# Patient Record
Sex: FEMALE | Race: BLACK OR AFRICAN AMERICAN | NOT HISPANIC OR LATINO | Employment: OTHER | URBAN - METROPOLITAN AREA
[De-identification: names, ages, dates, MRNs, and addresses within clinical notes are randomized per-mention and may not be internally consistent; named-entity substitution may affect disease eponyms.]

---

## 2017-02-22 ENCOUNTER — OFFICE VISIT (OUTPATIENT)
Dept: FAMILY MEDICINE | Facility: CLINIC | Age: 32
End: 2017-02-22
Payer: COMMERCIAL

## 2017-02-22 VITALS
TEMPERATURE: 98 F | WEIGHT: 185.88 LBS | OXYGEN SATURATION: 99 % | RESPIRATION RATE: 16 BRPM | HEIGHT: 70 IN | DIASTOLIC BLOOD PRESSURE: 96 MMHG | BODY MASS INDEX: 26.61 KG/M2 | SYSTOLIC BLOOD PRESSURE: 144 MMHG | HEART RATE: 97 BPM

## 2017-02-22 DIAGNOSIS — J01.90 ACUTE BACTERIAL SINUSITIS: Primary | ICD-10-CM

## 2017-02-22 DIAGNOSIS — J06.9 UPPER RESPIRATORY TRACT INFECTION, UNSPECIFIED TYPE: ICD-10-CM

## 2017-02-22 DIAGNOSIS — B96.89 ACUTE BACTERIAL SINUSITIS: Primary | ICD-10-CM

## 2017-02-22 PROCEDURE — 96372 THER/PROPH/DIAG INJ SC/IM: CPT | Mod: S$GLB,,, | Performed by: NURSE PRACTITIONER

## 2017-02-22 PROCEDURE — 3080F DIAST BP >= 90 MM HG: CPT | Mod: S$GLB,,, | Performed by: NURSE PRACTITIONER

## 2017-02-22 PROCEDURE — 99214 OFFICE O/P EST MOD 30 MIN: CPT | Mod: 25,S$GLB,, | Performed by: NURSE PRACTITIONER

## 2017-02-22 PROCEDURE — 99999 PR PBB SHADOW E&M-EST. PATIENT-LVL IV: CPT | Mod: PBBFAC,,, | Performed by: NURSE PRACTITIONER

## 2017-02-22 PROCEDURE — 3077F SYST BP >= 140 MM HG: CPT | Mod: S$GLB,,, | Performed by: NURSE PRACTITIONER

## 2017-02-22 PROCEDURE — 1160F RVW MEDS BY RX/DR IN RCRD: CPT | Mod: S$GLB,,, | Performed by: NURSE PRACTITIONER

## 2017-02-22 RX ORDER — TRIAMCINOLONE ACETONIDE 40 MG/ML
40 INJECTION, SUSPENSION INTRA-ARTICULAR; INTRAMUSCULAR
Status: COMPLETED | OUTPATIENT
Start: 2017-02-22 | End: 2017-02-22

## 2017-02-22 RX ORDER — PROMETHAZINE HYDROCHLORIDE AND DEXTROMETHORPHAN HYDROBROMIDE 6.25; 15 MG/5ML; MG/5ML
5 SYRUP ORAL
Qty: 180 ML | Refills: 0 | Status: SHIPPED | OUTPATIENT
Start: 2017-02-22 | End: 2017-03-01

## 2017-02-22 RX ORDER — FLUTICASONE PROPIONATE 50 MCG
1 SPRAY, SUSPENSION (ML) NASAL DAILY
Qty: 16 G | Refills: 0 | Status: SHIPPED | OUTPATIENT
Start: 2017-02-22 | End: 2017-04-25

## 2017-02-22 RX ORDER — AZITHROMYCIN 250 MG/1
TABLET, FILM COATED ORAL
Qty: 6 TABLET | Refills: 0 | Status: ON HOLD | OUTPATIENT
Start: 2017-02-22 | End: 2017-04-18 | Stop reason: HOSPADM

## 2017-02-22 RX ADMIN — TRIAMCINOLONE ACETONIDE 40 MG: 40 INJECTION, SUSPENSION INTRA-ARTICULAR; INTRAMUSCULAR at 12:02

## 2017-02-22 NOTE — MEDICAL/APP STUDENT
Subjective:       Patient ID: Nasra Marks is a 31 y.o. female.    Chief Complaint: Cough (x's 3 days); Dizziness (x's 3 days); and Nausea (x's 3 days)    Cough   This is a new problem. The current episode started in the past 7 days. The problem has been gradually worsening. The cough is productive of purulent sputum. Associated symptoms include chills, headaches, myalgias, nasal congestion, postnasal drip, a rash, rhinorrhea, a sore throat and sweats. Pertinent negatives include no chest pain, ear congestion, ear pain, fever, heartburn, hemoptysis or shortness of breath. The symptoms are aggravated by lying down. She has tried OTC cough suppressant and prescription cough suppressant for the symptoms. The treatment provided mild relief.   Dizziness:   Chronicity:  New  Onset: 3 days.  Progression since onset:  Gradually worsening  Frequency:  Every few hours  Pain Scale:  0/10  Severity:  Mild  Duration:  Very brief  Dizziness characteristics:  Sensation of movement, spinning inside head only and lightheaded/impending faint  Frequency of Spells:  Hourly   Associated symptoms: headaches, nausea, diaphoresis, weakness (generalized), visual disturbances and light-headedness.no ear congestion, no ear pain, no fever, no vomiting, no syncope, no palpitations, no panic, no slurred speech, no numbness in extremities and no chest pain.  Nausea   This is a new problem. Episode onset: 3 days. The problem occurs intermittently. The problem has been gradually worsening. Associated symptoms include chills, congestion, coughing, diaphoresis, fatigue, headaches, myalgias, nausea, a rash, a sore throat, vertigo, a visual change and weakness (generalized). Pertinent negatives include no abdominal pain, anorexia, arthralgias, change in bowel habit, chest pain, fever, joint swelling, neck pain, numbness or vomiting. Nothing aggravates the symptoms. She has tried nothing for the symptoms.     Review of Systems   Constitutional:  Positive for appetite change, chills, diaphoresis and fatigue. Negative for fever.   HENT: Positive for congestion, postnasal drip, rhinorrhea, sneezing and sore throat. Negative for ear pain.    Respiratory: Positive for cough. Negative for hemoptysis and shortness of breath.    Cardiovascular: Negative for chest pain, palpitations and syncope.   Gastrointestinal: Positive for nausea. Negative for abdominal pain, anorexia, change in bowel habit, diarrhea, heartburn and vomiting.   Musculoskeletal: Positive for myalgias. Negative for arthralgias, joint swelling and neck pain.   Skin: Positive for rash.   Neurological: Positive for dizziness, vertigo, weakness (generalized), light-headedness and headaches. Negative for numbness.       Objective:      Physical Exam   Constitutional: Vital signs are normal. She appears well-developed and well-nourished.   HENT:   Head: Normocephalic and atraumatic.   Right Ear: Tympanic membrane and ear canal normal.   Left Ear: Tympanic membrane and ear canal normal.   Nose: Mucosal edema and rhinorrhea present.   Mouth/Throat: Uvula is midline. Posterior oropharyngeal erythema present.   Eyes: Pupils are equal, round, and reactive to light.   Neck: Normal range of motion. Neck supple.   Cardiovascular: Normal rate and regular rhythm.    Pulmonary/Chest: Effort normal and breath sounds normal.   Lymphadenopathy:     She has no cervical adenopathy.   Neurological: She is alert.   Skin: Skin is warm and dry.   Nursing note and vitals reviewed.      Assessment:     1. Acute bacterial sinusitis    2. Upper respiratory tract infection, unspecified type      Plan:   Nasra was seen today for cough, dizziness and nausea.    Diagnoses and all orders for this visit:    Acute bacterial sinusitis  -     promethazine-dextromethorphan (PROMETHAZINE-DM) 6.25-15 mg/5 mL Syrp; Take 5 mLs by mouth every 4 to 6 hours as needed.  -     azithromycin (Z-ZAMZAM) 250 MG tablet; Take 2 tablets by mouth on day 1;  Take 1 tablet by mouth on days 2-5  -     triamcinolone acetonide injection 40 mg; Inject 1 mL (40 mg total) into the muscle one time.  -     fluticasone (FLONASE) 50 mcg/actuation nasal spray; 1 spray by Each Nare route once daily.    Upper respiratory tract infection, unspecified type  -     promethazine-dextromethorphan (PROMETHAZINE-DM) 6.25-15 mg/5 mL Syrp; Take 5 mLs by mouth every 4 to 6 hours as needed.  -     azithromycin (Z-ZAMZAM) 250 MG tablet; Take 2 tablets by mouth on day 1; Take 1 tablet by mouth on days 2-5  -     triamcinolone acetonide injection 40 mg; Inject 1 mL (40 mg total) into the muscle one time.  -     fluticasone (FLONASE) 50 mcg/actuation nasal spray; 1 spray by Each Nare route once daily.    HOME CARE:  · Drink plenty of water, hot tea, and other liquids to stay well hydrated. This thins the mucus and promotes sinus drainage.  · Apply heat to the painful areas of the face. Use a towel soaked in hot water. Or,  the shower and direct the hot spray onto your face. This is a good way to inhale warm water vapor and get heat on your face at the same time. (Cover your mouth and nose with your hands so you can still breathe as you do this.)  · Use a vaporizer with products such as Vicks VapoRub (contains menthol) at night. Suck on peppermint, menthol or eucalyptus hard candies during the day.  · An expectorant containing guaifenesin (such as Robitussin), helps to thin the mucus and promote drainage from the sinuses.  · Over-the-counter decongestants may be used unless a similar medicine was prescribed. Nasal sprays work the fastest. Use one that contains phenylephrine (Carrington-synephrine, Sinex and others) or oxymetazoline (Afrin). First blow the nose gently to remove mucus, then apply the drops. Do not use these medicines more often than directed on the label or for more than three days or symptoms may worsen. You may also use tablets containing pseudoephedrine (Sudafed). Many sinus remedies  combine ingredients, which may increase side effects. Read the labels or ask the pharmacist for help. NOTE: Persons with high blood pressure should not use decongestants. They can raise blood pressure.  · Antihistamines are useful if allergies are a cause of your sinusitis. The mildest one is chlorpheniramine (available without a prescription). The dose for adults is 8-12mg three times a day. [NOTE: Do not use chlorpheniramine if you have glaucoma or if you are a man with trouble urinating due to an enlarged prostate.] Claritin (loratidine) is an antihistamine that causes less drowsiness and is a good alternative for daytime use.  · Do not use nasal rinses or irrigation during an acute sinus infection, unless advised by your doctor. Rinsing may spread the infection to other sinuses.  · You may use acetaminophen (Tylenol) or ibuprofen (Motrin, Advil) to control pain, unless another pain medicine was prescribed. [ NOTE: If you have chronic liver or kidney disease or ever had a stomach ulcer, talk with your doctor before using these medicines.] (Aspirin should never be used in anyone under 18 years of age who is ill with a fever. It may cause severe liver damage.)  · Finish the full course, even if you are feeling better after a few days.  FOLLOW UP with your doctor or this facility in one week or as instructed by our staff if not improving.  GET PROMPT MEDICAL ATTENTION if any of the following occur:  · Facial pain or headache becomes more severe  · Stiff neck  · Unusual drowsiness or confusion, or not acting like your normal self  · Swelling of the forehead or eyelids  · Vision problems including blurred or double vision  · Fever of 100.4ºF (38ºC) or higher, or as directed by your healthcare provider  · Seizure  © 8408-6379 [employer], [address] [place] [address], [place] [address]. All rights reserved. This information is not intended as a substitute for professional medical care. Always follow your healthcare  professional's instructions.    I certify that I was present in the room directing the student in service delivery and guiding them using my skilled judgment. As the co-signing provider I have reviewed the students documentation and am responsible for the treatment, assessment, and plan.  See Student's HPI.        Return if symptoms worsen or fail to improve.

## 2017-02-22 NOTE — MR AVS SNAPSHOT
[facility]  [address]  [place] [address]  Phone: [phone]  Fax: [phone]                  Nasra Marks   2017 12:30 PM   Office Visit    Description:  Female : 1985   Provider:  [facility]   Department:  [facility]           Reason for Visit     Cough     Dizziness     Nausea           Diagnoses this Visit        Comments    Acute bacterial sinusitis    -  Primary     Upper respiratory tract infection, unspecified type                To Do List           Goals (5 Years of Data)     None       These Medications        Disp Refills Start End    promethazine-dextromethorphan (PROMETHAZINE-DM) 6.25-15 mg/5 mL Syrp 180 mL 0 2017 3/1/2017    Take 5 mLs by mouth every 4 to 6 hours as needed. - Oral    Pharmacy: [facility] [address] [place] [address] [place] [address] [employer] AT [facility] #: [phone]       azithromycin (Z-ZAMZAM) 250 MG tablet 6 tablet 0 2017     Take 2 tablets by mouth on day 1; Take 1 tablet by mouth on days 2-5    [facility] [address] [place] [address] [employer] AT [facility] #: [phone]       fluticasone (FLONASE) 50 mcg/actuation nasal spray 16 g 0 2017     1 spray by Each Nare route once daily. - Each Nare    Pharmacy: [facility] [address] [place] [address] [employer] AT [facility] #: [phone]         [facility]     [facility] -  Assistance  Registered nurses in the [facility] provide clinical advisement, health education, appointment booking, and other advisory services.  Call for this free service at [phone].             Medications           Message regarding Medications     Verify the changes and/or additions to your medication regime listed below are the same as discussed with your clinician today.  If any of these changes or additions are incorrect, please notify your  "healthcare provider.        START taking these NEW medications        Refills    promethazine-dextromethorphan (PROMETHAZINE-DM) 6.25-15 mg/5 mL Syrp 0    Sig: Take 5 mLs by mouth every 4 to 6 hours as needed.    Class: Normal    Route: Oral    azithromycin (Z-ZAMZAM) 250 MG tablet 0    Sig: Take 2 tablets by mouth on day 1; Take 1 tablet by mouth on days 2-5    Class: Normal    fluticasone (FLONASE) 50 mcg/actuation nasal spray 0    Si spray by Each Nare route once daily.    Class: Normal    Route: Each Nare      These medications were administered today        Dose Freq    triamcinolone acetonide injection 40 mg 40 mg Clinic/HOD 1 time    Sig: Inject 1 mL (40 mg total) into the muscle one time.    Class: Normal    Route: Intramuscular           Verify that the below list of medications is an accurate representation of the medications you are currently taking.  If none reported, the list may be blank. If incorrect, please contact your healthcare provider. Carry this list with you in case of emergency.           Current Medications     lisinopril-hydrochlorothiazide (PRINZIDE,ZESTORETIC) 20-12.5 mg per tablet Take 1 tablet by mouth once daily.    azithromycin (Z-ZAMZAM) 250 MG tablet Take 2 tablets by mouth on day 1; Take 1 tablet by mouth on days 2-5    fluticasone (FLONASE) 50 mcg/actuation nasal spray 1 spray by Each Nare route once daily.    omeprazole (PRILOSEC) 20 MG capsule Take 1 capsule (20 mg total) by mouth once daily.    promethazine-dextromethorphan (PROMETHAZINE-DM) 6.25-15 mg/5 mL Syrp Take 5 mLs by mouth every 4 to 6 hours as needed.           Clinical Reference Information           Your Vitals Were     BP Pulse Temp Resp Height    144/96 (BP Location: Right arm, Patient Position: Sitting, BP Method: Manual) 97 98.1 °F (36.7 °C) (Oral) 16 5' 10" (1.778 m)    Weight Last Period SpO2 BMI    84.3 kg (185 lb 13.6 oz) 2017 (Approximate) 99% 26.67 kg/m2      Blood Pressure          Most Recent Value "    BP  (!)  144/96      Allergies as of 2/22/2017     No Known Allergies      Immunizations Administered on Date of Encounter - 2/22/2017     None      Instructions      Sinusitis (Antibiotic Treatment)    The sinuses are air-filled spaces within the bones of the face. They connect to the inside of the nose. Sinusitis is an inflammation of the tissue lining the sinus cavity. Sinus inflammation can occur during a cold. It can also be due to allergies to pollens and other particles in the air. Sinusitis can cause symptoms of sinus congestion and fullness. A sinus infection causes fever, headache and facial pain. There is often green or yellow drainage from the nose or into the back of the throat (post-nasal drip). You have been given antibiotics to treat this condition.  Home care:  · Take the full course of antibiotics as instructed. Do not stop taking them, even if you feel better.  · Drink plenty of water, hot tea, and other liquids. This may help thin mucus. It also may promote sinus drainage.  · Heat may help soothe painful areas of the face. Use a towel soaked in hot water. Or,  the shower and direct the hot spray onto your face. Using a vaporizer along with a menthol rub at night may also help.   · An expectorant containing guaifenesin may help thin the mucus and promote drainage from the sinuses.  · Over-the-counter decongestants may be used unless a similar medicine was prescribed. Nasal sprays work the fastest. Use one that contains phenylephrine or oxymetazoline. First blow the nose gently. Then use the spray. Do not use these medicines more often than directed on the label or symptoms may get worse. You may also use tablets containing pseudoephedrine. Avoid products that combine ingredients, because side effects may be increased. Read labels. You can also ask the pharmacist for help. (NOTE: Persons with high blood pressure should not use decongestants. They can raise blood  pressure.)  · Over-the-counter antihistamines may help if allergies contributed to your sinusitis.    · Do not use nasal rinses or irrigation during an acute sinus infection, unless told to by your health care provider. Rinsing may spread the infection to other sinuses.  · Use acetaminophen or ibuprofen to control pain, unless another pain medicine was prescribed. (If you have chronic liver or kidney disease or ever had a stomach ulcer, talk with your doctor before using these medicines. Aspirin should never be used in anyone under 18 years of age who is ill with a fever. It may cause severe liver damage.)  · Don't smoke. This can worsen symptoms.  Follow-up care  Follow up with your healthcare provider or our staff if you are not improving within the next week.  When to seek medical advice  Call your healthcare provider if any of these occur:  · Facial pain or headache becoming more severe  · Stiff neck  · Unusual drowsiness or confusion  · Swelling of the forehead or eyelids  · Vision problems, including blurred or double vision  · Fever of 100.4ºF (38ºC) or higher, or as directed by your healthcare provider  · Seizure  · Breathing problems  · Symptoms not resolving within 10 days  Date Last Reviewed: 4/13/2015  © 8311-2202 [employer]. [address] [place] [address]. All rights reserved. This information is not intended as a substitute for professional medical care. Always follow your healthcare professional's instructions.             Language Assistance Services     ATTENTION: Language assistance services are available, free of charge. Please call [phone].      ATENCIÓN: Si habla español, tiene a castro disposición servicios gratuitos de asistencia lingüística. Llame al [phone].     [facility] Ý: N?u b?n nói Ti?ng Vi?t, có các d?ch v? h? tr? ngôn ng? mi?n phí dành cho b?n. G?i s? [phone].         [facility] complies with applicable Federal civil rights laws and  does not discriminate on the basis of race, color, national origin, age, disability, or sex.

## 2017-02-22 NOTE — PATIENT INSTRUCTIONS
Sinusitis (Antibiotic Treatment)    The sinuses are air-filled spaces within the bones of the face. They connect to the inside of the nose. Sinusitis is an inflammation of the tissue lining the sinus cavity. Sinus inflammation can occur during a cold. It can also be due to allergies to pollens and other particles in the air. Sinusitis can cause symptoms of sinus congestion and fullness. A sinus infection causes fever, headache and facial pain. There is often green or yellow drainage from the nose or into the back of the throat (post-nasal drip). You have been given antibiotics to treat this condition.  Home care:  · Take the full course of antibiotics as instructed. Do not stop taking them, even if you feel better.  · Drink plenty of water, hot tea, and other liquids. This may help thin mucus. It also may promote sinus drainage.  · Heat may help soothe painful areas of the face. Use a towel soaked in hot water. Or,  the shower and direct the hot spray onto your face. Using a vaporizer along with a menthol rub at night may also help.   · An expectorant containing guaifenesin may help thin the mucus and promote drainage from the sinuses.  · Over-the-counter decongestants may be used unless a similar medicine was prescribed. Nasal sprays work the fastest. Use one that contains phenylephrine or oxymetazoline. First blow the nose gently. Then use the spray. Do not use these medicines more often than directed on the label or symptoms may get worse. You may also use tablets containing pseudoephedrine. Avoid products that combine ingredients, because side effects may be increased. Read labels. You can also ask the pharmacist for help. (NOTE: Persons with high blood pressure should not use decongestants. They can raise blood pressure.)  · Over-the-counter antihistamines may help if allergies contributed to your sinusitis.    · Do not use nasal rinses or irrigation during an acute sinus infection, unless told to by  your health care provider. Rinsing may spread the infection to other sinuses.  · Use acetaminophen or ibuprofen to control pain, unless another pain medicine was prescribed. (If you have chronic liver or kidney disease or ever had a stomach ulcer, talk with your doctor before using these medicines. Aspirin should never be used in anyone under 18 years of age who is ill with a fever. It may cause severe liver damage.)  · Don't smoke. This can worsen symptoms.  Follow-up care  Follow up with your healthcare provider or our staff if you are not improving within the next week.  When to seek medical advice  Call your healthcare provider if any of these occur:  · Facial pain or headache becoming more severe  · Stiff neck  · Unusual drowsiness or confusion  · Swelling of the forehead or eyelids  · Vision problems, including blurred or double vision  · Fever of 100.4ºF (38ºC) or higher, or as directed by your healthcare provider  · Seizure  · Breathing problems  · Symptoms not resolving within 10 days  Date Last Reviewed: 4/13/2015  © 1214-9714 [employer], [employer]. [address] [place] [address], [place] [address]. All rights reserved. This information is not intended as a substitute for professional medical care. Always follow your healthcare professional's instructions.

## 2017-03-03 NOTE — PROGRESS NOTES
Subjective:       Patient ID: Nasra Marks is a 31 y.o. female.    Chief Complaint: Cough (x's 3 days); Dizziness (x's 3 days); and Nausea (x's 3 days)    Cough   This is a new problem. The current episode started in the past 7 days. The problem has been gradually worsening. The cough is productive of purulent sputum. Associated symptoms include chills, headaches, myalgias, nasal congestion, postnasal drip, a rash, rhinorrhea, a sore throat and sweats. Pertinent negatives include no chest pain, ear congestion, ear pain, fever, heartburn, hemoptysis or shortness of breath. The symptoms are aggravated by lying down. She has tried OTC cough suppressant and prescription cough suppressant for the symptoms. The treatment provided mild relief.   Dizziness:   Chronicity:  New  Onset: 3 days.  Progression since onset:  Gradually worsening  Frequency:  Every few hours  Pain Scale:  0/10  Severity:  Mild  Duration:  Very brief  Dizziness characteristics:  Sensation of movement, spinning inside head only and lightheaded/impending faint  Frequency of Spells:  Hourly   Associated symptoms: headaches, nausea, diaphoresis, weakness (generalized), visual disturbances and light-headedness.no ear congestion, no ear pain, no fever, no vomiting, no syncope, no palpitations, no panic, no slurred speech, no numbness in extremities and no chest pain.  Nausea   This is a new problem. Episode onset: 3 days. The problem occurs intermittently. The problem has been gradually worsening. Associated symptoms include chills, congestion, coughing, diaphoresis, fatigue, headaches, myalgias, nausea, a rash, a sore throat, vertigo, a visual change and weakness (generalized). Pertinent negatives include no abdominal pain, anorexia, arthralgias, change in bowel habit, chest pain, fever, joint swelling, neck pain, numbness or vomiting. Nothing aggravates the symptoms. She has tried nothing for the symptoms.     Review of Systems   Constitutional:  Positive for appetite change, chills, diaphoresis and fatigue. Negative for fever.   HENT: Positive for congestion, postnasal drip, rhinorrhea, sneezing and sore throat. Negative for ear pain.    Respiratory: Positive for cough. Negative for hemoptysis and shortness of breath.    Cardiovascular: Negative for chest pain, palpitations and syncope.   Gastrointestinal: Positive for nausea. Negative for abdominal pain, anorexia, change in bowel habit, diarrhea, heartburn and vomiting.   Musculoskeletal: Positive for myalgias. Negative for arthralgias, joint swelling and neck pain.   Skin: Positive for rash.   Neurological: Positive for dizziness, vertigo, weakness (generalized), light-headedness and headaches. Negative for numbness.       Objective:      Physical Exam   Constitutional: Vital signs are normal. She appears well-developed and well-nourished.   HENT:   Head: Normocephalic and atraumatic.   Right Ear: Tympanic membrane and ear canal normal.   Left Ear: Tympanic membrane and ear canal normal.   Nose: Mucosal edema and rhinorrhea present.   Mouth/Throat: Uvula is midline. Posterior oropharyngeal erythema present.   Eyes: Pupils are equal, round, and reactive to light.   Neck: Normal range of motion. Neck supple.   Cardiovascular: Normal rate and regular rhythm.    Pulmonary/Chest: Effort normal and breath sounds normal.   Lymphadenopathy:     She has no cervical adenopathy.   Neurological: She is alert.   Skin: Skin is warm and dry.   Nursing note and vitals reviewed.      Assessment:     1. Acute bacterial sinusitis    2. Upper respiratory tract infection, unspecified type      Plan:   Nasra was seen today for cough, dizziness and nausea.    Diagnoses and all orders for this visit:    Acute bacterial sinusitis  -     promethazine-dextromethorphan (PROMETHAZINE-DM) 6.25-15 mg/5 mL Syrp; Take 5 mLs by mouth every 4 to 6 hours as needed.  -     azithromycin (Z-ZAMZAM) 250 MG tablet; Take 2 tablets by mouth on day 1;  Take 1 tablet by mouth on days 2-5  -     triamcinolone acetonide injection 40 mg; Inject 1 mL (40 mg total) into the muscle one time.  -     fluticasone (FLONASE) 50 mcg/actuation nasal spray; 1 spray by Each Nare route once daily.    Upper respiratory tract infection, unspecified type  -     promethazine-dextromethorphan (PROMETHAZINE-DM) 6.25-15 mg/5 mL Syrp; Take 5 mLs by mouth every 4 to 6 hours as needed.  -     azithromycin (Z-ZAMZAM) 250 MG tablet; Take 2 tablets by mouth on day 1; Take 1 tablet by mouth on days 2-5  -     triamcinolone acetonide injection 40 mg; Inject 1 mL (40 mg total) into the muscle one time.  -     fluticasone (FLONASE) 50 mcg/actuation nasal spray; 1 spray by Each Nare route once daily.    HOME CARE:  · Drink plenty of water, hot tea, and other liquids to stay well hydrated. This thins the mucus and promotes sinus drainage.  · Apply heat to the painful areas of the face. Use a towel soaked in hot water. Or,  the shower and direct the hot spray onto your face. This is a good way to inhale warm water vapor and get heat on your face at the same time. (Cover your mouth and nose with your hands so you can still breathe as you do this.)  · Use a vaporizer with products such as Vicks VapoRub (contains menthol) at night. Suck on peppermint, menthol or eucalyptus hard candies during the day.  · An expectorant containing guaifenesin (such as Robitussin), helps to thin the mucus and promote drainage from the sinuses.  · Over-the-counter decongestants may be used unless a similar medicine was prescribed. Nasal sprays work the fastest. Use one that contains phenylephrine (Carrington-synephrine, Sinex and others) or oxymetazoline (Afrin). First blow the nose gently to remove mucus, then apply the drops. Do not use these medicines more often than directed on the label or for more than three days or symptoms may worsen. You may also use tablets containing pseudoephedrine (Sudafed). Many sinus remedies  combine ingredients, which may increase side effects. Read the labels or ask the pharmacist for help. NOTE: Persons with high blood pressure should not use decongestants. They can raise blood pressure.  · Antihistamines are useful if allergies are a cause of your sinusitis. The mildest one is chlorpheniramine (available without a prescription). The dose for adults is 8-12mg three times a day. [NOTE: Do not use chlorpheniramine if you have glaucoma or if you are a man with trouble urinating due to an enlarged prostate.] Claritin (loratidine) is an antihistamine that causes less drowsiness and is a good alternative for daytime use.  · Do not use nasal rinses or irrigation during an acute sinus infection, unless advised by your doctor. Rinsing may spread the infection to other sinuses.  · You may use acetaminophen (Tylenol) or ibuprofen (Motrin, Advil) to control pain, unless another pain medicine was prescribed. [ NOTE: If you have chronic liver or kidney disease or ever had a stomach ulcer, talk with your doctor before using these medicines.] (Aspirin should never be used in anyone under 18 years of age who is ill with a fever. It may cause severe liver damage.)  · Finish the full course, even if you are feeling better after a few days.  FOLLOW UP with your doctor or this facility in one week or as instructed by our staff if not improving.  GET PROMPT MEDICAL ATTENTION if any of the following occur:  · Facial pain or headache becomes more severe  · Stiff neck  · Unusual drowsiness or confusion, or not acting like your normal self  · Swelling of the forehead or eyelids  · Vision problems including blurred or double vision  · Fever of 100.4ºF (38ºC) or higher, or as directed by your healthcare provider  · Seizure  © 8173-5613 [employer], [address] [place] [address], [place] [address]. All rights reserved. This information is not intended as a substitute for professional medical care. Always follow your healthcare  professional's instructions.      Return if symptoms worsen or fail to improve.

## 2017-04-16 ENCOUNTER — HOSPITAL ENCOUNTER (EMERGENCY)
Facility: OTHER | Age: 32
Discharge: ANOTHER HOSPITAL | End: 2017-04-16
Attending: EMERGENCY MEDICINE
Payer: COMMERCIAL

## 2017-04-16 ENCOUNTER — HOSPITAL ENCOUNTER (INPATIENT)
Facility: HOSPITAL | Age: 32
LOS: 2 days | Discharge: HOME | DRG: 287 | End: 2017-04-18
Attending: HOSPITALIST
Payer: COMMERCIAL

## 2017-04-16 VITALS
HEART RATE: 96 BPM | SYSTOLIC BLOOD PRESSURE: 140 MMHG | HEIGHT: 69 IN | OXYGEN SATURATION: 100 % | RESPIRATION RATE: 16 BRPM | WEIGHT: 184 LBS | TEMPERATURE: 99 F | DIASTOLIC BLOOD PRESSURE: 103 MMHG | BODY MASS INDEX: 27.25 KG/M2

## 2017-04-16 DIAGNOSIS — I16.1 HYPERTENSIVE EMERGENCY: Primary | ICD-10-CM

## 2017-04-16 DIAGNOSIS — I50.9 ACUTE HEART FAILURE, UNSPECIFIED HEART FAILURE TYPE: ICD-10-CM

## 2017-04-16 DIAGNOSIS — R06.02 SHORTNESS OF BREATH: ICD-10-CM

## 2017-04-16 DIAGNOSIS — I50.41 ACUTE COMBINED SYSTOLIC AND DIASTOLIC CONGESTIVE HEART FAILURE: Primary | ICD-10-CM

## 2017-04-16 DIAGNOSIS — I10 ESSENTIAL (PRIMARY) HYPERTENSION: ICD-10-CM

## 2017-04-16 DIAGNOSIS — I50.9 ACUTE CHF: ICD-10-CM

## 2017-04-16 DIAGNOSIS — I50.9 CHF (CONGESTIVE HEART FAILURE): ICD-10-CM

## 2017-04-16 PROBLEM — J81.0 ACUTE PULMONARY EDEMA: Status: ACTIVE | Noted: 2017-04-16

## 2017-04-16 LAB
ALBUMIN SERPL BCP-MCNC: 3.8 G/DL
ALBUMIN SERPL-MCNC: 3.5 G/DL (ref 3.3–5.5)
ALP SERPL-CCNC: 53 U/L (ref 42–141)
ALP SERPL-CCNC: 63 U/L
ALT SERPL W/O P-5'-P-CCNC: 18 U/L
AMPHET+METHAMPHET UR QL: NEGATIVE
ANION GAP SERPL CALC-SCNC: 10 MMOL/L
AST SERPL-CCNC: 19 U/L
B-HCG UR QL: NEGATIVE
BARBITURATES UR QL SCN>200 NG/ML: NEGATIVE
BASOPHILS # BLD AUTO: 0.02 K/UL
BASOPHILS NFR BLD: 0.2 %
BENZODIAZ UR QL SCN>200 NG/ML: NEGATIVE
BILIRUB SERPL-MCNC: 0.5 MG/DL
BILIRUB SERPL-MCNC: 0.7 MG/DL (ref 0.2–1.6)
BUN SERPL-MCNC: 12 MG/DL
BUN SERPL-MCNC: 14 MG/DL (ref 7–22)
BZE UR QL SCN: NEGATIVE
CALCIUM SERPL-MCNC: 9.3 MG/DL
CALCIUM SERPL-MCNC: 9.3 MG/DL (ref 8–10.3)
CANNABINOIDS UR QL SCN: NEGATIVE
CHLORIDE SERPL-SCNC: 105 MMOL/L
CHLORIDE SERPL-SCNC: 99 MMOL/L (ref 98–108)
CO2 SERPL-SCNC: 24 MMOL/L
CREAT SERPL-MCNC: 0.7 MG/DL (ref 0.6–1.2)
CREAT SERPL-MCNC: 0.8 MG/DL
CREAT UR-MCNC: 69 MG/DL
CTP QC/QA: YES
DIFFERENTIAL METHOD: ABNORMAL
EOSINOPHIL # BLD AUTO: 0.4 K/UL
EOSINOPHIL NFR BLD: 2.6 %
ERYTHROCYTE [DISTWIDTH] IN BLOOD BY AUTOMATED COUNT: 13.7 %
EST. GFR  (AFRICAN AMERICAN): >60 ML/MIN/1.73 M^2
EST. GFR  (NON AFRICAN AMERICAN): >60 ML/MIN/1.73 M^2
GLUCOSE SERPL-MCNC: 131 MG/DL
GLUCOSE SERPL-MCNC: 99 MG/DL (ref 73–118)
HCT VFR BLD AUTO: 37.9 %
HGB BLD-MCNC: 12.1 G/DL
LYMPHOCYTES # BLD AUTO: 0.8 K/UL
LYMPHOCYTES NFR BLD: 6.4 %
MAGNESIUM SERPL-MCNC: 1.8 MG/DL
MCH RBC QN AUTO: 27.7 PG
MCHC RBC AUTO-ENTMCNC: 31.9 %
MCV RBC AUTO: 87 FL
METHADONE UR QL SCN>300 NG/ML: NEGATIVE
MONOCYTES # BLD AUTO: 0.4 K/UL
MONOCYTES NFR BLD: 3 %
NEUTROPHILS # BLD AUTO: 11.6 K/UL
NEUTROPHILS NFR BLD: 87.3 %
OPIATES UR QL SCN: NEGATIVE
PCP UR QL SCN>25 NG/ML: NEGATIVE
PHOSPHATE SERPL-MCNC: 3 MG/DL
PLATELET # BLD AUTO: 280 K/UL
PMV BLD AUTO: 11 FL
POC ALT (SGPT): 19 U/L (ref 10–47)
POC AST (SGOT): 30 U/L (ref 11–38)
POC B-TYPE NATRIURETIC PEPTIDE: 674 PG/ML (ref 0–100)
POC CARDIAC TROPONIN I: 0.03 NG/ML
POC D-DI: 227 NG/ML (ref 0–450)
POC TCO2: 27 MMOL/L (ref 18–33)
POTASSIUM BLD-SCNC: 2.9 MMOL/L (ref 3.6–5.1)
POTASSIUM SERPL-SCNC: 3.5 MMOL/L
PROT SERPL-MCNC: 8 G/DL
PROTEIN, POC: 7.5 G/DL (ref 6.4–8.1)
RBC # BLD AUTO: 4.37 M/UL
SAMPLE: NORMAL
SODIUM BLD-SCNC: 143 MMOL/L (ref 128–145)
SODIUM SERPL-SCNC: 139 MMOL/L
TOXICOLOGY INFORMATION: NORMAL
TROPONIN I SERPL DL<=0.01 NG/ML-MCNC: 0.03 NG/ML
TSH SERPL DL<=0.005 MIU/L-ACNC: 1.16 UIU/ML
WBC # BLD AUTO: 13.21 K/UL

## 2017-04-16 PROCEDURE — 96374 THER/PROPH/DIAG INJ IV PUSH: CPT

## 2017-04-16 PROCEDURE — 84443 ASSAY THYROID STIM HORMONE: CPT

## 2017-04-16 PROCEDURE — 80053 COMPREHEN METABOLIC PANEL: CPT

## 2017-04-16 PROCEDURE — 93306 TTE W/DOPPLER COMPLETE: CPT

## 2017-04-16 PROCEDURE — 81025 URINE PREGNANCY TEST: CPT | Performed by: EMERGENCY MEDICINE

## 2017-04-16 PROCEDURE — 93306 TTE W/DOPPLER COMPLETE: CPT | Mod: 26,,, | Performed by: INTERNAL MEDICINE

## 2017-04-16 PROCEDURE — 83036 HEMOGLOBIN GLYCOSYLATED A1C: CPT

## 2017-04-16 PROCEDURE — 80307 DRUG TEST PRSMV CHEM ANLYZR: CPT

## 2017-04-16 PROCEDURE — 85025 COMPLETE CBC W/AUTO DIFF WBC: CPT

## 2017-04-16 PROCEDURE — 25000003 PHARM REV CODE 250: Performed by: EMERGENCY MEDICINE

## 2017-04-16 PROCEDURE — 63600175 PHARM REV CODE 636 W HCPCS: Performed by: EMERGENCY MEDICINE

## 2017-04-16 PROCEDURE — 84484 ASSAY OF TROPONIN QUANT: CPT

## 2017-04-16 PROCEDURE — 94761 N-INVAS EAR/PLS OXIMETRY MLT: CPT

## 2017-04-16 PROCEDURE — 63600175 PHARM REV CODE 636 W HCPCS: Performed by: HOSPITALIST

## 2017-04-16 PROCEDURE — 25000003 PHARM REV CODE 250: Performed by: HOSPITALIST

## 2017-04-16 PROCEDURE — 36415 COLL VENOUS BLD VENIPUNCTURE: CPT

## 2017-04-16 PROCEDURE — 82570 ASSAY OF URINE CREATININE: CPT

## 2017-04-16 PROCEDURE — 21400001 HC TELEMETRY ROOM

## 2017-04-16 PROCEDURE — 96375 TX/PRO/DX INJ NEW DRUG ADDON: CPT

## 2017-04-16 PROCEDURE — 83735 ASSAY OF MAGNESIUM: CPT

## 2017-04-16 PROCEDURE — 99285 EMERGENCY DEPT VISIT HI MDM: CPT | Mod: 25

## 2017-04-16 PROCEDURE — 84100 ASSAY OF PHOSPHORUS: CPT

## 2017-04-16 RX ORDER — ONDANSETRON 2 MG/ML
4 INJECTION INTRAMUSCULAR; INTRAVENOUS EVERY 12 HOURS PRN
Status: DISCONTINUED | OUTPATIENT
Start: 2017-04-16 | End: 2017-04-18 | Stop reason: HOSPADM

## 2017-04-16 RX ORDER — LISINOPRIL 20 MG/1
40 TABLET ORAL
Status: COMPLETED | OUTPATIENT
Start: 2017-04-16 | End: 2017-04-16

## 2017-04-16 RX ORDER — FAMOTIDINE 20 MG/1
20 TABLET, FILM COATED ORAL 2 TIMES DAILY
Status: DISCONTINUED | OUTPATIENT
Start: 2017-04-16 | End: 2017-04-18 | Stop reason: HOSPADM

## 2017-04-16 RX ORDER — MORPHINE SULFATE 10 MG/ML
5 INJECTION INTRAMUSCULAR; INTRAVENOUS; SUBCUTANEOUS
Status: COMPLETED | OUTPATIENT
Start: 2017-04-16 | End: 2017-04-16

## 2017-04-16 RX ORDER — HYDROCODONE BITARTRATE AND ACETAMINOPHEN 5; 325 MG/1; MG/1
1 TABLET ORAL EVERY 4 HOURS PRN
Status: DISCONTINUED | OUTPATIENT
Start: 2017-04-16 | End: 2017-04-18 | Stop reason: HOSPADM

## 2017-04-16 RX ORDER — FLUTICASONE PROPIONATE 50 MCG
1 SPRAY, SUSPENSION (ML) NASAL DAILY
Status: DISCONTINUED | OUTPATIENT
Start: 2017-04-16 | End: 2017-04-18 | Stop reason: HOSPADM

## 2017-04-16 RX ORDER — LISINOPRIL 20 MG/1
20 TABLET ORAL DAILY
Status: DISCONTINUED | OUTPATIENT
Start: 2017-04-16 | End: 2017-04-17

## 2017-04-16 RX ORDER — POTASSIUM CHLORIDE 20 MEQ/1
60 TABLET, EXTENDED RELEASE ORAL
Status: COMPLETED | OUTPATIENT
Start: 2017-04-16 | End: 2017-04-16

## 2017-04-16 RX ORDER — ACETAMINOPHEN 500 MG
1000 TABLET ORAL
Status: COMPLETED | OUTPATIENT
Start: 2017-04-16 | End: 2017-04-16

## 2017-04-16 RX ORDER — FUROSEMIDE 10 MG/ML
40 INJECTION INTRAMUSCULAR; INTRAVENOUS
Status: COMPLETED | OUTPATIENT
Start: 2017-04-16 | End: 2017-04-16

## 2017-04-16 RX ORDER — ZOLPIDEM TARTRATE 5 MG/1
5 TABLET ORAL NIGHTLY PRN
Status: DISCONTINUED | OUTPATIENT
Start: 2017-04-16 | End: 2017-04-18 | Stop reason: HOSPADM

## 2017-04-16 RX ORDER — ASPIRIN 325 MG
325 TABLET ORAL
Status: COMPLETED | OUTPATIENT
Start: 2017-04-16 | End: 2017-04-16

## 2017-04-16 RX ORDER — ONDANSETRON 2 MG/ML
4 INJECTION INTRAMUSCULAR; INTRAVENOUS
Status: COMPLETED | OUTPATIENT
Start: 2017-04-16 | End: 2017-04-16

## 2017-04-16 RX ORDER — FUROSEMIDE 10 MG/ML
40 INJECTION INTRAMUSCULAR; INTRAVENOUS 2 TIMES DAILY
Status: DISCONTINUED | OUTPATIENT
Start: 2017-04-16 | End: 2017-04-18

## 2017-04-16 RX ORDER — NITROGLYCERIN 0.4 MG/1
0.4 TABLET SUBLINGUAL
Status: COMPLETED | OUTPATIENT
Start: 2017-04-16 | End: 2017-04-16

## 2017-04-16 RX ORDER — SODIUM CHLORIDE 0.9 % (FLUSH) 0.9 %
3 SYRINGE (ML) INJECTION EVERY 8 HOURS
Status: DISCONTINUED | OUTPATIENT
Start: 2017-04-16 | End: 2017-04-18 | Stop reason: HOSPADM

## 2017-04-16 RX ADMIN — NITROGLYCERIN 0.4 MG: 0.4 TABLET SUBLINGUAL at 02:04

## 2017-04-16 RX ADMIN — ASPIRIN 325 MG ORAL TABLET 325 MG: 325 PILL ORAL at 02:04

## 2017-04-16 RX ADMIN — FAMOTIDINE 20 MG: 20 TABLET, FILM COATED ORAL at 09:04

## 2017-04-16 RX ADMIN — ONDANSETRON 4 MG: 2 INJECTION INTRAMUSCULAR; INTRAVENOUS at 06:04

## 2017-04-16 RX ADMIN — LISINOPRIL 40 MG: 20 TABLET ORAL at 07:04

## 2017-04-16 RX ADMIN — HYDROCODONE BITARTRATE AND ACETAMINOPHEN 1 TABLET: 5; 325 TABLET ORAL at 05:04

## 2017-04-16 RX ADMIN — Medication 3 ML: at 09:04

## 2017-04-16 RX ADMIN — FUROSEMIDE 40 MG: 10 INJECTION, SOLUTION INTRAVENOUS at 05:04

## 2017-04-16 RX ADMIN — LISINOPRIL 20 MG: 20 TABLET ORAL at 01:04

## 2017-04-16 RX ADMIN — POTASSIUM CHLORIDE 60 MEQ: 1500 TABLET, EXTENDED RELEASE ORAL at 03:04

## 2017-04-16 RX ADMIN — ACETAMINOPHEN 1000 MG: 500 TABLET ORAL at 03:04

## 2017-04-16 RX ADMIN — MORPHINE SULFATE 5 MG: 10 INJECTION INTRAVENOUS at 07:04

## 2017-04-16 RX ADMIN — FUROSEMIDE 40 MG: 10 INJECTION, SOLUTION INTRAMUSCULAR; INTRAVENOUS at 03:04

## 2017-04-16 RX ADMIN — Medication 3 ML: at 02:04

## 2017-04-16 RX ADMIN — FAMOTIDINE 20 MG: 20 TABLET, FILM COATED ORAL at 01:04

## 2017-04-16 NOTE — PLAN OF CARE
Problem: Patient Care Overview  Goal: Plan of Care Review  Outcome: Ongoing (interventions implemented as appropriate)    04/16/17 1157   Coping/Psychosocial   Plan Of Care Reviewed With patient;mother         Problem: Pain, Acute (Adult)  Goal: Acceptable Pain Control/Comfort Level  Patient will demonstrate the desired outcomes by discharge/transition of care.   Outcome: Ongoing (interventions implemented as appropriate)    04/16/17 1157   Pain, Acute (Adult)   Acceptable Pain Control/Comfort Level making progress toward outcome         Problem: Cardiac: Heart Failure (Adult)  Goal: Signs and Symptoms of Listed Potential Problems Will be Absent, Minimized or Managed (Cardiac: Heart Failure)  Signs and symptoms of listed potential problems will be absent, minimized or managed by discharge/transition of care (reference Cardiac: Heart Failure (Adult) CPG).   Outcome: Ongoing (interventions implemented as appropriate)    04/16/17 1157   Cardiac: Heart Failure   Problems Assessed (Heart Failure) all   Problems Present (Heart Failure) none

## 2017-04-16 NOTE — ED PROVIDER NOTES
"Encounter Date: 4/16/2017       History     Chief Complaint   Patient presents with    Chest Pain     symptoms began yesterday, denies coughing, took a Ally aspirin at home    Shortness of Breath    Nausea    Vomiting     Review of patient's allergies indicates:  No Known Allergies  Patient is a 31 y.o. female presenting with the following complaint: chest pain. The history is provided by the patient.   Chest Pain   Primary symptoms include shortness of breath and vomiting (once today prior to arrival). Pertinent negatives for primary symptoms include no fever, no cough, no palpitations, no nausea and no dizziness.   Pertinent negatives for associated symptoms include no weakness.   Her past medical history is significant for hypertension.   Pertinent negatives for past medical history include no CHF, no DVT, no hyperlipidemia, no MI, no PE, no strokes and no thyroid problem.   Pertinent negatives for family medical history include: no CAD, no early MI, no PE, no stroke and no sudden death.      31 y.o. female with past medical history of hypertension (poorly controlled not currently taking prescribed antihypertensive medications) presents to the emergency department complaining of acute chest pain and shortness of breath.  She states chest pain began Friday night while she was lying in bed.  She describes chest pain as constant, pressure in the center of her chest and feels as though her chest is congested or as if there is something "in there".    She states chest pain resolved by the next morning and recurred as she was walking in the grocery store later that day with associated shortness of breath without diaphoresis, nausea or vomiting, lightheadedness or dizziness.  She states chest that has been constant since yesterday  and she reports one episode of vomiting just prior to arrival today.    She further reports orthopnea and dyspnea on exertion over the last 2 days as well as a mild, non-productive cough " today.      She denies tobacco, alcohol or illicit drug use, except occasional marijuana use.  She further reports noncompliance with blood pressure medications for several months because it makes her fatigued and causes headaches.  She reports history of hypertension since the age of 16 and states her blood pressure is always high.    Denies recent travel, recent surgery, history of cancer, personal or family history of DVT/PE/ACS, unilateral leg swelling, or exogenous hormone use.       Past Medical History:   Diagnosis Date    Hypertension     dx at 16y.o.     Past Surgical History:   Procedure Laterality Date     SECTION      x 1    INDUCED       TUBAL LIGATION       Family History   Problem Relation Age of Onset    Hypertension Mother     Cancer Maternal Grandmother      breast x 2    Cancer Paternal Grandmother      breast    No Known Problems Sister     No Known Problems Brother     No Known Problems Son     No Known Problems Brother     Hypertension Other     Diabetes Other     Breast cancer Other     Cancer Paternal Aunt      breast    Cancer Paternal Uncle      Social History   Substance Use Topics    Smoking status: Never Smoker    Smokeless tobacco: Never Used    Alcohol use Yes      Comment: rarely     Review of Systems   Constitutional: Negative for appetite change, chills and fever.   Eyes: Negative.  Negative for photophobia and visual disturbance.   Respiratory: Positive for shortness of breath. Negative for cough and stridor.    Cardiovascular: Positive for chest pain. Negative for palpitations and leg swelling.   Gastrointestinal: Positive for vomiting (once today prior to arrival). Negative for nausea.   Genitourinary: Negative for decreased urine volume, dysuria and hematuria.   Musculoskeletal: Negative.  Negative for back pain.   Skin: Negative.    Neurological: Positive for headaches. Negative for dizziness, syncope, speech difficulty, weakness and  light-headedness.   Psychiatric/Behavioral: Negative.    All other systems reviewed and are negative.      Physical Exam   Initial Vitals   BP Pulse Resp Temp SpO2   04/16/17 0218 04/16/17 0218 04/16/17 0218 04/16/17 0218 04/16/17 0218   173/124 118 16 98.4 °F (36.9 °C) 96 %     Physical Exam    Nursing note and vitals reviewed.  Constitutional: She appears well-developed and well-nourished. She is not diaphoretic. No distress.   HENT:   Head: Normocephalic and atraumatic.   Mouth/Throat: Oropharynx is clear and moist. No oropharyngeal exudate.   Eyes: EOM are normal. Pupils are equal, round, and reactive to light.   Neck: Normal range of motion. Neck supple.   Cardiovascular: Normal rate, regular rhythm and intact distal pulses.   No murmur heard.  Pulmonary/Chest: No accessory muscle usage or stridor. No tachypnea and no bradypnea. No respiratory distress. She has decreased breath sounds in the right middle field, the right lower field, the left middle field and the left lower field. She has no wheezes. She has no rales. She exhibits no tenderness.   Abdominal: Soft. Bowel sounds are normal. There is no tenderness.   Musculoskeletal: Normal range of motion. She exhibits no edema or tenderness.   Neurological: She is alert and oriented to person, place, and time. She has normal strength. No cranial nerve deficit.   Skin: Skin is warm and dry. No erythema. No pallor.   Psychiatric: She has a normal mood and affect.         ED Course   Procedures  Labs Reviewed   POCT CMP - Abnormal; Notable for the following:        Result Value    POC Potassium 2.9 (*)     All other components within normal limits   POCT B-TYPE NATRIURETIC PEPTIDE (BNP) - Abnormal; Notable for the following:     POC B-Type Natriuretic Peptide 674 (*)     All other components within normal limits   TROPONIN ISTAT   DRUG SCREEN PANEL, URINE EMERGENCY   POCT URINE PREGNANCY   POCT CBC   POCT CMP   POCT TROPONIN   POCT B-TYPE NATRIURETIC PEPTIDE (BNP)  "  POCT D DIMER   POCT D DIMER     EKG Readings: (Independently Interpreted)   Initial Reading: No STEMI. Rhythm: Sinus Tachycardia. Heart Rate: 110. Ectopy: No Ectopy. Conduction: Normal. ST Segments: Normal ST Segments. T Waves: Normal. Axis: Normal. Other Impression: LVH          Medical Decision Making:   Differential Diagnosis:    ACS, heart failure, dysrhythmia, pneumothorax, pneumonia, PE, COPD, costochondritis, anxiety, and others      Clinical Tests:   Lab Tests: Ordered and Reviewed       <> Summary of Lab: White count 14.3 H&H 12.3 and 36.6 platelets 309 MCV is 83.3 RDW 13.1  Radiological Study: Ordered and Reviewed  ED Management:  Blood pressure resolved spontaneously prior to administration of nitroglycerin. /124 at triage with . MAP goal of 105 attained with sublingual NTG x 1.   Tylenol given for HA sp NTG. Transfer center contacted to arrange transfer to [facility] for cardiac work up. Advised 2 patients ahead of my transfer request. Moira Kolb MD, Emergency Medicine Staff 5:17 AM 4/16/2017    Patient has been accepted to [facility] by Dr. Portillo. Awaiting bed assignment.   Moira Kolb MD, Emergency Medicine Staff 6:58 AM 4/16/2017                     ED Course     Vitals:    04/16/17 0218 04/16/17 0245 04/16/17 0302 04/16/17 0510   BP: (!) 173/124 (!) 153/111 (!) 140/95 (!) 146/100   Pulse: (!) 118 (!) 112 106 103   Resp: 16      Temp: 98.4 °F (36.9 °C)      TempSrc: Temporal      SpO2: 96% (!) 94%     Weight: 83.5 kg (184 lb)      Height: 5' 9" (1.753 m)       04/16/17 0634 04/16/17 0635 04/16/17 0638   BP: 134/84 134/84    Pulse:  96 96   Resp:      Temp:      TempSrc:      SpO2:      Weight:      Height:            Labs Reviewed  Admission on 04/16/2017   Component Date Value Ref Range Status    POC Preg Test, Ur 04/16/2017 Negative  Negative Final     Acceptable 04/16/2017 Yes   Final    Albumin, POC 04/16/2017 3.5  3.3 - 5.5 g/dL Final    Alkaline " Phosphatase, POC 04/16/2017 53  42 - 141 U/L Final    ALT (SGPT), POC 04/16/2017 19  10 - 47 U/L Final    AST (SGOT), POC 04/16/2017 30  11 - 38 U/L Final    POC BUN 04/16/2017 14  7 - 22 mg/dL Final    Calcium, POC 04/16/2017 9.3  8.0 - 10.3 mg/dL Final    POC Chloride 04/16/2017 99  98 - 108 mmol/L Final    POC Creatinine 04/16/2017 0.7  0.6 - 1.2 mg/dL Final    POC Glucose 04/16/2017 99  73 - 118 mg/dL Final    POC Potassium 04/16/2017 2.9* 3.6 - 5.1 mmol/L Final    POC Sodium 04/16/2017 143  128 - 145 mmol/L Final    Bilirubin 04/16/2017 0.7  0.2 - 1.6 mg/dL Final    POC TCO2 04/16/2017 27  18 - 33 mmol/L Final    Protein 04/16/2017 7.5  6.4 - 8.1 g/dL Final    POC Cardiac Troponin I 04/16/2017 0.03  <0.09 ng/mL Final    Sample 04/16/2017 UNK   Final    POC D-DI 04/16/2017 227  0.0 - 450.0 ng/mL Final    POC B-Type Natriuretic Peptide 04/16/2017 674* 0.0 - 100.0 pg/mL Final        Imaging Reviewed    Imaging Results         X-Ray Chest PA And Lateral (Final result) Result time:  04/16/17 03:22:56    Final result by Interface, Rad Results In (04/16/17 03:22:56)    Narrative:    Study Desc:   XR CHEST PA AND LATERAL  Comparison: None []  Clinical History: []     Chest, 2 views dated 4/16/2017.     History: Chest pain.     No prior studies are available comparison.     The heart is enlarged.  The mediastinum is otherwise unremarkable.  The lungs demonstrate   a diffuse symmetric bilateral interstitial pattern with evidence of alveolar opacities in   the lung bases.  Blunting of the right lateral costophrenic angle suggests the presence   of a small right pleural effusion.  No acute left pleural space abnormality is are   identified.     Impression:  1.  Symmetric bilateral interstitial and basilar alveolar opacities.  In the setting of   cardiomegaly, this most likely represents acute pulmonary edema.  2.  Small right pleural effusion.     SL: 131     SL:  Signed by: Paco Meade MD   2017-04-16 03:22:54 [CDT]              Medications given in ED    Medications   aspirin tablet 325 mg (325 mg Oral Given 4/16/17 0245)   nitroGLYCERIN SL tablet 0.4 mg (0.4 mg Sublingual Given 4/16/17 0246)   potassium chloride SA CR tablet 60 mEq (60 mEq Oral Given 4/16/17 0328)   furosemide injection 40 mg (40 mg Intravenous Given 4/16/17 1229)   acetaminophen tablet 1,000 mg (1,000 mg Oral Given 4/16/17 3721)      Clinical Impression:   The primary encounter diagnosis was Hypertensive emergency. Diagnoses of Shortness of breath and Acute heart failure, unspecified heart failure type were also pertinent to this visit.          Moira Kolb MD  04/16/17 0726

## 2017-04-16 NOTE — IP AVS SNAPSHOT
[place] [address] [address] [place] [address]  Phone: [phone]           Patient Discharge Instructions   Our goal is to set you up for success. This packet includes information on your condition, medications, and your home care.  It will help you care for yourself to prevent having to return to the hospital.     Please ask your nurse if you have any questions.      There are many details to remember when preparing to leave the hospital. Here is what you will need to do:    1. Take your medicine. If you are prescribed medications, review your Medication List on the following pages. You may have new medications to  at the pharmacy and others that you'll need to stop taking. Review the instructions for how and when to take your medications. Talk with your doctor or nurses if you are unsure of what to do.     2. Go to your follow-up appointments. Specific follow-up information is listed in the following pages. Your may be contacted by a nurse or clinical provider about future appointments. Be sure we have all of the phone numbers to reach you. Please contact your provider's office if you are unable to make an appointment.     3. Watch for warning signs. Your doctor or nurse will give you detailed warning signs to watch for and when to call for assistance. These instructions may also include educational information about your condition. If you experience any of warning signs to your health, call your doctor.           [facility]  Unless otherwise directed by your provider, please   contact [facility], our nurse care line   that is available for 24/7 assistance.     [phone] (toll-free)     Registered nurses in the [facility]   provide: appointment scheduling, clinical advisement, health education, and other advisory services.                  ** Verify the list of medication(s) below is accurate and up to date. Carry this with you in case of emergency.  If your medications have changed, please notify your healthcare provider.             Medication List      START taking these medications        Additional Info                      carvedilol 6.25 MG tablet   Commonly known as:  COREG   Quantity:  180 tablet   Refills:  3   Dose:  6.25 mg    Last time this was given:  6.25 mg on 4/18/2017  9:03 AM   Instructions:  Take 1 tablet (6.25 mg total) by mouth 2 (two) times daily.     Begin Date    AM    Noon    PM    Bedtime       lisinopril 40 MG tablet   Commonly known as:  PRINIVIL,ZESTRIL   Quantity:  90 tablet   Refills:  3   Dose:  40 mg    Last time this was given:  40 mg on 4/18/2017  9:02 AM   Instructions:  Take 1 tablet (40 mg total) by mouth once daily.     Begin Date    AM    Noon    PM    Bedtime         CONTINUE taking these medications        Additional Info                      fluticasone 50 mcg/actuation nasal spray   Commonly known as:  FLONASE   Quantity:  16 g   Refills:  0   Dose:  1 spray    Instructions:  1 spray by Each Nare route once daily.     Begin Date    AM    Noon    PM    Bedtime       omeprazole 20 MG capsule   Commonly known as:  PRILOSEC   Quantity:  90 capsule   Refills:  3   Dose:  20 mg    Instructions:  Take 1 capsule (20 mg total) by mouth once daily.     Begin Date    AM    Noon    PM    Bedtime         STOP taking these medications     azithromycin 250 MG tablet   Commonly known as:  Z-ZAMZAM       lisinopril-hydrochlorothiazide 20-12.5 mg per tablet   Commonly known as:  PRINZIDE,ZESTORETIC            Where to Get Your Medications      These medications were sent to [facility] - [place] - [address] [facility] [facility]  [address] [address]    Hours:  24-hours Phone:  [phone]     carvedilol 6.25 MG tablet    lisinopril 40 MG tablet                  Please bring to all follow up appointments:    1. A copy of your discharge instructions.  2. All medicines you are currently  taking in their original bottles.  3. Identification and insurance card.    Please arrive 15 minutes ahead of scheduled appointment time.    Please call 24 hours in advance if you must reschedule your appointment and/or time.        Your Scheduled Appointments     Apr 26, 2017 11:00 AM CDT   Established Patient Visit with Jose De Jesus Longo MD   [facility] - Cardiology ([facility])    [address] [facility]  [place] [id number] [phone]              Follow-up Information     Follow up with Jose De Jesus Longo MD On 4/26/2017.    Specialty:  Cardiology    Why:  Follow up scheduled for Wednesday April 26th at 11am. Please be sure to keep scheduled follow up appointment     Contact information:    [address] [facility] [address]  [place] [id number] [phone]          Schedule an appointment as soon as possible for a visit with Veronika Rainey MD.    Specialty:  Family Medicine    Why:  For follow up in 1-2 weeks after hospital discharge.    Contact information:    [address] [facility] [address] [phone]          Follow up with NON FASTING LAB FOR CHF PROGRAM On 4/26/2017.    Why:  New CHF enrollee, non fasting lab BNP,CMP, MG at 10:00 a.m.    Contact information:    [facility] first floor LAB  [address] [place] [address]          Discharge References/Attachments     BLOOD PRESSURE, DISCHARGE INSTRUCTIONS: TAKING YOUR (ENGLISH)    EXERCISE FOR A HEALTHIER HEART (ENGLISH)    EATING HEART-HEALTHY FOODS (ENGLISH)    HEART HEALTH: RESOURCES (ENGLISH)    HEART DISEASE, WOMEN AND: TIPS FOR MAKING SMALL CHANGES (ENGLISH)    WALKING FOR FITNESS  (ENGLISH)    CATHETERIZATION, CARDIAC (ENGLISH)        Primary Diagnosis     Your primary diagnosis was:  Heart Failure      Admission Information     Date & Time Provider Department CSN    4/16/2017  8:56 AM Laureen Bobby MD [facility] [id number]      Care Providers     Provider Role Specialty Primary office phone    Laureen  "MD Kanu Attending Provider Hospitalist [phone]    Kashif Peguero MD Consulting Physician  Cardiology [phone]      Your Vitals Were     BP Pulse Temp Resp Height Weight    129/78 83 97.6 °F (36.4 °C) (Oral) 16 5' 10" (1.778 m) 80.9 kg (178 lb 4.8 oz)    Last Period SpO2 BMI          03/21/2017 98% 25.58 kg/m2        Recent Lab Values        4/16/2017                          11:28 AM           A1C 5.6           Comment for A1C at 11:28 AM on 4/16/2017:  According to ADA guidelines, hemoglobin A1C <7.0% represents  optimal control in non-pregnant diabetic patients.  Different  metrics may apply to specific populations.   Standards of Medical Care in Diabetes - 2016.  For the purpose of screening for the presence of diabetes:  <5.7%     Consistent with the absence of diabetes  5.7-6.4%  Consistent with increasing risk for diabetes   (prediabetes)  >or=6.5%  Consistent with diabetes  Currently no consensus exists for use of hemoglobin A1C  for diagnosis of diabetes for children.        Allergies as of 4/18/2017     No Known Allergies      Advance Directives     An advance directive is a document which, in the event you are no longer able to make decisions for yourself, tells your healthcare team what kind of treatment you do or do not want to receive, or who you would like to make those decisions for you.  If you do not currently have an advance directive, [facility] encourages you to create one.  For more information call:  [phone]), [phone] ([phone]),  or log on to [email or web address].        Language Assistance Services     ATTENTION: Language assistance services are available, free of charge. Please call [phone].      ATENCIÓN: Si habla español, tiene a castro disposición servicios gratuitos de asistencia lingüística. Llame al [phone].     CHÚ Ý: N?u b?n nói Ti?ng Vi?t, có các d?ch v? h? tr? ngôn ng? mi?n phí dành cho b?n. G?i s? [phone].      "   Heart Failure Education       Heart Failure: Being Active  You have a condition called heart failure. Being active doesnt mean that you have to wear yourself out. Even a little movement each day helps to strengthen your heart. If you cant get out to exercise, you can do simple stretching and strengthening exercises at home. These are good ways to keep you well-conditioned and prevent you and your heart from becoming excessively weak.    Ideas to get you started  · Add a little movement to things you do now. Walk to mail letters. Park your car at the far end of the parking lot and walk to the store. Walk up a flight of stairs instead of taking the elevator.  · Choose activities you enjoy. You might walk, swim, or ride an exercise bike. Things like gardening and washing the car count, too. Other possibilities include: washing dishes, walking the dog, walking around the mall, and doing aerobic activities with friends.  · Join a group exercise program at a [facility] or [facility], a senior center, or a community center. Or look into a hospital cardiac rehabilitation program. Ask your doctor if you qualify.  Tips to keep you going  · Get up and get dressed each day. Go to a coffee shop and read a newspaper or go somewhere that you'll be in the presence of other active people. Youll feel more like being active.  · Make a plan. Choose one or more activities that you enjoy and that you can easily do. Then plan to do at least one each day. You might write your plan on a calendar.  · Go with a friend or a group if you like company. This can help you feel supported and stay motivated, too.  · Plan social events that you enjoy. This will keep you mentally engaged as well as physically motivated to do things you find pleasure in.  For your safety  · Talk with your healthcare provider before starting an exercise program.  · Exercise indoors when its too hot or too cold outside, or when the air quality is poor. Try walking at a  shopping mall.  · Wear socks and sturdy shoes to maintain your balance and prevent falls.  · Start slowly. Do a few minutes several times a day at first. Increase your time and speed little by little.  · Stop and rest whenever you feel tired or get short of breath.  · Dont push yourself on days when you dont feel well.  Date Last Reviewed: 3/20/2016  © 4517-1751 [employer]. [address] [place] [address], [place] [address]. All rights reserved. This information is not intended as a substitute for professional medical care. Always follow your healthcare professional's instructions.              Heart Failure: Evaluating Your Heart  You have a condition called heart failure. To evaluate your condition, your doctor will examine you, ask questions, and do some tests. Along with looking for signs of heart failure, the doctor looks for any other health problems that may have led to heart failure. The results of your evaluation will help your doctor form a treatment plan.  Health history and physical exam  Your visit will start with a health history. Tell the doctor about any symptoms youve noticed and about all medicines you take. Then youll have a physical exam. This includes listening to your heartbeat and breathing. Youll also be checked for swelling (edema) in your legs and neck. When you have fluid buildup or fluid in the lungs, it may be called congestive heart failure.  Diagnosing heart failure     During an echocardiogram, sound waves bounce off the heart. These are converted into a picture on the screen.   The following may be done to help your doctor form a diagnosis:  · X-rays show the size and shape of your heart. These pictures can also show fluid in your lungs.  · An electrocardiogram (ECG or EKG) shows the pattern of your heartbeat. Small pads (electrodes) are placed on your chest, arms, and legs. Wires connect the pads to the ECG machine, which records your hearts electrical signals. This can  give the doctor information about heart function.  · An echocardiogram uses ultrasound waves to show the structure and movement of your heart muscle. This shows how well the heart pumps. It also shows the thickness of the heart walls, and if the heart is enlarged. It is one of the most useful, non-invasive tests as it provides information about the heart's general function. This helps your doctor make treatment decisions.  · Lab tests evaluate small amounts of blood or urine for signs of problems. A BNP lab test can help diagnose and evaluate heart failure. BNP stands for B-type natriuretic peptide. The ventricles secrete more BNP when heart failure worsens. Lab tests can also provide information about metabolic dysfunction or heart dysfunction.  Your treatment plan  Based on the results of your evaluation and tests, your doctor will develop a treatment plan. This plan is designed to relieve some of your heart failure symptoms and help make you more comfortable. Your treatment plan may include:  · Medicine to help your heart work better and improve your quality of life  · Changes in what you eat and drink to help prevent fluid from backing up in your body  · Daily monitoring of your weight and heart failure symptoms to see how well your treatment plan is working  · Exercise to help you stay healthy  · Help with quitting smoking  · Emotional and psychological support to help adjust to the changes  · Referrals to other specialists to make sure you are being treated comprehensively  Date Last Reviewed: 3/21/2016  © 7193-3468 [employer]. [address] [place] [address], [place] [address]. All rights reserved. This information is not intended as a substitute for professional medical care. Always follow your healthcare professional's instructions.              Heart Failure: Making Changes to Your Diet  You have a condition called heart failure. When you have heart failure, excess fluid is more likely to build up in  your body because your heart isn't working well. This makes the heart work harder to pump blood. Fluid buildup causes symptoms such as shortness of breath and swelling (edema). This is often referred to as congestive heart failure or CHF. Controlling the amount of salt (sodium) you eat may help stop fluid from building up. Your doctor may also tell you to reduce the amount of fluid you drink.  Reading food labels    Your healthcare provider will tell you how much sodium you can eat each day. Read food labels to keep track. Keep in mind that certain foods are high in salt. These include canned, frozen, and processed foods. Check the amount of sodium in each serving. Watch out for high-sodium ingredients. These include MSG (monosodium glutamate), baking soda, and sodium phosphate.   Eating less salt  Give yourself time to get used to eating less salt. It may take a little while. Here are some tips to help:  · Take the saltshaker off the table. Replace it with salt-free herb mixes and spices.  · Eat fresh or plain frozen vegetables. These have much less salt than canned vegetables.  · Choose low-sodium snacks like sodium-free pretzels, crackers, or air-popped popcorn.  · Dont add salt to your food when youre cooking. Instead, season your foods with pepper, lemon, garlic, or onion.  · When you eat out, ask that your food be cooked without added salt.  · Avoid eating fried foods as these often have a great deal of salt.  If youre told to limit fluids  You may need to limit how much fluid you have to help prevent swelling. This includes anything that is liquid at room temperature, such as ice cream and soup. If your doctor tells you to limit fluid, try these tips:  · Measure drinks in a measuring cup before you drink them. This will help you meet daily goals.  · Chill drinks to make them more refreshing.  · Suck on frozen lemon wedges to quench thirst.  · Only drink when youre thirsty.  · Chew sugarless gum or suck on  hard candy to keep your mouth moist.  · Weigh yourself daily to know if your body's fluid content is rising.  My sodium goal  Your healthcare provider may give you a sodium goal to meet each day. This includes sodium found in food as well as salt that you add. My goal is to eat no more than ___________ mg of sodium per day.     When to call your doctor  Call your doctor right away if you have any symptoms of worsening heart failure. These can include:  · Sudden weight gain  · Increased swelling of your legs or ankles  · Trouble breathing when youre resting or at night  · Increase in the number of pillows you have to sleep on  · Chest pain, pressure, discomfort, or pain in the jaw, neck, or back   Date Last Reviewed: 3/21/2016  © 3799-3793 [employer]. [address] [place] [address]. All rights reserved. This information is not intended as a substitute for professional medical care. Always follow your healthcare professional's instructions.              Heart Failure: Medicines to Help Your Heart    You have a condition called heart failure (also known as congestive heart failure, or CHF). Your doctor will likely prescribe medicines for heart failure and any underlying health problems you have. Most heart failure patients take one or more types of medicinen. Your healthcare provider will work to find the combination of medicines that works best for you.  Heart failure medicines  Here are the most common heart failure medicines:  · ACE inhibitors lower blood pressure and decrease strain on the heart. This makes it easier for the heart to pump. Angiotensin receptor blockers have similar effects. These are prescribed for some patients instead of ACE inhibitors.  · Beta-blockers relieve stress on the heart. They also improve symptoms. They may also improve the heart's pumping action over time.  · Diuretics (also called water pills) help rid your body of excess water. This can help rid your  body of swelling (edema). Having less fluid to pump means your heart doesnt have to work as hard. Some diuretics make your body lose a mineral called potassium. Your doctor will tell you if you need to take supplements or eat more foods high in potassium.  · Digoxin helps your heart pump with more strength. This helps your heart pump more blood with each beat. So, more oxygen-rich blood travels to the rest of the body.  · Aldosterone antagonists help alter hormones and decrease strain on the heart.  · Hydralazine and nitrates are two separate medicines used together to treat heart failure. They may come in one combination pill. They lower blood pressure and decrease how hard the heart has to pump.  Medicines for related conditions  Controlling other heart problems helps keep heart failure under control, too. Depending on other heart problems you have, medicines may be prescribed to:  · Lower blood pressure (antihypertensives).  · Lower cholesterol levels (statins).  · Prevent blood clots (anticoagulants or aspirin).  · Keep the heartbeat steady (antiarrhythmics).  Date Last Reviewed: 3/5/2016  © 9880-9405 [employer]. [address] [place] [address]. All rights reserved. This information is not intended as a substitute for professional medical care. Always follow your healthcare professional's instructions.              Heart Failure: Procedures That May Help    The heart is a muscle that pumps oxygen-rich blood to all parts of the body. When you have heart failure, the heart is not able to pump as well as it should. Blood and fluid may back up into the lungs (congestive heart failure), and some parts of the body dont get enough oxygen-rich blood to work normally. These problems lead to the symptoms of heart failure.     Certain procedures may help the heart pump better in some cases of heart failure. Some procedures are done to treat health problems that may have caused the heart failure  such as coronary artery disease or heart rhythm problems. For more serious heart failure, other options are available.  Treating artery and valve problems  If you have coronary artery disease or valve disease, procedures may be done to improve blood flow. This helps the heart pump better, which can improve heart failure symptoms. First, your doctor may do a cardiac catheterization to help detect clogged blood vessels or valve damage. During this procedure, a  thin tube (catheter) in inserted into a blood vessel and guided to the heart. There a dye is injected and a special type of X-ray (angiogram) is taken of the blood vessels. Procedures to open a blocked artery or fix damaged valves can also be done using catheterization.  · Angioplasty uses a balloon-tipped instrument at the end of the catheter. The balloon is inflated to widen the narrowed artery. In many cases, a stent is expanded to further support the narrowed artery. A stent is a metal mesh tube.  · Valve surgery repairs or replacement of faulty valves can also be done during catheterization so blood can flow properly through the chambers of the heart.  Bypass surgery is another option to help treat blocked arteries. It uses a healthy blood vessel from elsewhere in the body. The healthy blood vessel is attached above and below the blocked area so that blood can flow around the blocked artery.  Treating heart rhythm problems  A device may be placed in the chest to help a weak heart maintain a healthy, heartbeat so the heart can pump more effectively:  · Pacemaker. A pacemaker is an implanted device that regulates your heartbeat electronically. It monitors your heart's rhythm and generates a painless electric impulse that helps the heart beat in a regular rhythm. A pacemaker is programmed to meet your specific heart rhythm needs.  · Biventricular pacing/cardiac resynchronization therapy. A type of pacemaker that paces both pumping chambers of the heart at the  same time to coordinate contractions and to improve the heart's function. Some people with heart failure are candidates for this therapy.  · Implantable cardioverter defibrillator. A device similar to a pacemaker that senses when the heart is beating too fast and delivers an electrical shock to convert the fast rhythm to a normal rhythm. This can be a life saving device.  In severe cases  In more serious cases of heart failure when other treatments no longer work, other options may include:  · Ventricular assist devices (VADs). These are mechanical devices used to take over the pumping function for one or both of the heart's ventricles, or pumping chambers. A VAD may be necessary when heart failure progresses to the point that medicines and other treatments no longer help. In some cases, a VAD may be used as a bridge to transplant.  · Heart transplant. This is replacing the diseased heart with a healthy one from a donor. This is an option for a few people who are very sick. A heart transplant is very serious and not an option for all patients. Your doctor can tell you more.  Date Last Reviewed: 3/20/2016  © 0324-1989 [employer]. [address] [place] [address]. All rights reserved. This information is not intended as a substitute for professional medical care. Always follow your healthcare professional's instructions.              Heart Failure: Tracking Your Weight  You have a condition called heart failure. When you have heart failure, a sudden weight gain or a steady rise in weight is a warning sign that your body is retaining too much water and salt. This could mean your heart failure is getting worse. If left untreated, it can cause problems for your lungs and result in shortness of breath. Weighing yourself each day is the best way to know if youre retaining water. If your weight goes up quickly, call your doctor. You will be given instructions on how to get rid of the excess water.  You will likely need medicines and to avoid salt. This will help your heart work better.  Call your doctor if you gain more than 2 pounds in 1 day, more than 5 pounds in 1 week, or whatever weight gain you were told to report by your doctor. This is often a sign of worsening heart failure and needs to be evaluated and treated. Your doctor will tell you what to do next.   Tips for weighing yourself    · Weigh yourself at the same time each morning, wearing the same clothes. Weigh yourself after urinating and before eating.  · Use the same scale each day. Make sure the numbers are easy to read. Put the scale on a flat, hard surface -- not on a rug or carpet.  · Do not stop weighing yourself. If you forget one day, weigh again the next morning.  How to use your weight chart  · Keep your weight chart near the scale. Write your weight on the chart as soon as you get off the scale.  · Fill in the month and the start date on the chart. Then write down your weight each day. Your chart will look like this:    · If you miss a day, leave the space blank. Weigh yourself the next day and write your weight in the next space.  · Take your weight chart with you when you go to see your doctor.  Date Last Reviewed: 3/20/2016  © 0194-9275 [employer]. [address] [place] [address]. All rights reserved. This information is not intended as a substitute for professional medical care. Always follow your healthcare professional's instructions.              Heart Failure: Warning Signs of a Flare-Up  You have a condition called heart failure. Once you have heart failure, flare-ups can happen. Below are signs that can mean your heart failure is getting worse. If you notice any of these warning signs, call your healthcare provider.  Swelling    · Your feet, ankles, or lower legs get puffier.  · You notice skin changes on your lower legs.  · Your shoes feel too tight.  · Your clothes are tighter in the waist.  · You  have trouble getting rings on or off your fingers.  Shortness of breath  · You have to breathe harder even when youre doing your normal activities or when youre resting.  · You are short of breath walking up stairs or even short distances.  · You wake up at night short of breath or coughing.  · You need to use more pillows or sit up to sleep.  · You wake up tired or restless.  Other warning signs  · You feel weaker, dizzy, or more tired.  · You have chest pain or changes in your heartbeat.  · You have a cough that wont go away.  · You cant remember things or dont feel like eating.  Tracking your weight  Gaining weight is often the first warning sign that heart failure is getting worse. Gaining even a few pounds can be a sign that your body is retaining excess water and salt. Weighing yourself each day in the morning after you urinate and before you eat, is the best way to know if you're retaining water. Get a scale that is easy to read and make sure you wear the same clothes and use the same scale every time you weigh. Your healthcare provider will show you how to track your weight. Call your doctor if you gain more than 2 pounds in 1 day, 5 pounds in 1 week, or whatever weight gain you were told to report by your doctor. This is often a sign of worsening heart failure and needs to be evaluated and treated before it compromises your breathing. Your doctor will tell you what to do next.    Date Last Reviewed: 3/15/2016  © 7946-7677 [employer]. [address] [place] [address], [place] [address]. All rights reserved. This information is not intended as a substitute for professional medical care. Always follow your healthcare professional's instructions.               [facility] complies with applicable Federal civil rights laws and does not discriminate on the basis of race, color, national origin, age, disability, or sex.

## 2017-04-16 NOTE — SUBJECTIVE & OBJECTIVE
Past Medical History:   Diagnosis Date    Hypertension     dx at 16y.o.       Past Surgical History:   Procedure Laterality Date     SECTION      x 1    INDUCED       TUBAL LIGATION         Review of patient's allergies indicates:  No Known Allergies    Current Facility-Administered Medications on File Prior to Encounter   Medication    [COMPLETED] acetaminophen tablet 1,000 mg    [COMPLETED] aspirin tablet 325 mg    [COMPLETED] furosemide injection 40 mg    [COMPLETED] lisinopril tablet 40 mg    [COMPLETED] morphine injection 5 mg    [COMPLETED] nitroGLYCERIN SL tablet 0.4 mg    [COMPLETED] ondansetron injection 4 mg    [COMPLETED] potassium chloride SA CR tablet 60 mEq     Current Outpatient Prescriptions on File Prior to Encounter   Medication Sig    lisinopril-hydrochlorothiazide (PRINZIDE,ZESTORETIC) 20-12.5 mg per tablet Take 1 tablet by mouth once daily.    azithromycin (Z-ZAMZAM) 250 MG tablet Take 2 tablets by mouth on day 1; Take 1 tablet by mouth on days 2-5    fluticasone (FLONASE) 50 mcg/actuation nasal spray 1 spray by Each Nare route once daily.    omeprazole (PRILOSEC) 20 MG capsule Take 1 capsule (20 mg total) by mouth once daily.     Family History     Problem Relation (Age of Onset)    Breast cancer Other    Cancer Maternal Grandmother, Paternal Grandmother, Paternal Aunt, Paternal Uncle    Diabetes Other    Hypertension Mother, Other    No Known Problems Sister, Brother, Son, Brother        Social History Main Topics    Smoking status: Never Smoker    Smokeless tobacco: Never Used    Alcohol use Yes      Comment: rarely    Drug use: No    Sexual activity: Yes     Partners: Male     Birth control/ protection: None     Review of Systems   Constitutional: Positive for activity change and fatigue.   HENT: Positive for postnasal drip.    Eyes: Positive for visual disturbance.   Respiratory: Positive for cough, chest tightness and shortness of breath. Negative for  wheezing.    Cardiovascular: Positive for chest pain and leg swelling. Negative for palpitations.   Gastrointestinal: Negative for abdominal distention, abdominal pain, constipation and nausea.   Endocrine: Positive for heat intolerance. Negative for cold intolerance.   Genitourinary: Negative for difficulty urinating.   Musculoskeletal: Positive for arthralgias.   Skin: Negative for color change and rash.   Neurological: Negative for dizziness, facial asymmetry and headaches.   Hematological: Bruises/bleeds easily.     Objective:     Vital Signs (Most Recent):  Temp: 97.7 °F (36.5 °C) (04/16/17 0910)  Pulse: 85 (04/16/17 0910)  Resp: 16 (04/16/17 0910)  BP: (!) 139/96 (04/16/17 0910)  SpO2: (!) 94 % (04/16/17 0910) Vital Signs (24h Range):  Temp:  [97.7 °F (36.5 °C)-98.5 °F (36.9 °C)] 97.7 °F (36.5 °C)  Pulse:  [] 85  Resp:  [16] 16  SpO2:  [94 %-100 %] 94 %  BP: (134-173)/() 139/96     Weight: 80.3 kg (177 lb 1.6 oz)  Body mass index is 25.41 kg/(m^2).    Physical Exam   Constitutional: She is oriented to person, place, and time. She appears well-developed and well-nourished.   Overweight female in NAD, fatigued, but cooperative with exam   HENT:   Head: Normocephalic and atraumatic.   Mouth/Throat: Oropharynx is clear and moist.   Eyes: EOM are normal. Pupils are equal, round, and reactive to light.   Neck: Normal range of motion. Neck supple. No JVD present. No thyromegaly present.   Cardiovascular: Normal rate, regular rhythm, normal heart sounds and intact distal pulses.  Exam reveals no gallop and no friction rub.    No murmur heard.  Pulmonary/Chest: Effort normal. No respiratory distress. She has no wheezes.   Diminished breath sounds at the bases   Abdominal: Soft. Bowel sounds are normal. She exhibits no distension. There is no tenderness.   Musculoskeletal: Normal range of motion. She exhibits no edema.   Lymphadenopathy:     She has no cervical adenopathy.   Neurological: She is alert and  oriented to person, place, and time.   Skin: Skin is warm and dry. No rash noted. No erythema.   Psychiatric: She has a normal mood and affect. Her behavior is normal. Judgment and thought content normal.   Vitals reviewed.       Significant Labs: Reviewed    Significant Imaging: I have reviewed and interpreted all pertinent imaging results/findings within the past 24 hours.

## 2017-04-16 NOTE — H&P
[facility] Hospital Medicine  History & Physical    Patient Name: Nasra Marks  MRN: [id number]  Admission Date: 2017  Attending Physician: Marcelina Portillo MD  Primary Care Provider: Veronika Rainey MD         Patient information was obtained from patient, parent and ER records.     Subjective:     Principal Problem:Acute CHF    Chief Complaint: Shortness of breath     HPI: This is a 31 year old female with HTN diagnosed at 16 years old who presents with a month of worsening intermittent lower extremity edema, ELENA, chest tightness, cough, and fatigue.  The patient notes progression of her shortness of breath which prompted her visit to the ED.  Upon her arrival there, the patient had an EKG done that showed no acute ischemic changes, but signs of LVH.  A CXR was done that was consistent with pulmonary edema bilaterally and cardiomegaly.    Past Medical History:   Diagnosis Date    Hypertension     dx at 16y.o.       Past Surgical History:   Procedure Laterality Date     SECTION      x 1    INDUCED       TUBAL LIGATION         Review of patient's allergies indicates:  No Known Allergies    Current Facility-Administered Medications on File Prior to Encounter   Medication    [COMPLETED] acetaminophen tablet 1,000 mg    [COMPLETED] aspirin tablet 325 mg    [COMPLETED] furosemide injection 40 mg    [COMPLETED] lisinopril tablet 40 mg    [COMPLETED] morphine injection 5 mg    [COMPLETED] nitroGLYCERIN SL tablet 0.4 mg    [COMPLETED] ondansetron injection 4 mg    [COMPLETED] potassium chloride SA CR tablet 60 mEq     Current Outpatient Prescriptions on File Prior to Encounter   Medication Sig    lisinopril-hydrochlorothiazide (PRINZIDE,ZESTORETIC) 20-12.5 mg per tablet Take 1 tablet by mouth once daily.    azithromycin (Z-ZAMZAM) 250 MG tablet Take 2 tablets by mouth on day 1; Take 1 tablet by mouth on days 2-5    fluticasone (FLONASE) 50 mcg/actuation nasal spray 1 spray  by Each Nare route once daily.    omeprazole (PRILOSEC) 20 MG capsule Take 1 capsule (20 mg total) by mouth once daily.     Family History     Problem Relation (Age of Onset)    Breast cancer Other    Cancer Maternal Grandmother, Paternal Grandmother, Paternal Aunt, Paternal Uncle    Diabetes Other    Hypertension Mother, Other    No Known Problems Sister, Brother, Son, Brother        Social History Main Topics    Smoking status: Never Smoker    Smokeless tobacco: Never Used    Alcohol use Yes      Comment: rarely    Drug use: No    Sexual activity: Yes     Partners: Male     Birth control/ protection: None     Review of Systems   Constitutional: Positive for activity change and fatigue.   HENT: Positive for postnasal drip.    Eyes: Positive for visual disturbance.   Respiratory: Positive for cough, chest tightness and shortness of breath. Negative for wheezing.    Cardiovascular: Positive for chest pain and leg swelling. Negative for palpitations.   Gastrointestinal: Negative for abdominal distention, abdominal pain, constipation and nausea.   Endocrine: Positive for heat intolerance. Negative for cold intolerance.   Genitourinary: Negative for difficulty urinating.   Musculoskeletal: Positive for arthralgias.   Skin: Negative for color change and rash.   Neurological: Negative for dizziness, facial asymmetry and headaches.   Hematological: Bruises/bleeds easily.     Objective:     Vital Signs (Most Recent):  Temp: 97.7 °F (36.5 °C) (04/16/17 0910)  Pulse: 85 (04/16/17 0910)  Resp: 16 (04/16/17 0910)  BP: (!) 139/96 (04/16/17 0910)  SpO2: (!) 94 % (04/16/17 0910) Vital Signs (24h Range):  Temp:  [97.7 °F (36.5 °C)-98.5 °F (36.9 °C)] 97.7 °F (36.5 °C)  Pulse:  [] 85  Resp:  [16] 16  SpO2:  [94 %-100 %] 94 %  BP: (134-173)/() 139/96     Weight: 80.3 kg (177 lb 1.6 oz)  Body mass index is 25.41 kg/(m^2).    Physical Exam   Constitutional: She is oriented to person, place, and time. She appears  well-developed and well-nourished.   Overweight female in NAD, fatigued, but cooperative with exam   HENT:   Head: Normocephalic and atraumatic.   Mouth/Throat: Oropharynx is clear and moist.   Eyes: EOM are normal. Pupils are equal, round, and reactive to light.   Neck: Normal range of motion. Neck supple. No JVD present. No thyromegaly present.   Cardiovascular: Normal rate, regular rhythm, normal heart sounds and intact distal pulses.  Exam reveals no gallop and no friction rub.    No murmur heard.  Pulmonary/Chest: Effort normal. No respiratory distress. She has no wheezes.   Diminished breath sounds at the bases   Abdominal: Soft. Bowel sounds are normal. She exhibits no distension. There is no tenderness.   Musculoskeletal: Normal range of motion. She exhibits no edema.   Lymphadenopathy:     She has no cervical adenopathy.   Neurological: She is alert and oriented to person, place, and time.   Skin: Skin is warm and dry. No rash noted. No erythema.   Psychiatric: She has a normal mood and affect. Her behavior is normal. Judgment and thought content normal.   Vitals reviewed.       Significant Labs: Reviewed    Significant Imaging: I have reviewed and interpreted all pertinent imaging results/findings within the past 24 hours.    Assessment/Plan:     * Acute CHF  Suspect systolic congestive heart failure.  Will obtain 2D ECHO to verify and continue IV Lasix.  Will resume ACE-I as well, monitor strict I/Os, obtain troponin I Q6 x2, and monitor patient on telemetry.  Obtain TSH and HBA1c.      Acute pulmonary edema  IV Lasix as above and closely monitor respiratory status with pulse ox Q4.      Essential hypertension  Anti-hypertensive management as above.      Mild dysplasia of cervix /needs colp        Transaminitis        VTE Risk Mitigation         Ordered     Medium Risk of VTE  Once      04/16/17 1052     Place sequential compression device  Until discontinued      04/16/17 1052     Place ARSALAN hose  Until  discontinued      04/16/17 1052        Marcelina Portillo MD  Department of Hospital Medicine   [facility]

## 2017-04-16 NOTE — ASSESSMENT & PLAN NOTE
Suspect systolic congestive heart failure.  Will obtain 2D ECHO to verify and continue IV Lasix.  Will resume ACE-I as well, monitor strict I/Os, obtain troponin I Q6 x2, and monitor patient on telemetry.  Obtain TSH and HBA1c.

## 2017-04-16 NOTE — PROGRESS NOTES
Patient arrived to floor in stable condition from [facility]. Placed patient on the monitor. Visualized patient and assessed patient's overall condition and appearance. Admit assessment initiated. No complaints. NAD noted. Will continue to monitor.

## 2017-04-16 NOTE — ED TRIAGE NOTES
"Pt presents to ER with c/o chest pain to upper chest radiating to shoulders as a tightness since Friday.  Denies nausea, vomiting but states has intermittent sob. Pt has h/o hypertension but has not been taking her medication because " makes me feel bad."  "

## 2017-04-17 PROBLEM — I50.41 ACUTE COMBINED SYSTOLIC AND DIASTOLIC CONGESTIVE HEART FAILURE: Status: ACTIVE | Noted: 2017-04-16

## 2017-04-17 LAB
ANION GAP SERPL CALC-SCNC: 7 MMOL/L
BUN SERPL-MCNC: 15 MG/DL
CALCIUM SERPL-MCNC: 9.3 MG/DL
CHLORIDE SERPL-SCNC: 101 MMOL/L
CO2 SERPL-SCNC: 30 MMOL/L
CREAT SERPL-MCNC: 0.9 MG/DL
DIASTOLIC DYSFUNCTION: YES
EST. GFR  (AFRICAN AMERICAN): >60 ML/MIN/1.73 M^2
EST. GFR  (NON AFRICAN AMERICAN): >60 ML/MIN/1.73 M^2
ESTIMATED AVG GLUCOSE: 114 MG/DL
ESTIMATED PA SYSTOLIC PRESSURE: 36.41
GLOBAL PERICARDIAL EFFUSION: ABNORMAL
GLUCOSE SERPL-MCNC: 88 MG/DL
HBA1C MFR BLD HPLC: 5.6 %
MITRAL VALVE REGURGITATION: ABNORMAL
POTASSIUM SERPL-SCNC: 3.3 MMOL/L
RETIRED EF AND QEF - SEE NOTES: 25 (ref 55–65)
SODIUM SERPL-SCNC: 138 MMOL/L
TRICUSPID VALVE REGURGITATION: ABNORMAL

## 2017-04-17 PROCEDURE — 99254 IP/OBS CNSLTJ NEW/EST MOD 60: CPT | Mod: ,,, | Performed by: INTERNAL MEDICINE

## 2017-04-17 PROCEDURE — 63600175 PHARM REV CODE 636 W HCPCS: Performed by: HOSPITALIST

## 2017-04-17 PROCEDURE — 80048 BASIC METABOLIC PNL TOTAL CA: CPT

## 2017-04-17 PROCEDURE — 25000003 PHARM REV CODE 250: Performed by: HOSPITALIST

## 2017-04-17 PROCEDURE — 93005 ELECTROCARDIOGRAM TRACING: CPT

## 2017-04-17 PROCEDURE — 21400001 HC TELEMETRY ROOM

## 2017-04-17 PROCEDURE — 36415 COLL VENOUS BLD VENIPUNCTURE: CPT

## 2017-04-17 RX ORDER — LISINOPRIL 20 MG/1
40 TABLET ORAL DAILY
Status: DISCONTINUED | OUTPATIENT
Start: 2017-04-18 | End: 2017-04-18 | Stop reason: HOSPADM

## 2017-04-17 RX ORDER — LISINOPRIL 20 MG/1
20 TABLET ORAL ONCE
Status: COMPLETED | OUTPATIENT
Start: 2017-04-17 | End: 2017-04-17

## 2017-04-17 RX ORDER — POTASSIUM CHLORIDE 20 MEQ/1
20 TABLET, EXTENDED RELEASE ORAL 2 TIMES DAILY
Status: DISCONTINUED | OUTPATIENT
Start: 2017-04-17 | End: 2017-04-18

## 2017-04-17 RX ORDER — CARVEDILOL 6.25 MG/1
6.25 TABLET ORAL 2 TIMES DAILY
Status: DISCONTINUED | OUTPATIENT
Start: 2017-04-17 | End: 2017-04-18 | Stop reason: HOSPADM

## 2017-04-17 RX ADMIN — POTASSIUM CHLORIDE 20 MEQ: 1500 TABLET, EXTENDED RELEASE ORAL at 11:04

## 2017-04-17 RX ADMIN — Medication 3 ML: at 06:04

## 2017-04-17 RX ADMIN — LISINOPRIL 20 MG: 20 TABLET ORAL at 10:04

## 2017-04-17 RX ADMIN — FUROSEMIDE 40 MG: 10 INJECTION, SOLUTION INTRAVENOUS at 05:04

## 2017-04-17 RX ADMIN — FUROSEMIDE 40 MG: 10 INJECTION, SOLUTION INTRAVENOUS at 10:04

## 2017-04-17 RX ADMIN — POTASSIUM CHLORIDE 20 MEQ: 1500 TABLET, EXTENDED RELEASE ORAL at 10:04

## 2017-04-17 RX ADMIN — CARVEDILOL 6.25 MG: 6.25 TABLET, FILM COATED ORAL at 05:04

## 2017-04-17 RX ADMIN — HYDROCODONE BITARTRATE AND ACETAMINOPHEN 1 TABLET: 5; 325 TABLET ORAL at 07:04

## 2017-04-17 RX ADMIN — FAMOTIDINE 20 MG: 20 TABLET, FILM COATED ORAL at 10:04

## 2017-04-17 RX ADMIN — Medication 3 ML: at 10:04

## 2017-04-17 RX ADMIN — LISINOPRIL 20 MG: 20 TABLET ORAL at 11:04

## 2017-04-17 RX ADMIN — HYDROCODONE BITARTRATE AND ACETAMINOPHEN 1 TABLET: 5; 325 TABLET ORAL at 11:04

## 2017-04-17 RX ADMIN — Medication 3 ML: at 05:04

## 2017-04-17 NOTE — PLAN OF CARE
04/17/17 1237   Discharge Assessment   Assessment Type Discharge Planning Assessment   Confirmed/corrected address and phone number on facesheet? Yes   Assessment information obtained from? Patient   Prior to hospitilization cognitive status: Alert/Oriented   Prior to hospitalization functional status: Independent   Current cognitive status: Alert/Oriented   Current Functional Status: Independent   Arrived From home or self-care   Lives With spouse;child(valencia), dependent   Able to Return to Prior Arrangements yes   Is patient able to care for self after discharge? Yes   How many people do you have in your home that can help with your care after discharge? 1   Who are your caregiver(s) and their phone number(s)? Jacob Mata [facility]- [phone]   Patient's perception of discharge disposition home or selfcare   Readmission Within The Last 30 Days no previous admission in last 30 days   Patient currently being followed by outpatient case management? No   Patient currently receives home health services? No   Does the patient currently use HME? No   Patient currently receives private duty nursing? N/A   Patient currently receives any other outside agency services? No   Equipment Currently Used at Home none   Do you have any problems affording any of your prescribed medications? No   Is the patient taking medications as prescribed? yes   Do you have any financial concerns preventing you from receiving the healthcare you need? No   Does the patient have transportation to healthcare appointments? Yes   Transportation Available family or friend will provide   On Dialysis? No   Does the patient receive services at the [facility]mad[facility] Clinic? No   Are there any open cases? No   Discharge Plan A Home with family   Discharge Plan B Home with family   Patient/Family In Agreement With Plan yes     [employer] [id number]  [facility] - [address] AT [facility] [address]  [address] [address]  Phone:  [phone] Fax: [phone]    Pt already has [employer] [facility] blue folder at bedside. Discussed help at home and reports that she is independent but that if needed her spouse and family could help with any needs she may have.

## 2017-04-17 NOTE — NURSING
Notified Dr. Portillo of pt's /114. Dr. Portillo to place orders in EPIC. Will continue to monitor pt closely.

## 2017-04-17 NOTE — PROGRESS NOTES
Report received from Marie SAWYER. Nursing rounds completed. Assessed pt's general appearance/condition.  Patient resting quietly in bed, respirations even/unlabored, no signs of distress noted, pt denies pain at this time.  Will continue to monitor.

## 2017-04-17 NOTE — CONSULTS
"[facility]  Cardiology  Consult Note    Patient Name: Nasra Marks  MRN: [id number]  Admission Date: 2017  Hospital Length of Stay: 1 days  Code Status: Full Code   Attending Provider: Tye Hawk MD   Consulting Provider: Jose De Jesus Duffy MD  Primary Care Physician: Veronika Rainey MD  Principal Problem:Acute CHF    Patient information was obtained from patient, past medical records and ER records.     Inpatient consult to Cardiology  Consult performed by: JOSE DE JESUS DUFFY  Consult ordered by: TYE HAWK       CHF  Subjective:     Chief Complaint:  chest pain and sob    HPI: 31 y.o. female with past medical history of hypertension (poorly controlled not currently taking prescribed antihypertensive medications) presents to the emergency department complaining of acute chest pain and shortness of breath.  She states chest pain began Friday night while she was lying in bed. She describes chest pain as constant, pressure in the center of her chest and feels as though her chest is congested or as if there is something "in there". She states chest pain resolved by the next morning and recurred as she was walking in the grocery store later that day with associated shortness of breath without diaphoresis, nausea or vomiting, lightheadedness or dizziness. She states chest that has been constant since yesterday and she reports one episode of vomiting just prior to arrival today.    She first noticed symptoms worsening over 2 weeks ago.  She said she did have upper respiratory tract infection which was treated as sinusitis prior to this.  She does have history of poorly-controlled hypertension.  She currently feels a little bit better after diuresis but still complaining of substernal chest pains come and go.  She denies any PND, orthopnea or lower edema currently post initial diuresis.  She's not expressing dizziness, presyncope or syncope.  She has had prior  but no recent " deliveries.  She had no problems with her previous pregnancy.    Past Medical History:   Diagnosis Date    Hypertension     dx at 16y.o.       Past Surgical History:   Procedure Laterality Date     SECTION      x 1    INDUCED       TUBAL LIGATION         Review of patient's allergies indicates:  No Known Allergies    No current facility-administered medications on file prior to encounter.      Current Outpatient Prescriptions on File Prior to Encounter   Medication Sig    lisinopril-hydrochlorothiazide (PRINZIDE,ZESTORETIC) 20-12.5 mg per tablet Take 1 tablet by mouth once daily.    azithromycin (Z-ZAMZAM) 250 MG tablet Take 2 tablets by mouth on day 1; Take 1 tablet by mouth on days 2-5    fluticasone (FLONASE) 50 mcg/actuation nasal spray 1 spray by Each Nare route once daily.    omeprazole (PRILOSEC) 20 MG capsule Take 1 capsule (20 mg total) by mouth once daily.     Family History     Problem Relation (Age of Onset)    Breast cancer Other    Cancer Maternal Grandmother, Paternal Grandmother, Paternal Aunt, Paternal Uncle    Diabetes Other    Hypertension Mother, Other    No Known Problems Sister, Brother, Son, Brother        Social History Main Topics    Smoking status: Never Smoker    Smokeless tobacco: Never Used    Alcohol use Yes      Comment: rarely    Drug use: No    Sexual activity: Yes     Partners: Male     Birth control/ protection: None     Review of Systems   Constitution: Negative.   HENT: Negative.    Eyes: Negative.    Cardiovascular: Positive for chest pain, dyspnea on exertion, leg swelling, orthopnea and paroxysmal nocturnal dyspnea. Negative for irregular heartbeat, near-syncope, palpitations and syncope.   Respiratory: Positive for shortness of breath.    Skin: Negative.    Musculoskeletal: Negative.    Gastrointestinal: Negative for abdominal pain, constipation and diarrhea.   Genitourinary: Negative for dysuria.   Neurological: Negative.    Psychiatric/Behavioral:  Negative.      Objective:     Vital Signs (Most Recent):  Temp: 97.5 °F (36.4 °C) (04/17/17 1600)  Pulse: 89 (04/17/17 1600)  Resp: 18 (04/17/17 1600)  BP: (!) 162/114 (04/17/17 1713)  SpO2: 98 % (04/17/17 1600) Vital Signs (24h Range):  Temp:  [97.5 °F (36.4 °C)-98.1 °F (36.7 °C)] 97.5 °F (36.4 °C)  Pulse:  [83-94] 89  Resp:  [18] 18  SpO2:  [97 %-99 %] 98 %  BP: (136-162)/() 162/114     Weight: 80.3 kg (177 lb)  Body mass index is 25.4 kg/(m^2).    SpO2: 98 %  O2 Device (Oxygen Therapy): room air      Intake/Output Summary (Last 24 hours) at 04/17/17 1721  Last data filed at 04/17/17 1600   Gross per 24 hour   Intake              480 ml   Output             2250 ml   Net            -1770 ml       Lines/Drains/Airways     Peripheral Intravenous Line                 Peripheral IV - Single Lumen 04/16/17 0239 Right Antecubital 1 day                Physical Exam   Constitutional: She is oriented to person, place, and time. She appears well-developed and well-nourished.   HENT:   Head: Normocephalic and atraumatic.   Eyes: Conjunctivae and EOM are normal. Pupils are equal, round, and reactive to light.   Neck: Normal range of motion. Neck supple. No thyromegaly present.   Cardiovascular: Normal rate and regular rhythm.    No murmur heard.  Pulmonary/Chest: Effort normal and breath sounds normal. No respiratory distress.   Abdominal: Soft. Bowel sounds are normal.   Musculoskeletal: She exhibits no edema.   Neurological: She is alert and oriented to person, place, and time.   Skin: Skin is warm and dry.   Psychiatric: She has a normal mood and affect. Her behavior is normal.       Significant Labs:   CMP   Recent Labs  Lab 04/16/17  1128 04/17/17  0513    138   K 3.5 3.3*    101   CO2 24 30*   * 88   BUN 12 15   CREATININE 0.8 0.9   CALCIUM 9.3 9.3   PROT 8.0  --    ALBUMIN 3.8  --    BILITOT 0.5  --    ALKPHOS 63  --    AST 19  --    ALT 18  --    ANIONGAP 10 7*   ESTGFRAFRICA >60 >60    EGFRNONAA >60 >60   , CBC   Recent Labs  Lab 04/16/17  1128   WBC 13.21*   HGB 12.1   HCT 37.9      , INR No results for input(s): INR, PROTIME in the last 48 hours., Lipid Panel No results for input(s): CHOL, HDL, LDLCALC, TRIG, CHOLHDL in the last 48 hours. and Troponin   Recent Labs  Lab 04/16/17  1128   TROPONINI 0.032*       Significant Imaging: Echocardiogram:   2D echo with color flow doppler:   Results for orders placed or performed during the hospital encounter of 04/16/17   2D echo with color flow doppler   Result Value Ref Range    EF 25 (A) 55 - 65    Mitral Valve Regurgitation MILD     Diastolic Dysfunction Yes (A)     Est. PA Systolic Pressure 36.41     Pericardial Effusion NONE     Tricuspid Valve Regurgitation MILD      Assessment and Plan:     Active Diagnoses:    Diagnosis Date Noted POA    PRINCIPAL PROBLEM:  Acute CHF [I50.9] 04/16/2017 Yes    Acute pulmonary edema [J81.0] 04/16/2017 Yes    Transaminitis [R74.0] 03/02/2016 Yes    Essential hypertension [I10] 11/19/2012 Yes      Problems Resolved During this Admission:    Diagnosis Date Noted Date Resolved POA       VTE Risk Mitigation         Ordered     Medium Risk of VTE  Once      04/16/17 1052     Place sequential compression device  Until discontinued      04/16/17 1052     Place ARSALAN hose  Until discontinued      04/16/17 1052        Acute systolic heart failure  -Diuresis as tolerated  -Optimize medicines for secondary prevention  -Discussed options will plan for right and left heart catheterization in a.m.    **Risks/benefits of the procedure were d/w the patient including bleeding, infection, death, mi, arrhythmia, kidney failure, stroke, etc.  Patient understands and consent was placed on the chart.    Hypertension  -Check renal artery at time of catheterization    Obesity      Thank you for your consult. I will follow-up with patient. Please contact us if you have any additional questions.    Jose De Jesus Longo MD  Cardiology    [facility]

## 2017-04-17 NOTE — PLAN OF CARE
Problem: Patient Care Overview  Goal: Plan of Care Review  Outcome: Ongoing (interventions implemented as appropriate)    04/17/17 0207   Coping/Psychosocial   Plan Of Care Reviewed With patient   Pt verbalized understanding plan of care. She was calm, cooperative, and accepting. Pt did not fall or have any injuries during shift.     Problem: Pain, Acute (Adult)  Goal: Acceptable Pain Control/Comfort Level  Patient will demonstrate the desired outcomes by discharge/transition of care.   Outcome: Ongoing (interventions implemented as appropriate)    04/17/17 0207   Pain, Acute (Adult)   Acceptable Pain Control/Comfort Level making progress toward outcome         Problem: Cardiac: Heart Failure (Adult)  Goal: Signs and Symptoms of Listed Potential Problems Will be Absent, Minimized or Managed (Cardiac: Heart Failure)  Signs and symptoms of listed potential problems will be absent, minimized or managed by discharge/transition of care (reference Cardiac: Heart Failure (Adult) CPG).   Outcome: Ongoing (interventions implemented as appropriate)    04/17/17 0207   Cardiac: Heart Failure   Problems Assessed (Heart Failure) all   Problems Present (Heart Failure) none

## 2017-04-17 NOTE — PROGRESS NOTES
Report given to DIGNA Beckham. Walking rounds completed. Visualized and assessed patient NAD noted. Safety precautions maintained and call light within reach.    Chart check completed.

## 2017-04-17 NOTE — NURSING
Notified Dr. Longo of pt's complaint of chest tightness. No new orders given. Will continue to monitor pt closely.

## 2017-04-18 VITALS
WEIGHT: 178.31 LBS | RESPIRATION RATE: 16 BRPM | TEMPERATURE: 98 F | BODY MASS INDEX: 25.53 KG/M2 | OXYGEN SATURATION: 98 % | HEIGHT: 70 IN | SYSTOLIC BLOOD PRESSURE: 129 MMHG | DIASTOLIC BLOOD PRESSURE: 78 MMHG | HEART RATE: 83 BPM

## 2017-04-18 LAB
ANION GAP SERPL CALC-SCNC: 7 MMOL/L
BUN SERPL-MCNC: 20 MG/DL
CALCIUM SERPL-MCNC: 9.4 MG/DL
CHLORIDE SERPL-SCNC: 103 MMOL/L
CO2 SERPL-SCNC: 28 MMOL/L
CREAT SERPL-MCNC: 0.8 MG/DL
EST. GFR  (AFRICAN AMERICAN): >60 ML/MIN/1.73 M^2
EST. GFR  (NON AFRICAN AMERICAN): >60 ML/MIN/1.73 M^2
GLUCOSE SERPL-MCNC: 77 MG/DL
POTASSIUM SERPL-SCNC: 3.2 MMOL/L
SODIUM SERPL-SCNC: 138 MMOL/L

## 2017-04-18 PROCEDURE — 63600175 PHARM REV CODE 636 W HCPCS

## 2017-04-18 PROCEDURE — 4A023N8 MEASUREMENT OF CARDIAC SAMPLING AND PRESSURE, BILATERAL, PERCUTANEOUS APPROACH: ICD-10-PCS | Performed by: INTERNAL MEDICINE

## 2017-04-18 PROCEDURE — 36415 COLL VENOUS BLD VENIPUNCTURE: CPT

## 2017-04-18 PROCEDURE — 36252 INS CATH REN ART 1ST BILAT: CPT | Mod: 59

## 2017-04-18 PROCEDURE — 36252 INS CATH REN ART 1ST BILAT: CPT | Mod: 59,,, | Performed by: INTERNAL MEDICINE

## 2017-04-18 PROCEDURE — 93460 R&L HRT ART/VENTRICLE ANGIO: CPT | Mod: 26,,, | Performed by: INTERNAL MEDICINE

## 2017-04-18 PROCEDURE — 99900035 HC TECH TIME PER 15 MIN (STAT)

## 2017-04-18 PROCEDURE — 25000003 PHARM REV CODE 250: Performed by: HOSPITALIST

## 2017-04-18 PROCEDURE — 99152 MOD SED SAME PHYS/QHP 5/>YRS: CPT | Mod: ,,, | Performed by: INTERNAL MEDICINE

## 2017-04-18 PROCEDURE — 63600175 PHARM REV CODE 636 W HCPCS: Performed by: HOSPITALIST

## 2017-04-18 PROCEDURE — 25000003 PHARM REV CODE 250

## 2017-04-18 PROCEDURE — B2111ZZ FLUOROSCOPY OF MULTIPLE CORONARY ARTERIES USING LOW OSMOLAR CONTRAST: ICD-10-PCS | Performed by: INTERNAL MEDICINE

## 2017-04-18 PROCEDURE — B4181ZZ FLUOROSCOPY OF BILATERAL RENAL ARTERIES USING LOW OSMOLAR CONTRAST: ICD-10-PCS | Performed by: INTERNAL MEDICINE

## 2017-04-18 PROCEDURE — 80048 BASIC METABOLIC PNL TOTAL CA: CPT

## 2017-04-18 RX ORDER — CARVEDILOL 6.25 MG/1
6.25 TABLET ORAL 2 TIMES DAILY
Qty: 180 TABLET | Refills: 3 | Status: SHIPPED | OUTPATIENT
Start: 2017-04-18 | End: 2017-08-28 | Stop reason: SDUPTHER

## 2017-04-18 RX ORDER — LISINOPRIL 40 MG/1
40 TABLET ORAL DAILY
Qty: 90 TABLET | Refills: 3 | Status: SHIPPED | OUTPATIENT
Start: 2017-04-18 | End: 2018-07-25 | Stop reason: SDUPTHER

## 2017-04-18 RX ADMIN — CARVEDILOL 6.25 MG: 6.25 TABLET, FILM COATED ORAL at 09:04

## 2017-04-18 RX ADMIN — FUROSEMIDE 40 MG: 10 INJECTION, SOLUTION INTRAVENOUS at 09:04

## 2017-04-18 RX ADMIN — FAMOTIDINE 20 MG: 20 TABLET, FILM COATED ORAL at 09:04

## 2017-04-18 RX ADMIN — HYDROCODONE BITARTRATE AND ACETAMINOPHEN 1 TABLET: 5; 325 TABLET ORAL at 01:04

## 2017-04-18 RX ADMIN — POTASSIUM CHLORIDE 20 MEQ: 1500 TABLET, EXTENDED RELEASE ORAL at 09:04

## 2017-04-18 RX ADMIN — LISINOPRIL 40 MG: 20 TABLET ORAL at 09:04

## 2017-04-18 RX ADMIN — ZOLPIDEM TARTRATE 5 MG: 5 TABLET, FILM COATED ORAL at 12:04

## 2017-04-18 NOTE — PROGRESS NOTES
Follow-up Information     Follow up with Jose De Jesus Longo MD On 4/26/2017.    Specialty:  Cardiology    Why:  Follow up scheduled for Wednesday April 26th at 11am. Please be sure to keep scheduled follow up appointment     Contact information:    [address]  [address] [place] [address]  [phone]          Schedule an appointment as soon as possible for a visit with Veronika Rainey MD.    Specialty:  Family Medicine    Why:  For follow up in 1-2 weeks after hospital discharge.    Contact information:    [address] [place] [address]  [phone]          Thank you for choosing [facility] for your care. Within 48-72 hours after leaving the hospital you will receive a call from [facility] Nurses following up to see how you are doing. The team will ask you a few questions and the call will last approximately 20 minutes.     Please answer any calls you may receive from [facility] we want to continue to support you as you manage your healthcare needs. [facility] is happy to have the opportunity to serve you.     [facility] On [facility] Nurse Care Line - 24/7 Assistance  Registered [facility] nurses can provide appointment booking, health education, clinical advisement, and other advisory services.   Call for this free service at [phone].              Sincerely,   Your [facility] Team,   Treasure Narayan RN/[place]  [phone]

## 2017-04-18 NOTE — ASSESSMENT & PLAN NOTE
2D ECHO done and confirms the diagnosis.   Continue IV Lasix.  Titrate ACE-I as well, start B-Blockade with Coreg and titrate as needed.  Appreciate Cardiology input, they do right and left heart cath tomorrow.

## 2017-04-18 NOTE — SUBJECTIVE & OBJECTIVE
Interval History: The patient still has some SOB and chest discomfort, but it is improving.    Review of Systems   Constitutional: Positive for fatigue.   Respiratory: Positive for chest tightness and shortness of breath. Negative for wheezing.    Cardiovascular: Positive for chest pain and leg swelling. Negative for palpitations.   Gastrointestinal: Negative for abdominal distention, abdominal pain, constipation and nausea.   Endocrine: Negative for cold intolerance.   Genitourinary: Negative for difficulty urinating.   Musculoskeletal: Positive for arthralgias.   Skin: Negative for color change and rash.   Neurological: Negative for dizziness, facial asymmetry and headaches.     Objective:     Vital Signs (Most Recent):  Temp: 98.8 °F (37.1 °C) (04/17/17 2026)  Pulse: 95 (04/17/17 2026)  Resp: 18 (04/17/17 2026)  BP: (!) 133/100 (04/17/17 2026)  SpO2: 96 % (04/17/17 2026) Vital Signs (24h Range):  Temp:  [97.5 °F (36.4 °C)-98.8 °F (37.1 °C)] 98.8 °F (37.1 °C)  Pulse:  [85-95] 95  Resp:  [18] 18  SpO2:  [96 %-99 %] 96 %  BP: (133-162)/() 133/100     Weight: 80.3 kg (177 lb)  Body mass index is 25.4 kg/(m^2).    Intake/Output Summary (Last 24 hours) at 04/17/17 2211  Last data filed at 04/17/17 1600   Gross per 24 hour   Intake             1160 ml   Output             1850 ml   Net             -690 ml      Physical Exam   Constitutional: She is oriented to person, place, and time. She appears well-developed and well-nourished.   Overweight female in NAD, fatigued, but cooperative with exam   HENT:   Head: Normocephalic and atraumatic.   Mouth/Throat: Oropharynx is clear and moist.   Eyes: EOM are normal. Pupils are equal, round, and reactive to light.   Neck: Normal range of motion. Neck supple. No JVD present. No thyromegaly present.   Cardiovascular: Normal rate, regular rhythm, normal heart sounds and intact distal pulses.  Exam reveals no gallop and no friction rub.    No murmur heard.  Pulmonary/Chest:  Effort normal. No respiratory distress. She has no wheezes.   Diminished breath sounds at the bases   Abdominal: Soft. Bowel sounds are normal. She exhibits no distension. There is no tenderness.   Musculoskeletal: Normal range of motion. She exhibits no edema.   Lymphadenopathy:     She has no cervical adenopathy.   Neurological: She is alert and oriented to person, place, and time.   Skin: Skin is warm and dry. No rash noted. No erythema.   Psychiatric: She has a normal mood and affect. Her behavior is normal. Judgment and thought content normal.   Vitals reviewed.      Significant Labs:   CBC:   Recent Labs  Lab 04/16/17  1128   WBC 13.21*   HGB 12.1   HCT 37.9        CMP:   Recent Labs  Lab 04/16/17  1128 04/17/17  0513    138   K 3.5 3.3*    101   CO2 24 30*   * 88   BUN 12 15   CREATININE 0.8 0.9   CALCIUM 9.3 9.3   PROT 8.0  --    ALBUMIN 3.8  --    BILITOT 0.5  --    ALKPHOS 63  --    AST 19  --    ALT 18  --    ANIONGAP 10 7*   EGFRNONAA >60 >60     Troponin:   Recent Labs  Lab 04/16/17  1128   TROPONINI 0.032*       Significant Imaging: I have reviewed and interpreted all pertinent imaging results/findings within the past 24 hours.

## 2017-04-18 NOTE — PROGRESS NOTES
[facility] Heart Failure Program consult complete. Explained program details including home monitoring expectations, scale + router setup, weekly PharmD calls, and follow up appointment with labs. Addressed pt's questions and concerns to his/her satisfaction. Reviewed blue educational folder (Welcome letter, PharmD contact number, Heart Failure Zones, and program booklet.) Pt verbalized understanding of all discussed and gave consent to enroll in 30-day monitoring program.    H&P on admit: Principal Problem:Acute CHF     Chief Complaint: Shortness of breath      HPI: This is a 31 year old female with HTN diagnosed at 16 years old who presents with a month of worsening intermittent lower extremity edema, ELENA, chest tightness, cough, and fatigue. The patient notes progression of her shortness of breath which prompted her visit to the ED. Upon her arrival there, the patient had an EKG done that showed no acute ischemic changes, but signs of LVH. A CXR was done that was consistent with pulmonary edema bilaterally and cardiomegaly.          Past Medical History:   Diagnosis Date    Hypertension       dx at 16y.o.       Patient has some college,  and 2 kids.  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  She has been non compliant with meds due to meds making her feel bad she says.  Also dietary habit have not been the best with traveling.  Patient now says this is a wake up call.    Issues: low medication compliance (reviewed importance of compliance to prevent CHF exacerbation) and poor dietary compliance (reviewed low sodium diet (1,500 mg/day) + fluid restriction (50 oz))    Contact information:  [phone] (home)   [phone] (mobile)    Extended Emergency Contact Information  Primary Emergency Contact: Jacob Marks   [facility]  Home Phone: [phone]  Mobile Phone: [phone]  Relation: Spouse    Consults placed: Dietary  Readmission Risk: 20%    linked to pt  with most recent inpatient dry weight. 178 LBS Scale: R046  Follow-up appointment scheduled on Dr. Longo on 4/26/2017 at 11:00 a.m. with labs at [facility] on 4/26/2017 at 10:00 a.m.    .Daphney Palencia RN, BSN, [facility]  4/18/2017

## 2017-04-18 NOTE — CONSULTS
[facility]  Adult Nutrition  Consult Note    SUMMARY     Continuum of Care Plan    D/C Planning:  Patient will discharge on low sodium (1500 - 2000 mg), Cardiac Diet.      Reason for Assessment    Reason for Assessment: physician consult, other (see comments) (CHF, Low Sodium education)  Diagnosis:  (Congestive Heart Failure)    General Information Comments: Educated patient and spouse on Congestive Heart Failure Nutrition Therapy. Reviewed foods that are high in sodium and recommend to have a goal of 1500 mg Sodium per day. Provided handout on soidum-free flavorings. Reviewed heart healthy cooking tips. Provided Educational Handouts from the Academy of [employer] Dietetics (Nutrition Care [place]:  Adults) for patient to discharge home with. Patient and spouse verbalized understanding.     Nutrition Prescription Ordered    Current Diet Order: Cardiac    Nutrition Risk Screen     Nutrition Risk Screen: no indicators present    Labs/Tests/Procedures/Meds    Labs:  Reviewed  Pertinent Lab Comments:  , Troponin 0.032  Medications:  reviewed    Monitor and Evaluation    Food and Nutrient Intake: energy intake, food and beverage intake  Food and Nutrient Adminstration: diet order  Knowledge/Beliefs/Attitudes: food and nutrition knowledge/skill, beliefs and attitudes    Nutrition Risk    Level of Risk: other (see comments) (f/u 1x weekly)    Nutrition Follow-Up    RD Follow-up?: Yes

## 2017-04-18 NOTE — PLAN OF CARE
04/18/17 1558   Final Note   Assessment Type Final Discharge Note   Discharge Disposition Home   Discharge planning education complete? Yes   Hospital Follow Up  Appt(s) scheduled? Yes   Discharge plans and expectations educations in teach back method with documentation complete? Yes   Offered [facility] Bedside Delivery? n/a   [facility] n/a   Referral to Outpatient Case Management complete? n/a   Referral to / orders for Home Health Complete? n/a   30 day supply of medicines given at discharge, if documented non-compliance / non-adherence? n/a   Any social issues identified prior to discharge? n/a   Did you assess the readiness or willingness of the family or caregiver to support self management of care? n/a   Right Care Referral Info   Post Acute Recommendation No Care     Follow-up Information     Follow up with Jose De Jesus Longo MD On 4/26/2017.    Specialty:  Cardiology    Why:  Follow up scheduled for Wednesday April 26th at 11am. Please be sure to keep scheduled follow up appointment     Contact information:    [address] [place] [address]  [place] [address] [phone]          Schedule an appointment as soon as possible for a visit with Veronika Rainey MD.    Specialty:  Family Medicine    Why:  For follow up in 1-2 weeks after hospital discharge.    Contact information:    [address] [facility]  [place] [address] [phone]          Follow up with NON FASTING LAB FOR CHF PROGRAM On 4/26/2017.    Why:  New CHF enrollee, non fasting lab BNP,CMP, MG at 10:00 a.m.    Contact information:    [facility] first floor LAB  [address]  [place] [id number]          Notified pts nurse Ana that all CM needs have been addressed and that she may proceed with nursing education and instructions to complete d/c process.

## 2017-04-18 NOTE — BRIEF OP NOTE
[facility]  Brief Operative Note    SUMMARY     Surgery Date: 4/18/2017     Surgeon(s) and Role:     * Jose De Jesus Longo MD - Primary    Assisting Surgeon: None    Pre-op Diagnosis:  CHF (congestive heart failure) [I50.9]    Post-op Diagnosis:  Acute combined systolic and diastolic heart failure  Procedure(s) (LRB):  HEART CATH-BILATERAL (Bilateral)    Anesthesia: Choice    Description of Procedure: Right and left heart catheterization with selective coronary angiography, LV gram    Description of the findings of the procedure: Severe nonischemic cardiomyopathy with low filling pressures on the right side and elevated LVEDP, PA sat was 62%    Recommendation:  -Okay for DC later today  -No diuretic on discharge  -Follow-up with me in one week    Estimated Blood Loss: <50 cc         Specimens:   Specimen     None

## 2017-04-18 NOTE — NURSING
Pt arrived to room. Pt is AAOx4. Respirations are even and unlabored. Right groin dressing is clean, dry and intact. Pedal pulses present. Instructed pt on bedrest. Notified Dr. Longo of pt's complaint of chest pain. No new orders given at this time. Will continue to monitor pt closely.

## 2017-04-18 NOTE — PROGRESS NOTES
[place] linked New CHF Program enrollee visit with Dr. Longo follow up appt on  4/26/2017 at 11:00 a.m. And non fasting lab, mg, cmp and BNP at 10:00 a.m. At [facility]..Daphney Palencia RN, BSN, [facility]  4/18/2017    Order for dietary consult and call for pharmacy to discuss diet and meds..Daphney Palencia RN, BSN, [facility]  4/18/2017

## 2017-04-18 NOTE — PROGRESS NOTES
Call placed to [phone] spoke to Karyna to schedule appointment with Dr Longo.  Appointment scheduled for Wednesday April 26th at 11am with Dr Longo.   Information entered into follow up tab to print on AVS at time of discharge.

## 2017-04-18 NOTE — PROGRESS NOTES
[facility] Hospital Medicine  Progress Note    Patient Name: Nasra Marks  MRN: [id number]  Patient Class: IP- Inpatient   Admission Date: 4/16/2017  Length of Stay: 1 days  Attending Physician: Marcelina Portillo MD  Primary Care Provider: Veronika Rainey MD        Subjective:     Principal Problem:Acute combined systolic and diastolic congestive heart failure    HPI:  This is a 31 year old female with HTN diagnosed at 16 years old who presents with a month of worsening intermittent lower extremity edema, ELENA, chest tightness, cough, and fatigue.  The patient notes progression of her shortness of breath which prompted her visit to the ED.  Upon her arrival there, the patient had an EKG done that showed no acute ischemic changes, but signs of LVH.  A CXR was done that was consistent with pulmonary edema bilaterally and cardiomegaly.    Hospital Course:  The patient had a 2D ECHO done that shows severe cardiomyopathy with EF of 25% with diastolic dysfunction.  The patient has poor control of hypertension and added Coreg and Amlodipine for better control.  Consult Cardiology to evaluate and give recommendations for treatment.    Interval History: The patient still has some SOB and chest discomfort, but it is improving.    Review of Systems   Constitutional: Positive for fatigue.   Respiratory: Positive for chest tightness and shortness of breath. Negative for wheezing.    Cardiovascular: Positive for chest pain and leg swelling. Negative for palpitations.   Gastrointestinal: Negative for abdominal distention, abdominal pain, constipation and nausea.   Endocrine: Negative for cold intolerance.   Genitourinary: Negative for difficulty urinating.   Musculoskeletal: Positive for arthralgias.   Skin: Negative for color change and rash.   Neurological: Negative for dizziness, facial asymmetry and headaches.     Objective:     Vital Signs (Most Recent):  Temp: 98.8 °F (37.1 °C) (04/17/17 2026)  Pulse: 95  (04/17/17 2026)  Resp: 18 (04/17/17 2026)  BP: (!) 133/100 (04/17/17 2026)  SpO2: 96 % (04/17/17 2026) Vital Signs (24h Range):  Temp:  [97.5 °F (36.4 °C)-98.8 °F (37.1 °C)] 98.8 °F (37.1 °C)  Pulse:  [85-95] 95  Resp:  [18] 18  SpO2:  [96 %-99 %] 96 %  BP: (133-162)/() 133/100     Weight: 80.3 kg (177 lb)  Body mass index is 25.4 kg/(m^2).    Intake/Output Summary (Last 24 hours) at 04/17/17 2211  Last data filed at 04/17/17 1600   Gross per 24 hour   Intake             1160 ml   Output             1850 ml   Net             -690 ml      Physical Exam   Constitutional: She is oriented to person, place, and time. She appears well-developed and well-nourished.   Overweight female in NAD, fatigued, but cooperative with exam   HENT:   Head: Normocephalic and atraumatic.   Mouth/Throat: Oropharynx is clear and moist.   Eyes: EOM are normal. Pupils are equal, round, and reactive to light.   Neck: Normal range of motion. Neck supple. No JVD present. No thyromegaly present.   Cardiovascular: Normal rate, regular rhythm, normal heart sounds and intact distal pulses.  Exam reveals no gallop and no friction rub.    No murmur heard.  Pulmonary/Chest: Effort normal. No respiratory distress. She has no wheezes.   Diminished breath sounds at the bases   Abdominal: Soft. Bowel sounds are normal. She exhibits no distension. There is no tenderness.   Musculoskeletal: Normal range of motion. She exhibits no edema.   Lymphadenopathy:     She has no cervical adenopathy.   Neurological: She is alert and oriented to person, place, and time.   Skin: Skin is warm and dry. No rash noted. No erythema.   Psychiatric: She has a normal mood and affect. Her behavior is normal. Judgment and thought content normal.   Vitals reviewed.      Significant Labs:   CBC:   Recent Labs  Lab 04/16/17  1128   WBC 13.21*   HGB 12.1   HCT 37.9        CMP:   Recent Labs  Lab 04/16/17  1128 04/17/17  0513    138   K 3.5 3.3*    101   CO2  24 30*   * 88   BUN 12 15   CREATININE 0.8 0.9   CALCIUM 9.3 9.3   PROT 8.0  --    ALBUMIN 3.8  --    BILITOT 0.5  --    ALKPHOS 63  --    AST 19  --    ALT 18  --    ANIONGAP 10 7*   EGFRNONAA >60 >60     Troponin:   Recent Labs  Lab 04/16/17  1128   TROPONINI 0.032*       Significant Imaging: I have reviewed and interpreted all pertinent imaging results/findings within the past 24 hours.    Assessment/Plan:      * Acute combined systolic and diastolic congestive heart failure   2D ECHO done and confirms the diagnosis.   Continue IV Lasix.  Titrate ACE-I as well, start B-Blockade with Coreg and titrate as needed.  Appreciate Cardiology input, they do right and left heart cath tomorrow.       Acute pulmonary edema  IV Lasix as above and closely monitor respiratory status with pulse ox Q4.      Essential hypertension  Anti-hypertensive management as above.      VTE Risk Mitigation         Ordered     Medium Risk of VTE  Once      04/16/17 1052     Place sequential compression device  Until discontinued      04/16/17 1052     Place ARSALAN hose  Until discontinued      04/16/17 1052          Marcelina Portillo MD  Department of Hospital Medicine   [facility]

## 2017-04-19 ENCOUNTER — TELEPHONE (OUTPATIENT)
Dept: OTHER | Facility: OTHER | Age: 32
End: 2017-04-19

## 2017-04-19 DIAGNOSIS — I50.41 ACUTE COMBINED SYSTOLIC AND DIASTOLIC HEART FAILURE: Primary | ICD-10-CM

## 2017-04-19 NOTE — TELEPHONE ENCOUNTER
"HPI: 31 y.o. female with past medical history of hypertension (poorly controlled not currently taking prescribed antihypertensive medications) presents to the emergency department complaining of acute chest pain and shortness of breath. She states chest pain began Friday night while she was lying in bed. She describes chest pain as constant, pressure in the center of her chest and feels as though her chest is congested or as if there is something "in there". She states chest pain resolved by the next morning and recurred as she was walking in the grocery store later that day with associated shortness of breath without diaphoresis, nausea or vomiting, lightheadedness or dizziness. She states chest that has been constant since yesterday and she reports one episode of vomiting just prior to arrival today.     She first noticed symptoms worsening over 2 weeks ago. She said she did have upper respiratory tract infection which was treated as sinusitis prior to this. She does have history of poorly-controlled hypertension. She currently feels a little bit better after diuresis but still complaining of substernal chest pains come and go. She denies any PND, orthopnea or lower edema currently post initial diuresis. She's not expressing dizziness, presyncope or syncope. She has had prior  but no recent deliveries. She had no problems with her previous pregnancy.    Ms. Marks is enrolled in the CHF Digital Medicine program. She has submitted a weight this morning very similar to discharge weight. Called to review program with her, but she asked to call me back later today. Will await her return call and will call back if she does not return my call by end of day.     Weight: 80.6 kg (177 lb 12.8 oz) (2017  7:26 AM)    "

## 2017-04-20 ENCOUNTER — TELEPHONE (OUTPATIENT)
Dept: OTHER | Facility: OTHER | Age: 32
End: 2017-04-20

## 2017-04-20 NOTE — DISCHARGE SUMMARY
[facility] Hospital Medicine  Discharge Summary      Patient Name: Nasra Marks  MRN: [id number]  Admission Date: 4/16/2017  Hospital Length of Stay: 2 days  Discharge Date and Time:  04/18/2017 1:58 PM  Attending Physician: Constance att. providers found   Discharging Provider: Laureen Barbosa MD  Primary Care Provider: Veronika Rainey MD      HPI:   This is a 31 year old female with HTN diagnosed at 16 years old who presents with a month of worsening intermittent lower extremity edema, ELENA, chest tightness, cough, and fatigue.  The patient notes progression of her shortness of breath which prompted her visit to the ED.  Upon her arrival there, the patient had an EKG done that showed no acute ischemic changes, but signs of LVH.  A CXR was done that was consistent with pulmonary edema bilaterally and cardiomegaly.    Procedure(s) (LRB):  HEART CATH-BILATERAL (Bilateral)      Indwelling Lines/Drains at time of discharge:   Lines/Drains/Airways          No matching active lines, drains, or airways        Hospital Course:   The patient had a 2D ECHO done that shows severe cardiomyopathy with EF of 25% with diastolic dysfunction.  The patient has poorly controlled hypertension at home. Coreg and lisinopril added for better control. Cardiology consulted. Given such severe heart failure, patient underwent bilateral heart cath. This showed no evidence of coronary artery obstruction, therefore classified as severe non ischemic cardiomyopathy. Study also showed low filling pressures on the right side, elevated LVEDP and PA sat of 62%. Diuretics discontinued. Strongly encouraged low sodium diet and to monitor BP at home while on antihypertensive regimen. Nutrition had provided education prior to discharge. Goal BP < 120/80 mmHg. Is to follow up with cardiology within one week. Is to be enrolled in heart failure program. No complications post cath. Renal function is normal      Consults:   Consults         Status  Ordering Provider     Inpatient consult to Cardiology  Once     Provider:  Kashif Peguero MD    Completed TYE HAWK     Inpatient consult to Dietary  Once     Provider:  (Not yet assigned)    Completed TYE HAWK     Inpatient consult to Dietary  Once     Provider:  (Not yet assigned)    Completed REED SHARMA        Pending Diagnostic Studies:     None        Final Active Diagnoses:    Diagnosis Date Noted POA    PRINCIPAL PROBLEM:  Acute combined systolic and diastolic congestive heart failure [I50.41] 04/16/2017 Yes    Acute pulmonary edema [J81.0] 04/16/2017 Yes    Essential hypertension [I10] 11/19/2012 Yes      Problems Resolved During this Admission:    Diagnosis Date Noted Date Resolved POA    Transaminitis [R74.0] 03/02/2016 04/18/2017 Yes        Discharged Condition: good    Disposition: Home or Self Care    Follow Up:  Follow-up Information     Follow up with Jose De Jesus Longo MD On 4/26/2017.    Specialty:  Cardiology    Why:  Follow up scheduled for Wednesday April 26th at 11am. Please be sure to keep scheduled follow up appointment     Contact information:    [address] [place], [place] [address] [address] [phone]          Schedule an appointment as soon as possible for a visit with Veronika Rainey MD.    Specialty:  Family Medicine    Why:  For follow up in 1-2 weeks after hospital discharge.    Contact information:    [address] [place], [place] [address] [address] [phone]          Follow up with NON FASTING LAB FOR CHF PROGRAM On 4/26/2017.    Why:  New CHF enrollee, non fasting lab BNP,CMP, MG at 10:00 a.m.    Contact information:    [facility] first floor LAB  [address] [facility]  [place]. [id number]          Medications:  Reconciled Home Medications:   Discharge Medication List as of 4/18/2017  3:58 PM      START taking these medications    Details   carvedilol (COREG) 6.25 MG tablet Take 1 tablet (6.25 mg total) by mouth 2 (two) times daily., Starting 4/18/2017, Until  Wed 4/18/18, Normal      lisinopril (PRINIVIL,ZESTRIL) 40 MG tablet Take 1 tablet (40 mg total) by mouth once daily., Starting 4/18/2017, Until Wed 4/18/18, Normal         CONTINUE these medications which have NOT CHANGED    Details   fluticasone (FLONASE) 50 mcg/actuation nasal spray 1 spray by Each Nare route once daily., Starting 2/22/2017, Until Discontinued, Normal      omeprazole (PRILOSEC) 20 MG capsule Take 1 capsule (20 mg total) by mouth once daily., Starting 9/23/2016, Until Sat 9/23/17, Normal         STOP taking these medications       azithromycin (Z-ZAMZAM) 250 MG tablet Comments:   Reason for Stopping:         lisinopril-hydrochlorothiazide (PRINZIDE,ZESTORETIC) 20-12.5 mg per tablet Comments:   Reason for Stopping:             Time spent on the discharge of patient: > 35 minutes    Laureen Barbosa MD  Department of Hospital Medicine  [facility]

## 2017-04-20 NOTE — TELEPHONE ENCOUNTER
LVM for Mrs Marks, need program intro and medication review completed.  She did submit another weight to our program today, appears stable.  No diuretic prescribed at D/C.   Weight: 80.9 kg (178 lb 6.4 oz) (4/20/2017  8:04 AM)

## 2017-04-21 ENCOUNTER — TELEPHONE (OUTPATIENT)
Dept: OTHER | Facility: OTHER | Age: 32
End: 2017-04-21

## 2017-04-21 DIAGNOSIS — I50.41 ACUTE COMBINED SYSTOLIC AND DIASTOLIC HEART FAILURE: Primary | ICD-10-CM

## 2017-04-21 RX ORDER — FUROSEMIDE 40 MG/1
40 TABLET ORAL DAILY PRN
Qty: 30 TABLET | Refills: 3 | Status: SHIPPED | OUTPATIENT
Start: 2017-04-21 | End: 2017-11-29 | Stop reason: SDUPTHER

## 2017-04-21 NOTE — TELEPHONE ENCOUNTER
Ms. Marks has returned my call this morning. She confirms weights submitted are dry weights. She denies JANETTE or ABD bloating/distension. She doesn't feel she is urinating much. She confirms adherence to 50 oz fluid restriction as instructed in the hospital. She does endorse PND and 3-4 pillow orthopnea. She has no SOB at rest but feels ELENA gets worse as the day goes on. She is following low sodium diet. She isn't adding salt or anything with salt to her food. She is eating fresh/frozen vegetables, no processed foods. She eats stovetop oatmeal for breakfast. She confirms she is adherent to carvedilol and lisinopril. She does feel nausea when taking carvedilol, so advised she take each dose with breakfast and dinner to help this. She was discharged without a diuretic. She was given lasix 40 mg IV BID while inpatient. Last dose of Lasix was 40 mg IV on Tuesday morning prior to discharge. Her weight has remained stable and equal to discharge weight since being home. She said she was told that she should be able to eliminate the fluid on her own when she left the hospital which is why they did not prescribe Lasix on discharge. Her Cr at discharge was 0.8, K was 3.2. Will send a prescription for lasix 40 mg QD PRN to her pharmacy. She will take 1 dose today as she is still symptomatic, but will not take any further doses unless instructed to do so. She will also increase her intake of potassium rich foods as K was low at discharge. Will monitor closely as her follow up with Dr. Longo is not until 4/26. Verified that she has our program contact information and asked that she call over the weekend with any clinical changes regardless of her weight.     Weight: 80.9 kg (178 lb 6.4 oz) (4/21/2017  7:56 AM)

## 2017-04-21 NOTE — TELEPHONE ENCOUNTER
Ms. Marks continues to submit weights to the CHF Digital Medicine program. We have not been able to complete program review with her as of day 3. Called and [facility].     Weight: 80.9 kg (178 lb 6.4 oz) (4/21/2017  7:56 AM)

## 2017-04-24 ENCOUNTER — TELEPHONE (OUTPATIENT)
Dept: OTHER | Facility: OTHER | Age: 32
End: 2017-04-24

## 2017-04-24 NOTE — TELEPHONE ENCOUNTER
Called to assess Ms. Marks's clinical status as weight was down 2 lbs yesterday and has rebounded today. [facility].    Weight: 80.6 kg (177 lb 9.6 oz) (4/24/2017  7:16 AM)

## 2017-04-25 ENCOUNTER — TELEPHONE (OUTPATIENT)
Dept: OTHER | Facility: OTHER | Age: 32
End: 2017-04-25

## 2017-04-25 ENCOUNTER — OFFICE VISIT (OUTPATIENT)
Dept: CARDIOLOGY | Facility: CLINIC | Age: 32
End: 2017-04-25
Payer: COMMERCIAL

## 2017-04-25 VITALS
OXYGEN SATURATION: 98 % | HEART RATE: 86 BPM | HEIGHT: 70 IN | BODY MASS INDEX: 25.57 KG/M2 | DIASTOLIC BLOOD PRESSURE: 81 MMHG | WEIGHT: 178.63 LBS | SYSTOLIC BLOOD PRESSURE: 117 MMHG

## 2017-04-25 DIAGNOSIS — E66.01 MORBID OBESITY DUE TO EXCESS CALORIES: ICD-10-CM

## 2017-04-25 DIAGNOSIS — I50.40 COMBINED SYSTOLIC AND DIASTOLIC CONGESTIVE HEART FAILURE, UNSPECIFIED CONGESTIVE HEART FAILURE CHRONICITY: Primary | ICD-10-CM

## 2017-04-25 DIAGNOSIS — I10 ESSENTIAL HYPERTENSION: ICD-10-CM

## 2017-04-25 PROCEDURE — 99214 OFFICE O/P EST MOD 30 MIN: CPT | Mod: S$GLB,,, | Performed by: INTERNAL MEDICINE

## 2017-04-25 PROCEDURE — 99999 PR PBB SHADOW E&M-EST. PATIENT-LVL III: CPT | Mod: PBBFAC,,, | Performed by: INTERNAL MEDICINE

## 2017-04-25 PROCEDURE — 3074F SYST BP LT 130 MM HG: CPT | Mod: S$GLB,,, | Performed by: INTERNAL MEDICINE

## 2017-04-25 PROCEDURE — 3079F DIAST BP 80-89 MM HG: CPT | Mod: S$GLB,,, | Performed by: INTERNAL MEDICINE

## 2017-04-25 PROCEDURE — 1160F RVW MEDS BY RX/DR IN RCRD: CPT | Mod: S$GLB,,, | Performed by: INTERNAL MEDICINE

## 2017-04-25 NOTE — PROGRESS NOTES
Subjective:    Patient ID:  Nasra Marks is a 31 y.o. female who presents for follow-up of No chief complaint on file.      HPI  Patient is here for follow-up of newly diagnosed systolic heart failure.  She underwent cardiac catheterization showing nonischemic heart myopathy with low filling pressures.  She was discharged on no diuretic therapy.  She currently denies any worsening cardiopulmonary complaints.  She's not expressing chest pain, shortness of breath or palpitations.  She denies any PND, orthopnea or lower edema.  She's not expressing dizziness, presyncope or syncope.  She has some mild pain at the right groin access site.  We discussed that this mild bruising with resolved with time.  She does have a little small not consistent with closure device as well.  We discussed that this would dissolve.    Review of Systems   Constitution: Negative.   HENT: Negative.    Eyes: Negative.    Cardiovascular: Negative for chest pain, dyspnea on exertion, irregular heartbeat, leg swelling, near-syncope, orthopnea, palpitations, paroxysmal nocturnal dyspnea and syncope.   Respiratory: Negative for shortness of breath.    Skin: Negative.    Musculoskeletal: Negative.    Gastrointestinal: Negative for abdominal pain, constipation and diarrhea.   Genitourinary: Negative for dysuria.   Neurological: Negative.    Psychiatric/Behavioral: Negative.         Objective:    Physical Exam   Constitutional: She is oriented to person, place, and time. She appears well-developed and well-nourished.   HENT:   Head: Normocephalic and atraumatic.   Eyes: Conjunctivae and EOM are normal. Pupils are equal, round, and reactive to light.   Neck: Normal range of motion. Neck supple. No thyromegaly present.   Cardiovascular: Normal rate and regular rhythm.    No murmur heard.  Pulmonary/Chest: Effort normal and breath sounds normal. No respiratory distress.   Abdominal: Soft. Bowel sounds are normal.   Musculoskeletal: She exhibits no  edema.   Neurological: She is alert and oriented to person, place, and time.   Skin: Skin is warm and dry.   Psychiatric: She has a normal mood and affect. Her behavior is normal.       echo:  CONCLUSIONS     1 - Severely depressed left ventricular systolic function (EF 25-30%).     2 - Concentric hypertrophy.     3 - Severe left atrial enlargement.     4 - Mildly to moderately depressed right ventricular systolic function .     R?LHC:  B. Summary/Post-Operative Diagnosis       Normal coronary arteries.    Systolic dysfunction.    Mildly elevated left Filling Pressures.    Normal Pulmonary Hypertension.    Ejection Fraction: 20%  Global LV Function: severely depressed    Air rest:  PW:  (4)  PA: 24  CO_THERM: 4.4  RV: 25  RA:  (5)  LVEDP: 17       E. Angiographic Results     Diagnostic:          Patient has a right dominant coronary artery.        - Left Main Coronary Artery:             The LM is normal. There is DANNY 3 flow.     - Left Anterior Descending Artery:             The LAD is normal. There is DANNY 3 flow.     - Left Circumflex Artery:             The LCX is normal. There is DANNY 3 flow.     - Right Coronary Artery:             The RCA is normal. There is DANNY 3 flow.     - Left Renal Artery:             The ostial left renal is normal.     - Right Renal Artery:             The ostial right renal is normal.     - Common Femoral Artery:             The right CFA is normal.    Assessment:       1. Combined systolic and diastolic congestive heart failure, unspecified congestive heart failure chronicity    2. Essential hypertension    3. Morbid obesity due to excess calories         Plan:       -cont med tx  -Encouraged diet next size  -Repeat echo in 6 months to evaluate function  -Sodium restriction    Return to clinic in one month

## 2017-04-25 NOTE — TELEPHONE ENCOUNTER
"Ms. Marks's weight is up approximately 3 lbs overnight. She denies any JANETTE, but does have some ABD bloating. She also tells me she started feeling "winded" after taking a walk yesterday and also had CP and nausea. She denies SOB at rest. She denies any N/V today, but does still endorse chest pain/tightness. She says she feels the same way she did before she came in for this most recent hospitalization. She is not monitoring BP at home, only monitoring weight for the CHF Digital Med program. She has not taken any doses of lasix since one time dose last week. Instructed her to take a dose of Lasix 40 mg when we hang up and contact Dr. Longo's office to see if she can be seen today instead of tomorrow. Advised she go to the ED if she cannot be seen by Dr. Longo today for symptoms of CP/tightness associated with ELENA. Will follow along in her chart for updates. Asked that she call back with any concerns or questions.     Weight: 81.6 kg (180 lb) (4/25/2017  8:36 AM)    "

## 2017-04-25 NOTE — MR AVS SNAPSHOT
[facility]  [address] [facility]  [place] [address]  Phone: [phone]                  Nasra Marks   2017 2:00 PM   Office Visit    Description:  Female : 1985   Provider:  Jose De Jesus Longo MD   Department:  [facility]           Reason for Visit     Establish Care           Diagnoses this Visit        Comments    Combined systolic and diastolic congestive heart failure, unspecified congestive heart failure chronicity    -  Primary     Essential hypertension         Morbid obesity due to excess calories                To Do List           Future Appointments        Provider Department Dept Phone    2017 10:00 AM [facility] [phone]    2017 11:00 AM Jose De Jesus Longo MD [facility] [phone]      Goals (5 Years of Data)     None      Follow-Up and Disposition     Return in about 4 weeks (around 2017).      [facility]     [facility] -  Assistance  Unless otherwise directed by your provider, please contact [facility], our nurse care line that is available for  assistance.     Registered nurses in the [facility] provide: appointment scheduling, clinical advisement, health education, and other advisory services.  Call: [phone] (toll free)               Medications           Message regarding Medications     Verify the changes and/or additions to your medication regime listed below are the same as discussed with your clinician today.  If any of these changes or additions are incorrect, please notify your healthcare provider.        STOP taking these medications     fluticasone (FLONASE) 50 mcg/actuation nasal spray 1 spray by Each Nare route once daily.    omeprazole (PRILOSEC) 20 MG capsule Take 1 capsule (20 mg total) by mouth once daily.           Verify that the below list of medications is an accurate representation of the medications you are  "currently taking.  If none reported, the list may be blank. If incorrect, please contact your healthcare provider. Carry this list with you in case of emergency.           Current Medications     carvedilol (COREG) 6.25 MG tablet Take 1 tablet (6.25 mg total) by mouth 2 (two) times daily.    furosemide (LASIX) 40 MG tablet Take 1 tablet (40 mg total) by mouth daily as needed.    lisinopril (PRINIVIL,ZESTRIL) 40 MG tablet Take 1 tablet (40 mg total) by mouth once daily.           Clinical Reference Information           Your Vitals Were     BP Pulse Height Weight Last Period SpO2    117/81 (BP Location: Right arm, Patient Position: Sitting, BP Method: Automatic) 86 5' 10" (1.778 m) 81 kg (178 lb 9.6 oz) 03/21/2017 98%    BMI                25.63 kg/m2          Blood Pressure          Most Recent Value    BP  117/81      Allergies as of 4/25/2017     No Known Allergies      Immunizations Administered on Date of Encounter - 4/25/2017     None      Language Assistance Services     ATTENTION: Language assistance services are available, free of charge. Please call [phone].      ATENCIÓN: Si habla espjose, tiene a castro disposición servicios gratuitos de asistencia lingüística. Llame al [phone].     CHÚ Ý: N?u b?n nói Ti?ng Vi?t, có các d?ch v? h? tr? ngôn ng? mi?n phí dành cho b?n. G?i s? [phone].         [facility] complies with applicable Federal civil rights laws and does not discriminate on the basis of race, color, national origin, age, disability, or sex.        "

## 2017-04-26 ENCOUNTER — TELEPHONE (OUTPATIENT)
Dept: OTHER | Facility: OTHER | Age: 32
End: 2017-04-26

## 2017-04-26 ENCOUNTER — LAB VISIT (OUTPATIENT)
Dept: LAB | Facility: HOSPITAL | Age: 32
End: 2017-04-26
Attending: INTERNAL MEDICINE
Payer: COMMERCIAL

## 2017-04-26 DIAGNOSIS — I50.41 ACUTE COMBINED SYSTOLIC AND DIASTOLIC HEART FAILURE: ICD-10-CM

## 2017-04-26 LAB
ALBUMIN SERPL BCP-MCNC: 3.6 G/DL
ALP SERPL-CCNC: 48 U/L
ALT SERPL W/O P-5'-P-CCNC: 12 U/L
ANION GAP SERPL CALC-SCNC: 5 MMOL/L
AST SERPL-CCNC: 17 U/L
BILIRUB SERPL-MCNC: 0.6 MG/DL
BNP SERPL-MCNC: 247 PG/ML
BUN SERPL-MCNC: 13 MG/DL
CALCIUM SERPL-MCNC: 9.4 MG/DL
CHLORIDE SERPL-SCNC: 105 MMOL/L
CO2 SERPL-SCNC: 26 MMOL/L
CREAT SERPL-MCNC: 0.9 MG/DL
EST. GFR  (AFRICAN AMERICAN): >60 ML/MIN/1.73 M^2
EST. GFR  (NON AFRICAN AMERICAN): >60 ML/MIN/1.73 M^2
GLUCOSE SERPL-MCNC: 122 MG/DL
MAGNESIUM SERPL-MCNC: 2 MG/DL
POTASSIUM SERPL-SCNC: 4.5 MMOL/L
PROT SERPL-MCNC: 7.3 G/DL
SODIUM SERPL-SCNC: 136 MMOL/L

## 2017-04-26 PROCEDURE — 83880 ASSAY OF NATRIURETIC PEPTIDE: CPT

## 2017-04-26 PROCEDURE — 36415 COLL VENOUS BLD VENIPUNCTURE: CPT

## 2017-04-26 PROCEDURE — 83735 ASSAY OF MAGNESIUM: CPT

## 2017-04-26 PROCEDURE — 80053 COMPREHEN METABOLIC PANEL: CPT

## 2017-04-26 NOTE — TELEPHONE ENCOUNTER
"Mrs. Marks was seen by Dr. Longo yesterday. She says he told her "shortness of breath and chest discomfort are to be excpected." She said he wasn't concerned about her clinical status. He did change Lasix from 40 mg QD PRN to 20 mg QD. This was not in his note, but Mrs. Marks will start taking lasix 20 mg daily for maintenance diuretic dose today. She will also report to labs today as scheduled. She denies any CP or SOB today. She has no N/V. She has no LH/dizziness. She denies any LE/ABD swelling and says she is feeling better overall. We will follow up once lab results are reviewed.       Weight: 81 kg (178 lb 9.6 oz) (4/26/2017  8:14 AM)    "

## 2017-04-28 ENCOUNTER — TELEPHONE (OUTPATIENT)
Dept: OTHER | Facility: OTHER | Age: 32
End: 2017-04-28

## 2017-04-28 NOTE — TELEPHONE ENCOUNTER
"Mrs Marks's weight is stable.  She reports an increase in swelling in her feet an ankles last evening.  Ate no salty foods, in fact used "No Salt" in her cooking yesterday.  Did report to her that this has Potassium in the product and to limit its use.  Reviewed other symptoms.  Reporting 3 pillow orthopnea, still reports SOB and ELENA, but states that this may be improving.  She denies PND, resting well.  Due to increased JANETTE and continued orthopnea, will use 40 mg of lasix today x 1 only.  She will return to 20 mg QD over the weekend.  She understands to call with any concerns of worsening of symptoms.  Weight: 81.3 kg (179 lb 3.2 oz) (4/28/2017  7:36 AM)    "

## 2017-04-30 ENCOUNTER — TELEPHONE (OUTPATIENT)
Dept: OTHER | Facility: OTHER | Age: 32
End: 2017-04-30

## 2017-04-30 NOTE — TELEPHONE ENCOUNTER
"No weight this morning.  LVM for a return call or text weight back.  Skipped folder contains a weight from "User 6" that is very different from Mrs Marks.  Likely family member using, but calling to verify any changes with her overnight.  "

## 2017-05-02 ENCOUNTER — TELEPHONE (OUTPATIENT)
Dept: OTHER | Facility: OTHER | Age: 32
End: 2017-05-02

## 2017-05-02 NOTE — TELEPHONE ENCOUNTER
Ms. Marks confirms that she only used Lasix 40 mg once on Friday and resumed her 20 mg QD dose on Saturday. She has not had any return of JANETTE. She is still unsure how to determine if she has ABD distenstion/swelling, so reviewed ways to identify this today. She denies having her pants fit tighter than usual, she doesn't feel bloated, and her ABD is not hard. She is now only using 2 pillows at night to sleep and has no complaints of SOB/ELENA. She is monitoring salt and fluid intake closely to stay within recommended allowances. Asked that she call back with any concerns or clinical changes and we will continue to monitor daily weights.     Weight: 80.7 kg (178 lb) (5/2/2017  9:06 AM)

## 2017-05-05 ENCOUNTER — TELEPHONE (OUTPATIENT)
Dept: OTHER | Facility: OTHER | Age: 32
End: 2017-05-05

## 2017-05-05 NOTE — TELEPHONE ENCOUNTER
Ms. Marks's weight is up today. Last time weight was this high she was symptomatic and required a pulse of lasix 40 mg. Called to assess HF s/s. LMCB.      Weight: 81.6 kg (180 lb) (5/5/2017  7:40 AM)

## 2017-05-06 ENCOUNTER — TELEPHONE (OUTPATIENT)
Dept: OTHER | Facility: OTHER | Age: 32
End: 2017-05-06

## 2017-05-06 NOTE — TELEPHONE ENCOUNTER
Mrs. Marks submitted a weight 3.5lbs less than yesterday. She reports that her allergies were active yesterday and therefore her appetite was less.  She denies any LH, dizziness or near syncope.  She confirms only taking her usual dose of lasix 20mg yesterday and did not take any extra diuretics.  She says that when her weight peaked yesterday she did notice mild CP but denied any LLE, SOB, abd bloating or PND.  She feels she is doing well.  She describes her UOP as infrequent but feels this is usual for her as well.  She has no complaints and understands that dietary control is essential.  She knows to call over the weekend with any changes.   Weight: 80.2 kg (176 lb 12.8 oz) (5/6/2017  8:20 AM)

## 2017-05-08 ENCOUNTER — TELEPHONE (OUTPATIENT)
Dept: OTHER | Facility: OTHER | Age: 32
End: 2017-05-08

## 2017-05-08 NOTE — TELEPHONE ENCOUNTER
Ms. Marks's weight did not transmit this morning. LMCB to get verbal weight and assess HF s/s.

## 2017-05-08 NOTE — TELEPHONE ENCOUNTER
Ms. Marks didn't weigh this morning because she was in a rush to get her kids to school. She is feeling well. Weight has remained stable since pulse of lasix on Friday. She has no JANETTE/ABD distension. She denies SOB/ELENA and is sleeping well at night. She will send us a weight in the morning.

## 2017-05-09 ENCOUNTER — TELEPHONE (OUTPATIENT)
Dept: OTHER | Facility: OTHER | Age: 32
End: 2017-05-09

## 2017-05-09 NOTE — TELEPHONE ENCOUNTER
Ms. Marks's weight has transmitted today and is on the higher end of her range. Called to assess HF s/s. LMCB.      Weight: 82 kg (180 lb 12.8 oz) (5/9/2017  7:34 AM)

## 2017-05-12 ENCOUNTER — TELEPHONE (OUTPATIENT)
Dept: OTHER | Facility: OTHER | Age: 32
End: 2017-05-12

## 2017-05-12 NOTE — TELEPHONE ENCOUNTER
Ms. Marks submits a 2 lb gain today. She has been fairly stable on Lasix 20 mg QD until recently she has required additional doses of Lasix. She felt CP/chest tightness yesterday and Wednesday when jogging. She said she doesn't notice it when walking. Advised she try to take things lightly and discuss the CP on exertion with Dr. Longo at her next follow up. She denies JANETTE/ABD distension. She denies SOB at rest or PND/orthopnea. She has stable baseline ELENA. She is watching salt and fluid intake. Instructed her to take lasix 40 mg today and resume 20 mg QD tomorrow unless instructed otherwise. Asked that she call back with any change in her clinical status regardless of weight.       Weight: 81.6 kg (180 lb) (5/12/2017  7:46 AM)

## 2017-05-15 ENCOUNTER — TELEPHONE (OUTPATIENT)
Dept: OTHER | Facility: OTHER | Age: 32
End: 2017-05-15

## 2017-05-15 NOTE — TELEPHONE ENCOUNTER
Called today to check in.  Was pulsed last week and symptomatic with CP on exertion.  Weight is up by 1 lb today.  She did not answer and her voice mailbox was full.    Weight: 81.9 kg (180 lb 9.6 oz) (5/15/2017  9:08 AM)

## 2017-05-16 ENCOUNTER — TELEPHONE (OUTPATIENT)
Dept: OTHER | Facility: OTHER | Age: 32
End: 2017-05-16

## 2017-05-16 NOTE — TELEPHONE ENCOUNTER
Attempted to call today but did not answer.  Weight unchanged from yesterday.  If not c/o any symptoms, will do exit interview.    Weight: 81.7 kg (180 lb 3.2 oz) (5/16/2017  7:30 AM)

## 2017-05-17 ENCOUNTER — TELEPHONE (OUTPATIENT)
Dept: OTHER | Facility: OTHER | Age: 32
End: 2017-05-17

## 2017-05-17 NOTE — TELEPHONE ENCOUNTER
Ms. Marks's weight has returned closer to baseline this morning. Called to assess clinical status and complete exit interview. LMCB.       Weight: 80.8 kg (178 lb 3.2 oz) (5/17/2017  7:58 AM)

## 2017-05-18 ENCOUNTER — TELEPHONE (OUTPATIENT)
Dept: OTHER | Facility: OTHER | Age: 32
End: 2017-05-18

## 2017-05-18 NOTE — TELEPHONE ENCOUNTER
Ms. Marks's weight remains stable on Lasix 20 mg QD. She denies any JANETTE or ABD distension. She has no complaints of SOB/ELENA or CP. She is getting good U/O. She is aware that today is her final day in the program and will return the scale to [facility] within the next week or 2. She will purchase a scale of her own and will weigh herself daily. Advised she notify Dr. Longo for a weight gain of 3 lbs/day or 5 lbs/week. Exit interview complete.       Weight: 81.5 kg (179 lb 9.6 oz) (5/18/2017  7:18 AM)

## 2017-05-24 ENCOUNTER — OFFICE VISIT (OUTPATIENT)
Dept: CARDIOLOGY | Facility: CLINIC | Age: 32
End: 2017-05-24
Payer: COMMERCIAL

## 2017-05-24 VITALS
OXYGEN SATURATION: 99 % | DIASTOLIC BLOOD PRESSURE: 87 MMHG | SYSTOLIC BLOOD PRESSURE: 131 MMHG | WEIGHT: 180 LBS | HEIGHT: 70 IN | HEART RATE: 75 BPM | BODY MASS INDEX: 25.77 KG/M2

## 2017-05-24 DIAGNOSIS — I50.41 ACUTE COMBINED SYSTOLIC AND DIASTOLIC CONGESTIVE HEART FAILURE: Primary | ICD-10-CM

## 2017-05-24 DIAGNOSIS — E66.01 MORBID OBESITY DUE TO EXCESS CALORIES: ICD-10-CM

## 2017-05-24 DIAGNOSIS — I10 ESSENTIAL HYPERTENSION: ICD-10-CM

## 2017-05-24 PROCEDURE — 99999 PR PBB SHADOW E&M-EST. PATIENT-LVL III: CPT | Mod: PBBFAC,,, | Performed by: INTERNAL MEDICINE

## 2017-05-24 PROCEDURE — 3079F DIAST BP 80-89 MM HG: CPT | Mod: S$GLB,,, | Performed by: INTERNAL MEDICINE

## 2017-05-24 PROCEDURE — 1160F RVW MEDS BY RX/DR IN RCRD: CPT | Mod: S$GLB,,, | Performed by: INTERNAL MEDICINE

## 2017-05-24 PROCEDURE — 99214 OFFICE O/P EST MOD 30 MIN: CPT | Mod: S$GLB,,, | Performed by: INTERNAL MEDICINE

## 2017-05-24 PROCEDURE — 3075F SYST BP GE 130 - 139MM HG: CPT | Mod: S$GLB,,, | Performed by: INTERNAL MEDICINE

## 2017-05-24 NOTE — PROGRESS NOTES
Subjective:    Patient ID:  Nasra Marks is a 31 y.o. female who presents for follow-up of Follow-up      HPI  Previous hx:  Patient is here for follow-up of newly diagnosed systolic heart failure.  She underwent cardiac catheterization showing nonischemic heart myopathy with low filling pressures.  She was discharged on no diuretic therapy.  She currently denies any worsening cardiopulmonary complaints.  She's not expressing chest pain, shortness of breath or palpitations.  She denies any PND, orthopnea or lower edema.  She's not expressing dizziness, presyncope or syncope.  She has some mild pain at the right groin access site.  We discussed that this mild bruising with resolved with time.  She does have a little small not consistent with closure device as well.  We discussed that this would dissolve.    Today:  Here for follow-up of systolic heart failure.  She denies any worsening cardiopulmonary complaints.  She's been exercising by walking.  She tried to run but was unable to.  She did short winded very quickly with heavier exertion but relieved with rest.  She denies any chest pain or palpitations.  She's expands no PND, orthopnea or lower edema.  She denies any dizziness, presyncope or syncope.  She still gets occasional sweats at night.  Otherwise she's had no significant fevers, chills etc.  she's tried to be compliant with medicines as well as diet as well.  She did eat fast food which she admitted to.  We discussed that in moderation potentially it's acceptable but also to look for healthier are turning this when eating fast food.      Review of Systems   Constitution: Negative.   HENT: Negative.    Eyes: Negative.    Cardiovascular: Negative for chest pain, dyspnea on exertion, irregular heartbeat, leg swelling, near-syncope, orthopnea, palpitations, paroxysmal nocturnal dyspnea and syncope.   Respiratory: Negative for shortness of breath.    Skin: Negative.    Musculoskeletal: Negative.     Gastrointestinal: Negative for abdominal pain, constipation and diarrhea.   Genitourinary: Negative for dysuria.   Neurological: Negative.    Psychiatric/Behavioral: Negative.         Objective:    Physical Exam   Constitutional: She is oriented to person, place, and time. She appears well-developed and well-nourished.   HENT:   Head: Normocephalic and atraumatic.   Eyes: Conjunctivae and EOM are normal. Pupils are equal, round, and reactive to light.   Neck: Normal range of motion. Neck supple. No thyromegaly present.   Cardiovascular: Normal rate and regular rhythm.    No murmur heard.  Pulmonary/Chest: Effort normal and breath sounds normal. No respiratory distress.   Abdominal: Soft. Bowel sounds are normal.   Musculoskeletal: She exhibits no edema.   Neurological: She is alert and oriented to person, place, and time.   Skin: Skin is warm and dry.   Psychiatric: She has a normal mood and affect. Her behavior is normal.       echo:  CONCLUSIONS     1 - Severely depressed left ventricular systolic function (EF 25-30%).     2 - Concentric hypertrophy.     3 - Severe left atrial enlargement.     4 - Mildly to moderately depressed right ventricular systolic function .     R?LHC:  B. Summary/Post-Operative Diagnosis       Normal coronary arteries.    Systolic dysfunction.    Mildly elevated left Filling Pressures.    Normal Pulmonary Hypertension.    Ejection Fraction: 20%  Global LV Function: severely depressed    Air rest:  PW:  (4)  PA: 24  CO_THERM: 4.4  RV: 25  RA:  (5)  LVEDP: 17       E. Angiographic Results     Diagnostic:          Patient has a right dominant coronary artery.        - Left Main Coronary Artery:             The LM is normal. There is DANNY 3 flow.     - Left Anterior Descending Artery:             The LAD is normal. There is DANNY 3 flow.     - Left Circumflex Artery:             The LCX is normal. There is DANNY 3 flow.     - Right Coronary Artery:             The RCA is normal. There is DANNY  3 flow.     - Left Renal Artery:             The ostial left renal is normal.     - Right Renal Artery:             The ostial right renal is normal.     - Common Femoral Artery:             The right CFA is normal.    Assessment:       1. Acute combined systolic and diastolic congestive heart failure    2. Essential hypertension    3. Morbid obesity due to excess calories         Plan:       -cont med tx  -Encouraged diet next size  -Repeat echo in 6 months to evaluate function  -Sodium restriction    Return to clinic in 3 month

## 2017-06-20 ENCOUNTER — NURSE TRIAGE (OUTPATIENT)
Dept: ADMINISTRATIVE | Facility: CLINIC | Age: 32
End: 2017-06-20

## 2017-06-20 ENCOUNTER — HOSPITAL ENCOUNTER (EMERGENCY)
Facility: HOSPITAL | Age: 32
Discharge: HOME | End: 2017-06-20
Attending: EMERGENCY MEDICINE
Payer: COMMERCIAL

## 2017-06-20 VITALS
DIASTOLIC BLOOD PRESSURE: 85 MMHG | SYSTOLIC BLOOD PRESSURE: 142 MMHG | BODY MASS INDEX: 26.66 KG/M2 | WEIGHT: 180 LBS | HEART RATE: 72 BPM | HEIGHT: 69 IN | TEMPERATURE: 98 F | RESPIRATION RATE: 18 BRPM | OXYGEN SATURATION: 100 %

## 2017-06-20 DIAGNOSIS — R07.89 CHEST PAIN, NON-CARDIAC: Primary | ICD-10-CM

## 2017-06-20 DIAGNOSIS — R11.2 NON-INTRACTABLE VOMITING WITH NAUSEA, UNSPECIFIED VOMITING TYPE: ICD-10-CM

## 2017-06-20 LAB
ALBUMIN SERPL BCP-MCNC: 3.8 G/DL
ALP SERPL-CCNC: 60 U/L
ALT SERPL W/O P-5'-P-CCNC: 17 U/L
ANION GAP SERPL CALC-SCNC: 8 MMOL/L
AST SERPL-CCNC: 20 U/L
B-HCG UR QL: NEGATIVE
BASOPHILS # BLD AUTO: 0.01 K/UL
BASOPHILS NFR BLD: 0.1 %
BILIRUB SERPL-MCNC: 0.2 MG/DL
BILIRUB UR QL STRIP: NEGATIVE
BNP SERPL-MCNC: 245 PG/ML
BUN SERPL-MCNC: 12 MG/DL
CALCIUM SERPL-MCNC: 8.9 MG/DL
CHLORIDE SERPL-SCNC: 106 MMOL/L
CLARITY UR: CLEAR
CO2 SERPL-SCNC: 24 MMOL/L
COLOR UR: ABNORMAL
CREAT SERPL-MCNC: 0.8 MG/DL
CTP QC/QA: YES
DIFFERENTIAL METHOD: ABNORMAL
EOSINOPHIL # BLD AUTO: 0.1 K/UL
EOSINOPHIL NFR BLD: 1.9 %
ERYTHROCYTE [DISTWIDTH] IN BLOOD BY AUTOMATED COUNT: 13.9 %
EST. GFR  (AFRICAN AMERICAN): >60 ML/MIN/1.73 M^2
EST. GFR  (NON AFRICAN AMERICAN): >60 ML/MIN/1.73 M^2
GLUCOSE SERPL-MCNC: 84 MG/DL
GLUCOSE UR QL STRIP: NEGATIVE
HCT VFR BLD AUTO: 35.5 %
HGB BLD-MCNC: 12 G/DL
HGB UR QL STRIP: ABNORMAL
INR PPP: 1
KETONES UR QL STRIP: NEGATIVE
LEUKOCYTE ESTERASE UR QL STRIP: NEGATIVE
LYMPHOCYTES # BLD AUTO: 1.4 K/UL
LYMPHOCYTES NFR BLD: 19.4 %
MCH RBC QN AUTO: 29 PG
MCHC RBC AUTO-ENTMCNC: 33.8 %
MCV RBC AUTO: 86 FL
MICROSCOPIC COMMENT: NORMAL
MONOCYTES # BLD AUTO: 0.7 K/UL
MONOCYTES NFR BLD: 9 %
NEUTROPHILS # BLD AUTO: 5.1 K/UL
NEUTROPHILS NFR BLD: 69.3 %
NITRITE UR QL STRIP: NEGATIVE
PH UR STRIP: 6 [PH] (ref 5–8)
PLATELET # BLD AUTO: 344 K/UL
PMV BLD AUTO: 10.3 FL
POTASSIUM SERPL-SCNC: 3.8 MMOL/L
PROT SERPL-MCNC: 7.9 G/DL
PROT UR QL STRIP: NEGATIVE
PROTHROMBIN TIME: 10.9 SEC
RBC # BLD AUTO: 4.14 M/UL
RBC #/AREA URNS HPF: 1 /HPF (ref 0–4)
SODIUM SERPL-SCNC: 138 MMOL/L
SP GR UR STRIP: 1.01 (ref 1–1.03)
SQUAMOUS #/AREA URNS HPF: 1 /HPF
TROPONIN I SERPL DL<=0.01 NG/ML-MCNC: 0.01 NG/ML
URN SPEC COLLECT METH UR: ABNORMAL
UROBILINOGEN UR STRIP-ACNC: NEGATIVE EU/DL
WBC # BLD AUTO: 7.42 K/UL

## 2017-06-20 PROCEDURE — 81000 URINALYSIS NONAUTO W/SCOPE: CPT

## 2017-06-20 PROCEDURE — 80053 COMPREHEN METABOLIC PANEL: CPT

## 2017-06-20 PROCEDURE — 93005 ELECTROCARDIOGRAM TRACING: CPT

## 2017-06-20 PROCEDURE — 81025 URINE PREGNANCY TEST: CPT | Performed by: EMERGENCY MEDICINE

## 2017-06-20 PROCEDURE — 84484 ASSAY OF TROPONIN QUANT: CPT

## 2017-06-20 PROCEDURE — 85610 PROTHROMBIN TIME: CPT

## 2017-06-20 PROCEDURE — 83880 ASSAY OF NATRIURETIC PEPTIDE: CPT

## 2017-06-20 PROCEDURE — 99284 EMERGENCY DEPT VISIT MOD MDM: CPT | Mod: 25

## 2017-06-20 PROCEDURE — 85025 COMPLETE CBC W/AUTO DIFF WBC: CPT

## 2017-06-20 RX ORDER — PROMETHAZINE HYDROCHLORIDE 25 MG/1
25 SUPPOSITORY RECTAL EVERY 6 HOURS PRN
Qty: 12 SUPPOSITORY | Refills: 0 | Status: SHIPPED | OUTPATIENT
Start: 2017-06-20 | End: 2017-07-21

## 2017-06-20 RX ORDER — ONDANSETRON 4 MG/1
4 TABLET, FILM COATED ORAL EVERY 8 HOURS PRN
Qty: 12 TABLET | Refills: 0 | Status: SHIPPED | OUTPATIENT
Start: 2017-06-20 | End: 2017-07-21

## 2017-06-20 NOTE — DISCHARGE INSTRUCTIONS
Lots of clear liquids only well you have nausea and vomiting.  Please return immediately if you get worse or if new problems develop.  Please follow-up with your primary care doctor this week.  Rest.  Phenergan and Zofran for nausea and vomiting.  Rest.

## 2017-06-20 NOTE — ED TRIAGE NOTES
Pt presents to ED c/o chest pains and nv for 2 days now.  States her chest feels tight and sharp and rates her pain a 6/10.  States the pain is constant and not intermittent.  Hx of HTN.  Denies any sob, ha.

## 2017-06-20 NOTE — TELEPHONE ENCOUNTER
Reason for Disposition   Chest pain lasting longer than 5 minutes    Protocols used: ST CHEST PAIN-A-OH    Nasra is calling to report she has been having chest pain for 3 days.  States is getting worse.  Also had some vomiting yesterday.  Hx of cardiac problems.  Advised ER.  Please contact caller directly with any further care advice.

## 2017-06-20 NOTE — ED PROVIDER NOTES
Encounter Date: 2017    SCRIBE #1 NOTE: I, Silvia Toney, am scribing for, and in the presence of,  Rayray Mason MD. I have scribed the following portions of the note - Other sections scribed: HPI and ROS.       History     Chief Complaint   Patient presents with    Chest Pain     pt c/o midsternal chest pain x 3 days, also some nausea today. Pt states history of HTN and CHF.      Review of patient's allergies indicates:  No Known Allergies  Chief Complaint: Chest Pain    HPI: This 32 y.o. Female with HTN presents to the ED c/o sternal and right sternal border chest pain. Symptoms have progressively worsened since onset 2 days ago. There's associated nausea and 4 episodes of emesis yesterday morning. Chest pain is sharp and worse with deep breaths. It's also described as a tightness. She also reports associated right arm pain today (since resolved). There's no attempted treatment. Patient denies fever, chills, SOB, headache, cough, abdominal pain or urinary symptoms.       The history is provided by the patient. No  was used.     Past Medical History:   Diagnosis Date    Hypertension     dx at 16y.o.     Past Surgical History:   Procedure Laterality Date     SECTION      x 1    INDUCED       TUBAL LIGATION       Family History   Problem Relation Age of Onset    Hypertension Mother     Cancer Maternal Grandmother      breast x 2    Cancer Paternal Grandmother      breast    No Known Problems Sister     No Known Problems Brother     No Known Problems Son     No Known Problems Brother     Hypertension Other     Diabetes Other     Breast cancer Other     Cancer Paternal Aunt      breast    Cancer Paternal Uncle      Social History   Substance Use Topics    Smoking status: Never Smoker    Smokeless tobacco: Never Used    Alcohol use No      Comment: rarely     Review of Systems   Constitutional: Negative for chills and fever.   HENT: Negative for ear pain  and sore throat.    Eyes: Negative for pain.   Respiratory: Positive for chest tightness. Negative for cough and shortness of breath.    Cardiovascular: Positive for chest pain.   Gastrointestinal: Positive for nausea and vomiting. Negative for abdominal pain and diarrhea.   Genitourinary: Negative for dysuria and hematuria.   Musculoskeletal: Negative for myalgias (arm or leg pain).   Skin: Negative for rash.   Neurological: Negative for headaches.       Physical Exam     Initial Vitals [06/20/17 1118]   BP Pulse Resp Temp SpO2   (!) 143/97 85 18 98.3 °F (36.8 °C) 100 %     Physical Exam  The patient was examined specifically for the following:   General:No significant distress, Good color, Warm and dry. Head and neck:Scalp atraumatic, Neck supple. Neurological:Appropriate conversation, Gross motor deficits. Eyes:Conjugate gaze, Clear corneas. ENT: No epistaxis. Cardiac: Regular rate and rhythm, Grossly normal heart tones. Pulmonary: Wheezing, Rales. Gastrointestinal: Abdominal tenderness, Abdominal distention. Musculoskeletal: Extremity deformity, Apparent pain with range of motion of the joints. Skin: Rash.   The findings on examination were normal except for the following: The patient has exquisite tenderness of the right upper sternal border.  The lungs are clear.  The heart tones are normal.  The abdomen is soft.  There is no tachycardia.  The patient is afebrile.  She is in no distress.  Vital signs are stable.  ED Course   Procedures  Labs Reviewed   CBC W/ AUTO DIFFERENTIAL - Abnormal; Notable for the following:        Result Value    Hematocrit 35.5 (*)     All other components within normal limits   B-TYPE NATRIURETIC PEPTIDE - Abnormal; Notable for the following:      (*)     All other components within normal limits   URINALYSIS - Abnormal; Notable for the following:     Occult Blood UA 2+ (*)     All other components within normal limits   COMPREHENSIVE METABOLIC PANEL   TROPONIN I   PROTIME-INR    URINALYSIS MICROSCOPIC   POCT URINE PREGNANCY     EKG Readings: (Independently Interpreted)   Initial Reading: No STEMI.   This patient is in a normal sinus rhythm with a heart rate of 81.  The MS QRS and QT intervals are normal.  There is poor R-wave progression across the precordium..  There are nonspecific T-wave changes.       X-Rays:   Independently Interpreted Readings:   Other Readings:  Chest x-ray reveals cardiomegaly.  The lung fields are clear.    Medical decision making: This patient presented emergency room with chest pain.  The patient is had her chest pain for 2 days.  It is sharp and worse with deep breathing.  The patient has had 4 episodes of vomiting.  There is no coughing chills sweats or shortness of breath.  The patient is not tachycardic.  I doubt pulmonary embolus.  I doubt myocardial infarction pericarditis.  I'm wondering whether this is chest wall pain.  The patient may have some esophagitis.  The patient has had some nausea and vomiting.  She is essentially otherwise well without other injuries or problems.  The urinalysis is negative for UTI she is not pregnant.  I do not know the cause of her nausea and vomiting.  Laboratory work is grossly unremarkable.  I believe the patient is stable for discharge to outpatient follow-up and symptomatic treatment.              Scribe Attestation:   Scribe #1: I performed the above scribed service and the documentation accurately describes the services I performed. I attest to the accuracy of the note.    Attending Attestation:           Physician Attestation for Scribe:  Physician Attestation Statement for Scribe #1: I, Rayray Mason MD, reviewed documentation, as scribed by Silvia Toney in my presence, and it is both accurate and complete.                 ED Course     Clinical Impression:   The primary encounter diagnosis was Chest pain, non-cardiac. A diagnosis of Non-intractable vomiting with nausea, unspecified vomiting type was also  pertinent to this visit.          Rayray Mason MD  06/20/17 1931

## 2017-07-17 PROBLEM — J81.0 ACUTE PULMONARY EDEMA: Status: RESOLVED | Noted: 2017-04-16 | Resolved: 2017-07-17

## 2017-08-28 ENCOUNTER — OFFICE VISIT (OUTPATIENT)
Dept: CARDIOLOGY | Facility: CLINIC | Age: 32
End: 2017-08-28
Payer: COMMERCIAL

## 2017-08-28 VITALS
DIASTOLIC BLOOD PRESSURE: 103 MMHG | BODY MASS INDEX: 27.67 KG/M2 | WEIGHT: 187.38 LBS | HEART RATE: 68 BPM | OXYGEN SATURATION: 98 % | SYSTOLIC BLOOD PRESSURE: 150 MMHG

## 2017-08-28 DIAGNOSIS — E66.01 MORBID OBESITY DUE TO EXCESS CALORIES: ICD-10-CM

## 2017-08-28 DIAGNOSIS — I10 ESSENTIAL HYPERTENSION: ICD-10-CM

## 2017-08-28 DIAGNOSIS — I50.41 ACUTE COMBINED SYSTOLIC AND DIASTOLIC CONGESTIVE HEART FAILURE: Primary | ICD-10-CM

## 2017-08-28 PROCEDURE — 3008F BODY MASS INDEX DOCD: CPT | Mod: S$GLB,,, | Performed by: INTERNAL MEDICINE

## 2017-08-28 PROCEDURE — 3077F SYST BP >= 140 MM HG: CPT | Mod: S$GLB,,, | Performed by: INTERNAL MEDICINE

## 2017-08-28 PROCEDURE — 99999 PR PBB SHADOW E&M-EST. PATIENT-LVL III: CPT | Mod: PBBFAC,,, | Performed by: INTERNAL MEDICINE

## 2017-08-28 PROCEDURE — 99214 OFFICE O/P EST MOD 30 MIN: CPT | Mod: S$GLB,,, | Performed by: INTERNAL MEDICINE

## 2017-08-28 PROCEDURE — 3080F DIAST BP >= 90 MM HG: CPT | Mod: S$GLB,,, | Performed by: INTERNAL MEDICINE

## 2017-08-28 RX ORDER — CARVEDILOL 12.5 MG/1
12.5 TABLET ORAL 2 TIMES DAILY
Qty: 180 TABLET | Refills: 3 | Status: SHIPPED | OUTPATIENT
Start: 2017-08-28 | End: 2017-11-29 | Stop reason: SDUPTHER

## 2017-08-28 NOTE — PROGRESS NOTES
Subjective:    Patient ID:  Nasra Mraks is a 32 y.o. female who presents for follow-up of Shortness of Breath      Shortness of Breath   Pertinent negatives include no abdominal pain, chest pain, leg swelling, orthopnea, PND or syncope.   Previous hx:  Here for follow-up of systolic heart failure.  She denies any worsening cardiopulmonary complaints.  She's been exercising by walking.  She tried to run but was unable to.  She did short winded very quickly with heavier exertion but relieved with rest.  She denies any chest pain or palpitations.  She's expands no PND, orthopnea or lower edema.  She denies any dizziness, presyncope or syncope.  She still gets occasional sweats at night.  Otherwise she's had no significant fevers, chills etc.  she's tried to be compliant with medicines as well as diet as well.  She did eat fast food which she admitted to.  We discussed that in moderation potentially it's acceptable but also to look for healthier are turning this when eating fast food.    Day:  Here for follow-up of systolic heart failure.  She denies any worsening cardiopulmonary complaints.  She knows that when she is not compliant with certain things she does develop some lower extremity edema.  She recently went to the emergency department with chest pain was told she had remained mostly stress.  We again reassured her that she's not prone to heart attack with her previous catheterization findings.  By listening to her Steff sounds like heart is pumping better.  She denies any PND, orthopnea or lower edema.  She's not expressing dizziness, presyncope or syncope.  She's given a try and get back into working out again.    Review of Systems   Constitution: Negative.   HENT: Negative.    Eyes: Negative.    Cardiovascular: Negative for chest pain, dyspnea on exertion, irregular heartbeat, leg swelling, near-syncope, orthopnea, palpitations, paroxysmal nocturnal dyspnea and syncope.   Respiratory: Positive for  shortness of breath.    Skin: Negative.    Musculoskeletal: Negative.    Gastrointestinal: Negative for abdominal pain, constipation and diarrhea.   Genitourinary: Negative for dysuria.   Neurological: Negative.    Psychiatric/Behavioral: Negative.         Objective:    Physical Exam   Constitutional: She is oriented to person, place, and time. She appears well-developed and well-nourished.   HENT:   Head: Normocephalic and atraumatic.   Eyes: Conjunctivae and EOM are normal. Pupils are equal, round, and reactive to light.   Neck: Normal range of motion. Neck supple. No thyromegaly present.   Cardiovascular: Normal rate and regular rhythm.    No murmur heard.  Pulmonary/Chest: Effort normal and breath sounds normal. No respiratory distress.   Abdominal: Soft. Bowel sounds are normal.   Musculoskeletal: She exhibits no edema.   Neurological: She is alert and oriented to person, place, and time.   Skin: Skin is warm and dry.   Psychiatric: She has a normal mood and affect. Her behavior is normal.       echo:  CONCLUSIONS     1 - Severely depressed left ventricular systolic function (EF 25-30%).     2 - Concentric hypertrophy.     3 - Severe left atrial enlargement.     4 - Mildly to moderately depressed right ventricular systolic function .     R?LHC:  B. Summary/Post-Operative Diagnosis       Normal coronary arteries.    Systolic dysfunction.    Mildly elevated left Filling Pressures.    Normal Pulmonary Hypertension.    Ejection Fraction: 20%  Global LV Function: severely depressed    Air rest:  PW:  (4)  PA: 24  CO_THERM: 4.4  RV: 25  RA:  (5)  LVEDP: 17       E. Angiographic Results     Diagnostic:          Patient has a right dominant coronary artery.        - Left Main Coronary Artery:             The LM is normal. There is DANNY 3 flow.     - Left Anterior Descending Artery:             The LAD is normal. There is DANNY 3 flow.     - Left Circumflex Artery:             The LCX is normal. There is DANNY 3 flow.      - Right Coronary Artery:             The RCA is normal. There is DANNY 3 flow.     - Left Renal Artery:             The ostial left renal is normal.     - Right Renal Artery:             The ostial right renal is normal.     - Common Femoral Artery:             The right CFA is normal.    Assessment:       1. Acute combined systolic and diastolic congestive heart failure    2. Essential hypertension    3. Morbid obesity due to excess calories         Plan:       -cont med tx, increase carvedilol to 12.5 twice a day  -Encouraged diet next size  -Repeat echo to evaluate function  -Sodium restriction    Return to clinic in 3 month with cardiogram at that time

## 2017-10-05 ENCOUNTER — OFFICE VISIT (OUTPATIENT)
Dept: FAMILY MEDICINE | Facility: CLINIC | Age: 32
End: 2017-10-05
Payer: COMMERCIAL

## 2017-10-05 VITALS
HEIGHT: 69 IN | SYSTOLIC BLOOD PRESSURE: 142 MMHG | TEMPERATURE: 100 F | WEIGHT: 183.06 LBS | HEART RATE: 107 BPM | DIASTOLIC BLOOD PRESSURE: 100 MMHG | OXYGEN SATURATION: 98 % | BODY MASS INDEX: 27.11 KG/M2

## 2017-10-05 DIAGNOSIS — I10 ESSENTIAL HYPERTENSION: ICD-10-CM

## 2017-10-05 DIAGNOSIS — R50.9 FEVER, UNSPECIFIED FEVER CAUSE: ICD-10-CM

## 2017-10-05 DIAGNOSIS — J06.9 VIRAL URI WITH COUGH: Primary | ICD-10-CM

## 2017-10-05 DIAGNOSIS — R52 BODY ACHES: ICD-10-CM

## 2017-10-05 LAB
CTP QC/QA: YES
FLUAV AG NPH QL: NEGATIVE
FLUBV AG NPH QL: NEGATIVE

## 2017-10-05 PROCEDURE — 99214 OFFICE O/P EST MOD 30 MIN: CPT | Mod: 25,S$GLB,, | Performed by: NURSE PRACTITIONER

## 2017-10-05 PROCEDURE — 96372 THER/PROPH/DIAG INJ SC/IM: CPT | Mod: S$GLB,,, | Performed by: NURSE PRACTITIONER

## 2017-10-05 PROCEDURE — 87804 INFLUENZA ASSAY W/OPTIC: CPT | Mod: QW,S$GLB,, | Performed by: NURSE PRACTITIONER

## 2017-10-05 PROCEDURE — 99999 PR PBB SHADOW E&M-EST. PATIENT-LVL IV: CPT | Mod: PBBFAC,,, | Performed by: NURSE PRACTITIONER

## 2017-10-05 RX ORDER — FLUTICASONE PROPIONATE 50 MCG
2 SPRAY, SUSPENSION (ML) NASAL DAILY
Qty: 16 G | Refills: 0 | Status: SHIPPED | OUTPATIENT
Start: 2017-10-05 | End: 2018-08-05

## 2017-10-05 RX ORDER — IBUPROFEN 600 MG/1
600 TABLET ORAL 3 TIMES DAILY
Qty: 30 TABLET | Refills: 0 | Status: SHIPPED | OUTPATIENT
Start: 2017-10-05 | End: 2017-12-14

## 2017-10-05 RX ORDER — LEVOCETIRIZINE DIHYDROCHLORIDE 5 MG/1
5 TABLET, FILM COATED ORAL NIGHTLY
Qty: 30 TABLET | Refills: 0 | Status: SHIPPED | OUTPATIENT
Start: 2017-10-05 | End: 2017-12-14

## 2017-10-05 RX ORDER — PROMETHAZINE HYDROCHLORIDE AND DEXTROMETHORPHAN HYDROBROMIDE 6.25; 15 MG/5ML; MG/5ML
5 SYRUP ORAL
Qty: 180 ML | Refills: 0 | Status: SHIPPED | OUTPATIENT
Start: 2017-10-05 | End: 2017-10-15

## 2017-10-05 RX ORDER — DEXAMETHASONE SODIUM PHOSPHATE 4 MG/ML
8 INJECTION, SOLUTION INTRA-ARTICULAR; INTRALESIONAL; INTRAMUSCULAR; INTRAVENOUS; SOFT TISSUE
Status: COMPLETED | OUTPATIENT
Start: 2017-10-05 | End: 2017-10-05

## 2017-10-05 RX ADMIN — DEXAMETHASONE SODIUM PHOSPHATE 8 MG: 4 INJECTION, SOLUTION INTRA-ARTICULAR; INTRALESIONAL; INTRAMUSCULAR; INTRAVENOUS; SOFT TISSUE at 08:10

## 2017-10-05 NOTE — PATIENT INSTRUCTIONS
Viral Upper Respiratory Illness (Adult)  You have a viral upper respiratory illness (URI), which is another term for the common cold. This illness is contagious during the first few days. It is spread through the air by coughing and sneezing. It may also be spread by direct contact (touching the sick person and then touching your own eyes, nose, or mouth). Frequent handwashing will decrease risk of spread. Most viral illnesses go away within 7 to 10 days with rest and simple home remedies. Sometimes the illness may last for several weeks. Antibiotics will not kill a virus, and they are generally not prescribed for this condition.    Home care  · If symptoms are severe, rest at home for the first 2 to 3 days. When you resume activity, don't let yourself get too tired.  · Avoid being exposed to cigarette smoke (yours or others).  · You may use acetaminophen or ibuprofen to control pain and fever, unless another medicine was prescribed. (Note: If you have chronic liver or kidney disease, have ever had a stomach ulcer or gastrointestinal bleeding, or are taking blood-thinning medicines, talk with your healthcare provider before using these medicines.) Aspirin should never be given to anyone under 18 years of age who is ill with a viral infection or fever. It may cause severe liver or brain damage.  · Your appetite may be poor, so a light diet is fine. Avoid dehydration by drinking 6 to 8 glasses of fluids per day (water, soft drinks, juices, tea, or soup). Extra fluids will help loosen secretions in the nose and lungs.  · Over-the-counter cold medicines will not shorten the length of time youre sick, but they may be helpful for the following symptoms: cough, sore throat, and nasal and sinus congestion. (Note: Do not use decongestants if you have high blood pressure.)  Follow-up care  Follow up with your healthcare provider, or as advised.  When to seek medical advice  Call your healthcare provider right away if any  of these occur:  · Cough with lots of colored sputum (mucus)  · Severe headache; face, neck, or ear pain  · Difficulty swallowing due to throat pain  · Fever of 100.4°F (38°C)  Call 911, or get immediate medical care  Call emergency services right away if any of these occur:  · Chest pain, shortness of breath, wheezing, or difficulty breathing  · Coughing up blood  · Inability to swallow due to throat pain  Date Last Reviewed: 9/13/2015  © 5896-3094 [employer]. [address] [place] [id number]. All rights reserved. This information is not intended as a substitute for professional medical care. Always follow your healthcare professional's instructions.

## 2017-10-05 NOTE — PROGRESS NOTES
"Subjective:       Patient ID: Nasra Marks is a 32 y.o. female.    Chief Complaint: URI (congestion,body aches,sore throat X yesterday)    URI    This is a new problem. The current episode started yesterday. The problem has been gradually worsening. The maximum temperature recorded prior to her arrival was 100.4 - 100.9 F. Associated symptoms include congestion, coughing, ear pain, joint pain, nausea, rhinorrhea, sinus pain, sneezing and a sore throat. Pertinent negatives include no rash or vomiting. Treatments tried: mucinex. The treatment provided no relief.       Past Medical History:   Diagnosis Date    Hypertension     dx at 16y.o.       Social History     Social History    Marital status:      Spouse name: N/A    Number of children: N/A    Years of education: N/A     Occupational History     [employer]     Social History Main Topics    Smoking status: Never Smoker    Smokeless tobacco: Never Used    Alcohol use No      Comment: rarely    Drug use: No    Sexual activity: Yes     Partners: Male     Birth control/ protection: None     Other Topics Concern    Not on file     Social History Narrative    No narrative on file       Past Surgical History:   Procedure Laterality Date     SECTION      x 1    INDUCED       TUBAL LIGATION         Review of Systems   Constitutional: Positive for chills and fever.   HENT: Positive for congestion, ear pain, rhinorrhea, sinus pain, sneezing and sore throat.    Respiratory: Positive for cough.    Gastrointestinal: Positive for nausea. Negative for vomiting.   Musculoskeletal: Positive for arthralgias and joint pain.   Skin: Negative for color change and rash.       Objective:   BP (!) 142/100 (BP Location: Right arm, Patient Position: Sitting, BP Method: Large (Manual))   Pulse 107   Temp 100 °F (37.8 °C) (Oral)   Ht 5' 9" (1.753 m)   Wt 83 kg (183 lb 1.5 oz)   LMP 2017   SpO2 98%   BMI 27.04 kg/m²      Physical Exam "   Constitutional: She appears well-developed and well-nourished.   HENT:   Head: Normocephalic and atraumatic.   Right Ear: Hearing, tympanic membrane, external ear and ear canal normal.   Left Ear: Hearing, tympanic membrane, external ear and ear canal normal.   Nose: Mucosal edema and rhinorrhea present.   Mouth/Throat: Uvula is midline. Posterior oropharyngeal erythema present.   Eyes: Conjunctivae and lids are normal.   Cardiovascular: Normal rate, regular rhythm, S1 normal, S2 normal and normal heart sounds.    Pulmonary/Chest: Effort normal and breath sounds normal.   Lymphadenopathy:     She has no cervical adenopathy.   Skin: Skin is warm, dry and intact.   Psychiatric: She has a normal mood and affect. Her speech is normal and behavior is normal.       Assessment:       1. Viral URI with cough    2. Fever, unspecified fever cause    3. Body aches    4. Essential hypertension        Plan:       Nasra was seen today for uri.    Diagnoses and all orders for this visit:    Viral URI with cough  -     dexamethasone injection 8 mg; Inject 2 mLs (8 mg total) into the muscle one time.  -     promethazine-dextromethorphan (PROMETHAZINE-DM) 6.25-15 mg/5 mL Syrp; Take 5 mLs by mouth every 4 to 6 hours as needed.  -     levocetirizine (XYZAL) 5 MG tablet; Take 1 tablet (5 mg total) by mouth every evening.  -     fluticasone (FLONASE) 50 mcg/actuation nasal spray; 2 sprays by Each Nare route once daily.  -     ibuprofen (ADVIL,MOTRIN) 600 MG tablet; Take 1 tablet (600 mg total) by mouth 3 (three) times daily.  -     Increase your water intake to 64-80 oz daily to help thin mucus  -     Nasal Saline spray (Over the counter [place] spray or [place])  2 sprays each nostril 2-3 times a day for nasal congestion  -     Tylenol 500 mg 2 tablets or Ibuprofen 200 mg 2 tablets every 4-6 hours as needed for fever, headaches, sore throat, ear pain, bodyaches, and/or nasal/sinus inflammation  -     Warm salt water gargles with 1/2 cup  water and 1 tablespoon salt every 4 hours  -     Warm tea with honey and lemon    Fever, unspecified fever cause  -     POCT Influenza A/B    Body aches  -     POCT Influenza A/B      Essential hypertension  Elevated in clinic today, however, she has not taken her medication. She will start monitoring her blood pressure at home and if not within normal parameters, will follow up for medication adjustment. She verbalized understanding.   The patient is asked to make an attempt to improve diet and exercise patterns to aid in medical management of this problem.      Return if symptoms worsen or fail to improve.

## 2017-10-10 ENCOUNTER — OFFICE VISIT (OUTPATIENT)
Dept: FAMILY MEDICINE | Facility: CLINIC | Age: 32
End: 2017-10-10
Payer: COMMERCIAL

## 2017-10-10 VITALS
WEIGHT: 183.75 LBS | HEART RATE: 80 BPM | TEMPERATURE: 99 F | SYSTOLIC BLOOD PRESSURE: 148 MMHG | BODY MASS INDEX: 27.22 KG/M2 | DIASTOLIC BLOOD PRESSURE: 100 MMHG | OXYGEN SATURATION: 99 % | HEIGHT: 69 IN

## 2017-10-10 DIAGNOSIS — J06.9 VIRAL URI WITH COUGH: Primary | ICD-10-CM

## 2017-10-10 DIAGNOSIS — I10 ESSENTIAL HYPERTENSION: ICD-10-CM

## 2017-10-10 PROCEDURE — 99999 PR PBB SHADOW E&M-EST. PATIENT-LVL IV: CPT | Mod: PBBFAC,,, | Performed by: NURSE PRACTITIONER

## 2017-10-10 PROCEDURE — 99214 OFFICE O/P EST MOD 30 MIN: CPT | Mod: S$GLB,,, | Performed by: NURSE PRACTITIONER

## 2017-10-10 RX ORDER — CODEINE PHOSPHATE AND GUAIFENESIN 10; 100 MG/5ML; MG/5ML
5 SOLUTION ORAL 3 TIMES DAILY PRN
Qty: 180 ML | Refills: 0 | Status: SHIPPED | OUTPATIENT
Start: 2017-10-10 | End: 2017-10-20

## 2017-10-10 RX ORDER — LEVOCETIRIZINE DIHYDROCHLORIDE 5 MG/1
5 TABLET, FILM COATED ORAL NIGHTLY
Qty: 30 TABLET | Refills: 5 | Status: SHIPPED | OUTPATIENT
Start: 2017-10-10 | End: 2017-12-14

## 2017-10-10 NOTE — PROGRESS NOTES
Subjective:       Patient ID: Nasra Marks is a 32 y.o. female.    Chief Complaint: Cough    32-year-old female presents to the clinic today with complaint of sore throat, sinus congestion, coughing, dizziness, and lightheadedness for the last week.  She denies any fever, chills, body aches, ear pain, wheezing, shortness breath, abdominal pain, vomiting, or diarrhea.  She was seen on 10/5/2017 described Phenergan DM and Xyzal.  She states the cough medicine is not helping at all.  She does not think she picked up a Xyzal prescription.  She is using Flonase and has been taking some ibuprofen.  She states the biggest problem is cough.      Past Medical History:   Diagnosis Date    Hypertension     dx at 16y.o.     Past Surgical History:   Procedure Laterality Date     SECTION      x 1    INDUCED       TUBAL LIGATION        reports that she has never smoked. She has never used smokeless tobacco. She reports that she does not drink alcohol or use drugs.  Review of Systems   Constitutional: Negative for chills and fatigue.   HENT: Positive for congestion and sore throat. Negative for ear discharge, ear pain, postnasal drip, rhinorrhea, sinus pain and sinus pressure.    Eyes: Positive for itching. Negative for pain and discharge.   Respiratory: Positive for cough. Negative for shortness of breath and wheezing.    Gastrointestinal: Positive for nausea. Negative for abdominal pain, diarrhea and vomiting.   Musculoskeletal: Negative for gait problem.   Neurological: Positive for dizziness and light-headedness. Negative for headaches.       Objective:      Physical Exam   Constitutional: She is oriented to person, place, and time. She appears well-developed and well-nourished. No distress.   HENT:   Head: Normocephalic and atraumatic.   Right Ear: External ear normal.   Left Ear: External ear normal.   Nose: Nose normal.   Mouth/Throat: No oropharyngeal exudate.   Post nasal drip noted    Eyes:  Conjunctivae and EOM are normal. Pupils are equal, round, and reactive to light. Right eye exhibits no discharge. Left eye exhibits no discharge. No scleral icterus.   Neck: Normal range of motion. Neck supple.   Cardiovascular: Normal rate, regular rhythm and normal heart sounds.  Exam reveals no gallop and no friction rub.    No murmur heard.  Pulmonary/Chest: Effort normal and breath sounds normal. No respiratory distress. She has no wheezes. She has no rales.   A lot of coughing noted    Abdominal: Soft. Bowel sounds are normal. There is no tenderness.   Musculoskeletal: Normal range of motion. She exhibits no edema.   Lymphadenopathy:     She has cervical adenopathy.   Neurological: She is alert and oriented to person, place, and time.   Skin: Skin is warm and dry. She is not diaphoretic.   Psychiatric: She has a normal mood and affect.       Assessment:       1. Viral URI with cough    2. Essential hypertension        Plan:         Viral URI with cough  -     levocetirizine (XYZAL) 5 MG tablet; Take 1 tablet (5 mg total) by mouth every evening.  Dispense: 30 tablet; Refill: 5  -     guaifenesin-codeine 100-10 mg/5 ml (TUSSI-ORGANIDIN NR)  mg/5 mL syrup; Take 5 mLs by mouth 3 (three) times daily as needed.  Dispense: 180 mL; Refill: 0    Essential hypertension  - stressed the importance of taking medication as prescribed  - blood pressure check up next week

## 2017-10-10 NOTE — PATIENT INSTRUCTIONS
Continue Flonase  Alternate Tylenol / Ibuprofen as needed  Gargle with warm salt water  Try Tea with honey

## 2017-11-20 ENCOUNTER — HOSPITAL ENCOUNTER (OUTPATIENT)
Dept: CARDIOLOGY | Facility: HOSPITAL | Age: 32
Discharge: HOME | End: 2017-11-20
Attending: INTERNAL MEDICINE
Payer: COMMERCIAL

## 2017-11-20 DIAGNOSIS — I50.41 ACUTE COMBINED SYSTOLIC AND DIASTOLIC CONGESTIVE HEART FAILURE: ICD-10-CM

## 2017-11-20 LAB
DIASTOLIC DYSFUNCTION: YES
ESTIMATED PA SYSTOLIC PRESSURE: 21.66
RETIRED EF AND QEF - SEE NOTES: 15 (ref 55–65)

## 2017-11-20 PROCEDURE — 93306 TTE W/DOPPLER COMPLETE: CPT

## 2017-11-20 PROCEDURE — 93306 TTE W/DOPPLER COMPLETE: CPT | Mod: 26,,, | Performed by: INTERNAL MEDICINE

## 2017-11-29 ENCOUNTER — OFFICE VISIT (OUTPATIENT)
Dept: CARDIOLOGY | Facility: CLINIC | Age: 32
End: 2017-11-29
Payer: COMMERCIAL

## 2017-11-29 VITALS
BODY MASS INDEX: 27.35 KG/M2 | DIASTOLIC BLOOD PRESSURE: 100 MMHG | OXYGEN SATURATION: 100 % | WEIGHT: 185.19 LBS | SYSTOLIC BLOOD PRESSURE: 146 MMHG | HEART RATE: 82 BPM

## 2017-11-29 DIAGNOSIS — I50.41 ACUTE COMBINED SYSTOLIC AND DIASTOLIC CONGESTIVE HEART FAILURE: Primary | ICD-10-CM

## 2017-11-29 DIAGNOSIS — I50.41 ACUTE COMBINED SYSTOLIC AND DIASTOLIC HEART FAILURE: ICD-10-CM

## 2017-11-29 DIAGNOSIS — E66.01 MORBID OBESITY DUE TO EXCESS CALORIES: ICD-10-CM

## 2017-11-29 DIAGNOSIS — I10 ESSENTIAL HYPERTENSION: ICD-10-CM

## 2017-11-29 PROCEDURE — 99214 OFFICE O/P EST MOD 30 MIN: CPT | Mod: S$GLB,,, | Performed by: INTERNAL MEDICINE

## 2017-11-29 PROCEDURE — 99999 PR PBB SHADOW E&M-EST. PATIENT-LVL III: CPT | Mod: PBBFAC,,, | Performed by: INTERNAL MEDICINE

## 2017-11-29 RX ORDER — FUROSEMIDE 40 MG/1
40 TABLET ORAL DAILY
Qty: 30 TABLET | Refills: 11 | Status: SHIPPED | OUTPATIENT
Start: 2017-11-29 | End: 2019-06-25 | Stop reason: SDUPTHER

## 2017-11-29 RX ORDER — CARVEDILOL 25 MG/1
25 TABLET ORAL 2 TIMES DAILY
Qty: 180 TABLET | Refills: 3 | Status: SHIPPED | OUTPATIENT
Start: 2017-11-29 | End: 2019-02-19 | Stop reason: SDUPTHER

## 2017-11-29 NOTE — PROGRESS NOTES
Subjective:    Patient ID:  Nasra Marks is a 32 y.o. female who presents for follow-up of No chief complaint on file.      Shortness of Breath   Pertinent negatives include no abdominal pain, chest pain, leg swelling, orthopnea, PND or syncope.   Previous hx:  Here for follow-up of systolic heart failure.  She denies any worsening cardiopulmonary complaints.  She knows that when she is not compliant with certain things she does develop some lower extremity edema.  She recently went to the emergency department with chest pain was told she had remained mostly stress.  We again reassured her that she's not prone to heart attack with her previous catheterization findings.  By listening to her Steff sounds like heart is pumping better.  She denies any PND, orthopnea or lower edema.  She's not expressing dizziness, presyncope or syncope.  She's given a try and get back into working out again.    Today:  Here for follow-up of systolic heart failure.  She denies any worsening cardiopulmonary complaints.  She says over the holidays she has been maybe is noncompliant again has had some increasing cough and some shortness of breath.  Says she's had a little bit of weight change.  She denies any current chest pain or palpitations.  She does get shortness of breath with heavier exertion but relieved with rest.  She denies any PND, orthopnea but some mild lower extremity edema.  She feels like her waist is also gained in girth.  She denies any dizziness, presyncope or syncope.  She had repeat echocardiogram again showing severely decreased function.  This is not improved on adequate medical therapy.  We preliminarily discussed AICD implant which she would like to think about.      Review of Systems   Constitution: Negative.   HENT: Negative.    Eyes: Negative.    Cardiovascular: Negative for chest pain, dyspnea on exertion, irregular heartbeat, leg swelling, near-syncope, orthopnea, palpitations, paroxysmal nocturnal  dyspnea and syncope.   Respiratory: Positive for shortness of breath.    Skin: Negative.    Musculoskeletal: Negative.    Gastrointestinal: Negative for abdominal pain, constipation and diarrhea.   Genitourinary: Negative for dysuria.   Neurological: Negative.    Psychiatric/Behavioral: Negative.         Objective:    Physical Exam   Constitutional: She is oriented to person, place, and time. She appears well-developed and well-nourished.   HENT:   Head: Normocephalic and atraumatic.   Eyes: Conjunctivae and EOM are normal. Pupils are equal, round, and reactive to light.   Neck: Normal range of motion. Neck supple. No thyromegaly present.   Cardiovascular: Normal rate and regular rhythm.    No murmur heard.  Pulmonary/Chest: Effort normal and breath sounds normal. No respiratory distress.   Abdominal: Soft. Bowel sounds are normal.   Musculoskeletal: She exhibits no edema.   Neurological: She is alert and oriented to person, place, and time.   Skin: Skin is warm and dry.   Psychiatric: She has a normal mood and affect. Her behavior is normal.       echo: 11-17  CONCLUSIONS     1 - Severely depressed left ventricular systolic function (EF 15-20%).     2 - Restrictive LV filling pattern, indicating markedly elevated LAP (grade 3 diastolic dysfunction).     3 - Concentric hypertrophy.     4 - Moderate left ventricular enlargement.     5 - Mild left atrial enlargement.     R?LHC:  B. Summary/Post-Operative Diagnosis       Normal coronary arteries.    Systolic dysfunction.    Mildly elevated left Filling Pressures.    Normal Pulmonary Hypertension.    Ejection Fraction: 20%  Global LV Function: severely depressed    Air rest:  PW:  (4)  PA: 24  CO_THERM: 4.4  RV: 25  RA:  (5)  LVEDP: 17       E. Angiographic Results     Diagnostic:          Patient has a right dominant coronary artery.        - Left Main Coronary Artery:             The LM is normal. There is DANNY 3 flow.     - Left Anterior Descending Artery:              The LAD is normal. There is DANNY 3 flow.     - Left Circumflex Artery:             The LCX is normal. There is DANNY 3 flow.     - Right Coronary Artery:             The RCA is normal. There is DANNY 3 flow.     - Left Renal Artery:             The ostial left renal is normal.     - Right Renal Artery:             The ostial right renal is normal.     - Common Femoral Artery:             The right CFA is normal.    Assessment:       1. Acute combined systolic and diastolic congestive heart failure    2. Essential hypertension    3. Morbid obesity due to excess calories         Plan:       -cont med tx, increase carvedilol to 25 mg twice a day  -Maintenance Lasix 40 mg daily  -Discussed ICD implant and risks/benefits which she would like to think about and will call us next week regarding decision  -Encouraged diet and exercise  -Sodium restriction    Return to clinic 1 month

## 2017-12-14 ENCOUNTER — HOSPITAL ENCOUNTER (OUTPATIENT)
Dept: PREADMISSION TESTING | Facility: HOSPITAL | Age: 32
Discharge: HOME | End: 2017-12-14
Attending: INTERNAL MEDICINE
Payer: COMMERCIAL

## 2017-12-14 VITALS
DIASTOLIC BLOOD PRESSURE: 88 MMHG | TEMPERATURE: 98 F | HEART RATE: 73 BPM | RESPIRATION RATE: 18 BRPM | BODY MASS INDEX: 27.06 KG/M2 | SYSTOLIC BLOOD PRESSURE: 138 MMHG | OXYGEN SATURATION: 99 % | WEIGHT: 189 LBS | HEIGHT: 70 IN

## 2017-12-14 LAB
ANION GAP SERPL CALC-SCNC: 6 MMOL/L
BASOPHILS # BLD AUTO: 0.02 K/UL
BASOPHILS NFR BLD: 0.2 %
BUN SERPL-MCNC: 10 MG/DL
CALCIUM SERPL-MCNC: 9 MG/DL
CHLORIDE SERPL-SCNC: 105 MMOL/L
CO2 SERPL-SCNC: 28 MMOL/L
CREAT SERPL-MCNC: 0.8 MG/DL
DIFFERENTIAL METHOD: ABNORMAL
EOSINOPHIL # BLD AUTO: 0.1 K/UL
EOSINOPHIL NFR BLD: 1.1 %
ERYTHROCYTE [DISTWIDTH] IN BLOOD BY AUTOMATED COUNT: 13.8 %
EST. GFR  (AFRICAN AMERICAN): >60 ML/MIN/1.73 M^2
EST. GFR  (NON AFRICAN AMERICAN): >60 ML/MIN/1.73 M^2
GLUCOSE SERPL-MCNC: 89 MG/DL
HCT VFR BLD AUTO: 35 %
HGB BLD-MCNC: 11.7 G/DL
INR PPP: 1.1
LYMPHOCYTES # BLD AUTO: 1.7 K/UL
LYMPHOCYTES NFR BLD: 18.9 %
MCH RBC QN AUTO: 29.4 PG
MCHC RBC AUTO-ENTMCNC: 33.4 G/DL
MCV RBC AUTO: 88 FL
MONOCYTES # BLD AUTO: 0.6 K/UL
MONOCYTES NFR BLD: 6.8 %
NEUTROPHILS # BLD AUTO: 6.7 K/UL
NEUTROPHILS NFR BLD: 73 %
PLATELET # BLD AUTO: 325 K/UL
PMV BLD AUTO: 10 FL
POTASSIUM SERPL-SCNC: 4 MMOL/L
PROTHROMBIN TIME: 11.1 SEC
RBC # BLD AUTO: 3.98 M/UL
SODIUM SERPL-SCNC: 139 MMOL/L
WBC # BLD AUTO: 9.14 K/UL

## 2017-12-14 PROCEDURE — 80048 BASIC METABOLIC PNL TOTAL CA: CPT

## 2017-12-14 PROCEDURE — 85610 PROTHROMBIN TIME: CPT

## 2017-12-14 PROCEDURE — 85025 COMPLETE CBC W/AUTO DIFF WBC: CPT

## 2017-12-14 PROCEDURE — 93005 ELECTROCARDIOGRAM TRACING: CPT

## 2017-12-14 PROCEDURE — 93010 ELECTROCARDIOGRAM REPORT: CPT | Mod: ,,, | Performed by: INTERNAL MEDICINE

## 2017-12-14 NOTE — DISCHARGE INSTRUCTIONS
Your surgery is scheduled for____12/18/2017_____________.    Call [phone] between 2 pm and 5 pm ___12/15/2017_________ to find out your arrival time for the day of surgery.    Report to SAME DAY SURGERY UNIT at _______am on the 2nd floor of the hospital.  Use the front entrance of the hospital before 6 am.  If you need wheelchair assistance, call [phone] from your cell phone,  or call 0 from the courtesy phone in the hospital lobby.    Important instructions:   Do not eat or drink after 12 midnight, including water.  It is okay to brush your teeth.  Do not have gum, candy or mints.   Take your morning medications as usual.      For this procedure you will shower at home the night before and also the morning of surgery with HIBICLENS soap provided by the pre op nurse. Do not use this soap on your face or genitals. You will shower a third time here at the hospital on the morning of surgery. Rinse completely after each shower.  Please place clean linens on your bed the night before surgery. Please wear fresh clean clothing after each shower.  No shaving of procedural area at least 4-5 days before surgery due to   increased risk of skin irritation and/or possible infection.     Do not wear make- up, including mascara.     You may wear deodorant only.      Do not wear powder, body lotion or cologne.     Do not wear any jewelry or have any metal on your body.     Please bring any documents given to you by your doctor.     If you are going home on the same day of surgery, you must have arrangements for a ride home.  You will not be able to drive home if you were given anesthesia or sedation.     Stop taking Aspirin, Ibuprofen, Motrin and Aleve at least 7 days before your surgery. You may use Tylenol.     Stop taking fish oil and vitamin E for least 7 days before surgery.     Wear loose fitting clothes allowing for bandages.     Please leave money and valuables home.       You may bring your cell  phone.     Call the doctor if fever or illness should occur before your surgery.    Call [phone] to contact us here at [facility] if needed.

## 2017-12-18 ENCOUNTER — HOSPITAL ENCOUNTER (OUTPATIENT)
Facility: HOSPITAL | Age: 32
Discharge: HOME | End: 2017-12-19
Attending: INTERNAL MEDICINE | Admitting: INTERNAL MEDICINE
Payer: COMMERCIAL

## 2017-12-18 ENCOUNTER — SURGERY (OUTPATIENT)
Age: 32
End: 2017-12-18

## 2017-12-18 DIAGNOSIS — I10 ESSENTIAL (PRIMARY) HYPERTENSION: ICD-10-CM

## 2017-12-18 DIAGNOSIS — I50.41 ACUTE COMBINED SYSTOLIC AND DIASTOLIC CONGESTIVE HEART FAILURE: ICD-10-CM

## 2017-12-18 PROCEDURE — 99152 MOD SED SAME PHYS/QHP 5/>YRS: CPT

## 2017-12-18 PROCEDURE — C1722 AICD, SINGLE CHAMBER: HCPCS

## 2017-12-18 PROCEDURE — 63600175 PHARM REV CODE 636 W HCPCS: Performed by: INTERNAL MEDICINE

## 2017-12-18 PROCEDURE — 99152 MOD SED SAME PHYS/QHP 5/>YRS: CPT | Mod: ,,, | Performed by: INTERNAL MEDICINE

## 2017-12-18 PROCEDURE — 25000003 PHARM REV CODE 250: Performed by: INTERNAL MEDICINE

## 2017-12-18 PROCEDURE — 25000003 PHARM REV CODE 250

## 2017-12-18 PROCEDURE — 33249 INSJ/RPLCMT DEFIB W/LEAD(S): CPT | Mod: ,,, | Performed by: INTERNAL MEDICINE

## 2017-12-18 PROCEDURE — A4216 STERILE WATER/SALINE, 10 ML: HCPCS

## 2017-12-18 PROCEDURE — 63600175 PHARM REV CODE 636 W HCPCS

## 2017-12-18 RX ORDER — CEFAZOLIN SODIUM 2 G/50ML
2 SOLUTION INTRAVENOUS
Status: DISCONTINUED | OUTPATIENT
Start: 2017-12-18 | End: 2017-12-18 | Stop reason: HOSPADM

## 2017-12-18 RX ORDER — LISINOPRIL 20 MG/1
40 TABLET ORAL DAILY
Status: DISCONTINUED | OUTPATIENT
Start: 2017-12-18 | End: 2017-12-19 | Stop reason: HOSPADM

## 2017-12-18 RX ORDER — FUROSEMIDE 40 MG/1
40 TABLET ORAL DAILY
Status: DISCONTINUED | OUTPATIENT
Start: 2017-12-18 | End: 2017-12-19 | Stop reason: HOSPADM

## 2017-12-18 RX ORDER — CEFAZOLIN SODIUM 2 G/50ML
2 SOLUTION INTRAVENOUS
Status: DISCONTINUED | OUTPATIENT
Start: 2017-12-18 | End: 2017-12-18

## 2017-12-18 RX ORDER — CARVEDILOL 6.25 MG/1
25 TABLET ORAL 2 TIMES DAILY
Status: DISCONTINUED | OUTPATIENT
Start: 2017-12-18 | End: 2017-12-19 | Stop reason: HOSPADM

## 2017-12-18 RX ORDER — DIPHENHYDRAMINE HYDROCHLORIDE 50 MG/ML
25 INJECTION INTRAMUSCULAR; INTRAVENOUS EVERY 4 HOURS PRN
Status: DISCONTINUED | OUTPATIENT
Start: 2017-12-18 | End: 2017-12-19 | Stop reason: HOSPADM

## 2017-12-18 RX ORDER — OXYCODONE AND ACETAMINOPHEN 5; 325 MG/1; MG/1
1 TABLET ORAL EVERY 4 HOURS PRN
Status: DISCONTINUED | OUTPATIENT
Start: 2017-12-18 | End: 2017-12-19 | Stop reason: HOSPADM

## 2017-12-18 RX ORDER — FLUTICASONE PROPIONATE 50 MCG
2 SPRAY, SUSPENSION (ML) NASAL DAILY
Status: DISCONTINUED | OUTPATIENT
Start: 2017-12-18 | End: 2017-12-19 | Stop reason: HOSPADM

## 2017-12-18 RX ADMIN — OXYCODONE AND ACETAMINOPHEN 1 TABLET: 5; 325 TABLET ORAL at 08:12

## 2017-12-18 RX ADMIN — FUROSEMIDE 40 MG: 40 TABLET ORAL at 03:12

## 2017-12-18 RX ADMIN — DIPHENHYDRAMINE HYDROCHLORIDE 25 MG: 50 INJECTION, SOLUTION INTRAMUSCULAR; INTRAVENOUS at 09:12

## 2017-12-18 RX ADMIN — OXYCODONE AND ACETAMINOPHEN 1 TABLET: 5; 325 TABLET ORAL at 03:12

## 2017-12-18 RX ADMIN — CARVEDILOL 25 MG: 6.25 TABLET, FILM COATED ORAL at 08:12

## 2017-12-18 NOTE — H&P
HPI:  Here for follow-up of systolic heart failure.  She denies any worsening cardiopulmonary complaints.  She says over the holidays she has been maybe is noncompliant again has had some increasing cough and some shortness of breath.  Says she's had a little bit of weight change.  She denies any current chest pain or palpitations.  She does get shortness of breath with heavier exertion but relieved with rest.  She denies any PND, orthopnea but some mild lower extremity edema.  She feels like her waist is also gained in girth.  She denies any dizziness, presyncope or syncope.  She had repeat echocardiogram again showing severely decreased function.  This is not improved on adequate medical therapy.  We preliminarily discussed AICD implant which she would like to think about.        Review of Systems   Constitution: Negative.   HENT: Negative.    Eyes: Negative.    Cardiovascular: Negative for chest pain, dyspnea on exertion, irregular heartbeat, leg swelling, near-syncope, orthopnea, palpitations, paroxysmal nocturnal dyspnea and syncope.   Respiratory: Positive for shortness of breath.    Skin: Negative.    Musculoskeletal: Negative.    Gastrointestinal: Negative for abdominal pain, constipation and diarrhea.   Genitourinary: Negative for dysuria.   Neurological: Negative.    Psychiatric/Behavioral: Negative.       Objective:    Physical Exam   Constitutional: She is oriented to person, place, and time. She appears well-developed and well-nourished.   HENT:   Head: Normocephalic and atraumatic.   Eyes: Conjunctivae and EOM are normal. Pupils are equal, round, and reactive to light.   Neck: Normal range of motion. Neck supple. No thyromegaly present.   Cardiovascular: Normal rate and regular rhythm.    No murmur heard.  Pulmonary/Chest: Effort normal and breath sounds normal. No respiratory distress.   Abdominal: Soft. Bowel sounds are normal.   Musculoskeletal: She exhibits no edema.   Neurological: She is alert  and oriented to person, place, and time.   Skin: Skin is warm and dry.   Psychiatric: She has a normal mood and affect. Her behavior is normal.        echo: 11-17  CONCLUSIONS     1 - Severely depressed left ventricular systolic function (EF 15-20%).     2 - Restrictive LV filling pattern, indicating markedly elevated LAP (grade 3 diastolic dysfunction).     3 - Concentric hypertrophy.     4 - Moderate left ventricular enlargement.     5 - Mild left atrial enlargement.      R?LHC:  B. Summary/Post-Operative Diagnosis       Normal coronary arteries.    Systolic dysfunction.    Mildly elevated left Filling Pressures.    Normal Pulmonary Hypertension.   Ejection Fraction: 20%  Global LV Function: severely depressed    Air rest:  PW:  (4)  PA: 24  CO_THERM: 4.4  RV: 25  RA:  (5)  LVEDP: 17       E. Angiographic Results     Diagnostic:          Patient has a right dominant coronary artery.        - Left Main Coronary Artery:             The LM is normal. There is DANNY 3 flow.     - Left Anterior Descending Artery:             The LAD is normal. There is DANNY 3 flow.     - Left Circumflex Artery:             The LCX is normal. There is DANNY 3 flow.     - Right Coronary Artery:             The RCA is normal. There is DANNY 3 flow.     - Left Renal Artery:             The ostial left renal is normal.     - Right Renal Artery:             The ostial right renal is normal.     - Common Femoral Artery:             The right CFA is normal.     Assessment:       1. Acute combined systolic and diastolic congestive heart failure    2. Essential hypertension    3. Morbid obesity due to excess calories       Plan:       -cont med tx, increase carvedilol to 25 mg twice a day  -Maintenance Lasix 40 mg daily  -Discussed ICD implant and risks/benefits which she would like to think about and will call us next week regarding decision  -Encouraged diet and exercise  -Sodium restriction     Return to clinic after the  procedure    **Risks/benefits of the procedure were d/w the patient including bleeding, infection, ptx, etc.  Patient understands and wishes to proceed.  Consent was placed on the chart.

## 2017-12-19 VITALS
SYSTOLIC BLOOD PRESSURE: 143 MMHG | WEIGHT: 191.13 LBS | OXYGEN SATURATION: 99 % | HEIGHT: 70 IN | TEMPERATURE: 98 F | BODY MASS INDEX: 27.36 KG/M2 | HEART RATE: 74 BPM | DIASTOLIC BLOOD PRESSURE: 97 MMHG | RESPIRATION RATE: 18 BRPM

## 2017-12-19 PROCEDURE — 25000003 PHARM REV CODE 250: Performed by: INTERNAL MEDICINE

## 2017-12-19 PROCEDURE — 93005 ELECTROCARDIOGRAM TRACING: CPT

## 2017-12-19 PROCEDURE — 93010 ELECTROCARDIOGRAM REPORT: CPT | Mod: ,,, | Performed by: INTERNAL MEDICINE

## 2017-12-19 RX ORDER — OXYCODONE AND ACETAMINOPHEN 5; 325 MG/1; MG/1
1 TABLET ORAL EVERY 4 HOURS PRN
Qty: 30 TABLET | Refills: 0 | Status: SHIPPED | OUTPATIENT
Start: 2017-12-19 | End: 2019-03-25

## 2017-12-19 RX ADMIN — FUROSEMIDE 40 MG: 40 TABLET ORAL at 09:12

## 2017-12-19 RX ADMIN — CARVEDILOL 25 MG: 6.25 TABLET, FILM COATED ORAL at 09:12

## 2017-12-19 RX ADMIN — OXYCODONE AND ACETAMINOPHEN 1 TABLET: 5; 325 TABLET ORAL at 09:12

## 2017-12-19 RX ADMIN — LISINOPRIL 40 MG: 20 TABLET ORAL at 09:12

## 2017-12-19 NOTE — DISCHARGE SUMMARY
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  She was admitted electively for ICD placement for primary prevention.  See admission H&P for full details.    Procedure(s) (LRB):  ICD SC new (N/A)      Hospital Course: Patient was electively minute underwent ICD placement successfully.  She was observed overnight for any complications.  She had no significant issues and was discharged in stable condition.  She will return in one week for staple removal.  She needs to wear left arm sling times one week.  Prescription for pain medication was given.  She will resume all her previous medications but hold aspirin for one week which she says she wasn't taking any way.    Consults:     Final Active Diagnoses:    Diagnosis Date Noted POA    PRINCIPAL PROBLEM:  Acute combined systolic and diastolic congestive heart failure [I50.41] 04/16/2017 Yes      Problems Resolved During this Admission:    Diagnosis Date Noted Date Resolved POA      Discharged Condition: good    Disposition: Home    Follow Up:  Follow-up Information     Jose De Jesus Longo MD In 1 week.    Specialties:  INTERVENTIONAL CARDIOLOGY, Cardiology  Contact information:  [address] [place], [place] [id number]  [phone]                 Patient Instructions:   Diet cardiac  Activity as tolerated  Routine post-implant instructions given    Medications:  Reconciled Home Medications:   Current Discharge Medication List      START taking these medications     Details   oxyCODONE-acetaminophen (PERCOCET) 5-325 mg per tablet Take 1 tablet by mouth every 4 (four) hours as needed.  Qty: 30 tablet, Refills: 0         CONTINUE these medications which have NOT CHANGED    Details   carvedilol (COREG) 25 MG tablet Take 1 tablet (25 mg total) by mouth 2 (two) times daily.  Qty: 180 tablet, Refills: 3      furosemide (LASIX) 40 MG tablet Take 1 tablet (40 mg total) by mouth once daily.  Qty: 30 tablet, Refills: 11    Associated Diagnoses: Acute combined systolic and diastolic heart failure      lisinopril (PRINIVIL,ZESTRIL) 40 MG tablet Take 1 tablet (40 mg total) by mouth once daily.  Qty: 90 tablet, Refills: 3      fluticasone (FLONASE) 50 mcg/actuation nasal spray 2 sprays by Each Nare route once daily.  Qty: 16 g, Refills: 0             Significant Diagnostic Studies: Labs: BMP: No results for input(s): GLU, NA, K, CL, CO2, BUN, CREATININE, CALCIUM, MG in the last 48 hours.    Pending Diagnostic Studies:     Procedure Component Value Units Date/Time    EKG 12-lead [[id number]]     Order Status:  Sent Lab Status:  No result         Indwelling Lines/Drains at time of discharge:   Lines/Drains/Airways          No matching active lines, drains, or airways          Time spent on the discharge of patient: 30 minutes  Patient was seen and examined on the date of discharge and determined to be suitable for discharge.         Jose De Jesus Longo MD  Department of Hospital Medicine  [facility]

## 2017-12-19 NOTE — PLAN OF CARE
WRITTEN HEALTHCARE DISCHARGE INFORMATION     Things that YOU are responsible for to Manage Your Care At Home:  1. Getting your prescriptions filled.  2. Taking you medications as directed. DO NOT MISS ANY DOSES!  3. Going to your follow-up doctor appointments. This is important because it allows the doctor to monitor your progress and to determine if any changes need to be made to your treatment plan.    If you are unable to make your follow up appointments, please call the number listed and reschedule this appointment.     After discharge, if you need assistance, you can call [facility] Line for 24/7 assistance at [phone]    If you are experience any signs or symptoms, Call your doctor or Call 911 and come to your nearest Emergency Room.    Thank you for choosing [facility] and allowing us to care for you.   From your care management team: Bhavna RODRÍGUEZ,RSW [phone]     You should receive a call from [facility] within 48-72 hours to help manage your care after discharge. Please try to make sure that you answer your phone for this important phone call.     Follow-up Information     Jose De Jesus Longo MD On 12/26/2017.    Specialties:  INTERVENTIONAL CARDIOLOGY, Cardiology  Why:  Hospital Follow Up appointment on Tuesday at 3:40pm.   Contact information:  [address] [place], [place] [id number] [phone]

## 2017-12-19 NOTE — PROGRESS NOTES
In bed awake and alert without complaints or discomfort. Skin dry intact no edema. Dressing to left chest has scant draining  Patient instructed to use ice pack  To reduce pain and swelling. Patient educated  .  On the importance of medication compliance and  Keeping all appointments.arramged. Assessment completed.

## 2017-12-19 NOTE — PLAN OF CARE
12/19/17 0900   Final Note   Assessment Type Final Discharge Note   Discharge Disposition Home   What phone number can be called within the next 1-3 days to see how you are doing after discharge? [phone]   Hospital Follow Up  Appt(s) scheduled? Yes   Discharge plans and expectations educations in teach back method with documentation complete? Yes   Right Care Referral Info   Post Acute Recommendation No Care

## 2017-12-19 NOTE — PLAN OF CARE
12/19/17 0859   Discharge Assessment   Assessment Type Discharge Planning Assessment   Confirmed/corrected address and phone number on facesheet? Yes   Assessment information obtained from? Patient   Prior to hospitilization cognitive status: Alert/Oriented   Prior to hospitalization functional status: Independent   Current cognitive status: Alert/Oriented   Current Functional Status: Independent   Lives With spouse;child(valencia), dependent   Able to Return to Prior Arrangements yes   Is patient able to care for self after discharge? Yes   Who are your caregiver(s) and their phone number(s)? Spouse Jacob   Patient's perception of discharge disposition home or selfcare   Readmission Within The Last 30 Days no previous admission in last 30 days   Equipment Currently Used at Home none   Do you have any problems affording any of your prescribed medications? No   Is the patient taking medications as prescribed? yes   Does the patient have transportation home? Yes   Transportation Available family or friend will provide   Discharge Plan A Home   Patient/Family In Agreement With Plan yes

## 2017-12-26 ENCOUNTER — OFFICE VISIT (OUTPATIENT)
Dept: CARDIOLOGY | Facility: CLINIC | Age: 32
End: 2017-12-26
Payer: COMMERCIAL

## 2017-12-26 VITALS
OXYGEN SATURATION: 100 % | BODY MASS INDEX: 27.2 KG/M2 | SYSTOLIC BLOOD PRESSURE: 141 MMHG | HEART RATE: 77 BPM | DIASTOLIC BLOOD PRESSURE: 86 MMHG | WEIGHT: 189.63 LBS

## 2017-12-26 DIAGNOSIS — I10 ESSENTIAL HYPERTENSION: ICD-10-CM

## 2017-12-26 DIAGNOSIS — E66.01 MORBID OBESITY DUE TO EXCESS CALORIES: ICD-10-CM

## 2017-12-26 DIAGNOSIS — I50.41 ACUTE COMBINED SYSTOLIC AND DIASTOLIC CONGESTIVE HEART FAILURE: Primary | ICD-10-CM

## 2017-12-26 DIAGNOSIS — Z95.810 AICD (AUTOMATIC CARDIOVERTER/DEFIBRILLATOR) PRESENT: ICD-10-CM

## 2017-12-26 PROCEDURE — 99024 POSTOP FOLLOW-UP VISIT: CPT | Mod: S$GLB,,, | Performed by: INTERNAL MEDICINE

## 2017-12-26 PROCEDURE — 99999 PR PBB SHADOW E&M-EST. PATIENT-LVL III: CPT | Mod: PBBFAC,,, | Performed by: INTERNAL MEDICINE

## 2017-12-26 NOTE — PROGRESS NOTES
Subjective:    Patient ID:  Nasra Marks is a 32 y.o. female who presents for follow-up of Follow-up      Shortness of Breath   Pertinent negatives include no abdominal pain, chest pain, leg swelling, orthopnea, PND or syncope.   Previous hx:  Here for follow-up of systolic heart failure.  She denies any worsening cardiopulmonary complaints.  She says over the holidays she has been maybe is noncompliant again has had some increasing cough and some shortness of breath.  Says she's had a little bit of weight change.  She denies any current chest pain or palpitations.  She does get shortness of breath with heavier exertion but relieved with rest.  She denies any PND, orthopnea but some mild lower extremity edema.  She feels like her waist is also gained in girth.  She denies any dizziness, presyncope or syncope.  She had repeat echocardiogram again showing severely decreased function.  This is not improved on adequate medical therapy.  We preliminarily discussed AICD implant which she would like to think about.    Today:  Here follow-up of systolic heart failure.  She underwent ICD placement without any issues.  She's here today for staple removal.  She had no significant issues.  Wound site looks good.  She denies any chest pain, shortness breath or palpitations.  She's expands no PND, orthopnea or lower edema.  She denies any dizziness, presyncope or syncope.      Review of Systems   Constitution: Negative.   HENT: Negative.    Eyes: Negative.    Cardiovascular: Negative for chest pain, dyspnea on exertion, irregular heartbeat, leg swelling, near-syncope, orthopnea, palpitations, paroxysmal nocturnal dyspnea and syncope.   Respiratory: Positive for shortness of breath.    Skin: Negative.    Musculoskeletal: Negative.    Gastrointestinal: Negative for abdominal pain, constipation and diarrhea.   Genitourinary: Negative for dysuria.   Neurological: Negative.    Psychiatric/Behavioral: Negative.         Objective:     Physical Exam   Constitutional: She is oriented to person, place, and time. She appears well-developed and well-nourished.   HENT:   Head: Normocephalic and atraumatic.   Eyes: Conjunctivae and EOM are normal. Pupils are equal, round, and reactive to light.   Neck: Normal range of motion. Neck supple. No thyromegaly present.   Cardiovascular: Normal rate and regular rhythm.    No murmur heard.  Pulmonary/Chest: Effort normal and breath sounds normal. No respiratory distress.   Abdominal: Soft. Bowel sounds are normal.   Musculoskeletal: She exhibits no edema.   Neurological: She is alert and oriented to person, place, and time.   Skin: Skin is warm and dry.   Psychiatric: She has a normal mood and affect. Her behavior is normal.       echo: 11-17  CONCLUSIONS     1 - Severely depressed left ventricular systolic function (EF 15-20%).     2 - Restrictive LV filling pattern, indicating markedly elevated LAP (grade 3 diastolic dysfunction).     3 - Concentric hypertrophy.     4 - Moderate left ventricular enlargement.     5 - Mild left atrial enlargement.     R?LHC:  B. Summary/Post-Operative Diagnosis       Normal coronary arteries.    Systolic dysfunction.    Mildly elevated left Filling Pressures.    Normal Pulmonary Hypertension.    Ejection Fraction: 20%  Global LV Function: severely depressed    Air rest:  PW:  (4)  PA: 24  CO_THERM: 4.4  RV: 25  RA:  (5)  LVEDP: 17       E. Angiographic Results     Diagnostic:          Patient has a right dominant coronary artery.        - Left Main Coronary Artery:             The LM is normal. There is DANNY 3 flow.     - Left Anterior Descending Artery:             The LAD is normal. There is DANNY 3 flow.     - Left Circumflex Artery:             The LCX is normal. There is DANNY 3 flow.     - Right Coronary Artery:             The RCA is normal. There is DANNY 3 flow.     - Left Renal Artery:             The ostial left renal is normal.     - Right Renal Artery:              The ostial right renal is normal.     - Common Femoral Artery:             The right CFA is normal.    Assessment:       1. Acute combined systolic and diastolic congestive heart failure    2. Morbid obesity due to excess calories    3. Essential hypertension    4. AICD (automatic cardioverter/defibrillator) present         Plan:       -cont med tx  -Follow-up device checks with [employer]  -Encouraged diet and exercise  -Sodium restriction    Return to clinic 1 month with ICD check

## 2018-02-20 ENCOUNTER — OFFICE VISIT (OUTPATIENT)
Dept: CARDIOLOGY | Facility: CLINIC | Age: 33
End: 2018-02-20
Payer: COMMERCIAL

## 2018-02-20 VITALS
BODY MASS INDEX: 26.73 KG/M2 | SYSTOLIC BLOOD PRESSURE: 124 MMHG | HEART RATE: 60 BPM | WEIGHT: 186.31 LBS | DIASTOLIC BLOOD PRESSURE: 86 MMHG | OXYGEN SATURATION: 98 %

## 2018-02-20 DIAGNOSIS — E66.01 MORBID OBESITY DUE TO EXCESS CALORIES: ICD-10-CM

## 2018-02-20 DIAGNOSIS — I10 ESSENTIAL HYPERTENSION: ICD-10-CM

## 2018-02-20 DIAGNOSIS — I50.41 ACUTE COMBINED SYSTOLIC AND DIASTOLIC CONGESTIVE HEART FAILURE: Primary | ICD-10-CM

## 2018-02-20 DIAGNOSIS — Z95.810 AICD (AUTOMATIC CARDIOVERTER/DEFIBRILLATOR) PRESENT: ICD-10-CM

## 2018-02-20 PROCEDURE — 99024 POSTOP FOLLOW-UP VISIT: CPT | Mod: S$GLB,,, | Performed by: INTERNAL MEDICINE

## 2018-02-20 PROCEDURE — 3008F BODY MASS INDEX DOCD: CPT | Mod: S$GLB,,, | Performed by: INTERNAL MEDICINE

## 2018-02-20 PROCEDURE — 99999 PR PBB SHADOW E&M-EST. PATIENT-LVL III: CPT | Mod: PBBFAC,,, | Performed by: INTERNAL MEDICINE

## 2018-02-20 PROCEDURE — 93289 INTERROG DEVICE EVAL HEART: CPT | Mod: S$GLB,,, | Performed by: INTERNAL MEDICINE

## 2018-02-20 NOTE — PROGRESS NOTES
Subjective:    Patient ID:  Nasra Marks is a 32 y.o. female who presents for follow-up of Follow-up ([employer] )      Shortness of Breath   Pertinent negatives include no abdominal pain, chest pain, leg swelling, orthopnea, PND or syncope.   Previous hx:  Here follow-up of systolic heart failure.  She underwent ICD placement without any issues.  She's here today for staple removal.  She had no significant issues.  Wound site looks good.  She denies any chest pain, shortness breath or palpitations.  She's expands no PND, orthopnea or lower edema.  She denies any dizziness, presyncope or syncope.    Today:  Here for follow-up of systolic heart failure.  She mainly just feels fatigued to little bit.  She gets shortness breath with heavier activity but relieved with rest.  She denies any chest pain or other issues.  She's expands no PND, orthopnea or lower edema.  She denies any dizziness, presyncope or syncope.  She's had no problems post-device check.  She had a check today without any issues.      Review of Systems   Constitution: Negative.   HENT: Negative.    Eyes: Negative.    Cardiovascular: Negative for chest pain, dyspnea on exertion, irregular heartbeat, leg swelling, near-syncope, orthopnea, palpitations, paroxysmal nocturnal dyspnea and syncope.   Respiratory: Positive for shortness of breath.    Skin: Negative.    Musculoskeletal: Negative.    Gastrointestinal: Negative for abdominal pain, constipation and diarrhea.   Genitourinary: Negative for dysuria.   Neurological: Negative.    Psychiatric/Behavioral: Negative.         Objective:    Physical Exam   Constitutional: She is oriented to person, place, and time. She appears well-developed and well-nourished.   HENT:   Head: Normocephalic and atraumatic.   Eyes: Conjunctivae and EOM are normal. Pupils are equal, round, and reactive to light.   Neck: Normal range of motion. Neck supple. No thyromegaly present.   Cardiovascular: Normal rate and regular  rhythm.    No murmur heard.  Pulmonary/Chest: Effort normal and breath sounds normal. No respiratory distress.   Abdominal: Soft. Bowel sounds are normal.   Musculoskeletal: She exhibits no edema.   Neurological: She is alert and oriented to person, place, and time.   Skin: Skin is warm and dry.   Psychiatric: She has a normal mood and affect. Her behavior is normal.       echo: 11-17  CONCLUSIONS     1 - Severely depressed left ventricular systolic function (EF 15-20%).     2 - Restrictive LV filling pattern, indicating markedly elevated LAP (grade 3 diastolic dysfunction).     3 - Concentric hypertrophy.     4 - Moderate left ventricular enlargement.     5 - Mild left atrial enlargement.     R?LHC:  B. Summary/Post-Operative Diagnosis       Normal coronary arteries.    Systolic dysfunction.    Mildly elevated left Filling Pressures.    Normal Pulmonary Hypertension.    Ejection Fraction: 20%  Global LV Function: severely depressed    Air rest:  PW:  (4)  PA: 24  CO_THERM: 4.4  RV: 25  RA:  (5)  LVEDP: 17       E. Angiographic Results     Diagnostic:          Patient has a right dominant coronary artery.        - Left Main Coronary Artery:             The LM is normal. There is DANNY 3 flow.     - Left Anterior Descending Artery:             The LAD is normal. There is DANNY 3 flow.     - Left Circumflex Artery:             The LCX is normal. There is DANNY 3 flow.     - Right Coronary Artery:             The RCA is normal. There is DANNY 3 flow.     - Left Renal Artery:             The ostial left renal is normal.     - Right Renal Artery:             The ostial right renal is normal.     - Common Femoral Artery:             The right CFA is normal.    Assessment:       1. Acute combined systolic and diastolic congestive heart failure    2. Morbid obesity due to excess calories    3. Essential hypertension    4. AICD (automatic cardioverter/defibrillator) present         Plan:       -cont med tx  -Follow-up device  checks with [employer]  -Encouraged diet and exercise  -Sodium restriction  -Refer to cardiac rehabilitation    Return to clinic 6 mos with device check      [employer] ICD check:  Indication: Primary prevention    Model:Visia AF  VVI with a low rate of 40 bpm  Ventricular paced less than 1%    RV: 12.6 mV, 513 ohms, 0.75 V at 0.4 ms    No events  No changes    Return to clinic in 6 months

## 2018-04-30 ENCOUNTER — HOSPITAL ENCOUNTER (EMERGENCY)
Facility: HOSPITAL | Age: 33
Discharge: HOME | End: 2018-04-30
Attending: EMERGENCY MEDICINE
Payer: COMMERCIAL

## 2018-04-30 ENCOUNTER — TELEPHONE (OUTPATIENT)
Dept: FAMILY MEDICINE | Facility: CLINIC | Age: 33
End: 2018-04-30

## 2018-04-30 VITALS
HEART RATE: 80 BPM | SYSTOLIC BLOOD PRESSURE: 142 MMHG | TEMPERATURE: 98 F | DIASTOLIC BLOOD PRESSURE: 88 MMHG | HEIGHT: 70 IN | RESPIRATION RATE: 16 BRPM | WEIGHT: 188 LBS | OXYGEN SATURATION: 98 % | BODY MASS INDEX: 26.92 KG/M2

## 2018-04-30 DIAGNOSIS — R07.9 CHEST PAIN, UNSPECIFIED TYPE: Primary | ICD-10-CM

## 2018-04-30 DIAGNOSIS — R06.02 SHORTNESS OF BREATH: ICD-10-CM

## 2018-04-30 LAB
ALBUMIN SERPL BCP-MCNC: 4.1 G/DL
ALP SERPL-CCNC: 59 U/L
ALT SERPL W/O P-5'-P-CCNC: 16 U/L
ANION GAP SERPL CALC-SCNC: 6 MMOL/L
AST SERPL-CCNC: 19 U/L
BASOPHILS # BLD AUTO: 0.03 K/UL
BASOPHILS NFR BLD: 0.3 %
BILIRUB SERPL-MCNC: 0.5 MG/DL
BNP SERPL-MCNC: 137 PG/ML
BUN SERPL-MCNC: 14 MG/DL
CALCIUM SERPL-MCNC: 9.4 MG/DL
CHLORIDE SERPL-SCNC: 103 MMOL/L
CO2 SERPL-SCNC: 28 MMOL/L
CREAT SERPL-MCNC: 0.9 MG/DL
D DIMER PPP IA.FEU-MCNC: 1.16 MG/L FEU
DIFFERENTIAL METHOD: ABNORMAL
EOSINOPHIL # BLD AUTO: 0.1 K/UL
EOSINOPHIL NFR BLD: 1.4 %
ERYTHROCYTE [DISTWIDTH] IN BLOOD BY AUTOMATED COUNT: 12.8 %
EST. GFR  (AFRICAN AMERICAN): >60 ML/MIN/1.73 M^2
EST. GFR  (NON AFRICAN AMERICAN): >60 ML/MIN/1.73 M^2
GLUCOSE SERPL-MCNC: 85 MG/DL
HCT VFR BLD AUTO: 36.6 %
HGB BLD-MCNC: 12.3 G/DL
INR PPP: 1
LYMPHOCYTES # BLD AUTO: 1.7 K/UL
LYMPHOCYTES NFR BLD: 19.4 %
MCH RBC QN AUTO: 29 PG
MCHC RBC AUTO-ENTMCNC: 33.6 G/DL
MCV RBC AUTO: 86 FL
MONOCYTES # BLD AUTO: 0.8 K/UL
MONOCYTES NFR BLD: 9.3 %
NEUTROPHILS # BLD AUTO: 6 K/UL
NEUTROPHILS NFR BLD: 69.5 %
PLATELET # BLD AUTO: 297 K/UL
PMV BLD AUTO: 9.5 FL
POTASSIUM SERPL-SCNC: 4.3 MMOL/L
PROT SERPL-MCNC: 8.1 G/DL
PROTHROMBIN TIME: 10.7 SEC
RBC # BLD AUTO: 4.24 M/UL
SODIUM SERPL-SCNC: 137 MMOL/L
TROPONIN I SERPL DL<=0.01 NG/ML-MCNC: <0.006 NG/ML
WBC # BLD AUTO: 8.59 K/UL

## 2018-04-30 PROCEDURE — 96361 HYDRATE IV INFUSION ADD-ON: CPT

## 2018-04-30 PROCEDURE — 93010 ELECTROCARDIOGRAM REPORT: CPT | Mod: ,,, | Performed by: INTERNAL MEDICINE

## 2018-04-30 PROCEDURE — 84484 ASSAY OF TROPONIN QUANT: CPT

## 2018-04-30 PROCEDURE — 96360 HYDRATION IV INFUSION INIT: CPT

## 2018-04-30 PROCEDURE — 85610 PROTHROMBIN TIME: CPT

## 2018-04-30 PROCEDURE — 93005 ELECTROCARDIOGRAM TRACING: CPT

## 2018-04-30 PROCEDURE — 83880 ASSAY OF NATRIURETIC PEPTIDE: CPT

## 2018-04-30 PROCEDURE — 99284 EMERGENCY DEPT VISIT MOD MDM: CPT | Mod: 25

## 2018-04-30 PROCEDURE — 25000003 PHARM REV CODE 250: Performed by: EMERGENCY MEDICINE

## 2018-04-30 PROCEDURE — 80053 COMPREHEN METABOLIC PANEL: CPT

## 2018-04-30 PROCEDURE — 85025 COMPLETE CBC W/AUTO DIFF WBC: CPT

## 2018-04-30 PROCEDURE — 85379 FIBRIN DEGRADATION QUANT: CPT

## 2018-04-30 PROCEDURE — 25500020 PHARM REV CODE 255: Performed by: EMERGENCY MEDICINE

## 2018-04-30 RX ORDER — ONDANSETRON 4 MG/1
4 TABLET, ORALLY DISINTEGRATING ORAL
Status: COMPLETED | OUTPATIENT
Start: 2018-04-30 | End: 2018-04-30

## 2018-04-30 RX ORDER — FUROSEMIDE 20 MG/1
20 TABLET ORAL ONCE
Status: COMPLETED | OUTPATIENT
Start: 2018-04-30 | End: 2018-04-30

## 2018-04-30 RX ORDER — SODIUM CHLORIDE 9 MG/ML
1000 INJECTION, SOLUTION INTRAVENOUS
Status: COMPLETED | OUTPATIENT
Start: 2018-04-30 | End: 2018-04-30

## 2018-04-30 RX ADMIN — FUROSEMIDE 20 MG: 20 TABLET ORAL at 06:04

## 2018-04-30 RX ADMIN — ONDANSETRON 4 MG: 4 TABLET, ORALLY DISINTEGRATING ORAL at 02:04

## 2018-04-30 RX ADMIN — SODIUM CHLORIDE 1000 ML: 0.9 INJECTION, SOLUTION INTRAVENOUS at 04:04

## 2018-04-30 RX ADMIN — IOHEXOL 75 ML: 350 INJECTION, SOLUTION INTRAVENOUS at 04:04

## 2018-04-30 NOTE — ED TRIAGE NOTES
Patient presents to the ED via personal vehicle with . Patient reports headache since this morning, sob that  Started last night, ans a 7/10 midsternal chest tightness that started this morning and radiated to her left jaw, nausea and vomiting.

## 2018-04-30 NOTE — ED PROVIDER NOTES
Encounter Date: 2018    SCRIBE #1 NOTE: I, Estevannatalia Nasir, am scribing for, and in the presence of,  Bairon Bryant MD. I have scribed the following portions of the note - Other sections scribed: HPI, ROS, PE.       History     Chief Complaint   Patient presents with    Headache     nausea, headache, swelling to feet x 1 day; hx of CHF    Chest Pain     mid/left side chest pain started last night     CC: Chest Pain ; HA    33 y/o female with CHF and HTN presents to the ED c/o acute onset chest pain and bilateral feet swelling that she noticed yesterday night. The pain is severe (7/10). The patient states that her blood pressure was 158/103 and her weight was 190 when it's usually 185 yesterday night. She reports severe (8/10) HA, intermittent chest tightness, and nausea this morning. She reports similar chest tightness in the past; however, she states that no one could get to the bottom of it. The feet swelling resolved this morning. She attempted x2 Tylenol this morning with mild relief to her HA. The patient is compliant with her HTN medications. The patient denies fever, chills, or cough. No other symptoms reported.      The history is provided by the patient. No  was used.     Review of patient's allergies indicates:  No Known Allergies  Past Medical History:   Diagnosis Date    CHF (congestive heart failure)     Hypertension     dx at 16y.o.     Past Surgical History:   Procedure Laterality Date    CARDIAC DEFIBRILLATOR PLACEMENT       SECTION      x 1    INDUCED       TUBAL LIGATION       Family History   Problem Relation Age of Onset    Hypertension Mother     Cancer Maternal Grandmother      breast x 2    Cancer Paternal Grandmother      breast    No Known Problems Sister     No Known Problems Brother     No Known Problems Son     No Known Problems Brother     Hypertension Other     Diabetes Other     Breast cancer Other     Cancer Paternal Aunt       breast    Cancer Paternal Uncle      Social History   Substance Use Topics    Smoking status: Never Smoker    Smokeless tobacco: Never Used    Alcohol use No      Comment: rarely     Review of Systems   Constitutional: Positive for unexpected weight change (weight loss). Negative for chills and fever.   HENT: Negative for congestion, ear pain, rhinorrhea and sore throat.    Eyes: Negative for redness.   Respiratory: Positive for chest tightness (intermittent). Negative for cough and shortness of breath.    Cardiovascular: Positive for chest pain and leg swelling (bilateral feet that has resolved).   Gastrointestinal: Positive for nausea. Negative for abdominal pain, diarrhea and vomiting.   Genitourinary: Negative for difficulty urinating, dysuria, frequency and urgency.   Musculoskeletal: Negative for back pain and neck pain.   Skin: Negative for rash.   Neurological: Positive for headaches.       Physical Exam     Initial Vitals   BP Pulse Resp Temp SpO2   04/30/18 1209 04/30/18 1209 04/30/18 1209 04/30/18 1210 04/30/18 1209   130/81 78 20 98.4 °F (36.9 °C) 100 %      MAP       04/30/18 1209       97.33         Physical Exam    Nursing note and vitals reviewed.  Constitutional: She appears well-developed and well-nourished.  Non-toxic appearance. She does not appear ill.   HENT:   Head: Normocephalic and atraumatic.   Mouth/Throat: Oropharynx is clear and moist.   Eyes: EOM are normal. Pupils are equal, round, and reactive to light. No scleral icterus.   Neck: Normal range of motion. Neck supple. No JVD present.   Cardiovascular: Normal rate and regular rhythm.   Pulmonary/Chest: Effort normal and breath sounds normal. No respiratory distress.   Abdominal: Soft. Normal appearance and bowel sounds are normal. She exhibits no distension. There is no tenderness.   Musculoskeletal: Normal range of motion. She exhibits no edema.   Symmetrical lower extremities. No calf tenderness   Neurological: She is alert  and oriented to person, place, and time. No cranial nerve deficit or sensory deficit.   Skin: Skin is warm and dry.   Psychiatric: She has a normal mood and affect.         ED Course   Procedures  Labs Reviewed   CBC W/ AUTO DIFFERENTIAL - Abnormal; Notable for the following:        Result Value    Hematocrit 36.6 (*)     All other components within normal limits   COMPREHENSIVE METABOLIC PANEL - Abnormal; Notable for the following:     Anion Gap 6 (*)     All other components within normal limits   B-TYPE NATRIURETIC PEPTIDE - Abnormal; Notable for the following:      (*)     All other components within normal limits   D DIMER, QUANTITATIVE - Abnormal; Notable for the following:     D-Dimer 1.16 (*)     All other components within normal limits   TROPONIN I   PROTIME-INR     EKG Readings: (Independently Interpreted)   Initial Reading: No STEMI. Rhythm: Normal Sinus Rhythm. Heart Rate: 76. Ectopy: No Ectopy. Conduction: Normal. ST Segments: Non-Specific ST Segment Depression. T Waves Flipped: V4, V5, V6 and III.          Medical Decision Making:   History:   Old Medical Records: I decided to obtain old medical records.  Differential Diagnosis:   CHF exacerbation  ACS  Musculoskeletal pain  Electrolyte abnormality  Pneumonia  PE  Independently Interpreted Test(s):   I have ordered and independently interpreted X-rays - see prior notes.  I have ordered and independently interpreted EKG Reading(s) - see prior notes  Clinical Tests:   Lab Tests: Ordered and Reviewed  Radiological Study: Ordered and Reviewed  Medical Tests: Ordered and Reviewed  ED Management:  Patient afebrile, no acute distress. He EKG without acute ischemic changes and comparable to prior.  Mild within acceptable limits with a negative troponin, but her D-dimer is noted to be elevated.  Patient sent for CT angio which did not demonstrate PE.  Patient has remained hemodynamically stable in the emergency room without hypoxia, tachycardia or  tachypnea or respiratory distress that was suggest significant PE.  She is stable for discharge to follow up with her primary physician. Counseled on supportive care and appropriate medication usage. Dicussed extensively concerning symptoms for which to return to ER and the importance of follow up . Patient expressed understanding and agreement with treatment plan.     This chart dictated using Dragon as a result there may be some typographical errors.             Scribe Attestation:   Scribe #1: I performed the above scribed service and the documentation accurately describes the services I performed. I attest to the accuracy of the note.    Attending Attestation:           Physician Attestation for Scribe:  Physician Attestation Statement for Scribe #1: I, Bairon Bryant MD, reviewed documentation, as scribed by Deondre Best in my presence, and it is both accurate and complete.                    Clinical Impression:   The primary encounter diagnosis was Chest pain, unspecified type. A diagnosis of Shortness of breath was also pertinent to this visit.    Disposition:   Disposition: Discharged  Condition: Stable                        Bairon Carrizales MD  04/30/18 7647

## 2018-04-30 NOTE — DISCHARGE INSTRUCTIONS
Continue your medications as prescribed.  Return to the emergency room for any persistent, new or worsening symptoms.  Contact your primary physician to schedule an appointment for close follow-up.

## 2018-04-30 NOTE — ED PROVIDER NOTES
32-year-old female, history of CHF.  EF 20%.  ICD in place.  Chest pain which began last night, leg swelling which began over the past 2 days, orthopnea ×2 days.    Well-appearing and nontoxic.  Lungs overall clear, decreased breath sounds to bases. No resp distress. Vitals reassuring.      EKG, BMP, troponin, further workup.  Moved to main side.    [facility] 1215     Miguelito Castañeda PA-C  04/30/18 1218

## 2018-04-30 NOTE — TELEPHONE ENCOUNTER
Patient requesting advise on earlier appt. Patient states she has CHF her  /103, and she has gained weight her current weight 189.6,and her weight 2 days ago was 186lbs , she c/o headache, SOB, denies nausea/vomiting,fever,chills. Please Advise     Patient has an appt scheduled with  MICHELLE Larkin 5:40 pm.

## 2018-05-15 ENCOUNTER — OFFICE VISIT (OUTPATIENT)
Dept: CARDIOLOGY | Facility: CLINIC | Age: 33
End: 2018-05-15
Payer: COMMERCIAL

## 2018-05-15 VITALS
BODY MASS INDEX: 26.57 KG/M2 | HEART RATE: 68 BPM | DIASTOLIC BLOOD PRESSURE: 82 MMHG | SYSTOLIC BLOOD PRESSURE: 124 MMHG | RESPIRATION RATE: 20 BRPM | WEIGHT: 185.19 LBS | OXYGEN SATURATION: 98 %

## 2018-05-15 DIAGNOSIS — I50.41 ACUTE COMBINED SYSTOLIC AND DIASTOLIC CONGESTIVE HEART FAILURE: Primary | ICD-10-CM

## 2018-05-15 DIAGNOSIS — Z95.810 AICD (AUTOMATIC CARDIOVERTER/DEFIBRILLATOR) PRESENT: ICD-10-CM

## 2018-05-15 DIAGNOSIS — E66.01 MORBID OBESITY DUE TO EXCESS CALORIES: ICD-10-CM

## 2018-05-15 DIAGNOSIS — I10 ESSENTIAL HYPERTENSION: ICD-10-CM

## 2018-05-15 PROCEDURE — 99214 OFFICE O/P EST MOD 30 MIN: CPT | Mod: 25,S$GLB,, | Performed by: INTERNAL MEDICINE

## 2018-05-15 PROCEDURE — 93289 INTERROG DEVICE EVAL HEART: CPT | Mod: S$GLB,,, | Performed by: INTERNAL MEDICINE

## 2018-05-15 PROCEDURE — 99999 PR PBB SHADOW E&M-EST. PATIENT-LVL III: CPT | Mod: PBBFAC,,, | Performed by: INTERNAL MEDICINE

## 2018-05-15 PROCEDURE — 3008F BODY MASS INDEX DOCD: CPT | Mod: CPTII,S$GLB,, | Performed by: INTERNAL MEDICINE

## 2018-05-15 PROCEDURE — 3074F SYST BP LT 130 MM HG: CPT | Mod: CPTII,S$GLB,, | Performed by: INTERNAL MEDICINE

## 2018-05-15 PROCEDURE — 3079F DIAST BP 80-89 MM HG: CPT | Mod: CPTII,S$GLB,, | Performed by: INTERNAL MEDICINE

## 2018-05-15 NOTE — PROGRESS NOTES
Subjective:    Patient ID:  Nasra Marks is a 32 y.o. female who presents for follow-up of No chief complaint on file.      Shortness of Breath   Pertinent negatives include no abdominal pain, chest pain, leg swelling, orthopnea, PND or syncope.   Previous hx:  Here follow-up of systolic heart failure.  She underwent ICD placement without any issues.  She's here today for staple removal.  She had no significant issues.  Wound site looks good.  She denies any chest pain, shortness breath or palpitations.  She's expands no PND, orthopnea or lower edema.  She denies any dizziness, presyncope or syncope.    Today:  Here for follow-up of systolic heart failure.  She mainly just feels fatigued to little bit.  She gets shortness breath with heavier activity but relieved with rest.  She denies any chest pain or other issues.  She's expands no PND, orthopnea or lower edema.  She denies any dizziness, presyncope or syncope.  She's had no problems post-device check.  She had a check today without any issues.      Review of Systems   Constitution: Negative.   HENT: Negative.    Eyes: Negative.    Cardiovascular: Negative for chest pain, dyspnea on exertion, irregular heartbeat, leg swelling, near-syncope, orthopnea, palpitations, paroxysmal nocturnal dyspnea and syncope.   Respiratory: Positive for shortness of breath.    Skin: Negative.    Musculoskeletal: Negative.    Gastrointestinal: Negative for abdominal pain, constipation and diarrhea.   Genitourinary: Negative for dysuria.   Neurological: Negative.    Psychiatric/Behavioral: Negative.         Objective:    Physical Exam   Constitutional: She is oriented to person, place, and time. She appears well-developed and well-nourished.   HENT:   Head: Normocephalic and atraumatic.   Eyes: Conjunctivae and EOM are normal. Pupils are equal, round, and reactive to light.   Neck: Normal range of motion. Neck supple. No thyromegaly present.   Cardiovascular: Normal rate and  regular rhythm.    No murmur heard.  Pulmonary/Chest: Effort normal and breath sounds normal. No respiratory distress.   Abdominal: Soft. Bowel sounds are normal.   Musculoskeletal: She exhibits no edema.   Neurological: She is alert and oriented to person, place, and time.   Skin: Skin is warm and dry.   Psychiatric: She has a normal mood and affect. Her behavior is normal.       echo: 11-17  CONCLUSIONS     1 - Severely depressed left ventricular systolic function (EF 15-20%).     2 - Restrictive LV filling pattern, indicating markedly elevated LAP (grade 3 diastolic dysfunction).     3 - Concentric hypertrophy.     4 - Moderate left ventricular enlargement.     5 - Mild left atrial enlargement.     R?LHC:  B. Summary/Post-Operative Diagnosis       Normal coronary arteries.    Systolic dysfunction.    Mildly elevated left Filling Pressures.    Normal Pulmonary Hypertension.    Ejection Fraction: 20%  Global LV Function: severely depressed    Air rest:  PW:  (4)  PA: 24  CO_THERM: 4.4  RV: 25  RA:  (5)  LVEDP: 17       E. Angiographic Results     Diagnostic:          Patient has a right dominant coronary artery.        - Left Main Coronary Artery:             The LM is normal. There is DANNY 3 flow.     - Left Anterior Descending Artery:             The LAD is normal. There is DANNY 3 flow.     - Left Circumflex Artery:             The LCX is normal. There is DANNY 3 flow.     - Right Coronary Artery:             The RCA is normal. There is DANNY 3 flow.     - Left Renal Artery:             The ostial left renal is normal.     - Right Renal Artery:             The ostial right renal is normal.     - Common Femoral Artery:             The right CFA is normal.    Assessment:       1. Acute combined systolic and diastolic congestive heart failure    2. Morbid obesity due to excess calories    3. Essential hypertension    4. AICD (automatic cardioverter/defibrillator) present         Plan:       -cont med tx  -Follow-up  device checks with [employer]  -Encouraged diet and exercise  -Sodium restriction  -Refer to cardiac rehabilitation    Return to clinic 6 mos with device check      [employer] ICD check:  Indication: Primary prevention    Model:Visia AF  VVI with a low rate of 40 bpm  Ventricular paced less than 1%    RV: 12.6 mV, 513 ohms, 0.75 V at 0.4 ms    No events  No changes    Return to clinic in 6 months

## 2018-07-25 NOTE — TELEPHONE ENCOUNTER
----- Message from Amanda Kapoor sent at 7/25/2018  4:38 PM CDT -----  Contact: Self   Pt is requesting a refill on medication lisinopril (PRINIVIL,ZESTRIL) 40 MG tablet . Please call pt at [phone].

## 2018-07-26 RX ORDER — LISINOPRIL 40 MG/1
40 TABLET ORAL DAILY
Qty: 30 TABLET | Refills: 0 | Status: SHIPPED | OUTPATIENT
Start: 2018-07-26 | End: 2018-11-27 | Stop reason: SDUPTHER

## 2018-08-05 ENCOUNTER — HOSPITAL ENCOUNTER (EMERGENCY)
Facility: HOSPITAL | Age: 33
Discharge: HOME | End: 2018-08-05
Attending: EMERGENCY MEDICINE
Payer: COMMERCIAL

## 2018-08-05 VITALS
SYSTOLIC BLOOD PRESSURE: 127 MMHG | DIASTOLIC BLOOD PRESSURE: 80 MMHG | HEIGHT: 69 IN | WEIGHT: 187 LBS | BODY MASS INDEX: 27.7 KG/M2 | OXYGEN SATURATION: 99 % | RESPIRATION RATE: 16 BRPM | HEART RATE: 72 BPM | TEMPERATURE: 98 F

## 2018-08-05 DIAGNOSIS — R07.9 CHEST PAIN: ICD-10-CM

## 2018-08-05 DIAGNOSIS — J20.9 ACUTE BRONCHITIS, UNSPECIFIED ORGANISM: ICD-10-CM

## 2018-08-05 DIAGNOSIS — R07.89 CHEST DISCOMFORT: ICD-10-CM

## 2018-08-05 DIAGNOSIS — R05.9 COUGH: Primary | ICD-10-CM

## 2018-08-05 LAB
ALBUMIN SERPL-MCNC: 3.6 G/DL (ref 3.3–5.5)
ALBUMIN SERPL-MCNC: 3.6 G/DL (ref 3.3–5.5)
ALP SERPL-CCNC: 52 U/L (ref 42–141)
ALP SERPL-CCNC: 62 U/L (ref 42–141)
B-HCG UR QL: NEGATIVE
BILIRUB SERPL-MCNC: 0.8 MG/DL (ref 0.2–1.6)
BILIRUB SERPL-MCNC: 0.9 MG/DL (ref 0.2–1.6)
BILIRUBIN, POC UA: NEGATIVE
BLOOD, POC UA: ABNORMAL
BUN SERPL-MCNC: 12 MG/DL (ref 7–22)
CALCIUM SERPL-MCNC: 9.6 MG/DL (ref 8–10.3)
CHLORIDE SERPL-SCNC: 105 MMOL/L (ref 98–108)
CLARITY, POC UA: CLEAR
COLOR, POC UA: YELLOW
CREAT SERPL-MCNC: 0.9 MG/DL (ref 0.6–1.2)
CTP QC/QA: YES
GLUCOSE SERPL-MCNC: 84 MG/DL (ref 73–118)
GLUCOSE, POC UA: NEGATIVE
KETONES, POC UA: NEGATIVE
LEUKOCYTE EST, POC UA: NEGATIVE
NITRITE, POC UA: NEGATIVE
PH UR STRIP: 6 [PH]
POC ALT (SGPT): 15 U/L (ref 10–47)
POC ALT (SGPT): 17 U/L (ref 10–47)
POC AMYLASE: 47 U/L (ref 14–97)
POC AST (SGOT): 27 U/L (ref 11–38)
POC AST (SGOT): 29 U/L (ref 11–38)
POC B-TYPE NATRIURETIC PEPTIDE: 186 PG/ML (ref 0–100)
POC CARDIAC TROPONIN I: 0.01 NG/ML
POC GGT: 12 U/L (ref 5–65)
POC PTINR: 1 (ref 0.9–1.2)
POC PTWBT: 11.7 SEC (ref 9.7–14.3)
POC TCO2: 27 MMOL/L (ref 18–33)
POTASSIUM BLD-SCNC: 4 MMOL/L (ref 3.6–5.1)
PROTEIN, POC UA: NEGATIVE
PROTEIN, POC: 7.7 G/DL (ref 6.4–8.1)
PROTEIN, POC: 7.7 G/DL (ref 6.4–8.1)
SAMPLE: NORMAL
SAMPLE: NORMAL
SODIUM BLD-SCNC: 140 MMOL/L (ref 128–145)
SPECIFIC GRAVITY, POC UA: 1.01
UROBILINOGEN, POC UA: 0.2 E.U./DL

## 2018-08-05 PROCEDURE — 85025 COMPLETE CBC W/AUTO DIFF WBC: CPT

## 2018-08-05 PROCEDURE — 84484 ASSAY OF TROPONIN QUANT: CPT

## 2018-08-05 PROCEDURE — 99284 EMERGENCY DEPT VISIT MOD MDM: CPT | Mod: 25

## 2018-08-05 PROCEDURE — 82040 ASSAY OF SERUM ALBUMIN: CPT

## 2018-08-05 PROCEDURE — 25000242 PHARM REV CODE 250 ALT 637 W/ HCPCS: Performed by: EMERGENCY MEDICINE

## 2018-08-05 PROCEDURE — 93005 ELECTROCARDIOGRAM TRACING: CPT

## 2018-08-05 PROCEDURE — 93010 ELECTROCARDIOGRAM REPORT: CPT | Mod: ,,, | Performed by: INTERNAL MEDICINE

## 2018-08-05 PROCEDURE — 81025 URINE PREGNANCY TEST: CPT | Performed by: EMERGENCY MEDICINE

## 2018-08-05 PROCEDURE — 83880 ASSAY OF NATRIURETIC PEPTIDE: CPT

## 2018-08-05 PROCEDURE — 80053 COMPREHEN METABOLIC PANEL: CPT

## 2018-08-05 PROCEDURE — 81003 URINALYSIS AUTO W/O SCOPE: CPT

## 2018-08-05 PROCEDURE — 25000003 PHARM REV CODE 250: Performed by: EMERGENCY MEDICINE

## 2018-08-05 PROCEDURE — S0028 INJECTION, FAMOTIDINE, 20 MG: HCPCS | Performed by: EMERGENCY MEDICINE

## 2018-08-05 PROCEDURE — 85610 PROTHROMBIN TIME: CPT

## 2018-08-05 PROCEDURE — 96374 THER/PROPH/DIAG INJ IV PUSH: CPT

## 2018-08-05 PROCEDURE — 94640 AIRWAY INHALATION TREATMENT: CPT

## 2018-08-05 RX ORDER — ONDANSETRON 8 MG/1
8 TABLET, ORALLY DISINTEGRATING ORAL EVERY 6 HOURS PRN
Qty: 30 TABLET | Refills: 0 | Status: SHIPPED | OUTPATIENT
Start: 2018-08-05 | End: 2018-08-07

## 2018-08-05 RX ORDER — FAMOTIDINE 20 MG/1
20 TABLET, FILM COATED ORAL 2 TIMES DAILY
Qty: 30 TABLET | Refills: 0 | Status: SHIPPED | OUTPATIENT
Start: 2018-08-05 | End: 2019-03-06

## 2018-08-05 RX ORDER — PROMETHAZINE HYDROCHLORIDE 25 MG/1
25 SUPPOSITORY RECTAL 2 TIMES DAILY PRN
Qty: 10 SUPPOSITORY | Refills: 0 | Status: SHIPPED | OUTPATIENT
Start: 2018-08-05 | End: 2019-03-25

## 2018-08-05 RX ORDER — ASPIRIN 325 MG
325 TABLET ORAL
Status: COMPLETED | OUTPATIENT
Start: 2018-08-05 | End: 2018-08-05

## 2018-08-05 RX ORDER — FAMOTIDINE 10 MG/ML
20 INJECTION INTRAVENOUS
Status: COMPLETED | OUTPATIENT
Start: 2018-08-05 | End: 2018-08-05

## 2018-08-05 RX ORDER — PROMETHAZINE HYDROCHLORIDE 25 MG/1
25 TABLET ORAL
Status: COMPLETED | OUTPATIENT
Start: 2018-08-05 | End: 2018-08-05

## 2018-08-05 RX ORDER — ALBUTEROL SULFATE 90 UG/1
2 AEROSOL, METERED RESPIRATORY (INHALATION) EVERY 4 HOURS PRN
Qty: 1 INHALER | Refills: 0 | Status: SHIPPED | OUTPATIENT
Start: 2018-08-05 | End: 2019-03-06

## 2018-08-05 RX ORDER — FLUTICASONE PROPIONATE 50 MCG
1 SPRAY, SUSPENSION (ML) NASAL 2 TIMES DAILY
Qty: 15 G | Refills: 0 | Status: SHIPPED | OUTPATIENT
Start: 2018-08-05 | End: 2018-08-26

## 2018-08-05 RX ORDER — IPRATROPIUM BROMIDE AND ALBUTEROL SULFATE 2.5; .5 MG/3ML; MG/3ML
3 SOLUTION RESPIRATORY (INHALATION) ONCE
Status: COMPLETED | OUTPATIENT
Start: 2018-08-05 | End: 2018-08-05

## 2018-08-05 RX ORDER — ONDANSETRON 4 MG/1
8 TABLET, ORALLY DISINTEGRATING ORAL ONCE
Status: COMPLETED | OUTPATIENT
Start: 2018-08-05 | End: 2018-08-05

## 2018-08-05 RX ORDER — BENZONATATE 100 MG/1
100 CAPSULE ORAL 3 TIMES DAILY PRN
Qty: 30 CAPSULE | Refills: 0 | Status: SHIPPED | OUTPATIENT
Start: 2018-08-05 | End: 2018-08-15

## 2018-08-05 RX ADMIN — ONDANSETRON 8 MG: 4 TABLET, ORALLY DISINTEGRATING ORAL at 09:08

## 2018-08-05 RX ADMIN — IPRATROPIUM BROMIDE AND ALBUTEROL SULFATE 3 ML: .5; 2.5 SOLUTION RESPIRATORY (INHALATION) at 10:08

## 2018-08-05 RX ADMIN — ASPIRIN 325 MG ORAL TABLET 325 MG: 325 PILL ORAL at 10:08

## 2018-08-05 RX ADMIN — PROMETHAZINE HYDROCHLORIDE 25 MG: 25 TABLET ORAL at 12:08

## 2018-08-05 RX ADMIN — FAMOTIDINE 20 MG: 10 INJECTION INTRAVENOUS at 09:08

## 2018-08-05 NOTE — ED PROVIDER NOTES
Encounter Date: 2018       History     Chief Complaint   Patient presents with    Cough     pt reports cough x 2 days and has gotten worse. pt reports history of CHF and pt reports when she coughs her chest hurts.    Chest discomfort     began 2 days ago after the cough and has gotten worse over the past 2 days.    Vomiting     pt reports vomiting began yesterday and has had 4 episodes since.     33-year-old female chief complaint cough with chest pain for 2 days.  Patient states she has a nonproductive cough.  Patient states she coughs until she gags, but nothing really comes up.  Symptoms started 2 days ago are getting worse.  Patient feels like she has really bad cold.  Patient states she does have chest pain when she coughs.  It is an achy pain in the chest.  No chest pain at rest.  Symptoms come and go.          Review of patient's allergies indicates:  No Known Allergies  Past Medical History:   Diagnosis Date    CHF (congestive heart failure)     Hypertension     dx at 16y.o.     Past Surgical History:   Procedure Laterality Date    CARDIAC DEFIBRILLATOR PLACEMENT       SECTION      x 1    INDUCED       TUBAL LIGATION       Family History   Problem Relation Age of Onset    Hypertension Mother     Cancer Maternal Grandmother         breast x 2    Cancer Paternal Grandmother         breast    No Known Problems Sister     No Known Problems Brother     No Known Problems Son     No Known Problems Brother     Hypertension Other     Diabetes Other     Breast cancer Other     Cancer Paternal Aunt         breast    Cancer Paternal Uncle      Social History   Substance Use Topics    Smoking status: Never Smoker    Smokeless tobacco: Never Used    Alcohol use No      Comment: rarely     Review of Systems   Constitutional: Negative for fever.   HENT: Negative for sore throat.    Respiratory: Positive for cough and wheezing. Negative for shortness of breath.    Cardiovascular:  Negative for chest pain.   Gastrointestinal: Positive for nausea. Negative for abdominal pain.   Genitourinary: Negative for dysuria.   Musculoskeletal: Negative for back pain.   Skin: Negative for rash.   Neurological: Negative for weakness.   Hematological: Does not bruise/bleed easily.   All other systems reviewed and are negative.      Physical Exam     Initial Vitals [08/05/18 0946]   BP Pulse Resp Temp SpO2   134/88 80 19 98.3 °F (36.8 °C) 100 %      MAP       --         Physical Exam    Nursing note and vitals reviewed.  Constitutional: She appears well-developed and well-nourished.   HENT:   Head: Normocephalic and atraumatic.   Right Ear: Tympanic membrane and external ear normal.   Left Ear: Tympanic membrane and external ear normal.   Nose: Mucosal edema and rhinorrhea present.   Mouth/Throat: Uvula is midline and mucous membranes are normal. Posterior oropharyngeal erythema present.   Eyes: Conjunctivae and EOM are normal. Pupils are equal, round, and reactive to light. Right eye exhibits no discharge. Left eye exhibits no discharge.   Neck: Normal range of motion. Neck supple.   Cardiovascular: Normal rate, regular rhythm, normal heart sounds and intact distal pulses. Exam reveals no gallop and no friction rub.    No murmur heard.  Pulmonary/Chest: No respiratory distress. She has wheezes. She has no rhonchi. She has no rales. She exhibits no tenderness.   Abdominal: Soft. Bowel sounds are normal. She exhibits no distension and no mass. There is no tenderness. There is no rebound and no guarding.   Musculoskeletal: Normal range of motion. She exhibits no edema or tenderness.   Lymphadenopathy:     She has no cervical adenopathy.   Neurological: She is alert and oriented to person, place, and time. She has normal strength. No cranial nerve deficit or sensory deficit.   Skin: Skin is warm and dry. Capillary refill takes less than 2 seconds. No rash noted. No pallor.   Psychiatric: She has a normal mood  and affect.         ED Course   Procedures  Labs Reviewed   POCT URINALYSIS W/O SCOPE - Abnormal; Notable for the following:        Result Value    Glucose, UA Negative (*)     Bilirubin, UA Negative (*)     Ketones, UA Negative (*)     Blood, UA Trace-intact (*)     Protein, UA Negative (*)     Nitrite, UA Negative (*)     Leukocytes, UA Negative (*)     All other components within normal limits   POCT B-TYPE NATRIURETIC PEPTIDE (BNP) - Abnormal; Notable for the following:     POC B-Type Natriuretic Peptide 186 (*)     All other components within normal limits   TROPONIN ISTAT   POCT URINE PREGNANCY   POCT CBC   POCT URINALYSIS W/O SCOPE   POCT CMP   POCT PROTIME-INR   POCT TROPONIN   POCT LIVER PANEL   POCT LIVER PANEL   POCT CMP   ISTAT PROCEDURE   POCT B-TYPE NATRIURETIC PEPTIDE (BNP)     EKG Readings: (Independently Interpreted)   Initial Reading: No STEMI. Previous EKG: Compared with most recent EKG Previous EKG Date: When compared to prior EKG April 30, 2018 rate has decreased by 2 beats per minute today. Rhythm: Sinus Arrhythmia. Heart Rate: 74. Axis: Normal. Other Impression: Abnormal EKG, LVH by criteria, nonspecific T-wave abnormality.       Imaging Results          X-Ray Chest PA And Lateral (Final result)  Result time 08/05/18 10:11:05    Final result by Nicola Gillespie MD (08/05/18 10:11:05)                 Impression:      Cardiomegaly, similar to prior exam.      Electronically signed by: Nicola Gillespie MD  Date:    08/05/2018  Time:    10:11             Narrative:    EXAMINATION:  XR CHEST PA AND LATERAL    CLINICAL HISTORY:  Chest Pain;    TECHNIQUE:  PA and lateral views of the chest were performed.    COMPARISON:  CT and radiograph 04/30/2018    FINDINGS:  Pacing device in the left chest wall with leads in stable position.  The heart is enlarged, similar to prior exam.  Pulmonary vascularity within normal limits.    The lungs appear clear without confluent pulmonary parenchymal opacity. No  pleural fluid.    Osseous structures appear intact.                                                 Medical decision making   Chief complaint: cough with chest pain for 2 days  Differential diagnosis:  Treatment in the ED Physical Exam, Pepcid, Zofran, albuterol, aspirin, and Phenergan  Troponin is negative after greater than 24 hr of pain.  Patient reports feeling better after medication.    Discussed labs, and imaging results.  Patient with no further vomiting in the ED.  Fill and take prescriptions as directed.  Return to the ED if symptoms worsen or do not resolve.   Answered questions and discussed discharge plan.    Patient feels much better and is ready for discharge.  Follow up with PCP in 1 days.       Clinical Impression:   The primary encounter diagnosis was Cough. Diagnoses of Chest discomfort, Chest pain, and Acute bronchitis, unspecified organism were also pertinent to this visit.                             Suzie Fuentes DO  08/05/18 1820

## 2018-08-06 ENCOUNTER — OFFICE VISIT (OUTPATIENT)
Dept: FAMILY MEDICINE | Facility: CLINIC | Age: 33
End: 2018-08-06
Payer: COMMERCIAL

## 2018-08-06 VITALS
DIASTOLIC BLOOD PRESSURE: 80 MMHG | HEART RATE: 90 BPM | SYSTOLIC BLOOD PRESSURE: 120 MMHG | WEIGHT: 188.94 LBS | HEIGHT: 69 IN | BODY MASS INDEX: 27.98 KG/M2 | TEMPERATURE: 99 F | OXYGEN SATURATION: 98 %

## 2018-08-06 DIAGNOSIS — J06.9 VIRAL URI: Primary | ICD-10-CM

## 2018-08-06 PROCEDURE — 99999 PR PBB SHADOW E&M-EST. PATIENT-LVL III: CPT | Mod: PBBFAC,,, | Performed by: INTERNAL MEDICINE

## 2018-08-06 PROCEDURE — 3079F DIAST BP 80-89 MM HG: CPT | Mod: CPTII,S$GLB,, | Performed by: INTERNAL MEDICINE

## 2018-08-06 PROCEDURE — 99213 OFFICE O/P EST LOW 20 MIN: CPT | Mod: S$GLB,,, | Performed by: INTERNAL MEDICINE

## 2018-08-06 PROCEDURE — 3008F BODY MASS INDEX DOCD: CPT | Mod: CPTII,S$GLB,, | Performed by: INTERNAL MEDICINE

## 2018-08-06 PROCEDURE — 3074F SYST BP LT 130 MM HG: CPT | Mod: CPTII,S$GLB,, | Performed by: INTERNAL MEDICINE

## 2018-08-06 RX ORDER — PROMETHAZINE HYDROCHLORIDE AND DEXTROMETHORPHAN HYDROBROMIDE 6.25; 15 MG/5ML; MG/5ML
5 SYRUP ORAL EVERY 12 HOURS PRN
Qty: 60 ML | Refills: 0 | Status: SHIPPED | OUTPATIENT
Start: 2018-08-06 | End: 2018-08-16

## 2018-08-06 NOTE — PROGRESS NOTES
This note was created by combination of typed  and Dragon dictation.  Transcription errors may be present.  If there are any questions, please contact me.    Assessment / Plan:   Viral URI  -not volume overloaded on clinical exam. I favor bronchitis rather than CHF. Already rx'd benzonatate and flonase, does not like nasal spray.  Will send in rx for phenergan DM cough syrup, cautioned against sedation.  Out of work x 2 days.   -     promethazine-dextromethorphan (PROMETHAZINE-DM) 6.25-15 mg/5 mL Syrp; Take 5 mLs by mouth every 12 (twelve) hours as needed.  Dispense: 60 mL; Refill: 0          There are no discontinued medications.  Modified Medications    No medications on file     New Prescriptions    PROMETHAZINE-DEXTROMETHORPHAN (PROMETHAZINE-DM) 6.25-15 MG/5 ML SYRP    Take 5 mLs by mouth every 12 (twelve) hours as needed.         Follow Up: No Follow-up on file.      Subjective:     Chief Complaint   Patient presents with    Bronchitis    Follow-up       ROXANNE Hammonds is a 33 y.o. female, last appointment with this clinic was Visit date not found.    No LMP recorded.    Seen in ER yesterday c/o cough.  BNP elevated but better compared to historical readings.  BNP was better in April though.  CXR normal  Was given pepcid, zofran, albuterol and phenergan with improvement.     echo: 11-17  CONCLUSIONS     1 - Severely depressed left ventricular systolic function (EF 15-20%).     2 - Restrictive LV filling pattern, indicating markedly elevated LAP (grade 3 diastolic dysfunction).     3 - Concentric hypertrophy.     4 - Moderate left ventricular enlargement.     5 - Mild left atrial enlargement.      R?LHC:  B. Summary/Post-Operative Diagnosis       Normal coronary arteries.    Systolic dysfunction.    Mildly elevated left Filling Pressures.    Normal Pulmonary Hypertension.     Ejection Fraction: 20%  Global LV Function: severely depressed       Saturday - laryngitis.  Sunday - continued laryngitis and  nauseated vomited, clear for the most part. Did have one time she threw up and saw specks of blood.  No accompanying fever or chills.  But had felt lightheaded and sensation of tightness of the chest.  Like she was building up fluid in the chest similar to previous instances of CHF.  Coughing started late yesterday () and continued. No pedal edema.  Taking her lasix.     Today the voice has improved some.  Still with some chest tightness; rhinorrhea now which is new. Cough, nonproductive. early this morning was productive.     Has a scale at home.  Notes she averages 185 at home.  Last time she weighed herself was on Saturday. 185 at that time which is her norm.  Is aware of signs/sx of fluid overload and so far negative - no foot swelling no girth changes.  Sent home on albuterol inhaler - minimal improvement.     Sick contacts none.     Patient Care Team:  Veronika Rainey MD as PCP - General (Family Medicine)    Patient Active Problem List    Diagnosis Date Noted    Chest pain     Acute combined systolic and diastolic congestive heart failure 2017    RUQ abdominal pain 2016    Essential hypertension 2012    Mild dysplasia of cervix /needs colp 2012       PAST MEDICAL HISTORY:  Past Medical History:   Diagnosis Date    CHF (congestive heart failure)     Hypertension     dx at 16y.o.       PAST SURGICAL HISTORY:  Past Surgical History:   Procedure Laterality Date    CARDIAC DEFIBRILLATOR PLACEMENT       SECTION      x 1    INDUCED       TUBAL LIGATION         SOCIAL HISTORY:  Social History     Social History    Marital status:      Spouse name: N/A    Number of children: N/A    Years of education: N/A     Occupational History     [employer]     Social History Main Topics    Smoking status: Never Smoker    Smokeless tobacco: Never Used    Alcohol use No      Comment: rarely    Drug use: No    Sexual activity: Yes     Partners: Male     Birth  control/ protection: None     Other Topics Concern    Not on file     Social History Narrative    No narrative on file       ALLERGIES AND MEDICATIONS: updated and reviewed.  Review of patient's allergies indicates:  No Known Allergies  Current Outpatient Prescriptions   Medication Sig Dispense Refill    albuterol 90 mcg/actuation inhaler Inhale 2 puffs into the lungs every 4 (four) hours as needed for Wheezing or Shortness of Breath (And cough). Use with spacer.    Rescue  Dispense with 1 spacer 1 Inhaler 0    benzonatate (TESSALON) 100 MG capsule Take 1 capsule (100 mg total) by mouth 3 (three) times daily as needed for Cough. 30 capsule 0    carvedilol (COREG) 25 MG tablet Take 1 tablet (25 mg total) by mouth 2 (two) times daily. 180 tablet 3    famotidine (PEPCID) 20 MG tablet Take 1 tablet (20 mg total) by mouth 2 (two) times daily. 30 tablet 0    fluticasone (FLONASE) 50 mcg/actuation nasal spray 1 spray (50 mcg total) by Each Nare route 2 (two) times daily. for 21 days 15 g 0    furosemide (LASIX) 40 MG tablet Take 1 tablet (40 mg total) by mouth once daily. 30 tablet 11    lisinopril (PRINIVIL,ZESTRIL) 40 MG tablet Take 1 tablet (40 mg total) by mouth once daily. 30 tablet 0    ondansetron (ZOFRAN-ODT) 8 MG TbDL Take 1 tablet (8 mg total) by mouth every 6 (six) hours as needed (For nausea). 30 tablet 0    oxyCODONE-acetaminophen (PERCOCET) 5-325 mg per tablet Take 1 tablet by mouth every 4 (four) hours as needed. 30 tablet 0    promethazine (PHENERGAN) 25 MG suppository Place 1 suppository (25 mg total) rectally 2 (two) times daily as needed for Nausea. 10 suppository 0     No current facility-administered medications for this visit.        Review of Systems   Constitutional: Negative for chills and fever.   Respiratory: Positive for cough and shortness of breath. Negative for sputum production and wheezing.    Cardiovascular: Negative for chest pain and palpitations.   Skin: Negative for rash.  "      Objective:   Physical Exam   Vitals:    08/06/18 1317   BP: 120/80   BP Location: Right arm   Patient Position: Sitting   BP Method: Large (Manual)   Pulse: 90   Temp: 98.9 °F (37.2 °C)   TempSrc: Oral   SpO2: 98%   Weight: 85.7 kg (188 lb 15 oz)   Height: 5' 9" (1.753 m)    Body mass index is 27.9 kg/m².            Physical Exam   Constitutional: She is oriented to person, place, and time. She appears well-developed and well-nourished.   HENT:   TMs grey/clear bilaterally.  OP no erythema no exudates  Hoarse on exam.   Eyes: EOM are normal.   Neck: Neck supple.   Cardiovascular: Normal rate, regular rhythm and normal heart sounds.    No JVD   Pulmonary/Chest: Effort normal and breath sounds normal. She has no wheezes.   Musculoskeletal: She exhibits no edema.   Lymphadenopathy:     She has no cervical adenopathy.   Neurological: She is alert and oriented to person, place, and time.   Skin: Skin is warm and dry.   Psychiatric: She has a normal mood and affect. Her behavior is normal.     "

## 2018-08-06 NOTE — LETTER
August 6, 2018      [facility]  [address] [facility]  [place] [address]  Phone: [phone]  Fax: [phone]       Patient: Nasra Marks  YOB: 1985  Date of Visit: 8/6/18      To Whom It May Concern:    Nasra Marks  was at [facility] on 8/6/18. She may return to work/school on Thursday August 9 with no restrictions. If you have any questions or concerns, or if I can be of further assistance, please do not hesitate to contact me.      Sincerely,        Beny Morales MD

## 2018-11-02 ENCOUNTER — OFFICE VISIT (OUTPATIENT)
Dept: URGENT CARE | Facility: CLINIC | Age: 33
End: 2018-11-02
Payer: COMMERCIAL

## 2018-11-02 VITALS
RESPIRATION RATE: 16 BRPM | OXYGEN SATURATION: 99 % | HEART RATE: 79 BPM | DIASTOLIC BLOOD PRESSURE: 98 MMHG | BODY MASS INDEX: 27.85 KG/M2 | SYSTOLIC BLOOD PRESSURE: 144 MMHG | HEIGHT: 69 IN | WEIGHT: 188 LBS | TEMPERATURE: 98 F

## 2018-11-02 DIAGNOSIS — R51.9 ACUTE INTRACTABLE HEADACHE, UNSPECIFIED HEADACHE TYPE: ICD-10-CM

## 2018-11-02 DIAGNOSIS — R42 DIZZINESS: Primary | ICD-10-CM

## 2018-11-02 PROCEDURE — 3008F BODY MASS INDEX DOCD: CPT | Mod: CPTII,S$GLB,, | Performed by: NURSE PRACTITIONER

## 2018-11-02 PROCEDURE — 3080F DIAST BP >= 90 MM HG: CPT | Mod: CPTII,S$GLB,, | Performed by: NURSE PRACTITIONER

## 2018-11-02 PROCEDURE — 96372 THER/PROPH/DIAG INJ SC/IM: CPT | Mod: 59,S$GLB,, | Performed by: NURSE PRACTITIONER

## 2018-11-02 PROCEDURE — 3077F SYST BP >= 140 MM HG: CPT | Mod: CPTII,S$GLB,, | Performed by: NURSE PRACTITIONER

## 2018-11-02 PROCEDURE — 99214 OFFICE O/P EST MOD 30 MIN: CPT | Mod: 25,S$GLB,, | Performed by: NURSE PRACTITIONER

## 2018-11-02 RX ORDER — MECLIZINE HYDROCHLORIDE 25 MG/1
25 TABLET ORAL
Status: COMPLETED | OUTPATIENT
Start: 2018-11-02 | End: 2018-11-02

## 2018-11-02 RX ORDER — BETAMETHASONE SODIUM PHOSPHATE AND BETAMETHASONE ACETATE 3; 3 MG/ML; MG/ML
6 INJECTION, SUSPENSION INTRA-ARTICULAR; INTRALESIONAL; INTRAMUSCULAR; SOFT TISSUE
Status: COMPLETED | OUTPATIENT
Start: 2018-11-02 | End: 2018-11-02

## 2018-11-02 RX ORDER — MECLIZINE HYDROCHLORIDE 25 MG/1
25 TABLET ORAL 3 TIMES DAILY PRN
Qty: 30 TABLET | Refills: 0 | Status: SHIPPED | OUTPATIENT
Start: 2018-11-02 | End: 2019-03-25

## 2018-11-02 RX ORDER — KETOROLAC TROMETHAMINE 30 MG/ML
30 INJECTION, SOLUTION INTRAMUSCULAR; INTRAVENOUS
Status: COMPLETED | OUTPATIENT
Start: 2018-11-02 | End: 2018-11-02

## 2018-11-02 RX ADMIN — MECLIZINE HYDROCHLORIDE 25 MG: 25 TABLET ORAL at 01:11

## 2018-11-02 RX ADMIN — KETOROLAC TROMETHAMINE 30 MG: 30 INJECTION, SOLUTION INTRAMUSCULAR; INTRAVENOUS at 01:11

## 2018-11-02 RX ADMIN — BETAMETHASONE SODIUM PHOSPHATE AND BETAMETHASONE ACETATE 6 MG: 3; 3 INJECTION, SUSPENSION INTRA-ARTICULAR; INTRALESIONAL; INTRAMUSCULAR; SOFT TISSUE at 01:11

## 2018-11-02 NOTE — PATIENT INSTRUCTIONS
USE MECLIZINE AS NEEDED FOR DIZZINESS EVERY 8 HOURS- CAN MAKE YOU DROWSY    GO TO THE ER IF DIZZINESS WORSENS OR YOU PASS OUT    FOLLOW UP WITH PCP IF DIZZINESS PERSIST     Managing Dizziness (Vertigo) with Medicines    Although medicines can't cure your problem, they can help control symptoms. Your doctor may prescribe medicines for a few weeks and then taper them off. Always take your medicine as prescribed. Never share your medicine with others.  Contact your healthcare provider right away if you have side effects from your medicines.   How medicines can help  · Treat infection or inflammation. If you have an infection caused by bacteria, your doctor can prescribe antibiotics.  · Limit conflicting balance signals. These medicines are often in pill form.  · Ease nausea. Suppositories, pills, or shots can reduce vomiting.  · Reduce pressure in the canals. Diuretics can be used to treat Meniere's disease. These medicines help your body get rid of extra fluid.  · Ease other symptoms. Other medicines can help ease depression and anxiety caused by living with dizziness or fainting.  Date Last Reviewed: 11/1/2016  © 6144-4335 [employer]. [address] [place] [address]. All rights reserved. This information is not intended as a substitute for professional medical care. Always follow your healthcare professional's instructions.        Dizziness (Vertigo) and Balance Problems: Staying Safe     Replace burned-out lightbulbs to keep your home safe and well lit.   Falls or accidents can lead to pain, broken bones, and fear of future falls. Protect yourself and others by preparing for episodes. Simple steps can help you stay safe at home and wherever you go.  Lighting  Keep all areas well lit. This helps your eyes send the right signals to the brain. It also makes you less likely to trip and fall. If bright lights make symptoms worse, dim the lights or lie in a dark room until the dizziness  passes. Then turn the lights back to their normal level.  Tips:  · Keep a flashlight by the bed.  · Place nightlights in bathrooms and hallways.  · Replace burned-out bulbs, or have someone replace them for you.  Preventing falls  To reduce your risk of falling:  · Get out of bed or up from a chair slowly.  · Wear low-heeled shoes that fit properly and have slip-resistant soles.  · Remove throw rugs. Clear clutter from walkways.  · Use handrails on stairs. Have handrails installed or adjusted if needed.  · Install grab bars in the bathroom. Don't use towel racks for balance.  · Use a shower stool. Also put adhesive strips in the shower or on the tub floor.  Going out  With a little time and preparation, you can get around safely.  Tips:  · Bring a cane or walking aid if needed.  · Give yourself plenty of time in case you start to get dizzy.  · Ask your healthcare provider what type of exercise is safe for your condition.  · Be patient. If an activity such as walking through a crowded shop causes you stress, you may not be ready for it yet.  Driving  If you become dizzy or disoriented while driving, you could hurt yourself and others. That's why it's best to not drive until symptoms have gone away. In some cases, your license may be temporarily held until it's safe for you to drive again.  For safety:  · Ask a friend to drive for you.  · Take public transportation.  · Walk to stores and other places when you can.  Asking for help  Don't be afraid to ask for help running errands, cooking meals, and doing exercise. Whether it's a friend, loved one, neighbor, or stranger on the street, a little help can make a world of difference.   Date Last Reviewed: 11/1/2016  © 4989-9505 [employer]. [address] [place] [address], [place] [address]. All rights reserved. This information is not intended as a substitute for professional medical care. Always follow your healthcare professional's instructions.

## 2018-11-02 NOTE — LETTER
November 2, 2018      [facility] [address], [address]  [place] [address]  Phone: [phone]  Fax: [phone]       Patient: Nasra Marks   YOB: 1985  Date of Visit: 11/02/2018    To Whom It May Concern:    Venkata Marks  was at [facility] on 11/02/2018. She may return to work/school on 11/4/2018 with no restrictions. If you have any questions or concerns, or if I can be of further assistance, please do not hesitate to contact me.    Sincerely,    Alexis Arango, NP

## 2018-11-02 NOTE — PROGRESS NOTES
"Subjective:       Patient ID: Nasra Marks is a 33 y.o. female.    Vitals:  height is 5' 9" (1.753 m) and weight is 85.3 kg (188 lb). Her temperature is 97.7 °F (36.5 °C). Her blood pressure is 144/98 (abnormal) and her pulse is 79. Her respiration is 16 and oxygen saturation is 99%.     Chief Complaint: Dizziness    Pt reports dizziness that started 3 days ago and is getting progressively worse. Pt reports dizziness occurs randomly and is not associated with position changes or sudden movements. Pt reports she started having a headache and nausea today. Pt has htn and CHF and reports BP is usually 120s/80s. Pt denies history of migraines or vertigo. Pt denies being dizzy at this time but reports severe headache.       Dizziness:   Chronicity:  New  Onset:  In the past 7 days  Progression since onset:  Waxing and waning  Frequency:  Every few hours  Dizziness characteristics:  Sensation of movement, off-balance and lightheaded/impending faint   Associated symptoms: headaches and nausea.no fever, no vomiting and no chest pain.    Review of Systems   Constitution: Negative for chills and fever.   HENT: Negative for sore throat.    Eyes: Negative for blurred vision.   Cardiovascular: Negative for chest pain.   Respiratory: Negative for shortness of breath.    Skin: Negative for rash.   Musculoskeletal: Negative for back pain and joint pain.   Gastrointestinal: Positive for nausea. Negative for abdominal pain, diarrhea and vomiting.   Neurological: Positive for dizziness and headaches.   Psychiatric/Behavioral: The patient is not nervous/anxious.        Objective:      Physical Exam   Constitutional: She is oriented to person, place, and time. She appears well-developed and well-nourished. She is cooperative.  Non-toxic appearance. She does not appear ill. No distress.   HENT:   Head: Normocephalic and atraumatic.   Right Ear: Hearing, external ear and ear canal normal. A middle ear effusion is present.   Left " Ear: Hearing, external ear and ear canal normal. A middle ear effusion is present.   Nose: Nose normal. No mucosal edema, rhinorrhea or nasal deformity. No epistaxis. Right sinus exhibits no maxillary sinus tenderness and no frontal sinus tenderness. Left sinus exhibits no maxillary sinus tenderness and no frontal sinus tenderness.   Mouth/Throat: Uvula is midline, oropharynx is clear and moist and mucous membranes are normal. No trismus in the jaw. Normal dentition. No uvula swelling. No posterior oropharyngeal erythema.   Eyes: Conjunctivae and lids are normal. Right eye exhibits no discharge. Left eye exhibits no discharge. No scleral icterus. Right eye exhibits no nystagmus. Left eye exhibits no nystagmus.   Sclera clear bilat   Neck: Trachea normal, normal range of motion, full passive range of motion without pain and phonation normal. Neck supple.   Cardiovascular: Normal rate, regular rhythm, normal heart sounds, intact distal pulses and normal pulses. Exam reveals no decreased pulses.   No murmur heard.  Pulmonary/Chest: Effort normal and breath sounds normal. No stridor. No respiratory distress. She has no decreased breath sounds. She has no wheezes.   Abdominal: Soft. Normal appearance and bowel sounds are normal. She exhibits no distension, no pulsatile midline mass and no mass. There is no tenderness.   Musculoskeletal: Normal range of motion. She exhibits no edema or deformity.   Lymphadenopathy:     She has no cervical adenopathy.   Neurological: She is alert and oriented to person, place, and time. She exhibits normal muscle tone. Coordination normal.   Skin: Skin is warm, dry and intact. She is not diaphoretic. No pallor.   Psychiatric: She has a normal mood and affect. Her speech is normal and behavior is normal. Judgment and thought content normal. Cognition and memory are normal.   Nursing note and vitals reviewed.      EKG- NSR, rate 76, no ST changes, LVH present in previous EKG   Assessment:        1. Dizziness    2. Acute intractable headache, unspecified headache type        Plan:         Dizziness  -     IN OFFICE EKG 12-LEAD (to Muse)  -     betamethasone acetate-betamethasone sodium phosphate injection 6 mg  -     meclizine tablet 25 mg  -     meclizine (ANTIVERT) 25 mg tablet; Take 1 tablet (25 mg total) by mouth 3 (three) times daily as needed.  Dispense: 30 tablet; Refill: 0    Acute intractable headache, unspecified headache type  -     ketorolac injection 30 mg  -     betamethasone acetate-betamethasone sodium phosphate injection 6 mg    MDM:  Pt was treated today for vertigo and migraine for possible causes of headache. Pt was given er precautions and told to follow up with cardiology or PCP  Patient Instructions         USE MECLIZINE AS NEEDED FOR DIZZINESS EVERY 8 HOURS- CAN MAKE YOU DROWSY    GO TO THE ER IF DIZZINESS WORSENS OR YOU PASS OUT    FOLLOW UP WITH PCP IF DIZZINESS PERSIST     Managing Dizziness (Vertigo) with Medicines    Although medicines can't cure your problem, they can help control symptoms. Your doctor may prescribe medicines for a few weeks and then taper them off. Always take your medicine as prescribed. Never share your medicine with others.  Contact your healthcare provider right away if you have side effects from your medicines.   How medicines can help  · Treat infection or inflammation. If you have an infection caused by bacteria, your doctor can prescribe antibiotics.  · Limit conflicting balance signals. These medicines are often in pill form.  · Ease nausea. Suppositories, pills, or shots can reduce vomiting.  · Reduce pressure in the canals. Diuretics can be used to treat Meniere's disease. These medicines help your body get rid of extra fluid.  · Ease other symptoms. Other medicines can help ease depression and anxiety caused by living with dizziness or fainting.  Date Last Reviewed: 11/1/2016  © 7989-1372 [employer]. [address] [facility],  [place] [address]. All rights reserved. This information is not intended as a substitute for professional medical care. Always follow your healthcare professional's instructions.        Dizziness (Vertigo) and Balance Problems: Staying Safe     Replace burned-out lightbulbs to keep your home safe and well lit.   Falls or accidents can lead to pain, broken bones, and fear of future falls. Protect yourself and others by preparing for episodes. Simple steps can help you stay safe at home and wherever you go.  Lighting  Keep all areas well lit. This helps your eyes send the right signals to the brain. It also makes you less likely to trip and fall. If bright lights make symptoms worse, dim the lights or lie in a dark room until the dizziness passes. Then turn the lights back to their normal level.  Tips:  · Keep a flashlight by the bed.  · Place nightlights in bathrooms and hallways.  · Replace burned-out bulbs, or have someone replace them for you.  Preventing falls  To reduce your risk of falling:  · Get out of bed or up from a chair slowly.  · Wear low-heeled shoes that fit properly and have slip-resistant soles.  · Remove throw rugs. Clear clutter from walkways.  · Use handrails on stairs. Have handrails installed or adjusted if needed.  · Install grab bars in the bathroom. Don't use towel racks for balance.  · Use a shower stool. Also put adhesive strips in the shower or on the tub floor.  Going out  With a little time and preparation, you can get around safely.  Tips:  · Bring a cane or walking aid if needed.  · Give yourself plenty of time in case you start to get dizzy.  · Ask your healthcare provider what type of exercise is safe for your condition.  · Be patient. If an activity such as walking through a crowded shop causes you stress, you may not be ready for it yet.  Driving  If you become dizzy or disoriented while driving, you could hurt yourself and others. That's why it's best to not drive until symptoms  have gone away. In some cases, your license may be temporarily held until it's safe for you to drive again.  For safety:  · Ask a friend to drive for you.  · Take public transportation.  · Walk to stores and other places when you can.  Asking for help  Don't be afraid to ask for help running errands, cooking meals, and doing exercise. Whether it's a friend, loved one, neighbor, or stranger on the street, a little help can make a world of difference.   Date Last Reviewed: 11/1/2016  © 8203-6767 [employer]. [address] [place] [address], [place] [address]. All rights reserved. This information is not intended as a substitute for professional medical care. Always follow your healthcare professional's instructions.

## 2018-11-22 ENCOUNTER — HOSPITAL ENCOUNTER (EMERGENCY)
Facility: HOSPITAL | Age: 33
Discharge: HOME | End: 2018-11-23
Attending: EMERGENCY MEDICINE
Payer: COMMERCIAL

## 2018-11-22 DIAGNOSIS — J06.9 ACUTE URI: Primary | ICD-10-CM

## 2018-11-22 DIAGNOSIS — R07.9 CHEST PAIN: ICD-10-CM

## 2018-11-22 DIAGNOSIS — R05.9 COUGH: ICD-10-CM

## 2018-11-22 PROCEDURE — 93005 ELECTROCARDIOGRAM TRACING: CPT

## 2018-11-22 PROCEDURE — 93010 ELECTROCARDIOGRAM REPORT: CPT | Mod: ,,, | Performed by: INTERNAL MEDICINE

## 2018-11-22 PROCEDURE — 81025 URINE PREGNANCY TEST: CPT | Performed by: EMERGENCY MEDICINE

## 2018-11-22 PROCEDURE — 96374 THER/PROPH/DIAG INJ IV PUSH: CPT

## 2018-11-22 PROCEDURE — 93010 ELECTROCARDIOGRAM REPORT: CPT | Mod: S$GLB,,, | Performed by: INTERNAL MEDICINE

## 2018-11-22 PROCEDURE — 99285 EMERGENCY DEPT VISIT HI MDM: CPT | Mod: 25

## 2018-11-22 PROCEDURE — 96375 TX/PRO/DX INJ NEW DRUG ADDON: CPT

## 2018-11-23 ENCOUNTER — OFFICE VISIT (OUTPATIENT)
Dept: FAMILY MEDICINE | Facility: CLINIC | Age: 33
End: 2018-11-23
Payer: COMMERCIAL

## 2018-11-23 VITALS
DIASTOLIC BLOOD PRESSURE: 88 MMHG | SYSTOLIC BLOOD PRESSURE: 131 MMHG | DIASTOLIC BLOOD PRESSURE: 78 MMHG | BODY MASS INDEX: 26.2 KG/M2 | TEMPERATURE: 98 F | HEIGHT: 70 IN | HEIGHT: 70 IN | BODY MASS INDEX: 25.6 KG/M2 | HEART RATE: 90 BPM | TEMPERATURE: 99 F | SYSTOLIC BLOOD PRESSURE: 146 MMHG | WEIGHT: 183 LBS | OXYGEN SATURATION: 97 % | OXYGEN SATURATION: 100 % | WEIGHT: 178.81 LBS | RESPIRATION RATE: 19 BRPM | HEART RATE: 81 BPM

## 2018-11-23 DIAGNOSIS — K29.00 ACUTE GASTRITIS WITHOUT HEMORRHAGE, UNSPECIFIED GASTRITIS TYPE: Primary | ICD-10-CM

## 2018-11-23 LAB
ALBUMIN SERPL-MCNC: 3.2 G/DL (ref 3.3–5.5)
ALP SERPL-CCNC: 68 U/L (ref 42–141)
B-HCG UR QL: NEGATIVE
BILIRUB SERPL-MCNC: 0.4 MG/DL (ref 0.2–1.6)
BUN SERPL-MCNC: 10 MG/DL (ref 7–22)
CALCIUM SERPL-MCNC: 9.4 MG/DL (ref 8–10.3)
CHLORIDE SERPL-SCNC: 108 MMOL/L (ref 98–108)
CREAT SERPL-MCNC: 0.7 MG/DL (ref 0.6–1.2)
CTP QC/QA: YES
GLUCOSE SERPL-MCNC: 92 MG/DL (ref 73–118)
POC ALT (SGPT): 20 U/L (ref 10–47)
POC AST (SGOT): 22 U/L (ref 11–38)
POC B-TYPE NATRIURETIC PEPTIDE: 175 PG/ML (ref 0–100)
POC CARDIAC TROPONIN I: 0.01 NG/ML
POC D-DI: 502 NG/ML (ref 0–450)
POC TCO2: 28 MMOL/L (ref 18–33)
POTASSIUM BLD-SCNC: 3.6 MMOL/L (ref 3.6–5.1)
PROTEIN, POC: 7.2 G/DL (ref 6.4–8.1)
SAMPLE: NORMAL
SODIUM BLD-SCNC: 146 MMOL/L (ref 128–145)

## 2018-11-23 PROCEDURE — 63600175 PHARM REV CODE 636 W HCPCS: Performed by: EMERGENCY MEDICINE

## 2018-11-23 PROCEDURE — 83880 ASSAY OF NATRIURETIC PEPTIDE: CPT

## 2018-11-23 PROCEDURE — 85025 COMPLETE CBC W/AUTO DIFF WBC: CPT

## 2018-11-23 PROCEDURE — 99999 PR PBB SHADOW E&M-EST. PATIENT-LVL III: CPT | Mod: PBBFAC,,, | Performed by: FAMILY MEDICINE

## 2018-11-23 PROCEDURE — 3077F SYST BP >= 140 MM HG: CPT | Mod: CPTII,S$GLB,, | Performed by: FAMILY MEDICINE

## 2018-11-23 PROCEDURE — 3008F BODY MASS INDEX DOCD: CPT | Mod: CPTII,S$GLB,, | Performed by: FAMILY MEDICINE

## 2018-11-23 PROCEDURE — 3078F DIAST BP <80 MM HG: CPT | Mod: CPTII,S$GLB,, | Performed by: FAMILY MEDICINE

## 2018-11-23 PROCEDURE — 85379 FIBRIN DEGRADATION QUANT: CPT

## 2018-11-23 PROCEDURE — 99214 OFFICE O/P EST MOD 30 MIN: CPT | Mod: S$GLB,,, | Performed by: FAMILY MEDICINE

## 2018-11-23 PROCEDURE — 80053 COMPREHEN METABOLIC PANEL: CPT

## 2018-11-23 PROCEDURE — 25500020 PHARM REV CODE 255: Performed by: EMERGENCY MEDICINE

## 2018-11-23 PROCEDURE — 25000003 PHARM REV CODE 250: Performed by: EMERGENCY MEDICINE

## 2018-11-23 PROCEDURE — 84484 ASSAY OF TROPONIN QUANT: CPT

## 2018-11-23 RX ORDER — AMOXICILLIN 500 MG/1
CAPSULE ORAL
COMMUNITY
Start: 2018-09-18 | End: 2019-03-06

## 2018-11-23 RX ORDER — PHENOL/SODIUM PHENOLATE
1 AEROSOL, SPRAY (ML) MUCOUS MEMBRANE DAILY
Qty: 30 EACH | Refills: 0 | Status: SHIPPED | OUTPATIENT
Start: 2018-11-23 | End: 2019-03-25

## 2018-11-23 RX ORDER — ASPIRIN 325 MG
325 TABLET ORAL
Status: COMPLETED | OUTPATIENT
Start: 2018-11-23 | End: 2018-11-23

## 2018-11-23 RX ORDER — DIPHENHYDRAMINE HYDROCHLORIDE 50 MG/ML
50 INJECTION INTRAMUSCULAR; INTRAVENOUS
Status: COMPLETED | OUTPATIENT
Start: 2018-11-23 | End: 2018-11-23

## 2018-11-23 RX ORDER — BENZONATATE 100 MG/1
100 CAPSULE ORAL 3 TIMES DAILY PRN
Qty: 20 CAPSULE | Refills: 0 | Status: SHIPPED | OUTPATIENT
Start: 2018-11-23 | End: 2018-12-03

## 2018-11-23 RX ORDER — METHYLPREDNISOLONE SOD SUCC 125 MG
125 VIAL (EA) INJECTION
Status: COMPLETED | OUTPATIENT
Start: 2018-11-23 | End: 2018-11-23

## 2018-11-23 RX ORDER — LORATADINE 10 MG/1
10 TABLET ORAL DAILY
Qty: 60 TABLET | Refills: 0 | COMMUNITY
Start: 2018-11-23 | End: 2019-03-25

## 2018-11-23 RX ORDER — FLUTICASONE PROPIONATE 50 MCG
2 SPRAY, SUSPENSION (ML) NASAL DAILY
Qty: 16 G | Refills: 0 | Status: SHIPPED | OUTPATIENT
Start: 2018-11-23 | End: 2019-03-06

## 2018-11-23 RX ORDER — MONTELUKAST SODIUM 10 MG/1
10 TABLET ORAL NIGHTLY
Qty: 30 TABLET | Refills: 0 | Status: SHIPPED | OUTPATIENT
Start: 2018-11-23 | End: 2018-12-23

## 2018-11-23 RX ORDER — HYDROCODONE POLISTIREX AND CHLORPHENIRAMINE POLISTIREX 10; 8 MG/5ML; MG/5ML
5 SUSPENSION, EXTENDED RELEASE ORAL NIGHTLY PRN
Qty: 60 ML | Refills: 0 | Status: SHIPPED | OUTPATIENT
Start: 2018-11-23 | End: 2019-03-25

## 2018-11-23 RX ADMIN — METHYLPREDNISOLONE SODIUM SUCCINATE 125 MG: 125 INJECTION, POWDER, FOR SOLUTION INTRAMUSCULAR; INTRAVENOUS at 01:11

## 2018-11-23 RX ADMIN — DIPHENHYDRAMINE HYDROCHLORIDE 50 MG: 50 INJECTION, SOLUTION INTRAMUSCULAR; INTRAVENOUS at 01:11

## 2018-11-23 RX ADMIN — IOHEXOL 20 ML: 350 INJECTION, SOLUTION INTRAVENOUS at 01:11

## 2018-11-23 RX ADMIN — ASPIRIN 325 MG ORAL TABLET 325 MG: 325 PILL ORAL at 12:11

## 2018-11-23 NOTE — ED NOTES
Called to CT room. IV infiltrated. IV removed per DIGNA Bernard  and warm compress applied to right AC per radiology tech. Pt taken back to room. Resp even and non labored. NAD noted.

## 2018-11-23 NOTE — ED PROVIDER NOTES
"Encounter Date: 2018    SCRIBE #1 NOTE: I, Dary Omer, am scribing for, and in the presence of,  Dr. Kolb. I have scribed the following portions of the note - Other sections scribed: HPI, ROS, PE.       History     Chief Complaint   Patient presents with    chest pain with cough     pt c/o cough "over one week" with left chest wall pain starting tonight, intermitt and worse with cough, reports pain 8 of 10. denies other complaint     33 y.o female with HTN and CHF presents with acute intermittent chest pain that started yesterday and has worsened today. She says she gets the chest pain when she coughs. She has been having the non productive cough for a week. She also reports nausea, intermittent SOB with exertion and HA since last night. She also notes having this type of chest pain before and she had went to the ER and was diagnosed with CHF. She takes lasix and Coreg. She denies a hx of asthma.       The history is provided by the patient.     Review of patient's allergies indicates:  No Known Allergies  Past Medical History:   Diagnosis Date    CHF (congestive heart failure)     Hypertension     dx at 16y.o.    Pacemaker      Past Surgical History:   Procedure Laterality Date    CARDIAC DEFIBRILLATOR PLACEMENT       SECTION      x 1    DILATION AND CURETTAGE, UTERUS N/A 2012    [facility] at [facility]    ICD SC new N/A 2017    Performed by Jose De Jesus Longo MD at [facility] CATH LAB    INDUCED       LIGATION, FALLOPIAN TUBE, LAPAROSCOPIC N/A 2013    [facility]fred Jules [facility] at [facility] OR    TUBAL LIGATION       Family History   Problem Relation Age of Onset    Hypertension Mother     Cancer Maternal Grandmother         breast x 2    Cancer Paternal Grandmother         breast    No Known Problems Sister     No Known Problems Brother     No Known Problems Son     No Known Problems Brother     Hypertension Other     Diabetes " Other     Breast cancer Other     Cancer Paternal Aunt         breast    Cancer Paternal Uncle      Social History     Tobacco Use    Smoking status: Never Smoker    Smokeless tobacco: Never Used   Substance Use Topics    Alcohol use: No     Comment: rarely    Drug use: No     Review of Systems   Constitutional: Negative for chills and fever.   Respiratory: Positive for cough and shortness of breath.    Cardiovascular: Positive for chest pain. Negative for palpitations and leg swelling.   Gastrointestinal: Negative for nausea and vomiting.   Neurological: Positive for headaches. Negative for dizziness, syncope and weakness.   All other systems reviewed and are negative.      Physical Exam     Initial Vitals [11/22/18 2328]   BP Pulse Resp Temp SpO2   (!) 153/95 92 16 98.7 °F (37.1 °C) 100 %      MAP       --         Physical Exam    Nursing note and vitals reviewed.  Constitutional: She appears well-developed and well-nourished. She is not diaphoretic. No distress.   HENT:   Head: Normocephalic and atraumatic.   Eyes: EOM are normal.   Cardiovascular: Normal rate, regular rhythm and normal heart sounds. Exam reveals no gallop and no friction rub.    No murmur heard.  Pulmonary/Chest: Breath sounds normal. No respiratory distress. She has no wheezes. She has no rhonchi. She has no rales.   Musculoskeletal: Normal range of motion.   Neurological: She is alert and oriented to person, place, and time. No cranial nerve deficit or sensory deficit.   Skin: Skin is warm and dry. Capillary refill takes less than 2 seconds.   Psychiatric: She has a normal mood and affect. Her behavior is normal.         ED Course   Procedures  Labs Reviewed   POCT D DIMER - Abnormal; Notable for the following components:       Result Value    POC D- (*)     All other components within normal limits   POCT CMP - Abnormal; Notable for the following components:    Albumin, POC 3.2 (*)     POC Sodium 146 (*)     All other components  within normal limits   POCT B-TYPE NATRIURETIC PEPTIDE (BNP) - Abnormal; Notable for the following components:    POC B-Type Natriuretic Peptide 175 (*)     All other components within normal limits   TROPONIN ISTAT   POCT URINE PREGNANCY   POCT CBC   POCT CMP   POCT B-TYPE NATRIURETIC PEPTIDE (BNP)   POCT TROPONIN   POCT D DIMER          Imaging Results          CTA Chest Non-Coronary (PE Study) (Final result)  Result time 11/23/18 02:09:29    Final result by Kasandra Erazo MD (11/23/18 02:09:29)                 Impression:      No acute intrathoracic abnormalities identified.    Examination nondiagnostic for evaluation of potential PE, as above.      Electronically signed by: Kasandra Erazo MD  Date:    11/23/2018  Time:    02:09             Narrative:    EXAMINATION:  CTA CHEST NON CORONARY    CLINICAL HISTORY:  Chest pain, acute, PE suspected, intermed prob, positive D-dimer;    TECHNIQUE:  CT of the chest was performed which is essentially noncontrast.  Patient was administered approximately 40 cc of IV contrast which reportedly infiltrated into the right upper extremity.  CT technologist reports ED physician and nurse were made aware.    COMPARISON:  CTA chest from 04/30/2018.    FINDINGS:  Structures at the base of the neck are unremarkable.  Left-sided pacer device is visualized.  Aorta is non-aneurysmal.  The heart is mildly enlarged without pericardial effusion.  There is no evidence of mediastinal, axillary, or hilar lymph node enlargement.  The esophagus maintains a normal course and caliber.    The trachea and bronchi are patent.  The lungs are symmetrically expanded.  Lungs are essentially clear with no evidence of mass, consolidation, or effusion.    The visualized abdominal structures are unremarkable.  Osseous structures and extrathoracic soft tissues are unremarkable.                               X-Ray Chest PA And Lateral (Final result)  Result time 11/23/18 00:54:19    Final result by John  AUSTIN Ruff MD (11/23/18 00:54:19)                 Impression:      Stable cardiomegaly with AICD.    No significant change from prior study.      Electronically signed by: John Ruff MD  Date:    11/23/2018  Time:    00:54             Narrative:    EXAMINATION:  XR CHEST PA AND LATERAL    CLINICAL HISTORY:  Cough    TECHNIQUE:  PA and lateral views the chest were performed.    COMPARISON:  August 5, 2018.    FINDINGS:  AICD appears unchanged.    Stable cardiomegaly.    Heart and lungs  appear unchanged when allowing for differences in technique and positioning.                                 Medical Decision Making:   Differential Diagnosis:   Decompensated HF, ACS, pneumonia, PE, pleurisy, dysrhythmia, others.   Clinical Tests:   Lab Tests: Ordered and Reviewed       <> Summary of Lab: White count 8.3 H&H 12.5 and 35 platelets 306 MCV 85.8 RDW 12.9  Radiological Study: Ordered and Reviewed  Medical Tests: Ordered and Reviewed  ED Management:  CTA non-diagnostic for PE due to IV infiltration. Minimal discomfort.   Patient advised to follow up with PCP for re-evaluation and may repeat CTA if symptoms persist.         Results for MAR NATASHA ROCIO (MRN [id number]) as of 11/23/2018 01:03   Ref. Range 1/30/2016 01:15 4/26/2017 10:36 6/20/2017 11:32 4/30/2018 12:20   BNP Latest Ref Range: 0 - 99 pg/mL 114 (H) 247 (H) 245 (H) 137 (H)          Scribe Attestation:   Scribe #1: I performed the above scribed service and the documentation accurately describes the services I performed. I attest to the accuracy of the note.      Labs Reviewed  Admission on 11/22/2018, Discharged on 11/23/2018   Component Date Value Ref Range Status    POC Preg Test, Ur 11/23/2018 Negative  Negative Final     Acceptable 11/23/2018 Yes   Final    POC Cardiac Troponin I 11/23/2018 0.01  <0.09 ng/mL Final    Sample 11/23/2018 UNK   Final    POC D-DI 11/23/2018 502* 0.0 - 450.0 ng/mL Final    Albumin, POC 11/23/2018 3.2* 3.3  - 5.5 g/dL Final    Alkaline Phosphatase, POC 11/23/2018 68  42 - 141 U/L Final    ALT (SGPT), POC 11/23/2018 20  10 - 47 U/L Final    AST (SGOT), POC 11/23/2018 22  11 - 38 U/L Final    POC BUN 11/23/2018 10  7 - 22 mg/dL Final    Calcium, POC 11/23/2018 9.4  8.0 - 10.3 mg/dL Final    POC Chloride 11/23/2018 108  98 - 108 mmol/L Final    POC Creatinine 11/23/2018 0.7  0.6 - 1.2 mg/dL Final    POC Glucose 11/23/2018 92  73 - 118 mg/dL Final    POC Potassium 11/23/2018 3.6  3.6 - 5.1 mmol/L Final    POC Sodium 11/23/2018 146* 128 - 145 mmol/L Final    Bilirubin 11/23/2018 0.4  0.2 - 1.6 mg/dL Final    POC TCO2 11/23/2018 28  18 - 33 mmol/L Final    Protein 11/23/2018 7.2  6.4 - 8.1 g/dL Final    POC B-Type Natriuretic Peptide 11/23/2018 175* 0.0 - 100.0 pg/mL Final        Imaging Reviewed    Imaging Results          CTA Chest Non-Coronary (PE Study) (Final result)  Result time 11/23/18 02:09:29    Final result by Kasandra Erazo MD (11/23/18 02:09:29)                 Impression:      No acute intrathoracic abnormalities identified.    Examination nondiagnostic for evaluation of potential PE, as above.      Electronically signed by: Kasandra Erazo MD  Date:    11/23/2018  Time:    02:09             Narrative:    EXAMINATION:  CTA CHEST NON CORONARY    CLINICAL HISTORY:  Chest pain, acute, PE suspected, intermed prob, positive D-dimer;    TECHNIQUE:  CT of the chest was performed which is essentially noncontrast.  Patient was administered approximately 40 cc of IV contrast which reportedly infiltrated into the right upper extremity.  CT technologist reports ED physician and nurse were made aware.    COMPARISON:  CTA chest from 04/30/2018.    FINDINGS:  Structures at the base of the neck are unremarkable.  Left-sided pacer device is visualized.  Aorta is non-aneurysmal.  The heart is mildly enlarged without pericardial effusion.  There is no evidence of mediastinal, axillary, or hilar lymph node  enlargement.  The esophagus maintains a normal course and caliber.    The trachea and bronchi are patent.  The lungs are symmetrically expanded.  Lungs are essentially clear with no evidence of mass, consolidation, or effusion.    The visualized abdominal structures are unremarkable.  Osseous structures and extrathoracic soft tissues are unremarkable.                               X-Ray Chest PA And Lateral (Final result)  Result time 11/23/18 00:54:19    Final result by John Ruff MD (11/23/18 00:54:19)                 Impression:      Stable cardiomegaly with AICD.    No significant change from prior study.      Electronically signed by: John Ruff MD  Date:    11/23/2018  Time:    00:54             Narrative:    EXAMINATION:  XR CHEST PA AND LATERAL    CLINICAL HISTORY:  Cough    TECHNIQUE:  PA and lateral views the chest were performed.    COMPARISON:  August 5, 2018.    FINDINGS:  AICD appears unchanged.    Stable cardiomegaly.    Heart and lungs  appear unchanged when allowing for differences in technique and positioning.                                Medications given in ED    Medications   aspirin tablet 325 mg (325 mg Oral Given 11/23/18 0022)   omnipaque 350 iohexol 75 mL (20 mLs Intravenous Given 11/23/18 0140)   methylPREDNISolone sodium succinate injection 125 mg (125 mg Intravenous Given 11/23/18 0129)   diphenhydrAMINE injection 50 mg (50 mg Intravenous Given 11/23/18 0130)       This document was produced by a scribe under my direction and in my presence. I agree with the content of the note and have made any necessary edits.     Moira Kolb MD         Note was created using voice recognition software. Note may have occasional typographical errors that may not have been identified and edited despite good addi initial review prior to signing.               Discharge Medications        Medication List      START taking these medications    fluticasone 50 mcg/actuation nasal  spray  Commonly known as:  FLONASE  2 sprays (100 mcg total) by Each Nare route once daily.     hydrocodone-chlorpheniramine 10-8 mg/5 mL suspension  Commonly known as:  TUSSIONEX  Take 5 mLs by mouth nightly as needed for Cough or Congestion.     loratadine 10 mg tablet  Commonly known as:  CLARITIN  Take 1 tablet (10 mg total) by mouth once daily.     montelukast 10 mg tablet  Commonly known as:  SINGULAIR  Take 1 tablet (10 mg total) by mouth every evening.        ASK your doctor about these medications    albuterol 90 mcg/actuation inhaler  Commonly known as:  PROVENTIL/VENTOLIN HFA  Inhale 2 puffs into the lungs every 4 (four) hours as needed for Wheezing or Shortness of Breath (And cough). Use with spacer.    Rescue  Dispense with 1 spacer     benzonatate 100 MG capsule  Commonly known as:  TESSALON  Take 1 capsule (100 mg total) by mouth 3 (three) times daily as needed for Cough.  Ask about: Should I take this medication?     carvedilol 25 MG tablet  Commonly known as:  COREG  Take 1 tablet (25 mg total) by mouth 2 (two) times daily.     famotidine 20 MG tablet  Commonly known as:  PEPCID  Take 1 tablet (20 mg total) by mouth 2 (two) times daily.     furosemide 40 MG tablet  Commonly known as:  LASIX  Take 1 tablet (40 mg total) by mouth once daily.     meclizine 25 mg tablet  Commonly known as:  ANTIVERT  Take 1 tablet (25 mg total) by mouth 3 (three) times daily as needed.     oxyCODONE-acetaminophen 5-325 mg per tablet  Commonly known as:  PERCOCET  Take 1 tablet by mouth every 4 (four) hours as needed.     promethazine 25 MG suppository  Commonly known as:  PHENERGAN  Place 1 suppository (25 mg total) rectally 2 (two) times daily as needed for Nausea.           Where to Get Your Medications      These medications were sent to [id number] - [place]ORGAN - [address] AT [facility]  [address] [address]    Hours:  24-hours Phone:  [phone]    · benzonatate 100 MG capsule  · fluticasone 50 mcg/actuation nasal spray  · montelukast 10 mg tablet     You can get these medications from any pharmacy    Bring a paper prescription for each of these medications  · hydrocodone-chlorpheniramine 10-8 mg/5 mL suspension  You don't need a prescription for these medications  · loratadine 10 mg tablet               Patient discharged to home in stable condition with instructions to:   1. Please take all meds as prescribed.  2. Follow-up with your primary care doctor   3. Return precautions discussed and patient and/or family/caretaker understands to return to the emergency room for any concerns including worsening of your current symptoms, fever, chills, night sweats, worsening pain, chest pain, shortness of breath, nausea, vomiting, diarrhea, bleeding, headache, difficulty talking, visual disturbances, weakness, numbness or any other acute concerns    Clinical Impression:     1. Acute URI    2. Chest pain    3. Cough                                 Moira Kolb MD  12/09/18 [id number]

## 2018-11-23 NOTE — ED NOTES
Pt aware of discharge. Pt waiting on her ride. Resp even and non labored. NAD noted. Pt denies pain at present time.

## 2018-11-23 NOTE — ED NOTES
Pt arrived to ED with c/o Left Chest Wall pain since 2000 tonight. Pt reports it worsened when trying to lie down. Reports she has been coughing for over a week and chest pain is worse with coughing. Pt reports she had some loose stools last night. Resp even and non labored. NaD noted. Dry cough noted. EDP to evaluate. Will continue to monitor.

## 2018-11-23 NOTE — ED NOTES
Pt reports she is not having any chest pain at present time. Resp even and non labored. NaD noted. Pt pre-medicated prior to CTA due to patient reporting some itching the last time she had contrast.

## 2018-11-23 NOTE — PROGRESS NOTES
[facility]  Progress Note    SUBJECTIVE:     Chief Complaint   Patient presents with    blood clot       HPI   Nasra Marks  is a 33 y.o. female here for c/o chest/epigastric pain for the past week. She went to the ER yesterday for chest pain and CTA was ordered to rule out PE (due to elevated d dimer). Contrast was unable to be given, and CT chest was unremarkable. Denies any current difficulty breathing and does not appear to be in any distress.     Review of patient's allergies indicates:  No Known Allergies    Past Medical History:   Diagnosis Date    CHF (congestive heart failure)     Hypertension     dx at 16y.o.    Pacemaker      Past Surgical History:   Procedure Laterality Date    CARDIAC DEFIBRILLATOR PLACEMENT       SECTION      x 1    DILATION AND CURETTAGE, UTERUS N/A 2012    Performed by [facility] at [facility] OR    ICD SC new N/A 2017    Performed by Jose De Jesus Longo MD at [facility] CATH LAB    INDUCED       LIGATION, FALLOPIAN TUBE, LAPAROSCOPIC N/A 2013    Performed by Wilfred Jules MD at [facility] OR    TUBAL LIGATION       Family History   Problem Relation Age of Onset    Hypertension Mother     Cancer Maternal Grandmother         breast x 2    Cancer Paternal Grandmother         breast    No Known Problems Sister     No Known Problems Brother     No Known Problems Son     No Known Problems Brother     Hypertension Other     Diabetes Other     Breast cancer Other     Cancer Paternal Aunt         breast    Cancer Paternal Uncle      Social History     Tobacco Use    Smoking status: Never Smoker    Smokeless tobacco: Never Used   Substance Use Topics    Alcohol use: No     Comment: rarely    Drug use: No        Review of Systems   Constitutional: Negative for chills, fever, malaise/fatigue and weight loss.   Respiratory: Negative for cough and shortness of breath.    Cardiovascular: Negative for chest pain and  palpitations.   Gastrointestinal: Positive for abdominal pain and heartburn. Negative for blood in stool, constipation, diarrhea, melena, nausea and vomiting.   Neurological: Negative for weakness.     OBJECTIVE:     Vitals:    11/23/18 1355   BP: (!) 146/78   Pulse: 90   Temp: 98.2 °F (36.8 °C)     Body mass index is 25.65 kg/m².    Physical Exam   Constitutional: She is oriented to person, place, and time. She appears distressed (mild).   HENT:   Head: Normocephalic and atraumatic.   Cardiovascular: Normal rate.   Pulmonary/Chest: Effort normal and breath sounds normal. No respiratory distress. She has no wheezes. She has no rales.   Abdominal: Soft. Bowel sounds are normal. She exhibits no distension. There is tenderness (to palpation of epigastric area). There is no rebound.   Neurological: She is alert and oriented to person, place, and time.   Skin: She is not diaphoretic.       Old records were reviewed. Labs and/or images were independently reviewed.    ASSESSMENT     1. Acute gastritis without hemorrhage, unspecified gastritis type        PLAN:     Acute gastritis without hemorrhage, unspecified gastritis type  -     (pyxis) gi cocktail (mylanta 30 mL, lidocaine 2 % viscous 10 mL, dicyclomine 10 mL) 50 mL  -     Start omeprazole 20 mg TbEC; Take 1 tablet by mouth once daily.  Dispense: 30 each; Refill: 0  -     Suspect GI related, vs actual PE which she went to the ER for. Couldn't do CT with contrast. Pulse ox > 97% today so low suspicioin especailly with clincal symptomatic improvement after GI cocktail. Discussed that if chest pain/SOB reoccurs or persists, recommend ER visit.      RTC GORDO Pablo MD  11/23/2018 2:48 PM

## 2018-11-28 RX ORDER — LISINOPRIL 40 MG/1
TABLET ORAL
Qty: 15 TABLET | Refills: 0 | Status: SHIPPED | OUTPATIENT
Start: 2018-11-28 | End: 2019-02-01 | Stop reason: SDUPTHER

## 2019-01-22 ENCOUNTER — OFFICE VISIT (OUTPATIENT)
Dept: CARDIOLOGY | Facility: CLINIC | Age: 34
End: 2019-01-22
Payer: COMMERCIAL

## 2019-01-22 VITALS
WEIGHT: 182.75 LBS | BODY MASS INDEX: 26.22 KG/M2 | RESPIRATION RATE: 20 BRPM | DIASTOLIC BLOOD PRESSURE: 74 MMHG | SYSTOLIC BLOOD PRESSURE: 122 MMHG | HEART RATE: 82 BPM | OXYGEN SATURATION: 99 %

## 2019-01-22 DIAGNOSIS — Z09 FOLLOW UP: Primary | ICD-10-CM

## 2019-01-22 DIAGNOSIS — E66.01 MORBID OBESITY DUE TO EXCESS CALORIES: ICD-10-CM

## 2019-01-22 DIAGNOSIS — Z95.810 AICD (AUTOMATIC CARDIOVERTER/DEFIBRILLATOR) PRESENT: ICD-10-CM

## 2019-01-22 DIAGNOSIS — I50.41 ACUTE COMBINED SYSTOLIC AND DIASTOLIC CONGESTIVE HEART FAILURE: ICD-10-CM

## 2019-01-22 DIAGNOSIS — I10 ESSENTIAL HYPERTENSION: ICD-10-CM

## 2019-01-22 PROCEDURE — 3008F PR BODY MASS INDEX (BMI) DOCUMENTED: ICD-10-PCS | Mod: CPTII,S$GLB,, | Performed by: INTERNAL MEDICINE

## 2019-01-22 PROCEDURE — 3078F DIAST BP <80 MM HG: CPT | Mod: CPTII,S$GLB,, | Performed by: INTERNAL MEDICINE

## 2019-01-22 PROCEDURE — 3008F BODY MASS INDEX DOCD: CPT | Mod: CPTII,S$GLB,, | Performed by: INTERNAL MEDICINE

## 2019-01-22 PROCEDURE — 99999 PR PBB SHADOW E&M-EST. PATIENT-LVL III: ICD-10-PCS | Mod: PBBFAC,,, | Performed by: INTERNAL MEDICINE

## 2019-01-22 PROCEDURE — 99999 PR PBB SHADOW E&M-EST. PATIENT-LVL III: CPT | Mod: PBBFAC,,, | Performed by: INTERNAL MEDICINE

## 2019-01-22 PROCEDURE — 3078F PR MOST RECENT DIASTOLIC BLOOD PRESSURE < 80 MM HG: ICD-10-PCS | Mod: CPTII,S$GLB,, | Performed by: INTERNAL MEDICINE

## 2019-01-22 PROCEDURE — 3074F PR MOST RECENT SYSTOLIC BLOOD PRESSURE < 130 MM HG: ICD-10-PCS | Mod: CPTII,S$GLB,, | Performed by: INTERNAL MEDICINE

## 2019-01-22 PROCEDURE — 99214 OFFICE O/P EST MOD 30 MIN: CPT | Mod: S$GLB,,, | Performed by: INTERNAL MEDICINE

## 2019-01-22 PROCEDURE — 99214 PR OFFICE/OUTPT VISIT, EST, LEVL IV, 30-39 MIN: ICD-10-PCS | Mod: S$GLB,,, | Performed by: INTERNAL MEDICINE

## 2019-01-22 PROCEDURE — 3074F SYST BP LT 130 MM HG: CPT | Mod: CPTII,S$GLB,, | Performed by: INTERNAL MEDICINE

## 2019-01-22 PROCEDURE — 99213 OFFICE O/P EST LOW 20 MIN: CPT | Mod: PBBFAC | Performed by: INTERNAL MEDICINE

## 2019-01-22 NOTE — PROGRESS NOTES
Subjective:    Patient ID:  Nasra Marks is a 33 y.o. female who presents for follow-up of Follow-up (medtronic )      Shortness of Breath   Pertinent negatives include no abdominal pain, chest pain, leg swelling, orthopnea, PND or syncope.   Previous hx:  Here follow-up of systolic heart failure.  She underwent ICD placement without any issues.  She's here today for staple removal.  She had no significant issues.  Wound site looks good.  She denies any chest pain, shortness breath or palpitations.  She's expands no PND, orthopnea or lower edema.  She denies any dizziness, presyncope or syncope.    Today:  Here for follow-up of systolic heart failure.  She mainly just feels fatigued to little bit.  She gets shortness breath with heavier activity but relieved with rest.  She denies any chest pain or other issues.  She's expands no PND, orthopnea or lower edema.  She denies any dizziness, presyncope or syncope.  She's had no problems post-device check.  She had a check today without any issues.  She was very sensitive to RV pacing when it happened and she has felt this very similarly at times even though she paces less than 1%.  She has not been exercising we discussed increasing her level of activity.      Review of Systems   Constitution: Negative.   HENT: Negative.    Eyes: Negative.    Cardiovascular: Negative for chest pain, dyspnea on exertion, irregular heartbeat, leg swelling, near-syncope, orthopnea, palpitations, paroxysmal nocturnal dyspnea and syncope.   Respiratory: Positive for shortness of breath.    Skin: Negative.    Musculoskeletal: Negative.    Gastrointestinal: Negative for abdominal pain, constipation and diarrhea.   Genitourinary: Negative for dysuria.   Neurological: Negative.    Psychiatric/Behavioral: Negative.         Objective:      Physical Exam   Constitutional: She is oriented to person, place, and time. She appears well-developed and well-nourished.   HENT:   Head: Normocephalic  and atraumatic.   Eyes: Conjunctivae and EOM are normal. Pupils are equal, round, and reactive to light.   Neck: Normal range of motion. Neck supple. No thyromegaly present.   Cardiovascular: Normal rate and regular rhythm.   No murmur heard.  Pulmonary/Chest: Effort normal and breath sounds normal. No respiratory distress.   Abdominal: Soft. Bowel sounds are normal.   Musculoskeletal: She exhibits no edema.   Neurological: She is alert and oriented to person, place, and time.   Skin: Skin is warm and dry.   Psychiatric: She has a normal mood and affect. Her behavior is normal.       echo: 11-17  CONCLUSIONS     1 - Severely depressed left ventricular systolic function (EF 15-20%).     2 - Restrictive LV filling pattern, indicating markedly elevated LAP (grade 3 diastolic dysfunction).     3 - Concentric hypertrophy.     4 - Moderate left ventricular enlargement.     5 - Mild left atrial enlargement.     R?LHC:  B. Summary/Post-Operative Diagnosis       Normal coronary arteries.    Systolic dysfunction.    Mildly elevated left Filling Pressures.    Normal Pulmonary Hypertension.    Ejection Fraction: 20%  Global LV Function: severely depressed    Air rest:  PW:  (4)  PA: 24  CO_THERM: 4.4  RV: 25  RA:  (5)  LVEDP: 17       E. Angiographic Results     Diagnostic:          Patient has a right dominant coronary artery.        - Left Main Coronary Artery:             The LM is normal. There is DANNY 3 flow.     - Left Anterior Descending Artery:             The LAD is normal. There is DANNY 3 flow.     - Left Circumflex Artery:             The LCX is normal. There is DANNY 3 flow.     - Right Coronary Artery:             The RCA is normal. There is DANNY 3 flow.     - Left Renal Artery:             The ostial left renal is normal.     - Right Renal Artery:             The ostial right renal is normal.     - Common Femoral Artery:             The right CFA is normal.    Assessment:       1. Follow up    2. Acute combined  systolic and diastolic congestive heart failure    3. Morbid obesity due to excess calories    4. Essential hypertension    5. AICD (automatic cardioverter/defibrillator) present         Plan:       -cont med tx  -Follow-up device checks with [employer]  -Encouraged diet and exercise  -Sodium restriction  -echo prior to next visit    Return to clinic 6 mos with device check      Medtronic ICD check:  Indication: Primary prevention    Model:Visia AF  VVI with a low rate of 40 bpm  Ventricular paced less than 1%    RV: 12.6 mV, 513 ohms, 0.75 V at 0.4 ms    No events  No changes    Return to clinic in 6 months

## 2019-02-01 RX ORDER — LISINOPRIL 40 MG/1
TABLET ORAL
Qty: 15 TABLET | Refills: 0 | Status: SHIPPED | OUTPATIENT
Start: 2019-02-01 | End: 2019-04-08 | Stop reason: SDUPTHER

## 2019-02-08 ENCOUNTER — HOSPITAL ENCOUNTER (EMERGENCY)
Facility: HOSPITAL | Age: 34
Discharge: HOME | End: 2019-02-09
Attending: INTERNAL MEDICINE
Payer: COMMERCIAL

## 2019-02-08 DIAGNOSIS — M54.32 SCIATICA OF LEFT SIDE: Primary | ICD-10-CM

## 2019-02-08 LAB
B-HCG UR QL: NEGATIVE
CTP QC/QA: YES

## 2019-02-08 PROCEDURE — 99284 EMERGENCY DEPT VISIT MOD MDM: CPT | Mod: ER

## 2019-02-08 PROCEDURE — 81025 URINE PREGNANCY TEST: CPT | Mod: ER | Performed by: INTERNAL MEDICINE

## 2019-02-08 PROCEDURE — 96372 THER/PROPH/DIAG INJ SC/IM: CPT | Mod: 59,ER

## 2019-02-09 VITALS
DIASTOLIC BLOOD PRESSURE: 102 MMHG | OXYGEN SATURATION: 100 % | WEIGHT: 181 LBS | HEART RATE: 88 BPM | HEIGHT: 70 IN | TEMPERATURE: 99 F | SYSTOLIC BLOOD PRESSURE: 159 MMHG | BODY MASS INDEX: 25.91 KG/M2 | RESPIRATION RATE: 20 BRPM

## 2019-02-09 PROBLEM — M54.32 SCIATICA OF LEFT SIDE: Status: ACTIVE | Noted: 2019-02-09

## 2019-02-09 PROCEDURE — 63600175 PHARM REV CODE 636 W HCPCS: Mod: ER | Performed by: INTERNAL MEDICINE

## 2019-02-09 RX ORDER — GABAPENTIN 300 MG/1
300 CAPSULE ORAL NIGHTLY
Qty: 30 CAPSULE | Refills: 0 | Status: SHIPPED | OUTPATIENT
Start: 2019-02-09 | End: 2019-03-25 | Stop reason: HOSPADM

## 2019-02-09 RX ORDER — IBUPROFEN 800 MG/1
800 TABLET ORAL EVERY 8 HOURS PRN
Qty: 30 TABLET | Refills: 0 | Status: SHIPPED | OUTPATIENT
Start: 2019-02-09 | End: 2019-03-06

## 2019-02-09 RX ORDER — KETOROLAC TROMETHAMINE 30 MG/ML
60 INJECTION, SOLUTION INTRAMUSCULAR; INTRAVENOUS
Status: COMPLETED | OUTPATIENT
Start: 2019-02-09 | End: 2019-02-09

## 2019-02-09 RX ADMIN — KETOROLAC TROMETHAMINE 60 MG: 30 INJECTION, SOLUTION INTRAMUSCULAR at 12:02

## 2019-02-09 NOTE — ED PROVIDER NOTES
Encounter Date: 2019    SCRIBE #1 NOTE: I, Dary Omer, am scribing for, and in the presence of,  Dr. Woods. I have scribed the following portions of the note - Other sections scribed: HPI, ROS, PE.       History     Chief Complaint   Patient presents with    Hip Pain     left hip pain, low back pain, left knee pain all the pain radiates. Pain x 4 days. No known injury     33 y.o female presents with left hip pain and left lower back pain for 4 days. She says the pain radiates into her left knee. She denies any injury or falls. She also denies any problems with urination, numbness, or weakness.  She has not taken anymedication for her pain today.       The history is provided by the patient.     Review of patient's allergies indicates:  No Known Allergies  Past Medical History:   Diagnosis Date    CHF (congestive heart failure)     Hypertension     dx at 16y.o.    Pacemaker      Past Surgical History:   Procedure Laterality Date    CARDIAC DEFIBRILLATOR PLACEMENT      CARDIAC DEFIBRILLATOR PLACEMENT  2017     SECTION      x 1    DILATION AND CURETTAGE, UTERUS N/A 2012    [facility]formed by [facility] at [facility] OR    ICD SC new N/A 2017    Performed by Jose De [facility]o MD at [facility] CATH LAB    INDUCED       LIGATION, FALLOPIAN TUBE, LAPAROSCOPIC N/A 2013    Performed by Wilfred Jules MD at [facility] OR    TUBAL LIGATION       Family History   Problem Relation Age of Onset    Hypertension Mother     Cancer Maternal Grandmother         breast x 2    Cancer Paternal Grandmother         breast    No Known Problems Sister     No Known Problems Brother     No Known Problems Son     No Known Problems Brother     Hypertension Other     Diabetes Other     Breast cancer Other     Cancer Paternal Aunt         breast    Cancer Paternal Uncle      Social History     Tobacco Use    Smoking status: Never Smoker    Smokeless tobacco: Never Used   Substance Use  Topics    Alcohol use: No     Comment: rarely    Drug use: No     Review of Systems   Genitourinary: Negative for decreased urine volume and difficulty urinating.   Musculoskeletal: Positive for arthralgias (left hip and left knee) and back pain (right lower back pain).   Neurological: Negative for weakness and numbness.   All other systems reviewed and are negative.      Physical Exam     Initial Vitals [02/08/19 2343]   BP Pulse Resp Temp SpO2   (!) 163/110 95 17 98.2 °F (36.8 °C) 100 %      MAP       --         Physical Exam    Nursing note and vitals reviewed.  Constitutional: She appears well-developed and well-nourished. She is not diaphoretic. No distress.   HENT:   Head: Normocephalic and atraumatic.   Right Ear: External ear normal.   Left Ear: External ear normal.   Eyes: EOM are normal.   Neck: Normal range of motion.   Cardiovascular: Normal rate, regular rhythm, normal heart sounds and intact distal pulses. Exam reveals no gallop and no friction rub.    No murmur heard.  Pulmonary/Chest: Breath sounds normal. No respiratory distress. She has no wheezes. She has no rhonchi. She has no rales.   Abdominal: Bowel sounds are normal. She exhibits no distension.   Musculoskeletal: Normal range of motion.        Left hip: She exhibits normal range of motion, normal strength, no tenderness, no bony tenderness, no swelling, no crepitus, no deformity and no laceration.        Left knee: She exhibits normal range of motion, no swelling, no effusion, no ecchymosis, no deformity, no laceration, no erythema and no bony tenderness. No tenderness found.        Lumbar back: She exhibits pain (left lower). She exhibits normal range of motion, no tenderness, no bony tenderness, no swelling, no edema, no deformity, no laceration, no spasm and normal pulse.        Legs:  Neurological: She is alert and oriented to person, place, and time. No cranial nerve deficit or sensory deficit.   Skin: Skin is warm and dry. Capillary  refill takes less than 2 seconds.   Psychiatric: She has a normal mood and affect. Her behavior is normal.         ED Course   Procedures  Labs Reviewed   POCT URINE PREGNANCY          Imaging Results    None          Medical Decision Making:   Initial Assessment:   33 y.o female presents with left hip pain and left lower back pain for 4 days. She says the pain radiates into her left knee. She denies any injury or falls. She also denies any problems with urination, numbness, or weakness.  She has not taken anymedication for her pain today.             Scribe Attestation:   Scribe #1: I performed the above scribed service and the documentation accurately describes the services I performed. I attest to the accuracy of the note.    This document was produced by a scribe under my direction and in my presence. I agree with the content of the note and have made any necessary edits.     Dr. Woods    02/11/2019 3:48 AM             Clinical Impression:     1. Sciatica of left side        Disposition:   Disposition: Discharged  Condition: Stable                        Jostin Woods MD  02/11/19 0348       Jostin Woods MD  02/11/19 0349

## 2019-02-19 RX ORDER — CARVEDILOL 25 MG/1
TABLET ORAL
Qty: 180 TABLET | Refills: 0 | Status: SHIPPED | OUTPATIENT
Start: 2019-02-19 | End: 2019-10-14 | Stop reason: SDUPTHER

## 2019-03-06 ENCOUNTER — HOSPITAL ENCOUNTER (EMERGENCY)
Facility: HOSPITAL | Age: 34
Discharge: HOME | End: 2019-03-06
Attending: EMERGENCY MEDICINE
Payer: COMMERCIAL

## 2019-03-06 VITALS
SYSTOLIC BLOOD PRESSURE: 141 MMHG | BODY MASS INDEX: 25.91 KG/M2 | DIASTOLIC BLOOD PRESSURE: 95 MMHG | TEMPERATURE: 98 F | WEIGHT: 181 LBS | RESPIRATION RATE: 18 BRPM | OXYGEN SATURATION: 96 % | HEART RATE: 82 BPM | HEIGHT: 70 IN

## 2019-03-06 DIAGNOSIS — I50.9 CONGESTIVE HEART FAILURE, UNSPECIFIED HF CHRONICITY, UNSPECIFIED HEART FAILURE TYPE: Primary | ICD-10-CM

## 2019-03-06 DIAGNOSIS — Z91.199 NON-COMPLIANCE: ICD-10-CM

## 2019-03-06 DIAGNOSIS — R06.02 SOB (SHORTNESS OF BREATH): ICD-10-CM

## 2019-03-06 LAB
ALBUMIN SERPL BCP-MCNC: 4.1 G/DL
ALP SERPL-CCNC: 68 U/L
ALT SERPL W/O P-5'-P-CCNC: 14 U/L
ANION GAP SERPL CALC-SCNC: 11 MMOL/L
AST SERPL-CCNC: 19 U/L
BASOPHILS # BLD AUTO: 0.02 K/UL
BASOPHILS NFR BLD: 0.3 %
BILIRUB SERPL-MCNC: 0.4 MG/DL
BNP SERPL-MCNC: 583 PG/ML
BUN SERPL-MCNC: 10 MG/DL
CALCIUM SERPL-MCNC: 9.6 MG/DL
CHLORIDE SERPL-SCNC: 106 MMOL/L
CO2 SERPL-SCNC: 23 MMOL/L
CREAT SERPL-MCNC: 0.8 MG/DL
DIFFERENTIAL METHOD: ABNORMAL
EOSINOPHIL # BLD AUTO: 0.1 K/UL
EOSINOPHIL NFR BLD: 1.1 %
ERYTHROCYTE [DISTWIDTH] IN BLOOD BY AUTOMATED COUNT: 13.5 %
EST. GFR  (AFRICAN AMERICAN): >60 ML/MIN/1.73 M^2
EST. GFR  (NON AFRICAN AMERICAN): >60 ML/MIN/1.73 M^2
GLUCOSE SERPL-MCNC: 92 MG/DL
HCT VFR BLD AUTO: 39.9 %
HGB BLD-MCNC: 12.6 G/DL
LYMPHOCYTES # BLD AUTO: 1.9 K/UL
LYMPHOCYTES NFR BLD: 25.1 %
MCH RBC QN AUTO: 28.8 PG
MCHC RBC AUTO-ENTMCNC: 31.6 G/DL
MCV RBC AUTO: 91 FL
MONOCYTES # BLD AUTO: 0.6 K/UL
MONOCYTES NFR BLD: 8.3 %
NEUTROPHILS # BLD AUTO: 5 K/UL
NEUTROPHILS NFR BLD: 65.2 %
PLATELET # BLD AUTO: 352 K/UL
PMV BLD AUTO: 11.1 FL
POTASSIUM SERPL-SCNC: 3.1 MMOL/L
PROT SERPL-MCNC: 8.1 G/DL
RBC # BLD AUTO: 4.38 M/UL
SODIUM SERPL-SCNC: 140 MMOL/L
TROPONIN I SERPL DL<=0.01 NG/ML-MCNC: 0.02 NG/ML
WBC # BLD AUTO: 7.58 K/UL

## 2019-03-06 PROCEDURE — 80053 COMPREHEN METABOLIC PANEL: CPT

## 2019-03-06 PROCEDURE — 93005 ELECTROCARDIOGRAM TRACING: CPT

## 2019-03-06 PROCEDURE — 25000003 PHARM REV CODE 250: Performed by: EMERGENCY MEDICINE

## 2019-03-06 PROCEDURE — 63600175 PHARM REV CODE 636 W HCPCS: Performed by: EMERGENCY MEDICINE

## 2019-03-06 PROCEDURE — 84484 ASSAY OF TROPONIN QUANT: CPT

## 2019-03-06 PROCEDURE — 93010 EKG 12-LEAD: ICD-10-PCS | Mod: ,,, | Performed by: INTERNAL MEDICINE

## 2019-03-06 PROCEDURE — 83880 ASSAY OF NATRIURETIC PEPTIDE: CPT

## 2019-03-06 PROCEDURE — 96374 THER/PROPH/DIAG INJ IV PUSH: CPT

## 2019-03-06 PROCEDURE — 85025 COMPLETE CBC W/AUTO DIFF WBC: CPT

## 2019-03-06 PROCEDURE — 93010 ELECTROCARDIOGRAM REPORT: CPT | Mod: ,,, | Performed by: INTERNAL MEDICINE

## 2019-03-06 PROCEDURE — 99285 EMERGENCY DEPT VISIT HI MDM: CPT | Mod: 25

## 2019-03-06 RX ORDER — POTASSIUM CHLORIDE 20 MEQ/15ML
40 SOLUTION ORAL
Status: COMPLETED | OUTPATIENT
Start: 2019-03-06 | End: 2019-03-06

## 2019-03-06 RX ORDER — FUROSEMIDE 10 MG/ML
100 INJECTION INTRAMUSCULAR; INTRAVENOUS
Status: COMPLETED | OUTPATIENT
Start: 2019-03-06 | End: 2019-03-06

## 2019-03-06 RX ORDER — NITROGLYCERIN 0.4 MG/1
0.4 TABLET SUBLINGUAL
Status: COMPLETED | OUTPATIENT
Start: 2019-03-06 | End: 2019-03-06

## 2019-03-06 RX ADMIN — FUROSEMIDE 100 MG: 10 INJECTION, SOLUTION INTRAMUSCULAR; INTRAVENOUS at 04:03

## 2019-03-06 RX ADMIN — NITROGLYCERIN 0.4 MG: 0.4 TABLET SUBLINGUAL at 04:03

## 2019-03-06 RX ADMIN — POTASSIUM CHLORIDE 40 MEQ: 1.5 SOLUTION ORAL at 04:03

## 2019-03-06 NOTE — ED PROVIDER NOTES
Encounter Date: 3/6/2019       History     Chief Complaint   Patient presents with    Chest Pain     midsternal CP, SOB, and nausea for a few days; reports hx CHF; pt has defib     33 y.o. female Past Medical History:  No date: CHF (congestive heart failure)  No date: Hypertension      Comment:  dx at 16y.o.  No date: Pacemaker     With systolic and diastolic failure and EF 25%, notes that for the last 3 days straight she feels chest tightness and mild sob. Denies chest pain.  No falls/trauma/injury. Notes that when she lays down at night she wakes up slightly air hungry.          Review of patient's allergies indicates:  No Known Allergies  Past Medical History:   Diagnosis Date    CHF (congestive heart failure)     Hypertension     dx at 16y.o.    Pacemaker      Past Surgical History:   Procedure Laterality Date    CARDIAC DEFIBRILLATOR PLACEMENT      CARDIAC DEFIBRILLATOR PLACEMENT  2017     SECTION      x 1    DILATION AND CURETTAGE, UTERUS N/A 2012    Performed by [facility] at [facility] OR    ICD SC new N/A 2017    Performed by [facility]o MD at [facility] CATH LAB    INDUCED       LIGATION, FALLOPIAN TUBE, LAPAROSCOPIC N/A 2013    Performed by [facility]fred [facility]ules [facility] at [facility] OR    TUBAL LIGATION       Family History   Problem Relation Age of Onset    Hypertension Mother     Cancer Maternal Grandmother         breast x 2    Cancer Paternal Grandmother         breast    No Known Problems Sister     No Known Problems Brother     No Known Problems Son     No Known Problems Brother     Hypertension Other     Diabetes Other     Breast cancer Other     Cancer Paternal Aunt         breast    Cancer Paternal Uncle      Social History     Tobacco Use    Smoking status: Never Smoker    Smokeless tobacco: Never Used   Substance Use Topics    Alcohol use: No     Comment: rarely    Drug use: No     Review of Systems   Constitutional: Negative for fever.    HENT: Negative for sore throat.    Respiratory: Positive for chest tightness and shortness of breath.    Cardiovascular: Negative for chest pain.   Gastrointestinal: Negative for nausea.   Genitourinary: Negative for dysuria.   Musculoskeletal: Negative for back pain.   Skin: Negative for rash.   Neurological: Negative for weakness.   Hematological: Does not bruise/bleed easily.   All other systems reviewed and are negative.      Physical Exam     Initial Vitals [03/06/19 0258]   BP Pulse Resp Temp SpO2   (!) 165/115 86 18 98.4 °F (36.9 °C) 100 %      MAP       --         Physical Exam    Nursing note and vitals reviewed.  Constitutional: She appears well-developed and well-nourished.   HENT:   Head: Normocephalic and atraumatic.   Eyes: Conjunctivae and EOM are normal. Pupils are equal, round, and reactive to light.   Neck: Normal range of motion.   Cardiovascular: Normal rate and regular rhythm.   Pulmonary/Chest: Breath sounds normal. No respiratory distress. She has no wheezes. She has no rales.   Abdominal: She exhibits no distension.   Musculoskeletal: Normal range of motion.   Neurological: She is alert. No cranial nerve deficit. GCS score is 15. GCS eye subscore is 4. GCS verbal subscore is 5. GCS motor subscore is 6.   Skin: Skin is warm and dry.   Psychiatric: She has a normal mood and affect. Thought content normal.         ED Course   Procedures  Labs Reviewed   CBC W/ AUTO DIFFERENTIAL   COMPREHENSIVE METABOLIC PANEL   TROPONIN I   B-TYPE NATRIURETIC PEPTIDE     EKG Readings: (Independently Interpreted)   Hr 87, sinus, nl axis/intervals, no roney/twi. Non aacute, no stemi, +PVCs       Imaging Results    None          Medical Decision Making:   Initial Assessment:   Pt with hx chf, here with sob. Have sent screening labs /ekg/cxr                 pt apparently admits she has not taken her meds in several days. Have written for IV lasix and a dose of SL nitro and will discharge pt. She has 100% o2 sat.      Labs Reviewed   CBC W/ AUTO DIFFERENTIAL - Abnormal; Notable for the following components:       Result Value    MCHC 31.6 (*)     Platelets 352 (*)     All other components within normal limits   COMPREHENSIVE METABOLIC PANEL - Abnormal; Notable for the following components:    Potassium 3.1 (*)     All other components within normal limits   B-TYPE NATRIURETIC PEPTIDE - Abnormal; Notable for the following components:     (*)     All other components within normal limits   TROPONIN I       X-Ray Chest AP Portable   Final Result      Cardiomegaly and new findings suggestive of CHF exacerbation.         Electronically signed by: Deshawn Salas MD   Date:    03/06/2019   Time:    04:06             Clinical Impression:       ICD-10-CM ICD-9-CM   1. Congestive heart failure, unspecified HF chronicity, unspecified heart failure type I50.9 428.0   2. SOB (shortness of breath) R06.02 786.05   3. Non-compliance Z91.19 V15.81                                Naveed Yang MD  03/06/19 0424

## 2019-03-06 NOTE — ED NOTES
Patient reports not taking her lasix nor her blood pressure medications due to the side effects. Reports they make her tired. Patient educated on the importance of taking all of her medication seeing that she has CHF

## 2019-03-06 NOTE — DISCHARGE INSTRUCTIONS
Thank you for coming to our Emergency Department today. It is important to remember that some problems are difficult to diagnose and may not be found during your first visit. Be sure to follow up with your primary care doctor and review any labs/imaging that was performed with them. If you do not have a primary care doctor, you may contact the one listed on your discharge paperwork or you may also call the [facility] Appointment Desk at [phone] to schedule an appointment with one.     Return to the ER with any questions/concerns, new/concerning symptoms, worsening or failure to improve. Do not drive or make any important decisions for 24 hours if you have received any pain medications, sedatives or mood altering drugs during your ER visit.

## 2019-03-06 NOTE — ED TRIAGE NOTES
Patient presents to the ER via personal vehicle. Patient presents with midsternal chest pain that started 2 days ago. Reports she thought the pain would get better but it didn't. Reports SOB, nausea without vomiting.

## 2019-03-24 RX ORDER — LISINOPRIL 40 MG/1
TABLET ORAL
Qty: 15 TABLET | Refills: 0 | OUTPATIENT
Start: 2019-03-24

## 2019-03-25 ENCOUNTER — HOSPITAL ENCOUNTER (EMERGENCY)
Facility: HOSPITAL | Age: 34
Discharge: HOME | End: 2019-03-26
Attending: EMERGENCY MEDICINE
Payer: COMMERCIAL

## 2019-03-25 DIAGNOSIS — R07.9 CHEST PAIN: ICD-10-CM

## 2019-03-25 LAB
ALBUMIN SERPL BCP-MCNC: 3.8 G/DL (ref 3.5–5.2)
ALP SERPL-CCNC: 53 U/L (ref 55–135)
ALT SERPL W/O P-5'-P-CCNC: 11 U/L (ref 10–44)
ANION GAP SERPL CALC-SCNC: 9 MMOL/L (ref 8–16)
AST SERPL-CCNC: 16 U/L (ref 10–40)
B-HCG UR QL: NEGATIVE
BASOPHILS # BLD AUTO: 0.04 K/UL (ref 0–0.2)
BASOPHILS NFR BLD: 0.4 % (ref 0–1.9)
BILIRUB SERPL-MCNC: 0.3 MG/DL (ref 0.1–1)
BNP SERPL-MCNC: 251 PG/ML (ref 0–99)
BUN SERPL-MCNC: 14 MG/DL (ref 6–20)
CALCIUM SERPL-MCNC: 9.5 MG/DL (ref 8.7–10.5)
CHLORIDE SERPL-SCNC: 106 MMOL/L (ref 95–110)
CO2 SERPL-SCNC: 22 MMOL/L (ref 23–29)
CREAT SERPL-MCNC: 0.9 MG/DL (ref 0.5–1.4)
CTP QC/QA: YES
DIFFERENTIAL METHOD: ABNORMAL
EOSINOPHIL # BLD AUTO: 0.2 K/UL (ref 0–0.5)
EOSINOPHIL NFR BLD: 1.7 % (ref 0–8)
ERYTHROCYTE [DISTWIDTH] IN BLOOD BY AUTOMATED COUNT: 13.1 % (ref 11.5–14.5)
EST. GFR  (AFRICAN AMERICAN): >60 ML/MIN/1.73 M^2
EST. GFR  (NON AFRICAN AMERICAN): >60 ML/MIN/1.73 M^2
GLUCOSE SERPL-MCNC: 83 MG/DL (ref 70–110)
HCT VFR BLD AUTO: 36.8 % (ref 37–48.5)
HGB BLD-MCNC: 11.6 G/DL (ref 12–16)
LYMPHOCYTES # BLD AUTO: 2.4 K/UL (ref 1–4.8)
LYMPHOCYTES NFR BLD: 24.1 % (ref 18–48)
MCH RBC QN AUTO: 28.4 PG (ref 27–31)
MCHC RBC AUTO-ENTMCNC: 31.5 G/DL (ref 32–36)
MCV RBC AUTO: 90 FL (ref 82–98)
MONOCYTES # BLD AUTO: 0.8 K/UL (ref 0.3–1)
MONOCYTES NFR BLD: 7.8 % (ref 4–15)
NEUTROPHILS # BLD AUTO: 6.5 K/UL (ref 1.8–7.7)
NEUTROPHILS NFR BLD: 65.9 % (ref 38–73)
PLATELET # BLD AUTO: 280 K/UL (ref 150–350)
PMV BLD AUTO: 10.8 FL (ref 9.2–12.9)
POTASSIUM SERPL-SCNC: 3.5 MMOL/L (ref 3.5–5.1)
PROT SERPL-MCNC: 7.4 G/DL (ref 6–8.4)
RBC # BLD AUTO: 4.09 M/UL (ref 4–5.4)
SODIUM SERPL-SCNC: 137 MMOL/L (ref 136–145)
TROPONIN I SERPL DL<=0.01 NG/ML-MCNC: <0.006 NG/ML (ref 0–0.03)
WBC # BLD AUTO: 9.94 K/UL (ref 3.9–12.7)

## 2019-03-25 PROCEDURE — 81025 URINE PREGNANCY TEST: CPT | Performed by: NURSE PRACTITIONER

## 2019-03-25 PROCEDURE — 93010 EKG 12-LEAD: ICD-10-PCS | Mod: ,,, | Performed by: INTERNAL MEDICINE

## 2019-03-25 PROCEDURE — 93010 ELECTROCARDIOGRAM REPORT: CPT | Mod: ,,, | Performed by: INTERNAL MEDICINE

## 2019-03-25 PROCEDURE — 63600175 PHARM REV CODE 636 W HCPCS: Performed by: EMERGENCY MEDICINE

## 2019-03-25 PROCEDURE — 25000003 PHARM REV CODE 250: Performed by: EMERGENCY MEDICINE

## 2019-03-25 PROCEDURE — 93005 ELECTROCARDIOGRAM TRACING: CPT

## 2019-03-25 PROCEDURE — 96374 THER/PROPH/DIAG INJ IV PUSH: CPT

## 2019-03-25 PROCEDURE — 83880 ASSAY OF NATRIURETIC PEPTIDE: CPT

## 2019-03-25 PROCEDURE — 85025 COMPLETE CBC W/AUTO DIFF WBC: CPT

## 2019-03-25 PROCEDURE — 80053 COMPREHEN METABOLIC PANEL: CPT

## 2019-03-25 PROCEDURE — 99285 EMERGENCY DEPT VISIT HI MDM: CPT | Mod: 25

## 2019-03-25 PROCEDURE — 84484 ASSAY OF TROPONIN QUANT: CPT

## 2019-03-25 RX ORDER — LISINOPRIL 20 MG/1
20 TABLET ORAL
Status: COMPLETED | OUTPATIENT
Start: 2019-03-25 | End: 2019-03-25

## 2019-03-25 RX ORDER — KETOROLAC TROMETHAMINE 30 MG/ML
15 INJECTION, SOLUTION INTRAMUSCULAR; INTRAVENOUS
Status: COMPLETED | OUTPATIENT
Start: 2019-03-25 | End: 2019-03-25

## 2019-03-25 RX ADMIN — KETOROLAC TROMETHAMINE 15 MG: 30 INJECTION, SOLUTION INTRAMUSCULAR; INTRAVENOUS at 11:03

## 2019-03-25 RX ADMIN — LISINOPRIL 20 MG: 20 TABLET ORAL at 11:03

## 2019-03-26 VITALS
RESPIRATION RATE: 14 BRPM | HEART RATE: 71 BPM | WEIGHT: 179 LBS | SYSTOLIC BLOOD PRESSURE: 118 MMHG | DIASTOLIC BLOOD PRESSURE: 66 MMHG | TEMPERATURE: 99 F | BODY MASS INDEX: 25.62 KG/M2 | HEIGHT: 70 IN | OXYGEN SATURATION: 98 %

## 2019-03-26 LAB — TROPONIN I SERPL DL<=0.01 NG/ML-MCNC: <0.006 NG/ML (ref 0–0.03)

## 2019-03-26 PROCEDURE — 84484 ASSAY OF TROPONIN QUANT: CPT

## 2019-03-26 RX ORDER — LISINOPRIL 40 MG/1
40 TABLET ORAL DAILY
Qty: 15 TABLET | Refills: 0 | OUTPATIENT
Start: 2019-03-26

## 2019-03-26 NOTE — ED TRIAGE NOTES
Patient presents to the ER via personal vehicle. Patient presents with chest pain (tightness), SOB, headache with visional disturbance (blurry), nausea without vomiting. Stated she had a shoot pain down her left arm and then it went numb.

## 2019-03-26 NOTE — ED PROVIDER NOTES
Encounter Date: 3/25/2019    SCRIBE #1 NOTE: I, Xochitlbrian Rios, am scribing for, and in the presence of,  Edvin Mathew MD. I have scribed the following portions of the note - Other sections scribed: HPI, ROS, PE.       History     Chief Complaint   Patient presents with    Chest Pain     Pt reports L chest pain that radiates down left arm since 1200 today. Pt also reports sob and headache     CC: Chest Pain    HPI: This is a 34 y/o female with PMHx of CHF, HTN, and Pacemaker who presents to the ED for emergent evaluation of acute left chest pain that radiated down left arm with fingertip numbness that began today around 1200. Pt rates pain as severe (9/10) and worsened throughout the day. Pt also c/o headache, photophobia, nausea, and SOB. Pt reports that she was seen for chest tightness x3 weeks ago. Denies vomiting, diarrhea, diaphoresis, leg swelling, fever, chills, cough, rhinorrhea, sore throat, or further symptoms. Denies sick contacts. Denies known allergies. Denies smoking cigarettes, drinking EtOH, or illicit drug use. Pt reports that she takes carvedilol, furosemide, and lisinopril. Pt last took lisinopril x3 days ago but compliant with other meds.    Surgery noted: , tubal ligation    The history is provided by the patient. No  was used.     Review of patient's allergies indicates:  No Known Allergies  Past Medical History:   Diagnosis Date    CHF (congestive heart failure)     Hypertension     dx at 16y.o.    Pacemaker      Past Surgical History:   Procedure Laterality Date    CARDIAC DEFIBRILLATOR PLACEMENT      CARDIAC DEFIBRILLATOR PLACEMENT  2017     SECTION      x 1    DILATION AND CURETTAGE, UTERUS N/A 2012    [facility] OR    ICD SC new N/A 2017    Performed by Jose De [facility]o [facility] at [facility] CATH LAB    INDUCED       LIGATION, FALLOPIAN TUBE, LAPAROSCOPIC N/A 2013    [facility]    TUBAL LIGATION       Family History   Problem Relation Age of Onset    Hypertension Mother     Cancer Maternal Grandmother         breast x 2    Cancer Paternal Grandmother         breast    No Known Problems Sister     No Known Problems Brother     No Known Problems Son     No Known Problems Brother     Hypertension Other     Diabetes Other     Breast cancer Other     Cancer Paternal Aunt         breast    Cancer Paternal Uncle      Social History     Tobacco Use    Smoking status: Never Smoker    Smokeless tobacco: Never Used   Substance Use Topics    Alcohol use: No     Comment: rarely    Drug use: No     Review of Systems   Constitutional: Negative for chills and fever.   HENT: Negative for congestion, ear pain, rhinorrhea and sore throat.    Eyes: Positive for photophobia. Negative for pain and visual disturbance.   Respiratory: Positive for shortness of breath. Negative for cough.    Cardiovascular: Positive for chest pain.   Gastrointestinal: Positive for nausea. Negative for abdominal pain, diarrhea and vomiting.   Genitourinary: Negative for dysuria.   Musculoskeletal: Negative for back pain and neck pain.   Skin: Negative for rash.   Neurological: Positive for headaches.   Psychiatric/Behavioral: Negative for confusion.   All other systems reviewed and are negative.      Physical Exam     Initial Vitals [03/25/19 2152]   BP Pulse Resp Temp SpO2   (!) 139/98 80 17 98.6 °F (37 °C) 99 %      MAP       --         Physical Exam    Nursing note and vitals reviewed.  Constitutional: She appears well-developed and well-nourished. She is not diaphoretic.  Non-toxic appearance. She does not appear ill. No distress.   HENT:   Head: Normocephalic and atraumatic.   Right Ear: External ear normal.   Left Ear: External ear normal.   Nose: Nose normal.   Eyes: Conjunctivae and EOM are normal. Pupils are equal, round, and reactive to light. No scleral icterus.   Neck:  Normal range of motion. Neck supple.   Cardiovascular: Normal rate, regular rhythm, normal heart sounds and intact distal pulses. Exam reveals no gallop and no friction rub.    No murmur heard.  Pulmonary/Chest: Effort normal and breath sounds normal. No stridor. No respiratory distress. She has no wheezes. She has no rhonchi. She has no rales.   Abdominal: Soft. Normal appearance and bowel sounds are normal. She exhibits no distension. There is no tenderness. There is no rebound and no guarding.   Musculoskeletal: Normal range of motion. She exhibits no edema or tenderness.   Neurological: She is alert and oriented to person, place, and time. No cranial nerve deficit.   Skin: Skin is warm and dry. No rash noted.   Psychiatric: She has a normal mood and affect. Her behavior is normal.         ED Course   Procedures  Labs Reviewed   CBC W/ AUTO DIFFERENTIAL - Abnormal; Notable for the following components:       Result Value    Hemoglobin 11.6 (*)     Hematocrit 36.8 (*)     MCHC 31.5 (*)     All other components within normal limits   COMPREHENSIVE METABOLIC PANEL - Abnormal; Notable for the following components:    CO2 22 (*)     Alkaline Phosphatase 53 (*)     All other components within normal limits   B-TYPE NATRIURETIC PEPTIDE - Abnormal; Notable for the following components:     (*)     All other components within normal limits   TROPONIN I   TROPONIN I   POCT URINE PREGNANCY          Imaging Results          X-Ray Chest AP Portable (Final result)  Result time 03/25/19 23:35:51    Final result by Ana Chase MD (03/25/19 23:35:51)                 Impression:      No acute intrathoracic abnormality detected.  Cardiomegaly.      Electronically signed by: Ana Chase  Date:    03/25/2019  Time:    23:35             Narrative:    EXAMINATION:  AP PORTABLE CHEST    CLINICAL HISTORY:  Chest Pain;    TECHNIQUE:  AP portable chest radiograph was  submitted.    COMPARISON:  03/06/2019    FINDINGS:  There is a single lead left subclavian pacer.  AP portable chest radiograph demonstrates a cardiac silhouette within normal limits.  There is no focal consolidation, pneumothorax, or pleural effusion.                                 Medical Decision Making:   Initial Assessment:   33-year-old female with history of congestive heart failure, EF 50-20% presenting with headache, chest discomfort nausea.  Patient states symptoms gradually worsened throughout the day.  Was recently seen for chest pain approximately 3 weeks ago.  Patient has been off of her lisinopril since Friday.  On exam, patient well-appearing in no acute distress periods lung is clear.  EKG shows normal sinus rhythm, rate of 73, no significant ST segment elevation or depression concerning for acute ischemia.  Differential includes tension or migraine headache, ACS, less likely pneumonia, pneumothorax.  Initial troponin negative. Heart score low.  Heart catheterization 2 years ago negative with normal coronaries.  We will treat her headache, get a repeat troponin, and reassess.    Troponin x2 negative.  Chest x-ray unremarkable.  Labs at baseline or unremarkable. Headache improved with Toradol.  [place] subarachnoid rule negative, doubt ICH.  Believe she is safe for discharge home.  Discussed strict return precautions as well as need for primary care follow-up    Additional MDM:   Heart Score:    History:          Moderately suspicious.  ECG:             Normal  Age:               Less than 45 years  Risk factors: 1-2 risk factors  Troponin:       Less than or equal to normal limit  Final Score: 2             Scribe Attestation:   Scribe #1: I performed the above scribed service and the documentation accurately describes the services I performed. I attest to the accuracy of the note.    Attending Attestation:           Physician Attestation for Scribe:  Physician Attestation Statement for Scribe #1:  I, Edvin Mathew MD, reviewed documentation, as scribed by Xochitl Rios in my presence, and it is both accurate and complete.                    Clinical Impression:       ICD-10-CM ICD-9-CM   1. Chest pain R07.9 786.50         Disposition:   Disposition: Discharged  Condition: Stable                        Edvin Mathew MD  03/26/19 0313

## 2019-04-08 RX ORDER — LISINOPRIL 40 MG/1
40 TABLET ORAL DAILY
Qty: 30 TABLET | Refills: 0 | Status: SHIPPED | OUTPATIENT
Start: 2019-04-08 | End: 2019-04-30 | Stop reason: SDUPTHER

## 2019-04-08 NOTE — TELEPHONE ENCOUNTER
Patient states she is completely out of lisinopril and has been out for a while. appt  4/30. Please advise

## 2019-04-08 NOTE — TELEPHONE ENCOUNTER
----- Message from Jaye Denis sent at 4/8/2019  1:38 PM CDT -----  Contact: Self/ [phone]  Patient would like staff to give her a call, she's wondering why her BP medication is being refused.  Thank you

## 2019-04-16 ENCOUNTER — PATIENT OUTREACH (OUTPATIENT)
Dept: ADMINISTRATIVE | Facility: HOSPITAL | Age: 34
End: 2019-04-16

## 2019-04-30 ENCOUNTER — OFFICE VISIT (OUTPATIENT)
Dept: FAMILY MEDICINE | Facility: CLINIC | Age: 34
End: 2019-04-30
Payer: COMMERCIAL

## 2019-04-30 VITALS
SYSTOLIC BLOOD PRESSURE: 130 MMHG | BODY MASS INDEX: 25.88 KG/M2 | DIASTOLIC BLOOD PRESSURE: 80 MMHG | OXYGEN SATURATION: 98 % | TEMPERATURE: 98 F | HEART RATE: 100 BPM | WEIGHT: 180.75 LBS | HEIGHT: 70 IN

## 2019-04-30 DIAGNOSIS — I10 ESSENTIAL HYPERTENSION: ICD-10-CM

## 2019-04-30 DIAGNOSIS — Z00.00 ANNUAL PHYSICAL EXAM: Primary | ICD-10-CM

## 2019-04-30 DIAGNOSIS — I50.41 ACUTE COMBINED SYSTOLIC AND DIASTOLIC CONGESTIVE HEART FAILURE: ICD-10-CM

## 2019-04-30 PROCEDURE — 3079F DIAST BP 80-89 MM HG: CPT | Mod: CPTII,S$GLB,, | Performed by: FAMILY MEDICINE

## 2019-04-30 PROCEDURE — 3079F PR MOST RECENT DIASTOLIC BLOOD PRESSURE 80-89 MM HG: ICD-10-PCS | Mod: CPTII,S$GLB,, | Performed by: FAMILY MEDICINE

## 2019-04-30 PROCEDURE — 99395 PR PREVENTIVE VISIT,EST,18-39: ICD-10-PCS | Mod: S$GLB,,, | Performed by: FAMILY MEDICINE

## 2019-04-30 PROCEDURE — 99395 PREV VISIT EST AGE 18-39: CPT | Mod: S$GLB,,, | Performed by: FAMILY MEDICINE

## 2019-04-30 PROCEDURE — 99999 PR PBB SHADOW E&M-EST. PATIENT-LVL III: CPT | Mod: PBBFAC,,, | Performed by: FAMILY MEDICINE

## 2019-04-30 PROCEDURE — 99999 PR PBB SHADOW E&M-EST. PATIENT-LVL III: ICD-10-PCS | Mod: PBBFAC,,, | Performed by: FAMILY MEDICINE

## 2019-04-30 PROCEDURE — 3075F SYST BP GE 130 - 139MM HG: CPT | Mod: CPTII,S$GLB,, | Performed by: FAMILY MEDICINE

## 2019-04-30 PROCEDURE — 3075F PR MOST RECENT SYSTOLIC BLOOD PRESS GE 130-139MM HG: ICD-10-PCS | Mod: CPTII,S$GLB,, | Performed by: FAMILY MEDICINE

## 2019-04-30 RX ORDER — LISINOPRIL 40 MG/1
40 TABLET ORAL DAILY
Qty: 90 TABLET | Refills: 1 | Status: SHIPPED | OUTPATIENT
Start: 2019-04-30 | End: 2019-10-14 | Stop reason: SDUPTHER

## 2019-04-30 NOTE — PATIENT INSTRUCTIONS
"[place] [facility] Watch   Foods that fight inflammation  Doctors are learning that one of the best ways to quell inflammation lies not in the medicine cabinet, but in the refrigerator.  Updated: August 13, 2017   Published: June, 2014   Your immune system becomes activated when your body recognizes anything that is foreign--such as an invading microbe, plant pollen, or chemical. This often triggers a process called inflammation. Intermittent bouts of inflammation directed at truly threatening invaders protect your health.  However, sometimes inflammation persists, day in and day out, even when you are not threatened by a foreign invader. That's when inflammation can become your enemy. Many major diseases that plague us--including cancer, heart disease, diabetes, arthritis, depression, and Alzheimer's--have been linked to chronic inflammation.  One of the most powerful tools to combat inflammation comes not from the pharmacy, but from the grocery store. "Many experimental studies have shown that components of foods or beverages may have anti-inflammatory effects," says Dr. Jayjay Sommer, professor of nutrition and epidemiology in the Department of Nutrition at the [place] [employer].  Choose the right foods, and you may be able to reduce your risk of illness. Consistently pick the wrong ones, and you could accelerate the inflammatory disease process.  Foods that inflame  Try to avoid or limit these foods as much as possible:  refined carbohydrates, such as white bread and pastries  French fries and other fried foods  soda and other sugar-sweetened beverages  red meat (burgers, steaks) and processed meat (hot dogs, sausage)  margarine, shortening, and lard     Simple tips to fight inflammation and stay healthy!  Protect your health from the risks of chronic inflammation  Get tips from [place] doctors     Inflammation-promoting foods  Not surprisingly, the same foods that contribute to inflammation are " "generally considered bad for our health, including sodas and refined carbohydrates, as well as red meat and processed meats.  "Some of the foods that have been associated with an increased risk for chronic diseases such as type 2 diabetes and heart disease are also associated with excess inflammation," Dr. Sommer says. "It's not surprising, since inflammation is an important underlying mechanism for the development of these diseases."  Unhealthy foods also contribute to weight gain, which is itself a risk factor for inflammation. Yet in several studies, even after researchers took obesity into account, the link between foods and inflammation remained, which suggests weight gain isn't the sole . "Some of the food components or ingredients may have independent effects on inflammation over and above increased caloric intake," Dr. Sommer says.    Foods that combat inflammation  Include plenty of these anti-inflammatory foods in your diet:  tomatoes  olive oil  green leafy vegetables, such as spinach, kale, and collards  nuts like almonds and walnuts  fatty fish like salmon, mackerel, tuna, and sardines  fruits such as strawberries, blueberries, cherries, and oranges     Anti-inflammation foods  On the flip side are foods and beverages that have been found to reduce the risk of inflammation, and with it, chronic disease, says Dr. Sommer. He notes in particular fruits and vegetables such as blueberries, apples, and leafy greens that are high in natural antioxidants and polyphenols--protective compounds found in plants.  Studies have also associated nuts with reduced markers of inflammation and a lower risk of cardiovascular disease and diabetes. Coffee, which contains polyphenols and other anti-inflammatory compounds, may protect against inflammation, as well.    Anti-inflammatory eating  To reduce levels of inflammation, aim for an overall healthy diet. If you're looking for an eating plan that closely follows the tenets of " "anti-inflammatory eating, consider the Mediterranean diet, which is high in fruits, vegetables, nuts, whole grains, fish, and healthy oils.  In addition to lowering inflammation, a more natural, less processed diet can have noticeable effects on your physical and emotional health. "A healthy diet is beneficial not only for reducing the risk of chronic diseases, but also for improving mood and overall quality of life," Dr. Sommer says.      "

## 2019-05-01 NOTE — PROGRESS NOTES
Routine Office Visit    Patient Name: Nasra Marks    : 1985  MRN: [id number]    Subjective:  Nasra is a 33 y.o. female who presents today for      1. Annual physical - pt has not been seen in 2 years. Since then, she has been diagnosed with combined systolic and diastolic heart failure. She continues to follow-up with cardiology. She reports she has decreased exercise tolerance. She becomes short of breath after exertion. She wants to continue to be active but it is difficult for her. She has been working on improving her diet / lifestyle.   2. Hot flashes - pt still has menstrual cycle but states it is very irregular. She does not know when her period is coming or for how many days it would last. She states that  She sometimes has heavier bleeding. She has also been having hot flashes. She feels hot and cold. She is unable to regulate her temperature especially at night. She is scheduled to see her ob-gyn    Review of Systems   Constitutional: Negative for chills and fever.   HENT: Negative for congestion.    Eyes: Negative for blurred vision.   Respiratory: Negative for cough.    Cardiovascular: Negative for chest pain.   Gastrointestinal: Negative for abdominal pain, constipation, diarrhea, heartburn, nausea and vomiting.   Genitourinary: Negative for dysuria.   Musculoskeletal: Negative for myalgias.   Skin: Negative for itching and rash.   Neurological: Negative for dizziness and headaches.   Psychiatric/Behavioral: Negative for depression.       Active Problem List  Patient Active Problem List   Diagnosis    Essential hypertension    Mild dysplasia of cervix /needs colp    RUQ abdominal pain    Acute combined systolic and diastolic congestive heart failure    Chest pain    Sciatica of left side       Past Surgical History  Past Surgical History:   Procedure Laterality Date    CARDIAC DEFIBRILLATOR PLACEMENT      CARDIAC DEFIBRILLATOR PLACEMENT  2017     SECTION      x 1     DILATION AND CURETTAGE, UTERUS N/A 2012    [facility] OR    ICD SC new N/A 2017    [facility] by [facility] at [facility] CATH LAB    INDUCED       LIGATION, FALLOPIAN TUBE, LAPAROSCOPIC N/A 2013    [facility] by [facility] at [facility] OR    TUBAL LIGATION         Family History  Family History   Problem Relation Age of Onset    Hypertension Mother     Cancer Maternal Grandmother         breast x 2    Cancer Paternal Grandmother         breast    No Known Problems Sister     No Known Problems Brother     No Known Problems Son     No Known Problems Brother     Hypertension Other     Diabetes Other     Breast cancer Other     Cancer Paternal Aunt         breast    Cancer Paternal Uncle        Social History  Social History     Socioeconomic History    Marital status:      Spouse name: Not on file    Number of children: Not on file    Years of education: Not on file    Highest education level: Not on file   Occupational History     Employer: [employer]   Social Needs    Financial resource strain: Not on file    Food insecurity:     Worry: Not on file     Inability: Not on file    Transportation needs:     Medical: Not on file     Non-medical: Not on file   Tobacco Use    Smoking status: Never Smoker    Smokeless tobacco: Never Used   Substance and Sexual Activity    Alcohol use: No     Comment: rarely    Drug use: No    Sexual activity: Yes     Partners: Male     Birth control/protection: None   Lifestyle    Physical activity:     Days per week: Not on file     Minutes per session: Not on file    Stress: Not on file   Relationships    Social connections:     Talks on phone: Not on file     Gets together: Not on file     Attends Christianity service: Not on file     Active member of club or organization: Not on file     Attends meetings of clubs or organizations: Not on file     Relationship status: Not on file   Other  "Topics Concern    Not on file   Social History Narrative    Not on file       Medications and Allergies  Reviewed and updated.   Current Outpatient Medications   Medication Sig    carvedilol (COREG) 25 MG tablet TAKE 1 TABLET(25 MG) BY MOUTH TWICE DAILY    lisinopril (PRINIVIL,ZESTRIL) 40 MG tablet Take 1 tablet (40 mg total) by mouth once daily.    furosemide (LASIX) 40 MG tablet Take 1 tablet (40 mg total) by mouth once daily.     No current facility-administered medications for this visit.        Physical Exam  /80 (BP Location: Right arm, Patient Position: Sitting, BP Method: Medium (Manual))   Pulse 100   Temp 98.2 °F (36.8 °C) (Oral)   Ht 5' 10" (1.778 m)   Wt 82 kg (180 lb 12.4 oz)   LMP 04/03/2019 (Approximate)   SpO2 98%   BMI 25.94 kg/m²   Physical Exam   Constitutional: She is oriented to person, place, and time. She appears well-developed and well-nourished.   HENT:   Head: Normocephalic and atraumatic.   Eyes: Pupils are equal, round, and reactive to light. Conjunctivae and EOM are normal.   Neck: Normal range of motion. Neck supple.   Cardiovascular: Normal rate, regular rhythm and normal heart sounds. Exam reveals no gallop and no friction rub.   No murmur heard.  Pulmonary/Chest: Breath sounds normal. No respiratory distress.   Abdominal: Soft. Bowel sounds are normal. She exhibits no distension. There is no tenderness.   Musculoskeletal: Normal range of motion.   Lymphadenopathy:     She has no cervical adenopathy.   Neurological: She is alert and oriented to person, place, and time.   Skin: Skin is warm.   Psychiatric: She has a normal mood and affect.         Assessment/Plan:  Nasra Marks is a 33 y.o. female who presents today for :    Annual physical exam  Health Maintenance       Date Due Completion Date    Pap Smear 11/01/2018 11/1/2017    TETANUS VACCINE 04/30/2020 (Originally 8/19/2009) 8/19/1999    Influenza Vaccine 08/01/2019 12/17/2001        I addressed all major " concerns as it related to health maintenance.  All were ordered and scheduled based on the patients wishes.  Any additional health maintenance will be readdressed at the next physical if declined or deferred by the patient.  Pt to schedule with her ob-gyn     Essential hypertension  The current medical regimen is effective;  continue present plan and medications.  -     lisinopril (PRINIVIL,ZESTRIL) 40 MG tablet; Take 1 tablet (40 mg total) by mouth once daily.  Dispense: 90 tablet; Refill: 1    Acute combined systolic and diastolic congestive heart failure  Continue f/u with cardiology - Dr. Longo                 Follow up in about 6 months (around 10/30/2019) for yearly exam.

## 2019-05-24 ENCOUNTER — HOSPITAL ENCOUNTER (OUTPATIENT)
Facility: HOSPITAL | Age: 34
Discharge: HOME | End: 2019-05-25
Attending: EMERGENCY MEDICINE | Admitting: HOSPITALIST
Payer: MEDICAID

## 2019-05-24 DIAGNOSIS — I50.9 CHF (CONGESTIVE HEART FAILURE): ICD-10-CM

## 2019-05-24 DIAGNOSIS — I42.8 NONISCHEMIC CARDIOMYOPATHY: Chronic | ICD-10-CM

## 2019-05-24 DIAGNOSIS — R07.89 CHEST DISCOMFORT: ICD-10-CM

## 2019-05-24 DIAGNOSIS — I10 ESSENTIAL HYPERTENSION: Chronic | ICD-10-CM

## 2019-05-24 DIAGNOSIS — I50.43 ACUTE ON CHRONIC COMBINED SYSTOLIC AND DIASTOLIC CONGESTIVE HEART FAILURE: Primary | ICD-10-CM

## 2019-05-24 DIAGNOSIS — I50.9 HEART FAILURE: ICD-10-CM

## 2019-05-24 LAB
ALBUMIN SERPL BCP-MCNC: 3.9 G/DL (ref 3.5–5.2)
ALP SERPL-CCNC: 64 U/L (ref 55–135)
ALT SERPL W/O P-5'-P-CCNC: 12 U/L (ref 10–44)
ANION GAP SERPL CALC-SCNC: 8 MMOL/L (ref 8–16)
AORTIC ROOT ANNULUS: 2.78 CM
AORTIC VALVE CUSP SEPERATION: 2.08 CM
ASCENDING AORTA: 2.99 CM
AST SERPL-CCNC: 17 U/L (ref 10–40)
AV INDEX (PROSTH): 0.61
AV MEAN GRADIENT: 4.35 MMHG
AV PEAK GRADIENT: 6.55 MMHG
AV VALVE AREA: 2.28 CM2
AV VELOCITY RATIO: 0.61
B-HCG UR QL: NEGATIVE
BASOPHILS # BLD AUTO: 0.02 K/UL (ref 0–0.2)
BASOPHILS NFR BLD: 0.2 % (ref 0–1.9)
BILIRUB SERPL-MCNC: 0.7 MG/DL (ref 0.1–1)
BNP SERPL-MCNC: 781 PG/ML (ref 0–99)
BSA FOR ECHO PROCEDURE: 2.02 M2
BUN SERPL-MCNC: 11 MG/DL (ref 6–20)
CALCIUM SERPL-MCNC: 9.2 MG/DL (ref 8.7–10.5)
CHLORIDE SERPL-SCNC: 106 MMOL/L (ref 95–110)
CHOLEST SERPL-MCNC: 170 MG/DL (ref 120–199)
CHOLEST/HDLC SERPL: 4 {RATIO} (ref 2–5)
CO2 SERPL-SCNC: 23 MMOL/L (ref 23–29)
CREAT SERPL-MCNC: 0.8 MG/DL (ref 0.5–1.4)
CTP QC/QA: YES
CV ECHO LV RWT: 0.5 CM
DIFFERENTIAL METHOD: ABNORMAL
DOP CALC AO PEAK VEL: 1.28 M/S
DOP CALC AO VTI: 23.29 CM
DOP CALC LVOT AREA: 3.76 CM2
DOP CALC LVOT DIAMETER: 2.19 CM
DOP CALC LVOT PEAK VEL: 0.78 M/S
DOP CALC LVOT STROKE VOLUME: 53.09 CM3
DOP CALCLVOT PEAK VEL VTI: 14.1 CM
E WAVE DECELERATION TIME: 157.54 MSEC
E/A RATIO: 2.44
E/E' RATIO: 17.5
ECHO LV POSTERIOR WALL: 1.56 CM (ref 0.6–1.1)
EOSINOPHIL # BLD AUTO: 0.1 K/UL (ref 0–0.5)
EOSINOPHIL NFR BLD: 0.9 % (ref 0–8)
ERYTHROCYTE [DISTWIDTH] IN BLOOD BY AUTOMATED COUNT: 13.4 % (ref 11.5–14.5)
EST. GFR  (AFRICAN AMERICAN): >60 ML/MIN/1.73 M^2
EST. GFR  (NON AFRICAN AMERICAN): >60 ML/MIN/1.73 M^2
FRACTIONAL SHORTENING: 12 % (ref 28–44)
GLUCOSE SERPL-MCNC: 92 MG/DL (ref 70–110)
HCT VFR BLD AUTO: 37.1 % (ref 37–48.5)
HDLC SERPL-MCNC: 43 MG/DL (ref 40–75)
HDLC SERPL: 25.3 % (ref 20–50)
HGB BLD-MCNC: 12.1 G/DL (ref 12–16)
INTERVENTRICULAR SEPTUM: 1.24 CM (ref 0.6–1.1)
IVRT: 0.11 MSEC
LA MAJOR: 5.49 CM
LA MINOR: 5.52 CM
LA WIDTH: 4.69 CM
LDLC SERPL CALC-MCNC: 117.8 MG/DL (ref 63–159)
LEFT ATRIUM SIZE: 3.91 CM
LEFT ATRIUM VOLUME INDEX: 42.8 ML/M2
LEFT ATRIUM VOLUME: 85.81 CM3
LEFT INTERNAL DIMENSION IN SYSTOLE: 5.46 CM (ref 2.1–4)
LEFT VENTRICLE DIASTOLIC VOLUME INDEX: 95.91 ML/M2
LEFT VENTRICLE DIASTOLIC VOLUME: 192.33 ML
LEFT VENTRICLE MASS INDEX: 202.8 G/M2
LEFT VENTRICLE SYSTOLIC VOLUME INDEX: 72.3 ML/M2
LEFT VENTRICLE SYSTOLIC VOLUME: 145.02 ML
LEFT VENTRICULAR INTERNAL DIMENSION IN DIASTOLE: 6.18 CM (ref 3.5–6)
LEFT VENTRICULAR MASS: 406.72 G
LIPASE SERPL-CCNC: 12 U/L (ref 4–60)
LV LATERAL E/E' RATIO: 15
LV SEPTAL E/E' RATIO: 21
LYMPHOCYTES # BLD AUTO: 1.6 K/UL (ref 1–4.8)
LYMPHOCYTES NFR BLD: 18.3 % (ref 18–48)
MAGNESIUM SERPL-MCNC: 1.9 MG/DL (ref 1.6–2.6)
MCH RBC QN AUTO: 29 PG (ref 27–31)
MCHC RBC AUTO-ENTMCNC: 32.6 G/DL (ref 32–36)
MCV RBC AUTO: 89 FL (ref 82–98)
MONOCYTES # BLD AUTO: 0.5 K/UL (ref 0.3–1)
MONOCYTES NFR BLD: 5.9 % (ref 4–15)
MV PEAK A VEL: 0.43 M/S
MV PEAK E VEL: 1.05 M/S
NEUTROPHILS # BLD AUTO: 6.3 K/UL (ref 1.8–7.7)
NEUTROPHILS NFR BLD: 74.7 % (ref 38–73)
NONHDLC SERPL-MCNC: 127 MG/DL
PISA TR MAX VEL: 2.25 M/S
PLATELET # BLD AUTO: 341 K/UL (ref 150–350)
PMV BLD AUTO: 10.7 FL (ref 9.2–12.9)
POTASSIUM SERPL-SCNC: 3.9 MMOL/L (ref 3.5–5.1)
PROT SERPL-MCNC: 7.7 G/DL (ref 6–8.4)
PULM VEIN S/D RATIO: 0.74
PV PEAK D VEL: 0.42 M/S
PV PEAK S VEL: 0.31 M/S
PV PEAK VELOCITY: 0.71 CM/S
RA MAJOR: 5.04 CM
RA PRESSURE: 8 MMHG
RA WIDTH: 4.11 CM
RBC # BLD AUTO: 4.17 M/UL (ref 4–5.4)
RIGHT VENTRICULAR END-DIASTOLIC DIMENSION: 3.8 CM
RV TISSUE DOPPLER FREE WALL SYSTOLIC VELOCITY 1 (APICAL 4 CHAMBER VIEW): 13.82 M/S
SINUS: 2.97 CM
SODIUM SERPL-SCNC: 137 MMOL/L (ref 136–145)
STJ: 2.51 CM
TDI LATERAL: 0.07
TDI SEPTAL: 0.05
TDI: 0.06
TR MAX PG: 20.25 MMHG
TRICUSPID ANNULAR PLANE SYSTOLIC EXCURSION: 1.68 CM
TRIGL SERPL-MCNC: 46 MG/DL (ref 30–150)
TROPONIN I SERPL DL<=0.01 NG/ML-MCNC: <0.006 NG/ML (ref 0–0.03)
TROPONIN I SERPL DL<=0.01 NG/ML-MCNC: <0.006 NG/ML (ref 0–0.03)
TV REST PULMONARY ARTERY PRESSURE: 28 MMHG
WBC # BLD AUTO: 8.47 K/UL (ref 3.9–12.7)

## 2019-05-24 PROCEDURE — 93005 ELECTROCARDIOGRAM TRACING: CPT

## 2019-05-24 PROCEDURE — 96374 THER/PROPH/DIAG INJ IV PUSH: CPT

## 2019-05-24 PROCEDURE — G0378 HOSPITAL OBSERVATION PER HR: HCPCS

## 2019-05-24 PROCEDURE — 83690 ASSAY OF LIPASE: CPT

## 2019-05-24 PROCEDURE — 36415 COLL VENOUS BLD VENIPUNCTURE: CPT

## 2019-05-24 PROCEDURE — 80053 COMPREHEN METABOLIC PANEL: CPT

## 2019-05-24 PROCEDURE — 96375 TX/PRO/DX INJ NEW DRUG ADDON: CPT

## 2019-05-24 PROCEDURE — 93010 ELECTROCARDIOGRAM REPORT: CPT | Mod: ,,, | Performed by: INTERNAL MEDICINE

## 2019-05-24 PROCEDURE — 99285 EMERGENCY DEPT VISIT HI MDM: CPT | Mod: 25

## 2019-05-24 PROCEDURE — 25000003 PHARM REV CODE 250: Performed by: HOSPITALIST

## 2019-05-24 PROCEDURE — 84484 ASSAY OF TROPONIN QUANT: CPT | Mod: 91

## 2019-05-24 PROCEDURE — 96375 TX/PRO/DX INJ NEW DRUG ADDON: CPT | Mod: 59

## 2019-05-24 PROCEDURE — 80061 LIPID PANEL: CPT

## 2019-05-24 PROCEDURE — 63600175 PHARM REV CODE 636 W HCPCS: Performed by: EMERGENCY MEDICINE

## 2019-05-24 PROCEDURE — 96376 TX/PRO/DX INJ SAME DRUG ADON: CPT

## 2019-05-24 PROCEDURE — 81025 URINE PREGNANCY TEST: CPT | Performed by: PHYSICIAN ASSISTANT

## 2019-05-24 PROCEDURE — 96372 THER/PROPH/DIAG INJ SC/IM: CPT

## 2019-05-24 PROCEDURE — 83735 ASSAY OF MAGNESIUM: CPT

## 2019-05-24 PROCEDURE — 85025 COMPLETE CBC W/AUTO DIFF WBC: CPT

## 2019-05-24 PROCEDURE — 63600175 PHARM REV CODE 636 W HCPCS: Performed by: HOSPITALIST

## 2019-05-24 PROCEDURE — 83880 ASSAY OF NATRIURETIC PEPTIDE: CPT

## 2019-05-24 PROCEDURE — 25000003 PHARM REV CODE 250: Performed by: EMERGENCY MEDICINE

## 2019-05-24 PROCEDURE — 93010 EKG 12-LEAD: ICD-10-PCS | Mod: ,,, | Performed by: INTERNAL MEDICINE

## 2019-05-24 PROCEDURE — 84484 ASSAY OF TROPONIN QUANT: CPT

## 2019-05-24 RX ORDER — SODIUM CHLORIDE 0.9 % (FLUSH) 0.9 %
10 SYRINGE (ML) INJECTION
Status: DISCONTINUED | OUTPATIENT
Start: 2019-05-24 | End: 2019-05-25 | Stop reason: HOSPADM

## 2019-05-24 RX ORDER — ONDANSETRON 2 MG/ML
4 INJECTION INTRAMUSCULAR; INTRAVENOUS
Status: COMPLETED | OUTPATIENT
Start: 2019-05-24 | End: 2019-05-24

## 2019-05-24 RX ORDER — NAPROXEN SODIUM 220 MG/1
81 TABLET, FILM COATED ORAL DAILY
Status: DISCONTINUED | OUTPATIENT
Start: 2019-05-25 | End: 2019-05-24

## 2019-05-24 RX ORDER — ENOXAPARIN SODIUM 100 MG/ML
40 INJECTION SUBCUTANEOUS EVERY 24 HOURS
Status: DISCONTINUED | OUTPATIENT
Start: 2019-05-24 | End: 2019-05-25 | Stop reason: HOSPADM

## 2019-05-24 RX ORDER — FUROSEMIDE 10 MG/ML
20 INJECTION INTRAMUSCULAR; INTRAVENOUS
Status: COMPLETED | OUTPATIENT
Start: 2019-05-24 | End: 2019-05-24

## 2019-05-24 RX ORDER — ACETAMINOPHEN 325 MG/1
650 TABLET ORAL EVERY 6 HOURS PRN
Status: DISCONTINUED | OUTPATIENT
Start: 2019-05-24 | End: 2019-05-25 | Stop reason: HOSPADM

## 2019-05-24 RX ORDER — CARVEDILOL 3.12 MG/1
3.12 TABLET ORAL 2 TIMES DAILY WITH MEALS
Status: DISCONTINUED | OUTPATIENT
Start: 2019-05-24 | End: 2019-05-24

## 2019-05-24 RX ORDER — ACETAMINOPHEN 500 MG
1000 TABLET ORAL
Status: COMPLETED | OUTPATIENT
Start: 2019-05-24 | End: 2019-05-24

## 2019-05-24 RX ORDER — ONDANSETRON 2 MG/ML
4 INJECTION INTRAMUSCULAR; INTRAVENOUS EVERY 6 HOURS PRN
Status: DISCONTINUED | OUTPATIENT
Start: 2019-05-24 | End: 2019-05-25 | Stop reason: HOSPADM

## 2019-05-24 RX ORDER — PROCHLORPERAZINE EDISYLATE 5 MG/ML
10 INJECTION INTRAMUSCULAR; INTRAVENOUS ONCE
Status: COMPLETED | OUTPATIENT
Start: 2019-05-24 | End: 2019-05-24

## 2019-05-24 RX ORDER — FUROSEMIDE 10 MG/ML
20 INJECTION INTRAMUSCULAR; INTRAVENOUS ONCE
Status: COMPLETED | OUTPATIENT
Start: 2019-05-25 | End: 2019-05-25

## 2019-05-24 RX ORDER — RAMELTEON 8 MG/1
8 TABLET ORAL NIGHTLY PRN
Status: DISCONTINUED | OUTPATIENT
Start: 2019-05-24 | End: 2019-05-25 | Stop reason: HOSPADM

## 2019-05-24 RX ORDER — LISINOPRIL 20 MG/1
40 TABLET ORAL DAILY
Status: DISCONTINUED | OUTPATIENT
Start: 2019-05-25 | End: 2019-05-25 | Stop reason: HOSPADM

## 2019-05-24 RX ORDER — CARVEDILOL 6.25 MG/1
12.5 TABLET ORAL 2 TIMES DAILY WITH MEALS
Status: DISCONTINUED | OUTPATIENT
Start: 2019-05-24 | End: 2019-05-25 | Stop reason: HOSPADM

## 2019-05-24 RX ORDER — PROCHLORPERAZINE EDISYLATE 5 MG/ML
10 INJECTION INTRAMUSCULAR; INTRAVENOUS EVERY 6 HOURS PRN
Status: DISCONTINUED | OUTPATIENT
Start: 2019-05-24 | End: 2019-05-24

## 2019-05-24 RX ORDER — DIPHENHYDRAMINE HYDROCHLORIDE 50 MG/ML
25 INJECTION INTRAMUSCULAR; INTRAVENOUS ONCE
Status: COMPLETED | OUTPATIENT
Start: 2019-05-24 | End: 2019-05-24

## 2019-05-24 RX ORDER — FUROSEMIDE 10 MG/ML
40 INJECTION INTRAMUSCULAR; INTRAVENOUS 2 TIMES DAILY
Status: DISCONTINUED | OUTPATIENT
Start: 2019-05-24 | End: 2019-05-24

## 2019-05-24 RX ADMIN — ONDANSETRON 4 MG: 2 INJECTION INTRAMUSCULAR; INTRAVENOUS at 10:05

## 2019-05-24 RX ADMIN — FUROSEMIDE 20 MG: 10 INJECTION, SOLUTION INTRAMUSCULAR; INTRAVENOUS at 12:05

## 2019-05-24 RX ADMIN — LIDOCAINE HYDROCHLORIDE: 20 SOLUTION ORAL; TOPICAL at 10:05

## 2019-05-24 RX ADMIN — ONDANSETRON 4 MG: 2 INJECTION INTRAMUSCULAR; INTRAVENOUS at 01:05

## 2019-05-24 RX ADMIN — ACETAMINOPHEN 1000 MG: 500 TABLET, FILM COATED ORAL at 12:05

## 2019-05-24 RX ADMIN — DIPHENHYDRAMINE HYDROCHLORIDE 25 MG: 50 INJECTION, SOLUTION INTRAMUSCULAR; INTRAVENOUS at 07:05

## 2019-05-24 RX ADMIN — FUROSEMIDE 20 MG: 10 INJECTION, SOLUTION INTRAMUSCULAR; INTRAVENOUS at 10:05

## 2019-05-24 RX ADMIN — CARVEDILOL 12.5 MG: 6.25 TABLET, FILM COATED ORAL at 06:05

## 2019-05-24 RX ADMIN — PROCHLORPERAZINE EDISYLATE 10 MG: 5 INJECTION INTRAMUSCULAR; INTRAVENOUS at 07:05

## 2019-05-24 RX ADMIN — ENOXAPARIN SODIUM 40 MG: 100 INJECTION SUBCUTANEOUS at 06:05

## 2019-05-24 NOTE — HPI
33 y.o. female with nonischemic cardiomyopathy with pacemaker/AICD in place and hypertension presents with a complaint of worsening exertional dyspnea.  She has had poor exercise tolerance for awhile, but states that last night she became nauseous and vomited with intermittent chest pain and felt as if she were suffocating in her sleep.  She reports adherence to her home medication regimen and denies dietary indiscretion.  Also denies fever, chills, cough, lower extremity edema, dizziness, syncope, diarrhea, abdominal pain, dysuria, frequency, or urgency.  Follows with Dr. Longo.  Echo in 2017 revealed severely depressed LV systolic function, EF 15-20%, grade 3 diastolic dysfunction.  Left heart catheterization also in 2017 revealed normal coronary arteries.  In the ED, BNP 72 with evidence of pulmonary edema and cardiomegaly on chest x-ray.  EKG without evidence of acute ischemia, troponin negative, other routine labs also unremarkable.  Administered IV Lasix and Placed in observation for further evaluation and treatment.

## 2019-05-24 NOTE — NURSING
Received patient from ER to room via wheelchair. Patient accompanied by transport. Transferred patient to bed. Evaluated general patient appearance and condition. Admit assessment initiated. Tele monitoring initiated. Saline lock at RAC Intact. No apparent distress noted at this time.

## 2019-05-24 NOTE — ED NOTES
Pt ambulated hallway noted to start at 95% on RA while ambulating pt o2 sat ws 93% with reported SOB. MD corea made aware

## 2019-05-24 NOTE — ED PROVIDER NOTES
Encounter Date: 2019    SCRIBE #1 NOTE: I, Raúl Morin, am scribing for, and in the presence of,  Rashad Stone MD. I have scribed the following portions of the note - Other sections scribed: HPI, ROS.       History     Chief Complaint   Patient presents with    Chest Pain     pt reports intermittent midsernal chest pains that began last night with nausea, hx of CHF     CC: Chest Pain    HPI: This 33 y.o. Female with CHF, HTN and pacemaker defibrillator presents to the ED for an evaluation of intermittent midsternal chest pain, nausea and emesis which began last night while laying down. Pt reports she was suffocating in her sleep last night. She denies any recent salt intake or leg swelling. Pt complies with lasix 40 mg daily. She denies fever, abdominal pain, diarrhea, neck pain, dysuria or numbness.    The history is provided by the patient. No  was used.     Review of patient's allergies indicates:  No Known Allergies  Past Medical History:   Diagnosis Date    CHF (congestive heart failure)     Hypertension     dx at 16y.o.    Pacemaker 2017     Past Surgical History:   Procedure Laterality Date    CARDIAC DEFIBRILLATOR PLACEMENT      CARDIAC DEFIBRILLATOR PLACEMENT  2017     SECTION      x 1    DILATION AND CURETTAGE, UTERUS N/A 2012    [facility] by [facility] OR    ICD SC new N/A 2017    Performed by Jose De Jesus Longo MD at [facility] CATH LAB    INDUCED       LIGATION, FALLOPIAN TUBE, LAPAROSCOPIC N/A 2013    [facility] by Wilfred Jules [facility] at [facility] OR    TUBAL LIGATION       Family History   Problem Relation Age of Onset    Hypertension Mother     Cancer Maternal Grandmother         breast x 2    Cancer Paternal Grandmother         breast    No Known Problems Sister     No Known Problems Brother     No Known Problems Son     No Known Problems Brother     Hypertension Other     Diabetes Other      Breast cancer Other     Cancer Paternal Aunt         breast    Cancer Paternal Uncle      Social History     Tobacco Use    Smoking status: Never Smoker    Smokeless tobacco: Never Used   Substance Use Topics    Alcohol use: Yes     Comment: rarely    Drug use: No     Review of Systems   Constitutional: Negative for chills and fever.   HENT: Negative for ear pain, rhinorrhea and sore throat.    Eyes: Negative for redness.   Respiratory: Negative for shortness of breath.    Cardiovascular: Positive for chest pain.   Gastrointestinal: Positive for nausea and vomiting. Negative for abdominal pain and diarrhea.   Genitourinary: Negative for dysuria and hematuria.   Musculoskeletal: Negative for back pain and neck pain.   Skin: Negative for rash.   Neurological: Negative for weakness, numbness and headaches.   Hematological: Does not bruise/bleed easily.   Psychiatric/Behavioral: The patient is not nervous/anxious.        Physical Exam     Initial Vitals [05/24/19 0945]   BP Pulse Resp Temp SpO2   119/84 92 19 99 °F (37.2 °C) 96 %      MAP       --         Physical Exam    Nursing note and vitals reviewed.  Constitutional: She appears well-developed and well-nourished.   HENT:   Mouth/Throat: Oropharynx is clear and moist.   Eyes: EOM are normal. Pupils are equal, round, and reactive to light.   Neck: Normal range of motion. Neck supple. No thyromegaly present. No JVD present.   Cardiovascular: Normal rate, regular rhythm, normal heart sounds and intact distal pulses. Exam reveals no gallop and no friction rub.    No murmur heard.  Pulmonary/Chest: No respiratory distress. She has no wheezes. She has no rhonchi. She has rales.   Abdominal: Soft. Bowel sounds are normal. She exhibits no distension. There is no tenderness. There is no rebound and no guarding.   Musculoskeletal: Normal range of motion. She exhibits no edema or tenderness.   Neurological: She is alert and oriented to person, place, and time. She has  normal strength. She displays normal reflexes. No cranial nerve deficit or sensory deficit. GCS score is 15. GCS eye subscore is 4. GCS verbal subscore is 5. GCS motor subscore is 6.   Skin: Skin is warm and dry. Capillary refill takes less than 2 seconds.         ED Course   Critical Care  Date/Time: 5/24/2019 12:00 PM  Performed by: Rashad Stone MD  Authorized by: Rashad Stone MD   Direct patient critical care time: 15 minutes  Additional history critical care time: 10 minutes  Ordering / reviewing critical care time: 10 minutes  Documentation critical care time: 10 minutes  Consulting other physicians critical care time: 8 minutes  Other critical care time: 10 (admission) minutes  Total critical care time (exclusive of procedural time) : 63 minutes  Critical care time was exclusive of separately billable procedures and treating other patients and teaching time.  Critical care was necessary to treat or prevent imminent or life-threatening deterioration of the following conditions: cardiac failure.  Critical care was time spent personally by me on the following activities: development of treatment plan with patient or surrogate, discussions with consultants, discussions with primary provider, interpretation of cardiac output measurements, evaluation of patient's response to treatment, examination of patient, obtaining history from patient or surrogate, ordering and performing treatments and interventions, ordering and review of laboratory studies, ordering and review of radiographic studies, pulse oximetry, re-evaluation of patient's condition and review of old charts.        Labs Reviewed   CBC W/ AUTO DIFFERENTIAL - Abnormal; Notable for the following components:       Result Value    Gran% 74.7 (*)     All other components within normal limits   B-TYPE NATRIURETIC PEPTIDE - Abnormal; Notable for the following components:     (*)     All other components within normal limits   COMPREHENSIVE  METABOLIC PANEL   TROPONIN I   MAGNESIUM   LIPASE   POCT URINE PREGNANCY        ECG Results          EKG 12-lead (Final result)  Result time 05/26/19 20:55:59    Final result by Interface, Lab In [facility] (05/26/19 20:55:59)                 Narrative:    Test Reason : R07.89,    Vent. Rate : 090 BPM     Atrial Rate : 090 BPM     P-R Int : 160 ms          QRS Dur : 092 ms      QT Int : 392 ms       P-R-T Axes : 028 093 072 degrees     QTc Int : 479 ms    Normal sinus rhythm  Rightward axis  Borderline Abnormal ECG  When compared with ECG of 24-MAY-2019 09:47,  No significant change was found  Confirmed by Kashif Peguero MD ([id number]) on 5/26/2019 8:55:51 PM    Referred By: System System           Confirmed By:Kashif Peguero MD                            Imaging Results          X-Ray Chest 1 View (Final result)  Result time 05/24/19 10:36:28    Final result by Yayo Howard MD (05/24/19 10:36:28)                 Impression:      Cardiac defibrillator.    Prominence of the cardiac silhouette, stable.    Pulmonary vascular congestion.      Electronically signed by: Yayo Howard MD  Date:    05/24/2019  Time:    10:36             Narrative:    EXAMINATION:  XR CHEST 1 VIEW    CLINICAL HISTORY:  Other chest pain    TECHNIQUE:  Single frontal view of the chest was performed.    COMPARISON:  03/25/2019.    FINDINGS:  There is a single lead cardiac defibrillator present as before.  The heart and mediastinal structures are unchanged.  There is pulmonary vascular congestion present.  The lungs are free of focal consolidations.  There is no evidence for pneumothorax or large pleural effusions.                              X-Rays:   Independently Interpreted Readings:   Chest X-Ray: Cardiomegaly present.  Increased vascular markings consistent with CHF are present.       patient presents with significant increase in orthopnea and dyspnea on exertion since yesterday.  Patient has chest pressure that is worse with lying down.   Patient has a history of significant dilated cardiomyopathy with an EF of 15% great the diastolic failure on last echo in 2017.  Patient does have a defibrillator placed.  Has not felt to gone off.  Patient's room-air oxygen saturations are 96%.  With ambulation patient becomes significantly tachypneic however oxygen saturations hold 93%.  Patient unable to lay down in the bed due to feeling like she is being smothered.  States she normally sleeps relatively flat.  No edema. Mild JVD.  Mild rales.  Patient given Lasix IV 20 mg x2 doses here in the emergency department.  She took Lasix p.o. 40 mg earlier today.  Patient observed for several hours without improvement of symptomatology.  Patient has only urinated once.  Will place in observation overnight and change to 40 mg IV b.i.d. of Lasix.  Will repeat 2D echo and consult Cardiology.  I discussed the case with Sterling with the observation service.          Scribe Attestation:   Scribe #1: I performed the above scribed service and the documentation accurately describes the services I performed. I attest to the accuracy of the note.    Attending Attestation:           Physician Attestation for Scribe:  Physician Attestation Statement for Scribe #1: I, Rashad Stone MD, reviewed documentation, as scribed by Raúl Morin in my presence, and it is both accurate and complete.                    Clinical Impression:       ICD-10-CM ICD-9-CM   1. Acute on chronic combined systolic and diastolic congestive heart failure I50.43 428.43     428.0   2. Chest discomfort R07.89 786.59   3. Heart failure I50.9 428.9   4. CHF (congestive heart failure) I50.9 428.0   5. Essential hypertension I10 401.9   6. Nonischemic cardiomyopathy I42.8 425.4                                Rashad Stone MD  05/24/19 1650       Rashad Stone MD  07/19/19 1336

## 2019-05-24 NOTE — SUBJECTIVE & OBJECTIVE
Past Medical History:   Diagnosis Date    CHF (congestive heart failure)     Hypertension     dx at 16y.o.    Pacemaker 2017       Past Surgical History:   Procedure Laterality Date    CARDIAC DEFIBRILLATOR PLACEMENT      CARDIAC DEFIBRILLATOR PLACEMENT  2017     SECTION      x 1    DILATION AND CURETTAGE, UTERUS N/A 2012    Performed by [facility] at [facility] OR    ICD SC new N/A 2017    Performed by Jose De Jesus [facility]o MD at [facility] CATH LAB    INDUCED       LIGATION, FALLOPIAN TUBE, LAPAROSCOPIC N/A 2013    Performed by Wilfred [facility]ules [facility] at [facility] OR    TUBAL LIGATION         Review of patient's allergies indicates:  No Known Allergies    No current facility-administered medications on file prior to encounter.      Current Outpatient Medications on File Prior to Encounter   Medication Sig    carvedilol (COREG) 25 MG tablet TAKE 1 TABLET(25 MG) BY MOUTH TWICE DAILY    furosemide (LASIX) 40 MG tablet Take 1 tablet (40 mg total) by mouth once daily.    lisinopril (PRINIVIL,ZESTRIL) 40 MG tablet Take 1 tablet (40 mg total) by mouth once daily.     Family History     Problem Relation (Age of Onset)    Breast cancer Other    Cancer Maternal Grandmother, Paternal Grandmother, Paternal Aunt, Paternal Uncle    Diabetes Other    Hypertension Mother, Other    No Known Problems Sister, Brother, Son, Brother        Tobacco Use    Smoking status: Never Smoker    Smokeless tobacco: Never Used   Substance and Sexual Activity    Alcohol use: Yes     Comment: rarely    Drug use: No    Sexual activity: Yes     Partners: Male     Birth control/protection: None     Review of Systems   Constitutional: Negative for chills, fatigue and fever.   Eyes: Negative for photophobia and visual disturbance.   Respiratory: Positive for shortness of breath. Negative for cough.    Cardiovascular: Positive for chest pain. Negative for palpitations and leg swelling.    Gastrointestinal: Positive for nausea and vomiting. Negative for abdominal pain and diarrhea.   Genitourinary: Negative for dysuria, frequency and urgency.   Skin: Negative for pallor, rash and wound.   Neurological: Negative for light-headedness and headaches.   Psychiatric/Behavioral: Negative for confusion and decreased concentration.     Objective:     Vital Signs (Most Recent):  Temp: 98.2 °F (36.8 °C) (05/24/19 1411)  Pulse: 75 (05/24/19 1301)  Resp: 13 (05/24/19 1227)  BP: 116/77 (05/24/19 1301)  SpO2: 99 % (05/24/19 1301) Vital Signs (24h Range):  Temp:  [98.2 °F (36.8 °C)-99 °F (37.2 °C)] 98.2 °F (36.8 °C)  Pulse:  [75-92] 75  Resp:  [13-19] 13  SpO2:  [96 %-99 %] 99 %  BP: (114-122)/(76-91) 116/77     Weight: 82.6 kg (182 lb)  Body mass index is 26.11 kg/m².    Physical Exam   Constitutional: She is oriented to person, place, and time. She appears well-developed and well-nourished. No distress.   HENT:   Head: Normocephalic and atraumatic.   Right Ear: External ear normal.   Left Ear: External ear normal.   Nose: Nose normal.   Mouth/Throat: Oropharynx is clear and moist.   Eyes: Pupils are equal, round, and reactive to light. Conjunctivae and EOM are normal.   Neck: Normal range of motion. Neck supple.   Cardiovascular: Normal rate, regular rhythm and intact distal pulses.   Pulmonary/Chest: Effort normal and breath sounds normal. No respiratory distress. She has no wheezes.   Abdominal: Soft. Bowel sounds are normal. She exhibits no distension. There is no tenderness.   No palpable hepatomegaly or splenomegaly    Musculoskeletal: Normal range of motion. She exhibits no edema or tenderness.   Neurological: She is alert and oriented to person, place, and time.   Skin: Skin is warm and dry.   Psychiatric: She has a normal mood and affect. Thought content normal.   Nursing note and vitals reviewed.        CRANIAL NERVES     CN III, IV, VI   Pupils are equal, round, and reactive to light.  Extraocular  motions are normal.        Significant Labs: All pertinent labs within the past 24 hours have been reviewed.    Significant Imaging: I have reviewed all pertinent imaging results/findings within the past 24 hours.

## 2019-05-24 NOTE — H&P
[facility] Hospital Medicine  History & Physical    Patient Name: Nasra Marks  MRN: [id number]  Admission Date: 5/24/2019  Attending Physician: Estefania Lam MD   Primary Care Provider: Veronika Rainey MD         Patient information was obtained from patient, past medical records and ER records.     Subjective:     Principal Problem:Acute on chronic combined systolic and diastolic congestive heart failure    Chief Complaint:   Chief Complaint   Patient presents with    Chest Pain     pt reports intermittent midsernal chest pains that began last night with nausea, hx of CHF        HPI: 33 y.o. female with nonischemic cardiomyopathy with pacemaker/AICD in place and hypertension presents with a complaint of worsening exertional dyspnea.  She has had poor exercise tolerance for awhile, but states that last night she became nauseous and vomited with intermittent chest pain and felt as if she were suffocating in her sleep.  She reports adherence to her home medication regimen and denies dietary indiscretion.  Also denies fever, chills, cough, lower extremity edema, dizziness, syncope, diarrhea, abdominal pain, dysuria, frequency, or urgency.  Follows with Dr. Longo.  Echo in 2017 revealed severely depressed LV systolic function, EF 15-20%, grade 3 diastolic dysfunction.  Left heart catheterization also in 2017 revealed normal coronary arteries.  In the ED, BNP 72 with evidence of pulmonary edema and cardiomegaly on chest x-ray.  EKG without evidence of acute ischemia, troponin negative, other routine labs also unremarkable.  Administered IV Lasix and Placed in observation for further evaluation and treatment.    Past Medical History:   Diagnosis Date    CHF (congestive heart failure)     Hypertension     dx at 16y.o.    Pacemaker 12/18/2017       Past Surgical History:   Procedure Laterality Date    CARDIAC DEFIBRILLATOR PLACEMENT      CARDIAC DEFIBRILLATOR PLACEMENT  12/2017      SECTION      x 1    DILATION AND CURETTAGE, UTERUS N/A 2012    Performed by [facility]ye [facility] at [facility] OR    ICD SC new N/A 2017    Performed by Jose De Jesus [facility]o MD at [facility] CATH LAB    INDUCED       LIGATION, FALLOPIAN TUBE, LAPAROSCOPIC N/A 2013    Performed by [facility]fred Jules [facility] at [facility] OR    TUBAL LIGATION         Review of patient's allergies indicates:  No Known Allergies    No current facility-administered medications on file prior to encounter.      Current Outpatient Medications on File Prior to Encounter   Medication Sig    carvedilol (COREG) 25 MG tablet TAKE 1 TABLET(25 MG) BY MOUTH TWICE DAILY    furosemide (LASIX) 40 MG tablet Take 1 tablet (40 mg total) by mouth once daily.    lisinopril (PRINIVIL,ZESTRIL) 40 MG tablet Take 1 tablet (40 mg total) by mouth once daily.     Family History     Problem Relation (Age of Onset)    Breast cancer Other    Cancer Maternal Grandmother, Paternal Grandmother, Paternal Aunt, Paternal Uncle    Diabetes Other    Hypertension Mother, Other    No Known Problems Sister, Brother, Son, Brother        Tobacco Use    Smoking status: Never Smoker    Smokeless tobacco: Never Used   Substance and Sexual Activity    Alcohol use: Yes     Comment: rarely    Drug use: No    Sexual activity: Yes     Partners: Male     Birth control/protection: None     Review of Systems   Constitutional: Negative for chills, fatigue and fever.   Eyes: Negative for photophobia and visual disturbance.   Respiratory: Positive for shortness of breath. Negative for cough.    Cardiovascular: Positive for chest pain. Negative for palpitations and leg swelling.   Gastrointestinal: Positive for nausea and vomiting. Negative for abdominal pain and diarrhea.   Genitourinary: Negative for dysuria, frequency and urgency.   Skin: Negative for pallor, rash and wound.   Neurological: Negative for light-headedness and headaches.   Psychiatric/Behavioral:  Negative for confusion and decreased concentration.     Objective:     Vital Signs (Most Recent):  Temp: 98.2 °F (36.8 °C) (05/24/19 1411)  Pulse: 75 (05/24/19 1301)  Resp: 13 (05/24/19 1227)  BP: 116/77 (05/24/19 1301)  SpO2: 99 % (05/24/19 1301) Vital Signs (24h Range):  Temp:  [98.2 °F (36.8 °C)-99 °F (37.2 °C)] 98.2 °F (36.8 °C)  Pulse:  [75-92] 75  Resp:  [13-19] 13  SpO2:  [96 %-99 %] 99 %  BP: (114-122)/(76-91) 116/77     Weight: 82.6 kg (182 lb)  Body mass index is 26.11 kg/m².    Physical Exam   Constitutional: She is oriented to person, place, and time. She appears well-developed and well-nourished. No distress.   HENT:   Head: Normocephalic and atraumatic.   Right Ear: External ear normal.   Left Ear: External ear normal.   Nose: Nose normal.   Mouth/Throat: Oropharynx is clear and moist.   Eyes: Pupils are equal, round, and reactive to light. Conjunctivae and EOM are normal.   Neck: Normal range of motion. Neck supple.   Cardiovascular: Normal rate, regular rhythm and intact distal pulses.   Pulmonary/Chest: Effort normal and breath sounds normal. No respiratory distress. She has no wheezes.   Abdominal: Soft. Bowel sounds are normal. She exhibits no distension. There is no tenderness.   No palpable hepatomegaly or splenomegaly    Musculoskeletal: Normal range of motion. She exhibits no edema or tenderness.   Neurological: She is alert and oriented to person, place, and time.   Skin: Skin is warm and dry.   Psychiatric: She has a normal mood and affect. Thought content normal.   Nursing note and vitals reviewed.        CRANIAL NERVES     CN III, IV, VI   Pupils are equal, round, and reactive to light.  Extraocular motions are normal.        Significant Labs: All pertinent labs within the past 24 hours have been reviewed.    Significant Imaging: I have reviewed all pertinent imaging results/findings within the past 24 hours.    Assessment/Plan:     * Acute on chronic combined systolic and diastolic  congestive heart failure  Complaint of PND last night and worsening exertional dyspnea, BNP elevated greater than 700 with evidence of pulmonary edema on chest x-ray, continue diuresis with IV Lasix and afterload reduction as tolerated, check echo, I&Os, daily weights.    Nonischemic cardiomyopathy  As above    Essential hypertension  Well controlled, continue home medications and monitor blood pressure, adjust as needed.      VTE Risk Mitigation (From admission, onward)        Ordered     enoxaparin injection 40 mg  Daily      05/24/19 1754     IP VTE HIGH RISK PATIENT  Once      05/24/19 1754        Sterling Washburn Jr., APRN, [facility]-BC  Hospitalist - Department of Hospital Medicine  [facility] [address] [place] [address]  Office #: [phone]; Pager #: [phone]

## 2019-05-24 NOTE — ED TRIAGE NOTES
Reports having CP that started late last night as heaviness, and this morning reports tightness to chest. Reports SOB, Reports nausea, and denies dizziness

## 2019-05-24 NOTE — PLAN OF CARE
05/24/19 1640   Discharge Assessment   Assessment Type Discharge Planning Assessment   Confirmed/corrected address and phone number on facesheet? Yes   Assessment information obtained from? Patient   Prior to hospitilization cognitive status: Alert/Oriented   Prior to hospitalization functional status: Independent   Current cognitive status: Alert/Oriented   Current Functional Status: Independent   Lives With spouse   Able to Return to Prior Arrangements no   Is patient able to care for self after discharge? Yes   Who are your caregiver(s) and their phone number(s)? Jacob [phone]   Patient's perception of discharge disposition home or selfcare   Readmission Within the Last 30 Days no previous admission in last 30 days   Patient currently being followed by outpatient case management? No   Patient currently receives any other outside agency services? No   Equipment Currently Used at Home none   Do you have any problems affording any of your prescribed medications? No   Is the patient taking medications as prescribed? yes   Does the patient have transportation home? Yes   Transportation Anticipated car, drives self;family or friend will provide   Dialysis Name and Scheduled days N/A   Does the patient receive services at the [facility]? No   Discharge Plan A Home with family   Discharge Plan B Home with family   DME Needed Upon Discharge  none   Patient/Family in Agreement with Plan yes       DEBBIE met with pt and pt's family at bedside. SW explained her role in Care Management. Pt voiced understanding. SW inquired about HELP AT HOME. Pt stated that she will have Jacob at home to help for support. SW voiced understanding. SW inquired about responsibilities when it comes to  MANAGING HER/HIS HEALTH at home and what it entails. Pt inquired about details. SW informed pt of RESPONSIBILITIES of:    1. Follow up appointments  2. Getting Prescriptions filled  3. Taking medications as prescribed.     Pt voiced  "understanding.  SW explained "My Health Packet" blue folder and the pink and green tabs that are on the folder as well. Discharge Brochure given to pt with Care Team information. Pt voiced understanding.     Pt's pharmacy:   [facility] [id number] - [place] - [address] [facility] [address]  [address]  [place] [address]  Phone: [phone] Fax: [phone]      Pt's preference for appointments:mornings       "

## 2019-05-24 NOTE — ASSESSMENT & PLAN NOTE
Complaint of PND last night and worsening exertional dyspnea, BNP elevated greater than 700 with evidence of pulmonary edema on chest x-ray, continue diuresis with IV Lasix and afterload reduction as tolerated, check echo, I&Os, daily weights.

## 2019-05-25 VITALS
DIASTOLIC BLOOD PRESSURE: 69 MMHG | HEART RATE: 70 BPM | WEIGHT: 182.31 LBS | TEMPERATURE: 98 F | RESPIRATION RATE: 18 BRPM | SYSTOLIC BLOOD PRESSURE: 111 MMHG | OXYGEN SATURATION: 98 % | BODY MASS INDEX: 26.1 KG/M2 | HEIGHT: 70 IN

## 2019-05-25 LAB
ALBUMIN SERPL BCP-MCNC: 3.7 G/DL (ref 3.5–5.2)
ALP SERPL-CCNC: 61 U/L (ref 55–135)
ALT SERPL W/O P-5'-P-CCNC: 10 U/L (ref 10–44)
ANION GAP SERPL CALC-SCNC: 7 MMOL/L (ref 8–16)
AST SERPL-CCNC: 14 U/L (ref 10–40)
BASOPHILS # BLD AUTO: 0.03 K/UL (ref 0–0.2)
BASOPHILS NFR BLD: 0.3 % (ref 0–1.9)
BILIRUB SERPL-MCNC: 0.6 MG/DL (ref 0.1–1)
BUN SERPL-MCNC: 18 MG/DL (ref 6–20)
CALCIUM SERPL-MCNC: 9 MG/DL (ref 8.7–10.5)
CHLORIDE SERPL-SCNC: 104 MMOL/L (ref 95–110)
CO2 SERPL-SCNC: 26 MMOL/L (ref 23–29)
CREAT SERPL-MCNC: 1 MG/DL (ref 0.5–1.4)
DIFFERENTIAL METHOD: ABNORMAL
EOSINOPHIL # BLD AUTO: 0.1 K/UL (ref 0–0.5)
EOSINOPHIL NFR BLD: 0.8 % (ref 0–8)
ERYTHROCYTE [DISTWIDTH] IN BLOOD BY AUTOMATED COUNT: 13.4 % (ref 11.5–14.5)
EST. GFR  (AFRICAN AMERICAN): >60 ML/MIN/1.73 M^2
EST. GFR  (NON AFRICAN AMERICAN): >60 ML/MIN/1.73 M^2
GLUCOSE SERPL-MCNC: 83 MG/DL (ref 70–110)
HCT VFR BLD AUTO: 36 % (ref 37–48.5)
HGB BLD-MCNC: 11.8 G/DL (ref 12–16)
LYMPHOCYTES # BLD AUTO: 1.8 K/UL (ref 1–4.8)
LYMPHOCYTES NFR BLD: 20.3 % (ref 18–48)
MAGNESIUM SERPL-MCNC: 2.1 MG/DL (ref 1.6–2.6)
MCH RBC QN AUTO: 28.9 PG (ref 27–31)
MCHC RBC AUTO-ENTMCNC: 32.8 G/DL (ref 32–36)
MCV RBC AUTO: 88 FL (ref 82–98)
MONOCYTES # BLD AUTO: 0.6 K/UL (ref 0.3–1)
MONOCYTES NFR BLD: 7.2 % (ref 4–15)
NEUTROPHILS # BLD AUTO: 6.2 K/UL (ref 1.8–7.7)
NEUTROPHILS NFR BLD: 71.4 % (ref 38–73)
PLATELET # BLD AUTO: 315 K/UL (ref 150–350)
PMV BLD AUTO: 10.7 FL (ref 9.2–12.9)
POCT GLUCOSE: 84 MG/DL (ref 70–110)
POTASSIUM SERPL-SCNC: 3.7 MMOL/L (ref 3.5–5.1)
PROT SERPL-MCNC: 7.3 G/DL (ref 6–8.4)
RBC # BLD AUTO: 4.08 M/UL (ref 4–5.4)
SODIUM SERPL-SCNC: 137 MMOL/L (ref 136–145)
WBC # BLD AUTO: 8.72 K/UL (ref 3.9–12.7)

## 2019-05-25 PROCEDURE — 63600175 PHARM REV CODE 636 W HCPCS: Performed by: HOSPITALIST

## 2019-05-25 PROCEDURE — 25000003 PHARM REV CODE 250: Performed by: EMERGENCY MEDICINE

## 2019-05-25 PROCEDURE — 85025 COMPLETE CBC W/AUTO DIFF WBC: CPT

## 2019-05-25 PROCEDURE — G0378 HOSPITAL OBSERVATION PER HR: HCPCS

## 2019-05-25 PROCEDURE — 96376 TX/PRO/DX INJ SAME DRUG ADON: CPT

## 2019-05-25 PROCEDURE — 94761 N-INVAS EAR/PLS OXIMETRY MLT: CPT

## 2019-05-25 PROCEDURE — 80053 COMPREHEN METABOLIC PANEL: CPT

## 2019-05-25 PROCEDURE — 36415 COLL VENOUS BLD VENIPUNCTURE: CPT

## 2019-05-25 PROCEDURE — 63600175 PHARM REV CODE 636 W HCPCS: Performed by: NURSE PRACTITIONER

## 2019-05-25 PROCEDURE — 25000003 PHARM REV CODE 250: Performed by: HOSPITALIST

## 2019-05-25 PROCEDURE — 83735 ASSAY OF MAGNESIUM: CPT

## 2019-05-25 RX ORDER — FUROSEMIDE 10 MG/ML
40 INJECTION INTRAMUSCULAR; INTRAVENOUS DAILY
Status: DISCONTINUED | OUTPATIENT
Start: 2019-05-25 | End: 2019-05-25 | Stop reason: HOSPADM

## 2019-05-25 RX ADMIN — LISINOPRIL 40 MG: 20 TABLET ORAL at 09:05

## 2019-05-25 RX ADMIN — CARVEDILOL 12.5 MG: 6.25 TABLET, FILM COATED ORAL at 09:05

## 2019-05-25 RX ADMIN — FUROSEMIDE 20 MG: 10 INJECTION, SOLUTION INTRAMUSCULAR; INTRAVENOUS at 06:05

## 2019-05-25 RX ADMIN — FUROSEMIDE 40 MG: 10 INJECTION, SOLUTION INTRAVENOUS at 09:05

## 2019-05-25 NOTE — HOSPITAL COURSE
Very nice 33-year-old  female with cardiac defibrillator in place presented with shortness of breath and found to be in CHF exacerbation.  She had chest x-ray significant for suggested pulmonary vascular congestion, , and repeat 2D echo on 05/24/2019 showed LVEF of 15% with grade 3 diastolic dysfunction, and left ventricular hypertrophy.  She was started on IV Lasix for diuresis and responded well to treatment.  Although I and documentation is inaccurate the patient reports that she has urinated multiple times during the night but discarded some.  This morning patient's physical assessment shows an improved picture, no lower extremity edema noted her lungs are clear, and no apparent JVD. Oxygen saturations 99% on room air, she converses in full sentences, and she is in a good mood.  Electrolytes are within normal limits and her renal functions appear preserved.  She has decent cholesterol control. She endorsed drinking more Des-Aid lately because it has been hot.She received extensive counseling from this provider regarding fluid restriction and low-sodium diet.  We talked in detail about how to restrict her fluid intake and what to look for her low sodium diet.  Her vitals are grossly stable this morning she is anxious for discharge and will discharge to follow up with primary care.

## 2019-05-25 NOTE — DISCHARGE INSTRUCTIONS
"Complete medications as ordered  Follow all discharge instructions.  Please schedule follow up appointments as necessary      When to Call Your Doctor  Call your doctor immediately if you have any of the following:  · Severe headache  · Severe dizziness, or fainting  · Nausea or vomiting  · Fast heartbeat (higher than 100 beats per minute)  · Fever or chills  · Swollen ankles  · Weakness  · Chest Pain attacks that last longer, occur more often, or are more severe than in the past       1) Do not use mak salt in the Blue Box  2) Use mak salt in the light blue box (mak light)  3) Throw away all spices and cooking ingredients that have salt in first 4 ingredients)  4) Buy new spices that say "powder" not "salt" (example, onion powder, garlic powder, lemon pepper powder)  5) Buy only frozen or fresh vegatables (no canned veggies or fruit)  6) Avoid prepackaged cold cuts (example, turkey, ham, bologna)  7) Drink only 1.5 liters of fluid daily (6 small cups or 3/16 oz bottles) this should include your meals and coffee as well    "

## 2019-05-25 NOTE — PLAN OF CARE
05/25/19 1159   Final Note   Assessment Type Final Discharge Note   Anticipated Discharge Disposition Home   What phone number can be called within the next 1-3 days to see how you are doing after discharge?   ([phone] )   Hospital Follow Up  Appt(s) scheduled? Yes  (Unable to schedule within 3 days; earliest available was 6/17)   Discharge plans and expectations educations in teach back method with documentation complete? Yes   Right Care Referral Info   Post Acute Recommendation No Care

## 2019-05-25 NOTE — NURSING
Report received from DIGNA Ayoub. Patient resting comfortably in bed, denies chest pain, denies shortness of breath. Plan of care reviewed with patient. Will continue to monitor.

## 2019-05-25 NOTE — PLAN OF CARE
Problem: Fluid Volume Excess  Goal: Fluid Balance    Intervention: Monitor and Manage Hypervolemia     05/25/19 1059   Monitor and Manage Hypervolemia   Skin Protection adhesive use limited;electrode sites changed;pouching devices used;transparent dressing maintained;tubing/devices free from skin contact

## 2019-05-25 NOTE — DISCHARGE SUMMARY
[facility] Hospital Medicine  Discharge Summary      Patient Name: Nasra Marks  MRN: [id number]  Admission Date: 5/24/2019  Hospital Length of Stay: 0 days  Discharge Date and Time:  05/25/2019 11:19 AM  Attending Physician: Estefania Lam MD   Discharging Provider: EDILSON Denny  Primary Care Provider: Veronika Rainey MD      HPI:   33 y.o. female with nonischemic cardiomyopathy with pacemaker/AICD in place and hypertension presents with a complaint of worsening exertional dyspnea.  She has had poor exercise tolerance for awhile, but states that last night she became nauseous and vomited with intermittent chest pain and felt as if she were suffocating in her sleep.  She reports adherence to her home medication regimen and denies dietary indiscretion.  Also denies fever, chills, cough, lower extremity edema, dizziness, syncope, diarrhea, abdominal pain, dysuria, frequency, or urgency.  Follows with Dr. Longo.  Echo in 2017 revealed severely depressed LV systolic function, EF 15-20%, grade 3 diastolic dysfunction.  Left heart catheterization also in 2017 revealed normal coronary arteries.  In the ED, BNP 72 with evidence of pulmonary edema and cardiomegaly on chest x-ray.  EKG without evidence of acute ischemia, troponin negative, other routine labs also unremarkable.  Administered IV Lasix and Placed in observation for further evaluation and treatment.    * No surgery found *      Hospital Course:   Very nice 33-year-old  female with cardiac defibrillator in place presented with shortness of breath and found to be in CHF exacerbation.  She had chest x-ray significant for suggested pulmonary vascular congestion, , and repeat 2D echo on 05/24/2019 showed LVEF of 15% with grade 3 diastolic dysfunction, and left ventricular hypertrophy.  She was started on IV Lasix for diuresis and responded well to treatment.  Although I and documentation is inaccurate the  patient reports that she has urinated multiple times during the night but discarded some.  This morning patient's physical assessment shows an improved picture, no lower extremity edema noted her lungs are clear, and no apparent JVD. Oxygen saturations 99% on room air, she converses in full sentences, and she is in a good mood.  Electrolytes are within normal limits and her renal functions appear preserved.  She has decent cholesterol control. She endorsed drinking more Des-Aid lately because it has been hot.She received extensive counseling from this provider regarding fluid restriction and low-sodium diet.  We talked in detail about how to restrict her fluid intake and what to look for her low sodium diet.  Her vitals are grossly stable this morning she is anxious for discharge and will discharge to follow up with primary care.     Consults:     No new Assessment & Plan notes have been filed under this hospital service since the last note was generated.  Service: Hospital Medicine    Final Active Diagnoses:    Diagnosis Date Noted POA    PRINCIPAL PROBLEM:  Acute on chronic combined systolic and diastolic congestive heart failure [I50.43] 05/24/2019 Yes    Nonischemic cardiomyopathy [I42.8] 05/24/2019 Yes     Chronic    Essential hypertension [I10] 11/19/2012 Yes     Chronic      Problems Resolved During this Admission:       Discharged Condition: stable    Disposition: Home or Self Care    Follow Up:  Follow-up Information     Veronika Rainey MD In 3 days.    Specialties:  Family Medicine, Wound Care  Why:  Call the office to schedule appointment, For outpatient follow-up/post hospitalizaton  Contact information:  [address]  [place] [address]  [phone]                 Patient Instructions:      Diet Cardiac     Activity as tolerated       Significant Diagnostic Studies: Labs:   CMP   Recent Labs   Lab 05/24/19  1009 05/25/19  0438    137   K 3.9 3.7    104   CO2 23 26   GLU 92 83   BUN 11  18   CREATININE 0.8 1.0   CALCIUM 9.2 9.0   PROT 7.7 7.3   ALBUMIN 3.9 3.7   BILITOT 0.7 0.6   ALKPHOS 64 61   AST 17 14   ALT 12 10   ANIONGAP 8 7*   ESTGFRAFRICA >60 >60   EGFRNONAA >60 >60   , CBC   Recent Labs   Lab 05/24/19  1009 05/25/19  0438   WBC 8.47 8.72   HGB 12.1 11.8*   HCT 37.1 36.0*    315   , INR   Lab Results   Component Value Date    INR 1.0 04/30/2018    INR 1.1 12/14/2017    INR 1.0 06/20/2017   , Lipid Panel   Lab Results   Component Value Date    CHOL 170 05/24/2019    HDL 43 05/24/2019    LDLCALC 117.8 05/24/2019    TRIG 46 05/24/2019    CHOLHDL 25.3 05/24/2019   , Troponin   Recent Labs   Lab 05/24/19  1927   TROPONINI <0.006    and A1C: No results for input(s): HGBA1C in the last 4320 hours.    Pending Diagnostic Studies:     None         Medications:  Reconciled Home Medications:      Medication List      CONTINUE taking these medications    carvedilol 25 MG tablet  Commonly known as:  COREG  TAKE 1 TABLET(25 MG) BY MOUTH TWICE DAILY     furosemide 40 MG tablet  Commonly known as:  LASIX  Take 1 tablet (40 mg total) by mouth once daily.     lisinopril 40 MG tablet  Commonly known as:  PRINIVIL,ZESTRIL  Take 1 tablet (40 mg total) by mouth once daily.            Indwelling Lines/Drains at time of discharge:   Lines/Drains/Airways          None          Time spent on the discharge of patient: 45 minutes  Patient was seen and examined on the date of discharge and determined to be suitable for discharge.          JAK Mccall, FNP-C  Hospitalist - Department of Hospital Medicine  [address], [place] [address]  Office [phone]; Pager [phone]

## 2019-05-25 NOTE — PLAN OF CARE
05/25/19 1200   Post-Acute Status   Post-Acute Authorization   (Home; patient will schedule follow-up)   Discharge Delays None known at this time

## 2019-05-25 NOTE — NURSING
In preparation for discharge, d/c'ed patient's tele monitor, NSR  prior to removal, d/c'ed patient's saline lock, applied pressure to site, secured site with tape and gauze. Discharge instructions given to patient. Patient verbalized understanding of instructions. Patient states willingness to comply.

## 2019-06-25 DIAGNOSIS — I50.41 ACUTE COMBINED SYSTOLIC AND DIASTOLIC HEART FAILURE: ICD-10-CM

## 2019-06-26 DIAGNOSIS — I50.41 ACUTE COMBINED SYSTOLIC AND DIASTOLIC HEART FAILURE: ICD-10-CM

## 2019-06-26 RX ORDER — FUROSEMIDE 40 MG/1
40 TABLET ORAL DAILY
Qty: 30 TABLET | Refills: 11 | Status: SHIPPED | OUTPATIENT
Start: 2019-06-26 | End: 2019-09-07 | Stop reason: SDUPTHER

## 2019-06-26 RX ORDER — FUROSEMIDE 40 MG/1
40 TABLET ORAL DAILY
Qty: 30 TABLET | Refills: 11 | Status: SHIPPED | OUTPATIENT
Start: 2019-06-26 | End: 2019-06-26 | Stop reason: SDUPTHER

## 2019-09-06 PROCEDURE — 96374 THER/PROPH/DIAG INJ IV PUSH: CPT

## 2019-09-06 PROCEDURE — 99285 EMERGENCY DEPT VISIT HI MDM: CPT | Mod: 25

## 2019-09-06 RX ORDER — ASPIRIN 325 MG
325 TABLET ORAL
Status: COMPLETED | OUTPATIENT
Start: 2019-09-07 | End: 2019-09-07

## 2019-09-07 ENCOUNTER — HOSPITAL ENCOUNTER (EMERGENCY)
Facility: HOSPITAL | Age: 34
Discharge: HOME | End: 2019-09-07
Attending: EMERGENCY MEDICINE

## 2019-09-07 VITALS
DIASTOLIC BLOOD PRESSURE: 89 MMHG | OXYGEN SATURATION: 99 % | RESPIRATION RATE: 18 BRPM | WEIGHT: 183 LBS | HEART RATE: 78 BPM | TEMPERATURE: 98 F | SYSTOLIC BLOOD PRESSURE: 138 MMHG | HEIGHT: 70 IN | BODY MASS INDEX: 26.2 KG/M2

## 2019-09-07 DIAGNOSIS — R06.02 SOB (SHORTNESS OF BREATH): ICD-10-CM

## 2019-09-07 DIAGNOSIS — I50.23 ACUTE ON CHRONIC SYSTOLIC CONGESTIVE HEART FAILURE: Primary | ICD-10-CM

## 2019-09-07 DIAGNOSIS — R07.9 CHEST PAIN: ICD-10-CM

## 2019-09-07 DIAGNOSIS — I50.41 ACUTE COMBINED SYSTOLIC AND DIASTOLIC HEART FAILURE: ICD-10-CM

## 2019-09-07 LAB
ALBUMIN SERPL BCP-MCNC: 3.8 G/DL (ref 3.5–5.2)
ALP SERPL-CCNC: 53 U/L (ref 55–135)
ALT SERPL W/O P-5'-P-CCNC: 12 U/L (ref 10–44)
ANION GAP SERPL CALC-SCNC: 8 MMOL/L (ref 8–16)
AST SERPL-CCNC: 17 U/L (ref 10–40)
B-HCG UR QL: NEGATIVE
BASOPHILS # BLD AUTO: 0.02 K/UL (ref 0–0.2)
BASOPHILS NFR BLD: 0.3 % (ref 0–1.9)
BILIRUB SERPL-MCNC: 0.2 MG/DL (ref 0.1–1)
BNP SERPL-MCNC: 232 PG/ML (ref 0–99)
BUN SERPL-MCNC: 15 MG/DL (ref 6–20)
CALCIUM SERPL-MCNC: 8.9 MG/DL (ref 8.7–10.5)
CHLORIDE SERPL-SCNC: 107 MMOL/L (ref 95–110)
CO2 SERPL-SCNC: 24 MMOL/L (ref 23–29)
CREAT SERPL-MCNC: 0.8 MG/DL (ref 0.5–1.4)
CTP QC/QA: YES
DIFFERENTIAL METHOD: ABNORMAL
EOSINOPHIL # BLD AUTO: 0.1 K/UL (ref 0–0.5)
EOSINOPHIL NFR BLD: 1.9 % (ref 0–8)
ERYTHROCYTE [DISTWIDTH] IN BLOOD BY AUTOMATED COUNT: 13.4 % (ref 11.5–14.5)
EST. GFR  (AFRICAN AMERICAN): >60 ML/MIN/1.73 M^2
EST. GFR  (NON AFRICAN AMERICAN): >60 ML/MIN/1.73 M^2
GLUCOSE SERPL-MCNC: 92 MG/DL (ref 70–110)
HCT VFR BLD AUTO: 34.3 % (ref 37–48.5)
HGB BLD-MCNC: 10.9 G/DL (ref 12–16)
LYMPHOCYTES # BLD AUTO: 2.1 K/UL (ref 1–4.8)
LYMPHOCYTES NFR BLD: 27.9 % (ref 18–48)
MCH RBC QN AUTO: 29.1 PG (ref 27–31)
MCHC RBC AUTO-ENTMCNC: 31.8 G/DL (ref 32–36)
MCV RBC AUTO: 92 FL (ref 82–98)
MONOCYTES # BLD AUTO: 0.6 K/UL (ref 0.3–1)
MONOCYTES NFR BLD: 7.3 % (ref 4–15)
NEUTROPHILS # BLD AUTO: 4.7 K/UL (ref 1.8–7.7)
NEUTROPHILS NFR BLD: 62.6 % (ref 38–73)
PLATELET # BLD AUTO: 300 K/UL (ref 150–350)
PMV BLD AUTO: 10.1 FL (ref 9.2–12.9)
POTASSIUM SERPL-SCNC: 3.3 MMOL/L (ref 3.5–5.1)
PROT SERPL-MCNC: 7.4 G/DL (ref 6–8.4)
RBC # BLD AUTO: 3.74 M/UL (ref 4–5.4)
SODIUM SERPL-SCNC: 139 MMOL/L (ref 136–145)
TROPONIN I SERPL DL<=0.01 NG/ML-MCNC: <0.006 NG/ML (ref 0–0.03)
TROPONIN I SERPL DL<=0.01 NG/ML-MCNC: <0.006 NG/ML (ref 0–0.03)
WBC # BLD AUTO: 7.49 K/UL (ref 3.9–12.7)

## 2019-09-07 PROCEDURE — 84484 ASSAY OF TROPONIN QUANT: CPT | Mod: 91

## 2019-09-07 PROCEDURE — 25000003 PHARM REV CODE 250: Performed by: EMERGENCY MEDICINE

## 2019-09-07 PROCEDURE — 63600175 PHARM REV CODE 636 W HCPCS: Performed by: EMERGENCY MEDICINE

## 2019-09-07 PROCEDURE — 80053 COMPREHEN METABOLIC PANEL: CPT

## 2019-09-07 PROCEDURE — 85025 COMPLETE CBC W/AUTO DIFF WBC: CPT

## 2019-09-07 PROCEDURE — 84484 ASSAY OF TROPONIN QUANT: CPT

## 2019-09-07 PROCEDURE — 81025 URINE PREGNANCY TEST: CPT | Performed by: EMERGENCY MEDICINE

## 2019-09-07 PROCEDURE — 83880 ASSAY OF NATRIURETIC PEPTIDE: CPT

## 2019-09-07 RX ORDER — FUROSEMIDE 10 MG/ML
40 INJECTION INTRAMUSCULAR; INTRAVENOUS
Status: COMPLETED | OUTPATIENT
Start: 2019-09-07 | End: 2019-09-07

## 2019-09-07 RX ORDER — POTASSIUM CHLORIDE 20 MEQ/15ML
40 SOLUTION ORAL
Status: COMPLETED | OUTPATIENT
Start: 2019-09-07 | End: 2019-09-07

## 2019-09-07 RX ORDER — FUROSEMIDE 40 MG/1
40 TABLET ORAL DAILY
Qty: 35 TABLET | Refills: 0 | Status: SHIPPED | OUTPATIENT
Start: 2019-09-07 | End: 2019-10-14 | Stop reason: SDUPTHER

## 2019-09-07 RX ADMIN — POTASSIUM CHLORIDE 40 MEQ: 20 SOLUTION ORAL at 03:09

## 2019-09-07 RX ADMIN — ASPIRIN 325 MG ORAL TABLET 325 MG: 325 PILL ORAL at 02:09

## 2019-09-07 RX ADMIN — FUROSEMIDE 40 MG: 10 INJECTION, SOLUTION INTRAVENOUS at 02:09

## 2019-09-07 NOTE — ED PROVIDER NOTES
Encounter Date: 2019    SCRIBE #1 NOTE: I, Bridger Guerra, am scribing for, and in the presence of,  Dr. Cook. I have scribed the following portions of the note - the EKG reading. Other sections scribed: HPI, ROS, PE.       History     Chief Complaint   Patient presents with    Chest Pain     Patient reports a 9/10, intermittent, midsternal chest tightness that radiates to left arm. Patient reports a frontal headache, sob,  and nausea. Denies vomiting, cough.     This is a 34 y.o. female with history of CHF (EF 15%), defibrillator placement, HTN who presents to the ED complaining of intermittent, tightness like, middle CP with associated SOB. Also has nausea which is not related to her CP. Her CP episodes persist for 45 seconds. Her defibrillator did not go off this evening. Patient reports light headedness. The patient noticed these episodes are worse during her menstrual cycles. She has had them previously and has noticed them intermittently over the last 3 months. Denies fever, cough. Her SOB occurs with exertion. She can walk a block or less before the SOB occurs. Previously able to walk 2 miles. +4 pillow orthopnea (previous 1-2 pillow).   Patient denies vomiting, diaphoresis, coughing, fever, leg swelling, alcohol consumption, tobacco use, and substance abuse. She takes lasix 40 mg once a day, coreg, and lisinopril.           Review of patient's allergies indicates:  No Known Allergies  Past Medical History:   Diagnosis Date    CHF (congestive heart failure)     Hypertension     dx at 16y.o.    Pacemaker 2017     Past Surgical History:   Procedure Laterality Date    CARDIAC DEFIBRILLATOR PLACEMENT      CARDIAC DEFIBRILLATOR PLACEMENT  2017     SECTION      x 1    DILATION AND CURETTAGE, UTERUS N/A 2012    [facility] at [facility] OR    ICD SC new N/A 2017    Performed by Jose De Jesus Longo MD at [facility] CATH LAB    INDUCED       LIGATION,  FALLOPIAN TUBE, LAPAROSCOPIC N/A 7/19/2013    [facility] OR    TUBAL LIGATION       Family History   Problem Relation Age of Onset    Hypertension Mother     Cancer Maternal Grandmother         breast x 2    Cancer Paternal Grandmother         breast    No Known Problems Sister     No Known Problems Brother     No Known Problems Son     No Known Problems Brother     Hypertension Other     Diabetes Other     Breast cancer Other     Cancer Paternal Aunt         breast    Cancer Paternal Uncle      Social History     Tobacco Use    Smoking status: Never Smoker    Smokeless tobacco: Never Used   Substance Use Topics    Alcohol use: Yes     Comment: rarely    Drug use: No     Review of Systems   Constitutional: Negative for chills, diaphoresis and fever.   HENT: Negative for sore throat.    Eyes: Negative for photophobia and visual disturbance.   Respiratory: Positive for shortness of breath. Negative for cough.    Cardiovascular: Positive for chest pain. Negative for leg swelling.   Gastrointestinal: Positive for nausea. Negative for abdominal pain, blood in stool, constipation, diarrhea and vomiting.   Genitourinary: Negative for dysuria, flank pain, frequency, hematuria and urgency.   Musculoskeletal: Negative for back pain, neck pain and neck stiffness.   Skin: Negative for rash and wound.   Neurological: Positive for light-headedness. Negative for weakness, numbness and headaches.   Hematological: Does not bruise/bleed easily.   Psychiatric/Behavioral: Negative for confusion and suicidal ideas.       Physical Exam     Initial Vitals [09/06/19 2240]   BP Pulse Resp Temp SpO2   132/87 65 18 98.7 °F (37.1 °C) 100 %      MAP       --         Physical Exam    Nursing note and vitals reviewed.  Constitutional: She appears well-developed and well-nourished. She is not diaphoretic. No distress.   HENT:   Head: Normocephalic and atraumatic.   Mouth/Throat: Oropharynx is clear  and moist. No oropharyngeal exudate.   Eyes: Conjunctivae and EOM are normal. Pupils are equal, round, and reactive to light. Right eye exhibits no discharge. Left eye exhibits no discharge.   Neck: Normal range of motion. Neck supple. No JVD present.   Cardiovascular: Normal rate, regular rhythm, normal heart sounds and intact distal pulses. Exam reveals no gallop and no friction rub.    No murmur heard.  Pulmonary/Chest: Breath sounds normal. No respiratory distress. She has no wheezes. She has no rhonchi. She has no rales.   Abdominal: Soft. Bowel sounds are normal. She exhibits no distension. There is no tenderness. There is no rebound and no guarding.   Musculoskeletal: Normal range of motion. She exhibits no edema or tenderness.        Right lower leg: She exhibits no edema.        Left lower leg: She exhibits no edema.   Lymphadenopathy:     She has no cervical adenopathy.   Neurological: She is alert and oriented to person, place, and time. No cranial nerve deficit.   Skin: Skin is warm and dry. Capillary refill takes less than 2 seconds. No rash noted. No erythema.   Psychiatric: She has a normal mood and affect. Her speech is normal. Thought content normal.         ED Course   Procedures  Labs Reviewed   CBC W/ AUTO DIFFERENTIAL - Abnormal; Notable for the following components:       Result Value    RBC 3.74 (*)     Hemoglobin 10.9 (*)     Hematocrit 34.3 (*)     Mean Corpuscular Hemoglobin Conc 31.8 (*)     All other components within normal limits   COMPREHENSIVE METABOLIC PANEL - Abnormal; Notable for the following components:    Potassium 3.3 (*)     Alkaline Phosphatase 53 (*)     All other components within normal limits   B-TYPE NATRIURETIC PEPTIDE - Abnormal; Notable for the following components:     (*)     All other components within normal limits   TROPONIN I   TROPONIN I   POCT URINE PREGNANCY     EKG Readings: (Independently Interpreted)   Rhythm: Normal Sinus Rhythm. Heart Rate: 67 . T  Waves Flipped: AVL.   No ST elevation or depression       Imaging Results          X-Ray Chest PA And Lateral (Final result)  Result time 09/07/19 02:55:38    Final result by Lee Isaacs MD (09/07/19 02:55:38)                 Impression:      Stable intrathoracic appearance there is no radiographic evidence for superimposed acute intrathoracic process.      Electronically signed by: Lee Isaacs  Date:    09/07/2019  Time:    02:55             Narrative:    EXAMINATION:  XR CHEST PA AND LATERAL    CLINICAL HISTORY:  Chest pain, unspecified    TECHNIQUE:  PA and lateral views of the chest were performed.    COMPARISON:  May 24, 2019    FINDINGS:  Single lead cardiac pacemaker again noted, the cardiomediastinal silhouette appears stable.  There is no evidence for confluent infiltrate or consolidation, significant pleural effusion or pneumothorax.  The osseous structures appear intact.                               MDM  34 year old patient with hx of CHF (EF 15%)  presenting secondary to chest pain and SOB. Patient is at lower risk of ACS due to risk factors and HPI with a heart score of 1. Patient has received an aspirin. EKG was reassuring and chest xray showed nothing acute. Most likely mild CHF exacerbation.      Also considered but less likely:     PE: normal rate, no recent immobilization/surgery/travel  Pneumonia: chest xray negative. No fever. No cough and lungs non consistent with pna  Tamponade: unlikely due to chest xray and ekg  STEMI: No STEMI on ekg  Dissection: equal pulses bilaterally and no ripping chest pain to the back  Esophageal rupture: no dysphagia or vomiting and chest xray negative for mediastinal air  Arrhythmia: no arrhythmia on ekg  CHF: no fluid overload on Cxr and physical exam, however patient feels like she is fluid overload, +orthopnea/ increased ELENA   Pneumothorax: bilateral breath sounds and no signs of pneumothorax on chest xray    Patient felt improved with interventions  and has a lower risk of impending cardiac failure to the heart score of 1. Patient was discharged with follow up with her cardiologist, patient recently lost her insurance and is looking for a new cardiologist. Will give her a refill of her lasix and encourage her to take her lasix 40 mg BID x 5 days, then return to qday. Daily weights. Low salt diet. Return precautions given, patient understands and agrees with plan. All questions answered.  Instructed to follow up with PCP.   Donna Cook MD  Emergency Medicine  4:10 AM 9/7/2019               Scribe Attestation:   Scribe #1: I performed the above scribed service and the documentation accurately describes the services I performed. I attest to the accuracy of the note.               Clinical Impression:       ICD-10-CM ICD-9-CM   1. Acute on chronic systolic congestive heart failure I50.23 428.23     428.0   2. Chest pain R07.9 786.50   3. SOB (shortness of breath) R06.02 786.05   4. Acute combined systolic and diastolic heart failure I50.41 428.41                           Scribe attestation: I, Donna Cook, personally performed the services described in this documentation. All medical record entries made by the scribe were at my direction and in my presence. I have reviewed the chart and agree that the record reflects my personal performance and is accurate and complete         Donna Cook MD  09/07/19 0410

## 2019-09-07 NOTE — DISCHARGE INSTRUCTIONS
Thank you for coming to our Emergency Department today. It is important to remember that some problems are difficult to diagnose and may not be found during your first visit. Be sure to follow up with your primary care doctor and review any labs/imaging that was performed with them. If you do not have a primary care doctor, you may contact the one listed on your discharge paperwork or you may also call the [facility] Appointment Desk at [phone] to schedule an appointment with one.     All medications may potentially have side effects and it is impossible to predict which medications may give you side effects. If you feel that you are having a negative effect of any medication you should immediately stop taking them and seek medical attention.    Return to the ER with any questions/concerns, new/concerning symptoms, worsening or failure to improve. Do not drive or make any important decisions for 24 hours if you have received any pain medications, sedatives or mood altering drugs during your ER visit.

## 2019-09-07 NOTE — ED TRIAGE NOTES
Pt here with c/o intermittent midsternal chest tightness that radiates to left arm. Pt reports hx CHF and HTN.

## 2019-10-14 ENCOUNTER — HOSPITAL ENCOUNTER (EMERGENCY)
Facility: HOSPITAL | Age: 34
Discharge: HOME | End: 2019-10-15
Attending: EMERGENCY MEDICINE

## 2019-10-14 DIAGNOSIS — I50.41 ACUTE COMBINED SYSTOLIC AND DIASTOLIC HEART FAILURE: ICD-10-CM

## 2019-10-14 DIAGNOSIS — R10.2 PELVIC PAIN: Primary | ICD-10-CM

## 2019-10-14 DIAGNOSIS — D25.9 UTERINE LEIOMYOMA, UNSPECIFIED LOCATION: ICD-10-CM

## 2019-10-14 LAB
B-HCG UR QL: NEGATIVE
BACTERIA #/AREA URNS HPF: ABNORMAL /HPF
BACTERIA GENITAL QL WET PREP: ABNORMAL
BASOPHILS # BLD AUTO: 0.04 K/UL (ref 0–0.2)
BASOPHILS NFR BLD: 0.5 % (ref 0–1.9)
BILIRUB UR QL STRIP: NEGATIVE
CLARITY UR: CLEAR
CLUE CELLS VAG QL WET PREP: ABNORMAL
COLOR UR: YELLOW
CTP QC/QA: YES
DIFFERENTIAL METHOD: ABNORMAL
EOSINOPHIL # BLD AUTO: 0.2 K/UL (ref 0–0.5)
EOSINOPHIL NFR BLD: 1.9 % (ref 0–8)
ERYTHROCYTE [DISTWIDTH] IN BLOOD BY AUTOMATED COUNT: 12.2 % (ref 11.5–14.5)
FILAMENT FUNGI VAG WET PREP-#/AREA: ABNORMAL
GLUCOSE UR QL STRIP: NEGATIVE
HCT VFR BLD AUTO: 36.9 % (ref 37–48.5)
HGB BLD-MCNC: 11.9 G/DL (ref 12–16)
HGB UR QL STRIP: ABNORMAL
IMM GRANULOCYTES # BLD AUTO: 0.02 K/UL (ref 0–0.04)
IMM GRANULOCYTES NFR BLD AUTO: 0.2 % (ref 0–0.5)
KETONES UR QL STRIP: NEGATIVE
LEUKOCYTE ESTERASE UR QL STRIP: NEGATIVE
LYMPHOCYTES # BLD AUTO: 2.3 K/UL (ref 1–4.8)
LYMPHOCYTES NFR BLD: 26 % (ref 18–48)
MCH RBC QN AUTO: 29.8 PG (ref 27–31)
MCHC RBC AUTO-ENTMCNC: 32.2 G/DL (ref 32–36)
MCV RBC AUTO: 93 FL (ref 82–98)
MICROSCOPIC COMMENT: ABNORMAL
MONOCYTES # BLD AUTO: 0.8 K/UL (ref 0.3–1)
MONOCYTES NFR BLD: 9.3 % (ref 4–15)
NEUTROPHILS # BLD AUTO: 5.5 K/UL (ref 1.8–7.7)
NEUTROPHILS NFR BLD: 62.1 % (ref 38–73)
NITRITE UR QL STRIP: NEGATIVE
NRBC BLD-RTO: 0 /100 WBC
PH UR STRIP: 5 [PH] (ref 5–8)
PLATELET # BLD AUTO: 328 K/UL (ref 150–350)
PMV BLD AUTO: 9.7 FL (ref 9.2–12.9)
PROT UR QL STRIP: NEGATIVE
RBC # BLD AUTO: 3.99 M/UL (ref 4–5.4)
RBC #/AREA URNS HPF: 5 /HPF (ref 0–4)
SP GR UR STRIP: 1.03 (ref 1–1.03)
SPECIMEN SOURCE: ABNORMAL
SQUAMOUS #/AREA URNS HPF: 7 /HPF
T VAGINALIS GENITAL QL WET PREP: ABNORMAL
URN SPEC COLLECT METH UR: ABNORMAL
UROBILINOGEN UR STRIP-ACNC: NEGATIVE EU/DL
WBC # BLD AUTO: 8.88 K/UL (ref 3.9–12.7)
WBC #/AREA URNS HPF: 3 /HPF (ref 0–5)
WBC #/AREA VAG WET PREP: ABNORMAL
YEAST GENITAL QL WET PREP: ABNORMAL

## 2019-10-14 PROCEDURE — 81025 URINE PREGNANCY TEST: CPT | Performed by: PHYSICIAN ASSISTANT

## 2019-10-14 PROCEDURE — 85025 COMPLETE CBC W/AUTO DIFF WBC: CPT

## 2019-10-14 PROCEDURE — 25000003 PHARM REV CODE 250: Performed by: PHYSICIAN ASSISTANT

## 2019-10-14 PROCEDURE — 80053 COMPREHEN METABOLIC PANEL: CPT

## 2019-10-14 PROCEDURE — 87491 CHLMYD TRACH DNA AMP PROBE: CPT

## 2019-10-14 PROCEDURE — 99284 EMERGENCY DEPT VISIT MOD MDM: CPT

## 2019-10-14 PROCEDURE — 81000 URINALYSIS NONAUTO W/SCOPE: CPT

## 2019-10-14 PROCEDURE — 87210 SMEAR WET MOUNT SALINE/INK: CPT

## 2019-10-14 PROCEDURE — 63600175 PHARM REV CODE 636 W HCPCS: Performed by: PHYSICIAN ASSISTANT

## 2019-10-14 RX ORDER — ACETAMINOPHEN 325 MG/1
650 TABLET ORAL
Status: COMPLETED | OUTPATIENT
Start: 2019-10-14 | End: 2019-10-14

## 2019-10-14 RX ORDER — FUROSEMIDE 40 MG/1
40 TABLET ORAL DAILY
Qty: 90 TABLET | Refills: 3 | Status: SHIPPED | OUTPATIENT
Start: 2019-10-14 | End: 2020-02-17 | Stop reason: SDUPTHER

## 2019-10-14 RX ORDER — LISINOPRIL 40 MG/1
40 TABLET ORAL DAILY
Qty: 90 TABLET | Refills: 3 | Status: SHIPPED | OUTPATIENT
Start: 2019-10-14 | End: 2020-02-17 | Stop reason: SDUPTHER

## 2019-10-14 RX ORDER — FUROSEMIDE 40 MG/1
40 TABLET ORAL DAILY
Qty: 35 TABLET | Refills: 0 | Status: CANCELLED | OUTPATIENT
Start: 2019-10-14 | End: 2019-11-13

## 2019-10-14 RX ORDER — CARVEDILOL 25 MG/1
TABLET ORAL
Qty: 180 TABLET | Refills: 3 | Status: SHIPPED | OUTPATIENT
Start: 2019-10-14 | End: 2020-02-17 | Stop reason: SDUPTHER

## 2019-10-14 RX ORDER — LISINOPRIL 40 MG/1
40 TABLET ORAL DAILY
Qty: 90 TABLET | Refills: 1 | Status: CANCELLED | OUTPATIENT
Start: 2019-10-14

## 2019-10-14 RX ORDER — CARVEDILOL 25 MG/1
TABLET ORAL
Qty: 180 TABLET | Refills: 0 | Status: CANCELLED | OUTPATIENT
Start: 2019-10-14

## 2019-10-14 RX ADMIN — SODIUM CHLORIDE 1000 ML: 0.9 INJECTION, SOLUTION INTRAVENOUS at 11:10

## 2019-10-14 RX ADMIN — ACETAMINOPHEN 650 MG: 325 TABLET, FILM COATED ORAL at 10:10

## 2019-10-14 NOTE — TELEPHONE ENCOUNTER
----- Message from Roberto Boyce sent at 10/14/2019  9:27 AM CDT -----  Contact: Self  Type: RX Refill Request    Who Called: Self    Have you contacted your pharmacy:Yes    Refill or New Rx:Refill    RX Name and Strength:    carvedilol (COREG) 25 MG tablet (1x day)    furosemide (LASIX) 40 MG tablet (2xday)  lisinopril (PRINIVIL,ZESTRIL) 40 MG tablet (1-2x day)      How is the patient currently taking it? Daily    Is this a 30 day or 90 day RX:90    Preferred Pharmacy with phone number:     [facility] - [place] ([place], [place] - [address]  [address] [facility] ([place] [address]  Phone: [phone] Fax: [phone]      Local or Mail Order:Local    Ordering Provider:Dr. Longo    Would the patient rather a call back or a response via My [facility]? Callback    Best Call Back Number:[phone]

## 2019-10-15 VITALS
BODY MASS INDEX: 25.91 KG/M2 | OXYGEN SATURATION: 100 % | TEMPERATURE: 99 F | WEIGHT: 181 LBS | SYSTOLIC BLOOD PRESSURE: 138 MMHG | HEIGHT: 70 IN | RESPIRATION RATE: 18 BRPM | DIASTOLIC BLOOD PRESSURE: 70 MMHG | HEART RATE: 70 BPM

## 2019-10-15 LAB
ALBUMIN SERPL BCP-MCNC: 4 G/DL (ref 3.5–5.2)
ALP SERPL-CCNC: 58 U/L (ref 55–135)
ALT SERPL W/O P-5'-P-CCNC: 15 U/L (ref 10–44)
ANION GAP SERPL CALC-SCNC: 8 MMOL/L (ref 8–16)
AST SERPL-CCNC: 16 U/L (ref 10–40)
BILIRUB SERPL-MCNC: 0.3 MG/DL (ref 0.1–1)
BUN SERPL-MCNC: 13 MG/DL (ref 6–20)
C TRACH DNA SPEC QL NAA+PROBE: NOT DETECTED
CALCIUM SERPL-MCNC: 9.2 MG/DL (ref 8.7–10.5)
CHLORIDE SERPL-SCNC: 106 MMOL/L (ref 95–110)
CO2 SERPL-SCNC: 26 MMOL/L (ref 23–29)
CREAT SERPL-MCNC: 0.8 MG/DL (ref 0.5–1.4)
EST. GFR  (AFRICAN AMERICAN): >60 ML/MIN/1.73 M^2
EST. GFR  (NON AFRICAN AMERICAN): >60 ML/MIN/1.73 M^2
GLUCOSE SERPL-MCNC: 90 MG/DL (ref 70–110)
N GONORRHOEA DNA SPEC QL NAA+PROBE: NOT DETECTED
POTASSIUM SERPL-SCNC: 3.7 MMOL/L (ref 3.5–5.1)
PROT SERPL-MCNC: 7.9 G/DL (ref 6–8.4)
SODIUM SERPL-SCNC: 140 MMOL/L (ref 136–145)

## 2019-10-15 RX ORDER — OXYCODONE AND ACETAMINOPHEN 5; 325 MG/1; MG/1
1 TABLET ORAL EVERY 6 HOURS PRN
Qty: 12 TABLET | Refills: 0 | Status: SHIPPED | OUTPATIENT
Start: 2019-10-15 | End: 2020-02-17

## 2019-10-15 RX ORDER — IBUPROFEN 600 MG/1
600 TABLET ORAL EVERY 6 HOURS PRN
Qty: 20 TABLET | Refills: 0 | Status: SHIPPED | OUTPATIENT
Start: 2019-10-15 | End: 2020-02-17

## 2019-10-15 NOTE — ED TRIAGE NOTES
The patient reports lower right abdominal pain that radiates to right lower back x 2 days. Denies vomiting, diarrhea, fevers, chills. Denies taking medications for symptoms.

## 2019-10-15 NOTE — ED PROVIDER NOTES
Encounter Date: 10/14/2019  SORT:   33 y/o female with history of HTN, CHF, pacemaker PSHx of tubal ligation presenting for evaluation of RLQ pain/pelvic pain x 3 days radiating into R lumbar region and buttock and now across lower abdomen. Denies fever, chills, vomiting, diarrhea, dysuria, hematuria, urinary urgency or frequency, vaginal discharge. LORI Simons PA-C      History     Chief Complaint   Patient presents with    Abdominal Pain     The patient reports lower right abd pain that is radiating to right lower back. Denies vomiting, diarrhea, dysuria, vaginal bleeding or discharge.    Back Pain     34-year-old female with past medical history CHF, hx pacemaker placement, with chief complaint R inguinal pain x 3 days. Atraumatic. Pain is waxing/waning in nature, constant. Pain mildly alleviated with remaining stationary. No exacerbating factors. No urinary complaints. No vaginal complaints. No concern for STI. LMP 10/5/19.  Denies any nausea vomiting.  Denies fever. No change in appetite. Normal BM today. History of , no other abdominal surgeries.  Symptoms are acute, constant, severity 6/10, worsening with past 3 days.    Patient states she has had pain similar to this in the past, typically associated with menses, however never this severe.  Denies history of ovarian cysts or any pelvic abnormalities.        Review of patient's allergies indicates:  No Known Allergies  Past Medical History:   Diagnosis Date    CHF (congestive heart failure)     Hypertension     dx at 16y.o.    Pacemaker 2017     Past Surgical History:   Procedure Laterality Date    CARDIAC DEFIBRILLATOR PLACEMENT      CARDIAC DEFIBRILLATOR PLACEMENT  2017     SECTION      x 1    INDUCED       TUBAL LIGATION       Family History   Problem Relation Age of Onset    Hypertension Mother     Cancer Maternal Grandmother         breast x 2    Cancer Paternal Grandmother         breast    No Known  Problems Sister     No Known Problems Brother     No Known Problems Son     No Known Problems Brother     Hypertension Other     Diabetes Other     Breast cancer Other     Cancer Paternal Aunt         breast    Cancer Paternal Uncle      Social History     Tobacco Use    Smoking status: Never Smoker    Smokeless tobacco: Never Used   Substance Use Topics    Alcohol use: Yes     Comment: rarely    Drug use: No     Review of Systems   Constitutional: Negative for appetite change, chills and fever.   HENT: Negative for congestion, rhinorrhea and sore throat.    Eyes: Negative.  Negative for discharge and redness.   Respiratory: Negative for cough, chest tightness and shortness of breath.    Cardiovascular: Negative for chest pain.   Gastrointestinal: Positive for abdominal pain. Negative for constipation, diarrhea, nausea and vomiting.   Endocrine: Negative.    Genitourinary: Positive for pelvic pain. Negative for difficulty urinating, dysuria, flank pain, frequency, hematuria, vaginal bleeding, vaginal discharge and vaginal pain.   Musculoskeletal: Negative for back pain, myalgias, neck pain and neck stiffness.   Skin: Negative for rash.   Neurological: Negative for dizziness, weakness, light-headedness and headaches.   Hematological: Does not bruise/bleed easily.   Psychiatric/Behavioral: Negative.    All other systems reviewed and are negative.      Physical Exam     Initial Vitals [10/14/19 2044]   BP Pulse Resp Temp SpO2   (!) 144/89 80 16 97.9 °F (36.6 °C) 100 %      MAP       --         Physical Exam    Nursing note and vitals reviewed.  Constitutional: She appears well-developed and well-nourished. She is not diaphoretic. No distress.   Uncomfortable appearing, nontoxic.   HENT:   Head: Normocephalic and atraumatic.   Eyes: Conjunctivae and EOM are normal. Pupils are equal, round, and reactive to light.   Neck: Normal range of motion. Neck supple. No tracheal deviation present.   Cardiovascular:  Intact distal pulses.   Pulmonary/Chest: No stridor. No respiratory distress.   Abdominal:   Abdomen overall soft, normal bowel sounds ×4.  TTP R inguinal region, suprapubic region.  No rebound or guarding.  No palpable mass or distention.  No flank or CVA tenderness.  Negative Burger sign.  No significant pain over McBurney's point.   Genitourinary:   Genitourinary Comments: No vesicular lesions or open sores to external genitalia. No significant discharge, gross bleeding, tissue within vaginal vault. Mild R sided adnexal ttp. Cervix os closed. No cervix erythema/friability. No CMT. No significant bleeding, tissue, discharge from os.   Lymphadenopathy:     She has no cervical adenopathy.   Neurological: She is alert and oriented to person, place, and time.   Skin: Skin is warm and dry. Capillary refill takes less than 2 seconds.   Psychiatric: She has a normal mood and affect. Her behavior is normal. Judgment and thought content normal.         ED Course   Procedures  Labs Reviewed   URINALYSIS, REFLEX TO URINE CULTURE - Abnormal; Notable for the following components:       Result Value    Occult Blood UA 2+ (*)     All other components within normal limits    Narrative:     Preferred Collection Type->Urine, Clean Catch   URINALYSIS MICROSCOPIC - Abnormal; Notable for the following components:    RBC, UA 5 (*)     Bacteria Few (*)     All other components within normal limits    Narrative:     Preferred Collection Type->Urine, Clean Catch   VAGINAL SCREEN - Abnormal; Notable for the following components:    WBC - Vaginal Screen Occasional (*)     Bacteria - Vaginal Screen Few (*)     All other components within normal limits   CBC W/ AUTO DIFFERENTIAL - Abnormal; Notable for the following components:    RBC 3.99 (*)     Hemoglobin 11.9 (*)     Hematocrit 36.9 (*)     All other components within normal limits   C. TRACHOMATIS/N. GONORRHOEAE BY AMP DNA   COMPREHENSIVE METABOLIC PANEL   POCT URINE PREGNANCY           Imaging Results          US Pelvis Comp with Transvag NON-OB (xpd (Final result)  Result time 10/15/19 02:17:09    Final result by Ronald Engle MD (10/15/19 02:17:09)                 Impression:      Retroflexed, leiomyomatous uterus.      Electronically signed by: Ronald Engle MD  Date:    10/15/2019  Time:    02:17             Narrative:    EXAMINATION:  US PELVIS COMP WITH TRANSVAG NON-OB (XPD)    CLINICAL HISTORY:  R inguinal pain;    TECHNIQUE:  Transabdominal sonography of the pelvis was performed, followed by transvaginal sonography to better evaluate the uterus and ovaries.    COMPARISON:  CT 03/01/2016    FINDINGS:  Uterus:    Size: 7.3 x 4.3 x 5.1 cm cm.  The uterus is retroflexed.    Masses: There are three uterine fibroids present, the largest measuring 1.6 x 1.4 x 1.9 cm.  There are incidentally noted nabothian cysts present at the cervix.    Endometrium: Normal in this pre menopausal patient, measuring 5 mm.    Right ovary:    Size: 3.5 x 2.0 x 2.7 cm    Appearance: Normal    Vascular flow: Normal.    Left ovary:    Size: 3.6 x 1.7 x 2.6 cm    Appearance: Normal    Vascular Flow: Normal.    Multiple bilateral ovarian follicles are visualized.    Free Fluid:    None.                                 Medical Decision Making:   Differential Diagnosis:   Ovarian cyst, appendicitis, constipation, UTI  Clinical Tests:   Lab Tests: Ordered and Reviewed  Radiological Study: Ordered and Reviewed  ED Management:  Symptoms x 3 days. No fever. No leukocytosis, no real pain over McBurney's point--mostly to R inguinal region. I do not think this represents appendicitis.     Given that pt has been experiencing more and more painful menses; given that she has had this pain before, though not as severe; I do think this is likely pelvic in origin. May be due to fibroids. Will have her f/u with OB. Return precautions given.                       Clinical Impression:       ICD-10-CM ICD-9-CM   1. Pelvic pain  R10.2 [id number]   2. Uterine leiomyoma, unspecified location D25.9 218.9         Disposition:   Disposition: Discharged  Condition: Stable                        Miguelito Castañeda PA-C  10/15/19 0245

## 2019-10-15 NOTE — DISCHARGE INSTRUCTIONS
Follow-up with OB as planned. Ibuprofen for pain. Percocet for severe pain.    Please return to this ED if pain worsens despite treatment, if you begin with fever, if you begin with nausea/vomiting, if any other problems occur.

## 2020-01-02 ENCOUNTER — HOSPITAL ENCOUNTER (EMERGENCY)
Facility: HOSPITAL | Age: 35
Discharge: HOME | End: 2020-01-03
Attending: EMERGENCY MEDICINE
Payer: COMMERCIAL

## 2020-01-02 DIAGNOSIS — R07.9 CHEST PAIN: ICD-10-CM

## 2020-01-02 DIAGNOSIS — I42.9 CARDIOMYOPATHY, UNSPECIFIED TYPE: Primary | ICD-10-CM

## 2020-01-02 LAB
ALBUMIN SERPL BCP-MCNC: 3.9 G/DL (ref 3.5–5.2)
ALP SERPL-CCNC: 53 U/L (ref 55–135)
ALT SERPL W/O P-5'-P-CCNC: 9 U/L (ref 10–44)
ANION GAP SERPL CALC-SCNC: 3 MMOL/L (ref 8–16)
AST SERPL-CCNC: 11 U/L (ref 10–40)
B-HCG UR QL: NEGATIVE
BASOPHILS # BLD AUTO: 0.05 K/UL (ref 0–0.2)
BASOPHILS NFR BLD: 0.5 % (ref 0–1.9)
BILIRUB SERPL-MCNC: 0.1 MG/DL (ref 0.1–1)
BNP SERPL-MCNC: 224 PG/ML (ref 0–99)
BUN SERPL-MCNC: 11 MG/DL (ref 6–20)
CALCIUM SERPL-MCNC: 8 MG/DL (ref 8.7–10.5)
CHLORIDE SERPL-SCNC: 108 MMOL/L (ref 95–110)
CO2 SERPL-SCNC: 23 MMOL/L (ref 23–29)
CREAT SERPL-MCNC: 0.7 MG/DL (ref 0.5–1.4)
CTP QC/QA: YES
DIFFERENTIAL METHOD: ABNORMAL
EOSINOPHIL # BLD AUTO: 0.1 K/UL (ref 0–0.5)
EOSINOPHIL NFR BLD: 1.3 % (ref 0–8)
ERYTHROCYTE [DISTWIDTH] IN BLOOD BY AUTOMATED COUNT: 12.5 % (ref 11.5–14.5)
EST. GFR  (AFRICAN AMERICAN): >60 ML/MIN/1.73 M^2
EST. GFR  (NON AFRICAN AMERICAN): >60 ML/MIN/1.73 M^2
GLUCOSE SERPL-MCNC: 95 MG/DL (ref 70–110)
HCT VFR BLD AUTO: 36.2 % (ref 37–48.5)
HGB BLD-MCNC: 11.7 G/DL (ref 12–16)
IMM GRANULOCYTES # BLD AUTO: 0.03 K/UL (ref 0–0.04)
IMM GRANULOCYTES NFR BLD AUTO: 0.3 % (ref 0–0.5)
LYMPHOCYTES # BLD AUTO: 2.2 K/UL (ref 1–4.8)
LYMPHOCYTES NFR BLD: 21.6 % (ref 18–48)
MCH RBC QN AUTO: 29.3 PG (ref 27–31)
MCHC RBC AUTO-ENTMCNC: 32.3 G/DL (ref 32–36)
MCV RBC AUTO: 91 FL (ref 82–98)
MONOCYTES # BLD AUTO: 0.8 K/UL (ref 0.3–1)
MONOCYTES NFR BLD: 7.8 % (ref 4–15)
NEUTROPHILS # BLD AUTO: 7.1 K/UL (ref 1.8–7.7)
NEUTROPHILS NFR BLD: 68.5 % (ref 38–73)
NRBC BLD-RTO: 0 /100 WBC
PLATELET # BLD AUTO: 293 K/UL (ref 150–350)
PMV BLD AUTO: 9.8 FL (ref 9.2–12.9)
POTASSIUM SERPL-SCNC: 3.4 MMOL/L (ref 3.5–5.1)
PROT SERPL-MCNC: 7.3 G/DL (ref 6–8.4)
RBC # BLD AUTO: 3.99 M/UL (ref 4–5.4)
SODIUM SERPL-SCNC: 134 MMOL/L (ref 136–145)
TROPONIN I SERPL DL<=0.01 NG/ML-MCNC: <0.006 NG/ML (ref 0–0.03)
WBC # BLD AUTO: 10.37 K/UL (ref 3.9–12.7)

## 2020-01-02 PROCEDURE — 93010 EKG 12-LEAD: ICD-10-PCS | Mod: ,,, | Performed by: INTERNAL MEDICINE

## 2020-01-02 PROCEDURE — 93005 ELECTROCARDIOGRAM TRACING: CPT

## 2020-01-02 PROCEDURE — 96374 THER/PROPH/DIAG INJ IV PUSH: CPT

## 2020-01-02 PROCEDURE — 81025 URINE PREGNANCY TEST: CPT | Performed by: NURSE PRACTITIONER

## 2020-01-02 PROCEDURE — 83880 ASSAY OF NATRIURETIC PEPTIDE: CPT

## 2020-01-02 PROCEDURE — 93010 ELECTROCARDIOGRAM REPORT: CPT | Mod: ,,, | Performed by: INTERNAL MEDICINE

## 2020-01-02 PROCEDURE — 25000003 PHARM REV CODE 250: Performed by: EMERGENCY MEDICINE

## 2020-01-02 PROCEDURE — 80053 COMPREHEN METABOLIC PANEL: CPT

## 2020-01-02 PROCEDURE — 84484 ASSAY OF TROPONIN QUANT: CPT

## 2020-01-02 PROCEDURE — 85025 COMPLETE CBC W/AUTO DIFF WBC: CPT

## 2020-01-02 PROCEDURE — 99285 EMERGENCY DEPT VISIT HI MDM: CPT | Mod: 25

## 2020-01-02 PROCEDURE — 63600175 PHARM REV CODE 636 W HCPCS: Performed by: EMERGENCY MEDICINE

## 2020-01-02 RX ORDER — POTASSIUM CHLORIDE 750 MG/1
10 TABLET, EXTENDED RELEASE ORAL DAILY
Qty: 30 TABLET | Refills: 0 | Status: SHIPPED | OUTPATIENT
Start: 2020-01-02 | End: 2020-02-17

## 2020-01-02 RX ORDER — POTASSIUM CHLORIDE 20 MEQ/15ML
20 SOLUTION ORAL
Status: COMPLETED | OUTPATIENT
Start: 2020-01-02 | End: 2020-01-02

## 2020-01-02 RX ORDER — FUROSEMIDE 10 MG/ML
40 INJECTION INTRAMUSCULAR; INTRAVENOUS
Status: COMPLETED | OUTPATIENT
Start: 2020-01-02 | End: 2020-01-02

## 2020-01-02 RX ADMIN — POTASSIUM CHLORIDE 20 MEQ: 20 SOLUTION ORAL at 11:01

## 2020-01-02 RX ADMIN — FUROSEMIDE 40 MG: 10 INJECTION, SOLUTION INTRAVENOUS at 11:01

## 2020-01-03 VITALS
TEMPERATURE: 99 F | RESPIRATION RATE: 20 BRPM | WEIGHT: 183 LBS | SYSTOLIC BLOOD PRESSURE: 135 MMHG | HEART RATE: 79 BPM | OXYGEN SATURATION: 99 % | BODY MASS INDEX: 26.2 KG/M2 | HEIGHT: 70 IN | DIASTOLIC BLOOD PRESSURE: 95 MMHG

## 2020-01-03 NOTE — ED PROVIDER NOTES
Encounter Date: 2020    This is a Provider in Triage/MSE of a 34 y.o. female presenting to the ED with c/o chest pain - tightness with left arm pain. Hx of CHF. Care will be transferred to an alternate provider when patient is roomed for a full evaluation and final disposition. JAK Villafana, JOCELYNP-FEDE 2020 9:37 PM    SCRIBE #1 NOTE: I, Vitaly Strickland, am scribing for, and in the presence of,  Thompson Bruner MD. I have scribed the following portions of the note - Other sections scribed: HPI, ROS, PE, EKG.       History     Chief Complaint   Patient presents with    Chest Pain     pt c/o intermittent (L) sided CP that radiates down (L) arm and SOB x 4 hrs; hx of HTN and compliant with meds; hx of CHF; states pain feels like tightness    Shortness of Breath     This is a 34 y.o. Female with a PMHx of CHF (EF 15%) and HTN who presents to the ED with c/o left sided chest tightness that radiates into the left arm with associated intermittent palpitations, dizziness, SOB and nausea that began 4 hrs pta while the pt was walking to her car from the gym. SOB worsens when she walks. She notes that symptoms feel the same as previous CHF exacerbation episodes. She denies any leg swelling, emesis, diaphoresis, hematemesis, or any other symptoms. Denies any other worsening or alleviating factors.  Patient has been compliant with medications.  No cough.  No fever.  She is currently chest pain-free.        The history is provided by the patient and medical records.     Review of patient's allergies indicates:  No Known Allergies  Past Medical History:   Diagnosis Date    CHF (congestive heart failure)     Hypertension     dx at 16y.o.    Pacemaker 2017     Past Surgical History:   Procedure Laterality Date    CARDIAC DEFIBRILLATOR PLACEMENT      CARDIAC DEFIBRILLATOR PLACEMENT  2017     SECTION      x 1    INDUCED       TUBAL LIGATION       Family History   Problem Relation Age of Onset     Hypertension Mother     Cancer Maternal Grandmother         breast x 2    Cancer Paternal Grandmother         breast    No Known Problems Sister     No Known Problems Brother     No Known Problems Son     No Known Problems Brother     Hypertension Other     Diabetes Other     Breast cancer Other     Cancer Paternal Aunt         breast    Cancer Paternal Uncle      Social History     Tobacco Use    Smoking status: Never Smoker    Smokeless tobacco: Never Used   Substance Use Topics    Alcohol use: Yes     Comment: rarely    Drug use: No     Review of Systems   Constitutional: Negative for chills, diaphoresis and fever.   HENT: Negative for congestion.    Respiratory: Positive for chest tightness and shortness of breath. Negative for cough.    Cardiovascular: Positive for chest pain and palpitations.   Gastrointestinal: Positive for nausea. Negative for abdominal pain and vomiting.   Genitourinary: Negative for dysuria.   Musculoskeletal: Negative for back pain.   Skin: Negative for rash.   Neurological: Positive for dizziness. Negative for syncope and light-headedness.   Psychiatric/Behavioral: Negative for confusion.   All other systems reviewed and are negative.      Physical Exam     Initial Vitals [01/02/20 2137]   BP Pulse Resp Temp SpO2   (!) 177/115 80 18 99.2 °F (37.3 °C) 100 %      MAP       --         Physical Exam    Nursing note and vitals reviewed.  Constitutional: She appears well-developed and well-nourished. She is not diaphoretic. No distress.   HENT:   Head: Normocephalic and atraumatic.   Nose: Nose normal.   Mouth/Throat: Oropharynx is clear and moist.   Eyes: Conjunctivae and EOM are normal. Pupils are equal, round, and reactive to light. Right eye exhibits no discharge. Left eye exhibits no discharge.   Neck: Normal range of motion. Neck supple. No thyromegaly present. No JVD present.   Cardiovascular: Normal rate, regular rhythm and normal heart sounds.   No murmur  heard.  Pulmonary/Chest: Breath sounds normal. No stridor. No respiratory distress. She has no wheezes. She has no rhonchi. She has no rales.   Abdominal: Soft. She exhibits no distension. There is no tenderness. There is no rebound and no guarding.   Musculoskeletal: Normal range of motion. She exhibits no edema or tenderness.   Neurological: She is alert and oriented to person, place, and time. She has normal strength. No cranial nerve deficit. GCS score is 15. GCS eye subscore is 4. GCS verbal subscore is 5. GCS motor subscore is 6.   Skin: Skin is warm and dry. No rash noted. No erythema.   Psychiatric: She has a normal mood and affect. Her behavior is normal. Judgment and thought content normal.         ED Course   Procedures  Labs Reviewed   CBC W/ AUTO DIFFERENTIAL - Abnormal; Notable for the following components:       Result Value    RBC 3.99 (*)     Hemoglobin 11.7 (*)     Hematocrit 36.2 (*)     All other components within normal limits   COMPREHENSIVE METABOLIC PANEL - Abnormal; Notable for the following components:    Sodium 134 (*)     Potassium 3.4 (*)     Calcium 8.0 (*)     Alkaline Phosphatase 53 (*)     ALT 9 (*)     Anion Gap 3 (*)     All other components within normal limits   B-TYPE NATRIURETIC PEPTIDE - Abnormal; Notable for the following components:     (*)     All other components within normal limits   TROPONIN I   POCT URINE PREGNANCY     EKG Readings: (Independently Interpreted)   Initial Reading: No STEMI. Rhythm: Normal Sinus Rhythm. Heart Rate: 74 BPM. Ectopy: No Ectopy. Conduction: Normal. ST Segments: Normal ST Segments. Axis: Normal.   Non-specific T-wave abnormality.  No significant changes when compared to 05/19.  No acute ischemia       Imaging Results          X-Ray Chest PA And Lateral (Final result)  Result time 01/02/20 22:53:23    Final result by Kasandra Erazo MD (01/02/20 22:53:23)                 Impression:      No acute cardiopulmonary process  identified.      Electronically signed by: Kasandra Erazo MD  Date:    01/02/2020  Time:    22:53             Narrative:    EXAMINATION:  XR CHEST PA AND LATERAL    CLINICAL HISTORY:  Chest pain, unspecified    TECHNIQUE:  PA and lateral views of the chest were performed.    COMPARISON:  None    FINDINGS:  Cardiac silhouette is mildly enlarged but stable in size.  Left-sided pacer device is seen.  Lungs are symmetrically expanded.  No evidence of focal consolidative process, pneumothorax, or significant effusion.  No acute osseous abnormality identified.                                 Medical Decision Making:   History:   Old Medical Records: I decided to obtain old medical records.  Initial Assessment:   Patient presents with chest tightness and exertional dyspnea.  Patient's history of significant cardiomyopathy.  Patient has a defibrillator.  No syncopal or near syncopal events.  No peripheral edema. No orthopnea.  No symptoms of pneumonia.  Patient is currently chest pain-free.  No evidence ischemia on EKG.  Will check lab work and chest x-ray.  Differential Diagnosis:   CHF, electrolyte abnormality, arrhythmia  Independently Interpreted Test(s):   I have ordered and independently interpreted X-rays - see prior notes.  I have ordered and independently interpreted EKG Reading(s) - see prior notes  Clinical Tests:   Lab Tests: Ordered and Reviewed  The following lab test(s) were unremarkable: CMP, CBC, BNP and Troponin  Radiological Study: Ordered and Reviewed  Medical Tests: Ordered and Reviewed  ED Management:  2345:  No evidence of a significant acute CHF.  No pulmonary edema on chest x-ray.  lungs clear on exam.  Lab work appears to be at baseline.  Will give an additional dose of Lasix.  Will give potassium since patient is mildly hypokalemic.  Patient stable for outpatient treatment.  Blood pressure is normalized to 140/70.  Oxygen saturation 100% on room air.  Patient no distress. No evidence of arrhythmia  on monitor.  Patient may follow up with her cardiologist            Scribe Attestation:   Scribe #1: I performed the above scribed service and the documentation accurately describes the services I performed. I attest to the accuracy of the note.    Attending Attestation:           Physician Attestation for Scribe:  Physician Attestation Statement for Scribe #1: I, Thompson Bruner MD, reviewed documentation, as scribed by Vitaly Strickland in my presence, and it is both accurate and complete.                               Clinical Impression:       ICD-10-CM ICD-9-CM   1. Cardiomyopathy, unspecified type I42.9 425.4   2. Chest pain R07.9 786.50         Disposition:   Disposition: Discharged  Condition: Stable                     Thompson Bruner MD  01/02/20 4880       Thompson Bruner MD  01/02/20 5143

## 2020-02-13 ENCOUNTER — PATIENT OUTREACH (OUTPATIENT)
Dept: ADMINISTRATIVE | Facility: OTHER | Age: 35
End: 2020-02-13

## 2020-02-17 ENCOUNTER — OFFICE VISIT (OUTPATIENT)
Dept: CARDIOLOGY | Facility: CLINIC | Age: 35
End: 2020-02-17
Payer: COMMERCIAL

## 2020-02-17 VITALS
HEIGHT: 70 IN | OXYGEN SATURATION: 98 % | DIASTOLIC BLOOD PRESSURE: 66 MMHG | WEIGHT: 184.94 LBS | SYSTOLIC BLOOD PRESSURE: 138 MMHG | HEART RATE: 78 BPM | BODY MASS INDEX: 26.48 KG/M2 | RESPIRATION RATE: 15 BRPM

## 2020-02-17 DIAGNOSIS — Z95.810 ICD (IMPLANTABLE CARDIOVERTER-DEFIBRILLATOR), SINGLE, IN SITU: ICD-10-CM

## 2020-02-17 DIAGNOSIS — I42.8 NONISCHEMIC CARDIOMYOPATHY: Primary | Chronic | ICD-10-CM

## 2020-02-17 DIAGNOSIS — I10 ESSENTIAL HYPERTENSION: Chronic | ICD-10-CM

## 2020-02-17 DIAGNOSIS — I47.29 NSVT (NONSUSTAINED VENTRICULAR TACHYCARDIA): ICD-10-CM

## 2020-02-17 PROCEDURE — 99214 OFFICE O/P EST MOD 30 MIN: CPT | Mod: S$GLB,,, | Performed by: INTERNAL MEDICINE

## 2020-02-17 PROCEDURE — 99999 PR PBB SHADOW E&M-EST. PATIENT-LVL III: CPT | Mod: PBBFAC,,, | Performed by: INTERNAL MEDICINE

## 2020-02-17 PROCEDURE — 99999 PR PBB SHADOW E&M-EST. PATIENT-LVL III: ICD-10-PCS | Mod: PBBFAC,,, | Performed by: INTERNAL MEDICINE

## 2020-02-17 PROCEDURE — 99214 PR OFFICE/OUTPT VISIT, EST, LEVL IV, 30-39 MIN: ICD-10-PCS | Mod: S$GLB,,, | Performed by: INTERNAL MEDICINE

## 2020-02-17 RX ORDER — SPIRONOLACTONE 25 MG/1
25 TABLET ORAL DAILY
Qty: 90 TABLET | Refills: 3 | Status: SHIPPED | OUTPATIENT
Start: 2020-02-17 | End: 2020-08-05

## 2020-02-17 RX ORDER — FUROSEMIDE 40 MG/1
40 TABLET ORAL DAILY
Qty: 90 TABLET | Refills: 3 | Status: SHIPPED | OUTPATIENT
Start: 2020-02-17 | End: 2020-12-12 | Stop reason: SDUPTHER

## 2020-02-17 RX ORDER — CARVEDILOL 25 MG/1
TABLET ORAL
Qty: 180 TABLET | Refills: 3 | Status: SHIPPED | OUTPATIENT
Start: 2020-02-17 | End: 2021-04-08

## 2020-02-17 RX ORDER — LISINOPRIL 40 MG/1
40 TABLET ORAL DAILY
Qty: 90 TABLET | Refills: 3 | Status: SHIPPED | OUTPATIENT
Start: 2020-02-17 | End: 2021-03-08 | Stop reason: SDUPTHER

## 2020-02-17 NOTE — PROGRESS NOTES
CARDIOVASCULAR PROGRESS NOTE    REASON FOR CONSULT:   Nasra Marks is a 34 y.o. female who presents for follow up of MINDY, CY ICD.    PCP: Redd  HISTORY OF PRESENT ILLNESS:   Previously followed by Dr. Longo, last seen January 2019.    The patient returns for follow-up after a greater than 1 year hiatus.  She reports episodic ER visits for chest pain and shortness of breath.  She continues to have these complaints.  She has had no palpitations or syncope.  There has been no device discharges.  She denies PND, orthopnea, or lower extremity edema.  There has been no melena, hematuria, or claudicant symptoms.  She tells me that she may need to have a hysterectomy as her menstrual cycle appears to be adversely affecting her cardiac symptoms.  She tells me she is able to climb up a flight of stairs without significant limitation.  She did have a negative heart catheterization back in 2017 and appears euvolemic.  I do not see any prohibitive cardiac contraindication to a hysterectomy.    The Med[employer]onic ICD was interrogated in the office today.  Single-chamber device.  Underlying rhythm is sinus at 83 beats per minute.  She is pacing less than 0.1% of the time in the ventricle.  Estimated longevity 10.3 years.  Sensing and pacing thresholds as well as impedance are normal.  There were for high ventricular rate episodes consistent with nonsustained ventricular tachycardia.  The longest was for 3 sec lasting 11 beats at 240 beats per minute in May 2019.  The most recent was on December 4, 2019 lasting 5 beats at a ventricular rate of 214 beats per minute.  No programming changes were made today.    CARDIOVASCULAR HISTORY:   NICM (cath 4/2017 with normal cors)  ICD (Donta 12/2017, MDT single chamber)    PAST MEDICAL HISTORY:     Past Medical History:   Diagnosis Date    CHF (congestive heart failure)     Hypertension     dx at 16y.o.    Pacemaker 12/18/2017       PAST SURGICAL HISTORY:     Past Surgical History:    Procedure Laterality Date    CARDIAC DEFIBRILLATOR PLACEMENT      CARDIAC DEFIBRILLATOR PLACEMENT  2017     SECTION      x 1    INDUCED       TUBAL LIGATION         ALLERGIES AND MEDICATION:   Review of patient's allergies indicates:  No Known Allergies     Medication List           Accurate as of 2020  1:45 PM. If you have any questions, ask your nurse or doctor.               CONTINUE taking these medications    carvediloL 25 MG tablet  Commonly known as:  COREG  TAKE 1 TABLET(25 MG) BY MOUTH TWICE DAILY     furosemide 40 MG tablet  Commonly known as:  LASIX  Take 1 tablet (40 mg total) by mouth once daily. Take BID x 5 days, then qday     ibuprofen 600 MG tablet  Commonly known as:  ADVIL,MOTRIN  Take 1 tablet (600 mg total) by mouth every 6 (six) hours as needed for Pain.     lisinopril 40 MG tablet  Commonly known as:  PRINIVIL,ZESTRIL  Take 1 tablet (40 mg total) by mouth once daily.     oxyCODONE-acetaminophen 5-325 mg per tablet  Commonly known as:  PERCOCET  Take 1 tablet by mouth every 6 (six) hours as needed (severe/breakthrough pain).     potassium chloride 10 MEQ Tbsr  Commonly known as:  KLOR-CON  Take 1 tablet (10 mEq total) by mouth once daily.            SOCIAL HISTORY:     Social History     Socioeconomic History    Marital status:      Spouse name: Not on file    Number of children: Not on file    Years of education: Not on file    Highest education level: Not on file   Occupational History     Employer: [employer]   Social Needs    Financial resource strain: Not on file    Food insecurity:     Worry: Not on file     Inability: Not on file    Transportation needs:     Medical: Not on file     Non-medical: Not on file   Tobacco Use    Smoking status: Never Smoker    Smokeless tobacco: Never Used   Substance and Sexual Activity    Alcohol use: Yes     Comment: rarely    Drug use: No    Sexual activity: Yes     Partners: Male     Birth  control/protection: None   Lifestyle    Physical activity:     Days per week: Not on file     Minutes per session: Not on file    Stress: Not on file   Relationships    Social connections:     Talks on phone: Not on file     Gets together: Not on file     Attends Roman Catholic service: Not on file     Active member of club or organization: Not on file     Attends meetings of clubs or organizations: Not on file     Relationship status: Not on file   Other Topics Concern    Not on file   Social History Narrative    Not on file       FAMILY HISTORY:     Family History   Problem Relation Age of Onset    Hypertension Mother     Cancer Maternal Grandmother         breast x 2    Cancer Paternal Grandmother         breast    No Known Problems Sister     No Known Problems Brother     No Known Problems Son     No Known Problems Brother     Hypertension Other     Diabetes Other     Breast cancer Other     Cancer Paternal Aunt         breast    Cancer Paternal Uncle        REVIEW OF SYSTEMS:   Review of Systems   Constitutional: Negative for chills, diaphoresis and fever.   HENT: Negative for nosebleeds.    Eyes: Negative for blurred vision, double vision and photophobia.   Respiratory: Positive for shortness of breath. Negative for hemoptysis and wheezing.    Cardiovascular: Positive for chest pain. Negative for palpitations, orthopnea, claudication, leg swelling and PND.   Gastrointestinal: Negative for abdominal pain, blood in stool, heartburn, melena, nausea and vomiting.   Genitourinary: Negative for flank pain and hematuria.   Musculoskeletal: Negative for falls, myalgias and neck pain.   Skin: Negative for rash.   Neurological: Negative for dizziness, seizures, loss of consciousness, weakness and headaches.   Endo/Heme/Allergies: Negative for polydipsia. Does not bruise/bleed easily.   Psychiatric/Behavioral: Negative for depression and memory loss. The patient is not nervous/anxious.        PHYSICAL EXAM:  "    Vitals:    02/17/20 1327   BP: 138/66   Pulse: 78   Resp: 15    Body mass index is 26.54 kg/m².  Weight: 83.9 kg (184 lb 15.5 oz)   Height: 5' 10" (177.8 cm)     Physical Exam   Constitutional: She is oriented to person, place, and time. She appears well-developed and well-nourished. She is cooperative.  Non-toxic appearance. No distress.   HENT:   Head: Normocephalic and atraumatic.   Eyes: Pupils are equal, round, and reactive to light. Conjunctivae and EOM are normal. No scleral icterus.   Neck: Trachea normal and normal range of motion. Neck supple. Normal carotid pulses and no JVD present. Carotid bruit is not present. No neck rigidity. No tracheal deviation and no edema present. No thyromegaly present.   Cardiovascular: Normal rate, regular rhythm, S1 normal and S2 normal. PMI is not displaced. Exam reveals no gallop and no friction rub.   No murmur heard.  Pulses:       Carotid pulses are 2+ on the right side, and 2+ on the left side.  Pulmonary/Chest: Effort normal and breath sounds normal. No stridor. No respiratory distress. She has no wheezes. She has no rales. She exhibits no tenderness.   Abdominal: Soft. She exhibits no distension. There is no hepatosplenomegaly.   Musculoskeletal: Normal range of motion. She exhibits no edema or tenderness.   Feet:   Right Foot:   Skin Integrity: Negative for ulcer.   Left Foot:   Skin Integrity: Negative for ulcer.   Neurological: She is alert and oriented to person, place, and time. No cranial nerve deficit.   Skin: Skin is warm and dry. No rash noted. No erythema.   Psychiatric: She has a normal mood and affect. Her speech is normal and behavior is normal.   Vitals reviewed.      DATA:   EKG: (personally reviewed tracing)  1/2/20 SR 74, NSSTTW changes    Laboratory:  CBC:  Recent Labs   Lab 09/07/19  0004 10/14/19  2344 01/02/20  2152   WBC 7.49 8.88 10.37   Hemoglobin 10.9 L 11.9 L 11.7 L   Hematocrit 34.3 L 36.9 L 36.2 L   Platelets 300 328 293 "       CHEMISTRIES:  Recent Labs   Lab 04/26/17  1036  05/24/19  1009 05/25/19  0438 09/07/19  0004 10/14/19  2344 01/02/20  2152   Glucose 122 H   < > 92 83 92 90 95   Sodium 136   < > 137 137 139 140 134 L   Potassium 4.5   < > 3.9 3.7 3.3 L 3.7 3.4 L   BUN, Bld 13   < > 11 18 15 13 11   Creatinine 0.9   < > 0.8 1.0 0.8 0.8 0.7   eGFR if African American >60   < > >60 >60 >60 >60 >60   eGFR if non African American >60   < > >60 >60 >60 >60 >60   Calcium 9.4   < > 9.2 9.0 8.9 9.2 8.0 L   Magnesium 2.0  --  1.9 2.1  --   --   --     < > = values in this interval not displayed.       CARDIAC BIOMARKERS:  Recent Labs   Lab 09/07/19  0004 09/07/19  0240 01/02/20  2152   Troponin I <0.006 <0.006 <0.006       COAGS:  Recent Labs   Lab 06/20/17  1132 12/14/17  0945 04/30/18  1220   INR 1.0 1.1 1.0       LIPIDS/LFTS:  Recent Labs   Lab 05/24/19  1927  09/07/19  0004 10/14/19  2344 01/02/20  2152   Cholesterol 170  --   --   --   --    Triglycerides 46  --   --   --   --    HDL 43  --   --   --   --    LDL Cholesterol 117.8  --   --   --   --    Non-HDL Cholesterol 127  --   --   --   --    AST  --    < > 17 16 11   ALT  --    < > 12 15 9 L    < > = values in this interval not displayed.     Lab Results   Component Value Date    TSH 0.92 07/21/2017         Cardiovascular Testing:  Echo 5/24/19  · Severely decreased left ventricular systolic function. The estimated ejection fraction is 15%  · Severe global hypokinetic wall motion.  · Mild concentric left ventricular hypertrophy.  · Moderate left ventricular enlargement.  · Grade III (severe) left ventricular diastolic dysfunction consistent with restrictive physiology.  · Normal right ventricular systolic function.  · Mild mitral regurgitation.  · The estimated PA systolic pressure is 28 mm Hg    Cath 4/18/17  B. Summary/Post-Operative Diagnosis    Normal coronary arteries.    Systolic dysfunction.    Mildly elevated left Filling Pressures.    Normal Pulmonary  Hypertension.  D. Hemodynamic Results  Ejection Fraction: 20%  Global LV Function: severely depressed  PW:  (4)  PA: 24  CO_THERM: 4.4  RV: 25  RA:  (5)  LVEDP: 17   E. Angiographic Results       Patient has a right dominant coronary artery.      - Left Main Coronary Artery:             The LM is normal. There is DANNY 3 flow.     - Left Anterior Descending Artery:             The LAD is normal. There is DANNY 3 flow.     - Left Circumflex Artery:             The LCX is normal. There is DANNY 3 flow.     - Right Coronary Artery:             The RCA is normal. There is DANNY 3 flow.     - Left Renal Artery:             The ostial left renal is normal.     - Right Renal Artery:             The ostial right renal is normal.     - Common Femoral Artery:             The right CFA is normal.      ASSESSMENT:   # NICM, appears euvolemic  # MDT ICD  # NSVT  # HTN, controlled  # ?preop for hysterectomy, pt reports reasonable exercise capacity.  Normal cors on cath 4/2017.    PLAN:   Cont med rx  Add aldactone 25mg qd  Check BMP 2 weeks  No prohibitive cardiac contraindication to possible hysterectomy and anesthesia as deemed necessary.  Would consider the patient to be at moderate but not excessive cardiac risk for surgery.  RTC 3 months with MDT ICD check (May 2020)      Kashif Peguero MD, FACC

## 2020-03-04 ENCOUNTER — LAB VISIT (OUTPATIENT)
Dept: LAB | Facility: HOSPITAL | Age: 35
End: 2020-03-04
Attending: INTERNAL MEDICINE
Payer: COMMERCIAL

## 2020-03-04 DIAGNOSIS — I42.8 NONISCHEMIC CARDIOMYOPATHY: Chronic | ICD-10-CM

## 2020-03-04 DIAGNOSIS — I10 ESSENTIAL HYPERTENSION: Chronic | ICD-10-CM

## 2020-03-04 LAB
ANION GAP SERPL CALC-SCNC: 7 MMOL/L (ref 8–16)
BUN SERPL-MCNC: 12 MG/DL (ref 6–20)
CALCIUM SERPL-MCNC: 8.9 MG/DL (ref 8.7–10.5)
CHLORIDE SERPL-SCNC: 110 MMOL/L (ref 95–110)
CO2 SERPL-SCNC: 24 MMOL/L (ref 23–29)
CREAT SERPL-MCNC: 0.8 MG/DL (ref 0.5–1.4)
EST. GFR  (AFRICAN AMERICAN): >60 ML/MIN/1.73 M^2
EST. GFR  (NON AFRICAN AMERICAN): >60 ML/MIN/1.73 M^2
GLUCOSE SERPL-MCNC: 100 MG/DL (ref 70–110)
POTASSIUM SERPL-SCNC: 3.7 MMOL/L (ref 3.5–5.1)
SODIUM SERPL-SCNC: 141 MMOL/L (ref 136–145)

## 2020-03-04 PROCEDURE — 36415 COLL VENOUS BLD VENIPUNCTURE: CPT

## 2020-03-04 PROCEDURE — 80048 BASIC METABOLIC PNL TOTAL CA: CPT

## 2020-03-21 ENCOUNTER — PATIENT MESSAGE (OUTPATIENT)
Dept: FAMILY MEDICINE | Facility: CLINIC | Age: 35
End: 2020-03-21

## 2020-03-21 ENCOUNTER — PATIENT MESSAGE (OUTPATIENT)
Dept: CARDIOLOGY | Facility: CLINIC | Age: 35
End: 2020-03-21

## 2020-04-28 ENCOUNTER — HOSPITAL ENCOUNTER (EMERGENCY)
Facility: HOSPITAL | Age: 35
Discharge: HOME | End: 2020-04-29
Attending: EMERGENCY MEDICINE
Payer: COMMERCIAL

## 2020-04-28 DIAGNOSIS — R42 LIGHTHEADEDNESS: ICD-10-CM

## 2020-04-28 DIAGNOSIS — R51.9 NONINTRACTABLE EPISODIC HEADACHE, UNSPECIFIED HEADACHE TYPE: Primary | ICD-10-CM

## 2020-04-28 PROCEDURE — 96374 THER/PROPH/DIAG INJ IV PUSH: CPT

## 2020-04-28 PROCEDURE — 99284 EMERGENCY DEPT VISIT MOD MDM: CPT | Mod: 25

## 2020-04-28 PROCEDURE — 81025 URINE PREGNANCY TEST: CPT | Performed by: EMERGENCY MEDICINE

## 2020-04-28 RX ORDER — PROCHLORPERAZINE EDISYLATE 5 MG/ML
10 INJECTION INTRAMUSCULAR; INTRAVENOUS ONCE
Status: COMPLETED | OUTPATIENT
Start: 2020-04-29 | End: 2020-04-29

## 2020-04-29 VITALS
OXYGEN SATURATION: 100 % | BODY MASS INDEX: 26.2 KG/M2 | WEIGHT: 183 LBS | TEMPERATURE: 99 F | RESPIRATION RATE: 19 BRPM | HEART RATE: 73 BPM | SYSTOLIC BLOOD PRESSURE: 140 MMHG | HEIGHT: 70 IN | DIASTOLIC BLOOD PRESSURE: 89 MMHG

## 2020-04-29 LAB
ALBUMIN SERPL BCP-MCNC: 3.7 G/DL (ref 3.5–5.2)
ALP SERPL-CCNC: 53 U/L (ref 55–135)
ALT SERPL W/O P-5'-P-CCNC: 16 U/L (ref 10–44)
ANION GAP SERPL CALC-SCNC: 6 MMOL/L (ref 8–16)
AST SERPL-CCNC: 16 U/L (ref 10–40)
B-HCG UR QL: NEGATIVE
BASOPHILS # BLD AUTO: 0.04 K/UL (ref 0–0.2)
BASOPHILS NFR BLD: 0.5 % (ref 0–1.9)
BILIRUB SERPL-MCNC: 0.2 MG/DL (ref 0.1–1)
BILIRUB UR QL STRIP: NEGATIVE
BUN SERPL-MCNC: 13 MG/DL (ref 6–20)
CALCIUM SERPL-MCNC: 9.4 MG/DL (ref 8.7–10.5)
CHLORIDE SERPL-SCNC: 108 MMOL/L (ref 95–110)
CLARITY UR: CLEAR
CO2 SERPL-SCNC: 27 MMOL/L (ref 23–29)
COLOR UR: YELLOW
CREAT SERPL-MCNC: 0.9 MG/DL (ref 0.5–1.4)
CTP QC/QA: YES
DIFFERENTIAL METHOD: ABNORMAL
EOSINOPHIL # BLD AUTO: 0.1 K/UL (ref 0–0.5)
EOSINOPHIL NFR BLD: 1.6 % (ref 0–8)
ERYTHROCYTE [DISTWIDTH] IN BLOOD BY AUTOMATED COUNT: 12.7 % (ref 11.5–14.5)
EST. GFR  (AFRICAN AMERICAN): >60 ML/MIN/1.73 M^2
EST. GFR  (NON AFRICAN AMERICAN): >60 ML/MIN/1.73 M^2
GLUCOSE SERPL-MCNC: 85 MG/DL (ref 70–110)
GLUCOSE UR QL STRIP: NEGATIVE
HCT VFR BLD AUTO: 35 % (ref 37–48.5)
HGB BLD-MCNC: 11.3 G/DL (ref 12–16)
HGB UR QL STRIP: ABNORMAL
IMM GRANULOCYTES # BLD AUTO: 0.01 K/UL (ref 0–0.04)
IMM GRANULOCYTES NFR BLD AUTO: 0.1 % (ref 0–0.5)
KETONES UR QL STRIP: NEGATIVE
LEUKOCYTE ESTERASE UR QL STRIP: NEGATIVE
LYMPHOCYTES # BLD AUTO: 2.2 K/UL (ref 1–4.8)
LYMPHOCYTES NFR BLD: 27.4 % (ref 18–48)
MAGNESIUM SERPL-MCNC: 2.1 MG/DL (ref 1.6–2.6)
MCH RBC QN AUTO: 28.9 PG (ref 27–31)
MCHC RBC AUTO-ENTMCNC: 32.3 G/DL (ref 32–36)
MCV RBC AUTO: 90 FL (ref 82–98)
MICROSCOPIC COMMENT: ABNORMAL
MONOCYTES # BLD AUTO: 0.7 K/UL (ref 0.3–1)
MONOCYTES NFR BLD: 9.3 % (ref 4–15)
NEUTROPHILS # BLD AUTO: 4.8 K/UL (ref 1.8–7.7)
NEUTROPHILS NFR BLD: 61.1 % (ref 38–73)
NITRITE UR QL STRIP: NEGATIVE
NRBC BLD-RTO: 0 /100 WBC
PH UR STRIP: 5 [PH] (ref 5–8)
PHOSPHATE SERPL-MCNC: 4.1 MG/DL (ref 2.7–4.5)
PLATELET # BLD AUTO: 307 K/UL (ref 150–350)
PMV BLD AUTO: 10.1 FL (ref 9.2–12.9)
POTASSIUM SERPL-SCNC: 3.9 MMOL/L (ref 3.5–5.1)
PROT SERPL-MCNC: 7.6 G/DL (ref 6–8.4)
PROT UR QL STRIP: NEGATIVE
RBC # BLD AUTO: 3.91 M/UL (ref 4–5.4)
RBC #/AREA URNS HPF: 6 /HPF (ref 0–4)
SODIUM SERPL-SCNC: 141 MMOL/L (ref 136–145)
SP GR UR STRIP: 1.02 (ref 1–1.03)
URN SPEC COLLECT METH UR: ABNORMAL
UROBILINOGEN UR STRIP-ACNC: NEGATIVE EU/DL
WBC # BLD AUTO: 7.88 K/UL (ref 3.9–12.7)
WBC #/AREA URNS HPF: 2 /HPF (ref 0–5)

## 2020-04-29 PROCEDURE — 93010 ELECTROCARDIOGRAM REPORT: CPT | Mod: ,,, | Performed by: INTERNAL MEDICINE

## 2020-04-29 PROCEDURE — 83735 ASSAY OF MAGNESIUM: CPT

## 2020-04-29 PROCEDURE — 93005 ELECTROCARDIOGRAM TRACING: CPT

## 2020-04-29 PROCEDURE — 63600175 PHARM REV CODE 636 W HCPCS: Performed by: EMERGENCY MEDICINE

## 2020-04-29 PROCEDURE — 84100 ASSAY OF PHOSPHORUS: CPT

## 2020-04-29 PROCEDURE — 80053 COMPREHEN METABOLIC PANEL: CPT

## 2020-04-29 PROCEDURE — 93010 EKG 12-LEAD: ICD-10-PCS | Mod: ,,, | Performed by: INTERNAL MEDICINE

## 2020-04-29 PROCEDURE — 81000 URINALYSIS NONAUTO W/SCOPE: CPT

## 2020-04-29 PROCEDURE — 85025 COMPLETE CBC W/AUTO DIFF WBC: CPT

## 2020-04-29 RX ORDER — PROCHLORPERAZINE MALEATE 10 MG
10 TABLET ORAL EVERY 6 HOURS PRN
Qty: 15 TABLET | Refills: 0 | Status: ON HOLD | OUTPATIENT
Start: 2020-04-29 | End: 2021-07-28 | Stop reason: HOSPADM

## 2020-04-29 RX ADMIN — PROCHLORPERAZINE EDISYLATE 10 MG: 5 INJECTION INTRAMUSCULAR; INTRAVENOUS at 01:04

## 2020-04-29 NOTE — ED PROVIDER NOTES
"Encounter Date: 2020    SCRIBE #1 NOTE: I, Keo Schultz, am scribing for, and in the presence of,  Avery West MD. I have scribed the following portions of the note - Other sections scribed: HPI/ROS/PE.       History     Chief Complaint   Patient presents with    Headache     pt present to the ED c/o headache x 2 days and dizziness earlier today. pt reports taking tylenol around . hx CHF     Dizziness     This 34 y.o. female with a medical history of CHF, HTN, and pacemaker presents to the ED for an emergent evaluation of a bitemporal and posterior headache beginning yesterday. There is associated dizziness beginning today. Pt states that she feels like "someone is hitting me in the back of my head." She notes a "pounding" headache when sleeping at night. Pt also reports increased SOB today. In the past, pt reports taking Tylenol for similar headaches. Of note, pt states she takes a fluid pill. She denies a hx of DM. Pt states BP was 153/107 when she took it earlier today. Otherwise, she denies fever, chills, chest pain, abdominal pain, n/v/d, and any other associated symptoms.    The history is provided by the patient. No  was used.     Review of patient's allergies indicates:  No Known Allergies  Past Medical History:   Diagnosis Date    CHF (congestive heart failure)     Hypertension     dx at 16y.o.    Pacemaker 2017     Past Surgical History:   Procedure Laterality Date    CARDIAC DEFIBRILLATOR PLACEMENT      CARDIAC DEFIBRILLATOR PLACEMENT  2017     SECTION      x 1    INDUCED       TUBAL LIGATION       Family History   Problem Relation Age of Onset    Hypertension Mother     Cancer Maternal Grandmother         breast x 2    Cancer Paternal Grandmother         breast    No Known Problems Sister     No Known Problems Brother     No Known Problems Son     No Known Problems Brother     Hypertension Other     Diabetes Other     " Breast cancer Other     Cancer Paternal Aunt         breast    Cancer Paternal Uncle      Social History     Tobacco Use    Smoking status: Never Smoker    Smokeless tobacco: Never Used   Substance Use Topics    Alcohol use: Not Currently     Comment: rarely    Drug use: No     Review of Systems   Constitutional: Negative for chills and fever.   HENT: Negative for congestion, rhinorrhea and sore throat.    Eyes: Negative for visual disturbance.   Respiratory: Positive for shortness of breath. Negative for cough.    Cardiovascular: Negative for chest pain.   Gastrointestinal: Negative for abdominal pain, diarrhea, nausea and vomiting.   Genitourinary: Negative for difficulty urinating and dysuria.   Musculoskeletal: Negative for neck stiffness.   Skin: Negative for rash.   Neurological: Positive for dizziness and headaches.       Physical Exam     Initial Vitals [04/28/20 2303]   BP Pulse Resp Temp SpO2   (!) 141/87 75 20 98.5 °F (36.9 °C) 100 %      MAP       --         Physical Exam    Nursing note and vitals reviewed.  Constitutional: She appears well-developed and well-nourished. She is not diaphoretic. No distress.   HENT:   Head: Normocephalic and atraumatic.   Eyes: Conjunctivae and EOM are normal. Pupils are equal, round, and reactive to light. Right eye exhibits no discharge. Left eye exhibits no discharge. No scleral icterus.   Neck: Normal range of motion. Neck supple. No tracheal deviation present. No JVD present.   Cardiovascular: Normal rate, regular rhythm, normal heart sounds and intact distal pulses. Exam reveals no gallop and no friction rub.    No murmur heard.  Pulmonary/Chest: Breath sounds normal. No stridor. No respiratory distress. She has no wheezes. She has no rhonchi. She has no rales.   Defibrillator noted to the chest wall   Abdominal: Soft. She exhibits no distension. There is no tenderness. There is no rebound and no guarding.   Musculoskeletal: Normal range of motion. She  exhibits no edema or tenderness.   Neurological: She is alert and oriented to person, place, and time. She has normal strength. No cranial nerve deficit. GCS score is 15. GCS eye subscore is 4. GCS verbal subscore is 5. GCS motor subscore is 6.   HERBER with NGND's.  CN II-XII intact.  F2N and H2S intact.  5/5 strength with full ROM noted to all digits.  Pt has a steady gait with no ataxia.     Skin: Skin is warm and dry. Capillary refill takes less than 2 seconds. No rash and no abscess noted. No erythema. No pallor.   Psychiatric: She has a normal mood and affect. Her behavior is normal. Judgment and thought content normal.         ED Course   Procedures  Labs Reviewed   COMPREHENSIVE METABOLIC PANEL - Abnormal; Notable for the following components:       Result Value    Alkaline Phosphatase 53 (*)     Anion Gap 6 (*)     All other components within normal limits   CBC W/ AUTO DIFFERENTIAL - Abnormal; Notable for the following components:    RBC 3.91 (*)     Hemoglobin 11.3 (*)     Hematocrit 35.0 (*)     All other components within normal limits   URINALYSIS, REFLEX TO URINE CULTURE - Abnormal; Notable for the following components:    Occult Blood UA 2+ (*)     All other components within normal limits    Narrative:     Preferred Collection Type->Urine, Clean Catch   URINALYSIS MICROSCOPIC - Abnormal; Notable for the following components:    RBC, UA 6 (*)     All other components within normal limits    Narrative:     Preferred Collection Type->Urine, Clean Catch   MAGNESIUM   PHOSPHORUS   POCT URINE PREGNANCY     EKG Readings: (Independently Interpreted)   Initial Reading: No STEMI. Rhythm: Sinus Bradycardia. Heart Rate: 59. Ectopy: No Ectopy. Conduction: Normal. ST Segments: Normal ST Segments. T Waves Flipped: AVR, V1 and V2. Axis: Normal. Clinical Impression: Sinus Bradycardia       Imaging Results    None                     Scribe Attestation:   Scribe #1: I performed the above scribed service and the  documentation accurately describes the services I performed. I attest to the accuracy of the note.    Pt arrived alert, afebrile, non-toxic in appearance, in no acute respiratory distress with VSS.  Focal neuro exam unremarkable with no H/A qualities to raise concern for ICH, IC mass or any other pathology mass to warrant neuroimaging at this time.  H/A resolved with meds.  .Labs showed no acute findings.  Pt discharged and counseled on the need to return to the nearest emergency room if they experience any other concerning symptoms.  Pt counseled to F/U outpatient with a PCP over the next two to three days.    Avery West MD                              Clinical Impression:       ICD-10-CM ICD-9-CM   1. Nonintractable episodic headache, unspecified headache type R51 784.0   2. Lightheadedness R42 780.4            I, Avery West, personally performed the services described in this documentation. All medical record entries made by the scribe were at my direction and in my presence.  I have reviewed the chart and agree that the record reflects my personal performance and is accurate and complete.     ED Disposition Condition    Discharge Stable        ED Prescriptions     Medication Sig Dispense Start Date End Date Auth. Provider    prochlorperazine (COMPAZINE) 10 MG tablet Take 1 tablet (10 mg total) by mouth every 6 (six) hours as needed (headache). 15 tablet 4/29/2020  Avery West MD        Follow-up Information     Follow up With Specialties Details Why Contact Info    Veronika Rainey MD Family Medicine, Wound Care Schedule an appointment as soon as possible for a visit in 1 week to follow-up on today's visit [address] [facility] [address]  [phone]      [facility] Emergency Medicine Go to  As needed, If symptoms worsen [address] [facility] [address] [phone]                                     Avery West MD  04/29/20 0605

## 2020-04-29 NOTE — ED TRIAGE NOTES
pt present to the ED c/o headache x 2 days and dizziness earlier today. pt reports taking tylenol around 2130. hx CHF

## 2020-08-02 ENCOUNTER — HOSPITAL ENCOUNTER (EMERGENCY)
Facility: HOSPITAL | Age: 35
Discharge: HOME | End: 2020-08-02
Attending: EMERGENCY MEDICINE
Payer: MEDICAID

## 2020-08-02 VITALS
SYSTOLIC BLOOD PRESSURE: 106 MMHG | OXYGEN SATURATION: 100 % | BODY MASS INDEX: 26.2 KG/M2 | WEIGHT: 183 LBS | RESPIRATION RATE: 16 BRPM | TEMPERATURE: 99 F | HEART RATE: 72 BPM | HEIGHT: 70 IN | DIASTOLIC BLOOD PRESSURE: 66 MMHG

## 2020-08-02 DIAGNOSIS — R07.9 CHEST PAIN: ICD-10-CM

## 2020-08-02 LAB
ALBUMIN SERPL-MCNC: 3.5 G/DL (ref 3.3–5.5)
ALP SERPL-CCNC: 56 U/L (ref 42–141)
B-HCG UR QL: NEGATIVE
BILIRUB SERPL-MCNC: 0.7 MG/DL (ref 0.2–1.6)
BILIRUBIN, POC UA: NEGATIVE
BLOOD, POC UA: ABNORMAL
BUN SERPL-MCNC: 11 MG/DL (ref 7–22)
CALCIUM SERPL-MCNC: 9.3 MG/DL (ref 8–10.3)
CHLORIDE SERPL-SCNC: 107 MMOL/L (ref 98–108)
CLARITY, POC UA: CLEAR
COLOR, POC UA: YELLOW
CREAT SERPL-MCNC: 0.5 MG/DL (ref 0.6–1.2)
CTP QC/QA: YES
CTP QC/QA: YES
GLUCOSE SERPL-MCNC: 101 MG/DL (ref 73–118)
GLUCOSE, POC UA: NEGATIVE
KETONES, POC UA: NEGATIVE
LEUKOCYTE EST, POC UA: NEGATIVE
NITRITE, POC UA: NEGATIVE
PH UR STRIP: 5.5 [PH]
POC ALT (SGPT): 17 U/L (ref 10–47)
POC AST (SGOT): 23 U/L (ref 11–38)
POC B-TYPE NATRIURETIC PEPTIDE: 333 PG/ML (ref 0–100)
POC CARDIAC TROPONIN I: 0 NG/ML
POC CARDIAC TROPONIN I: 0 NG/ML
POC PTINR: 1 (ref 0.9–1.2)
POC PTWBT: 12.3 SEC (ref 9.7–14.3)
POC TCO2: 25 MMOL/L (ref 18–33)
POTASSIUM BLD-SCNC: 3.8 MMOL/L (ref 3.6–5.1)
PROTEIN, POC UA: NEGATIVE
PROTEIN, POC: 7.1 G/DL (ref 6.4–8.1)
SAMPLE: NORMAL
SARS-COV-2 RDRP RESP QL NAA+PROBE: NEGATIVE
SODIUM BLD-SCNC: 140 MMOL/L (ref 128–145)
SPECIFIC GRAVITY, POC UA: 1.02
UROBILINOGEN, POC UA: 0.2 E.U./DL

## 2020-08-02 PROCEDURE — 85610 PROTHROMBIN TIME: CPT | Mod: ER

## 2020-08-02 PROCEDURE — 93010 ELECTROCARDIOGRAM REPORT: CPT | Mod: ,,, | Performed by: INTERNAL MEDICINE

## 2020-08-02 PROCEDURE — 81003 URINALYSIS AUTO W/O SCOPE: CPT | Mod: ER

## 2020-08-02 PROCEDURE — 63600175 PHARM REV CODE 636 W HCPCS: Mod: ER | Performed by: EMERGENCY MEDICINE

## 2020-08-02 PROCEDURE — 93005 ELECTROCARDIOGRAM TRACING: CPT | Mod: ER

## 2020-08-02 PROCEDURE — U0002 COVID-19 LAB TEST NON-CDC: HCPCS | Mod: ER | Performed by: EMERGENCY MEDICINE

## 2020-08-02 PROCEDURE — 83880 ASSAY OF NATRIURETIC PEPTIDE: CPT | Mod: ER

## 2020-08-02 PROCEDURE — 96374 THER/PROPH/DIAG INJ IV PUSH: CPT | Mod: ER

## 2020-08-02 PROCEDURE — 81025 URINE PREGNANCY TEST: CPT | Mod: ER | Performed by: EMERGENCY MEDICINE

## 2020-08-02 PROCEDURE — 93010 EKG 12-LEAD: ICD-10-PCS | Mod: ,,, | Performed by: INTERNAL MEDICINE

## 2020-08-02 PROCEDURE — 99285 EMERGENCY DEPT VISIT HI MDM: CPT | Mod: 25,ER

## 2020-08-02 PROCEDURE — 80053 COMPREHEN METABOLIC PANEL: CPT | Mod: ER

## 2020-08-02 PROCEDURE — 25000003 PHARM REV CODE 250: Mod: ER | Performed by: EMERGENCY MEDICINE

## 2020-08-02 PROCEDURE — 85025 COMPLETE CBC W/AUTO DIFF WBC: CPT | Mod: ER

## 2020-08-02 PROCEDURE — 84484 ASSAY OF TROPONIN QUANT: CPT | Mod: ER

## 2020-08-02 RX ORDER — ASPIRIN 325 MG
325 TABLET ORAL
Status: COMPLETED | OUTPATIENT
Start: 2020-08-02 | End: 2020-08-02

## 2020-08-02 RX ORDER — ACETAMINOPHEN 500 MG
1000 TABLET ORAL
Status: COMPLETED | OUTPATIENT
Start: 2020-08-02 | End: 2020-08-02

## 2020-08-02 RX ORDER — ONDANSETRON 2 MG/ML
8 INJECTION INTRAMUSCULAR; INTRAVENOUS
Status: COMPLETED | OUTPATIENT
Start: 2020-08-02 | End: 2020-08-02

## 2020-08-02 RX ADMIN — ACETAMINOPHEN 1000 MG: 500 TABLET ORAL at 03:08

## 2020-08-02 RX ADMIN — ASPIRIN 325 MG ORAL TABLET 325 MG: 325 PILL ORAL at 01:08

## 2020-08-02 RX ADMIN — ONDANSETRON 8 MG: 2 INJECTION INTRAMUSCULAR; INTRAVENOUS at 01:08

## 2020-08-02 RX ADMIN — NITROGLYCERIN 1 INCH: 20 OINTMENT TOPICAL at 01:08

## 2020-08-02 NOTE — ED PROVIDER NOTES
Encounter Date: 2020    SCRIBE #1 NOTE: I, Rhona Sen, am scribing for, and in the presence of,  Dr. Fuentes. I have scribed the following portions of the note - the EKG reading. Other sections scribed: HPI, ROS, PE.       History     Chief Complaint   Patient presents with    Chest Pain     Right mid chest pain intermittent since Friday with associated SOB. Hx CHF.     Nasra Marks is a 35 y.o. female with CHF who presents to the ED complaining of intermittent centered chest pain x 2 days. States episodes last about 10 seconds. Chest pain worsened today and radiates into right arm. Pain worsens with movement and walking. Rates pain 7/10 in ED and 10/10 at worst. Reports similar pain when she was first diagnosed with heart failure in 2017. Patient has a pacemaker and her cardiologist works at [facility]. Reports feeling nauseous for about an hour. No known allergies. Doesn't smoke or use drugs. Occasional alcohol. Last menstrual cycle was 2020.     The history is provided by the patient. No  was used.     Review of patient's allergies indicates:  No Known Allergies  Past Medical History:   Diagnosis Date    CHF (congestive heart failure)     Hypertension     dx at 16y.o.    Pacemaker 2017     Past Surgical History:   Procedure Laterality Date    CARDIAC DEFIBRILLATOR PLACEMENT      CARDIAC DEFIBRILLATOR PLACEMENT  2017     SECTION      x 1    INDUCED       TUBAL LIGATION       Family History   Problem Relation Age of Onset    Hypertension Mother     Cancer Maternal Grandmother         breast x 2    Cancer Paternal Grandmother         breast    No Known Problems Sister     No Known Problems Brother     No Known Problems Son     No Known Problems Brother     Hypertension Other     Diabetes Other     Breast cancer Other     Cancer Paternal Aunt         breast    Cancer Paternal Uncle      Social History     Tobacco Use     Smoking status: Never Smoker    Smokeless tobacco: Never Used   Substance Use Topics    Alcohol use: Not Currently     Comment: rarely    Drug use: No     Review of Systems   Constitutional: Negative for chills and fever.   HENT: Negative for congestion and sore throat.    Respiratory: Negative for cough and shortness of breath.    Cardiovascular: Positive for chest pain.   Gastrointestinal: Positive for nausea. Negative for diarrhea and vomiting.   Genitourinary: Negative for dysuria.   Skin: Negative for rash.   Neurological: Negative for weakness and headaches.   All other systems reviewed and are negative.      Physical Exam     Initial Vitals [08/02/20 1310]   BP Pulse Resp Temp SpO2   (!) 139/96 80 20 99 °F (37.2 °C) 100 %      MAP       --         Patient gave consent to have physical exam performed.    Physical Exam    Nursing note and vitals reviewed.  Constitutional: She appears well-developed and well-nourished.   HENT:   Head: Normocephalic and atraumatic.   Right Ear: External ear normal.   Left Ear: External ear normal.   Nose: Nose normal.   Mouth/Throat: Oropharynx is clear and moist.   Eyes: Conjunctivae and EOM are normal. Pupils are equal, round, and reactive to light.   Neck: Normal range of motion and phonation normal. Neck supple.   Cardiovascular: Normal rate, regular rhythm and intact distal pulses. Exam reveals no gallop and no friction rub.    Murmur heard.  Pulmonary/Chest: Effort normal and breath sounds normal. No stridor. No respiratory distress. She has no wheezes. She has no rhonchi. She has no rales. She exhibits tenderness.   +Chest wall tenderness.   Pacemaker in left anterior chest wall.    Abdominal: Soft. Bowel sounds are normal. She exhibits no distension. There is no abdominal tenderness. There is no rigidity, no rebound and no guarding.   Musculoskeletal: Normal range of motion. No tenderness or edema.      Right lower leg: She exhibits no swelling. No edema.      Left  lower leg: She exhibits no swelling. No edema.   Neurological: She is alert and oriented to person, place, and time. She has normal strength. No cranial nerve deficit or sensory deficit. GCS score is 15. GCS eye subscore is 4. GCS verbal subscore is 5. GCS motor subscore is 6.   Skin: Skin is warm and dry. Capillary refill takes less than 2 seconds. No rash noted.   Psychiatric: She has a normal mood and affect. Her behavior is normal.         ED Course   Procedures  Labs Reviewed   POCT URINALYSIS W/O SCOPE - Abnormal; Notable for the following components:       Result Value    Blood, UA 2+ (*)     All other components within normal limits   POCT CMP - Abnormal; Notable for the following components:    POC Creatinine 0.5 (*)     All other components within normal limits   POCT B-TYPE NATRIURETIC PEPTIDE (BNP) - Abnormal; Notable for the following components:    POC B-Type Natriuretic Peptide 333 (*)     All other components within normal limits   TROPONIN ISTAT   TROPONIN ISTAT   POCT CBC   POCT URINE PREGNANCY   POCT URINALYSIS W/O SCOPE   SARS-COV-2 RDRP GENE   POCT CMP   POCT PROTIME-INR   POCT TROPONIN   POCT B-TYPE NATRIURETIC PEPTIDE (BNP)   ISTAT PROCEDURE   POCT TROPONIN           EKG Readings: (Independently Interpreted)   Initial Reading: No STEMI. Rhythm: Normal Sinus Rhythm. Heart Rate: 76. Axis: Normal.   Normal EKG. QTc normal at 436. When compared to prior EKG dated 4/29/2020 rate increased by 17 bpm.    Other EKG Interpretations: Repeat EKG done at 3:53 p.m.  No STEMI  Normal sinus rhythm ventricular rate of 66  Normal axis  Abnormal EKG, nonspecific T-wave,   When compared to earlier EKG rate has decreased by 10 beats per minute       Imaging Results          X-Ray Chest AP Portable (Final result)  Result time 08/02/20 14:30:17    Final result by Marta Ramos MD (08/02/20 14:30:17)                 Impression:      No acute cardiopulmonary process..      Electronically signed  by: Marta Ramos MD  Date:    08/02/2020  Time:    14:30             Narrative:    EXAMINATION:  XR CHEST AP PORTABLE    CLINICAL HISTORY:  Chest Pain;    TECHNIQUE:  Single frontal view of the chest was performed.    COMPARISON:  Prior dated 01/02/2020    FINDINGS:  The mediastinal structures are midline.  The cardiac silhouette is prominent and stable,, evaluation limited by magnified AP technique.  There is a single lead cardiac pacing device in the left chest.    Lungs appear grossly clear.                                 Medical Decision Making:   History:   Old Medical Records: I decided to obtain old medical records.  Clinical Tests:   Lab Tests: Ordered  Radiological Study: Ordered  Medical Tests: Ordered  Chief complaint: chest pain that feels like patient's previous heart attack  Differential diagnosis: STEMI, NSTEMI, CHF, COVID, pregnancy, pneumonia, and pneumothorax    Treatment in the ED: PE, aspirin, nitroglycerin ointment, ondansetron   Patient reports feeling better after treatment in the ER.    Consult with cardiology Dr. Hanson.  Discussed patient's presentation, physical exam, past medical history, cardiac history, stress test, heart catheterization, and EF less than 15%.  He recommends repeat troponin and EKG.  If within normal limits discharge home to follow-up outpatient tomorrow.  Repeat troponin is 0.00.  Patient presents with chest pain for greater than 24 hours.  Troponin is negative.  No STEMI on EKG and no acute issues on chest x-ray.  Cardiology consult completed and they recommend follow up with Cardiology in 1 day for further evaluation of chest pain. Discussed need to return to the ED if chest pain worsens or does not resolve.  Discussed treatment, prescriptions, labs, and imaging results.    Fill and take prescriptions as directed.  Return to the ED if symptoms worsen or do not resolve.   Answered questions and discussed discharge plan.    Patient feels better and is ready for  discharge.  Follow up with PCP/specialist in 1 day.    Additional MDM:   Heart Score:    History:          Slightly suspicious.  ECG:             Normal  Age:               Less than 45 years  Risk factors: >= 3 risk factors or history of atherosclerotic disease  Troponin:       Less than or equal to normal limit  Final Score: 2             Scribe Attestation:   Scribe #1: I performed the above scribed service and the documentation accurately describes the services I performed. I attest to the accuracy of the note.     I, Dr. Suzie Fuentes, personally performed the services described in this documentation. This document was produced by a scribe under my direction and in my presence. All medical record entries made by the scribe were at my direction and in my presence.  I have reviewed the chart and agree that the record reflects my personal performance and is accurate and complete. Suzie Fuentes DO.     08/02/2020 3:03 PM                        Clinical Impression:     1. Chest pain              ED Disposition Condition    Admit                           Suzie Fuentes DO  08/02/20 1742

## 2020-08-02 NOTE — ED NOTES
ED provider reports keep nitro paste on pt currently, pt reports chest pain improved to 5/10 from 10/10. Pt reporting HA, tylenol given, EKG repeat performed, repeat troponin drawn, pt reports no needs at this time, call light in reach, awaiting results.

## 2020-08-02 NOTE — Clinical Note
Nasra Marks was seen and treated in our emergency department on 8/2/2020.  She may return to work after being cleared by follow-up physician 08/05/2020.       If you have any questions or concerns, please don't hesitate to call.      Suzie Fuentes, DO

## 2020-08-03 ENCOUNTER — PATIENT OUTREACH (OUTPATIENT)
Dept: ADMINISTRATIVE | Facility: OTHER | Age: 35
End: 2020-08-03

## 2020-08-03 NOTE — PROGRESS NOTES
Requested updates within Care Everywhere.  Patient's chart was reviewed for overdue JUAN JOSE topics.  Immunizations reconciled.

## 2020-08-05 ENCOUNTER — OFFICE VISIT (OUTPATIENT)
Dept: CARDIOLOGY | Facility: CLINIC | Age: 35
End: 2020-08-05
Payer: MEDICAID

## 2020-08-05 ENCOUNTER — TELEPHONE (OUTPATIENT)
Dept: FAMILY MEDICINE | Facility: CLINIC | Age: 35
End: 2020-08-05

## 2020-08-05 VITALS
WEIGHT: 184.31 LBS | DIASTOLIC BLOOD PRESSURE: 64 MMHG | SYSTOLIC BLOOD PRESSURE: 118 MMHG | RESPIRATION RATE: 15 BRPM | OXYGEN SATURATION: 98 % | HEART RATE: 92 BPM | BODY MASS INDEX: 26.39 KG/M2 | HEIGHT: 70 IN

## 2020-08-05 DIAGNOSIS — I10 ESSENTIAL HYPERTENSION: Chronic | ICD-10-CM

## 2020-08-05 DIAGNOSIS — R07.2 PRECORDIAL PAIN: ICD-10-CM

## 2020-08-05 DIAGNOSIS — Z95.810 ICD (IMPLANTABLE CARDIOVERTER-DEFIBRILLATOR), SINGLE, IN SITU: ICD-10-CM

## 2020-08-05 DIAGNOSIS — I42.8 NONISCHEMIC CARDIOMYOPATHY: Primary | Chronic | ICD-10-CM

## 2020-08-05 PROCEDURE — 99999 PR PBB SHADOW E&M-EST. PATIENT-LVL III: CPT | Mod: PBBFAC,,, | Performed by: INTERNAL MEDICINE

## 2020-08-05 PROCEDURE — 99999 PR PBB SHADOW E&M-EST. PATIENT-LVL III: ICD-10-PCS | Mod: PBBFAC,,, | Performed by: INTERNAL MEDICINE

## 2020-08-05 PROCEDURE — 99213 OFFICE O/P EST LOW 20 MIN: CPT | Mod: PBBFAC,25 | Performed by: INTERNAL MEDICINE

## 2020-08-05 PROCEDURE — 93282 PRGRMG EVAL IMPLANTABLE DFB: CPT | Mod: 26,S$PBB,, | Performed by: INTERNAL MEDICINE

## 2020-08-05 PROCEDURE — 99214 PR OFFICE/OUTPT VISIT, EST, LEVL IV, 30-39 MIN: ICD-10-PCS | Mod: S$PBB,25,, | Performed by: INTERNAL MEDICINE

## 2020-08-05 PROCEDURE — 93282 PRGRMG EVAL IMPLANTABLE DFB: CPT | Mod: PBBFAC | Performed by: INTERNAL MEDICINE

## 2020-08-05 PROCEDURE — 93282 PR PROGRAM EVAL (IN PERSON) IMPLANT DEVICE,CARDVERT/DEFIB,1 LEAD: ICD-10-PCS | Mod: 26,S$PBB,, | Performed by: INTERNAL MEDICINE

## 2020-08-05 PROCEDURE — 99214 OFFICE O/P EST MOD 30 MIN: CPT | Mod: S$PBB,25,, | Performed by: INTERNAL MEDICINE

## 2020-08-05 NOTE — PROGRESS NOTES
CARDIOVASCULAR PROGRESS NOTE    REASON FOR CONSULT:   Nasra Marks is a 35 y.o. female who presents for follow up of MINDY, MDT ICD.    PCP: Red[facility]  HISTORY OF PRESENT ILLNESS:   The patient returns for follow-up.  She has been seen in the emergency room again for chest pain, although this time was right-sided.  This was felt to be noncardiac.  She continues to have intermittent nonexertional chest pain.  She otherwise denies dyspnea, palpitations, syncope, or defibrillator discharges.  There has been no PND, orthopnea, melena, hematuria, or claudicant symptoms.  She also is describing some sleep issues having difficulty falling asleep.  This does not sound to be sleep apnea.  I suggested she try 12.5 mg of Benadryl as needed.  I have also suggested that she try over-the-counter Prilosec/omeprazole, and that if her chest pain continues, she consider GI evaluation.    Of note, the patient was intolerant of her Aldactone.  She stated that her lips swelled, she is no longer taking the medication.  I will remove it from her medication list below.    The Medtronic single-chamber ICD was interrogated in the office today.  Current rhythm is sinus at 95 beats per minute.  Program parameters VVI at 40.  She is pacing less than 1% time the ventricle.  Estimated longevity is 10 years.  Sensing and pacing thresholds as well as impedance are normal.  There been no high rate ventricular episodes.  No programming changes were made today.    CARDIOVASCULAR HISTORY:   NICM (cath 2017 with normal cors)  ICD (Donta 2017, MDT single chamber)    PAST MEDICAL HISTORY:     Past Medical History:   Diagnosis Date    CHF (congestive heart failure)     Hypertension     dx at 16y.o.    Pacemaker 2017       PAST SURGICAL HISTORY:     Past Surgical History:   Procedure Laterality Date    CARDIAC DEFIBRILLATOR PLACEMENT      CARDIAC DEFIBRILLATOR PLACEMENT  2017     SECTION      x 1    INDUCED       TUBAL  LIGATION         ALLERGIES AND MEDICATION:   Review of patient's allergies indicates:  No Known Allergies     Medication List          Accurate as of August 5, 2020  2:02 PM. If you have any questions, ask your nurse or doctor.            CONTINUE taking these medications    carvediloL 25 MG tablet  Commonly known as: COREG  TAKE 1 TABLET(25 MG) BY MOUTH TWICE DAILY     furosemide 40 MG tablet  Commonly known as: LASIX  Take 1 tablet (40 mg total) by mouth once daily.     lisinopriL 40 MG tablet  Commonly known as: PRINIVIL,ZESTRIL  Take 1 tablet (40 mg total) by mouth once daily.     prochlorperazine 10 MG tablet  Commonly known as: COMPAZINE  Take 1 tablet (10 mg total) by mouth every 6 (six) hours as needed (headache).     spironolactone 25 MG tablet  Commonly known as: ALDACTONE  Take 1 tablet (25 mg total) by mouth once daily.            SOCIAL HISTORY:     Social History     Socioeconomic History    Marital status:      Spouse name: Not on file    Number of children: Not on file    Years of education: Not on file    Highest education level: Not on file   Occupational History     Employer: [employer]   Social Needs    Financial resource strain: Not on file    Food insecurity     Worry: Not on file     Inability: Not on file    Transportation needs     Medical: Not on file     Non-medical: Not on file   Tobacco Use    Smoking status: Never Smoker    Smokeless tobacco: Never Used   Substance and Sexual Activity    Alcohol use: Not Currently     Comment: rarely    Drug use: No    Sexual activity: Yes     Partners: Male     Birth control/protection: None   Lifestyle    Physical activity     Days per week: Not on file     Minutes per session: Not on file    Stress: Not on file   Relationships    Social connections     Talks on phone: Not on file     Gets together: Not on file     Attends Protestant service: Not on file     Active member of club or organization: Not on file     Attends meetings of  "clubs or organizations: Not on file     Relationship status: Not on file   Other Topics Concern    Not on file   Social History Narrative    Not on file       FAMILY HISTORY:     Family History   Problem Relation Age of Onset    Hypertension Mother     Cancer Maternal Grandmother         breast x 2    Cancer Paternal Grandmother         breast    No Known Problems Sister     No Known Problems Brother     No Known Problems Son     No Known Problems Brother     Hypertension Other     Diabetes Other     Breast cancer Other     Cancer Paternal Aunt         breast    Cancer Paternal Uncle        REVIEW OF SYSTEMS:   Review of Systems   Constitutional: Negative for chills, diaphoresis and fever.   HENT: Negative for nosebleeds.    Eyes: Negative for blurred vision, double vision and photophobia.   Respiratory: Negative for hemoptysis, shortness of breath and wheezing.    Cardiovascular: Positive for chest pain. Negative for palpitations, orthopnea, claudication, leg swelling and PND.   Gastrointestinal: Negative for abdominal pain, blood in stool, heartburn, melena, nausea and vomiting.   Genitourinary: Negative for flank pain and hematuria.   Musculoskeletal: Negative for falls, myalgias and neck pain.   Skin: Negative for rash.   Neurological: Negative for dizziness, seizures, loss of consciousness, weakness and headaches.   Endo/Heme/Allergies: Negative for polydipsia. Does not bruise/bleed easily.   Psychiatric/Behavioral: Negative for depression and memory loss. The patient is not nervous/anxious.        PHYSICAL EXAM:     Vitals:    08/05/20 1354   BP: 118/64   Pulse: 92   Resp: 15    Body mass index is 26.44 kg/m².  Weight: 83.6 kg (184 lb 4.9 oz)   Height: 5' 10" (177.8 cm)     Physical Exam  Vitals signs reviewed.   Constitutional:       General: She is not in acute distress.     Appearance: Normal appearance. She is well-developed. She is not ill-appearing, toxic-appearing or diaphoretic.   HENT: "      Head: Normocephalic and atraumatic.   Eyes:      General: No scleral icterus.     Conjunctiva/sclera: Conjunctivae normal.      Pupils: Pupils are equal, round, and reactive to light.   Neck:      Musculoskeletal: Normal range of motion and neck supple. No edema or neck rigidity.      Thyroid: No thyromegaly.      Vascular: Normal carotid pulses. No carotid bruit or JVD.      Trachea: Trachea normal. No tracheal deviation.   Cardiovascular:      Rate and Rhythm: Normal rate and regular rhythm.      Pulses:           Carotid pulses are 2+ on the right side and 2+ on the left side.     Heart sounds: S1 normal and S2 normal. No murmur. No friction rub. No gallop.       Comments: L infraclavicular ICD site well healed  Pulmonary:      Effort: Pulmonary effort is normal. No respiratory distress.      Breath sounds: Normal breath sounds. No stridor. No wheezing, rhonchi or rales.   Chest:      Chest wall: No tenderness.   Abdominal:      General: There is no distension.      Palpations: Abdomen is soft.   Musculoskeletal: Normal range of motion.         General: No swelling or tenderness.   Feet:      Right foot:      Skin integrity: No ulcer.      Left foot:      Skin integrity: No ulcer.   Skin:     General: Skin is warm and dry.      Findings: No erythema or rash.   Neurological:      Mental Status: She is alert and oriented to person, place, and time.      Cranial Nerves: No cranial nerve deficit.   Psychiatric:         Speech: Speech normal.         Behavior: Behavior normal. Behavior is cooperative.         DATA:   EKG: (personally reviewed tracing(s))  8/2/20 SR 66, LVH, NSSTTW changes, similar to 4/29/20    Laboratory:  CBC:  Recent Labs   Lab 10/14/19  2344 01/02/20  2152 04/29/20  0008   WBC 8.88 10.37 7.88   Hemoglobin 11.9 L 11.7 L 11.3 L   Hematocrit 36.9 L 36.2 L 35.0 L   Platelets 328 293 307       CHEMISTRIES:  Recent Labs   Lab 05/24/19  1009 05/25/19  0438  01/02/20  2152 03/04/20  1345  04/29/20  0008   Glucose 92 83   < > 95 100 85   Sodium 137 137   < > 134 L 141 141   Potassium 3.9 3.7   < > 3.4 L 3.7 3.9   BUN, Bld 11 18   < > 11 12 13   Creatinine 0.8 1.0   < > 0.7 0.8 0.9   eGFR if African American >60 >60   < > >60 >60 >60   eGFR if non African American >60 >60   < > >60 >60 >60   Calcium 9.2 9.0   < > 8.0 L 8.9 9.4   Magnesium 1.9 2.1  --   --   --  2.1    < > = values in this interval not displayed.       CARDIAC BIOMARKERS:  Recent Labs   Lab 09/07/19  0004 09/07/19  0240 01/02/20 2152   Troponin I <0.006 <0.006 <0.006       COAGS:  Recent Labs   Lab 12/14/17  0945 04/30/18  1220   INR 1.1 1.0       LIPIDS/LFTS:  Recent Labs   Lab 05/24/19  1927  10/14/19  2344 01/02/20 2152 04/29/20  0008   Cholesterol 170  --   --   --   --    Triglycerides 46  --   --   --   --    HDL 43  --   --   --   --    LDL Cholesterol 117.8  --   --   --   --    Non-HDL Cholesterol 127  --   --   --   --    AST  --    < > 16 11 16   ALT  --    < > 15 9 L 16    < > = values in this interval not displayed.     Lab Results   Component Value Date    TSH 0.92 07/21/2017         Cardiovascular Testing:  Echo 5/24/19  · Severely decreased left ventricular systolic function. The estimated ejection fraction is 15%  · Severe global hypokinetic wall motion.  · Mild concentric left ventricular hypertrophy.  · Moderate left ventricular enlargement.  · Grade III (severe) left ventricular diastolic dysfunction consistent with restrictive physiology.  · Normal right ventricular systolic function.  · Mild mitral regurgitation.  · The estimated PA systolic pressure is 28 mm Hg    Cath 4/18/17  B. Summary/Post-Operative Diagnosis    Normal coronary arteries.    Systolic dysfunction.    Mildly elevated left Filling Pressures.    Normal Pulmonary Hypertension.  D. Hemodynamic Results  Ejection Fraction: 20%  Global LV Function: severely depressed  PW:  (4)  PA: 24  CO_THERM: 4.4  RV: 25  RA:  (5)  LVEDP: 17   E. Angiographic  Results       Patient has a right dominant coronary artery.      - Left Main Coronary Artery:             The LM is normal. There is DANNY 3 flow.     - Left Anterior Descending Artery:             The LAD is normal. There is DANNY 3 flow.     - Left Circumflex Artery:             The LCX is normal. There is DANNY 3 flow.     - Right Coronary Artery:             The RCA is normal. There is DANNY 3 flow.     - Left Renal Artery:             The ostial left renal is normal.     - Right Renal Artery:             The ostial right renal is normal.     - Common Femoral Artery:             The right CFA is normal.      ASSESSMENT:   # NICM, appears euvolemic  # MDT ICD  # hx NSVT  # HTN, controlled  # CP, noncardiac ?GI    PLAN:   Cont med rx  Cont GI eval +/- prilosec OTC, I will defer referral to PCP  RTC 3 months with MDT ICD check (Nov 2020)      Kashif Peguero MD, FACC

## 2020-08-05 NOTE — TELEPHONE ENCOUNTER
----- Message from Dolores Boyce sent at 8/5/2020  2:17 PM CDT -----  Type: Patient Call Back    Who called: Self     What is the request in detail: patient was seen by Dr. Peguero she states she was told by him she need to see her primary for a possible referral for GI the next available is not until 10/20. Please call     Can the clinic reply by [facility]? No     Would the patient rather a call back or a response via [facility]?  Call     Best call back number:[phone] (home)

## 2020-08-05 NOTE — TELEPHONE ENCOUNTER
Spoke with patient to schedule an appointment for chest pain 8/13/2020 with Dr. Chaudhary. Patient was also schedule to Dr. Rainey for her physical.

## 2020-08-12 ENCOUNTER — OFFICE VISIT (OUTPATIENT)
Dept: FAMILY MEDICINE | Facility: CLINIC | Age: 35
End: 2020-08-12
Payer: MEDICAID

## 2020-08-12 VITALS
RESPIRATION RATE: 20 BRPM | DIASTOLIC BLOOD PRESSURE: 62 MMHG | SYSTOLIC BLOOD PRESSURE: 124 MMHG | BODY MASS INDEX: 26.39 KG/M2 | HEART RATE: 81 BPM | HEIGHT: 70 IN | OXYGEN SATURATION: 99 % | WEIGHT: 184.31 LBS | TEMPERATURE: 97 F

## 2020-08-12 DIAGNOSIS — Z95.810 ICD (IMPLANTABLE CARDIOVERTER-DEFIBRILLATOR), SINGLE, IN SITU: ICD-10-CM

## 2020-08-12 DIAGNOSIS — I10 ESSENTIAL HYPERTENSION: ICD-10-CM

## 2020-08-12 DIAGNOSIS — R07.2 PRECORDIAL PAIN: ICD-10-CM

## 2020-08-12 DIAGNOSIS — I42.8 NONISCHEMIC CARDIOMYOPATHY: ICD-10-CM

## 2020-08-12 DIAGNOSIS — K21.9 GASTROESOPHAGEAL REFLUX DISEASE, ESOPHAGITIS PRESENCE NOT SPECIFIED: Primary | ICD-10-CM

## 2020-08-12 PROCEDURE — 99999 PR PBB SHADOW E&M-EST. PATIENT-LVL III: ICD-10-PCS | Mod: PBBFAC,,, | Performed by: INTERNAL MEDICINE

## 2020-08-12 PROCEDURE — 99215 PR OFFICE/OUTPT VISIT, EST, LEVL V, 40-54 MIN: ICD-10-PCS | Mod: S$PBB,,, | Performed by: INTERNAL MEDICINE

## 2020-08-12 PROCEDURE — 99215 OFFICE O/P EST HI 40 MIN: CPT | Mod: S$PBB,,, | Performed by: INTERNAL MEDICINE

## 2020-08-12 PROCEDURE — 99213 OFFICE O/P EST LOW 20 MIN: CPT | Mod: PBBFAC,PO | Performed by: INTERNAL MEDICINE

## 2020-08-12 PROCEDURE — 99999 PR PBB SHADOW E&M-EST. PATIENT-LVL III: CPT | Mod: PBBFAC,,, | Performed by: INTERNAL MEDICINE

## 2020-08-12 RX ORDER — OMEPRAZOLE 40 MG/1
40 CAPSULE, DELAYED RELEASE ORAL DAILY
Qty: 90 CAPSULE | Refills: 4 | Status: SHIPPED | OUTPATIENT
Start: 2020-08-12 | End: 2021-03-08

## 2020-08-12 NOTE — PROGRESS NOTES
Chief complaint:  Follow-up on chest pain    Patient is a 35-year-old black female new to me.  Recently seen by Cardiology.  She reports six months or more of reflux type symptoms.  It feels like a pressure and a balloon in her upper epigastric area and chest.  Associated more with belching.  She really does not have a lot of lower GI symptoms or gas.  She has taken no medications.  She is not on an aspirin.  She has no dysphagia or other signs or symptoms I would indicate any immediate referral to gastro read UD.  Was a pressure in her chest.  No melena.  We discussed assessing for any factors over the past six months or more that might have led to GERD.  She does report having this type of symptom even longer ago and she did have to go see GI for it.        Seen cards 8/5/20  HISTORY OF PRESENT ILLNESS:   The patient returns for follow-up.  She has been seen in the emergency room again for chest pain, although this time was right-sided.  This was felt to be noncardiac.  She continues to have intermittent nonexertional chest pain.  She otherwise denies dyspnea, palpitations, syncope, or defibrillator discharges.  There has been no PND, orthopnea, melena, hematuria, or claudicant symptoms.  She also is describing some sleep issues having difficulty falling asleep.  This does not sound to be sleep apnea.  I suggested she try 12.5 mg of Benadryl as needed.  I have also suggested that she try over-the-counter Prilosec/omeprazole, and that if her chest pain continues, she consider GI evaluation.     Of note, the patient was intolerant of her Aldactone.  She stated that her lips swelled, she is no longer taking the medication.  I will remove it from her medication list below.     The Med[employer]onic single-chamber ICD was interrogated in the office today.  Current rhythm is sinus at 95 beats per minute.  Program parameters VVI at 40.  She is pacing less than 1% time the ventricle.  Estimated longevity is 10 years.  Sensing and  pacing thresholds as well as impedance are normal.  There been no high rate ventricular episodes.  No programming changes were made today.     CARDIOVASCULAR HISTORY:   NICM (cath 4/2017 with normal cors)  ICD (Dravid 12/2017, MDT single chamber)    Review of systems:  No dysphagia, no signs of GI bleeding    Past Medical History:   Diagnosis Date    CHF (congestive heart failure)     Hypertension     dx at 16y.o.    Pacemaker 12/18/2017     Gen: no distress  EYES: conjunctiva clear, non-icteric, PERRL  ENT: nose clear, nasal mucosa normal, oropharynx clear and moist, teeth good  NECK:supple, thyroid non-palpable  RESP: effort is good, lungs clear  CV: heart RRR w/o murmur, gallops or rubs; no carotid bruits, no edema  GI: abdomen soft, non-distended, non-tender, no hepatosplenomegaly  MS: gait normal, no clubbing or cyanosis of the digits  SKIN: no rashes, warm to touch        Nasra was seen today for chest pain and referral.    Diagnoses and all orders for this visit:    Gastroesophageal reflux disease, esophagitis presence not specified, given the negative cardiac workup for the nature of her symptoms is fairly classic for reflux/esophagitis.  Long discussion today regarding reflux precautions, dietary factors and so forth.  We will start her on omeprazole 40 mg and assess response.  If she gets improvement she can wean down in the future to 20 mg, 20 mg every other day and possibly then go to Pepcid but it sounds like her problem may be more chronic than stated so she might benefit from just staying on acid suppression long-term especially to remove any GI related chest pain which would confuse her and get her anxious about cardiac chest pain.  We discussed using cold Maalox and so forth as well as Tums as needed.  We discussed expectation of response which might be a week or two for acid related injury to heal.Total time over 45 minutes with over 50% counseling.    Precordial pain, agree that this appears to  "be noncardiac    Nonischemic cardiomyopathy, reviewed cardiology notes    Essential hypertension, chronic and stable    ICD (implantable cardioverter-defibrillator), single, in situ    Other orders  -     omeprazole (PRILOSEC) 40 MG capsule; Take 1 capsule (40 mg total) by mouth once daily.     Based on anxiety expressed referable to the above issues access would not be therapeutic at all."This note will not be shared with the patient."  "

## 2020-10-26 ENCOUNTER — HOSPITAL ENCOUNTER (EMERGENCY)
Facility: HOSPITAL | Age: 35
Discharge: HOME | End: 2020-10-27
Attending: EMERGENCY MEDICINE
Payer: MEDICAID

## 2020-10-26 DIAGNOSIS — M54.41 ACUTE RIGHT-SIDED LOW BACK PAIN WITH RIGHT-SIDED SCIATICA: Primary | ICD-10-CM

## 2020-10-26 LAB
B-HCG UR QL: NEGATIVE
BILIRUBIN, POC UA: NEGATIVE
BLOOD, POC UA: ABNORMAL
CLARITY, POC UA: ABNORMAL
COLOR, POC UA: YELLOW
CTP QC/QA: YES
GLUCOSE, POC UA: NEGATIVE
KETONES, POC UA: NEGATIVE
LEUKOCYTE EST, POC UA: NEGATIVE
NITRITE, POC UA: NEGATIVE
PH UR STRIP: 6 [PH]
PROTEIN, POC UA: NEGATIVE
SPECIFIC GRAVITY, POC UA: >=1.03
UROBILINOGEN, POC UA: 0.2 E.U./DL

## 2020-10-26 PROCEDURE — 81003 URINALYSIS AUTO W/O SCOPE: CPT | Mod: ER

## 2020-10-26 PROCEDURE — 81025 URINE PREGNANCY TEST: CPT | Mod: ER | Performed by: EMERGENCY MEDICINE

## 2020-10-26 PROCEDURE — 25000003 PHARM REV CODE 250: Mod: ER | Performed by: EMERGENCY MEDICINE

## 2020-10-26 PROCEDURE — 99284 EMERGENCY DEPT VISIT MOD MDM: CPT | Mod: 25,ER

## 2020-10-26 PROCEDURE — 96372 THER/PROPH/DIAG INJ SC/IM: CPT | Mod: ER

## 2020-10-26 RX ORDER — METHOCARBAMOL 750 MG/1
1500 TABLET, FILM COATED ORAL
Status: COMPLETED | OUTPATIENT
Start: 2020-10-26 | End: 2020-10-26

## 2020-10-26 RX ORDER — KETOROLAC TROMETHAMINE 30 MG/ML
30 INJECTION, SOLUTION INTRAMUSCULAR; INTRAVENOUS
Status: COMPLETED | OUTPATIENT
Start: 2020-10-26 | End: 2020-10-27

## 2020-10-26 RX ORDER — KETOROLAC TROMETHAMINE 10 MG/1
10 TABLET, FILM COATED ORAL
Status: COMPLETED | OUTPATIENT
Start: 2020-10-26 | End: 2020-10-26

## 2020-10-26 RX ORDER — DEXAMETHASONE SODIUM PHOSPHATE 4 MG/ML
8 INJECTION, SOLUTION INTRA-ARTICULAR; INTRALESIONAL; INTRAMUSCULAR; INTRAVENOUS; SOFT TISSUE
Status: COMPLETED | OUTPATIENT
Start: 2020-10-26 | End: 2020-10-27

## 2020-10-26 RX ADMIN — METHOCARBAMOL 1500 MG: 750 TABLET ORAL at 10:10

## 2020-10-26 RX ADMIN — KETOROLAC TROMETHAMINE 10 MG: 10 TABLET, FILM COATED ORAL at 10:10

## 2020-10-27 VITALS
DIASTOLIC BLOOD PRESSURE: 89 MMHG | RESPIRATION RATE: 20 BRPM | HEART RATE: 76 BPM | SYSTOLIC BLOOD PRESSURE: 135 MMHG | WEIGHT: 185 LBS | HEIGHT: 65 IN | TEMPERATURE: 98 F | OXYGEN SATURATION: 100 % | BODY MASS INDEX: 30.82 KG/M2

## 2020-10-27 PROCEDURE — 63600175 PHARM REV CODE 636 W HCPCS: Mod: ER | Performed by: EMERGENCY MEDICINE

## 2020-10-27 RX ORDER — KETOROLAC TROMETHAMINE 10 MG/1
10 TABLET, FILM COATED ORAL EVERY 6 HOURS PRN
Qty: 12 TABLET | Refills: 0 | Status: SHIPPED | OUTPATIENT
Start: 2020-10-27 | End: 2020-10-30

## 2020-10-27 RX ORDER — TRAMADOL HYDROCHLORIDE 50 MG/1
50 TABLET ORAL EVERY 6 HOURS PRN
Qty: 12 TABLET | Refills: 0 | OUTPATIENT
Start: 2020-10-27 | End: 2021-07-17

## 2020-10-27 RX ORDER — PREDNISONE 20 MG/1
40 TABLET ORAL DAILY
Qty: 10 TABLET | Refills: 0 | Status: SHIPPED | OUTPATIENT
Start: 2020-10-27 | End: 2020-11-01

## 2020-10-27 RX ORDER — METHOCARBAMOL 750 MG/1
1500 TABLET, FILM COATED ORAL EVERY 6 HOURS
Qty: 24 TABLET | Refills: 0 | Status: SHIPPED | OUTPATIENT
Start: 2020-10-27 | End: 2020-10-30

## 2020-10-27 RX ADMIN — KETOROLAC TROMETHAMINE 30 MG: 30 INJECTION, SOLUTION INTRAMUSCULAR; INTRAVENOUS at 12:10

## 2020-10-27 RX ADMIN — DEXAMETHASONE SODIUM PHOSPHATE 8 MG: 4 INJECTION, SOLUTION INTRA-ARTICULAR; INTRALESIONAL; INTRAMUSCULAR; INTRAVENOUS; SOFT TISSUE at 12:10

## 2020-10-27 NOTE — ED PROVIDER NOTES
Encounter Date: 10/26/2020    SCRIBE #1 NOTE: I, Daphney Wilson, am scribing for, and in the presence of,  Dr. Kolb. I have scribed the following portions of the note - Other sections scribed: HPI, ROS, PE.       History     Chief Complaint   Patient presents with    Hip Pain     R hip and back pain x 5 days radiating down R leg; denies trauma     Nasra Marks is a 35 y.o. female with CHF, HTN and sciatica who presents to the ED complaining of acute intermittent sharp right-sided lower back pain, right hip pain and burning sensation to the right upper leg x 5 days. Denies injury, falls or accidents. States she began walking for exercise two weeks ago. States the pain is exacerbated by turning in certain positions especially while sleeping. Denies abdominal pain, saddle anesthesia, bowel/bladder incontinence, gait disturbance, urinary symptoms or rash.  Reports similar symptoms in the past that promptly resolved after doing back exercises recommended during an ER visit.  States she attempted treatment with ibuprofen.         The history is provided by the patient. No  was used.     Review of patient's allergies indicates:   Allergen Reactions    Aldactone [spironolactone] Swelling     Lips swelled     Past Medical History:   Diagnosis Date    CHF (congestive heart failure)     Hypertension     dx at 16y.o.    Pacemaker 2017     Past Surgical History:   Procedure Laterality Date    CARDIAC DEFIBRILLATOR PLACEMENT      CARDIAC DEFIBRILLATOR PLACEMENT  2017     SECTION      x 1    INDUCED       TUBAL LIGATION       Family History   Problem Relation Age of Onset    Hypertension Mother     Cancer Maternal Grandmother         breast x 2    Cancer Paternal Grandmother         breast    No Known Problems Sister     No Known Problems Brother     No Known Problems Son     No Known Problems Brother     Hypertension Other     Diabetes Other      Breast cancer Other     Cancer Paternal Aunt         breast    Cancer Paternal Uncle      Social History     Tobacco Use    Smoking status: Never Smoker    Smokeless tobacco: Never Used   Substance Use Topics    Alcohol use: Not Currently     Comment: rarely    Drug use: No     Review of Systems   Constitutional: Negative for fever.   Gastrointestinal: Negative for abdominal pain.   Genitourinary: Negative for dysuria, frequency and hematuria.   Musculoskeletal: Positive for arthralgias and back pain. Negative for gait problem.   Skin: Negative for rash.   Neurological: Negative for weakness and numbness.   All other systems reviewed and are negative.      Physical Exam     Initial Vitals [10/26/20 2125]   BP Pulse Resp Temp SpO2   (!) 136/93 78 20 98.1 °F (36.7 °C) 100 %      MAP       --         Physical Exam    Nursing note and vitals reviewed.  Constitutional: She appears well-developed and well-nourished. No distress.   HENT:   Head: Normocephalic and atraumatic.   Right Ear: External ear normal.   Left Ear: External ear normal.   Nose: Nose normal.   Mouth/Throat: Oropharynx is clear and moist.   Eyes: Conjunctivae are normal.   Neck: Normal range of motion. Neck supple.   Cardiovascular: Normal rate and intact distal pulses.   Pulmonary/Chest: No stridor. No respiratory distress.   Abdominal: There is no abdominal tenderness.   Musculoskeletal: No edema.      Right hip: Normal.      Right knee: Normal.      Lumbar back: She exhibits tenderness.      Right upper leg: Normal.      Comments: Right-sided lumbar paraspinal tenderness. + SLR right   Neurological: She is oriented to person, place, and time.   Skin: Skin is warm and dry. No rash noted.   Psychiatric: She has a normal mood and affect. Her behavior is normal.         ED Course   Procedures  Labs Reviewed   POCT URINALYSIS W/O SCOPE - Abnormal; Notable for the following components:       Result Value    Spec Grav UA >=1.030 (*)     Blood, UA 2+  (*)     All other components within normal limits   POCT URINE PREGNANCY   POCT URINALYSIS W/O SCOPE          Imaging Results          CT Renal Stone Study ABD Pelvis WO (Final result)  Result time 10/27/20 00:36:13    Final result by Kasandra Erazo MD (10/27/20 00:36:13)                 Impression:      1. No acute intra-abdominal abnormalities identified.  No evidence of renal stones or obstructive uropathy.  2. Cholelithiasis.  3. Additional findings as detailed above.      Electronically signed by: Kasandra Erazo MD  Date:    10/27/2020  Time:    00:36             Narrative:    EXAMINATION:  CT RENAL STONE STUDY ABD PELVIS WO    CLINICAL HISTORY:  Flank pain, kidney stone suspected;    TECHNIQUE:  Low dose axial images, sagittal and coronal reformations were obtained from the lung bases to the pubic symphysis.  Contrast was not administered.    COMPARISON:  CT abdomen and pelvis from March 2016.    FINDINGS:  Heart is enlarged.  Pacer wires are partially visualized.  The lung bases are clear.    No significant hepatic abnormality seen on this noncontrast exam.  There is no intra-or extrahepatic biliary ductal dilatation.  Small calcified stones are seen dependently within gallbladder.  The stomach, pancreas, spleen, and adrenal glands are unremarkable.    Kidneys show no evidence of stones or hydronephrosis.  Two small right renal cysts are visualized.  Ureters are unremarkable along their courses.  Urinary bladder is unremarkable.  Uterus is retroverted with known underlying fibroids.    Appendix is visualized and is unremarkable.  The visualized loops of small and large bowel show no evidence of obstruction or inflammation.  No free air or free fluid.    Aorta tapers normally.    No acute osseous abnormality identified. Subcutaneous soft tissue structures are unremarkable.                                 Medical Decision Making:   History:   Old Medical Records: I decided to obtain old medical  records.  Clinical Tests:   Lab Tests: Ordered and Reviewed            Scribe Attestation:   Scribe #1: I performed the above scribed service and the documentation accurately describes the services I performed. I attest to the accuracy of the note.    Attending Attestation:           Physician Attestation for Scribe:  Physician Attestation Statement for Scribe #1: I, Moira Kolb, reviewed documentation, as scribed by Daphney Steve in my presence, and it is both accurate and complete.                           Clinical Impression:     ICD-10-CM ICD-9-CM   1. Acute right-sided low back pain with right-sided sciatica  M54.41 724.2     724.3                   1. Acute right-sided low back pain with right-sided sciatica            ED Disposition Condition    Discharge Stable        ED Prescriptions     Medication Sig Dispense Start Date End Date Auth. Provider    methocarbamoL (ROBAXIN) 750 MG Tab Take 2 tablets (1,500 mg total) by mouth every 6 (six) hours. for 3 days 24 tablet 10/27/2020 10/30/2020 Moira Kolb MD    traMADoL (ULTRAM) 50 mg tablet Take 1 tablet (50 mg total) by mouth every 6 (six) hours as needed. 12 tablet 10/27/2020  Moira Kolb MD    predniSONE (DELTASONE) 20 MG tablet Take 2 tablets (40 mg total) by mouth once daily. for 5 days 10 tablet 10/27/2020 11/1/2020 Moira Kolb MD    ketorolac (TORADOL) 10 mg tablet Take 1 tablet (10 mg total) by mouth every 6 (six) hours as needed for Pain (take with food). 12 tablet 10/27/2020 10/30/2020 Moira Kolb MD        Follow-up Information     Follow up With Specialties Details Why Contact Info    Veronika Rainey MD Family Medicine, Wound Care Call today to schedule an appointment, for re-evaluation of today's complaint, and ongoing care [address] [facility]  [place] [id number] [phone]                                         Moira Kolb MD  10/27/20 1940

## 2020-10-27 NOTE — ED NOTES
Pt c/o R hip pain x 5 days w/out trauma. Pt states she has had this pain before and was dx'd w/ sciatica. Pt is A & O x 3, denies SOB, fever, chills and N/V/D. Skin is warm, dry and pink. MAGGIE x 3mm. BBS- CTA. Abd- SNT. PSM x 4 exts. Will continue to monitor closely.

## 2020-12-12 ENCOUNTER — HOSPITAL ENCOUNTER (EMERGENCY)
Facility: HOSPITAL | Age: 35
Discharge: HOME | End: 2020-12-12
Attending: EMERGENCY MEDICINE
Payer: MEDICAID

## 2020-12-12 VITALS
RESPIRATION RATE: 18 BRPM | OXYGEN SATURATION: 97 % | BODY MASS INDEX: 30.79 KG/M2 | SYSTOLIC BLOOD PRESSURE: 124 MMHG | TEMPERATURE: 99 F | WEIGHT: 185 LBS | HEART RATE: 80 BPM | DIASTOLIC BLOOD PRESSURE: 78 MMHG

## 2020-12-12 DIAGNOSIS — I50.9 CONGESTIVE HEART FAILURE, UNSPECIFIED HF CHRONICITY, UNSPECIFIED HEART FAILURE TYPE: Primary | ICD-10-CM

## 2020-12-12 DIAGNOSIS — J18.9 PNEUMONIA DUE TO INFECTIOUS ORGANISM, UNSPECIFIED LATERALITY, UNSPECIFIED PART OF LUNG: ICD-10-CM

## 2020-12-12 DIAGNOSIS — I10 HYPERTENSION, UNSPECIFIED TYPE: ICD-10-CM

## 2020-12-12 DIAGNOSIS — R06.02 SOB (SHORTNESS OF BREATH): ICD-10-CM

## 2020-12-12 LAB
ALBUMIN SERPL-MCNC: 3.6 G/DL (ref 3.3–5.5)
ALP SERPL-CCNC: 43 U/L (ref 42–141)
B-HCG UR QL: NEGATIVE
BILIRUB SERPL-MCNC: 1 MG/DL (ref 0.2–1.6)
BUN SERPL-MCNC: 11 MG/DL (ref 7–22)
CALCIUM SERPL-MCNC: 8.6 MG/DL (ref 8–10.3)
CHLORIDE SERPL-SCNC: 108 MMOL/L (ref 98–108)
CREAT SERPL-MCNC: 0.9 MG/DL (ref 0.6–1.2)
CTP QC/QA: YES
GLUCOSE SERPL-MCNC: 94 MG/DL (ref 73–118)
POC ALT (SGPT): 21 U/L (ref 10–47)
POC AST (SGOT): 27 U/L (ref 11–38)
POC B-TYPE NATRIURETIC PEPTIDE: 945 PG/ML (ref 0–100)
POC CARDIAC TROPONIN I: 0.01 NG/ML
POC TCO2: 24 MMOL/L (ref 18–33)
POTASSIUM BLD-SCNC: 3.7 MMOL/L (ref 3.6–5.1)
PROTEIN, POC: 7.1 G/DL (ref 6.4–8.1)
SAMPLE: NORMAL
SODIUM BLD-SCNC: 138 MMOL/L (ref 128–145)

## 2020-12-12 PROCEDURE — 80053 COMPREHEN METABOLIC PANEL: CPT | Mod: ER

## 2020-12-12 PROCEDURE — 99284 EMERGENCY DEPT VISIT MOD MDM: CPT | Mod: 25,ER

## 2020-12-12 PROCEDURE — 83880 ASSAY OF NATRIURETIC PEPTIDE: CPT | Mod: ER

## 2020-12-12 PROCEDURE — 85025 COMPLETE CBC W/AUTO DIFF WBC: CPT | Mod: ER

## 2020-12-12 PROCEDURE — 96374 THER/PROPH/DIAG INJ IV PUSH: CPT | Mod: ER

## 2020-12-12 PROCEDURE — 25000003 PHARM REV CODE 250: Mod: ER | Performed by: EMERGENCY MEDICINE

## 2020-12-12 PROCEDURE — 63600175 PHARM REV CODE 636 W HCPCS: Mod: ER | Performed by: EMERGENCY MEDICINE

## 2020-12-12 PROCEDURE — 81025 URINE PREGNANCY TEST: CPT | Mod: ER | Performed by: EMERGENCY MEDICINE

## 2020-12-12 PROCEDURE — U0003 INFECTIOUS AGENT DETECTION BY NUCLEIC ACID (DNA OR RNA); SEVERE ACUTE RESPIRATORY SYNDROME CORONAVIRUS 2 (SARS-COV-2) (CORONAVIRUS DISEASE [COVID-19]), AMPLIFIED PROBE TECHNIQUE, MAKING USE OF HIGH THROUGHPUT TECHNOLOGIES AS DESCRIBED BY CMS-2020-01-R: HCPCS

## 2020-12-12 PROCEDURE — 84484 ASSAY OF TROPONIN QUANT: CPT | Mod: ER

## 2020-12-12 PROCEDURE — 93010 ELECTROCARDIOGRAM REPORT: CPT | Mod: ,,, | Performed by: INTERNAL MEDICINE

## 2020-12-12 PROCEDURE — 93005 ELECTROCARDIOGRAM TRACING: CPT | Mod: ER

## 2020-12-12 PROCEDURE — 93010 EKG 12-LEAD: ICD-10-PCS | Mod: ,,, | Performed by: INTERNAL MEDICINE

## 2020-12-12 RX ORDER — FUROSEMIDE 10 MG/ML
80 INJECTION INTRAMUSCULAR; INTRAVENOUS
Status: COMPLETED | OUTPATIENT
Start: 2020-12-12 | End: 2020-12-12

## 2020-12-12 RX ORDER — LEVOFLOXACIN 5 MG/ML
750 INJECTION, SOLUTION INTRAVENOUS
Status: DISCONTINUED | OUTPATIENT
Start: 2020-12-12 | End: 2020-12-12

## 2020-12-12 RX ORDER — LEVOFLOXACIN 750 MG/1
750 TABLET ORAL DAILY
Qty: 10 TABLET | Refills: 0 | Status: SHIPPED | OUTPATIENT
Start: 2020-12-12 | End: 2020-12-22

## 2020-12-12 RX ORDER — FUROSEMIDE 40 MG/1
40 TABLET ORAL DAILY
Qty: 90 TABLET | Refills: 0 | Status: SHIPPED | OUTPATIENT
Start: 2020-12-12 | End: 2021-03-08 | Stop reason: SDUPTHER

## 2020-12-12 RX ORDER — LEVOFLOXACIN 750 MG/1
750 TABLET ORAL
Status: COMPLETED | OUTPATIENT
Start: 2020-12-12 | End: 2020-12-12

## 2020-12-12 RX ADMIN — LEVOFLOXACIN 750 MG: 750 TABLET, FILM COATED ORAL at 11:12

## 2020-12-12 RX ADMIN — FUROSEMIDE 80 MG: 10 INJECTION, SOLUTION INTRAVENOUS at 11:12

## 2020-12-12 NOTE — DISCHARGE INSTRUCTIONS
Thank you for coming to our Emergency Department today. It is important to remember that some problems are difficult to diagnose and may not be found during your first visit. Be sure to follow up with your primary care doctor and review any labs/imaging that was performed with them. If you do not have a primary care doctor, you may contact the one listed on your discharge paperwork or you may also call the [facility] Appointment Desk at [phone] to schedule an appointment with one.     All medications may potentially have side effects and it is impossible to predict which medications may give you side effects. If you feel that you are having a negative effect of any medication you should immediately stop taking them and seek medical attention.    Return to the ER with any questions/concerns, new/concerning symptoms, worsening or failure to improve. Do not drive or make any important decisions for 24 hours if you have received any pain medications, sedatives or mood altering drugs during your ER visit.

## 2020-12-12 NOTE — ED NOTES
Pt reports urine x1 already since lasix administration.  Educated pt on diet restrictions and fluid intake.  V/U.

## 2020-12-12 NOTE — Clinical Note
"Nasra"Harry Marks was seen and treated in our emergency department on 12/12/2020.     COVID-19 is present in our communities across the state. There is limited testing for COVID at this time, so not all patients can be tested. In this situation, your employee meets the following criteria:    Nasra Marks has met the criteria for COVID-19 testing based upon symptoms, travel, and/or potential exposure. The test has been completed and is pending results at this time. During this time the employee is not able to work and should be quarantined per the Centers for Disease Control timelines.     If you have any questions or concerns, or if I can be of further assistance, please do not hesitate to contact me.    Sincerely,             Naveed Yang MD"

## 2020-12-12 NOTE — ED PROVIDER NOTES
Encounter Date: 2020       History     Chief Complaint   Patient presents with    Cough     COUGH, SOB SINCE LAST NIGHT AND WOKE UP WITH CHEST TIGHTNESS THIS MORNING; DENIES FEVER     35 y.o. female Past Medical History:  No date: CHF (congestive heart failure)  No date: Hypertension      Comment:  dx at 16y.o.  2017: Pacemaker     Notes that she started to have a mild cough and sensation of shortness of breath starting yesterday notes mild chest tightness but denies chest pain denies fever chills nausea vomiting diarrhea dysuria changes in smell or taste.        Review of patient's allergies indicates:   Allergen Reactions    Aldactone [spironolactone] Swelling     Lips swelled     Past Medical History:   Diagnosis Date    CHF (congestive heart failure)     Hypertension     dx at 16y.o.    Pacemaker 2017     Past Surgical History:   Procedure Laterality Date    CARDIAC DEFIBRILLATOR PLACEMENT      CARDIAC DEFIBRILLATOR PLACEMENT  2017     SECTION      x 1    INDUCED       TUBAL LIGATION       Family History   Problem Relation Age of Onset    Hypertension Mother     Cancer Maternal Grandmother         breast x 2    Cancer Paternal Grandmother         breast    No Known Problems Sister     No Known Problems Brother     No Known Problems Son     No Known Problems Brother     Hypertension Other     Diabetes Other     Breast cancer Other     Cancer Paternal Aunt         breast    Cancer Paternal Uncle      Social History     Tobacco Use    Smoking status: Never Smoker    Smokeless tobacco: Never Used   Substance Use Topics    Alcohol use: Not Currently     Comment: rarely    Drug use: No     Review of Systems   Constitutional: Negative for fever.   HENT: Negative for sore throat.    Respiratory: Negative for shortness of breath.    Cardiovascular: Negative for chest pain.   Gastrointestinal: Negative for nausea.   Genitourinary: Negative for dysuria.    Musculoskeletal: Negative for back pain.   Skin: Negative for rash.   Neurological: Negative for weakness.   Hematological: Does not bruise/bleed easily.   All other systems reviewed and are negative.      Physical Exam     Initial Vitals [12/12/20 0955]   BP Pulse Resp Temp SpO2   (!) 148/106 96 18 99.1 °F (37.3 °C) 99 %      MAP       --         Physical Exam    Nursing note and vitals reviewed.  Constitutional: She appears well-developed and well-nourished.   HENT:   Head: Normocephalic and atraumatic.   Eyes: Conjunctivae and EOM are normal. Pupils are equal, round, and reactive to light.   Neck: Normal range of motion.   Cardiovascular: Normal rate and regular rhythm.   Pulmonary/Chest: Breath sounds normal. No respiratory distress.   Abdominal: She exhibits no distension.   Musculoskeletal: Normal range of motion.   Neurological: She is alert. No cranial nerve deficit. GCS score is 15. GCS eye subscore is 4. GCS verbal subscore is 5. GCS motor subscore is 6.   Skin: Skin is warm and dry.   Psychiatric: She has a normal mood and affect. Thought content normal.       Well-appearing in no distress  ED Course   Procedures  Labs Reviewed   POCT B-TYPE NATRIURETIC PEPTIDE (BNP) - Abnormal; Notable for the following components:       Result Value    POC B-Type Natriuretic Peptide 945 (*)     All other components within normal limits   TROPONIN ISTAT   SARS-COV-2 (COVID-19) QUALITATIVE PCR   POCT URINE PREGNANCY   POCT CBC   POCT CMP   POCT B-TYPE NATRIURETIC PEPTIDE (BNP)   POCT TROPONIN   POCT CMP     EKG Readings: (Independently Interpreted)   Heart rate 85 sinus normal axis, prolonged QTC, no ST elevation nonspecific ST changes V2 through V6.  NSC compared to prior EKG, 08/02/2020       Imaging Results          X-Ray Chest 1 View (Final result)  Result time 12/12/20 10:40:15    Final result by Nancy Banks MD (12/12/20 10:40:15)                 Impression:      As above.      Electronically signed by: Nancy  MD Darren  Date:    12/12/2020  Time:    10:40             Narrative:    EXAMINATION:  XR CHEST 1 VIEW    CLINICAL HISTORY:  Shortness of breath    TECHNIQUE:  Single frontal view of the chest was performed.    COMPARISON:  08/02/2020    FINDINGS:  Left-sided pacer device is in place.  The heart is enlarged.  There is some patchy opacity in the medial right lung base which could reflect atelectasis, aspiration or pneumonia.  Asymmetric edema also in the differential.  Skeletal structures intact.                                                  I have ordered screening labs EKG troponin BNP chest x-ray given history of cardiomyopathy and CHF I will order sent out COVID test and advised patient to quarantine          Labs Reviewed   POCT B-TYPE NATRIURETIC PEPTIDE (BNP) - Abnormal; Notable for the following components:       Result Value    POC B-Type Natriuretic Peptide 945 (*)     All other components within normal limits   TROPONIN ISTAT   SARS-COV-2 (COVID-19) QUALITATIVE PCR   POCT URINE PREGNANCY   POCT CBC   POCT CMP   POCT B-TYPE NATRIURETIC PEPTIDE (BNP)   POCT TROPONIN   POCT CMP       X-Ray Chest 1 View   Final Result      As above.         Electronically signed by: Nancy Banks MD   Date:    12/12/2020   Time:    10:40          Will place on Levaquin to cover for possible pneumonia on the chest x-ray findings are likely due to some component of CHF her BNP is very elevated to baseline will give a dose of IV Lasix here I have advised her to double up on her Lasix dose for approximately 1 week I have sent a send out COVID test  Clinical Impression:       ICD-10-CM ICD-9-CM   1. Congestive heart failure, unspecified HF chronicity, unspecified heart failure type  I50.9 428.0   2. SOB (shortness of breath)  R06.02 786.05   3. Pneumonia due to infectious organism, unspecified laterality, unspecified part of lung  J18.9 486                          ED Disposition Condition    Discharge Stable        ED  Prescriptions     Medication Sig Dispense Start Date End Date Auth. Provider    furosemide (LASIX) 40 MG tablet Take 1 tablet (40 mg total) by mouth once daily. 90 tablet 12/12/2020 1/11/2021 Naveed Yang MD    levoFLOXacin (LEVAQUIN) 750 MG tablet Take 1 tablet (750 mg total) by mouth once daily. for 10 days 10 tablet 12/12/2020 12/22/2020 Naveed Yang MD        Follow-up Information     Follow up With Specialties Details Why Contact Info    Veronika Rainey MD Family Medicine, Wound Care   [address] [place] [address]  [phone]                                         Naveed Yang MD  12/12/20 1105       Naveed Yang MD  12/12/20 1105       Naveed Yang MD  12/12/20 111

## 2020-12-13 ENCOUNTER — HOSPITAL ENCOUNTER (EMERGENCY)
Facility: HOSPITAL | Age: 35
Discharge: HOME | End: 2020-12-14
Attending: EMERGENCY MEDICINE
Payer: MEDICAID

## 2020-12-13 DIAGNOSIS — J40 BRONCHITIS: ICD-10-CM

## 2020-12-13 DIAGNOSIS — R07.9 CHEST PAIN: ICD-10-CM

## 2020-12-13 DIAGNOSIS — N94.6 MENSTRUAL CRAMPS: Primary | ICD-10-CM

## 2020-12-13 LAB
ALLENS TEST: ABNORMAL
CTP QC/QA: YES
HCO3 UR-SCNC: 24 MMOL/L (ref 24–28)
LDH SERPL L TO P-CCNC: 0.69 MMOL/L (ref 0.5–2.2)
PCO2 BLDA: 37.6 MMHG (ref 35–45)
PH SMN: 7.41 [PH] (ref 7.35–7.45)
PO2 BLDA: 27 MMHG (ref 40–60)
POC BE: 0 MMOL/L
POC SATURATED O2: 52 % (ref 95–100)
POC TCO2: 25 MMOL/L (ref 24–29)
SAMPLE: ABNORMAL
SARS-COV-2 RDRP RESP QL NAA+PROBE: NEGATIVE
SITE: ABNORMAL

## 2020-12-13 PROCEDURE — U0002 COVID-19 LAB TEST NON-CDC: HCPCS | Mod: ER | Performed by: EMERGENCY MEDICINE

## 2020-12-13 PROCEDURE — 93005 ELECTROCARDIOGRAM TRACING: CPT | Mod: ER

## 2020-12-13 PROCEDURE — 93010 EKG 12-LEAD: ICD-10-PCS | Mod: ,,, | Performed by: INTERNAL MEDICINE

## 2020-12-13 PROCEDURE — 25000242 PHARM REV CODE 250 ALT 637 W/ HCPCS: Mod: ER | Performed by: EMERGENCY MEDICINE

## 2020-12-13 PROCEDURE — 94640 AIRWAY INHALATION TREATMENT: CPT | Mod: ER

## 2020-12-13 PROCEDURE — 83880 ASSAY OF NATRIURETIC PEPTIDE: CPT | Mod: ER

## 2020-12-13 PROCEDURE — 99285 EMERGENCY DEPT VISIT HI MDM: CPT | Mod: 25,ER

## 2020-12-13 PROCEDURE — 96374 THER/PROPH/DIAG INJ IV PUSH: CPT | Mod: ER

## 2020-12-13 PROCEDURE — 93010 ELECTROCARDIOGRAM REPORT: CPT | Mod: ,,, | Performed by: INTERNAL MEDICINE

## 2020-12-13 PROCEDURE — 80053 COMPREHEN METABOLIC PANEL: CPT | Mod: ER

## 2020-12-13 PROCEDURE — 82803 BLOOD GASES ANY COMBINATION: CPT | Mod: ER

## 2020-12-13 PROCEDURE — 81003 URINALYSIS AUTO W/O SCOPE: CPT | Mod: ER

## 2020-12-13 PROCEDURE — 85025 COMPLETE CBC W/AUTO DIFF WBC: CPT | Mod: ER

## 2020-12-13 PROCEDURE — 82040 ASSAY OF SERUM ALBUMIN: CPT | Mod: ER

## 2020-12-13 PROCEDURE — 63600175 PHARM REV CODE 636 W HCPCS: Mod: ER | Performed by: EMERGENCY MEDICINE

## 2020-12-13 RX ORDER — ONDANSETRON 2 MG/ML
8 INJECTION INTRAMUSCULAR; INTRAVENOUS
Status: COMPLETED | OUTPATIENT
Start: 2020-12-13 | End: 2020-12-13

## 2020-12-13 RX ORDER — IPRATROPIUM BROMIDE AND ALBUTEROL SULFATE 2.5; .5 MG/3ML; MG/3ML
3 SOLUTION RESPIRATORY (INHALATION)
Status: COMPLETED | OUTPATIENT
Start: 2020-12-13 | End: 2020-12-13

## 2020-12-13 RX ORDER — ALBUTEROL SULFATE 90 UG/1
6 AEROSOL, METERED RESPIRATORY (INHALATION) ONCE
Status: DISCONTINUED | OUTPATIENT
Start: 2020-12-13 | End: 2020-12-13

## 2020-12-13 RX ADMIN — ONDANSETRON 8 MG: 2 INJECTION INTRAMUSCULAR; INTRAVENOUS at 11:12

## 2020-12-13 RX ADMIN — IPRATROPIUM BROMIDE AND ALBUTEROL SULFATE 3 ML: .5; 3 SOLUTION RESPIRATORY (INHALATION) at 11:12

## 2020-12-14 VITALS
SYSTOLIC BLOOD PRESSURE: 110 MMHG | RESPIRATION RATE: 18 BRPM | HEART RATE: 69 BPM | TEMPERATURE: 98 F | BODY MASS INDEX: 27.7 KG/M2 | WEIGHT: 187 LBS | OXYGEN SATURATION: 99 % | HEIGHT: 69 IN | DIASTOLIC BLOOD PRESSURE: 71 MMHG

## 2020-12-14 LAB
ALBUMIN SERPL-MCNC: 3.8 G/DL (ref 3.3–5.5)
ALBUMIN SERPL-MCNC: 3.9 G/DL (ref 3.3–5.5)
ALP SERPL-CCNC: 39 U/L (ref 42–141)
ALP SERPL-CCNC: 49 U/L (ref 42–141)
BILIRUB SERPL-MCNC: 0.5 MG/DL (ref 0.2–1.6)
BILIRUB SERPL-MCNC: 0.6 MG/DL (ref 0.2–1.6)
BILIRUBIN, POC UA: NEGATIVE
BLOOD, POC UA: ABNORMAL
BUN SERPL-MCNC: 21 MG/DL (ref 7–22)
CALCIUM SERPL-MCNC: 9.6 MG/DL (ref 8–10.3)
CHLORIDE SERPL-SCNC: 111 MMOL/L (ref 98–108)
CLARITY, POC UA: CLEAR
COLOR, POC UA: YELLOW
CREAT SERPL-MCNC: 1.1 MG/DL (ref 0.6–1.2)
GLUCOSE SERPL-MCNC: 114 MG/DL (ref 73–118)
GLUCOSE, POC UA: NEGATIVE
KETONES, POC UA: NEGATIVE
LEUKOCYTE EST, POC UA: NEGATIVE
NITRITE, POC UA: NEGATIVE
PH UR STRIP: 5.5 [PH]
POC ALT (SGPT): 21 U/L (ref 10–47)
POC ALT (SGPT): 22 U/L (ref 10–47)
POC AMYLASE: 57 U/L (ref 14–97)
POC AST (SGOT): 22 U/L (ref 11–38)
POC AST (SGOT): 26 U/L (ref 11–38)
POC B-TYPE NATRIURETIC PEPTIDE: 327 PG/ML (ref 0–100)
POC GGT: 15 U/L (ref 5–65)
POC TCO2: 26 MMOL/L (ref 18–33)
POTASSIUM BLD-SCNC: 3.8 MMOL/L (ref 3.6–5.1)
PROTEIN, POC UA: NEGATIVE
PROTEIN, POC: 7.6 G/DL (ref 6.4–8.1)
PROTEIN, POC: 7.6 G/DL (ref 6.4–8.1)
SARS-COV-2 RNA RESP QL NAA+PROBE: NOT DETECTED
SODIUM BLD-SCNC: 139 MMOL/L (ref 128–145)
SPECIFIC GRAVITY, POC UA: >=1.03
UROBILINOGEN, POC UA: 0.2 E.U./DL

## 2020-12-14 PROCEDURE — 27100098 HC SPACER: Mod: ER

## 2020-12-14 PROCEDURE — 25000003 PHARM REV CODE 250: Mod: ER | Performed by: EMERGENCY MEDICINE

## 2020-12-14 PROCEDURE — 80053 COMPREHEN METABOLIC PANEL: CPT | Mod: ER

## 2020-12-14 PROCEDURE — 82040 ASSAY OF SERUM ALBUMIN: CPT | Mod: ER

## 2020-12-14 RX ORDER — MAG HYDROX/ALUMINUM HYD/SIMETH 200-200-20
30 SUSPENSION, ORAL (FINAL DOSE FORM) ORAL
Status: COMPLETED | OUTPATIENT
Start: 2020-12-14 | End: 2020-12-14

## 2020-12-14 RX ORDER — SYRING-NEEDL,DISP,INSUL,0.3 ML 29 G X1/2"
296 SYRINGE, EMPTY DISPOSABLE MISCELLANEOUS ONCE
Qty: 295 ML | Refills: 0 | Status: SHIPPED | OUTPATIENT
Start: 2020-12-14 | End: 2020-12-14

## 2020-12-14 RX ORDER — ALBUTEROL SULFATE 90 UG/1
2 AEROSOL, METERED RESPIRATORY (INHALATION) EVERY 6 HOURS PRN
Qty: 18 G | Refills: 0 | Status: SHIPPED | OUTPATIENT
Start: 2020-12-14 | End: 2021-08-16

## 2020-12-14 RX ADMIN — MAGNESIUM HYDROXIDE/ALUMINUM HYDROXICE/SIMETHICONE 30 ML: 120; 1200; 1200 SUSPENSION ORAL at 01:12

## 2020-12-14 NOTE — ED TRIAGE NOTES
Pt presents to the ER with worsening SOB and chest pain since yesterday. Pt states she is also vomiting today. Pt denies fever. Pt states chest pains began after vomiting today.

## 2020-12-14 NOTE — ED PROVIDER NOTES
Encounter Date: 2020    SCRIBE #1 NOTE: I, Shazia Garcia, am scribing for, and in the presence of,  Dr. Kolb. I have scribed the following portions of the note - Other sections scribed: HPI, ROS, PE.       History     Chief Complaint   Patient presents with    Chest Pain     PT REPORTS SEEN HERE YESTERDAY AND REPORTS HAS CONTINUED COUGH, VOMITED ONCE APPROX 30 MIN AGO AND HAD CP OFF AND ON SINCE VOMITING    Cough     Nasra Marks is a 35 y.o. female with CHF who presents to the ED complaining of intermittent chest pain. She states that the CP begins after having SOB after walking. Reports taking a tramadol earlier today for menstrual pain. She states she began having nausea and had x1 episode of vomiting today. She also reports having low blood pressure. Vomit was food content. Denies any additional abdominal pain besides menstrual pain. LBM was x2 days ago, this is her baseline, she denies constipation. She was recently told to increase her Lasix dosage from 40 to 80. Pt was seen yesterday. Denies smoking. She has been hospitalized for CHF before.    The history is provided by the patient.     Review of patient's allergies indicates:   Allergen Reactions    Aldactone [spironolactone] Swelling     Lips swelled     Past Medical History:   Diagnosis Date    CHF (congestive heart failure)     Hypertension     dx at 16y.o.    Pacemaker 2017     Past Surgical History:   Procedure Laterality Date    CARDIAC DEFIBRILLATOR PLACEMENT      CARDIAC DEFIBRILLATOR PLACEMENT  2017     SECTION      x 1    INDUCED       TUBAL LIGATION       Family History   Problem Relation Age of Onset    Hypertension Mother     Cancer Maternal Grandmother         breast x 2    Cancer Paternal Grandmother         breast    No Known Problems Sister     No Known Problems Brother     No Known Problems Son     No Known Problems Brother     Hypertension Other     Diabetes Other     Breast  cancer Other     Cancer Paternal Aunt         breast    Cancer Paternal Uncle      Social History     Tobacco Use    Smoking status: Never Smoker    Smokeless tobacco: Never Used   Substance Use Topics    Alcohol use: Not Currently     Comment: rarely    Drug use: No     Review of Systems   Respiratory: Positive for shortness of breath.    Cardiovascular: Positive for chest pain.   Gastrointestinal: Positive for constipation (intermittent), nausea and vomiting. Negative for abdominal pain.        Frequent belching and flatus chronically     All other systems reviewed and are negative.      Physical Exam     Initial Vitals [12/13/20 2216]   BP Pulse Resp Temp SpO2   119/81 64 20 98.3 °F (36.8 °C) 99 %      MAP       --         Physical Exam    Nursing note and vitals reviewed.  Constitutional: She appears well-developed and well-nourished.   HENT:   Head: Normocephalic and atraumatic.   Nose: Nose normal.   Eyes: Conjunctivae are normal.   Neck: Normal range of motion and phonation normal. Neck supple. No stridor present.   Cardiovascular: Normal rate and intact distal pulses.   Pulmonary/Chest: Effort normal and breath sounds normal. No stridor. No tachypnea. No respiratory distress. She has no wheezes. She has no rales.   Abdominal: Soft. She exhibits no distension. There is no abdominal tenderness. There is no rebound and no guarding.   Musculoskeletal: Normal range of motion. No tenderness or edema.      Right lower leg: She exhibits no swelling. No edema.      Left lower leg: She exhibits no swelling. No edema.   Neurological: She is alert and oriented to person, place, and time. Gait normal.   Skin: Skin is warm and dry. No rash noted.   Psychiatric: She has a normal mood and affect. Her behavior is normal.         ED Course   Procedures  Labs Reviewed   POCT URINALYSIS W/O SCOPE - Abnormal; Notable for the following components:       Result Value    Spec Grav UA >=1.030 (*)     Blood, UA 3+ (*)     All  "other components within normal limits   ISTAT PROCEDURE - Abnormal; Notable for the following components:    POC PO2 27 (*)     POC SATURATED O2 52 (*)     All other components within normal limits   POCT B-TYPE NATRIURETIC PEPTIDE (BNP) - Abnormal; Notable for the following components:    POC B-Type Natriuretic Peptide 327 (*)     All other components within normal limits   POCT CMP - Abnormal; Notable for the following components:    Alkaline Phosphatase, POC 39 (*)     POC Chloride 111 (*)     All other components within normal limits   SARS-COV-2 RDRP GENE   POCT CBC   POCT URINALYSIS W/O SCOPE   POCT CMP   POCT B-TYPE NATRIURETIC PEPTIDE (BNP)   POCT LIVER PANEL   POCT LIVER PANEL     EKG Readings: (Independently Interpreted)   Initial Reading: No STEMI. Rhythm: Normal Sinus Rhythm. Heart Rate: 64. ST Segments: Normal ST Segments. T Waves: Normal. Axis: Normal.       Imaging Results          X-Ray Chest PA And Lateral (Final result)  Result time 12/14/20 00:20:02    Final result by Deshawn Salas MD (12/14/20 00:20:02)                 Impression:      No acute finding.    Stable cardiomegaly.      Electronically signed by: Deshawn Salas MD  Date:    12/14/2020  Time:    00:20             Narrative:    EXAMINATION:  XR CHEST PA AND LATERAL    CLINICAL HISTORY:  Provided history is "  Chest pain, unspecified".    TECHNIQUE:  Frontal and lateral views of the chest were performed.    COMPARISON:  12/12/2020.    FINDINGS:  Cardiomediastinal silhouette is enlarged but stable.  Left chest wall AICD is present with transvenous lead overlying the heart.  No large focal consolidation.  No sizable pleural effusion.  No pneumothorax.  Overall mildly improved lung aeration when compared with the prior study.                                 Medical Decision Making:   Independently Interpreted Test(s):   I have ordered and independently interpreted EKG Reading(s) - see prior notes  Clinical Tests:   Medical Tests: " Ordered and Reviewed            Scribe Attestation:   Scribe #1: I performed the above scribed service and the documentation accurately describes the services I performed. I attest to the accuracy of the note.    Attending Attestation:           Physician Attestation for Scribe:  Physician Attestation Statement for Scribe #1: I, Moira Kolb, reviewed documentation, as scribed by Shazia Garcia in my presence, and it is both accurate and complete.           Labs Reviewed            Admission on 12/13/2020, Discharged on 12/14/2020   Component Date Value Ref Range Status    POC Rapid COVID 12/13/2020 Negative  Negative Final     Acceptable 12/13/2020 Yes   Final    POC PH 12/13/2020 7.414  7.35 - 7.45 Final    POC PCO2 12/13/2020 37.6  35 - 45 mmHg Final    POC PO2 12/13/2020 27* 40 - 60 mmHg Final    POC HCO3 12/13/2020 24.0  24 - 28 mmol/L Final    POC BE 12/13/2020 0  -2 to 2 mmol/L Final    POC SATURATED O2 12/13/2020 52* 95 - 100 % Final    POC Lactate 12/13/2020 0.69  0.5 - 2.2 mmol/L Final    POC TCO2 12/13/2020 25  24 - 29 mmol/L Final    Sample 12/13/2020 VENOUS   Final    Site 12/13/2020 Other   Final    Allens Test 12/13/2020 N/A   Final    POC B-Type Natriuretic Peptide 12/14/2020 327* 0.0 - 100.0 pg/mL Final    Glucose, UA 12/14/2020 Negative   Final    Bilirubin, UA 12/14/2020 Negative   Final    Ketones, UA 12/14/2020 Negative   Final    Spec Grav UA 12/14/2020 >=1.030*  Final    Blood, UA 12/14/2020 3+*  Final    PH, UA 12/14/2020 5.5   Final    Protein, UA 12/14/2020 Negative   Final    Urobilinogen, UA 12/14/2020 0.2  E.U./dL Final    Nitrite, UA 12/14/2020 Negative   Final    Leukocytes, UA 12/14/2020 Negative   Final    Color, UA 12/14/2020 Yellow   Final    Clarity, UA 12/14/2020 Clear   Final    Albumin, POC 12/14/2020 3.9  3.3 - 5.5 g/dL Final    Alkaline Phosphatase, POC 12/14/2020 39* 42 - 141 U/L Final    ALT (SGPT), POC 12/14/2020 22  10 - 47 U/L  "Final    AST (SGOT), POC 12/14/2020 22  11 - 38 U/L Final    POC BUN 12/14/2020 21  7 - 22 mg/dL Final    Calcium, POC 12/14/2020 9.6  8.0 - 10.3 mg/dL Final    POC Chloride 12/14/2020 111* 98 - 108 mmol/L Final    POC Creatinine 12/14/2020 1.1  0.6 - 1.2 mg/dL Final    POC Glucose 12/14/2020 114  73 - 118 mg/dL Final    POC Potassium 12/14/2020 3.8  3.6 - 5.1 mmol/L Final    POC Sodium 12/14/2020 139  128 - 145 mmol/L Final    Bilirubin 12/14/2020 0.6  0.2 - 1.6 mg/dL Final    POC TCO2 12/14/2020 26  18 - 33 mmol/L Final    Protein 12/14/2020 7.6  6.4 - 8.1 g/dL Final    Albumin, POC 12/14/2020 3.8  3.3 - 5.5 g/dL Final    Alkaline Phosphatase, POC 12/14/2020 49  42 - 141 U/L Final    ALT (SGPT), POC 12/14/2020 21  10 - 47 U/L Final    Amylase, POC 12/14/2020 57  14 - 97 U/L Final    AST (SGOT), POC 12/14/2020 26  11 - 38 U/L Final    POC GGT 12/14/2020 15  5 - 65 U/L Final    Bilirubin 12/14/2020 0.5  0.2 - 1.6 mg/dL Final    Protein 12/14/2020 7.6  6.4 - 8.1 g/dL Final        Imaging Reviewed    Imaging Results          X-Ray Chest PA And Lateral (Final result)  Result time 12/14/20 00:20:02    Final result by Deshawn Salas MD (12/14/20 00:20:02)                 Impression:      No acute finding.    Stable cardiomegaly.      Electronically signed by: Deshawn Salas MD  Date:    12/14/2020  Time:    00:20             Narrative:    EXAMINATION:  XR CHEST PA AND LATERAL    CLINICAL HISTORY:  Provided history is "  Chest pain, unspecified".    TECHNIQUE:  Frontal and lateral views of the chest were performed.    COMPARISON:  12/12/2020.    FINDINGS:  Cardiomediastinal silhouette is enlarged but stable.  Left chest wall AICD is present with transvenous lead overlying the heart.  No large focal consolidation.  No sizable pleural effusion.  No pneumothorax.  Overall mildly improved lung aeration when compared with the prior study.                                Medications given in " ED    Medications   ondansetron injection 8 mg (8 mg Intravenous Given 12/13/20 2343)   albuterol-ipratropium 2.5 mg-0.5 mg/3 mL nebulizer solution 3 mL (3 mLs Nebulization Given 12/13/20 2236)   aluminum-magnesium hydroxide-simethicone 200-200-20 mg/5 mL suspension 30 mL (30 mLs Oral Given 12/14/20 0142)         Note was created using voice recognition software. Note may have occasional typographical errors that may not have been identified and edited despite good addi initial review prior to signing.               ED Course as of Dec 14 0656   Mon Dec 14, 2020   0139 Chest pain improved after neb. Abdominal cramps resolved after pain meds    [DL]      ED Course User Index  [DL] Moira Kolb MD            Clinical Impression:       ICD-10-CM ICD-9-CM   1. Menstrual cramps  N94.6 625.3   2. Chest pain  R07.9 786.50   3. Bronchitis  J40 490                          ED Disposition Condition    Discharge Stable        ED Prescriptions     Medication Sig Dispense Start Date End Date Auth. Provider    albuterol (PROVENTIL/VENTOLIN HFA) 90 mcg/actuation inhaler Inhale 2 puffs into the lungs every 6 (six) hours as needed for Wheezing or Shortness of Breath. 18 g 12/14/2020  Moira Kolb MD    magnesium citrate solution (Expires today) Take 296 mLs by mouth once. for 1 dose 295 mL 12/14/2020 12/14/2020 Moira Kolb MD        Follow-up Information     Follow up With Specialties Details Why Contact Info    Veronika Rainey MD Family Medicine, Wound Care Call in 1 day to schedule an appointment, for re-evaluation of today's complaint, and ongoing care [address]  [place] [address]  [phone]      [facility] Emergency Medicine Go to  As needed, If symptoms worsen [address] [place] [address] [phone]                                       Moira Kolb MD  12/14/20 2762

## 2020-12-14 NOTE — ED NOTES
Pt states of slight improvement of SOB after breathing treatment and currently on RA. Upon ambulation, O2 sat lowest was 92% briefly. Runs between 92-95% walking. 100% at rest.

## 2021-03-04 ENCOUNTER — HOSPITAL ENCOUNTER (EMERGENCY)
Facility: HOSPITAL | Age: 36
Discharge: HOME | End: 2021-03-04
Attending: INTERNAL MEDICINE
Payer: MEDICAID

## 2021-03-04 VITALS
WEIGHT: 183 LBS | RESPIRATION RATE: 19 BRPM | HEART RATE: 72 BPM | TEMPERATURE: 100 F | SYSTOLIC BLOOD PRESSURE: 120 MMHG | BODY MASS INDEX: 26.2 KG/M2 | DIASTOLIC BLOOD PRESSURE: 70 MMHG | HEIGHT: 70 IN | OXYGEN SATURATION: 100 %

## 2021-03-04 DIAGNOSIS — R06.02 SHORTNESS OF BREATH: ICD-10-CM

## 2021-03-04 DIAGNOSIS — R09.1 PLEURISY: Primary | ICD-10-CM

## 2021-03-04 DIAGNOSIS — I50.9 CONGESTIVE HEART FAILURE, UNSPECIFIED HF CHRONICITY, UNSPECIFIED HEART FAILURE TYPE: ICD-10-CM

## 2021-03-04 DIAGNOSIS — R07.9 CHEST PAIN: ICD-10-CM

## 2021-03-04 LAB
ALBUMIN SERPL-MCNC: 3.6 G/DL (ref 3.3–5.5)
ALP SERPL-CCNC: 46 U/L (ref 42–141)
B-HCG UR QL: NEGATIVE
BILIRUB SERPL-MCNC: 0.4 MG/DL (ref 0.2–1.6)
BILIRUBIN, POC UA: NEGATIVE
BLOOD, POC UA: ABNORMAL
BUN SERPL-MCNC: 12 MG/DL (ref 7–22)
CALCIUM SERPL-MCNC: 9.2 MG/DL (ref 8–10.3)
CHLORIDE SERPL-SCNC: 108 MMOL/L (ref 98–108)
CLARITY, POC UA: CLEAR
COLOR, POC UA: YELLOW
CREAT SERPL-MCNC: 0.9 MG/DL (ref 0.6–1.2)
CTP QC/QA: YES
GLUCOSE SERPL-MCNC: 87 MG/DL (ref 73–118)
GLUCOSE, POC UA: NEGATIVE
KETONES, POC UA: NEGATIVE
LEUKOCYTE EST, POC UA: NEGATIVE
NITRITE, POC UA: NEGATIVE
PH UR STRIP: 7 [PH]
POC ALT (SGPT): 13 U/L (ref 10–47)
POC AST (SGOT): 22 U/L (ref 11–38)
POC B-TYPE NATRIURETIC PEPTIDE: 700 PG/ML (ref 0–100)
POC CARDIAC TROPONIN I: 0 NG/ML
POC D-DI: 636 NG/ML (ref 0–450)
POC TCO2: 27 MMOL/L (ref 18–33)
POTASSIUM BLD-SCNC: 3.6 MMOL/L (ref 3.6–5.1)
PROTEIN, POC UA: ABNORMAL
PROTEIN, POC: 7.5 G/DL (ref 6.4–8.1)
SAMPLE: NORMAL
SODIUM BLD-SCNC: 141 MMOL/L (ref 128–145)
SPECIFIC GRAVITY, POC UA: 1.02
UROBILINOGEN, POC UA: 2 E.U./DL

## 2021-03-04 PROCEDURE — 63600175 PHARM REV CODE 636 W HCPCS: Mod: ER | Performed by: INTERNAL MEDICINE

## 2021-03-04 PROCEDURE — 81003 URINALYSIS AUTO W/O SCOPE: CPT | Mod: ER

## 2021-03-04 PROCEDURE — 80053 COMPREHEN METABOLIC PANEL: CPT | Mod: ER

## 2021-03-04 PROCEDURE — 93010 ELECTROCARDIOGRAM REPORT: CPT | Mod: ,,, | Performed by: INTERNAL MEDICINE

## 2021-03-04 PROCEDURE — 96375 TX/PRO/DX INJ NEW DRUG ADDON: CPT | Mod: ER,59

## 2021-03-04 PROCEDURE — 81025 URINE PREGNANCY TEST: CPT | Mod: ER | Performed by: INTERNAL MEDICINE

## 2021-03-04 PROCEDURE — 85025 COMPLETE CBC W/AUTO DIFF WBC: CPT | Mod: ER

## 2021-03-04 PROCEDURE — 93010 EKG 12-LEAD: ICD-10-PCS | Mod: ,,, | Performed by: INTERNAL MEDICINE

## 2021-03-04 PROCEDURE — 83880 ASSAY OF NATRIURETIC PEPTIDE: CPT | Mod: ER

## 2021-03-04 PROCEDURE — 85379 FIBRIN DEGRADATION QUANT: CPT | Mod: ER

## 2021-03-04 PROCEDURE — 96374 THER/PROPH/DIAG INJ IV PUSH: CPT | Mod: ER,59

## 2021-03-04 PROCEDURE — 84484 ASSAY OF TROPONIN QUANT: CPT | Mod: ER

## 2021-03-04 PROCEDURE — 25500020 PHARM REV CODE 255: Mod: ER | Performed by: INTERNAL MEDICINE

## 2021-03-04 PROCEDURE — 99285 EMERGENCY DEPT VISIT HI MDM: CPT | Mod: 25,ER

## 2021-03-04 RX ORDER — FUROSEMIDE 10 MG/ML
40 INJECTION INTRAMUSCULAR; INTRAVENOUS
Status: COMPLETED | OUTPATIENT
Start: 2021-03-04 | End: 2021-03-04

## 2021-03-04 RX ORDER — KETOROLAC TROMETHAMINE 30 MG/ML
30 INJECTION, SOLUTION INTRAMUSCULAR; INTRAVENOUS
Status: COMPLETED | OUTPATIENT
Start: 2021-03-04 | End: 2021-03-04

## 2021-03-04 RX ADMIN — KETOROLAC TROMETHAMINE 30 MG: 30 INJECTION, SOLUTION INTRAMUSCULAR at 09:03

## 2021-03-04 RX ADMIN — IOHEXOL 100 ML: 350 INJECTION, SOLUTION INTRAVENOUS at 09:03

## 2021-03-04 RX ADMIN — FUROSEMIDE 40 MG: 10 INJECTION, SOLUTION INTRAMUSCULAR; INTRAVENOUS at 10:03

## 2021-03-08 ENCOUNTER — OFFICE VISIT (OUTPATIENT)
Dept: FAMILY MEDICINE | Facility: CLINIC | Age: 36
End: 2021-03-08
Payer: MEDICAID

## 2021-03-08 VITALS
OXYGEN SATURATION: 99 % | SYSTOLIC BLOOD PRESSURE: 110 MMHG | TEMPERATURE: 99 F | BODY MASS INDEX: 26.04 KG/M2 | HEART RATE: 75 BPM | DIASTOLIC BLOOD PRESSURE: 84 MMHG | HEIGHT: 70 IN | WEIGHT: 181.88 LBS

## 2021-03-08 DIAGNOSIS — R05.9 COUGH: ICD-10-CM

## 2021-03-08 DIAGNOSIS — I50.9 CONGESTIVE HEART FAILURE, UNSPECIFIED HF CHRONICITY, UNSPECIFIED HEART FAILURE TYPE: ICD-10-CM

## 2021-03-08 DIAGNOSIS — I42.8 NONISCHEMIC CARDIOMYOPATHY: ICD-10-CM

## 2021-03-08 DIAGNOSIS — Z95.810 ICD (IMPLANTABLE CARDIOVERTER-DEFIBRILLATOR), SINGLE, IN SITU: ICD-10-CM

## 2021-03-08 DIAGNOSIS — I10 ESSENTIAL HYPERTENSION: ICD-10-CM

## 2021-03-08 DIAGNOSIS — R07.9 CHEST PAIN, UNSPECIFIED TYPE: Primary | ICD-10-CM

## 2021-03-08 PROCEDURE — 99999 PR PBB SHADOW E&M-EST. PATIENT-LVL III: ICD-10-PCS | Mod: PBBFAC,,, | Performed by: INTERNAL MEDICINE

## 2021-03-08 PROCEDURE — 99214 PR OFFICE/OUTPT VISIT, EST, LEVL IV, 30-39 MIN: ICD-10-PCS | Mod: S$PBB,,, | Performed by: INTERNAL MEDICINE

## 2021-03-08 PROCEDURE — 99213 OFFICE O/P EST LOW 20 MIN: CPT | Mod: PBBFAC,PO | Performed by: INTERNAL MEDICINE

## 2021-03-08 PROCEDURE — 99214 OFFICE O/P EST MOD 30 MIN: CPT | Mod: S$PBB,,, | Performed by: INTERNAL MEDICINE

## 2021-03-08 PROCEDURE — 99999 PR PBB SHADOW E&M-EST. PATIENT-LVL III: CPT | Mod: PBBFAC,,, | Performed by: INTERNAL MEDICINE

## 2021-03-08 RX ORDER — LISINOPRIL 40 MG/1
40 TABLET ORAL DAILY
Qty: 90 TABLET | Refills: 3 | Status: ON HOLD | OUTPATIENT
Start: 2021-03-08 | End: 2021-07-28 | Stop reason: HOSPADM

## 2021-03-08 RX ORDER — FUROSEMIDE 40 MG/1
40 TABLET ORAL DAILY
Qty: 90 TABLET | Refills: 0 | Status: SHIPPED | OUTPATIENT
Start: 2021-03-08 | End: 2021-03-08 | Stop reason: SDUPTHER

## 2021-03-09 RX ORDER — FUROSEMIDE 40 MG/1
40 TABLET ORAL DAILY
Qty: 90 TABLET | Refills: 0 | Status: SHIPPED | OUTPATIENT
Start: 2021-03-09 | End: 2021-04-08

## 2021-03-26 ENCOUNTER — OFFICE VISIT (OUTPATIENT)
Dept: CARDIOLOGY | Facility: CLINIC | Age: 36
End: 2021-03-26
Payer: MEDICAID

## 2021-03-26 VITALS
HEIGHT: 70 IN | WEIGHT: 182.13 LBS | SYSTOLIC BLOOD PRESSURE: 136 MMHG | RESPIRATION RATE: 15 BRPM | HEART RATE: 80 BPM | OXYGEN SATURATION: 98 % | DIASTOLIC BLOOD PRESSURE: 74 MMHG | BODY MASS INDEX: 26.07 KG/M2

## 2021-03-26 DIAGNOSIS — I10 ESSENTIAL HYPERTENSION: Chronic | ICD-10-CM

## 2021-03-26 DIAGNOSIS — Z01.810 PREOP CARDIOVASCULAR EXAM: ICD-10-CM

## 2021-03-26 DIAGNOSIS — I42.8 NONISCHEMIC CARDIOMYOPATHY: Primary | Chronic | ICD-10-CM

## 2021-03-26 DIAGNOSIS — Z95.810 ICD (IMPLANTABLE CARDIOVERTER-DEFIBRILLATOR), SINGLE, IN SITU: ICD-10-CM

## 2021-03-26 DIAGNOSIS — R07.9 CHEST PAIN, UNSPECIFIED TYPE: ICD-10-CM

## 2021-03-26 PROCEDURE — 99213 OFFICE O/P EST LOW 20 MIN: CPT | Mod: PBBFAC | Performed by: INTERNAL MEDICINE

## 2021-03-26 PROCEDURE — 99999 PR PBB SHADOW E&M-EST. PATIENT-LVL III: CPT | Mod: PBBFAC,,, | Performed by: INTERNAL MEDICINE

## 2021-03-26 PROCEDURE — 99999 PR PBB SHADOW E&M-EST. PATIENT-LVL III: ICD-10-PCS | Mod: PBBFAC,,, | Performed by: INTERNAL MEDICINE

## 2021-03-26 PROCEDURE — 99214 OFFICE O/P EST MOD 30 MIN: CPT | Mod: S$PBB,,, | Performed by: INTERNAL MEDICINE

## 2021-03-26 PROCEDURE — 99214 PR OFFICE/OUTPT VISIT, EST, LEVL IV, 30-39 MIN: ICD-10-PCS | Mod: S$PBB,,, | Performed by: INTERNAL MEDICINE

## 2021-04-20 ENCOUNTER — OFFICE VISIT (OUTPATIENT)
Dept: CARDIOLOGY | Facility: CLINIC | Age: 36
End: 2021-04-20
Payer: MEDICAID

## 2021-04-20 VITALS
HEART RATE: 89 BPM | WEIGHT: 181 LBS | BODY MASS INDEX: 25.91 KG/M2 | RESPIRATION RATE: 15 BRPM | DIASTOLIC BLOOD PRESSURE: 72 MMHG | SYSTOLIC BLOOD PRESSURE: 126 MMHG | OXYGEN SATURATION: 98 % | HEIGHT: 70 IN

## 2021-04-20 DIAGNOSIS — Z01.810 PREOP CARDIOVASCULAR EXAM: ICD-10-CM

## 2021-04-20 DIAGNOSIS — I42.8 NONISCHEMIC CARDIOMYOPATHY: Primary | Chronic | ICD-10-CM

## 2021-04-20 DIAGNOSIS — Z95.810 ICD (IMPLANTABLE CARDIOVERTER-DEFIBRILLATOR), SINGLE, IN SITU: ICD-10-CM

## 2021-04-20 DIAGNOSIS — I10 ESSENTIAL HYPERTENSION: Chronic | ICD-10-CM

## 2021-04-20 PROCEDURE — 93282 PRGRMG EVAL IMPLANTABLE DFB: CPT | Mod: PBBFAC | Performed by: INTERNAL MEDICINE

## 2021-04-20 PROCEDURE — 99214 OFFICE O/P EST MOD 30 MIN: CPT | Mod: S$PBB,,, | Performed by: INTERNAL MEDICINE

## 2021-04-20 PROCEDURE — 99214 PR OFFICE/OUTPT VISIT, EST, LEVL IV, 30-39 MIN: ICD-10-PCS | Mod: S$PBB,,, | Performed by: INTERNAL MEDICINE

## 2021-04-20 PROCEDURE — 99999 PR PBB SHADOW E&M-EST. PATIENT-LVL III: CPT | Mod: PBBFAC,,, | Performed by: INTERNAL MEDICINE

## 2021-04-20 PROCEDURE — 93282 PR PROGRAM EVAL (IN PERSON) IMPLANT DEVICE,CARDVERT/DEFIB,1 LEAD: ICD-10-PCS | Mod: 26,S$PBB,, | Performed by: INTERNAL MEDICINE

## 2021-04-20 PROCEDURE — 99213 OFFICE O/P EST LOW 20 MIN: CPT | Mod: PBBFAC,25 | Performed by: INTERNAL MEDICINE

## 2021-04-20 PROCEDURE — 93282 PRGRMG EVAL IMPLANTABLE DFB: CPT | Mod: 26,S$PBB,, | Performed by: INTERNAL MEDICINE

## 2021-04-20 PROCEDURE — 99999 PR PBB SHADOW E&M-EST. PATIENT-LVL III: ICD-10-PCS | Mod: PBBFAC,,, | Performed by: INTERNAL MEDICINE

## 2021-05-30 ENCOUNTER — HOSPITAL ENCOUNTER (EMERGENCY)
Facility: HOSPITAL | Age: 36
Discharge: HOME | End: 2021-05-30
Attending: EMERGENCY MEDICINE
Payer: MEDICARE

## 2021-05-30 VITALS
HEART RATE: 75 BPM | WEIGHT: 179 LBS | HEIGHT: 70 IN | SYSTOLIC BLOOD PRESSURE: 119 MMHG | DIASTOLIC BLOOD PRESSURE: 78 MMHG | RESPIRATION RATE: 19 BRPM | TEMPERATURE: 99 F | OXYGEN SATURATION: 98 % | BODY MASS INDEX: 25.62 KG/M2

## 2021-05-30 DIAGNOSIS — R07.89 CHEST PAIN, NON-CARDIAC: Primary | ICD-10-CM

## 2021-05-30 LAB
ALBUMIN SERPL BCP-MCNC: 3.6 G/DL (ref 3.5–5.2)
ALP SERPL-CCNC: 43 U/L (ref 55–135)
ALT SERPL W/O P-5'-P-CCNC: 12 U/L (ref 10–44)
ANION GAP SERPL CALC-SCNC: 9 MMOL/L (ref 8–16)
AST SERPL-CCNC: 16 U/L (ref 10–40)
B-HCG UR QL: NEGATIVE
BASOPHILS # BLD AUTO: 0.04 K/UL (ref 0–0.2)
BASOPHILS NFR BLD: 0.5 % (ref 0–1.9)
BILIRUB SERPL-MCNC: 0.7 MG/DL (ref 0.1–1)
BUN SERPL-MCNC: 14 MG/DL (ref 6–20)
CALCIUM SERPL-MCNC: 9 MG/DL (ref 8.7–10.5)
CHLORIDE SERPL-SCNC: 109 MMOL/L (ref 95–110)
CO2 SERPL-SCNC: 21 MMOL/L (ref 23–29)
CREAT SERPL-MCNC: 0.8 MG/DL (ref 0.5–1.4)
CTP QC/QA: YES
DIFFERENTIAL METHOD: ABNORMAL
EOSINOPHIL # BLD AUTO: 0.1 K/UL (ref 0–0.5)
EOSINOPHIL NFR BLD: 0.9 % (ref 0–8)
ERYTHROCYTE [DISTWIDTH] IN BLOOD BY AUTOMATED COUNT: 13 % (ref 11.5–14.5)
EST. GFR  (AFRICAN AMERICAN): >60 ML/MIN/1.73 M^2
EST. GFR  (NON AFRICAN AMERICAN): >60 ML/MIN/1.73 M^2
GLUCOSE SERPL-MCNC: 86 MG/DL (ref 70–110)
HCT VFR BLD AUTO: 36.7 % (ref 37–48.5)
HGB BLD-MCNC: 12.2 G/DL (ref 12–16)
IMM GRANULOCYTES # BLD AUTO: 0.02 K/UL (ref 0–0.04)
IMM GRANULOCYTES NFR BLD AUTO: 0.3 % (ref 0–0.5)
LYMPHOCYTES # BLD AUTO: 1.5 K/UL (ref 1–4.8)
LYMPHOCYTES NFR BLD: 18.4 % (ref 18–48)
MCH RBC QN AUTO: 30.1 PG (ref 27–31)
MCHC RBC AUTO-ENTMCNC: 33.2 G/DL (ref 32–36)
MCV RBC AUTO: 91 FL (ref 82–98)
MONOCYTES # BLD AUTO: 0.6 K/UL (ref 0.3–1)
MONOCYTES NFR BLD: 7.8 % (ref 4–15)
NEUTROPHILS # BLD AUTO: 5.8 K/UL (ref 1.8–7.7)
NEUTROPHILS NFR BLD: 72.1 % (ref 38–73)
NRBC BLD-RTO: 0 /100 WBC
PLATELET # BLD AUTO: 273 K/UL (ref 150–450)
PMV BLD AUTO: 10.6 FL (ref 9.2–12.9)
POTASSIUM SERPL-SCNC: 4.2 MMOL/L (ref 3.5–5.1)
PROT SERPL-MCNC: 7.4 G/DL (ref 6–8.4)
RBC # BLD AUTO: 4.05 M/UL (ref 4–5.4)
SODIUM SERPL-SCNC: 139 MMOL/L (ref 136–145)
TROPONIN I SERPL DL<=0.01 NG/ML-MCNC: 0.01 NG/ML (ref 0–0.03)
WBC # BLD AUTO: 7.98 K/UL (ref 3.9–12.7)

## 2021-05-30 PROCEDURE — 96375 TX/PRO/DX INJ NEW DRUG ADDON: CPT

## 2021-05-30 PROCEDURE — 93005 ELECTROCARDIOGRAM TRACING: CPT

## 2021-05-30 PROCEDURE — 85025 COMPLETE CBC W/AUTO DIFF WBC: CPT | Performed by: EMERGENCY MEDICINE

## 2021-05-30 PROCEDURE — 93010 ELECTROCARDIOGRAM REPORT: CPT | Mod: ,,, | Performed by: INTERNAL MEDICINE

## 2021-05-30 PROCEDURE — 81025 URINE PREGNANCY TEST: CPT | Performed by: EMERGENCY MEDICINE

## 2021-05-30 PROCEDURE — 80053 COMPREHEN METABOLIC PANEL: CPT | Performed by: EMERGENCY MEDICINE

## 2021-05-30 PROCEDURE — 93010 EKG 12-LEAD: ICD-10-PCS | Mod: ,,, | Performed by: INTERNAL MEDICINE

## 2021-05-30 PROCEDURE — 99285 EMERGENCY DEPT VISIT HI MDM: CPT | Mod: 25

## 2021-05-30 PROCEDURE — 96374 THER/PROPH/DIAG INJ IV PUSH: CPT

## 2021-05-30 PROCEDURE — 84484 ASSAY OF TROPONIN QUANT: CPT | Performed by: EMERGENCY MEDICINE

## 2021-05-30 PROCEDURE — 63600175 PHARM REV CODE 636 W HCPCS: Performed by: EMERGENCY MEDICINE

## 2021-05-30 RX ORDER — DEXAMETHASONE SODIUM PHOSPHATE 4 MG/ML
8 INJECTION, SOLUTION INTRA-ARTICULAR; INTRALESIONAL; INTRAMUSCULAR; INTRAVENOUS; SOFT TISSUE
Status: COMPLETED | OUTPATIENT
Start: 2021-05-30 | End: 2021-05-30

## 2021-05-30 RX ORDER — FUROSEMIDE 40 MG/1
40 TABLET ORAL DAILY
COMMUNITY
End: 2021-11-19 | Stop reason: SDUPTHER

## 2021-05-30 RX ORDER — PREDNISONE 20 MG/1
60 TABLET ORAL DAILY
Qty: 9 TABLET | Refills: 0 | Status: SHIPPED | OUTPATIENT
Start: 2021-05-30 | End: 2021-06-02

## 2021-05-30 RX ORDER — KETOROLAC TROMETHAMINE 30 MG/ML
30 INJECTION, SOLUTION INTRAMUSCULAR; INTRAVENOUS
Status: COMPLETED | OUTPATIENT
Start: 2021-05-30 | End: 2021-05-30

## 2021-05-30 RX ORDER — DEXAMETHASONE SODIUM PHOSPHATE 4 MG/ML
4 INJECTION, SOLUTION INTRA-ARTICULAR; INTRALESIONAL; INTRAMUSCULAR; INTRAVENOUS; SOFT TISSUE
Status: DISCONTINUED | OUTPATIENT
Start: 2021-05-30 | End: 2021-05-30

## 2021-05-30 RX ORDER — IBUPROFEN 400 MG/1
400 TABLET ORAL EVERY 6 HOURS PRN
Qty: 20 TABLET | Refills: 0 | Status: SHIPPED | OUTPATIENT
Start: 2021-05-30 | End: 2021-05-30 | Stop reason: ALTCHOICE

## 2021-05-30 RX ADMIN — KETOROLAC TROMETHAMINE 30 MG: 30 INJECTION, SOLUTION INTRAMUSCULAR at 11:05

## 2021-05-30 RX ADMIN — DEXAMETHASONE SODIUM PHOSPHATE 8 MG: 4 INJECTION INTRA-ARTICULAR; INTRALESIONAL; INTRAMUSCULAR; INTRAVENOUS; SOFT TISSUE at 11:05

## 2021-07-17 ENCOUNTER — HOSPITAL ENCOUNTER (EMERGENCY)
Facility: HOSPITAL | Age: 36
Discharge: HOME | End: 2021-07-17
Attending: EMERGENCY MEDICINE
Payer: MEDICARE

## 2021-07-17 VITALS
OXYGEN SATURATION: 100 % | SYSTOLIC BLOOD PRESSURE: 135 MMHG | HEART RATE: 75 BPM | HEIGHT: 69 IN | BODY MASS INDEX: 26.36 KG/M2 | DIASTOLIC BLOOD PRESSURE: 83 MMHG | TEMPERATURE: 99 F | WEIGHT: 178 LBS | RESPIRATION RATE: 17 BRPM

## 2021-07-17 DIAGNOSIS — R07.89 LEFT-SIDED CHEST WALL PAIN: ICD-10-CM

## 2021-07-17 DIAGNOSIS — R07.89 CHEST PAIN, NON-CARDIAC: Primary | ICD-10-CM

## 2021-07-17 LAB
B-HCG UR QL: NEGATIVE
CTP QC/QA: YES

## 2021-07-17 PROCEDURE — 81025 URINE PREGNANCY TEST: CPT | Performed by: EMERGENCY MEDICINE

## 2021-07-17 PROCEDURE — 93010 EKG 12-LEAD: ICD-10-PCS | Mod: ,,, | Performed by: INTERNAL MEDICINE

## 2021-07-17 PROCEDURE — 99284 EMERGENCY DEPT VISIT MOD MDM: CPT | Mod: 25

## 2021-07-17 PROCEDURE — 93005 ELECTROCARDIOGRAM TRACING: CPT

## 2021-07-17 PROCEDURE — 93010 ELECTROCARDIOGRAM REPORT: CPT | Mod: ,,, | Performed by: INTERNAL MEDICINE

## 2021-07-17 RX ORDER — HYDROCODONE BITARTRATE AND ACETAMINOPHEN 5; 325 MG/1; MG/1
1 TABLET ORAL EVERY 4 HOURS PRN
Qty: 8 TABLET | Refills: 0 | Status: ON HOLD | OUTPATIENT
Start: 2021-07-17 | End: 2021-07-28 | Stop reason: HOSPADM

## 2021-07-23 ENCOUNTER — TELEPHONE (OUTPATIENT)
Dept: FAMILY MEDICINE | Facility: CLINIC | Age: 36
End: 2021-07-23

## 2021-07-23 DIAGNOSIS — U07.1 COVID-19: Primary | ICD-10-CM

## 2021-07-24 ENCOUNTER — NURSE TRIAGE (OUTPATIENT)
Dept: ADMINISTRATIVE | Facility: CLINIC | Age: 36
End: 2021-07-24

## 2021-07-24 ENCOUNTER — HOSPITAL ENCOUNTER (INPATIENT)
Facility: HOSPITAL | Age: 36
LOS: 4 days | Discharge: HOME | DRG: 177 | End: 2021-07-28
Attending: EMERGENCY MEDICINE | Admitting: EMERGENCY MEDICINE
Payer: MEDICARE

## 2021-07-24 DIAGNOSIS — R06.02 SOB (SHORTNESS OF BREATH): ICD-10-CM

## 2021-07-24 DIAGNOSIS — U07.1 COVID-19 VIRUS INFECTION: ICD-10-CM

## 2021-07-24 DIAGNOSIS — R79.89 TROPONIN LEVEL ELEVATED: Primary | ICD-10-CM

## 2021-07-24 DIAGNOSIS — Z20.822 SUSPECTED COVID-19 VIRUS INFECTION: ICD-10-CM

## 2021-07-24 PROBLEM — J12.82 PNEUMONIA DUE TO COVID-19 VIRUS: Status: ACTIVE | Noted: 2021-07-24

## 2021-07-24 LAB
ALBUMIN SERPL BCP-MCNC: 3.5 G/DL (ref 3.5–5.2)
ALP SERPL-CCNC: 41 U/L (ref 55–135)
ALT SERPL W/O P-5'-P-CCNC: 19 U/L (ref 10–44)
ANION GAP SERPL CALC-SCNC: 9 MMOL/L (ref 8–16)
AST SERPL-CCNC: 29 U/L (ref 10–40)
B-HCG UR QL: NEGATIVE
BASOPHILS # BLD AUTO: 0.01 K/UL (ref 0–0.2)
BASOPHILS NFR BLD: 0.2 % (ref 0–1.9)
BILIRUB SERPL-MCNC: 0.4 MG/DL (ref 0.1–1)
BNP SERPL-MCNC: 627 PG/ML (ref 0–99)
BUN SERPL-MCNC: 10 MG/DL (ref 6–20)
CALCIUM SERPL-MCNC: 8.5 MG/DL (ref 8.7–10.5)
CHLORIDE SERPL-SCNC: 103 MMOL/L (ref 95–110)
CK SERPL-CCNC: 77 U/L (ref 20–180)
CO2 SERPL-SCNC: 23 MMOL/L (ref 23–29)
CREAT SERPL-MCNC: 0.9 MG/DL (ref 0.5–1.4)
CRP SERPL-MCNC: 35 MG/L (ref 0–8.2)
CTP QC/QA: YES
D DIMER PPP IA.FEU-MCNC: 0.45 MG/L FEU
DIFFERENTIAL METHOD: ABNORMAL
EOSINOPHIL # BLD AUTO: 0 K/UL (ref 0–0.5)
EOSINOPHIL NFR BLD: 0 % (ref 0–8)
ERYTHROCYTE [DISTWIDTH] IN BLOOD BY AUTOMATED COUNT: 13.2 % (ref 11.5–14.5)
ERYTHROCYTE [SEDIMENTATION RATE] IN BLOOD BY WESTERGREN METHOD: 12 MM/HR (ref 0–20)
EST. GFR  (AFRICAN AMERICAN): >60 ML/MIN/1.73 M^2
EST. GFR  (NON AFRICAN AMERICAN): >60 ML/MIN/1.73 M^2
FERRITIN SERPL-MCNC: 186 NG/ML (ref 20–300)
GLUCOSE SERPL-MCNC: 87 MG/DL (ref 70–110)
HCT VFR BLD AUTO: 36 % (ref 37–48.5)
HGB BLD-MCNC: 12.2 G/DL (ref 12–16)
IMM GRANULOCYTES # BLD AUTO: 0.02 K/UL (ref 0–0.04)
IMM GRANULOCYTES NFR BLD AUTO: 0.3 % (ref 0–0.5)
LACTATE SERPL-SCNC: 1.2 MMOL/L (ref 0.5–2.2)
LDH SERPL L TO P-CCNC: 235 U/L (ref 110–260)
LYMPHOCYTES # BLD AUTO: 0.6 K/UL (ref 1–4.8)
LYMPHOCYTES NFR BLD: 9.7 % (ref 18–48)
MCH RBC QN AUTO: 30.4 PG (ref 27–31)
MCHC RBC AUTO-ENTMCNC: 33.9 G/DL (ref 32–36)
MCV RBC AUTO: 90 FL (ref 82–98)
MONOCYTES # BLD AUTO: 0.3 K/UL (ref 0.3–1)
MONOCYTES NFR BLD: 5.3 % (ref 4–15)
NEUTROPHILS # BLD AUTO: 5.4 K/UL (ref 1.8–7.7)
NEUTROPHILS NFR BLD: 84.5 % (ref 38–73)
NRBC BLD-RTO: 0 /100 WBC
PLATELET # BLD AUTO: 218 K/UL (ref 150–450)
PMV BLD AUTO: 11.2 FL (ref 9.2–12.9)
POTASSIUM SERPL-SCNC: 4 MMOL/L (ref 3.5–5.1)
PROCALCITONIN SERPL IA-MCNC: 0.06 NG/ML
PROT SERPL-MCNC: 7.5 G/DL (ref 6–8.4)
RBC # BLD AUTO: 4.01 M/UL (ref 4–5.4)
SODIUM SERPL-SCNC: 135 MMOL/L (ref 136–145)
TROPONIN I SERPL DL<=0.01 NG/ML-MCNC: 0.04 NG/ML (ref 0–0.03)
TROPONIN I SERPL DL<=0.01 NG/ML-MCNC: <0.006 NG/ML (ref 0–0.03)
WBC # BLD AUTO: 6.39 K/UL (ref 3.9–12.7)

## 2021-07-24 PROCEDURE — 11000001 HC ACUTE MED/SURG PRIVATE ROOM

## 2021-07-24 PROCEDURE — 83615 LACTATE (LD) (LDH) ENZYME: CPT | Performed by: PHYSICIAN ASSISTANT

## 2021-07-24 PROCEDURE — 96375 TX/PRO/DX INJ NEW DRUG ADDON: CPT

## 2021-07-24 PROCEDURE — 93005 ELECTROCARDIOGRAM TRACING: CPT

## 2021-07-24 PROCEDURE — 84145 PROCALCITONIN (PCT): CPT | Performed by: PHYSICIAN ASSISTANT

## 2021-07-24 PROCEDURE — 63600175 PHARM REV CODE 636 W HCPCS: Performed by: EMERGENCY MEDICINE

## 2021-07-24 PROCEDURE — 94760 N-INVAS EAR/PLS OXIMETRY 1: CPT

## 2021-07-24 PROCEDURE — 63600175 PHARM REV CODE 636 W HCPCS: Performed by: PHYSICIAN ASSISTANT

## 2021-07-24 PROCEDURE — 25000003 PHARM REV CODE 250: Performed by: EMERGENCY MEDICINE

## 2021-07-24 PROCEDURE — 99285 EMERGENCY DEPT VISIT HI MDM: CPT | Mod: 25

## 2021-07-24 PROCEDURE — 85652 RBC SED RATE AUTOMATED: CPT | Performed by: PHYSICIAN ASSISTANT

## 2021-07-24 PROCEDURE — 80053 COMPREHEN METABOLIC PANEL: CPT | Performed by: PHYSICIAN ASSISTANT

## 2021-07-24 PROCEDURE — 25000242 PHARM REV CODE 250 ALT 637 W/ HCPCS: Performed by: EMERGENCY MEDICINE

## 2021-07-24 PROCEDURE — 94640 AIRWAY INHALATION TREATMENT: CPT

## 2021-07-24 PROCEDURE — 25000003 PHARM REV CODE 250: Performed by: PHYSICIAN ASSISTANT

## 2021-07-24 PROCEDURE — 85025 COMPLETE CBC W/AUTO DIFF WBC: CPT | Performed by: PHYSICIAN ASSISTANT

## 2021-07-24 PROCEDURE — 86140 C-REACTIVE PROTEIN: CPT | Performed by: PHYSICIAN ASSISTANT

## 2021-07-24 PROCEDURE — 93010 EKG 12-LEAD: ICD-10-PCS | Mod: ,,, | Performed by: INTERNAL MEDICINE

## 2021-07-24 PROCEDURE — 82550 ASSAY OF CK (CPK): CPT | Performed by: PHYSICIAN ASSISTANT

## 2021-07-24 PROCEDURE — 81025 URINE PREGNANCY TEST: CPT | Performed by: PHYSICIAN ASSISTANT

## 2021-07-24 PROCEDURE — 84484 ASSAY OF TROPONIN QUANT: CPT | Mod: 91 | Performed by: PHYSICIAN ASSISTANT

## 2021-07-24 PROCEDURE — 96374 THER/PROPH/DIAG INJ IV PUSH: CPT

## 2021-07-24 PROCEDURE — 83880 ASSAY OF NATRIURETIC PEPTIDE: CPT | Performed by: PHYSICIAN ASSISTANT

## 2021-07-24 PROCEDURE — 82728 ASSAY OF FERRITIN: CPT | Performed by: PHYSICIAN ASSISTANT

## 2021-07-24 PROCEDURE — 93010 ELECTROCARDIOGRAM REPORT: CPT | Mod: ,,, | Performed by: INTERNAL MEDICINE

## 2021-07-24 PROCEDURE — 83605 ASSAY OF LACTIC ACID: CPT | Performed by: PHYSICIAN ASSISTANT

## 2021-07-24 PROCEDURE — 84484 ASSAY OF TROPONIN QUANT: CPT | Performed by: PHYSICIAN ASSISTANT

## 2021-07-24 PROCEDURE — 82306 VITAMIN D 25 HYDROXY: CPT | Performed by: PHYSICIAN ASSISTANT

## 2021-07-24 PROCEDURE — 85379 FIBRIN DEGRADATION QUANT: CPT | Performed by: PHYSICIAN ASSISTANT

## 2021-07-24 RX ORDER — IBUPROFEN 400 MG/1
800 TABLET ORAL
Status: DISCONTINUED | OUTPATIENT
Start: 2021-07-24 | End: 2021-07-24

## 2021-07-24 RX ORDER — ACETAMINOPHEN 500 MG
1000 TABLET ORAL
Status: COMPLETED | OUTPATIENT
Start: 2021-07-24 | End: 2021-07-24

## 2021-07-24 RX ORDER — IBUPROFEN 200 MG
16 TABLET ORAL
Status: DISCONTINUED | OUTPATIENT
Start: 2021-07-24 | End: 2021-07-28 | Stop reason: HOSPADM

## 2021-07-24 RX ORDER — ACETAMINOPHEN 500 MG
500 TABLET ORAL EVERY 6 HOURS PRN
Status: DISCONTINUED | OUTPATIENT
Start: 2021-07-24 | End: 2021-07-28 | Stop reason: HOSPADM

## 2021-07-24 RX ORDER — ENOXAPARIN SODIUM 100 MG/ML
40 INJECTION SUBCUTANEOUS EVERY 24 HOURS
Status: DISCONTINUED | OUTPATIENT
Start: 2021-07-24 | End: 2021-07-28 | Stop reason: HOSPADM

## 2021-07-24 RX ORDER — DEXAMETHASONE SODIUM PHOSPHATE 4 MG/ML
6 INJECTION, SOLUTION INTRA-ARTICULAR; INTRALESIONAL; INTRAMUSCULAR; INTRAVENOUS; SOFT TISSUE
Status: COMPLETED | OUTPATIENT
Start: 2021-07-24 | End: 2021-07-24

## 2021-07-24 RX ORDER — SODIUM CHLORIDE 0.9 % (FLUSH) 0.9 %
10 SYRINGE (ML) INJECTION
Status: DISCONTINUED | OUTPATIENT
Start: 2021-07-24 | End: 2021-07-28 | Stop reason: HOSPADM

## 2021-07-24 RX ORDER — ALBUTEROL SULFATE 90 UG/1
2 AEROSOL, METERED RESPIRATORY (INHALATION) ONCE
Status: COMPLETED | OUTPATIENT
Start: 2021-07-24 | End: 2021-07-24

## 2021-07-24 RX ORDER — ALBUTEROL SULFATE 90 UG/1
2 AEROSOL, METERED RESPIRATORY (INHALATION)
Status: DISCONTINUED | OUTPATIENT
Start: 2021-07-24 | End: 2021-07-27

## 2021-07-24 RX ORDER — FUROSEMIDE 40 MG/1
40 TABLET ORAL DAILY
Status: DISCONTINUED | OUTPATIENT
Start: 2021-07-25 | End: 2021-07-28 | Stop reason: HOSPADM

## 2021-07-24 RX ORDER — CARVEDILOL 12.5 MG/1
25 TABLET ORAL 2 TIMES DAILY WITH MEALS
Status: DISCONTINUED | OUTPATIENT
Start: 2021-07-25 | End: 2021-07-25

## 2021-07-24 RX ORDER — GLUCAGON 1 MG
1 KIT INJECTION
Status: DISCONTINUED | OUTPATIENT
Start: 2021-07-24 | End: 2021-07-28 | Stop reason: HOSPADM

## 2021-07-24 RX ORDER — ASCORBIC ACID 500 MG
500 TABLET ORAL 2 TIMES DAILY
Status: DISCONTINUED | OUTPATIENT
Start: 2021-07-24 | End: 2021-07-28 | Stop reason: HOSPADM

## 2021-07-24 RX ORDER — TALC
6 POWDER (GRAM) TOPICAL NIGHTLY PRN
Status: DISCONTINUED | OUTPATIENT
Start: 2021-07-24 | End: 2021-07-28 | Stop reason: HOSPADM

## 2021-07-24 RX ORDER — ALBUTEROL SULFATE 90 UG/1
2 AEROSOL, METERED RESPIRATORY (INHALATION) EVERY 6 HOURS
Status: DISCONTINUED | OUTPATIENT
Start: 2021-07-25 | End: 2021-07-24

## 2021-07-24 RX ORDER — AMOXICILLIN 250 MG
1 CAPSULE ORAL DAILY PRN
Status: DISCONTINUED | OUTPATIENT
Start: 2021-07-24 | End: 2021-07-28 | Stop reason: HOSPADM

## 2021-07-24 RX ORDER — LISINOPRIL 20 MG/1
40 TABLET ORAL DAILY
Status: DISCONTINUED | OUTPATIENT
Start: 2021-07-25 | End: 2021-07-25

## 2021-07-24 RX ORDER — IBUPROFEN 200 MG
24 TABLET ORAL
Status: DISCONTINUED | OUTPATIENT
Start: 2021-07-24 | End: 2021-07-28 | Stop reason: HOSPADM

## 2021-07-24 RX ORDER — BENZONATATE 100 MG/1
100 CAPSULE ORAL 3 TIMES DAILY PRN
Status: DISCONTINUED | OUTPATIENT
Start: 2021-07-24 | End: 2021-07-28 | Stop reason: HOSPADM

## 2021-07-24 RX ORDER — ONDANSETRON 2 MG/ML
4 INJECTION INTRAMUSCULAR; INTRAVENOUS
Status: COMPLETED | OUTPATIENT
Start: 2021-07-24 | End: 2021-07-24

## 2021-07-24 RX ORDER — MORPHINE SULFATE 4 MG/ML
4 INJECTION, SOLUTION INTRAMUSCULAR; INTRAVENOUS
Status: COMPLETED | OUTPATIENT
Start: 2021-07-24 | End: 2021-07-24

## 2021-07-24 RX ADMIN — DEXAMETHASONE SODIUM PHOSPHATE 6 MG: 4 INJECTION INTRA-ARTICULAR; INTRALESIONAL; INTRAMUSCULAR; INTRAVENOUS; SOFT TISSUE at 08:07

## 2021-07-24 RX ADMIN — ACETAMINOPHEN 500 MG: 500 TABLET, FILM COATED ORAL at 10:07

## 2021-07-24 RX ADMIN — ALBUTEROL SULFATE 2 PUFF: 90 AEROSOL, METERED RESPIRATORY (INHALATION) at 05:07

## 2021-07-24 RX ADMIN — ACETAMINOPHEN 1000 MG: 500 TABLET ORAL at 06:07

## 2021-07-24 RX ADMIN — MORPHINE SULFATE 4 MG: 4 INJECTION INTRAVENOUS at 07:07

## 2021-07-24 RX ADMIN — OXYCODONE HYDROCHLORIDE AND ACETAMINOPHEN 500 MG: 500 TABLET ORAL at 10:07

## 2021-07-24 RX ADMIN — BENZONATATE 100 MG: 100 CAPSULE ORAL at 10:07

## 2021-07-24 RX ADMIN — REMDESIVIR 200 MG: 100 INJECTION, POWDER, LYOPHILIZED, FOR SOLUTION INTRAVENOUS at 10:07

## 2021-07-24 RX ADMIN — MELATONIN TAB 3 MG 6 MG: 3 TAB at 10:07

## 2021-07-24 RX ADMIN — ENOXAPARIN SODIUM 40 MG: 40 INJECTION SUBCUTANEOUS at 10:07

## 2021-07-24 RX ADMIN — ONDANSETRON 4 MG: 2 INJECTION INTRAMUSCULAR; INTRAVENOUS at 07:07

## 2021-07-25 LAB
25(OH)D3+25(OH)D2 SERPL-MCNC: 14 NG/ML (ref 30–96)
ALBUMIN SERPL BCP-MCNC: 3.3 G/DL (ref 3.5–5.2)
ALP SERPL-CCNC: 43 U/L (ref 55–135)
ALT SERPL W/O P-5'-P-CCNC: 18 U/L (ref 10–44)
ANION GAP SERPL CALC-SCNC: 10 MMOL/L (ref 8–16)
AST SERPL-CCNC: 29 U/L (ref 10–40)
BASOPHILS # BLD AUTO: 0 K/UL (ref 0–0.2)
BASOPHILS NFR BLD: 0 % (ref 0–1.9)
BILIRUB SERPL-MCNC: 0.3 MG/DL (ref 0.1–1)
BUN SERPL-MCNC: 10 MG/DL (ref 6–20)
CALCIUM SERPL-MCNC: 8.9 MG/DL (ref 8.7–10.5)
CHLORIDE SERPL-SCNC: 102 MMOL/L (ref 95–110)
CO2 SERPL-SCNC: 23 MMOL/L (ref 23–29)
CREAT SERPL-MCNC: 0.7 MG/DL (ref 0.5–1.4)
DIFFERENTIAL METHOD: ABNORMAL
EOSINOPHIL # BLD AUTO: 0 K/UL (ref 0–0.5)
EOSINOPHIL NFR BLD: 0 % (ref 0–8)
ERYTHROCYTE [DISTWIDTH] IN BLOOD BY AUTOMATED COUNT: 13.2 % (ref 11.5–14.5)
EST. GFR  (AFRICAN AMERICAN): >60 ML/MIN/1.73 M^2
EST. GFR  (NON AFRICAN AMERICAN): >60 ML/MIN/1.73 M^2
GLUCOSE SERPL-MCNC: 121 MG/DL (ref 70–110)
HCT VFR BLD AUTO: 36.5 % (ref 37–48.5)
HGB BLD-MCNC: 12.2 G/DL (ref 12–16)
IMM GRANULOCYTES # BLD AUTO: 0.01 K/UL (ref 0–0.04)
IMM GRANULOCYTES NFR BLD AUTO: 0.3 % (ref 0–0.5)
LYMPHOCYTES # BLD AUTO: 0.4 K/UL (ref 1–4.8)
LYMPHOCYTES NFR BLD: 11.9 % (ref 18–48)
MAGNESIUM SERPL-MCNC: 1.8 MG/DL (ref 1.6–2.6)
MCH RBC QN AUTO: 30 PG (ref 27–31)
MCHC RBC AUTO-ENTMCNC: 33.4 G/DL (ref 32–36)
MCV RBC AUTO: 90 FL (ref 82–98)
MONOCYTES # BLD AUTO: 0.1 K/UL (ref 0.3–1)
MONOCYTES NFR BLD: 3.6 % (ref 4–15)
NEUTROPHILS # BLD AUTO: 2.8 K/UL (ref 1.8–7.7)
NEUTROPHILS NFR BLD: 84.2 % (ref 38–73)
NRBC BLD-RTO: 0 /100 WBC
PHOSPHATE SERPL-MCNC: 3.9 MG/DL (ref 2.7–4.5)
PLATELET # BLD AUTO: 221 K/UL (ref 150–450)
PMV BLD AUTO: 11.2 FL (ref 9.2–12.9)
POTASSIUM SERPL-SCNC: 4 MMOL/L (ref 3.5–5.1)
PROT SERPL-MCNC: 7.2 G/DL (ref 6–8.4)
RBC # BLD AUTO: 4.06 M/UL (ref 4–5.4)
SODIUM SERPL-SCNC: 135 MMOL/L (ref 136–145)
TROPONIN I SERPL DL<=0.01 NG/ML-MCNC: <0.006 NG/ML (ref 0–0.03)
WBC # BLD AUTO: 3.35 K/UL (ref 3.9–12.7)

## 2021-07-25 PROCEDURE — 83735 ASSAY OF MAGNESIUM: CPT | Performed by: PHYSICIAN ASSISTANT

## 2021-07-25 PROCEDURE — 84484 ASSAY OF TROPONIN QUANT: CPT | Performed by: PHYSICIAN ASSISTANT

## 2021-07-25 PROCEDURE — 36415 COLL VENOUS BLD VENIPUNCTURE: CPT | Performed by: PHYSICIAN ASSISTANT

## 2021-07-25 PROCEDURE — 25000003 PHARM REV CODE 250: Performed by: INTERNAL MEDICINE

## 2021-07-25 PROCEDURE — 86140 C-REACTIVE PROTEIN: CPT | Performed by: PHYSICIAN ASSISTANT

## 2021-07-25 PROCEDURE — 25000003 PHARM REV CODE 250: Performed by: PHYSICIAN ASSISTANT

## 2021-07-25 PROCEDURE — 94640 AIRWAY INHALATION TREATMENT: CPT

## 2021-07-25 PROCEDURE — 85025 COMPLETE CBC W/AUTO DIFF WBC: CPT | Performed by: PHYSICIAN ASSISTANT

## 2021-07-25 PROCEDURE — 84100 ASSAY OF PHOSPHORUS: CPT | Performed by: PHYSICIAN ASSISTANT

## 2021-07-25 PROCEDURE — 25000242 PHARM REV CODE 250 ALT 637 W/ HCPCS: Performed by: PHYSICIAN ASSISTANT

## 2021-07-25 PROCEDURE — 94761 N-INVAS EAR/PLS OXIMETRY MLT: CPT

## 2021-07-25 PROCEDURE — 80053 COMPREHEN METABOLIC PANEL: CPT | Performed by: PHYSICIAN ASSISTANT

## 2021-07-25 PROCEDURE — 27000221 HC OXYGEN, UP TO 24 HOURS

## 2021-07-25 PROCEDURE — 27100098 HC SPACER

## 2021-07-25 PROCEDURE — 11000001 HC ACUTE MED/SURG PRIVATE ROOM

## 2021-07-25 PROCEDURE — 63600175 PHARM REV CODE 636 W HCPCS: Performed by: PHYSICIAN ASSISTANT

## 2021-07-25 RX ORDER — LISINOPRIL 5 MG/1
5 TABLET ORAL DAILY
Status: DISCONTINUED | OUTPATIENT
Start: 2021-07-25 | End: 2021-07-28 | Stop reason: HOSPADM

## 2021-07-25 RX ORDER — CARVEDILOL 3.12 MG/1
3.12 TABLET ORAL 2 TIMES DAILY WITH MEALS
Status: DISCONTINUED | OUTPATIENT
Start: 2021-07-25 | End: 2021-07-28 | Stop reason: HOSPADM

## 2021-07-25 RX ADMIN — BENZONATATE 100 MG: 100 CAPSULE ORAL at 10:07

## 2021-07-25 RX ADMIN — CARVEDILOL 3.12 MG: 3.12 TABLET, FILM COATED ORAL at 10:07

## 2021-07-25 RX ADMIN — CARVEDILOL 3.12 MG: 3.12 TABLET, FILM COATED ORAL at 05:07

## 2021-07-25 RX ADMIN — Medication 1 TABLET: at 10:07

## 2021-07-25 RX ADMIN — DEXAMETHASONE 6 MG: 4 TABLET ORAL at 10:07

## 2021-07-25 RX ADMIN — ACETAMINOPHEN 500 MG: 500 TABLET, FILM COATED ORAL at 06:07

## 2021-07-25 RX ADMIN — LISINOPRIL 5 MG: 5 TABLET ORAL at 10:07

## 2021-07-25 RX ADMIN — ACETAMINOPHEN 500 MG: 500 TABLET, FILM COATED ORAL at 10:07

## 2021-07-25 RX ADMIN — ACETAMINOPHEN 500 MG: 500 TABLET, FILM COATED ORAL at 11:07

## 2021-07-25 RX ADMIN — OXYCODONE HYDROCHLORIDE AND ACETAMINOPHEN 500 MG: 500 TABLET ORAL at 10:07

## 2021-07-25 RX ADMIN — ALBUTEROL SULFATE 2 PUFF: 90 AEROSOL, METERED RESPIRATORY (INHALATION) at 01:07

## 2021-07-25 RX ADMIN — ALBUTEROL SULFATE 2 PUFF: 90 AEROSOL, METERED RESPIRATORY (INHALATION) at 10:07

## 2021-07-25 RX ADMIN — ENOXAPARIN SODIUM 40 MG: 40 INJECTION SUBCUTANEOUS at 06:07

## 2021-07-25 RX ADMIN — FUROSEMIDE 40 MG: 40 TABLET ORAL at 06:07

## 2021-07-25 RX ADMIN — REMDESIVIR 100 MG: 100 INJECTION, POWDER, LYOPHILIZED, FOR SOLUTION INTRAVENOUS at 01:07

## 2021-07-25 RX ADMIN — OXYCODONE HYDROCHLORIDE AND ACETAMINOPHEN 500 MG: 500 TABLET ORAL at 08:07

## 2021-07-25 RX ADMIN — MELATONIN TAB 3 MG 6 MG: 3 TAB at 10:07

## 2021-07-26 LAB
ALBUMIN SERPL BCP-MCNC: 3.1 G/DL (ref 3.5–5.2)
ALP SERPL-CCNC: 36 U/L (ref 55–135)
ALT SERPL W/O P-5'-P-CCNC: 26 U/L (ref 10–44)
ANION GAP SERPL CALC-SCNC: 8 MMOL/L (ref 8–16)
AST SERPL-CCNC: 36 U/L (ref 10–40)
BASOPHILS # BLD AUTO: 0 K/UL (ref 0–0.2)
BASOPHILS NFR BLD: 0 % (ref 0–1.9)
BILIRUB SERPL-MCNC: 0.3 MG/DL (ref 0.1–1)
BUN SERPL-MCNC: 12 MG/DL (ref 6–20)
CALCIUM SERPL-MCNC: 8.7 MG/DL (ref 8.7–10.5)
CHLORIDE SERPL-SCNC: 105 MMOL/L (ref 95–110)
CO2 SERPL-SCNC: 21 MMOL/L (ref 23–29)
CREAT SERPL-MCNC: 0.7 MG/DL (ref 0.5–1.4)
CRP SERPL-MCNC: 26.5 MG/L (ref 0–8.2)
D DIMER PPP IA.FEU-MCNC: 0.32 MG/L FEU
DIFFERENTIAL METHOD: ABNORMAL
EOSINOPHIL # BLD AUTO: 0 K/UL (ref 0–0.5)
EOSINOPHIL NFR BLD: 0 % (ref 0–8)
ERYTHROCYTE [DISTWIDTH] IN BLOOD BY AUTOMATED COUNT: 13.2 % (ref 11.5–14.5)
EST. GFR  (AFRICAN AMERICAN): >60 ML/MIN/1.73 M^2
EST. GFR  (NON AFRICAN AMERICAN): >60 ML/MIN/1.73 M^2
GLUCOSE SERPL-MCNC: 108 MG/DL (ref 70–110)
HCT VFR BLD AUTO: 39.8 % (ref 37–48.5)
HGB BLD-MCNC: 13 G/DL (ref 12–16)
IMM GRANULOCYTES # BLD AUTO: 0.02 K/UL (ref 0–0.04)
IMM GRANULOCYTES NFR BLD AUTO: 0.4 % (ref 0–0.5)
LYMPHOCYTES # BLD AUTO: 0.8 K/UL (ref 1–4.8)
LYMPHOCYTES NFR BLD: 14.5 % (ref 18–48)
MAGNESIUM SERPL-MCNC: 1.9 MG/DL (ref 1.6–2.6)
MCH RBC QN AUTO: 29.2 PG (ref 27–31)
MCHC RBC AUTO-ENTMCNC: 32.7 G/DL (ref 32–36)
MCV RBC AUTO: 89 FL (ref 82–98)
MONOCYTES # BLD AUTO: 0.8 K/UL (ref 0.3–1)
MONOCYTES NFR BLD: 14.3 % (ref 4–15)
NEUTROPHILS # BLD AUTO: 3.8 K/UL (ref 1.8–7.7)
NEUTROPHILS NFR BLD: 70.8 % (ref 38–73)
NRBC BLD-RTO: 0 /100 WBC
PHOSPHATE SERPL-MCNC: 3.7 MG/DL (ref 2.7–4.5)
PLATELET # BLD AUTO: 269 K/UL (ref 150–450)
PMV BLD AUTO: 11.2 FL (ref 9.2–12.9)
POTASSIUM SERPL-SCNC: 3.9 MMOL/L (ref 3.5–5.1)
PROT SERPL-MCNC: 7.1 G/DL (ref 6–8.4)
RBC # BLD AUTO: 4.45 M/UL (ref 4–5.4)
SODIUM SERPL-SCNC: 134 MMOL/L (ref 136–145)
WBC # BLD AUTO: 5.3 K/UL (ref 3.9–12.7)

## 2021-07-26 PROCEDURE — 11000001 HC ACUTE MED/SURG PRIVATE ROOM

## 2021-07-26 PROCEDURE — 80053 COMPREHEN METABOLIC PANEL: CPT | Performed by: PHYSICIAN ASSISTANT

## 2021-07-26 PROCEDURE — 94761 N-INVAS EAR/PLS OXIMETRY MLT: CPT

## 2021-07-26 PROCEDURE — 94640 AIRWAY INHALATION TREATMENT: CPT

## 2021-07-26 PROCEDURE — 25000242 PHARM REV CODE 250 ALT 637 W/ HCPCS: Performed by: PHYSICIAN ASSISTANT

## 2021-07-26 PROCEDURE — 83735 ASSAY OF MAGNESIUM: CPT | Performed by: PHYSICIAN ASSISTANT

## 2021-07-26 PROCEDURE — 85379 FIBRIN DEGRADATION QUANT: CPT | Performed by: PHYSICIAN ASSISTANT

## 2021-07-26 PROCEDURE — 27000221 HC OXYGEN, UP TO 24 HOURS

## 2021-07-26 PROCEDURE — 94799 UNLISTED PULMONARY SVC/PX: CPT

## 2021-07-26 PROCEDURE — 25000003 PHARM REV CODE 250: Performed by: INTERNAL MEDICINE

## 2021-07-26 PROCEDURE — 63600175 PHARM REV CODE 636 W HCPCS: Performed by: PHYSICIAN ASSISTANT

## 2021-07-26 PROCEDURE — 36415 COLL VENOUS BLD VENIPUNCTURE: CPT | Performed by: PHYSICIAN ASSISTANT

## 2021-07-26 PROCEDURE — 85025 COMPLETE CBC W/AUTO DIFF WBC: CPT | Performed by: PHYSICIAN ASSISTANT

## 2021-07-26 PROCEDURE — 25000003 PHARM REV CODE 250: Performed by: PHYSICIAN ASSISTANT

## 2021-07-26 PROCEDURE — 84100 ASSAY OF PHOSPHORUS: CPT | Performed by: PHYSICIAN ASSISTANT

## 2021-07-26 PROCEDURE — 99900035 HC TECH TIME PER 15 MIN (STAT)

## 2021-07-26 RX ADMIN — FUROSEMIDE 40 MG: 40 TABLET ORAL at 04:07

## 2021-07-26 RX ADMIN — ACETAMINOPHEN 500 MG: 500 TABLET, FILM COATED ORAL at 09:07

## 2021-07-26 RX ADMIN — REMDESIVIR 100 MG: 100 INJECTION, POWDER, LYOPHILIZED, FOR SOLUTION INTRAVENOUS at 12:07

## 2021-07-26 RX ADMIN — ENOXAPARIN SODIUM 40 MG: 40 INJECTION SUBCUTANEOUS at 04:07

## 2021-07-26 RX ADMIN — BENZONATATE 100 MG: 100 CAPSULE ORAL at 04:07

## 2021-07-26 RX ADMIN — Medication 1 TABLET: at 08:07

## 2021-07-26 RX ADMIN — OXYCODONE HYDROCHLORIDE AND ACETAMINOPHEN 500 MG: 500 TABLET ORAL at 08:07

## 2021-07-26 RX ADMIN — ALBUTEROL SULFATE 2 PUFF: 90 AEROSOL, METERED RESPIRATORY (INHALATION) at 09:07

## 2021-07-26 RX ADMIN — OXYCODONE HYDROCHLORIDE AND ACETAMINOPHEN 500 MG: 500 TABLET ORAL at 09:07

## 2021-07-26 RX ADMIN — ALBUTEROL SULFATE 2 PUFF: 90 AEROSOL, METERED RESPIRATORY (INHALATION) at 02:07

## 2021-07-26 RX ADMIN — MELATONIN TAB 3 MG 6 MG: 3 TAB at 09:07

## 2021-07-26 RX ADMIN — ACETAMINOPHEN 500 MG: 500 TABLET, FILM COATED ORAL at 08:07

## 2021-07-26 RX ADMIN — CARVEDILOL 3.12 MG: 3.12 TABLET, FILM COATED ORAL at 04:07

## 2021-07-26 RX ADMIN — CARVEDILOL 3.12 MG: 3.12 TABLET, FILM COATED ORAL at 08:07

## 2021-07-26 RX ADMIN — DEXAMETHASONE 6 MG: 4 TABLET ORAL at 08:07

## 2021-07-26 RX ADMIN — GUAIFENESIN AND DEXTROMETHORPHAN HYDROBROMIDE 1 TABLET: 600; 30 TABLET, EXTENDED RELEASE ORAL at 06:07

## 2021-07-26 RX ADMIN — ALBUTEROL SULFATE 2 PUFF: 90 AEROSOL, METERED RESPIRATORY (INHALATION) at 08:07

## 2021-07-27 LAB
ALBUMIN SERPL BCP-MCNC: 3.1 G/DL (ref 3.5–5.2)
ALP SERPL-CCNC: 36 U/L (ref 55–135)
ALT SERPL W/O P-5'-P-CCNC: 27 U/L (ref 10–44)
ANION GAP SERPL CALC-SCNC: 8 MMOL/L (ref 8–16)
AST SERPL-CCNC: 35 U/L (ref 10–40)
BASOPHILS # BLD AUTO: 0.01 K/UL (ref 0–0.2)
BASOPHILS NFR BLD: 0.1 % (ref 0–1.9)
BILIRUB SERPL-MCNC: 0.3 MG/DL (ref 0.1–1)
BUN SERPL-MCNC: 16 MG/DL (ref 6–20)
CALCIUM SERPL-MCNC: 8.4 MG/DL (ref 8.7–10.5)
CHLORIDE SERPL-SCNC: 105 MMOL/L (ref 95–110)
CO2 SERPL-SCNC: 21 MMOL/L (ref 23–29)
CREAT SERPL-MCNC: 0.7 MG/DL (ref 0.5–1.4)
DIFFERENTIAL METHOD: ABNORMAL
EOSINOPHIL # BLD AUTO: 0 K/UL (ref 0–0.5)
EOSINOPHIL NFR BLD: 0 % (ref 0–8)
ERYTHROCYTE [DISTWIDTH] IN BLOOD BY AUTOMATED COUNT: 13.2 % (ref 11.5–14.5)
EST. GFR  (AFRICAN AMERICAN): >60 ML/MIN/1.73 M^2
EST. GFR  (NON AFRICAN AMERICAN): >60 ML/MIN/1.73 M^2
GLUCOSE SERPL-MCNC: 93 MG/DL (ref 70–110)
HCT VFR BLD AUTO: 38 % (ref 37–48.5)
HGB BLD-MCNC: 12.9 G/DL (ref 12–16)
IMM GRANULOCYTES # BLD AUTO: 0.05 K/UL (ref 0–0.04)
IMM GRANULOCYTES NFR BLD AUTO: 0.6 % (ref 0–0.5)
LYMPHOCYTES # BLD AUTO: 1.1 K/UL (ref 1–4.8)
LYMPHOCYTES NFR BLD: 12.5 % (ref 18–48)
MAGNESIUM SERPL-MCNC: 1.9 MG/DL (ref 1.6–2.6)
MCH RBC QN AUTO: 30.1 PG (ref 27–31)
MCHC RBC AUTO-ENTMCNC: 33.9 G/DL (ref 32–36)
MCV RBC AUTO: 89 FL (ref 82–98)
MONOCYTES # BLD AUTO: 0.8 K/UL (ref 0.3–1)
MONOCYTES NFR BLD: 9.4 % (ref 4–15)
NEUTROPHILS # BLD AUTO: 6.6 K/UL (ref 1.8–7.7)
NEUTROPHILS NFR BLD: 77.4 % (ref 38–73)
NRBC BLD-RTO: 0 /100 WBC
PHOSPHATE SERPL-MCNC: 3.9 MG/DL (ref 2.7–4.5)
PLATELET # BLD AUTO: 268 K/UL (ref 150–450)
PMV BLD AUTO: 11.2 FL (ref 9.2–12.9)
POTASSIUM SERPL-SCNC: 3.6 MMOL/L (ref 3.5–5.1)
PROT SERPL-MCNC: 6.9 G/DL (ref 6–8.4)
RBC # BLD AUTO: 4.29 M/UL (ref 4–5.4)
SODIUM SERPL-SCNC: 134 MMOL/L (ref 136–145)
WBC # BLD AUTO: 8.48 K/UL (ref 3.9–12.7)

## 2021-07-27 PROCEDURE — 83735 ASSAY OF MAGNESIUM: CPT | Performed by: PHYSICIAN ASSISTANT

## 2021-07-27 PROCEDURE — 25000242 PHARM REV CODE 250 ALT 637 W/ HCPCS: Performed by: PHYSICIAN ASSISTANT

## 2021-07-27 PROCEDURE — 84100 ASSAY OF PHOSPHORUS: CPT | Performed by: PHYSICIAN ASSISTANT

## 2021-07-27 PROCEDURE — 25000003 PHARM REV CODE 250: Performed by: INTERNAL MEDICINE

## 2021-07-27 PROCEDURE — 94640 AIRWAY INHALATION TREATMENT: CPT

## 2021-07-27 PROCEDURE — 36415 COLL VENOUS BLD VENIPUNCTURE: CPT | Performed by: PHYSICIAN ASSISTANT

## 2021-07-27 PROCEDURE — 63600175 PHARM REV CODE 636 W HCPCS: Performed by: PHYSICIAN ASSISTANT

## 2021-07-27 PROCEDURE — 11000001 HC ACUTE MED/SURG PRIVATE ROOM

## 2021-07-27 PROCEDURE — 80053 COMPREHEN METABOLIC PANEL: CPT | Performed by: PHYSICIAN ASSISTANT

## 2021-07-27 PROCEDURE — 94761 N-INVAS EAR/PLS OXIMETRY MLT: CPT

## 2021-07-27 PROCEDURE — 25000003 PHARM REV CODE 250: Performed by: PHYSICIAN ASSISTANT

## 2021-07-27 PROCEDURE — 85025 COMPLETE CBC W/AUTO DIFF WBC: CPT | Performed by: PHYSICIAN ASSISTANT

## 2021-07-27 RX ORDER — ALBUTEROL SULFATE 90 UG/1
2 AEROSOL, METERED RESPIRATORY (INHALATION)
Status: DISCONTINUED | OUTPATIENT
Start: 2021-07-27 | End: 2021-07-28

## 2021-07-27 RX ADMIN — BENZONATATE 100 MG: 100 CAPSULE ORAL at 08:07

## 2021-07-27 RX ADMIN — GUAIFENESIN AND DEXTROMETHORPHAN HYDROBROMIDE 1 TABLET: 600; 30 TABLET, EXTENDED RELEASE ORAL at 08:07

## 2021-07-27 RX ADMIN — LISINOPRIL 5 MG: 5 TABLET ORAL at 08:07

## 2021-07-27 RX ADMIN — CARVEDILOL 3.12 MG: 3.12 TABLET, FILM COATED ORAL at 08:07

## 2021-07-27 RX ADMIN — ALBUTEROL SULFATE 2 PUFF: 90 AEROSOL, METERED RESPIRATORY (INHALATION) at 08:07

## 2021-07-27 RX ADMIN — ENOXAPARIN SODIUM 40 MG: 40 INJECTION SUBCUTANEOUS at 04:07

## 2021-07-27 RX ADMIN — OXYCODONE HYDROCHLORIDE AND ACETAMINOPHEN 500 MG: 500 TABLET ORAL at 08:07

## 2021-07-27 RX ADMIN — REMDESIVIR 100 MG: 100 INJECTION, POWDER, LYOPHILIZED, FOR SOLUTION INTRAVENOUS at 12:07

## 2021-07-27 RX ADMIN — CARVEDILOL 3.12 MG: 3.12 TABLET, FILM COATED ORAL at 04:07

## 2021-07-27 RX ADMIN — BENZONATATE 100 MG: 100 CAPSULE ORAL at 04:07

## 2021-07-27 RX ADMIN — DEXAMETHASONE 6 MG: 4 TABLET ORAL at 08:07

## 2021-07-27 RX ADMIN — Medication 1 TABLET: at 08:07

## 2021-07-27 RX ADMIN — ALBUTEROL SULFATE 2 PUFF: 90 AEROSOL, METERED RESPIRATORY (INHALATION) at 01:07

## 2021-07-27 RX ADMIN — FUROSEMIDE 40 MG: 40 TABLET ORAL at 04:07

## 2021-07-27 RX ADMIN — MELATONIN TAB 3 MG 6 MG: 3 TAB at 08:07

## 2021-07-28 ENCOUNTER — TELEPHONE (OUTPATIENT)
Dept: ADMINISTRATIVE | Facility: CLINIC | Age: 36
End: 2021-07-28

## 2021-07-28 VITALS
SYSTOLIC BLOOD PRESSURE: 107 MMHG | HEART RATE: 71 BPM | HEIGHT: 68 IN | TEMPERATURE: 98 F | BODY MASS INDEX: 26.9 KG/M2 | DIASTOLIC BLOOD PRESSURE: 71 MMHG | RESPIRATION RATE: 17 BRPM | WEIGHT: 177.5 LBS | OXYGEN SATURATION: 98 %

## 2021-07-28 LAB
ALBUMIN SERPL BCP-MCNC: 3.1 G/DL (ref 3.5–5.2)
ALP SERPL-CCNC: 34 U/L (ref 55–135)
ALT SERPL W/O P-5'-P-CCNC: 30 U/L (ref 10–44)
ANION GAP SERPL CALC-SCNC: 9 MMOL/L (ref 8–16)
AST SERPL-CCNC: 32 U/L (ref 10–40)
BASOPHILS # BLD AUTO: 0 K/UL (ref 0–0.2)
BASOPHILS NFR BLD: 0 % (ref 0–1.9)
BILIRUB SERPL-MCNC: 0.3 MG/DL (ref 0.1–1)
BUN SERPL-MCNC: 15 MG/DL (ref 6–20)
CALCIUM SERPL-MCNC: 8.3 MG/DL (ref 8.7–10.5)
CHLORIDE SERPL-SCNC: 105 MMOL/L (ref 95–110)
CO2 SERPL-SCNC: 23 MMOL/L (ref 23–29)
CREAT SERPL-MCNC: 0.7 MG/DL (ref 0.5–1.4)
CRP SERPL-MCNC: 3.6 MG/L (ref 0–8.2)
DIFFERENTIAL METHOD: ABNORMAL
EOSINOPHIL # BLD AUTO: 0 K/UL (ref 0–0.5)
EOSINOPHIL NFR BLD: 0 % (ref 0–8)
ERYTHROCYTE [DISTWIDTH] IN BLOOD BY AUTOMATED COUNT: 13.2 % (ref 11.5–14.5)
EST. GFR  (AFRICAN AMERICAN): >60 ML/MIN/1.73 M^2
EST. GFR  (NON AFRICAN AMERICAN): >60 ML/MIN/1.73 M^2
GLUCOSE SERPL-MCNC: 83 MG/DL (ref 70–110)
HCT VFR BLD AUTO: 37.9 % (ref 37–48.5)
HGB BLD-MCNC: 12.7 G/DL (ref 12–16)
IMM GRANULOCYTES # BLD AUTO: 0.02 K/UL (ref 0–0.04)
IMM GRANULOCYTES NFR BLD AUTO: 0.2 % (ref 0–0.5)
LYMPHOCYTES # BLD AUTO: 1.4 K/UL (ref 1–4.8)
LYMPHOCYTES NFR BLD: 16.4 % (ref 18–48)
MAGNESIUM SERPL-MCNC: 2 MG/DL (ref 1.6–2.6)
MCH RBC QN AUTO: 29.6 PG (ref 27–31)
MCHC RBC AUTO-ENTMCNC: 33.5 G/DL (ref 32–36)
MCV RBC AUTO: 88 FL (ref 82–98)
MONOCYTES # BLD AUTO: 0.9 K/UL (ref 0.3–1)
MONOCYTES NFR BLD: 10.7 % (ref 4–15)
NEUTROPHILS # BLD AUTO: 6.1 K/UL (ref 1.8–7.7)
NEUTROPHILS NFR BLD: 72.7 % (ref 38–73)
NRBC BLD-RTO: 0 /100 WBC
PHOSPHATE SERPL-MCNC: 3.5 MG/DL (ref 2.7–4.5)
PLATELET # BLD AUTO: 268 K/UL (ref 150–450)
PMV BLD AUTO: 11.2 FL (ref 9.2–12.9)
POTASSIUM SERPL-SCNC: 3.5 MMOL/L (ref 3.5–5.1)
PROT SERPL-MCNC: 6.9 G/DL (ref 6–8.4)
RBC # BLD AUTO: 4.29 M/UL (ref 4–5.4)
SODIUM SERPL-SCNC: 137 MMOL/L (ref 136–145)
WBC # BLD AUTO: 8.41 K/UL (ref 3.9–12.7)

## 2021-07-28 PROCEDURE — 36415 COLL VENOUS BLD VENIPUNCTURE: CPT | Performed by: PHYSICIAN ASSISTANT

## 2021-07-28 PROCEDURE — 25000003 PHARM REV CODE 250: Performed by: PHYSICIAN ASSISTANT

## 2021-07-28 PROCEDURE — 83735 ASSAY OF MAGNESIUM: CPT | Performed by: PHYSICIAN ASSISTANT

## 2021-07-28 PROCEDURE — 80053 COMPREHEN METABOLIC PANEL: CPT | Performed by: PHYSICIAN ASSISTANT

## 2021-07-28 PROCEDURE — 85025 COMPLETE CBC W/AUTO DIFF WBC: CPT | Performed by: PHYSICIAN ASSISTANT

## 2021-07-28 PROCEDURE — 25000003 PHARM REV CODE 250: Performed by: INTERNAL MEDICINE

## 2021-07-28 PROCEDURE — 84100 ASSAY OF PHOSPHORUS: CPT | Performed by: PHYSICIAN ASSISTANT

## 2021-07-28 PROCEDURE — 94640 AIRWAY INHALATION TREATMENT: CPT

## 2021-07-28 PROCEDURE — 94799 UNLISTED PULMONARY SVC/PX: CPT

## 2021-07-28 PROCEDURE — 25000242 PHARM REV CODE 250 ALT 637 W/ HCPCS: Performed by: PHYSICIAN ASSISTANT

## 2021-07-28 PROCEDURE — 63600175 PHARM REV CODE 636 W HCPCS: Performed by: PHYSICIAN ASSISTANT

## 2021-07-28 PROCEDURE — 86140 C-REACTIVE PROTEIN: CPT | Performed by: PHYSICIAN ASSISTANT

## 2021-07-28 RX ORDER — CARVEDILOL 3.12 MG/1
3.12 TABLET ORAL 2 TIMES DAILY WITH MEALS
Qty: 60 TABLET | Refills: 0 | Status: SHIPPED | OUTPATIENT
Start: 2021-07-28 | End: 2021-08-23 | Stop reason: SDUPTHER

## 2021-07-28 RX ORDER — ALBUTEROL SULFATE 90 UG/1
2 AEROSOL, METERED RESPIRATORY (INHALATION) EVERY 12 HOURS
Status: DISCONTINUED | OUTPATIENT
Start: 2021-07-28 | End: 2021-07-28 | Stop reason: HOSPADM

## 2021-07-28 RX ORDER — LISINOPRIL 5 MG/1
5 TABLET ORAL DAILY
Qty: 30 TABLET | Refills: 0 | Status: SHIPPED | OUTPATIENT
Start: 2021-07-28 | End: 2021-08-23 | Stop reason: SDUPTHER

## 2021-07-28 RX ADMIN — Medication 1 TABLET: at 08:07

## 2021-07-28 RX ADMIN — ALBUTEROL SULFATE 2 PUFF: 90 AEROSOL, METERED RESPIRATORY (INHALATION) at 07:07

## 2021-07-28 RX ADMIN — LISINOPRIL 5 MG: 5 TABLET ORAL at 08:07

## 2021-07-28 RX ADMIN — REMDESIVIR 100 MG: 100 INJECTION, POWDER, LYOPHILIZED, FOR SOLUTION INTRAVENOUS at 11:07

## 2021-07-28 RX ADMIN — OXYCODONE HYDROCHLORIDE AND ACETAMINOPHEN 500 MG: 500 TABLET ORAL at 08:07

## 2021-07-28 RX ADMIN — DEXAMETHASONE 6 MG: 4 TABLET ORAL at 08:07

## 2021-07-28 RX ADMIN — GUAIFENESIN AND DEXTROMETHORPHAN HYDROBROMIDE 1 TABLET: 600; 30 TABLET, EXTENDED RELEASE ORAL at 08:07

## 2021-07-28 RX ADMIN — CARVEDILOL 3.12 MG: 3.12 TABLET, FILM COATED ORAL at 08:07

## 2021-07-28 RX ADMIN — BENZONATATE 100 MG: 100 CAPSULE ORAL at 08:07

## 2021-07-29 ENCOUNTER — NURSE TRIAGE (OUTPATIENT)
Dept: ADMINISTRATIVE | Facility: CLINIC | Age: 36
End: 2021-07-29

## 2021-08-08 ENCOUNTER — HOSPITAL ENCOUNTER (EMERGENCY)
Facility: HOSPITAL | Age: 36
Discharge: HOME | End: 2021-08-08
Attending: EMERGENCY MEDICINE
Payer: MEDICARE

## 2021-08-08 VITALS
SYSTOLIC BLOOD PRESSURE: 121 MMHG | DIASTOLIC BLOOD PRESSURE: 74 MMHG | RESPIRATION RATE: 19 BRPM | BODY MASS INDEX: 26.37 KG/M2 | WEIGHT: 174 LBS | OXYGEN SATURATION: 96 % | TEMPERATURE: 98 F | HEART RATE: 61 BPM | HEIGHT: 68 IN

## 2021-08-08 DIAGNOSIS — R07.9 CHEST PAIN, UNSPECIFIED TYPE: Primary | ICD-10-CM

## 2021-08-08 LAB
ALBUMIN SERPL BCP-MCNC: 3.4 G/DL (ref 3.5–5.2)
ALP SERPL-CCNC: 42 U/L (ref 55–135)
ALT SERPL W/O P-5'-P-CCNC: 17 U/L (ref 10–44)
ANION GAP SERPL CALC-SCNC: 7 MMOL/L (ref 8–16)
APTT BLDCRRT: 25.8 SEC (ref 21–32)
AST SERPL-CCNC: 15 U/L (ref 10–40)
B-HCG UR QL: NEGATIVE
BACTERIA #/AREA URNS HPF: ABNORMAL /HPF
BASOPHILS # BLD AUTO: 0.03 K/UL (ref 0–0.2)
BASOPHILS NFR BLD: 0.4 % (ref 0–1.9)
BILIRUB SERPL-MCNC: 0.4 MG/DL (ref 0.1–1)
BILIRUB UR QL STRIP: NEGATIVE
BNP SERPL-MCNC: 272 PG/ML (ref 0–99)
BUN SERPL-MCNC: 11 MG/DL (ref 6–20)
CALCIUM SERPL-MCNC: 9 MG/DL (ref 8.7–10.5)
CHLORIDE SERPL-SCNC: 109 MMOL/L (ref 95–110)
CLARITY UR: CLEAR
CO2 SERPL-SCNC: 23 MMOL/L (ref 23–29)
COLOR UR: YELLOW
CREAT SERPL-MCNC: 0.7 MG/DL (ref 0.5–1.4)
CRP SERPL-MCNC: 2.2 MG/L (ref 0–8.2)
CTP QC/QA: YES
CTP QC/QA: YES
D DIMER PPP IA.FEU-MCNC: 0.44 MG/L FEU
DIFFERENTIAL METHOD: ABNORMAL
EOSINOPHIL # BLD AUTO: 0.1 K/UL (ref 0–0.5)
EOSINOPHIL NFR BLD: 0.6 % (ref 0–8)
ERYTHROCYTE [DISTWIDTH] IN BLOOD BY AUTOMATED COUNT: 13.2 % (ref 11.5–14.5)
ERYTHROCYTE [SEDIMENTATION RATE] IN BLOOD BY WESTERGREN METHOD: 58 MM/HR (ref 0–20)
EST. GFR  (AFRICAN AMERICAN): >60 ML/MIN/1.73 M^2
EST. GFR  (NON AFRICAN AMERICAN): >60 ML/MIN/1.73 M^2
GLUCOSE SERPL-MCNC: 97 MG/DL (ref 70–110)
GLUCOSE UR QL STRIP: NEGATIVE
HCT VFR BLD AUTO: 33.9 % (ref 37–48.5)
HGB BLD-MCNC: 11.5 G/DL (ref 12–16)
HGB UR QL STRIP: ABNORMAL
HYALINE CASTS #/AREA URNS LPF: 4 /LPF
IMM GRANULOCYTES # BLD AUTO: 0.03 K/UL (ref 0–0.04)
IMM GRANULOCYTES NFR BLD AUTO: 0.4 % (ref 0–0.5)
KETONES UR QL STRIP: NEGATIVE
LEUKOCYTE ESTERASE UR QL STRIP: NEGATIVE
LIPASE SERPL-CCNC: 23 U/L (ref 4–60)
LYMPHOCYTES # BLD AUTO: 1.3 K/UL (ref 1–4.8)
LYMPHOCYTES NFR BLD: 17 % (ref 18–48)
MCH RBC QN AUTO: 30.5 PG (ref 27–31)
MCHC RBC AUTO-ENTMCNC: 33.9 G/DL (ref 32–36)
MCV RBC AUTO: 90 FL (ref 82–98)
MICROSCOPIC COMMENT: ABNORMAL
MONOCYTES # BLD AUTO: 0.7 K/UL (ref 0.3–1)
MONOCYTES NFR BLD: 8.5 % (ref 4–15)
NEUTROPHILS # BLD AUTO: 5.8 K/UL (ref 1.8–7.7)
NEUTROPHILS NFR BLD: 73.1 % (ref 38–73)
NITRITE UR QL STRIP: NEGATIVE
NRBC BLD-RTO: 0 /100 WBC
PH UR STRIP: 6 [PH] (ref 5–8)
PLATELET # BLD AUTO: 320 K/UL (ref 150–450)
PMV BLD AUTO: 9.7 FL (ref 9.2–12.9)
POTASSIUM SERPL-SCNC: 4.1 MMOL/L (ref 3.5–5.1)
PROT SERPL-MCNC: 7.4 G/DL (ref 6–8.4)
PROT UR QL STRIP: ABNORMAL
RBC # BLD AUTO: 3.77 M/UL (ref 4–5.4)
RBC #/AREA URNS HPF: 4 /HPF (ref 0–4)
SARS-COV-2 RDRP RESP QL NAA+PROBE: NEGATIVE
SODIUM SERPL-SCNC: 139 MMOL/L (ref 136–145)
SP GR UR STRIP: 1.02 (ref 1–1.03)
SQUAMOUS #/AREA URNS HPF: 2 /HPF
TROPONIN I SERPL DL<=0.01 NG/ML-MCNC: 0.01 NG/ML (ref 0–0.03)
TROPONIN I SERPL DL<=0.01 NG/ML-MCNC: <0.006 NG/ML (ref 0–0.03)
URN SPEC COLLECT METH UR: ABNORMAL
UROBILINOGEN UR STRIP-ACNC: NEGATIVE EU/DL
WBC # BLD AUTO: 7.87 K/UL (ref 3.9–12.7)

## 2021-08-08 PROCEDURE — 85025 COMPLETE CBC W/AUTO DIFF WBC: CPT | Performed by: EMERGENCY MEDICINE

## 2021-08-08 PROCEDURE — 85379 FIBRIN DEGRADATION QUANT: CPT | Performed by: EMERGENCY MEDICINE

## 2021-08-08 PROCEDURE — 83690 ASSAY OF LIPASE: CPT | Performed by: EMERGENCY MEDICINE

## 2021-08-08 PROCEDURE — 99284 EMERGENCY DEPT VISIT MOD MDM: CPT | Mod: 25

## 2021-08-08 PROCEDURE — 96375 TX/PRO/DX INJ NEW DRUG ADDON: CPT

## 2021-08-08 PROCEDURE — 83880 ASSAY OF NATRIURETIC PEPTIDE: CPT | Performed by: EMERGENCY MEDICINE

## 2021-08-08 PROCEDURE — 81000 URINALYSIS NONAUTO W/SCOPE: CPT | Performed by: EMERGENCY MEDICINE

## 2021-08-08 PROCEDURE — 25000242 PHARM REV CODE 250 ALT 637 W/ HCPCS: Performed by: EMERGENCY MEDICINE

## 2021-08-08 PROCEDURE — 85730 THROMBOPLASTIN TIME PARTIAL: CPT | Performed by: EMERGENCY MEDICINE

## 2021-08-08 PROCEDURE — 96374 THER/PROPH/DIAG INJ IV PUSH: CPT

## 2021-08-08 PROCEDURE — 81025 URINE PREGNANCY TEST: CPT | Performed by: EMERGENCY MEDICINE

## 2021-08-08 PROCEDURE — 25000003 PHARM REV CODE 250: Performed by: EMERGENCY MEDICINE

## 2021-08-08 PROCEDURE — 86140 C-REACTIVE PROTEIN: CPT | Performed by: EMERGENCY MEDICINE

## 2021-08-08 PROCEDURE — 63600175 PHARM REV CODE 636 W HCPCS: Performed by: EMERGENCY MEDICINE

## 2021-08-08 PROCEDURE — U0002 COVID-19 LAB TEST NON-CDC: HCPCS | Performed by: EMERGENCY MEDICINE

## 2021-08-08 PROCEDURE — 80053 COMPREHEN METABOLIC PANEL: CPT | Performed by: EMERGENCY MEDICINE

## 2021-08-08 PROCEDURE — 84484 ASSAY OF TROPONIN QUANT: CPT | Mod: 91 | Performed by: EMERGENCY MEDICINE

## 2021-08-08 PROCEDURE — 85652 RBC SED RATE AUTOMATED: CPT | Performed by: EMERGENCY MEDICINE

## 2021-08-08 RX ORDER — ONDANSETRON 2 MG/ML
4 INJECTION INTRAMUSCULAR; INTRAVENOUS
Status: COMPLETED | OUTPATIENT
Start: 2021-08-08 | End: 2021-08-08

## 2021-08-08 RX ORDER — MORPHINE SULFATE 4 MG/ML
4 INJECTION, SOLUTION INTRAMUSCULAR; INTRAVENOUS ONCE AS NEEDED
Status: COMPLETED | OUTPATIENT
Start: 2021-08-08 | End: 2021-08-08

## 2021-08-08 RX ORDER — NITROGLYCERIN 0.4 MG/1
0.4 TABLET SUBLINGUAL
Status: COMPLETED | OUTPATIENT
Start: 2021-08-08 | End: 2021-08-08

## 2021-08-08 RX ORDER — KETOROLAC TROMETHAMINE 30 MG/ML
15 INJECTION, SOLUTION INTRAMUSCULAR; INTRAVENOUS
Status: COMPLETED | OUTPATIENT
Start: 2021-08-08 | End: 2021-08-08

## 2021-08-08 RX ORDER — ASPIRIN 325 MG
325 TABLET ORAL
Status: COMPLETED | OUTPATIENT
Start: 2021-08-08 | End: 2021-08-08

## 2021-08-08 RX ADMIN — NITROGLYCERIN 0.4 MG: 0.4 TABLET, ORALLY DISINTEGRATING SUBLINGUAL at 07:08

## 2021-08-08 RX ADMIN — ASPIRIN 325 MG ORAL TABLET 325 MG: 325 PILL ORAL at 07:08

## 2021-08-08 RX ADMIN — MORPHINE SULFATE 4 MG: 4 INJECTION INTRAVENOUS at 09:08

## 2021-08-08 RX ADMIN — ONDANSETRON 4 MG: 2 INJECTION INTRAMUSCULAR; INTRAVENOUS at 09:08

## 2021-08-08 RX ADMIN — LIDOCAINE HYDROCHLORIDE: 20 SOLUTION ORAL; TOPICAL at 08:08

## 2021-08-08 RX ADMIN — KETOROLAC TROMETHAMINE 15 MG: 30 INJECTION, SOLUTION INTRAMUSCULAR; INTRAVENOUS at 08:08

## 2021-08-12 ENCOUNTER — HOSPITAL ENCOUNTER (EMERGENCY)
Facility: HOSPITAL | Age: 36
Discharge: HOME | End: 2021-08-12
Attending: EMERGENCY MEDICINE
Payer: MEDICARE

## 2021-08-12 DIAGNOSIS — R07.9 CHEST PAIN: ICD-10-CM

## 2021-08-12 DIAGNOSIS — R55 SYNCOPE: ICD-10-CM

## 2021-08-12 LAB
ALBUMIN SERPL BCP-MCNC: 3.7 G/DL (ref 3.5–5.2)
ALP SERPL-CCNC: 50 U/L (ref 55–135)
ALT SERPL W/O P-5'-P-CCNC: 16 U/L (ref 10–44)
ANION GAP SERPL CALC-SCNC: 10 MMOL/L (ref 8–16)
AST SERPL-CCNC: 16 U/L (ref 10–40)
B-HCG UR QL: NEGATIVE
BASOPHILS # BLD AUTO: 0.06 K/UL (ref 0–0.2)
BASOPHILS NFR BLD: 0.8 % (ref 0–1.9)
BILIRUB SERPL-MCNC: 0.3 MG/DL (ref 0.1–1)
BNP SERPL-MCNC: 486 PG/ML (ref 0–99)
BUN SERPL-MCNC: 9 MG/DL (ref 6–20)
CALCIUM SERPL-MCNC: 9.6 MG/DL (ref 8.7–10.5)
CHLORIDE SERPL-SCNC: 104 MMOL/L (ref 95–110)
CO2 SERPL-SCNC: 26 MMOL/L (ref 23–29)
CREAT SERPL-MCNC: 0.7 MG/DL (ref 0.5–1.4)
CTP QC/QA: YES
DIFFERENTIAL METHOD: ABNORMAL
EOSINOPHIL # BLD AUTO: 0.1 K/UL (ref 0–0.5)
EOSINOPHIL NFR BLD: 1.6 % (ref 0–8)
ERYTHROCYTE [DISTWIDTH] IN BLOOD BY AUTOMATED COUNT: 13.1 % (ref 11.5–14.5)
EST. GFR  (AFRICAN AMERICAN): >60 ML/MIN/1.73 M^2
EST. GFR  (NON AFRICAN AMERICAN): >60 ML/MIN/1.73 M^2
GLUCOSE SERPL-MCNC: 100 MG/DL (ref 70–110)
HCT VFR BLD AUTO: 34.6 % (ref 37–48.5)
HGB BLD-MCNC: 11.4 G/DL (ref 12–16)
IMM GRANULOCYTES # BLD AUTO: 0.01 K/UL (ref 0–0.04)
IMM GRANULOCYTES NFR BLD AUTO: 0.1 % (ref 0–0.5)
LYMPHOCYTES # BLD AUTO: 1.5 K/UL (ref 1–4.8)
LYMPHOCYTES NFR BLD: 20.6 % (ref 18–48)
MAGNESIUM SERPL-MCNC: 1.9 MG/DL (ref 1.6–2.6)
MCH RBC QN AUTO: 30.2 PG (ref 27–31)
MCHC RBC AUTO-ENTMCNC: 32.9 G/DL (ref 32–36)
MCV RBC AUTO: 92 FL (ref 82–98)
MONOCYTES # BLD AUTO: 0.8 K/UL (ref 0.3–1)
MONOCYTES NFR BLD: 10.9 % (ref 4–15)
NEUTROPHILS # BLD AUTO: 4.9 K/UL (ref 1.8–7.7)
NEUTROPHILS NFR BLD: 66 % (ref 38–73)
NRBC BLD-RTO: 0 /100 WBC
PLATELET # BLD AUTO: 321 K/UL (ref 150–450)
PMV BLD AUTO: 10.1 FL (ref 9.2–12.9)
POCT GLUCOSE: 96 MG/DL (ref 70–110)
POTASSIUM SERPL-SCNC: 3.7 MMOL/L (ref 3.5–5.1)
PROT SERPL-MCNC: 7.9 G/DL (ref 6–8.4)
RBC # BLD AUTO: 3.77 M/UL (ref 4–5.4)
SODIUM SERPL-SCNC: 140 MMOL/L (ref 136–145)
TROPONIN I SERPL DL<=0.01 NG/ML-MCNC: <0.006 NG/ML (ref 0–0.03)
WBC # BLD AUTO: 7.41 K/UL (ref 3.9–12.7)

## 2021-08-12 PROCEDURE — 80053 COMPREHEN METABOLIC PANEL: CPT | Performed by: EMERGENCY MEDICINE

## 2021-08-12 PROCEDURE — 83735 ASSAY OF MAGNESIUM: CPT | Performed by: EMERGENCY MEDICINE

## 2021-08-12 PROCEDURE — 85025 COMPLETE CBC W/AUTO DIFF WBC: CPT | Performed by: EMERGENCY MEDICINE

## 2021-08-12 PROCEDURE — 99285 EMERGENCY DEPT VISIT HI MDM: CPT | Mod: 25

## 2021-08-12 PROCEDURE — 93010 EKG 12-LEAD: ICD-10-PCS | Mod: ,,, | Performed by: INTERNAL MEDICINE

## 2021-08-12 PROCEDURE — 82962 GLUCOSE BLOOD TEST: CPT

## 2021-08-12 PROCEDURE — 83880 ASSAY OF NATRIURETIC PEPTIDE: CPT | Performed by: EMERGENCY MEDICINE

## 2021-08-12 PROCEDURE — 93010 ELECTROCARDIOGRAM REPORT: CPT | Mod: ,,, | Performed by: INTERNAL MEDICINE

## 2021-08-12 PROCEDURE — 84484 ASSAY OF TROPONIN QUANT: CPT | Performed by: EMERGENCY MEDICINE

## 2021-08-12 PROCEDURE — 93005 ELECTROCARDIOGRAM TRACING: CPT

## 2021-08-12 PROCEDURE — 81025 URINE PREGNANCY TEST: CPT | Performed by: PHYSICIAN ASSISTANT

## 2021-08-13 ENCOUNTER — PATIENT MESSAGE (OUTPATIENT)
Dept: CARDIOLOGY | Facility: CLINIC | Age: 36
End: 2021-08-13

## 2021-08-13 VITALS
DIASTOLIC BLOOD PRESSURE: 87 MMHG | HEART RATE: 69 BPM | OXYGEN SATURATION: 100 % | BODY MASS INDEX: 26.37 KG/M2 | HEIGHT: 68 IN | SYSTOLIC BLOOD PRESSURE: 133 MMHG | TEMPERATURE: 98 F | WEIGHT: 174 LBS | RESPIRATION RATE: 19 BRPM

## 2021-08-16 ENCOUNTER — LAB VISIT (OUTPATIENT)
Dept: LAB | Facility: HOSPITAL | Age: 36
End: 2021-08-16
Attending: FAMILY MEDICINE
Payer: MEDICARE

## 2021-08-16 ENCOUNTER — OFFICE VISIT (OUTPATIENT)
Dept: FAMILY MEDICINE | Facility: CLINIC | Age: 36
End: 2021-08-16
Payer: MEDICARE

## 2021-08-16 VITALS
WEIGHT: 174.63 LBS | DIASTOLIC BLOOD PRESSURE: 84 MMHG | BODY MASS INDEX: 26.47 KG/M2 | SYSTOLIC BLOOD PRESSURE: 126 MMHG | TEMPERATURE: 98 F | HEART RATE: 88 BPM | HEIGHT: 68 IN | OXYGEN SATURATION: 96 %

## 2021-08-16 DIAGNOSIS — R79.9 ABNORMAL FINDING OF BLOOD CHEMISTRY, UNSPECIFIED: ICD-10-CM

## 2021-08-16 DIAGNOSIS — R53.83 FATIGUE, UNSPECIFIED TYPE: ICD-10-CM

## 2021-08-16 DIAGNOSIS — I42.8 NONISCHEMIC CARDIOMYOPATHY: ICD-10-CM

## 2021-08-16 DIAGNOSIS — Z13.6 ENCOUNTER FOR SCREENING FOR CARDIOVASCULAR DISORDERS: ICD-10-CM

## 2021-08-16 DIAGNOSIS — G44.019 EPISODIC CLUSTER HEADACHE, NOT INTRACTABLE: ICD-10-CM

## 2021-08-16 DIAGNOSIS — R55 SYNCOPE AND COLLAPSE: ICD-10-CM

## 2021-08-16 DIAGNOSIS — Z00.00 GENERAL MEDICAL EXAM: Primary | ICD-10-CM

## 2021-08-16 DIAGNOSIS — Z00.00 GENERAL MEDICAL EXAM: ICD-10-CM

## 2021-08-16 LAB
CHOLEST SERPL-MCNC: 164 MG/DL (ref 120–199)
CHOLEST/HDLC SERPL: 4.6 {RATIO} (ref 2–5)
HDLC SERPL-MCNC: 36 MG/DL (ref 40–75)
HDLC SERPL: 22 % (ref 20–50)
LDLC SERPL CALC-MCNC: 106.8 MG/DL (ref 63–159)
NONHDLC SERPL-MCNC: 128 MG/DL
TRIGL SERPL-MCNC: 106 MG/DL (ref 30–150)
TSH SERPL DL<=0.005 MIU/L-ACNC: 0.67 UIU/ML (ref 0.4–4)

## 2021-08-16 PROCEDURE — 83036 HEMOGLOBIN GLYCOSYLATED A1C: CPT | Performed by: FAMILY MEDICINE

## 2021-08-16 PROCEDURE — 80061 LIPID PANEL: CPT | Performed by: FAMILY MEDICINE

## 2021-08-16 PROCEDURE — 84443 ASSAY THYROID STIM HORMONE: CPT | Performed by: FAMILY MEDICINE

## 2021-08-16 PROCEDURE — 36415 COLL VENOUS BLD VENIPUNCTURE: CPT | Mod: PO | Performed by: FAMILY MEDICINE

## 2021-08-16 PROCEDURE — 99214 OFFICE O/P EST MOD 30 MIN: CPT | Mod: PBBFAC,PO | Performed by: FAMILY MEDICINE

## 2021-08-16 PROCEDURE — 99214 PR OFFICE/OUTPT VISIT, EST, LEVL IV, 30-39 MIN: ICD-10-PCS | Mod: S$PBB,,, | Performed by: FAMILY MEDICINE

## 2021-08-16 PROCEDURE — 99999 PR PBB SHADOW E&M-EST. PATIENT-LVL IV: CPT | Mod: PBBFAC,,, | Performed by: FAMILY MEDICINE

## 2021-08-16 PROCEDURE — 99999 PR PBB SHADOW E&M-EST. PATIENT-LVL IV: ICD-10-PCS | Mod: PBBFAC,,, | Performed by: FAMILY MEDICINE

## 2021-08-16 PROCEDURE — 99214 OFFICE O/P EST MOD 30 MIN: CPT | Mod: S$PBB,,, | Performed by: FAMILY MEDICINE

## 2021-08-17 ENCOUNTER — OFFICE VISIT (OUTPATIENT)
Dept: CARDIOLOGY | Facility: CLINIC | Age: 36
End: 2021-08-17
Payer: MEDICARE

## 2021-08-17 VITALS
DIASTOLIC BLOOD PRESSURE: 86 MMHG | OXYGEN SATURATION: 99 % | SYSTOLIC BLOOD PRESSURE: 118 MMHG | RESPIRATION RATE: 15 BRPM | HEIGHT: 68 IN | BODY MASS INDEX: 25.73 KG/M2 | HEART RATE: 91 BPM | WEIGHT: 169.75 LBS

## 2021-08-17 DIAGNOSIS — I10 ESSENTIAL HYPERTENSION: Chronic | ICD-10-CM

## 2021-08-17 DIAGNOSIS — R55 SYNCOPE, UNSPECIFIED SYNCOPE TYPE: Primary | ICD-10-CM

## 2021-08-17 DIAGNOSIS — Z95.810 ICD (IMPLANTABLE CARDIOVERTER-DEFIBRILLATOR), SINGLE, IN SITU: ICD-10-CM

## 2021-08-17 DIAGNOSIS — I42.8 NONISCHEMIC CARDIOMYOPATHY: Chronic | ICD-10-CM

## 2021-08-17 LAB
ESTIMATED AVG GLUCOSE: 108 MG/DL (ref 68–131)
HBA1C MFR BLD: 5.4 % (ref 4–5.6)

## 2021-08-17 PROCEDURE — 99214 OFFICE O/P EST MOD 30 MIN: CPT | Mod: S$PBB,,, | Performed by: INTERNAL MEDICINE

## 2021-08-17 PROCEDURE — 99213 OFFICE O/P EST LOW 20 MIN: CPT | Mod: PBBFAC | Performed by: INTERNAL MEDICINE

## 2021-08-17 PROCEDURE — 99214 PR OFFICE/OUTPT VISIT, EST, LEVL IV, 30-39 MIN: ICD-10-PCS | Mod: S$PBB,,, | Performed by: INTERNAL MEDICINE

## 2021-08-17 PROCEDURE — 99999 PR PBB SHADOW E&M-EST. PATIENT-LVL III: CPT | Mod: PBBFAC,,, | Performed by: INTERNAL MEDICINE

## 2021-08-17 PROCEDURE — 99999 PR PBB SHADOW E&M-EST. PATIENT-LVL III: ICD-10-PCS | Mod: PBBFAC,,, | Performed by: INTERNAL MEDICINE

## 2021-08-24 ENCOUNTER — OFFICE VISIT (OUTPATIENT)
Dept: FAMILY MEDICINE | Facility: CLINIC | Age: 36
End: 2021-08-24
Payer: MEDICARE

## 2021-08-24 VITALS
OXYGEN SATURATION: 98 % | SYSTOLIC BLOOD PRESSURE: 128 MMHG | HEART RATE: 86 BPM | TEMPERATURE: 100 F | BODY MASS INDEX: 26.02 KG/M2 | HEIGHT: 69 IN | DIASTOLIC BLOOD PRESSURE: 78 MMHG | WEIGHT: 175.69 LBS

## 2021-08-24 DIAGNOSIS — N64.4 BREAST TENDERNESS: Primary | ICD-10-CM

## 2021-08-24 DIAGNOSIS — N63.0 BREAST MASS: ICD-10-CM

## 2021-08-24 DIAGNOSIS — I42.8 NONISCHEMIC CARDIOMYOPATHY: ICD-10-CM

## 2021-08-24 PROCEDURE — 99999 PR PBB SHADOW E&M-EST. PATIENT-LVL III: CPT | Mod: PBBFAC,,, | Performed by: INTERNAL MEDICINE

## 2021-08-24 PROCEDURE — 99213 OFFICE O/P EST LOW 20 MIN: CPT | Mod: PBBFAC,PO | Performed by: INTERNAL MEDICINE

## 2021-08-24 PROCEDURE — 99214 PR OFFICE/OUTPT VISIT, EST, LEVL IV, 30-39 MIN: ICD-10-PCS | Mod: S$PBB,,, | Performed by: INTERNAL MEDICINE

## 2021-08-24 PROCEDURE — 99214 OFFICE O/P EST MOD 30 MIN: CPT | Mod: S$PBB,,, | Performed by: INTERNAL MEDICINE

## 2021-08-24 PROCEDURE — 99999 PR PBB SHADOW E&M-EST. PATIENT-LVL III: ICD-10-PCS | Mod: PBBFAC,,, | Performed by: INTERNAL MEDICINE

## 2021-08-24 RX ORDER — CLINDAMYCIN HYDROCHLORIDE 150 MG/1
150 CAPSULE ORAL EVERY 6 HOURS
Qty: 21 CAPSULE | Refills: 0 | Status: SHIPPED | OUTPATIENT
Start: 2021-08-24 | End: 2021-09-09 | Stop reason: ALTCHOICE

## 2021-08-24 RX ORDER — LISINOPRIL 5 MG/1
5 TABLET ORAL DAILY
Qty: 90 TABLET | Refills: 1 | Status: SHIPPED | OUTPATIENT
Start: 2021-08-24 | End: 2021-09-02 | Stop reason: SDUPTHER

## 2021-08-24 RX ORDER — CARVEDILOL 3.12 MG/1
3.12 TABLET ORAL 2 TIMES DAILY WITH MEALS
Qty: 180 TABLET | Refills: 1 | Status: SHIPPED | OUTPATIENT
Start: 2021-08-24 | End: 2021-09-02 | Stop reason: SDUPTHER

## 2021-08-25 ENCOUNTER — HOSPITAL ENCOUNTER (OUTPATIENT)
Dept: RADIOLOGY | Facility: HOSPITAL | Age: 36
Discharge: HOME | End: 2021-08-25
Attending: FAMILY MEDICINE
Payer: MEDICARE

## 2021-08-25 DIAGNOSIS — R55 SYNCOPE AND COLLAPSE: ICD-10-CM

## 2021-08-25 PROCEDURE — 70450 CT HEAD/BRAIN W/O DYE: CPT | Mod: TC

## 2021-08-25 PROCEDURE — 70450 CT HEAD WITHOUT CONTRAST: ICD-10-PCS | Mod: 26,,, | Performed by: RADIOLOGY

## 2021-08-25 PROCEDURE — 70450 CT HEAD/BRAIN W/O DYE: CPT | Mod: 26,,, | Performed by: RADIOLOGY

## 2021-09-01 ENCOUNTER — PATIENT MESSAGE (OUTPATIENT)
Dept: FAMILY MEDICINE | Facility: CLINIC | Age: 36
End: 2021-09-01

## 2021-09-02 RX ORDER — LISINOPRIL 5 MG/1
5 TABLET ORAL DAILY
Qty: 90 TABLET | Refills: 0 | Status: SHIPPED | OUTPATIENT
Start: 2021-09-02 | End: 2021-10-15

## 2021-09-02 RX ORDER — CARVEDILOL 3.12 MG/1
3.12 TABLET ORAL 2 TIMES DAILY WITH MEALS
Qty: 180 TABLET | Refills: 0 | Status: SHIPPED | OUTPATIENT
Start: 2021-09-02 | End: 2021-09-22 | Stop reason: SDUPTHER

## 2021-09-09 ENCOUNTER — OFFICE VISIT (OUTPATIENT)
Dept: OBSTETRICS AND GYNECOLOGY | Facility: CLINIC | Age: 36
End: 2021-09-09
Payer: MEDICARE

## 2021-09-09 VITALS
BODY MASS INDEX: 25.72 KG/M2 | WEIGHT: 174.19 LBS | SYSTOLIC BLOOD PRESSURE: 124 MMHG | DIASTOLIC BLOOD PRESSURE: 92 MMHG

## 2021-09-09 DIAGNOSIS — Z80.3 FAMILY HISTORY OF BREAST CANCER: ICD-10-CM

## 2021-09-09 DIAGNOSIS — N60.01 BENIGN BREAST CYST IN FEMALE, RIGHT: Primary | ICD-10-CM

## 2021-09-09 DIAGNOSIS — N92.0 MENORRHAGIA WITH REGULAR CYCLE: ICD-10-CM

## 2021-09-09 DIAGNOSIS — N94.6 DYSMENORRHEA: ICD-10-CM

## 2021-09-09 PROCEDURE — 99203 PR OFFICE/OUTPT VISIT, NEW, LEVL III, 30-44 MIN: ICD-10-PCS | Mod: S$PBB,,, | Performed by: OBSTETRICS & GYNECOLOGY

## 2021-09-09 PROCEDURE — 99999 PR PBB SHADOW E&M-EST. PATIENT-LVL II: CPT | Mod: PBBFAC,,, | Performed by: OBSTETRICS & GYNECOLOGY

## 2021-09-09 PROCEDURE — 99999 PR PBB SHADOW E&M-EST. PATIENT-LVL II: ICD-10-PCS | Mod: PBBFAC,,, | Performed by: OBSTETRICS & GYNECOLOGY

## 2021-09-09 PROCEDURE — 99203 OFFICE O/P NEW LOW 30 MIN: CPT | Mod: S$PBB,,, | Performed by: OBSTETRICS & GYNECOLOGY

## 2021-09-09 PROCEDURE — 99212 OFFICE O/P EST SF 10 MIN: CPT | Mod: PBBFAC | Performed by: OBSTETRICS & GYNECOLOGY

## 2021-09-15 ENCOUNTER — HOSPITAL ENCOUNTER (EMERGENCY)
Facility: HOSPITAL | Age: 36
Discharge: HOME | End: 2021-09-15
Attending: EMERGENCY MEDICINE
Payer: MEDICARE

## 2021-09-15 VITALS
WEIGHT: 178 LBS | HEART RATE: 85 BPM | OXYGEN SATURATION: 97 % | TEMPERATURE: 98 F | SYSTOLIC BLOOD PRESSURE: 126 MMHG | HEIGHT: 70 IN | RESPIRATION RATE: 17 BRPM | BODY MASS INDEX: 25.48 KG/M2 | DIASTOLIC BLOOD PRESSURE: 95 MMHG

## 2021-09-15 DIAGNOSIS — R06.02 SOB (SHORTNESS OF BREATH): Primary | ICD-10-CM

## 2021-09-15 DIAGNOSIS — R06.09 DYSPNEA ON EXERTION: ICD-10-CM

## 2021-09-15 LAB
ALBUMIN SERPL BCP-MCNC: 3.5 G/DL (ref 3.5–5.2)
ALP SERPL-CCNC: 50 U/L (ref 55–135)
ALT SERPL W/O P-5'-P-CCNC: 12 U/L (ref 10–44)
ANION GAP SERPL CALC-SCNC: 8 MMOL/L (ref 8–16)
AST SERPL-CCNC: 17 U/L (ref 10–40)
B-HCG UR QL: NEGATIVE
BASOPHILS # BLD AUTO: 0.04 K/UL (ref 0–0.2)
BASOPHILS NFR BLD: 0.5 % (ref 0–1.9)
BILIRUB SERPL-MCNC: 0.5 MG/DL (ref 0.1–1)
BNP SERPL-MCNC: 18 PG/ML (ref 0–99)
BUN SERPL-MCNC: 10 MG/DL (ref 6–20)
CALCIUM SERPL-MCNC: 9.3 MG/DL (ref 8.7–10.5)
CHLORIDE SERPL-SCNC: 107 MMOL/L (ref 95–110)
CO2 SERPL-SCNC: 23 MMOL/L (ref 23–29)
CREAT SERPL-MCNC: 0.7 MG/DL (ref 0.5–1.4)
CTP QC/QA: YES
DIFFERENTIAL METHOD: ABNORMAL
EOSINOPHIL # BLD AUTO: 0.1 K/UL (ref 0–0.5)
EOSINOPHIL NFR BLD: 1.3 % (ref 0–8)
ERYTHROCYTE [DISTWIDTH] IN BLOOD BY AUTOMATED COUNT: 13.2 % (ref 11.5–14.5)
EST. GFR  (AFRICAN AMERICAN): >60 ML/MIN/1.73 M^2
EST. GFR  (NON AFRICAN AMERICAN): >60 ML/MIN/1.73 M^2
GLUCOSE SERPL-MCNC: 92 MG/DL (ref 70–110)
HCT VFR BLD AUTO: 34.9 % (ref 37–48.5)
HGB BLD-MCNC: 11.4 G/DL (ref 12–16)
IMM GRANULOCYTES # BLD AUTO: 0.03 K/UL (ref 0–0.04)
IMM GRANULOCYTES NFR BLD AUTO: 0.4 % (ref 0–0.5)
LYMPHOCYTES # BLD AUTO: 1.3 K/UL (ref 1–4.8)
LYMPHOCYTES NFR BLD: 16.9 % (ref 18–48)
MCH RBC QN AUTO: 29.6 PG (ref 27–31)
MCHC RBC AUTO-ENTMCNC: 32.7 G/DL (ref 32–36)
MCV RBC AUTO: 91 FL (ref 82–98)
MONOCYTES # BLD AUTO: 0.6 K/UL (ref 0.3–1)
MONOCYTES NFR BLD: 7.8 % (ref 4–15)
NEUTROPHILS # BLD AUTO: 5.4 K/UL (ref 1.8–7.7)
NEUTROPHILS NFR BLD: 73.1 % (ref 38–73)
NRBC BLD-RTO: 0 /100 WBC
PLATELET # BLD AUTO: 318 K/UL (ref 150–450)
PMV BLD AUTO: 10.5 FL (ref 9.2–12.9)
POTASSIUM SERPL-SCNC: 3.7 MMOL/L (ref 3.5–5.1)
PROT SERPL-MCNC: 7.4 G/DL (ref 6–8.4)
RBC # BLD AUTO: 3.85 M/UL (ref 4–5.4)
SODIUM SERPL-SCNC: 138 MMOL/L (ref 136–145)
TROPONIN I SERPL DL<=0.01 NG/ML-MCNC: 0.03 NG/ML (ref 0–0.03)
WBC # BLD AUTO: 7.45 K/UL (ref 3.9–12.7)

## 2021-09-15 PROCEDURE — 93010 EKG 12-LEAD: ICD-10-PCS | Mod: ,,, | Performed by: INTERNAL MEDICINE

## 2021-09-15 PROCEDURE — 96375 TX/PRO/DX INJ NEW DRUG ADDON: CPT

## 2021-09-15 PROCEDURE — 83880 ASSAY OF NATRIURETIC PEPTIDE: CPT | Performed by: EMERGENCY MEDICINE

## 2021-09-15 PROCEDURE — 99285 EMERGENCY DEPT VISIT HI MDM: CPT | Mod: 25

## 2021-09-15 PROCEDURE — 96374 THER/PROPH/DIAG INJ IV PUSH: CPT

## 2021-09-15 PROCEDURE — 93005 ELECTROCARDIOGRAM TRACING: CPT

## 2021-09-15 PROCEDURE — 80053 COMPREHEN METABOLIC PANEL: CPT | Performed by: EMERGENCY MEDICINE

## 2021-09-15 PROCEDURE — 63600175 PHARM REV CODE 636 W HCPCS: Performed by: EMERGENCY MEDICINE

## 2021-09-15 PROCEDURE — 84484 ASSAY OF TROPONIN QUANT: CPT | Performed by: EMERGENCY MEDICINE

## 2021-09-15 PROCEDURE — 85025 COMPLETE CBC W/AUTO DIFF WBC: CPT | Performed by: EMERGENCY MEDICINE

## 2021-09-15 PROCEDURE — 93010 ELECTROCARDIOGRAM REPORT: CPT | Mod: ,,, | Performed by: INTERNAL MEDICINE

## 2021-09-15 RX ORDER — FUROSEMIDE 10 MG/ML
40 INJECTION INTRAMUSCULAR; INTRAVENOUS
Status: COMPLETED | OUTPATIENT
Start: 2021-09-15 | End: 2021-09-15

## 2021-09-15 RX ORDER — ONDANSETRON 2 MG/ML
4 INJECTION INTRAMUSCULAR; INTRAVENOUS
Status: COMPLETED | OUTPATIENT
Start: 2021-09-15 | End: 2021-09-15

## 2021-09-15 RX ADMIN — FUROSEMIDE 40 MG: 10 INJECTION, SOLUTION INTRAMUSCULAR; INTRAVENOUS at 09:09

## 2021-09-15 RX ADMIN — ONDANSETRON 4 MG: 2 INJECTION INTRAMUSCULAR; INTRAVENOUS at 11:09

## 2021-09-22 ENCOUNTER — OFFICE VISIT (OUTPATIENT)
Dept: CARDIOLOGY | Facility: CLINIC | Age: 36
End: 2021-09-22
Payer: MEDICARE

## 2021-09-22 VITALS
HEIGHT: 70 IN | BODY MASS INDEX: 24.94 KG/M2 | OXYGEN SATURATION: 99 % | DIASTOLIC BLOOD PRESSURE: 70 MMHG | RESPIRATION RATE: 15 BRPM | WEIGHT: 174.19 LBS | HEART RATE: 82 BPM | SYSTOLIC BLOOD PRESSURE: 128 MMHG

## 2021-09-22 DIAGNOSIS — I10 ESSENTIAL HYPERTENSION: ICD-10-CM

## 2021-09-22 DIAGNOSIS — R06.02 SOB (SHORTNESS OF BREATH): ICD-10-CM

## 2021-09-22 DIAGNOSIS — I42.8 NONISCHEMIC CARDIOMYOPATHY: Primary | ICD-10-CM

## 2021-09-22 DIAGNOSIS — I47.29 NSVT (NONSUSTAINED VENTRICULAR TACHYCARDIA): ICD-10-CM

## 2021-09-22 PROCEDURE — 99214 OFFICE O/P EST MOD 30 MIN: CPT | Mod: S$PBB,,, | Performed by: INTERNAL MEDICINE

## 2021-09-22 PROCEDURE — 99214 PR OFFICE/OUTPT VISIT, EST, LEVL IV, 30-39 MIN: ICD-10-PCS | Mod: S$PBB,,, | Performed by: INTERNAL MEDICINE

## 2021-09-22 PROCEDURE — 99999 PR PBB SHADOW E&M-EST. PATIENT-LVL IV: CPT | Mod: PBBFAC,,, | Performed by: INTERNAL MEDICINE

## 2021-09-22 PROCEDURE — 99214 OFFICE O/P EST MOD 30 MIN: CPT | Mod: PBBFAC | Performed by: INTERNAL MEDICINE

## 2021-09-22 PROCEDURE — 99999 PR PBB SHADOW E&M-EST. PATIENT-LVL IV: ICD-10-PCS | Mod: PBBFAC,,, | Performed by: INTERNAL MEDICINE

## 2021-09-22 RX ORDER — CARVEDILOL 6.25 MG/1
6.25 TABLET ORAL 2 TIMES DAILY WITH MEALS
Qty: 180 TABLET | Refills: 3 | Status: SHIPPED | OUTPATIENT
Start: 2021-09-22 | End: 2021-10-15 | Stop reason: SDUPTHER

## 2021-10-01 ENCOUNTER — HOSPITAL ENCOUNTER (EMERGENCY)
Facility: HOSPITAL | Age: 36
Discharge: HOME | End: 2021-10-02
Attending: EMERGENCY MEDICINE
Payer: MEDICARE

## 2021-10-01 DIAGNOSIS — R06.02 SOB (SHORTNESS OF BREATH): Primary | ICD-10-CM

## 2021-10-01 DIAGNOSIS — R07.9 CHEST PAIN, UNSPECIFIED TYPE: ICD-10-CM

## 2021-10-01 LAB
ALBUMIN SERPL BCP-MCNC: 3.7 G/DL (ref 3.5–5.2)
ALP SERPL-CCNC: 53 U/L (ref 55–135)
ALT SERPL W/O P-5'-P-CCNC: 9 U/L (ref 10–44)
ANION GAP SERPL CALC-SCNC: 12 MMOL/L (ref 8–16)
AST SERPL-CCNC: 17 U/L (ref 10–40)
B-HCG UR QL: NEGATIVE
BASOPHILS # BLD AUTO: 0.05 K/UL (ref 0–0.2)
BASOPHILS NFR BLD: 0.6 % (ref 0–1.9)
BILIRUB SERPL-MCNC: 0.3 MG/DL (ref 0.1–1)
BNP SERPL-MCNC: 570 PG/ML (ref 0–99)
BUN SERPL-MCNC: 16 MG/DL (ref 6–20)
CALCIUM SERPL-MCNC: 9.5 MG/DL (ref 8.7–10.5)
CHLORIDE SERPL-SCNC: 110 MMOL/L (ref 95–110)
CO2 SERPL-SCNC: 18 MMOL/L (ref 23–29)
CREAT SERPL-MCNC: 0.8 MG/DL (ref 0.5–1.4)
CTP QC/QA: YES
DIFFERENTIAL METHOD: ABNORMAL
EOSINOPHIL # BLD AUTO: 0.2 K/UL (ref 0–0.5)
EOSINOPHIL NFR BLD: 2.1 % (ref 0–8)
ERYTHROCYTE [DISTWIDTH] IN BLOOD BY AUTOMATED COUNT: 13 % (ref 11.5–14.5)
EST. GFR  (AFRICAN AMERICAN): >60 ML/MIN/1.73 M^2
EST. GFR  (NON AFRICAN AMERICAN): >60 ML/MIN/1.73 M^2
GLUCOSE SERPL-MCNC: 119 MG/DL (ref 70–110)
HCT VFR BLD AUTO: 34.9 % (ref 37–48.5)
HGB BLD-MCNC: 11.5 G/DL (ref 12–16)
IMM GRANULOCYTES # BLD AUTO: 0.02 K/UL (ref 0–0.04)
IMM GRANULOCYTES NFR BLD AUTO: 0.3 % (ref 0–0.5)
LYMPHOCYTES # BLD AUTO: 2.1 K/UL (ref 1–4.8)
LYMPHOCYTES NFR BLD: 26.2 % (ref 18–48)
MAGNESIUM SERPL-MCNC: 2.1 MG/DL (ref 1.6–2.6)
MCH RBC QN AUTO: 30.7 PG (ref 27–31)
MCHC RBC AUTO-ENTMCNC: 33 G/DL (ref 32–36)
MCV RBC AUTO: 93 FL (ref 82–98)
MONOCYTES # BLD AUTO: 0.7 K/UL (ref 0.3–1)
MONOCYTES NFR BLD: 8.5 % (ref 4–15)
NEUTROPHILS # BLD AUTO: 5 K/UL (ref 1.8–7.7)
NEUTROPHILS NFR BLD: 62.3 % (ref 38–73)
NRBC BLD-RTO: 0 /100 WBC
PLATELET # BLD AUTO: 289 K/UL (ref 150–450)
PMV BLD AUTO: 10.6 FL (ref 9.2–12.9)
POTASSIUM SERPL-SCNC: 3.5 MMOL/L (ref 3.5–5.1)
PROT SERPL-MCNC: 7.7 G/DL (ref 6–8.4)
RBC # BLD AUTO: 3.74 M/UL (ref 4–5.4)
SODIUM SERPL-SCNC: 140 MMOL/L (ref 136–145)
TROPONIN I SERPL DL<=0.01 NG/ML-MCNC: 0.02 NG/ML (ref 0–0.03)
WBC # BLD AUTO: 7.99 K/UL (ref 3.9–12.7)

## 2021-10-01 PROCEDURE — 99284 EMERGENCY DEPT VISIT MOD MDM: CPT | Mod: 25

## 2021-10-01 PROCEDURE — 83880 ASSAY OF NATRIURETIC PEPTIDE: CPT | Performed by: PHYSICIAN ASSISTANT

## 2021-10-01 PROCEDURE — 83735 ASSAY OF MAGNESIUM: CPT | Performed by: PHYSICIAN ASSISTANT

## 2021-10-01 PROCEDURE — 84484 ASSAY OF TROPONIN QUANT: CPT | Performed by: PHYSICIAN ASSISTANT

## 2021-10-01 PROCEDURE — 93010 EKG 12-LEAD: ICD-10-PCS | Mod: ,,, | Performed by: INTERNAL MEDICINE

## 2021-10-01 PROCEDURE — 81025 URINE PREGNANCY TEST: CPT | Performed by: PHYSICIAN ASSISTANT

## 2021-10-01 PROCEDURE — 80053 COMPREHEN METABOLIC PANEL: CPT | Performed by: PHYSICIAN ASSISTANT

## 2021-10-01 PROCEDURE — 85025 COMPLETE CBC W/AUTO DIFF WBC: CPT | Performed by: PHYSICIAN ASSISTANT

## 2021-10-01 PROCEDURE — 93010 ELECTROCARDIOGRAM REPORT: CPT | Mod: ,,, | Performed by: INTERNAL MEDICINE

## 2021-10-01 PROCEDURE — 93005 ELECTROCARDIOGRAM TRACING: CPT

## 2021-10-01 RX ORDER — FUROSEMIDE 10 MG/ML
40 INJECTION INTRAMUSCULAR; INTRAVENOUS
Status: DISCONTINUED | OUTPATIENT
Start: 2021-10-01 | End: 2021-10-02

## 2021-10-02 VITALS
HEIGHT: 70 IN | BODY MASS INDEX: 24.91 KG/M2 | HEART RATE: 87 BPM | OXYGEN SATURATION: 98 % | WEIGHT: 174 LBS | RESPIRATION RATE: 21 BRPM | TEMPERATURE: 98 F | SYSTOLIC BLOOD PRESSURE: 132 MMHG | DIASTOLIC BLOOD PRESSURE: 85 MMHG

## 2021-10-02 PROCEDURE — 25000003 PHARM REV CODE 250: Performed by: PHYSICIAN ASSISTANT

## 2021-10-02 RX ORDER — FUROSEMIDE 40 MG/1
80 TABLET ORAL
Status: COMPLETED | OUTPATIENT
Start: 2021-10-02 | End: 2021-10-02

## 2021-10-02 RX ADMIN — FUROSEMIDE 80 MG: 40 TABLET ORAL at 12:10

## 2021-10-06 ENCOUNTER — HOSPITAL ENCOUNTER (OUTPATIENT)
Dept: CARDIOLOGY | Facility: HOSPITAL | Age: 36
Discharge: HOME | End: 2021-10-06
Attending: INTERNAL MEDICINE
Payer: MEDICARE

## 2021-10-06 VITALS — WEIGHT: 174 LBS | HEIGHT: 70 IN | BODY MASS INDEX: 24.91 KG/M2

## 2021-10-06 DIAGNOSIS — I42.8 NONISCHEMIC CARDIOMYOPATHY: ICD-10-CM

## 2021-10-06 LAB
ASCENDING AORTA: 2.34 CM
AV INDEX (PROSTH): 0.75
AV MEAN GRADIENT: 2 MMHG
AV PEAK GRADIENT: 3 MMHG
AV VALVE AREA: 2.64 CM2
AV VELOCITY RATIO: 0.8
BSA FOR ECHO PROCEDURE: 1.97 M2
CV ECHO LV RWT: 0.41 CM
DOP CALC AO PEAK VEL: 0.85 M/S
DOP CALC AO VTI: 13.52 CM
DOP CALC LVOT AREA: 3.5 CM2
DOP CALC LVOT DIAMETER: 2.11 CM
DOP CALC LVOT PEAK VEL: 0.68 M/S
DOP CALC LVOT STROKE VOLUME: 35.65 CM3
DOP CALCLVOT PEAK VEL VTI: 10.2 CM
E WAVE DECELERATION TIME: 149.47 MSEC
E/A RATIO: 5.08
E/E' RATIO: 21.17 M/S
ECHO LV POSTERIOR WALL: 1.38 CM (ref 0.6–1.1)
EJECTION FRACTION: 15 %
FRACTIONAL SHORTENING: 11 % (ref 28–44)
INTERVENTRICULAR SEPTUM: 0.76 CM (ref 0.6–1.1)
IVRT: 96.89 MSEC
LA MAJOR: 6.89 CM
LA MINOR: 7.01 CM
LA WIDTH: 5.15 CM
LEFT ATRIUM SIZE: 4.98 CM
LEFT ATRIUM VOLUME INDEX: 76.9 ML/M2
LEFT ATRIUM VOLUME: 151.5 CM3
LEFT INTERNAL DIMENSION IN SYSTOLE: 5.91 CM (ref 2.1–4)
LEFT VENTRICLE DIASTOLIC VOLUME INDEX: 116.02 ML/M2
LEFT VENTRICLE DIASTOLIC VOLUME: 228.55 ML
LEFT VENTRICLE MASS INDEX: 163 G/M2
LEFT VENTRICLE SYSTOLIC VOLUME INDEX: 88.1 ML/M2
LEFT VENTRICLE SYSTOLIC VOLUME: 173.62 ML
LEFT VENTRICULAR INTERNAL DIMENSION IN DIASTOLE: 6.66 CM (ref 3.5–6)
LEFT VENTRICULAR MASS: 321.81 G
LV LATERAL E/E' RATIO: 18.14 M/S
LV SEPTAL E/E' RATIO: 25.4 M/S
MV PEAK A VEL: 0.25 M/S
MV PEAK E VEL: 1.27 M/S
MV STENOSIS PRESSURE HALF TIME: 43.35 MS
MV VALVE AREA P 1/2 METHOD: 5.07 CM2
PISA TR MAX VEL: 1.7 M/S
PV PEAK VELOCITY: 0.57 CM/S
RA MAJOR: 5.19 CM
RA PRESSURE: 3 MMHG
RA WIDTH: 4.55 CM
RIGHT VENTRICULAR END-DIASTOLIC DIMENSION: 3.95 CM
RV TISSUE DOPPLER FREE WALL SYSTOLIC VELOCITY 1 (APICAL 4 CHAMBER VIEW): 8.75 CM/S
SINUS: 2.9 CM
STJ: 2.35 CM
TDI LATERAL: 0.07 M/S
TDI SEPTAL: 0.05 M/S
TDI: 0.06 M/S
TR MAX PG: 12 MMHG
TRICUSPID ANNULAR PLANE SYSTOLIC EXCURSION: 2.04 CM
TV REST PULMONARY ARTERY PRESSURE: 15 MMHG

## 2021-10-06 PROCEDURE — 93306 TTE W/DOPPLER COMPLETE: CPT

## 2021-10-06 PROCEDURE — 93306 TTE W/DOPPLER COMPLETE: CPT | Mod: 26,,, | Performed by: INTERNAL MEDICINE

## 2021-10-06 PROCEDURE — 93306 ECHO (CUPID ONLY): ICD-10-PCS | Mod: 26,,, | Performed by: INTERNAL MEDICINE

## 2021-10-15 ENCOUNTER — OFFICE VISIT (OUTPATIENT)
Dept: CARDIOLOGY | Facility: CLINIC | Age: 36
End: 2021-10-15
Payer: MEDICARE

## 2021-10-15 VITALS
OXYGEN SATURATION: 100 % | BODY MASS INDEX: 24.77 KG/M2 | WEIGHT: 173 LBS | RESPIRATION RATE: 15 BRPM | HEIGHT: 70 IN | SYSTOLIC BLOOD PRESSURE: 130 MMHG | HEART RATE: 88 BPM | DIASTOLIC BLOOD PRESSURE: 78 MMHG

## 2021-10-15 DIAGNOSIS — R06.02 SOB (SHORTNESS OF BREATH): ICD-10-CM

## 2021-10-15 DIAGNOSIS — Z95.810 ICD (IMPLANTABLE CARDIOVERTER-DEFIBRILLATOR), SINGLE, IN SITU: ICD-10-CM

## 2021-10-15 DIAGNOSIS — I42.8 NONISCHEMIC CARDIOMYOPATHY: Primary | ICD-10-CM

## 2021-10-15 DIAGNOSIS — I10 ESSENTIAL HYPERTENSION: ICD-10-CM

## 2021-10-15 PROCEDURE — 99999 PR PBB SHADOW E&M-EST. PATIENT-LVL III: ICD-10-PCS | Mod: PBBFAC,,, | Performed by: INTERNAL MEDICINE

## 2021-10-15 PROCEDURE — 99214 OFFICE O/P EST MOD 30 MIN: CPT | Mod: S$PBB,,, | Performed by: INTERNAL MEDICINE

## 2021-10-15 PROCEDURE — 99999 PR PBB SHADOW E&M-EST. PATIENT-LVL III: CPT | Mod: PBBFAC,,, | Performed by: INTERNAL MEDICINE

## 2021-10-15 PROCEDURE — 99213 OFFICE O/P EST LOW 20 MIN: CPT | Mod: PBBFAC | Performed by: INTERNAL MEDICINE

## 2021-10-15 PROCEDURE — 99214 PR OFFICE/OUTPT VISIT, EST, LEVL IV, 30-39 MIN: ICD-10-PCS | Mod: S$PBB,,, | Performed by: INTERNAL MEDICINE

## 2021-10-15 RX ORDER — CARVEDILOL 6.25 MG/1
6.25 TABLET ORAL 2 TIMES DAILY WITH MEALS
Qty: 180 TABLET | Refills: 3 | Status: SHIPPED | OUTPATIENT
Start: 2021-10-15 | End: 2021-11-30 | Stop reason: SDUPTHER

## 2021-10-20 ENCOUNTER — LAB VISIT (OUTPATIENT)
Dept: LAB | Facility: HOSPITAL | Age: 36
End: 2021-10-20
Attending: INTERNAL MEDICINE
Payer: MEDICARE

## 2021-10-20 DIAGNOSIS — I10 ESSENTIAL HYPERTENSION: ICD-10-CM

## 2021-10-20 DIAGNOSIS — I42.8 NONISCHEMIC CARDIOMYOPATHY: ICD-10-CM

## 2021-10-20 LAB
ANION GAP SERPL CALC-SCNC: 6 MMOL/L (ref 8–16)
BUN SERPL-MCNC: 12 MG/DL (ref 6–20)
CALCIUM SERPL-MCNC: 9.2 MG/DL (ref 8.7–10.5)
CHLORIDE SERPL-SCNC: 108 MMOL/L (ref 95–110)
CO2 SERPL-SCNC: 22 MMOL/L (ref 23–29)
CREAT SERPL-MCNC: 0.8 MG/DL (ref 0.5–1.4)
EST. GFR  (AFRICAN AMERICAN): >60 ML/MIN/1.73 M^2
EST. GFR  (NON AFRICAN AMERICAN): >60 ML/MIN/1.73 M^2
GLUCOSE SERPL-MCNC: 88 MG/DL (ref 70–110)
POTASSIUM SERPL-SCNC: 4.3 MMOL/L (ref 3.5–5.1)
SODIUM SERPL-SCNC: 136 MMOL/L (ref 136–145)

## 2021-10-20 PROCEDURE — 80048 BASIC METABOLIC PNL TOTAL CA: CPT | Performed by: INTERNAL MEDICINE

## 2021-10-20 PROCEDURE — 36415 COLL VENOUS BLD VENIPUNCTURE: CPT | Performed by: INTERNAL MEDICINE

## 2021-10-28 ENCOUNTER — OFFICE VISIT (OUTPATIENT)
Dept: CARDIOLOGY | Facility: CLINIC | Age: 36
End: 2021-10-28
Payer: MEDICARE

## 2021-10-28 VITALS
DIASTOLIC BLOOD PRESSURE: 68 MMHG | HEART RATE: 79 BPM | SYSTOLIC BLOOD PRESSURE: 128 MMHG | HEIGHT: 70 IN | RESPIRATION RATE: 15 BRPM | BODY MASS INDEX: 24.77 KG/M2 | OXYGEN SATURATION: 98 % | WEIGHT: 173 LBS

## 2021-10-28 DIAGNOSIS — Z95.810 ICD (IMPLANTABLE CARDIOVERTER-DEFIBRILLATOR), SINGLE, IN SITU: ICD-10-CM

## 2021-10-28 DIAGNOSIS — I42.8 NONISCHEMIC CARDIOMYOPATHY: Primary | ICD-10-CM

## 2021-10-28 DIAGNOSIS — Z86.16 HISTORY OF COVID-19: ICD-10-CM

## 2021-10-28 DIAGNOSIS — I10 ESSENTIAL HYPERTENSION: ICD-10-CM

## 2021-10-28 PROCEDURE — 99214 PR OFFICE/OUTPT VISIT, EST, LEVL IV, 30-39 MIN: ICD-10-PCS | Mod: S$PBB,,, | Performed by: INTERNAL MEDICINE

## 2021-10-28 PROCEDURE — 99999 PR PBB SHADOW E&M-EST. PATIENT-LVL III: ICD-10-PCS | Mod: PBBFAC,,, | Performed by: INTERNAL MEDICINE

## 2021-10-28 PROCEDURE — 99213 OFFICE O/P EST LOW 20 MIN: CPT | Mod: PBBFAC | Performed by: INTERNAL MEDICINE

## 2021-10-28 PROCEDURE — 99999 PR PBB SHADOW E&M-EST. PATIENT-LVL III: CPT | Mod: PBBFAC,,, | Performed by: INTERNAL MEDICINE

## 2021-10-28 PROCEDURE — 99214 OFFICE O/P EST MOD 30 MIN: CPT | Mod: S$PBB,,, | Performed by: INTERNAL MEDICINE

## 2021-11-04 ENCOUNTER — LAB VISIT (OUTPATIENT)
Dept: LAB | Facility: HOSPITAL | Age: 36
End: 2021-11-04
Attending: INTERNAL MEDICINE
Payer: MEDICARE

## 2021-11-04 DIAGNOSIS — I42.8 NONISCHEMIC CARDIOMYOPATHY: ICD-10-CM

## 2021-11-04 LAB
ANION GAP SERPL CALC-SCNC: 10 MMOL/L (ref 8–16)
BUN SERPL-MCNC: 8 MG/DL (ref 6–20)
CALCIUM SERPL-MCNC: 9.1 MG/DL (ref 8.7–10.5)
CHLORIDE SERPL-SCNC: 108 MMOL/L (ref 95–110)
CO2 SERPL-SCNC: 22 MMOL/L (ref 23–29)
CREAT SERPL-MCNC: 0.8 MG/DL (ref 0.5–1.4)
EST. GFR  (AFRICAN AMERICAN): >60 ML/MIN/1.73 M^2
EST. GFR  (NON AFRICAN AMERICAN): >60 ML/MIN/1.73 M^2
GLUCOSE SERPL-MCNC: 89 MG/DL (ref 70–110)
POTASSIUM SERPL-SCNC: 3.3 MMOL/L (ref 3.5–5.1)
SODIUM SERPL-SCNC: 140 MMOL/L (ref 136–145)

## 2021-11-04 PROCEDURE — 80048 BASIC METABOLIC PNL TOTAL CA: CPT | Performed by: INTERNAL MEDICINE

## 2021-11-04 PROCEDURE — 36415 COLL VENOUS BLD VENIPUNCTURE: CPT | Performed by: INTERNAL MEDICINE

## 2021-11-05 ENCOUNTER — OFFICE VISIT (OUTPATIENT)
Dept: OPHTHALMOLOGY | Facility: CLINIC | Age: 36
End: 2021-11-05
Payer: MEDICARE

## 2021-11-05 DIAGNOSIS — G43.101 MIGRAINE WITH AURA AND WITH STATUS MIGRAINOSUS, NOT INTRACTABLE: Primary | ICD-10-CM

## 2021-11-05 PROCEDURE — 99999 PR PBB SHADOW E&M-EST. PATIENT-LVL II: ICD-10-PCS | Mod: PBBFAC,,, | Performed by: OPHTHALMOLOGY

## 2021-11-05 PROCEDURE — 92004 COMPRE OPH EXAM NEW PT 1/>: CPT | Mod: S$PBB,,, | Performed by: OPHTHALMOLOGY

## 2021-11-05 PROCEDURE — 99999 PR PBB SHADOW E&M-EST. PATIENT-LVL II: CPT | Mod: PBBFAC,,, | Performed by: OPHTHALMOLOGY

## 2021-11-05 PROCEDURE — 92004 PR EYE EXAM, NEW PATIENT,COMPREHESV: ICD-10-PCS | Mod: S$PBB,,, | Performed by: OPHTHALMOLOGY

## 2021-11-05 PROCEDURE — 99212 OFFICE O/P EST SF 10 MIN: CPT | Mod: PBBFAC,PO | Performed by: OPHTHALMOLOGY

## 2021-11-18 ENCOUNTER — PATIENT MESSAGE (OUTPATIENT)
Dept: CARDIOLOGY | Facility: CLINIC | Age: 36
End: 2021-11-18
Payer: MEDICAID

## 2021-11-18 DIAGNOSIS — R07.89 CHEST PAIN, NON-CARDIAC: ICD-10-CM

## 2021-11-18 RX ORDER — FUROSEMIDE 40 MG/1
40 TABLET ORAL DAILY
Status: CANCELLED | OUTPATIENT
Start: 2021-11-18

## 2021-11-19 ENCOUNTER — OFFICE VISIT (OUTPATIENT)
Dept: CARDIOLOGY | Facility: CLINIC | Age: 36
End: 2021-11-19
Payer: MEDICARE

## 2021-11-19 VITALS
RESPIRATION RATE: 15 BRPM | BODY MASS INDEX: 24.62 KG/M2 | HEART RATE: 84 BPM | SYSTOLIC BLOOD PRESSURE: 122 MMHG | WEIGHT: 172 LBS | OXYGEN SATURATION: 96 % | DIASTOLIC BLOOD PRESSURE: 70 MMHG | HEIGHT: 70 IN

## 2021-11-19 DIAGNOSIS — Z86.16 HISTORY OF COVID-19: ICD-10-CM

## 2021-11-19 DIAGNOSIS — I10 ESSENTIAL HYPERTENSION: ICD-10-CM

## 2021-11-19 DIAGNOSIS — I42.8 NONISCHEMIC CARDIOMYOPATHY: Primary | ICD-10-CM

## 2021-11-19 DIAGNOSIS — Z95.810 ICD (IMPLANTABLE CARDIOVERTER-DEFIBRILLATOR), SINGLE, IN SITU: ICD-10-CM

## 2021-11-19 DIAGNOSIS — E87.6 HYPOKALEMIA: ICD-10-CM

## 2021-11-19 DIAGNOSIS — R07.89 CHEST PAIN, NON-CARDIAC: ICD-10-CM

## 2021-11-19 PROCEDURE — 99214 PR OFFICE/OUTPT VISIT, EST, LEVL IV, 30-39 MIN: ICD-10-PCS | Mod: S$PBB,,, | Performed by: INTERNAL MEDICINE

## 2021-11-19 PROCEDURE — 99999 PR PBB SHADOW E&M-EST. PATIENT-LVL III: ICD-10-PCS | Mod: PBBFAC,,, | Performed by: INTERNAL MEDICINE

## 2021-11-19 PROCEDURE — 99999 PR PBB SHADOW E&M-EST. PATIENT-LVL III: CPT | Mod: PBBFAC,,, | Performed by: INTERNAL MEDICINE

## 2021-11-19 PROCEDURE — 99214 OFFICE O/P EST MOD 30 MIN: CPT | Mod: S$PBB,,, | Performed by: INTERNAL MEDICINE

## 2021-11-19 PROCEDURE — 99213 OFFICE O/P EST LOW 20 MIN: CPT | Mod: PBBFAC | Performed by: INTERNAL MEDICINE

## 2021-11-19 RX ORDER — POTASSIUM CHLORIDE 20 MEQ/1
20 TABLET, EXTENDED RELEASE ORAL DAILY
Qty: 90 TABLET | Refills: 3 | Status: SHIPPED | OUTPATIENT
Start: 2021-11-19 | End: 2021-12-28

## 2021-11-19 RX ORDER — SACUBITRIL AND VALSARTAN 97; 103 MG/1; MG/1
1 TABLET, FILM COATED ORAL 2 TIMES DAILY
Qty: 180 TABLET | Refills: 3 | Status: SHIPPED | OUTPATIENT
Start: 2021-11-19 | End: 2021-11-29 | Stop reason: SDUPTHER

## 2021-11-19 RX ORDER — FUROSEMIDE 40 MG/1
40 TABLET ORAL DAILY
Qty: 90 TABLET | Refills: 3 | Status: SHIPPED | OUTPATIENT
Start: 2021-11-19 | End: 2021-11-30 | Stop reason: SDUPTHER

## 2021-11-26 ENCOUNTER — LAB VISIT (OUTPATIENT)
Dept: LAB | Facility: HOSPITAL | Age: 36
End: 2021-11-26
Attending: INTERNAL MEDICINE
Payer: MEDICARE

## 2021-11-26 DIAGNOSIS — I42.8 NONISCHEMIC CARDIOMYOPATHY: ICD-10-CM

## 2021-11-26 DIAGNOSIS — I10 ESSENTIAL HYPERTENSION: ICD-10-CM

## 2021-11-26 DIAGNOSIS — E87.6 HYPOKALEMIA: ICD-10-CM

## 2021-11-26 LAB
ANION GAP SERPL CALC-SCNC: 7 MMOL/L (ref 8–16)
BUN SERPL-MCNC: 12 MG/DL (ref 6–20)
CALCIUM SERPL-MCNC: 9.1 MG/DL (ref 8.7–10.5)
CHLORIDE SERPL-SCNC: 103 MMOL/L (ref 95–110)
CO2 SERPL-SCNC: 24 MMOL/L (ref 23–29)
CREAT SERPL-MCNC: 0.8 MG/DL (ref 0.5–1.4)
EST. GFR  (AFRICAN AMERICAN): >60 ML/MIN/1.73 M^2
EST. GFR  (NON AFRICAN AMERICAN): >60 ML/MIN/1.73 M^2
GLUCOSE SERPL-MCNC: 91 MG/DL (ref 70–110)
POTASSIUM SERPL-SCNC: 3.8 MMOL/L (ref 3.5–5.1)
SODIUM SERPL-SCNC: 134 MMOL/L (ref 136–145)

## 2021-11-26 PROCEDURE — 36415 COLL VENOUS BLD VENIPUNCTURE: CPT | Mod: PO | Performed by: INTERNAL MEDICINE

## 2021-11-26 PROCEDURE — 80048 BASIC METABOLIC PNL TOTAL CA: CPT | Performed by: INTERNAL MEDICINE

## 2021-11-29 RX ORDER — SACUBITRIL AND VALSARTAN 97; 103 MG/1; MG/1
1 TABLET, FILM COATED ORAL 2 TIMES DAILY
Qty: 180 TABLET | Refills: 3 | Status: SHIPPED | OUTPATIENT
Start: 2021-11-29 | End: 2021-11-30

## 2021-11-30 ENCOUNTER — OFFICE VISIT (OUTPATIENT)
Dept: CARDIOLOGY | Facility: CLINIC | Age: 36
End: 2021-11-30
Payer: MEDICARE

## 2021-11-30 VITALS
WEIGHT: 172 LBS | HEART RATE: 82 BPM | RESPIRATION RATE: 15 BRPM | DIASTOLIC BLOOD PRESSURE: 74 MMHG | OXYGEN SATURATION: 98 % | SYSTOLIC BLOOD PRESSURE: 128 MMHG | HEIGHT: 70 IN | BODY MASS INDEX: 24.62 KG/M2

## 2021-11-30 DIAGNOSIS — R07.89 CHEST PAIN, NON-CARDIAC: ICD-10-CM

## 2021-11-30 DIAGNOSIS — I10 ESSENTIAL HYPERTENSION: ICD-10-CM

## 2021-11-30 DIAGNOSIS — I42.8 NONISCHEMIC CARDIOMYOPATHY: Primary | ICD-10-CM

## 2021-11-30 DIAGNOSIS — Z95.810 ICD (IMPLANTABLE CARDIOVERTER-DEFIBRILLATOR), SINGLE, IN SITU: ICD-10-CM

## 2021-11-30 DIAGNOSIS — E87.6 HYPOKALEMIA: ICD-10-CM

## 2021-11-30 DIAGNOSIS — R06.02 SOB (SHORTNESS OF BREATH): ICD-10-CM

## 2021-11-30 PROCEDURE — 99999 PR PBB SHADOW E&M-EST. PATIENT-LVL IV: ICD-10-PCS | Mod: PBBFAC,,, | Performed by: INTERNAL MEDICINE

## 2021-11-30 PROCEDURE — 99999 PR PBB SHADOW E&M-EST. PATIENT-LVL IV: CPT | Mod: PBBFAC,,, | Performed by: INTERNAL MEDICINE

## 2021-11-30 PROCEDURE — 99214 PR OFFICE/OUTPT VISIT, EST, LEVL IV, 30-39 MIN: ICD-10-PCS | Mod: S$PBB,,, | Performed by: INTERNAL MEDICINE

## 2021-11-30 PROCEDURE — 93282 PRGRMG EVAL IMPLANTABLE DFB: CPT | Mod: PBBFAC | Performed by: INTERNAL MEDICINE

## 2021-11-30 PROCEDURE — 99214 OFFICE O/P EST MOD 30 MIN: CPT | Mod: S$PBB,,, | Performed by: INTERNAL MEDICINE

## 2021-11-30 PROCEDURE — 93282 PRGRMG EVAL IMPLANTABLE DFB: CPT | Mod: 26,S$PBB,, | Performed by: INTERNAL MEDICINE

## 2021-11-30 PROCEDURE — 99214 OFFICE O/P EST MOD 30 MIN: CPT | Mod: PBBFAC | Performed by: INTERNAL MEDICINE

## 2021-11-30 PROCEDURE — 93282 PR PROGRAM EVAL (IN PERSON) IMPLANT DEVICE,CARDVERT/DEFIB,1 LEAD: ICD-10-PCS | Mod: 26,S$PBB,, | Performed by: INTERNAL MEDICINE

## 2021-11-30 RX ORDER — CARVEDILOL 12.5 MG/1
12.5 TABLET ORAL 2 TIMES DAILY WITH MEALS
Qty: 180 TABLET | Refills: 3 | Status: SHIPPED | OUTPATIENT
Start: 2021-11-30 | End: 2021-12-20 | Stop reason: SDUPTHER

## 2021-11-30 RX ORDER — FUROSEMIDE 40 MG/1
40 TABLET ORAL DAILY
Qty: 90 TABLET | Refills: 3 | Status: SHIPPED | OUTPATIENT
Start: 2021-11-30 | End: 2021-12-14 | Stop reason: SDUPTHER

## 2021-12-02 ENCOUNTER — TELEPHONE (OUTPATIENT)
Dept: TRANSPLANT | Facility: CLINIC | Age: 36
End: 2021-12-02
Payer: MEDICAID

## 2021-12-02 DIAGNOSIS — I50.9 CONGESTIVE HEART FAILURE, UNSPECIFIED HF CHRONICITY, UNSPECIFIED HEART FAILURE TYPE: Primary | ICD-10-CM

## 2021-12-07 ENCOUNTER — TELEPHONE (OUTPATIENT)
Dept: ELECTROPHYSIOLOGY | Facility: CLINIC | Age: 36
End: 2021-12-07
Payer: MEDICAID

## 2021-12-07 DIAGNOSIS — I42.8 NONISCHEMIC CARDIOMYOPATHY: Primary | ICD-10-CM

## 2021-12-15 DIAGNOSIS — R07.89 CHEST PAIN, NON-CARDIAC: ICD-10-CM

## 2021-12-15 RX ORDER — FUROSEMIDE 40 MG/1
40 TABLET ORAL DAILY
Qty: 90 TABLET | Refills: 3 | Status: SHIPPED | OUTPATIENT
Start: 2021-12-15 | End: 2022-02-15 | Stop reason: SDUPTHER

## 2021-12-20 ENCOUNTER — OFFICE VISIT (OUTPATIENT)
Dept: NEUROLOGY | Facility: CLINIC | Age: 36
End: 2021-12-20
Payer: MEDICARE

## 2021-12-20 VITALS
BODY MASS INDEX: 25.62 KG/M2 | HEART RATE: 78 BPM | WEIGHT: 179 LBS | HEIGHT: 70 IN | SYSTOLIC BLOOD PRESSURE: 120 MMHG | OXYGEN SATURATION: 98 % | DIASTOLIC BLOOD PRESSURE: 71 MMHG

## 2021-12-20 DIAGNOSIS — R55 SYNCOPE, UNSPECIFIED SYNCOPE TYPE: Primary | ICD-10-CM

## 2021-12-20 DIAGNOSIS — G44.019 EPISODIC CLUSTER HEADACHE, NOT INTRACTABLE: ICD-10-CM

## 2021-12-20 PROCEDURE — 99204 OFFICE O/P NEW MOD 45 MIN: CPT | Mod: HCNC,NTX,S$GLB, | Performed by: NEUROLOGICAL SURGERY

## 2021-12-20 PROCEDURE — 99204 PR OFFICE/OUTPT VISIT, NEW, LEVL IV, 45-59 MIN: ICD-10-PCS | Mod: HCNC,NTX,S$GLB, | Performed by: NEUROLOGICAL SURGERY

## 2021-12-20 PROCEDURE — 99999 PR PBB SHADOW E&M-EST. PATIENT-LVL IV: CPT | Mod: PBBFAC,HCNC,TXP, | Performed by: NEUROLOGICAL SURGERY

## 2021-12-20 PROCEDURE — 4010F PR ACE/ARB THEARPY RXD/TAKEN: ICD-10-PCS | Mod: HCNC,CPTII,NTX,S$GLB | Performed by: NEUROLOGICAL SURGERY

## 2021-12-20 PROCEDURE — 99999 PR PBB SHADOW E&M-EST. PATIENT-LVL IV: ICD-10-PCS | Mod: PBBFAC,HCNC,TXP, | Performed by: NEUROLOGICAL SURGERY

## 2021-12-20 PROCEDURE — 4010F ACE/ARB THERAPY RXD/TAKEN: CPT | Mod: HCNC,CPTII,NTX,S$GLB | Performed by: NEUROLOGICAL SURGERY

## 2021-12-20 RX ORDER — CARVEDILOL 12.5 MG/1
12.5 TABLET ORAL 2 TIMES DAILY WITH MEALS
Qty: 180 TABLET | Refills: 3 | Status: SHIPPED | OUTPATIENT
Start: 2021-12-20 | End: 2021-12-28 | Stop reason: DRUGHIGH

## 2021-12-22 ENCOUNTER — PATIENT MESSAGE (OUTPATIENT)
Dept: NEUROLOGY | Facility: CLINIC | Age: 36
End: 2021-12-22
Payer: COMMERCIAL

## 2021-12-28 ENCOUNTER — OFFICE VISIT (OUTPATIENT)
Dept: TRANSPLANT | Facility: CLINIC | Age: 36
End: 2021-12-28
Attending: INTERNAL MEDICINE
Payer: MEDICARE

## 2021-12-28 ENCOUNTER — HOSPITAL ENCOUNTER (OUTPATIENT)
Dept: PULMONOLOGY | Facility: CLINIC | Age: 36
Discharge: HOME | End: 2021-12-28
Attending: INTERNAL MEDICINE
Payer: MEDICARE

## 2021-12-28 ENCOUNTER — LAB VISIT (OUTPATIENT)
Dept: LAB | Facility: HOSPITAL | Age: 36
End: 2021-12-28
Attending: INTERNAL MEDICINE
Payer: COMMERCIAL

## 2021-12-28 VITALS
HEIGHT: 69 IN | SYSTOLIC BLOOD PRESSURE: 121 MMHG | DIASTOLIC BLOOD PRESSURE: 81 MMHG | WEIGHT: 181 LBS | WEIGHT: 179 LBS | BODY MASS INDEX: 26.51 KG/M2 | HEIGHT: 69 IN | HEART RATE: 76 BPM | BODY MASS INDEX: 26.81 KG/M2

## 2021-12-28 DIAGNOSIS — I50.9 CONGESTIVE HEART FAILURE, UNSPECIFIED HF CHRONICITY, UNSPECIFIED HEART FAILURE TYPE: ICD-10-CM

## 2021-12-28 DIAGNOSIS — I42.8 NONISCHEMIC CARDIOMYOPATHY: Chronic | ICD-10-CM

## 2021-12-28 DIAGNOSIS — R06.02 SOB (SHORTNESS OF BREATH): ICD-10-CM

## 2021-12-28 DIAGNOSIS — I50.42 CHRONIC COMBINED SYSTOLIC AND DIASTOLIC CONGESTIVE HEART FAILURE: Primary | ICD-10-CM

## 2021-12-28 DIAGNOSIS — I13.0 HYPERTENSIVE CARDIOVASCULAR-RENAL DISEASE, STAGE 1-4 OR UNSPECIFIED CHRONIC KIDNEY DISEASE, WITH HEART FAILURE: ICD-10-CM

## 2021-12-28 LAB
ALBUMIN SERPL BCP-MCNC: 3.7 G/DL (ref 3.5–5.2)
ALP SERPL-CCNC: 44 U/L (ref 55–135)
ALT SERPL W/O P-5'-P-CCNC: 13 U/L (ref 10–44)
ANION GAP SERPL CALC-SCNC: 4 MMOL/L (ref 8–16)
AST SERPL-CCNC: 18 U/L (ref 10–40)
BASOPHILS # BLD AUTO: 0.04 K/UL (ref 0–0.2)
BASOPHILS NFR BLD: 0.6 % (ref 0–1.9)
BILIRUB SERPL-MCNC: 0.4 MG/DL (ref 0.1–1)
BNP SERPL-MCNC: 935 PG/ML (ref 0–99)
BUN SERPL-MCNC: 10 MG/DL (ref 6–20)
CALCIUM SERPL-MCNC: 8.9 MG/DL (ref 8.7–10.5)
CHLORIDE SERPL-SCNC: 104 MMOL/L (ref 95–110)
CO2 SERPL-SCNC: 27 MMOL/L (ref 23–29)
CREAT SERPL-MCNC: 0.7 MG/DL (ref 0.5–1.4)
DIFFERENTIAL METHOD: ABNORMAL
EOSINOPHIL # BLD AUTO: 0.2 K/UL (ref 0–0.5)
EOSINOPHIL NFR BLD: 2.2 % (ref 0–8)
ERYTHROCYTE [DISTWIDTH] IN BLOOD BY AUTOMATED COUNT: 13.8 % (ref 11.5–14.5)
EST. GFR  (AFRICAN AMERICAN): >60 ML/MIN/1.73 M^2
EST. GFR  (NON AFRICAN AMERICAN): >60 ML/MIN/1.73 M^2
GLUCOSE SERPL-MCNC: 92 MG/DL (ref 70–110)
HCT VFR BLD AUTO: 35.2 % (ref 37–48.5)
HGB BLD-MCNC: 11.3 G/DL (ref 12–16)
IMM GRANULOCYTES # BLD AUTO: 0.01 K/UL (ref 0–0.04)
IMM GRANULOCYTES NFR BLD AUTO: 0.1 % (ref 0–0.5)
LYMPHOCYTES # BLD AUTO: 1.5 K/UL (ref 1–4.8)
LYMPHOCYTES NFR BLD: 21.3 % (ref 18–48)
MCH RBC QN AUTO: 29.2 PG (ref 27–31)
MCHC RBC AUTO-ENTMCNC: 32.1 G/DL (ref 32–36)
MCV RBC AUTO: 91 FL (ref 82–98)
MONOCYTES # BLD AUTO: 0.6 K/UL (ref 0.3–1)
MONOCYTES NFR BLD: 8.8 % (ref 4–15)
NEUTROPHILS # BLD AUTO: 4.6 K/UL (ref 1.8–7.7)
NEUTROPHILS NFR BLD: 67 % (ref 38–73)
NRBC BLD-RTO: 0 /100 WBC
PLATELET # BLD AUTO: 312 K/UL (ref 150–450)
PMV BLD AUTO: 10.3 FL (ref 9.2–12.9)
POTASSIUM SERPL-SCNC: 3.7 MMOL/L (ref 3.5–5.1)
PROT SERPL-MCNC: 7.3 G/DL (ref 6–8.4)
RBC # BLD AUTO: 3.87 M/UL (ref 4–5.4)
SODIUM SERPL-SCNC: 135 MMOL/L (ref 136–145)
TSH SERPL DL<=0.005 MIU/L-ACNC: 1.39 UIU/ML (ref 0.4–4)
WBC # BLD AUTO: 6.84 K/UL (ref 3.9–12.7)

## 2021-12-28 PROCEDURE — 3008F BODY MASS INDEX DOCD: CPT | Mod: CPTII,S$GLB,TXP, | Performed by: INTERNAL MEDICINE

## 2021-12-28 PROCEDURE — 83880 ASSAY OF NATRIURETIC PEPTIDE: CPT | Mod: HCNC,TXP | Performed by: INTERNAL MEDICINE

## 2021-12-28 PROCEDURE — 3074F SYST BP LT 130 MM HG: CPT | Mod: CPTII,S$GLB,TXP, | Performed by: INTERNAL MEDICINE

## 2021-12-28 PROCEDURE — 1159F PR MEDICATION LIST DOCUMENTED IN MEDICAL RECORD: ICD-10-PCS | Mod: CPTII,S$GLB,TXP, | Performed by: INTERNAL MEDICINE

## 2021-12-28 PROCEDURE — 1160F RVW MEDS BY RX/DR IN RCRD: CPT | Mod: CPTII,S$GLB,TXP, | Performed by: INTERNAL MEDICINE

## 2021-12-28 PROCEDURE — 99204 PR OFFICE/OUTPT VISIT, NEW, LEVL IV, 45-59 MIN: ICD-10-PCS | Mod: S$GLB,TXP,, | Performed by: INTERNAL MEDICINE

## 2021-12-28 PROCEDURE — 94618 PULMONARY STRESS TESTING: ICD-10-PCS | Mod: NTX,S$GLB,, | Performed by: INTERNAL MEDICINE

## 2021-12-28 PROCEDURE — 36415 COLL VENOUS BLD VENIPUNCTURE: CPT | Mod: HCNC,TXP | Performed by: INTERNAL MEDICINE

## 2021-12-28 PROCEDURE — 85025 COMPLETE CBC W/AUTO DIFF WBC: CPT | Mod: HCNC,TXP | Performed by: INTERNAL MEDICINE

## 2021-12-28 PROCEDURE — 3079F DIAST BP 80-89 MM HG: CPT | Mod: CPTII,S$GLB,TXP, | Performed by: INTERNAL MEDICINE

## 2021-12-28 PROCEDURE — 3074F PR MOST RECENT SYSTOLIC BLOOD PRESSURE < 130 MM HG: ICD-10-PCS | Mod: CPTII,S$GLB,TXP, | Performed by: INTERNAL MEDICINE

## 2021-12-28 PROCEDURE — 99499 UNLISTED E&M SERVICE: CPT | Mod: HCNC,S$GLB,TXP, | Performed by: INTERNAL MEDICINE

## 2021-12-28 PROCEDURE — 99999 PR PBB SHADOW E&M-EST. PATIENT-LVL III: CPT | Mod: PBBFAC,TXP,, | Performed by: INTERNAL MEDICINE

## 2021-12-28 PROCEDURE — 99499 RISK ADDL DX/OHS AUDIT: ICD-10-PCS | Mod: HCNC,S$GLB,TXP, | Performed by: INTERNAL MEDICINE

## 2021-12-28 PROCEDURE — 1159F MED LIST DOCD IN RCRD: CPT | Mod: CPTII,S$GLB,TXP, | Performed by: INTERNAL MEDICINE

## 2021-12-28 PROCEDURE — 3079F PR MOST RECENT DIASTOLIC BLOOD PRESSURE 80-89 MM HG: ICD-10-PCS | Mod: CPTII,S$GLB,TXP, | Performed by: INTERNAL MEDICINE

## 2021-12-28 PROCEDURE — 3008F PR BODY MASS INDEX (BMI) DOCUMENTED: ICD-10-PCS | Mod: CPTII,S$GLB,TXP, | Performed by: INTERNAL MEDICINE

## 2021-12-28 PROCEDURE — 1160F PR REVIEW ALL MEDS BY PRESCRIBER/CLIN PHARMACIST DOCUMENTED: ICD-10-PCS | Mod: CPTII,S$GLB,TXP, | Performed by: INTERNAL MEDICINE

## 2021-12-28 PROCEDURE — 99999 PR PBB SHADOW E&M-EST. PATIENT-LVL III: ICD-10-PCS | Mod: PBBFAC,TXP,, | Performed by: INTERNAL MEDICINE

## 2021-12-28 PROCEDURE — 84443 ASSAY THYROID STIM HORMONE: CPT | Mod: HCNC,TXP | Performed by: INTERNAL MEDICINE

## 2021-12-28 PROCEDURE — 94618 PULMONARY STRESS TESTING: CPT | Mod: NTX,S$GLB,, | Performed by: INTERNAL MEDICINE

## 2021-12-28 PROCEDURE — 80053 COMPREHEN METABOLIC PANEL: CPT | Mod: HCNC,TXP | Performed by: INTERNAL MEDICINE

## 2021-12-28 PROCEDURE — 99204 OFFICE O/P NEW MOD 45 MIN: CPT | Mod: S$GLB,TXP,, | Performed by: INTERNAL MEDICINE

## 2021-12-28 RX ORDER — EPLERENONE 25 MG/1
25 TABLET, FILM COATED ORAL DAILY
Qty: 30 TABLET | Refills: 11 | Status: SHIPPED | OUTPATIENT
Start: 2021-12-28 | End: 2022-02-15 | Stop reason: SDUPTHER

## 2021-12-28 RX ORDER — DAPAGLIFLOZIN 10 MG/1
10 TABLET, FILM COATED ORAL DAILY
Qty: 30 TABLET | Refills: 5 | Status: SHIPPED | OUTPATIENT
Start: 2021-12-28 | End: 2022-08-03

## 2021-12-28 RX ORDER — SACUBITRIL AND VALSARTAN 97; 103 MG/1; MG/1
1 TABLET, FILM COATED ORAL 2 TIMES DAILY
COMMUNITY
End: 2022-01-24 | Stop reason: SDUPTHER

## 2021-12-28 RX ORDER — CARVEDILOL 25 MG/1
25 TABLET ORAL 2 TIMES DAILY WITH MEALS
Qty: 60 TABLET | Refills: 5 | Status: SHIPPED | OUTPATIENT
Start: 2021-12-28 | End: 2022-02-15 | Stop reason: SDUPTHER

## 2021-12-28 NOTE — PATIENT INSTRUCTIONS
Do not watch TV in your bedroom.  Do not work on computer within 1-2 hrs of going to bed.  Do not exercise within 2 hrs of going to bed.  If unable to sleep get up, go to another room and read paper or book.  Do not eat or drink anything when you get up at night as you are attempting to re-train your body's sleep cycles; do not take naps in the daytime.    Increase carvedilol to 25 mg twice a day with meals    Take one tablet of Entresto  mg twice a day    Start eplerenone 25 mg once a day    BMP on Monday

## 2021-12-28 NOTE — LETTER
January 2, 2022        Kashif Peguero  [address] [facility] [address]  [address]  [place] [address]  Phone: [phone]  Fax: [phone]             [facility]  [address]  [place] [address]  Phone: [phone]   Patient: Nasra Marks   MR Number: [id number]   YOB: 1985   Date of Visit: 12/28/2021       Dear Dr. Kashif Peguero    Thank you for referring Nasra Marks to me for evaluation. Attached you will find relevant portions of my assessment and plan of care.    If you have questions, please do not hesitate to call me. I look forward to following Nasra Marks along with you.    Sincerely,    Timothy Prajapati Jr, MD    Enclosure    If you would like to receive this communication electronically, please contact [email or web address] or [phone] to request [employer] access.    [employer] is a tool which provides read-only access to select patient information with whom you have a relationship. Its easy to use and provides real time access to review your patients record including encounter summaries, notes, results, and demographic information.    If you feel you have received this communication in error or would no longer like to receive these types of communications, please e-mail [email or web address]

## 2021-12-31 ENCOUNTER — HOSPITAL ENCOUNTER (EMERGENCY)
Facility: HOSPITAL | Age: 36
Discharge: HOME | End: 2021-12-31
Attending: EMERGENCY MEDICINE
Payer: MEDICARE

## 2021-12-31 VITALS
WEIGHT: 180 LBS | BODY MASS INDEX: 26.66 KG/M2 | RESPIRATION RATE: 17 BRPM | TEMPERATURE: 98 F | HEIGHT: 69 IN | SYSTOLIC BLOOD PRESSURE: 119 MMHG | DIASTOLIC BLOOD PRESSURE: 81 MMHG | OXYGEN SATURATION: 99 % | HEART RATE: 89 BPM

## 2021-12-31 DIAGNOSIS — R06.02 SHORTNESS OF BREATH: ICD-10-CM

## 2021-12-31 DIAGNOSIS — I50.9 ACUTE ON CHRONIC CONGESTIVE HEART FAILURE, UNSPECIFIED HEART FAILURE TYPE: Primary | ICD-10-CM

## 2021-12-31 LAB
ALBUMIN SERPL BCP-MCNC: 3.8 G/DL (ref 3.5–5.2)
ALP SERPL-CCNC: 47 U/L (ref 55–135)
ALT SERPL W/O P-5'-P-CCNC: 15 U/L (ref 10–44)
ANION GAP SERPL CALC-SCNC: 5 MMOL/L (ref 8–16)
AST SERPL-CCNC: 17 U/L (ref 10–40)
B-HCG UR QL: NEGATIVE
BASOPHILS # BLD AUTO: 0.05 K/UL (ref 0–0.2)
BASOPHILS NFR BLD: 0.7 % (ref 0–1.9)
BILIRUB SERPL-MCNC: 0.4 MG/DL (ref 0.1–1)
BNP SERPL-MCNC: 1233 PG/ML (ref 0–99)
BUN SERPL-MCNC: 14 MG/DL (ref 6–20)
CALCIUM SERPL-MCNC: 9.1 MG/DL (ref 8.7–10.5)
CHLORIDE SERPL-SCNC: 107 MMOL/L (ref 95–110)
CO2 SERPL-SCNC: 26 MMOL/L (ref 23–29)
CREAT SERPL-MCNC: 0.8 MG/DL (ref 0.5–1.4)
CTP QC/QA: YES
DIFFERENTIAL METHOD: ABNORMAL
EOSINOPHIL # BLD AUTO: 0.2 K/UL (ref 0–0.5)
EOSINOPHIL NFR BLD: 2.2 % (ref 0–8)
ERYTHROCYTE [DISTWIDTH] IN BLOOD BY AUTOMATED COUNT: 13.5 % (ref 11.5–14.5)
EST. GFR  (AFRICAN AMERICAN): >60 ML/MIN/1.73 M^2
EST. GFR  (NON AFRICAN AMERICAN): >60 ML/MIN/1.73 M^2
GLUCOSE SERPL-MCNC: 84 MG/DL (ref 70–110)
HCT VFR BLD AUTO: 35.2 % (ref 37–48.5)
HGB BLD-MCNC: 11.4 G/DL (ref 12–16)
IMM GRANULOCYTES # BLD AUTO: 0.01 K/UL (ref 0–0.04)
IMM GRANULOCYTES NFR BLD AUTO: 0.1 % (ref 0–0.5)
LYMPHOCYTES # BLD AUTO: 1.9 K/UL (ref 1–4.8)
LYMPHOCYTES NFR BLD: 27.5 % (ref 18–48)
MCH RBC QN AUTO: 29.5 PG (ref 27–31)
MCHC RBC AUTO-ENTMCNC: 32.4 G/DL (ref 32–36)
MCV RBC AUTO: 91 FL (ref 82–98)
MONOCYTES # BLD AUTO: 0.6 K/UL (ref 0.3–1)
MONOCYTES NFR BLD: 9.2 % (ref 4–15)
NEUTROPHILS # BLD AUTO: 4.2 K/UL (ref 1.8–7.7)
NEUTROPHILS NFR BLD: 60.3 % (ref 38–73)
NRBC BLD-RTO: 0 /100 WBC
PLATELET # BLD AUTO: 288 K/UL (ref 150–450)
PMV BLD AUTO: 10.2 FL (ref 9.2–12.9)
POC MOLECULAR INFLUENZA A AGN: NEGATIVE
POC MOLECULAR INFLUENZA B AGN: NEGATIVE
POTASSIUM SERPL-SCNC: 3.8 MMOL/L (ref 3.5–5.1)
PROT SERPL-MCNC: 7.7 G/DL (ref 6–8.4)
RBC # BLD AUTO: 3.86 M/UL (ref 4–5.4)
SARS-COV-2 RDRP RESP QL NAA+PROBE: NEGATIVE
SODIUM SERPL-SCNC: 138 MMOL/L (ref 136–145)
TROPONIN I SERPL DL<=0.01 NG/ML-MCNC: <0.006 NG/ML (ref 0–0.03)
WBC # BLD AUTO: 6.88 K/UL (ref 3.9–12.7)

## 2021-12-31 PROCEDURE — 83880 ASSAY OF NATRIURETIC PEPTIDE: CPT | Mod: NTX | Performed by: EMERGENCY MEDICINE

## 2021-12-31 PROCEDURE — 93010 EKG 12-LEAD: ICD-10-PCS | Mod: NTX,,, | Performed by: INTERNAL MEDICINE

## 2021-12-31 PROCEDURE — 84484 ASSAY OF TROPONIN QUANT: CPT | Mod: NTX | Performed by: EMERGENCY MEDICINE

## 2021-12-31 PROCEDURE — U0002 COVID-19 LAB TEST NON-CDC: HCPCS | Mod: NTX | Performed by: NURSE PRACTITIONER

## 2021-12-31 PROCEDURE — 99284 EMERGENCY DEPT VISIT MOD MDM: CPT | Mod: 25,NTX

## 2021-12-31 PROCEDURE — 93005 ELECTROCARDIOGRAM TRACING: CPT | Mod: NTX

## 2021-12-31 PROCEDURE — 81025 URINE PREGNANCY TEST: CPT | Mod: NTX | Performed by: EMERGENCY MEDICINE

## 2021-12-31 PROCEDURE — 80053 COMPREHEN METABOLIC PANEL: CPT | Mod: NTX | Performed by: EMERGENCY MEDICINE

## 2021-12-31 PROCEDURE — 87502 INFLUENZA DNA AMP PROBE: CPT | Mod: NTX

## 2021-12-31 PROCEDURE — 85025 COMPLETE CBC W/AUTO DIFF WBC: CPT | Mod: NTX | Performed by: EMERGENCY MEDICINE

## 2021-12-31 PROCEDURE — 93010 ELECTROCARDIOGRAM REPORT: CPT | Mod: NTX,,, | Performed by: INTERNAL MEDICINE

## 2021-12-31 RX ORDER — FUROSEMIDE 10 MG/ML
40 INJECTION INTRAMUSCULAR; INTRAVENOUS
Status: DISCONTINUED | OUTPATIENT
Start: 2021-12-31 | End: 2022-01-01 | Stop reason: HOSPADM

## 2021-12-31 RX ORDER — FUROSEMIDE 10 MG/ML
40 INJECTION INTRAMUSCULAR; INTRAVENOUS
Status: DISCONTINUED | OUTPATIENT
Start: 2021-12-31 | End: 2021-12-31

## 2021-12-31 NOTE — FIRST PROVIDER EVALUATION
"Medical screening exam completed.  I have conducted a focused provider triage encounter, findings are as follows:    Brief history of present illness:  Patient presents today with two day history of cough, shortness of breath, chest pain, nausea, diarrhea. No known sick contacts.    Vitals:    12/31/21 1524   BP: 114/79   BP Location: Left arm   Patient Position: Sitting   Pulse: 85   Resp: 20   Temp: 98.3 °F (36.8 °C)   TempSrc: Oral   SpO2: 99%   Weight: 81.6 kg (180 lb)   Height: 5' 9" (1.753 m)       Pertinent physical exam:  AAOx3, NAD, respirations are even and unlabored, no peripheral edema noted.    Brief workup plan:  UPT, EKG, X-ray chest, COVID-19 testing, and flu testing    Preliminary workup initiated; this workup will be continued and followed by the physician or advanced practice provider that is assigned to the patient when roomed.  "

## 2022-01-01 NOTE — ED PROVIDER NOTES
Encounter Date: 2021       History     Chief Complaint   Patient presents with    Shortness of Breath    COVID-19 Concerns    Nausea    Diarrhea    Headache    Chest Pain     Pt c/o flu-like symptoms x1 day     35 yo F with history of HFrEF (last EF < 15%), HTN, defibrillator in place, presents with several day history of cough, congestion, shortness of breath, chest pressure. Symptoms unrelieved with use of benadryl at home. She has been compliant with lasix 40 mg daily, denies increased salt or water intake. Patient follows with Dr. Peguero (cardiology) and at [facility]. She is scheduled to get a heart cath in the next several weeks.         Review of patient's allergies indicates:   Allergen Reactions    Aldactone [spironolactone] Swelling     Lips swelled     Past Medical History:   Diagnosis Date    CHF (congestive heart failure)     Chronic combined systolic and diastolic congestive heart failure 2019    Essential hypertension 2012    Hypertension     dx at 16y.o.   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 disease, with heart failure 2021    ICD (implantable cardioverter-defibrillator), single, in situ 2020    Nonischemic cardiomyopathy 2019    Pacemaker 2017     Past Surgical History:   Procedure Laterality Date    CARDIAC DEFIBRILLATOR PLACEMENT  2017     SECTION      x 1    INDUCED       TUBAL LIGATION       Family History   Problem Relation Age of Onset    Hypertension Mother     Cancer Maternal Grandmother         breast x 2    Cancer Paternal Grandmother         breast    No Known Problems Sister     No Known Problems Brother     No Known Problems Son     No Known Problems Brother     Hypertension Other     Diabetes Other     Breast cancer Other     Cancer Paternal Aunt         breast    Cancer Paternal Uncle      Social History     Tobacco Use    Smoking status: Never  Smoker    Smokeless tobacco: Never Used   Substance Use Topics    Alcohol use: Not Currently     Comment: rarely    Drug use: No     Comment: in past very little marijuana     Review of Systems   Constitutional: Positive for chills and fatigue. Negative for fever.   HENT: Positive for congestion and sore throat.    Eyes: Negative for visual disturbance.   Respiratory: Positive for cough and shortness of breath.    Cardiovascular: Positive for chest pain. Negative for leg swelling.   Gastrointestinal: Positive for nausea. Negative for abdominal pain and vomiting.   Genitourinary: Negative for dysuria.   Skin: Negative for rash.   Neurological: Positive for headaches.   Psychiatric/Behavioral: Negative for decreased concentration.       Physical Exam     Initial Vitals [12/31/21 1524]   BP Pulse Resp Temp SpO2   114/79 85 20 98.3 °F (36.8 °C) 99 %      MAP       --         Physical Exam    Nursing note and vitals reviewed.  Constitutional: She appears well-developed and well-nourished. She is not diaphoretic. No distress.   HENT:   Mouth/Throat: Oropharynx is clear and moist.   Eyes: Pupils are equal, round, and reactive to light.   Neck: Neck supple.   Cardiovascular: Normal rate and regular rhythm.   Pulmonary/Chest: Breath sounds normal. No respiratory distress.   Abdominal: Abdomen is soft. There is no abdominal tenderness.   Musculoskeletal:         General: No edema.      Cervical back: Neck supple.     Neurological: She is alert and oriented to person, place, and time.   Skin: Skin is warm and dry.   Psychiatric: She has a normal mood and affect.         ED Course   Procedures  Labs Reviewed   B-TYPE NATRIURETIC PEPTIDE - Abnormal; Notable for the following components:       Result Value    BNP 1,233 (*)     All other components within normal limits   CBC W/ AUTO DIFFERENTIAL - Abnormal; Notable for the following components:    RBC 3.86 (*)     Hemoglobin 11.4 (*)     Hematocrit 35.2 (*)     All other  components within normal limits   COMPREHENSIVE METABOLIC PANEL - Abnormal; Notable for the following components:    Alkaline Phosphatase 47 (*)     Anion Gap 5 (*)     All other components within normal limits   TROPONIN I   POCT URINE PREGNANCY   SARS-COV-2 RDRP GENE   POCT INFLUENZA A/B MOLECULAR     EKG Readings: (Independently Interpreted)   NSR, rate 76 bpm, normal UT interval, QTc 459 ms. No STEMI.        Imaging Results          X-Ray Chest AP Portable (Final result)  Result time 12/31/21 16:46:40    Final result by Yifan Gonsalves MD (12/31/21 16:46:40)                 Impression:      1. Mildly prominent central hilar interstitial attenuation, could reflect congestive change, no large focal consolidation.      Electronically signed by: Yifan Gonsalves MD  Date:    12/31/2021  Time:    16:46             Narrative:    EXAMINATION:  XR CHEST AP PORTABLE    CLINICAL HISTORY:  Shortness of breath    TECHNIQUE:  Single frontal view of the chest was performed.    COMPARISON:  10/01/2021    FINDINGS:  The cardiomediastinal silhouette is prominent, similar to the previous exam.  Left chest wall pacer noted..  There is no pleural effusion.  The trachea is midline.  The lungs are symmetrically expanded bilaterally with mildly coarse central hilar interstitial attenuation.  No large focal consolidation seen.  There is no pneumothorax.  The osseous structures are unremarkable.                                 Medications   furosemide injection 40 mg (has no administration in time range)     Medical Decision Making:   Initial Assessment:   35 yo F with HFrEF, HTN presents with URI sx, shortness of breath, chest pressure. Denies increased salt or water intake, reports compliance with lasix. On exam, well appearing and in no distress. Covid and flu both negative. CXR with interstitial prominence. Patient seen in waiting room due to ER being on saturation, recommend ER work up with labs including cardiac enzymes. Will  move to ER bed once available.   ED Management:  Update:  Patient is still awaiting ER bed.  BNP elevated, I reviewed labs with the patient.  I have ordered IV Lasix and cardiac monitoring for her.  I have turned over care to Dr. Walker.  Again, ER is on saturation and there are no current beds available.                      Clinical Impression:   Final diagnoses:  [R06.02] Shortness of breath  [I50.9] Acute on chronic congestive heart failure, unspecified heart failure type (Primary)       This dictation has been generated using M-Modal Fluency Direct dictation; some phonetic errors may occur.             Tamara Post MD  12/31/21 1953

## 2022-01-02 NOTE — PROGRESS NOTES
Subjective:     Patient ID:  Nasra Marks is a 36 y.o. female who presents for evaluation of Consult (AHF), Cardiomyopathy, and Congestive Heart Failure    HPI:  35 yo BF diagnosis CHF due to nonischemic cardiomyopathy in 2017 with angiogram at that time without evidence of coronary artery disease.  She developed hypertension at the age of 16 but did not take her medications regularly.   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    She reports today that she has been taking 2 of the Entresto 1997 tablets twice daily by mistake.  She also takes carvedilol 12.5 mg twice daily.  With her menstrual cycles she also on the fluid takes extra Lasix.    At this time she reports stable dyspnea on exertion.  She also has trouble lying flat at night with cough and shortness of breath her chest feels full at times like he has to much fluid.  She does not recall any recent weight changes.    She was referred by Dr. Kashif Peguero.    Past Medical History:   Diagnosis Date    Chronic combined systolic and diastolic congestive heart failure 2019    Hypertension     dx at 16y.o.   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 disease, with heart failure 2021    ICD (implantable cardioverter-defibrillator) 2020    Nonischemic cardiomyopathy 2019       Past Surgical History:   Procedure Laterality Date    CARDIAC DEFIBRILLATOR PLACEMENT  2017     SECTION      x 1    INDUCED       TUBAL LIGATION         Family History   Problem Relation Age of Onset    Hypertension Mother     Cancer Maternal Grandmother         breast x 2    Cancer Paternal Grandmother         breast    No Known Problems Sister     No Known Problems Brother     No Known Problems Son     No Known Problems Brother     Hypertension Other     Diabetes Other     Breast cancer Other      Cancer Paternal Aunt         breast    Cancer Paternal Uncle    He denies any history of familial cardiomyopathy    Social History     Socioeconomic History    Marital status:    Occupational History     Employer: [employer]   Tobacco Use    Smoking status: Never Smoker    Smokeless tobacco: Never Used   Substance and Sexual Activity    Alcohol use: Rare     Comment: rarely    Drug use: No     Comment: in past very little marijuana       Medication Sig    furosemide (LASIX) 40 MG tablet Take 1 tablet (40 mg total) by mouth Daily.    sacubitriL-valsartan (ENTRESTO)  mg per tablet She was mistaking 2 (two) times daily.    carvediloL (COREG) 12.5 MG tablet Take 1 tablet (12.5 mg total) by mouth 2 (two) times daily with meals.    dapagliflozin (FARXIGA) 10 mg tablet Take 1 tablet (10 mg total) by mouth once daily.    eplerenone (INSPRA) 25 MG Tab Take 1 tablet (25 mg total) by mouth once daily.     Review of patient's allergies indicates:   Allergen Reactions    Aldactone [spironolactone] Swelling     Lips swelled       Review of Systems   Constitutional: Positive for malaise/fatigue and weight gain. Negative for chills, fever, night sweats and weight loss.   Cardiovascular: Positive for chest pain (Like chest fullness when lying supine at night associated with cough and shortness of breath), dyspnea on exertion, orthopnea and paroxysmal nocturnal dyspnea. Negative for irregular heartbeat, leg swelling, near-syncope, palpitations and syncope.   Respiratory: Positive for cough. Negative for sputum production and wheezing.    Hematologic/Lymphatic: Does not bruise/bleed easily.   Musculoskeletal: Negative for arthritis, joint pain and stiffness.   Gastrointestinal: Negative for hematochezia and melena.   Genitourinary: Negative for hematuria.        She has take extra furosemide due to fluid retention around her periods   Neurological: Negative for brief paralysis, focal weakness, seizures and  "weakness.   Psychiatric/Behavioral: The patient has insomnia.         Objective:   Physical Exam  Constitutional:       General: She is not in acute distress.     Appearance: She is well-developed. She is not ill-appearing, toxic-appearing or diaphoretic.      Comments: /81 (BP Location: Right arm, Patient Position: Sitting, BP Method: Medium (Automatic))   Pulse 76   Ht 5' 9" (1.753 m)   Wt 82.1 kg (181 lb)   BMI 26.73 kg/m²      HENT:      Head: Normocephalic and atraumatic.   Eyes:      General: No scleral icterus.        Right eye: No discharge.         Left eye: No discharge.      Conjunctiva/sclera: Conjunctivae normal.   Neck:      Thyroid: No thyromegaly.      Vascular: No JVD.      Trachea: No tracheal deviation.   Cardiovascular:      Rate and Rhythm: Normal rate and regular rhythm.      Heart sounds: S1 normal and S2 normal. No murmur heard.  Gallop present. S3 sounds present. No S4 sounds.    Pulmonary:      Effort: Pulmonary effort is normal.      Breath sounds: Normal breath sounds.   Abdominal:      General: Bowel sounds are normal. There is no distension (Liver span normal 10 cm).      Palpations: Abdomen is soft. There is no mass.      Tenderness: There is no abdominal tenderness. There is no guarding or rebound.   Musculoskeletal:         General: No swelling or tenderness.      Right lower leg: No edema.      Left lower leg: No edema.   Skin:     General: Skin is warm and dry.   Neurological:      General: No focal deficit present.      Mental Status: She is alert and oriented to person, place, and time. Mental status is at baseline.   Psychiatric:         Mood and Affect: Mood normal.         Behavior: Behavior normal.         Thought Content: Thought content normal.         Judgment: Judgment normal.          12/28/2021 6 minute walk test 459.4 m without desaturation    10/06/2021 echocardiogram  · The left ventricle is severely enlarged with eccentric hypertrophy and severely " decreased systolic function.  · The estimated ejection fraction is 15%.  · Grade III left ventricular diastolic dysfunction.  · Normal right ventricular size with normal right ventricular systolic function.  · Moderate left atrial enlargement.  · Mild mitral regurgitation.  · Normal central venous pressure (3 mmHg).  · The estimated PA systolic pressure is 15 mmHg.      12/31/2021EKG normal sinus rhythm nonspecific ST-T abnormality    Lab Results   Component Value Date    BNP 1,233 (H) 12/31/2021     (H) 12/28/2021     (H) 10/01/2021     Lab Results   Component Value Date    BNP 1,233 (H) 12/31/2021     12/31/2021    K 3.8 12/31/2021    MG 2.1 10/01/2021     12/31/2021    CO2 26 12/31/2021    PHOS 3.5 07/28/2021    BUN 14 12/31/2021    CREATININE 0.8 12/31/2021    GLU 84 12/31/2021    HGBA1C 5.4 08/16/2021    AST 17 12/31/2021    ALT 15 12/31/2021    ALBUMIN 3.8 12/31/2021    PROT 7.7 12/31/2021    BILITOT 0.4 12/31/2021    WBC 6.88 12/31/2021    HGB 11.4 (L) 12/31/2021    HCT 35.2 (L) 12/31/2021     12/31/2021    INR 1.0 04/30/2018     07/24/2021    TSH 1.386 12/28/2021    CHOL 164 08/16/2021    HDL 36 (L) 08/16/2021    LDLCALC 106.8 08/16/2021    TRIG 106 08/16/2021     Assessment:     1. Chronic combined systolic and diastolic congestive heart failure    2. 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 disease, with heart failure    3. SOB (shortness of breath)    4. Nonischemic cardiomyopathy      Plan:   Obtain EKG--this was accomplished 12/31/2021 demonstrating normal sinus rhythm nonspecific ST-T abnormality    She was referred for advanced options such as LVAD and transplant Dr. Kashif Peguero.  She has a suitable candidate for both transplant and LVAD based upon this initial visit and review records.  She is on a good medical regimen though I have elected to initiate eplerenone (allergic to spironolactone) and increase carvedilol.   She will return for CPX and right heart catheterization.    Increase carvedilol to 25 mg twice a day with meals    Take one tablet of Entresto  mg twice a day--she was mistakenly taking 2 tablets twice daily    Start eplerenone 25 mg once a day    BMP on Monday    For insomnia:    Do not watch TV in your bedroom.  Do not work on computer within 1-2 hrs of going to bed.  Do not exercise within 2 hrs of going to bed.  If unable to sleep get up, go to another room and read paper or book.  Do not eat or drink anything when you get up at night as you are attempting to re-train your body's sleep cycles; do not take naps in the daytime.      Arrange for CPX and right heart catheterization in January when I am in the lab and I will see her to review all the same day in the cath lab

## 2022-01-02 NOTE — H&P (VIEW-ONLY)
Subjective:     Patient ID:  Nasra Marks is a 36 y.o. female who presents for evaluation of Consult (AHF), Cardiomyopathy, and Congestive Heart Failure    HPI:  37 yo BF diagnosis CHF due to nonischemic cardiomyopathy in 2017 with angiogram at that time without evidence of coronary artery disease.  She developed hypertension at the age of 16 but did not take her medications regularly.   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    She reports today that she has been taking 2 of the Entresto 1997 tablets twice daily by mistake.  She also takes carvedilol 12.5 mg twice daily.  With her menstrual cycles she also on the fluid takes extra Lasix.    At this time she reports stable dyspnea on exertion.  She also has trouble lying flat at night with cough and shortness of breath her chest feels full at times like he has to much fluid.  She does not recall any recent weight changes.    She was referred by Dr. Kashif Peguero.    Past Medical History:   Diagnosis Date    Chronic combined systolic and diastolic congestive heart failure 2019    Hypertension     dx at 16y.o.   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 disease, with heart failure 2021    ICD (implantable cardioverter-defibrillator) 2020    Nonischemic cardiomyopathy 2019       Past Surgical History:   Procedure Laterality Date    CARDIAC DEFIBRILLATOR PLACEMENT  2017     SECTION      x 1    INDUCED       TUBAL LIGATION         Family History   Problem Relation Age of Onset    Hypertension Mother     Cancer Maternal Grandmother         breast x 2    Cancer Paternal Grandmother         breast    No Known Problems Sister     No Known Problems Brother     No Known Problems Son     No Known Problems Brother     Hypertension Other     Diabetes Other     Breast cancer Other      Cancer Paternal Aunt         breast    Cancer Paternal Uncle    He denies any history of familial cardiomyopathy    Social History     Socioeconomic History    Marital status:    Occupational History     Employer: [employer]   Tobacco Use    Smoking status: Never Smoker    Smokeless tobacco: Never Used   Substance and Sexual Activity    Alcohol use: Rare     Comment: rarely    Drug use: No     Comment: in past very little marijuana       Medication Sig    furosemide (LASIX) 40 MG tablet Take 1 tablet (40 mg total) by mouth Daily.    sacubitriL-valsartan (ENTRESTO)  mg per tablet She was mistaking 2 (two) times daily.    carvediloL (COREG) 12.5 MG tablet Take 1 tablet (12.5 mg total) by mouth 2 (two) times daily with meals.    dapagliflozin (FARXIGA) 10 mg tablet Take 1 tablet (10 mg total) by mouth once daily.    eplerenone (INSPRA) 25 MG Tab Take 1 tablet (25 mg total) by mouth once daily.     Review of patient's allergies indicates:   Allergen Reactions    Aldactone [spironolactone] Swelling     Lips swelled       Review of Systems   Constitutional: Positive for malaise/fatigue and weight gain. Negative for chills, fever, night sweats and weight loss.   Cardiovascular: Positive for chest pain (Like chest fullness when lying supine at night associated with cough and shortness of breath), dyspnea on exertion, orthopnea and paroxysmal nocturnal dyspnea. Negative for irregular heartbeat, leg swelling, near-syncope, palpitations and syncope.   Respiratory: Positive for cough. Negative for sputum production and wheezing.    Hematologic/Lymphatic: Does not bruise/bleed easily.   Musculoskeletal: Negative for arthritis, joint pain and stiffness.   Gastrointestinal: Negative for hematochezia and melena.   Genitourinary: Negative for hematuria.        She has take extra furosemide due to fluid retention around her periods   Neurological: Negative for brief paralysis, focal weakness, seizures and  "weakness.   Psychiatric/Behavioral: The patient has insomnia.         Objective:   Physical Exam  Constitutional:       General: She is not in acute distress.     Appearance: She is well-developed. She is not ill-appearing, toxic-appearing or diaphoretic.      Comments: /81 (BP Location: Right arm, Patient Position: Sitting, BP Method: Medium (Automatic))   Pulse 76   Ht 5' 9" (1.753 m)   Wt 82.1 kg (181 lb)   BMI 26.73 kg/m²      HENT:      Head: Normocephalic and atraumatic.   Eyes:      General: No scleral icterus.        Right eye: No discharge.         Left eye: No discharge.      Conjunctiva/sclera: Conjunctivae normal.   Neck:      Thyroid: No thyromegaly.      Vascular: No JVD.      Trachea: No tracheal deviation.   Cardiovascular:      Rate and Rhythm: Normal rate and regular rhythm.      Heart sounds: S1 normal and S2 normal. No murmur heard.  Gallop present. S3 sounds present. No S4 sounds.    Pulmonary:      Effort: Pulmonary effort is normal.      Breath sounds: Normal breath sounds.   Abdominal:      General: Bowel sounds are normal. There is no distension (Liver span normal 10 cm).      Palpations: Abdomen is soft. There is no mass.      Tenderness: There is no abdominal tenderness. There is no guarding or rebound.   Musculoskeletal:         General: No swelling or tenderness.      Right lower leg: No edema.      Left lower leg: No edema.   Skin:     General: Skin is warm and dry.   Neurological:      General: No focal deficit present.      Mental Status: She is alert and oriented to person, place, and time. Mental status is at baseline.   Psychiatric:         Mood and Affect: Mood normal.         Behavior: Behavior normal.         Thought Content: Thought content normal.         Judgment: Judgment normal.          12/28/2021 6 minute walk test 459.4 m without desaturation    10/06/2021 echocardiogram  · The left ventricle is severely enlarged with eccentric hypertrophy and severely " decreased systolic function.  · The estimated ejection fraction is 15%.  · Grade III left ventricular diastolic dysfunction.  · Normal right ventricular size with normal right ventricular systolic function.  · Moderate left atrial enlargement.  · Mild mitral regurgitation.  · Normal central venous pressure (3 mmHg).  · The estimated PA systolic pressure is 15 mmHg.      12/31/2021EKG normal sinus rhythm nonspecific ST-T abnormality    Lab Results   Component Value Date    BNP 1,233 (H) 12/31/2021     (H) 12/28/2021     (H) 10/01/2021     Lab Results   Component Value Date    BNP 1,233 (H) 12/31/2021     12/31/2021    K 3.8 12/31/2021    MG 2.1 10/01/2021     12/31/2021    CO2 26 12/31/2021    PHOS 3.5 07/28/2021    BUN 14 12/31/2021    CREATININE 0.8 12/31/2021    GLU 84 12/31/2021    HGBA1C 5.4 08/16/2021    AST 17 12/31/2021    ALT 15 12/31/2021    ALBUMIN 3.8 12/31/2021    PROT 7.7 12/31/2021    BILITOT 0.4 12/31/2021    WBC 6.88 12/31/2021    HGB 11.4 (L) 12/31/2021    HCT 35.2 (L) 12/31/2021     12/31/2021    INR 1.0 04/30/2018     07/24/2021    TSH 1.386 12/28/2021    CHOL 164 08/16/2021    HDL 36 (L) 08/16/2021    LDLCALC 106.8 08/16/2021    TRIG 106 08/16/2021     Assessment:     1. Chronic combined systolic and diastolic congestive heart failure    2. 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 disease, with heart failure    3. SOB (shortness of breath)    4. Nonischemic cardiomyopathy      Plan:   Obtain EKG--this was accomplished 12/31/2021 demonstrating normal sinus rhythm nonspecific ST-T abnormality    She was referred for advanced options such as LVAD and transplant Dr. Kashif Peguero.  She has a suitable candidate for both transplant and LVAD based upon this initial visit and review records.  She is on a good medical regimen though I have elected to initiate eplerenone (allergic to spironolactone) and increase carvedilol.   She will return for CPX and right heart catheterization.    Increase carvedilol to 25 mg twice a day with meals    Take one tablet of Entresto  mg twice a day--she was mistakenly taking 2 tablets twice daily    Start eplerenone 25 mg once a day    BMP on Monday    For insomnia:    Do not watch TV in your bedroom.  Do not work on computer within 1-2 hrs of going to bed.  Do not exercise within 2 hrs of going to bed.  If unable to sleep get up, go to another room and read paper or book.  Do not eat or drink anything when you get up at night as you are attempting to re-train your body's sleep cycles; do not take naps in the daytime.      Arrange for CPX and right heart catheterization in January when I am in the lab and I will see her to review all the same day in the cath lab

## 2022-01-03 ENCOUNTER — TELEPHONE (OUTPATIENT)
Dept: TRANSPLANT | Facility: CLINIC | Age: 37
End: 2022-01-03
Payer: COMMERCIAL

## 2022-01-03 NOTE — TELEPHONE ENCOUNTER
I spoke with pt, she did not get labs today as she did not start with medication changes until today.  Pt rescheduled for Wednesday

## 2022-01-04 ENCOUNTER — TELEPHONE (OUTPATIENT)
Dept: TRANSPLANT | Facility: CLINIC | Age: 37
End: 2022-01-04
Payer: COMMERCIAL

## 2022-01-04 DIAGNOSIS — Z01.818 PRE-OP TESTING: ICD-10-CM

## 2022-01-04 NOTE — TELEPHONE ENCOUNTER
----- Message from Cielo Ortiz MA sent at 1/4/2022  4:03 PM CST -----  Contact: self    ----- Message -----  From: Emily Ga MA  Sent: 1/3/2022  10:42 AM CST  To: [facility]    Pt is calling about med.Farxigan 10 mg given by . [facility] pharmacy didn't have script available,so she could take it. Also she has a lab for today  @ 12:30 pm that goes with this med that needs to be cancelled and rescheduled. Please call [phone].        643pm-- received the above message via [email or web address].  Attempted to return call to pt.  No answer.  Left voicemail requesting call back to discuss medication and labs. Will await return call.

## 2022-01-04 NOTE — TELEPHONE ENCOUNTER
Pre-procedure covid order entered and  notified to schedule/inform pt.     Left voicemail for pt to expect a call.

## 2022-01-04 NOTE — TELEPHONE ENCOUNTER
Received return call from pt   She started taking farxiga on Sunday.  Labs are scheduled for tomorrow 1/5/22.    Advised pt that I will call her if there are any changes that need to be made once results are receive.d

## 2022-01-05 ENCOUNTER — LAB VISIT (OUTPATIENT)
Dept: LAB | Facility: HOSPITAL | Age: 37
End: 2022-01-05
Attending: INTERNAL MEDICINE
Payer: COMMERCIAL

## 2022-01-05 DIAGNOSIS — I50.42 CHRONIC COMBINED SYSTOLIC AND DIASTOLIC CONGESTIVE HEART FAILURE: ICD-10-CM

## 2022-01-05 DIAGNOSIS — I13.0 HYPERTENSIVE CARDIOVASCULAR-RENAL DISEASE, STAGE 1-4 OR UNSPECIFIED CHRONIC KIDNEY DISEASE, WITH HEART FAILURE: ICD-10-CM

## 2022-01-05 LAB
ANION GAP SERPL CALC-SCNC: 9 MMOL/L (ref 8–16)
BUN SERPL-MCNC: 11 MG/DL (ref 6–20)
CALCIUM SERPL-MCNC: 9.3 MG/DL (ref 8.7–10.5)
CHLORIDE SERPL-SCNC: 102 MMOL/L (ref 95–110)
CO2 SERPL-SCNC: 25 MMOL/L (ref 23–29)
CREAT SERPL-MCNC: 0.9 MG/DL (ref 0.5–1.4)
EST. GFR  (AFRICAN AMERICAN): >60 ML/MIN/1.73 M^2
EST. GFR  (NON AFRICAN AMERICAN): >60 ML/MIN/1.73 M^2
GLUCOSE SERPL-MCNC: 119 MG/DL (ref 70–110)
POTASSIUM SERPL-SCNC: 3.6 MMOL/L (ref 3.5–5.1)
SODIUM SERPL-SCNC: 136 MMOL/L (ref 136–145)

## 2022-01-05 PROCEDURE — 36415 COLL VENOUS BLD VENIPUNCTURE: CPT | Mod: PO,TXP | Performed by: INTERNAL MEDICINE

## 2022-01-05 PROCEDURE — 80048 BASIC METABOLIC PNL TOTAL CA: CPT | Mod: NTX | Performed by: INTERNAL MEDICINE

## 2022-01-06 ENCOUNTER — TELEPHONE (OUTPATIENT)
Dept: TRANSPLANT | Facility: CLINIC | Age: 37
End: 2022-01-06
Payer: COMMERCIAL

## 2022-01-14 ENCOUNTER — HOSPITAL ENCOUNTER (EMERGENCY)
Facility: HOSPITAL | Age: 37
Discharge: HOME | End: 2022-01-14
Attending: EMERGENCY MEDICINE
Payer: MEDICARE

## 2022-01-14 VITALS
DIASTOLIC BLOOD PRESSURE: 71 MMHG | SYSTOLIC BLOOD PRESSURE: 111 MMHG | HEIGHT: 70 IN | BODY MASS INDEX: 25.62 KG/M2 | OXYGEN SATURATION: 100 % | HEART RATE: 72 BPM | RESPIRATION RATE: 18 BRPM | TEMPERATURE: 99 F | WEIGHT: 179 LBS

## 2022-01-14 DIAGNOSIS — R07.9 CHEST PAIN, UNSPECIFIED TYPE: Primary | ICD-10-CM

## 2022-01-14 DIAGNOSIS — I50.9 CONGESTIVE HEART FAILURE, UNSPECIFIED HF CHRONICITY, UNSPECIFIED HEART FAILURE TYPE: ICD-10-CM

## 2022-01-14 LAB
ALBUMIN SERPL BCP-MCNC: 3.7 G/DL (ref 3.5–5.2)
ALP SERPL-CCNC: 42 U/L (ref 55–135)
ALT SERPL W/O P-5'-P-CCNC: 10 U/L (ref 10–44)
ANION GAP SERPL CALC-SCNC: 6 MMOL/L (ref 8–16)
AST SERPL-CCNC: 13 U/L (ref 10–40)
B-HCG UR QL: NEGATIVE
BACTERIA #/AREA URNS HPF: ABNORMAL /HPF
BASOPHILS # BLD AUTO: 0.03 K/UL (ref 0–0.2)
BASOPHILS NFR BLD: 0.4 % (ref 0–1.9)
BILIRUB SERPL-MCNC: 0.4 MG/DL (ref 0.1–1)
BILIRUB UR QL STRIP: NEGATIVE
BNP SERPL-MCNC: 252 PG/ML (ref 0–99)
BUN SERPL-MCNC: 12 MG/DL (ref 6–20)
CALCIUM SERPL-MCNC: 8.6 MG/DL (ref 8.7–10.5)
CHLORIDE SERPL-SCNC: 107 MMOL/L (ref 95–110)
CLARITY UR: CLEAR
CO2 SERPL-SCNC: 27 MMOL/L (ref 23–29)
COLOR UR: YELLOW
CREAT SERPL-MCNC: 0.8 MG/DL (ref 0.5–1.4)
CTP QC/QA: YES
DIFFERENTIAL METHOD: ABNORMAL
EOSINOPHIL # BLD AUTO: 0.1 K/UL (ref 0–0.5)
EOSINOPHIL NFR BLD: 1.4 % (ref 0–8)
ERYTHROCYTE [DISTWIDTH] IN BLOOD BY AUTOMATED COUNT: 13.4 % (ref 11.5–14.5)
EST. GFR  (AFRICAN AMERICAN): >60 ML/MIN/1.73 M^2
EST. GFR  (NON AFRICAN AMERICAN): >60 ML/MIN/1.73 M^2
GLUCOSE SERPL-MCNC: 96 MG/DL (ref 70–110)
GLUCOSE UR QL STRIP: ABNORMAL
HCT VFR BLD AUTO: 37.9 % (ref 37–48.5)
HGB BLD-MCNC: 11.9 G/DL (ref 12–16)
HGB UR QL STRIP: ABNORMAL
IMM GRANULOCYTES # BLD AUTO: 0.02 K/UL (ref 0–0.04)
IMM GRANULOCYTES NFR BLD AUTO: 0.3 % (ref 0–0.5)
KETONES UR QL STRIP: NEGATIVE
LEUKOCYTE ESTERASE UR QL STRIP: NEGATIVE
LYMPHOCYTES # BLD AUTO: 1.2 K/UL (ref 1–4.8)
LYMPHOCYTES NFR BLD: 17.1 % (ref 18–48)
MCH RBC QN AUTO: 29.2 PG (ref 27–31)
MCHC RBC AUTO-ENTMCNC: 31.4 G/DL (ref 32–36)
MCV RBC AUTO: 93 FL (ref 82–98)
MICROSCOPIC COMMENT: ABNORMAL
MOLECULAR STREP A: NEGATIVE
MONOCYTES # BLD AUTO: 0.6 K/UL (ref 0.3–1)
MONOCYTES NFR BLD: 8.7 % (ref 4–15)
NEUTROPHILS # BLD AUTO: 5 K/UL (ref 1.8–7.7)
NEUTROPHILS NFR BLD: 72.1 % (ref 38–73)
NITRITE UR QL STRIP: NEGATIVE
NRBC BLD-RTO: 0 /100 WBC
PH UR STRIP: 6 [PH] (ref 5–8)
PLATELET # BLD AUTO: 297 K/UL (ref 150–450)
PMV BLD AUTO: 10.7 FL (ref 9.2–12.9)
POC MOLECULAR INFLUENZA A AGN: NEGATIVE
POC MOLECULAR INFLUENZA B AGN: NEGATIVE
POTASSIUM SERPL-SCNC: 4.2 MMOL/L (ref 3.5–5.1)
PROT SERPL-MCNC: 7.6 G/DL (ref 6–8.4)
PROT UR QL STRIP: ABNORMAL
RBC # BLD AUTO: 4.07 M/UL (ref 4–5.4)
RBC #/AREA URNS HPF: 28 /HPF (ref 0–4)
SARS-COV-2 RDRP RESP QL NAA+PROBE: NEGATIVE
SARS-COV-2 RNA RESP QL NAA+PROBE: NOT DETECTED
SODIUM SERPL-SCNC: 140 MMOL/L (ref 136–145)
SP GR UR STRIP: >1.03 (ref 1–1.03)
SQUAMOUS #/AREA URNS HPF: 5 /HPF
TROPONIN I SERPL DL<=0.01 NG/ML-MCNC: <0.006 NG/ML (ref 0–0.03)
TROPONIN I SERPL DL<=0.01 NG/ML-MCNC: <0.006 NG/ML (ref 0–0.03)
URN SPEC COLLECT METH UR: ABNORMAL
UROBILINOGEN UR STRIP-ACNC: NEGATIVE EU/DL
WBC # BLD AUTO: 6.91 K/UL (ref 3.9–12.7)
WBC #/AREA URNS HPF: 2 /HPF (ref 0–5)
YEAST URNS QL MICRO: ABNORMAL

## 2022-01-14 PROCEDURE — 96374 THER/PROPH/DIAG INJ IV PUSH: CPT | Mod: HCNC,NTX

## 2022-01-14 PROCEDURE — 96375 TX/PRO/DX INJ NEW DRUG ADDON: CPT | Mod: HCNC,NTX

## 2022-01-14 PROCEDURE — 81000 URINALYSIS NONAUTO W/SCOPE: CPT | Mod: HCNC,NTX | Performed by: NURSE PRACTITIONER

## 2022-01-14 PROCEDURE — 84484 ASSAY OF TROPONIN QUANT: CPT | Mod: 91,HCNC,NTX | Performed by: NURSE PRACTITIONER

## 2022-01-14 PROCEDURE — 93005 ELECTROCARDIOGRAM TRACING: CPT | Mod: HCNC,NTX

## 2022-01-14 PROCEDURE — 81025 URINE PREGNANCY TEST: CPT | Mod: HCNC,NTX | Performed by: EMERGENCY MEDICINE

## 2022-01-14 PROCEDURE — 80053 COMPREHEN METABOLIC PANEL: CPT | Mod: HCNC,NTX | Performed by: NURSE PRACTITIONER

## 2022-01-14 PROCEDURE — U0002 COVID-19 LAB TEST NON-CDC: HCPCS | Mod: HCNC,NTX | Performed by: NURSE PRACTITIONER

## 2022-01-14 PROCEDURE — 87502 INFLUENZA DNA AMP PROBE: CPT | Mod: HCNC,NTX

## 2022-01-14 PROCEDURE — U0005 INFEC AGEN DETEC AMPLI PROBE: HCPCS | Performed by: EMERGENCY MEDICINE

## 2022-01-14 PROCEDURE — 83880 ASSAY OF NATRIURETIC PEPTIDE: CPT | Mod: HCNC,NTX | Performed by: NURSE PRACTITIONER

## 2022-01-14 PROCEDURE — 85025 COMPLETE CBC W/AUTO DIFF WBC: CPT | Mod: HCNC,NTX | Performed by: NURSE PRACTITIONER

## 2022-01-14 PROCEDURE — 99285 EMERGENCY DEPT VISIT HI MDM: CPT | Mod: 25,HCNC,NTX

## 2022-01-14 PROCEDURE — 93010 ELECTROCARDIOGRAM REPORT: CPT | Mod: HCNC,NTX,, | Performed by: INTERNAL MEDICINE

## 2022-01-14 PROCEDURE — 93010 EKG 12-LEAD: ICD-10-PCS | Mod: HCNC,NTX,, | Performed by: INTERNAL MEDICINE

## 2022-01-14 PROCEDURE — U0003 INFECTIOUS AGENT DETECTION BY NUCLEIC ACID (DNA OR RNA); SEVERE ACUTE RESPIRATORY SYNDROME CORONAVIRUS 2 (SARS-COV-2) (CORONAVIRUS DISEASE [COVID-19]), AMPLIFIED PROBE TECHNIQUE, MAKING USE OF HIGH THROUGHPUT TECHNOLOGIES AS DESCRIBED BY CMS-2020-01-R: HCPCS | Mod: HCNC,NTX | Performed by: EMERGENCY MEDICINE

## 2022-01-14 PROCEDURE — 25000003 PHARM REV CODE 250: Mod: HCNC,NTX | Performed by: NURSE PRACTITIONER

## 2022-01-14 PROCEDURE — 63600175 PHARM REV CODE 636 W HCPCS: Mod: HCNC,NTX | Performed by: NURSE PRACTITIONER

## 2022-01-14 RX ORDER — KETOROLAC TROMETHAMINE 30 MG/ML
15 INJECTION, SOLUTION INTRAMUSCULAR; INTRAVENOUS
Status: COMPLETED | OUTPATIENT
Start: 2022-01-14 | End: 2022-01-14

## 2022-01-14 RX ORDER — FUROSEMIDE 10 MG/ML
20 INJECTION INTRAMUSCULAR; INTRAVENOUS
Status: COMPLETED | OUTPATIENT
Start: 2022-01-14 | End: 2022-01-14

## 2022-01-14 RX ADMIN — FUROSEMIDE 20 MG: 10 INJECTION, SOLUTION INTRAMUSCULAR; INTRAVENOUS at 11:01

## 2022-01-14 RX ADMIN — KETOROLAC TROMETHAMINE 15 MG: 30 INJECTION, SOLUTION INTRAMUSCULAR at 11:01

## 2022-01-14 RX ADMIN — LIDOCAINE HYDROCHLORIDE: 20 SOLUTION ORAL; TOPICAL at 11:01

## 2022-01-14 NOTE — ED TRIAGE NOTES
Pt complaining of chest pain and body aches that began yesterday.  Chest pain is 8/10, pressure/achey/tight, non-radiating.  Has not taken anything, lying flat on her back worsens the pain.  She is endorsing nausea. States she had diarrhea yesterday, has sore throat.  Denies cough, loss of taste or smell, fever.    LOC: Patient name and date of birth verified. The patient is awake, alert and aware of environment with an appropriate affect, the patient is oriented x 3 and speaking appropriately.   APPEARANCE: Patient resting comfortably, patient is clean and well groomed, patient's clothing is properly fastened.  SKIN: The skin is warm and dry, color consistent with ethnicity, patient has normal skin turgor and moist mucus membranes, skin intact, no breakdown or bruising noted.  MUSCULOSKELETAL: Patient moving all extremities well, no obvious swelling or deformities noted.   RESPIRATORY: Respirations are spontaneous, patient has a normal effort and rate, no accessory muscle use noted.  CARDIAC: Patient has a normal rate and rhythm, no periphreal edema noted, capillary refill < 3 seconds.  ABDOMEN: Soft and non tender to palpation, no distention noted. Bowel sounds present in all four quadrants.  NEUROLOGIC: Eyes open spontaneously, behavior appropriate to situation, follows commands, facial expression symmetrical, bilateral hand grasp equal and even, purposeful motor response noted, normal sensation in all extremities when touched with a finger.

## 2022-01-14 NOTE — ED PROVIDER NOTES
"Encounter Date: 1/14/2022    SCRIBE #1 NOTE: I, Angle Loza, am scribing for, and in the presence of,  Colton Swartz NP. I have scribed the following portions of the note - Other sections scribed: HPI, ROS.       History     Chief Complaint   Patient presents with    COVID-19 Concerns     Pt to ER with c/o body aches, nausea, cough with mid sternal chest discomfort x 2 days      This 36 y.o female, with a medical history of Congestive heart failure, Hypertension, Hypertensive cardiovascular-renal disease, and Nonischemic cardiomyopathy, presents to the ED c/o left sided chest pain (described as a "heavy weight" and ""). Pt reports that the pain began yesterday, prompting her to take an extra dose of Lasix, however, it worsened last night. She states that the pain feels similar to her CHF flares, but "a little more heavier." Pt notes that she has also been experiencing nausea, an intermittent cough, shortness of breath, congestion, rhinorrhea and body aches. She states that she is concerned as the body aches are atypical of her CHF flares. She notes that she has had recent COVID contacts. She reports taking Theraflu last night for treatment. Of note, she takes Lasix 40 mg daily. No use of blood thinners. No history of blood clots. Pt denies fever or any other associated symptoms.    The history is provided by the patient.     Review of patient's allergies indicates:   Allergen Reactions    Aldactone [spironolactone] Swelling     Lips swelled     Past Medical History:   Diagnosis Date    CHF (congestive heart failure)     Chronic combined systolic and diastolic congestive heart failure 5/24/2019    Essential hypertension 11/19/2012    Hypertension     dx at 16y.o.   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 disease, with heart failure 12/28/2021    ICD (implantable cardioverter-defibrillator), single, in situ 2/17/2020    Nonischemic cardiomyopathy 5/24/2019 "    Pacemaker 2017     Past Surgical History:   Procedure Laterality Date    CARDIAC DEFIBRILLATOR PLACEMENT  2017     SECTION      x 1    INDUCED       TUBAL LIGATION       Family History   Problem Relation Age of Onset    Hypertension Mother     Cancer Maternal Grandmother         breast x 2    Cancer Paternal Grandmother         breast    No Known Problems Sister     No Known Problems Brother     No Known Problems Son     No Known Problems Brother     Hypertension Other     Diabetes Other     Breast cancer Other     Cancer Paternal Aunt         breast    Cancer Paternal Uncle      Social History     Tobacco Use    Smoking status: Never Smoker    Smokeless tobacco: Never Used   Substance Use Topics    Alcohol use: Not Currently     Comment: rarely    Drug use: No     Comment: in past very little marijuana     Review of Systems   Constitutional: Negative for fever.   HENT: Positive for congestion and rhinorrhea. Negative for sore throat.    Respiratory: Positive for cough and shortness of breath.    Cardiovascular: Positive for chest pain.   Gastrointestinal: Positive for nausea.   Genitourinary: Negative for dysuria.   Musculoskeletal: Positive for myalgias. Negative for back pain.   Skin: Negative for rash.   Neurological: Negative for weakness.       Physical Exam     Initial Vitals [22 0709]   BP Pulse Resp Temp SpO2   105/60 67 18 98.5 °F (36.9 °C) 100 %      MAP       --         Physical Exam    Nursing note and vitals reviewed.  Constitutional: She appears well-developed and well-nourished. She is not diaphoretic. No distress.   HENT:   Head: Normocephalic and atraumatic.   Right Ear: External ear normal.   Left Ear: External ear normal.   Nose: Nose normal.   Eyes: Conjunctivae and EOM are normal. Right eye exhibits no discharge. Left eye exhibits no discharge.   Neck: Neck supple. No tracheal deviation present.   Normal range of motion.  Cardiovascular:  Normal rate.   Pulmonary/Chest: No stridor. No respiratory distress.   Abdominal: Abdomen is soft. She exhibits no distension. There is no abdominal tenderness.   Musculoskeletal:         General: No tenderness. Normal range of motion.      Cervical back: Normal range of motion and neck supple.     Neurological: She is alert and oriented to person, place, and time. She has normal strength. No cranial nerve deficit or sensory deficit.   Skin: Skin is warm and dry.   Psychiatric: She has a normal mood and affect. Her behavior is normal. Judgment and thought content normal.         ED Course   Procedures  Labs Reviewed   CBC W/ AUTO DIFFERENTIAL - Abnormal; Notable for the following components:       Result Value    Hemoglobin 11.9 (*)     MCHC 31.4 (*)     Lymph % 17.1 (*)     All other components within normal limits   COMPREHENSIVE METABOLIC PANEL - Abnormal; Notable for the following components:    Calcium 8.6 (*)     Alkaline Phosphatase 42 (*)     Anion Gap 6 (*)     All other components within normal limits   B-TYPE NATRIURETIC PEPTIDE - Abnormal; Notable for the following components:     (*)     All other components within normal limits   URINALYSIS, REFLEX TO URINE CULTURE - Abnormal; Notable for the following components:    Specific Gravity, UA >1.030 (*)     Protein, UA Trace (*)     Glucose, UA 4+ (*)     Occult Blood UA 2+ (*)     All other components within normal limits    Narrative:     Specimen Source->Urine   URINALYSIS MICROSCOPIC - Abnormal; Notable for the following components:    RBC, UA 28 (*)     All other components within normal limits    Narrative:     Specimen Source->Urine   TROPONIN I   TROPONIN I   SARS-COV-2 (COVID-19) QUALITATIVE PCR    Narrative:     Is testing needed for patient travel?->No  Is this needed for pre-procedure or pre-op testing?->No   POCT INFLUENZA A/B MOLECULAR   POCT URINE PREGNANCY   POCT STREP A MOLECULAR   SARS-COV-2 RDRP GENE     EKG Readings:  (Independently Interpreted)   Initial Reading: No STEMI. Previous EKG Date: 13/31/2021. Rhythm: Sinus Bradycardia. Heart Rate: 57. Ectopy: No Ectopy. Conduction: Normal. ST Segments: Normal ST Segments. T Waves: Normal. Clinical Impression: Sinus Bradycardia     ECG Results          EKG 12-lead (Final result)  Result time 01/14/22 16:42:57    Final result by Interface, Lab In [facility] (01/14/22 16:42:57)                 Narrative:    Test Reason : R07.9,    Vent. Rate : 057 BPM     Atrial Rate : 057 BPM     P-R Int : 168 ms          QRS Dur : 094 ms      QT Int : 402 ms       P-R-T Axes : 035 093 088 degrees     QTc Int : 391 ms    Sinus bradycardia  Rightward axis  Borderline Abnormal ECG  When compared with ECG of 31-DEC-2021 14:44,  Significant changes have occurred  Confirmed by Kashif Peguero MD ([id number]) on 1/14/2022 4:42:43 PM    Referred By:             Confirmed By:Kashif Peguero MD                            Imaging Results          X-Ray Chest 1 View (Final result)  Result time 01/14/22 08:38:55    Final result by Nicola Gillespie MD (01/14/22 08:38:55)                 Impression:      Cardiomegaly.      Electronically signed by: Nicola Gillespie MD  Date:    01/14/2022  Time:    08:38             Narrative:    EXAMINATION:  XR CHEST 1 VIEW    CLINICAL HISTORY:  Chest pain, unspecified    TECHNIQUE:  Frontal view of the chest was performed.    COMPARISON:  12/31/2021    FINDINGS:  Pacing device over the left chest wall with single lead in stable position.  Cardiomediastinal silhouette remains enlarged similar to prior.  No overt pulmonary vascular engorgement.    The lungs appear clear without confluent pulmonary parenchymal opacity. No pleural fluid or pneumothorax.                                 Medications   (pyxis) gi cocktail (mylanta 30 mL, LIDOcaine 2 % viscous 10 mL, dicyclomine 10 mL) 50 mL ( Oral Given 1/14/22 1153)   ketorolac injection 15 mg (15 mg Intravenous Given 1/14/22 1157)   furosemide  injection 20 mg (20 mg Intravenous Given 1/14/22 1153)     Medical Decision Making:   History:   Old Medical Records: I decided to obtain old medical records.  Clinical Tests:   Lab Tests: Ordered and Reviewed  Radiological Study: Ordered and Reviewed  Medical Tests: Ordered and Reviewed  ED Management:  36 year old patient presenting secondary to chest pain.  States that chest pain is similar to baseline chest pain that she has on most days.  Patient has a heart score of 3. EKG was reassuring and chest xray showed nothing acute.  BNP slightly elevated.  Patient does take Lasix daily.  Remaining labs reassuring.  Initial and repeat troponins are negative.  COVID-19, strep, and flu negative.    [email or web address]    Also considered but less likely:     PE: normal rate, no sob/recent immobilization/surgery/travel/family history. PERC negative.  Pneumonia: chest xray negative. No fever. No cough and lungs non consistent with pna  Tamponade: unlikely due to chest xray and ekg  STEMI: No STEMI on ekg  Dissection: equal pulses bilaterally and no ripping chest pain to the back  Esophageal rupture: no dysphagia or vomiting and chest xray negative for mediastinal air  Arrhythmia: no arrhythmia on ekg  CHF: no fluid overload on Cxr and physical exam  Pneumothorax: bilateral breath sounds and no signs of pneumothorax on chest xray     Uncertain etiology of patient's symptoms.  No evidence of emergent pathology at this time. Advised patient to follow up with her cardiologist for re-evaluation and further management.  ED return precautions given. All questions regarding diagnosis and plan were answered to the patient's fullest possible satisfaction. Patient expressed understanding of diagnosis, discharge instructions, and return precautions.            Patient note was created using [employer] voice dictation software.  Any errors in syntax or information may not have been identified and edited  prior to signing this note.          Additional MDM:   Heart Score:    History:          Moderately suspicious.  ECG:             Normal  Age:               Less than 45 years  Risk factors: >= 3 risk factors or history of atherosclerotic disease  Troponin:       Less than or equal to normal limit  Final Score: 3           Scribe Attestation:   Scribe #1: I performed the above scribed service and the documentation accurately describes the services I performed. I attest to the accuracy of the note.                 Clinical Impression:   Final diagnoses:  [R07.9] Chest pain, unspecified type (Primary)  [I50.9] Congestive heart failure, unspecified HF chronicity, unspecified heart failure type          ED Disposition Condition    Discharge Stable        ED Prescriptions     None        Follow-up Information     Follow up With Specialties Details Why Contact Info    Veronika Rainey MD Family Medicine, Wound Care Schedule an appointment as soon as possible for a visit   [address] [facility] [address] [phone]      Kashif Peguero MD Cardiology, INTERVENTIONAL CARDIOLOGY Schedule an appointment as soon as possible for a visit   [address]  [address]  [facility] [id number] [phone]      [facility] Emergency Dept Emergency Medicine  If symptoms worsen [address] [facility] [address] [phone]           I, Colton Swartz NP, personally performed the services described in this documentation. All medical record entries made by the scribe were at my direction and in my presence. I have reviewed the chart and agree that the record reflects my personal performance and is accurate and complete.     Colton Swartz NP  01/15/22 0821

## 2022-01-14 NOTE — DISCHARGE INSTRUCTIONS
Please return to the Emergency Department for any new or worsening symptoms including: fever, chest pain, shortness of breath, loss of consciousness, dizziness, weakness, or any other concerns.     Please schedule an appointment for follow up with your Primary Care Doctor and Cardiology as soon as possible for a recheck of your symptoms. If you do not have one, you may contact the one listed on your discharge paperwork or you may also call the [facility] Appointment Desk at [phone] to schedule an appointment with one.     Please take all medication as prescribed.

## 2022-01-14 NOTE — Clinical Note
"Nasra Dior" Kendrick was seen and treated in our emergency department on 1/14/2022.     COVID-19 is present in our communities across the state. There is limited testing for COVID at this time, so not all patients can be tested. In this situation, your employee meets the following criteria:    Nasra Marks has met the criteria for COVID-19 testing and has a POSITIVE result. She can return to work once they are asymptomatic for 24 hours without the use of fever reducing medications AND at least five days from the first positive result. A mask is recommended for 5 days post quarantine.     If you have any questions or concerns, or if I can be of further assistance, please do not hesitate to contact me.    Sincerely,              RN"

## 2022-01-19 ENCOUNTER — HOSPITAL ENCOUNTER (OUTPATIENT)
Dept: NEUROLOGY | Facility: HOSPITAL | Age: 37
Discharge: HOME | End: 2022-01-19
Attending: NEUROLOGICAL SURGERY
Payer: MEDICARE

## 2022-01-19 DIAGNOSIS — G44.019 EPISODIC CLUSTER HEADACHE, NOT INTRACTABLE: ICD-10-CM

## 2022-01-19 PROCEDURE — 95819 EEG AWAKE AND ASLEEP: CPT | Mod: 26,HCNC,NTX, | Performed by: PSYCHIATRY & NEUROLOGY

## 2022-01-19 PROCEDURE — 95819 PR EEG,W/AWAKE & ASLEEP RECORD: ICD-10-PCS | Mod: 26,HCNC,NTX, | Performed by: PSYCHIATRY & NEUROLOGY

## 2022-01-19 NOTE — PROCEDURES
EEG,w/awake & drowsy record    Date/Time: 1/27/2022 12:00 AM  Performed by: Ranjeet Ramirez MD  Authorized by: Dao Evans MD       ELECTROENCEPHALOGRAM REPORT    DATE OF SERVICE: 1/19/22  EEG NUMBER: OW   REQUESTED BY: Nathan  LOCATION OF SERVICE:  [facility]   Electroencephalographic (EEG) recording is with electrodes placed according to the International 10-20 placement system.  Thirty two (32) channels of digital signal (sampling rate of 512/sec) including T1 and T2 was simultaneously recorded from the scalp and may include  EKG, EMG, and/or eye monitors.  Recording band pass was 0.1 to 512 hz.  Digital video recording of the patient is simultaneously recorded with the EEG.  The patient is instructed report clinical symptoms which may occur during the recording session.  EEG and video recording is stored and archived in digital format. Activation procedures which include photic stimulation, hyperventilation and instructing patients to perform simple task are done in selected patients.    The EEG is displayed on a monitor screen and can be reviewed using different montages.  Computer assisted analysis is employed to detect spike and electrographic seizure activity.   The entire record is submitted for computer analysis.  The entire recording is visually reviewed and the times identified by computer analysis as being spikes or seizures are reviewed again.  Compresses spectral analysis (CSA) is also performed on the activity recorded from each individual channel.  This is displayed as a power display of frequencies from 0 to 30 Hz over time.   The CSA is reviewed looking for asymmetries in power between homologous areas of the scalp and then compared with the original EEG recording.     [employer] software was also utilized in the review of this study.  This software suite analyzes the EEG recording in multiple domains.  Coherence and rhythmicity is computed to identify EEG sections which may  contain organized seizures.  Each channel undergoes analysis to detect presence of spike and sharp waves which have special and morphological characteristic of epileptic activity.  The routine EEG recording is converted from spacial into frequency domain.  This is then displayed comparing homologous areas to identify areas of significant asymmetry.  Algorithm to identify non-cortically generated artifact is used to separate eye movement, EMG and other artifact from the EEG    EEG FINDINGS  The record shows a good  organization at rest, consisting of a 9-10 Hz posterior dominant rhythm with good  reactivity. There is mild bilateral beta activity.    Drowsiness is characterized by attenuation of the background, vertex waves, and bilateral theta slowing. Stage II sleep is characterized by slowing, vertex waves, and symmetric sleep spindles.     Provocative maneuvers including hyperventilation and photic stimulation were performed.     EKG recording shows a regular rhythm.    There is no push button or clinical event.    IMPRESSION:  Normal study.     Ranjeet Ramirez MD

## 2022-01-24 ENCOUNTER — OFFICE VISIT (OUTPATIENT)
Dept: CARDIOLOGY | Facility: CLINIC | Age: 37
End: 2022-01-24
Payer: MEDICARE

## 2022-01-24 VITALS
SYSTOLIC BLOOD PRESSURE: 110 MMHG | OXYGEN SATURATION: 100 % | DIASTOLIC BLOOD PRESSURE: 67 MMHG | WEIGHT: 179 LBS | HEART RATE: 71 BPM | HEIGHT: 70 IN | RESPIRATION RATE: 15 BRPM | BODY MASS INDEX: 25.62 KG/M2

## 2022-01-24 DIAGNOSIS — R07.9 CHEST PAIN, UNSPECIFIED TYPE: Primary | ICD-10-CM

## 2022-01-24 DIAGNOSIS — I10 ESSENTIAL HYPERTENSION: ICD-10-CM

## 2022-01-24 DIAGNOSIS — Z95.810 ICD (IMPLANTABLE CARDIOVERTER-DEFIBRILLATOR), SINGLE, IN SITU: ICD-10-CM

## 2022-01-24 DIAGNOSIS — I42.8 NONISCHEMIC CARDIOMYOPATHY: ICD-10-CM

## 2022-01-24 DIAGNOSIS — Z86.16 HISTORY OF COVID-19: ICD-10-CM

## 2022-01-24 PROCEDURE — 3078F PR MOST RECENT DIASTOLIC BLOOD PRESSURE < 80 MM HG: ICD-10-PCS | Mod: HCNC,CPTII,NTX,S$GLB | Performed by: INTERNAL MEDICINE

## 2022-01-24 PROCEDURE — 3008F PR BODY MASS INDEX (BMI) DOCUMENTED: ICD-10-PCS | Mod: HCNC,CPTII,NTX,S$GLB | Performed by: INTERNAL MEDICINE

## 2022-01-24 PROCEDURE — 93000 EKG 12-LEAD: ICD-10-PCS | Mod: HCNC,NTX,S$GLB, | Performed by: INTERNAL MEDICINE

## 2022-01-24 PROCEDURE — 3074F SYST BP LT 130 MM HG: CPT | Mod: HCNC,CPTII,NTX,S$GLB | Performed by: INTERNAL MEDICINE

## 2022-01-24 PROCEDURE — 3074F PR MOST RECENT SYSTOLIC BLOOD PRESSURE < 130 MM HG: ICD-10-PCS | Mod: HCNC,CPTII,NTX,S$GLB | Performed by: INTERNAL MEDICINE

## 2022-01-24 PROCEDURE — 99214 PR OFFICE/OUTPT VISIT, EST, LEVL IV, 30-39 MIN: ICD-10-PCS | Mod: HCNC,NTX,S$GLB, | Performed by: INTERNAL MEDICINE

## 2022-01-24 PROCEDURE — 1159F PR MEDICATION LIST DOCUMENTED IN MEDICAL RECORD: ICD-10-PCS | Mod: HCNC,CPTII,NTX,S$GLB | Performed by: INTERNAL MEDICINE

## 2022-01-24 PROCEDURE — 99999 PR PBB SHADOW E&M-EST. PATIENT-LVL IV: ICD-10-PCS | Mod: PBBFAC,HCNC,TXP, | Performed by: INTERNAL MEDICINE

## 2022-01-24 PROCEDURE — 99999 PR PBB SHADOW E&M-EST. PATIENT-LVL IV: CPT | Mod: PBBFAC,HCNC,TXP, | Performed by: INTERNAL MEDICINE

## 2022-01-24 PROCEDURE — 99499 UNLISTED E&M SERVICE: CPT | Mod: S$GLB,,, | Performed by: INTERNAL MEDICINE

## 2022-01-24 PROCEDURE — 3078F DIAST BP <80 MM HG: CPT | Mod: HCNC,CPTII,NTX,S$GLB | Performed by: INTERNAL MEDICINE

## 2022-01-24 PROCEDURE — 1160F PR REVIEW ALL MEDS BY PRESCRIBER/CLIN PHARMACIST DOCUMENTED: ICD-10-PCS | Mod: HCNC,CPTII,NTX,S$GLB | Performed by: INTERNAL MEDICINE

## 2022-01-24 PROCEDURE — 99499 RISK ADDL DX/OHS AUDIT: ICD-10-PCS | Mod: S$GLB,,, | Performed by: INTERNAL MEDICINE

## 2022-01-24 PROCEDURE — 99214 OFFICE O/P EST MOD 30 MIN: CPT | Mod: HCNC,NTX,S$GLB, | Performed by: INTERNAL MEDICINE

## 2022-01-24 PROCEDURE — 1160F RVW MEDS BY RX/DR IN RCRD: CPT | Mod: HCNC,CPTII,NTX,S$GLB | Performed by: INTERNAL MEDICINE

## 2022-01-24 PROCEDURE — 3008F BODY MASS INDEX DOCD: CPT | Mod: HCNC,CPTII,NTX,S$GLB | Performed by: INTERNAL MEDICINE

## 2022-01-24 PROCEDURE — 93000 ELECTROCARDIOGRAM COMPLETE: CPT | Mod: HCNC,NTX,S$GLB, | Performed by: INTERNAL MEDICINE

## 2022-01-24 PROCEDURE — 1159F MED LIST DOCD IN RCRD: CPT | Mod: HCNC,CPTII,NTX,S$GLB | Performed by: INTERNAL MEDICINE

## 2022-01-24 RX ORDER — SACUBITRIL AND VALSARTAN 97; 103 MG/1; MG/1
1 TABLET, FILM COATED ORAL 2 TIMES DAILY
Qty: 180 TABLET | Refills: 3 | Status: SHIPPED | OUTPATIENT
Start: 2022-01-24 | End: 2023-02-15 | Stop reason: SDUPTHER

## 2022-01-24 NOTE — PROGRESS NOTES
CARDIOVASCULAR PROGRESS NOTE    REASON FOR CONSULT:   aNsra Marks is a 36 y.o. female who presents for follow up of MINDY, MDT ICD.    PCP: Redd  Gyn: Labadie, Juan  CHF: Victorina  HISTORY OF PRESENT ILLNESS:   COVID+ 7/22/21    Patient comes in today for follow-up.  In the interim since her last office visit, she was seen by Dr. Prajapati with plans for right heart catheterization for workup of heart transplantation.  She reports generally stable status.  However, she is having some chest discomfort.  She has an area approximately 5 x 5 cm to the left of her sternum and to the right of her defibrillator which she describes as a pressure-type sensation.  There is some positional nature to this, although she is having some exertional symptoms.  She has had no palpitations, syncope, or defibrillator discharges.  There has been no melena, hematuria, or claudicant symptoms.  She denies any lower extremity edema, but is having some orthopnea.    Her EKG notes some new inferolateral EKG changes.  She was seen in the emergency room approximately 1 week ago with similar symptoms and a normal EKG with normal cardiac enzymes.  I have discussed the case with Dr. Melendez, who will get in touch with Dr. Prajapati.  Apparently, the patient needs to have her right heart catheterization performed at [facility], I am wondering if they might add a left heart and coronary angiogram to this procedure given the chest pain and EKG changes.    CARDIOVASCULAR HISTORY:   NICM (cath 4/2017 with normal cors)  ICD (Donta 12/2017, MDT single chamber)    PAST MEDICAL HISTORY:     Past Medical History:   Diagnosis Date    CHF (congestive heart failure)     Chronic combined systolic and diastolic congestive heart failure 5/24/2019    Essential hypertension 11/19/2012    Hypertension     dx at 16y.o.   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 disease, with heart failure 12/28/2021     ICD (implantable cardioverter-defibrillator), single, in situ 2020    Nonischemic cardiomyopathy 2019    Pacemaker 2017       PAST SURGICAL HISTORY:     Past Surgical History:   Procedure Laterality Date    CARDIAC DEFIBRILLATOR PLACEMENT  2017     SECTION      x 1    INDUCED       TUBAL LIGATION         ALLERGIES AND MEDICATION:     Review of patient's allergies indicates:   Allergen Reactions    Aldactone [spironolactone] Swelling     Lips swelled        Medication List          Accurate as of 2022 11:34 AM. If you have any questions, ask your nurse or doctor.            CONTINUE taking these medications    carvediloL 25 MG tablet  Commonly known as: COREG  Take 1 tablet (25 mg total) by mouth 2 (two) times daily with meals.     dapagliflozin 10 mg tablet  Commonly known as: FARXIGA  Take 1 tablet (10 mg total) by mouth once daily.     ENTRESTO  mg per tablet  Generic drug: sacubitriL-valsartan     eplerenone 25 MG Tab  Commonly known as: INSPRA  Take 1 tablet (25 mg total) by mouth once daily.     furosemide 40 MG tablet  Commonly known as: LASIX  Take 1 tablet (40 mg total) by mouth Daily.            SOCIAL HISTORY:     Social History     Socioeconomic History    Marital status:    Occupational History     Employer: [employer]   Tobacco Use    Smoking status: Never Smoker    Smokeless tobacco: Never Used   Substance and Sexual Activity    Alcohol use: Not Currently     Comment: rarely    Drug use: No     Comment: in past very little marijuana    Sexual activity: Yes     Partners: Male     Birth control/protection: None       FAMILY HISTORY:     Family History   Problem Relation Age of Onset    Hypertension Mother     Cancer Maternal Grandmother         breast x 2    Cancer Paternal Grandmother         breast    No Known Problems Sister     No Known Problems Brother     No Known Problems Son     No Known Problems Brother      "Hypertension Other     Diabetes Other     Breast cancer Other     Cancer Paternal Aunt         breast    Cancer Paternal Uncle        REVIEW OF SYSTEMS:   Review of Systems   Constitutional: Negative for chills, diaphoresis, fever and malaise/fatigue.   HENT: Negative for nosebleeds.    Eyes: Negative for blurred vision, double vision and photophobia.   Respiratory: Negative for hemoptysis, shortness of breath and wheezing.    Cardiovascular: Positive for chest pain. Negative for palpitations, orthopnea, claudication, leg swelling and PND.   Gastrointestinal: Negative for abdominal pain, blood in stool, heartburn, melena, nausea and vomiting.   Genitourinary: Negative for flank pain and hematuria.   Musculoskeletal: Negative for falls, myalgias and neck pain.   Skin: Negative for rash.   Neurological: Negative for dizziness, seizures, loss of consciousness, weakness and headaches.   Endo/Heme/Allergies: Negative for polydipsia. Does not bruise/bleed easily.   Psychiatric/Behavioral: Negative for depression and memory loss. The patient is not nervous/anxious.        PHYSICAL EXAM:     Vitals:    01/24/22 1111   BP: 110/67   Pulse: 71   Resp: 15    Body mass index is 25.68 kg/m².  Weight: 81.2 kg (179 lb)   Height: 5' 10" (177.8 cm)     Physical Exam  Vitals reviewed.   Constitutional:       General: She is not in acute distress.     Appearance: Normal appearance. She is well-developed. She is not ill-appearing, toxic-appearing or diaphoretic.   HENT:      Head: Normocephalic and atraumatic.   Eyes:      General: No scleral icterus.     Extraocular Movements: Extraocular movements intact.      Conjunctiva/sclera: Conjunctivae normal.      Pupils: Pupils are equal, round, and reactive to light.   Neck:      Thyroid: No thyromegaly.      Vascular: Normal carotid pulses. No carotid bruit or JVD.      Trachea: Trachea normal. No tracheal deviation.   Cardiovascular:      Rate and Rhythm: Normal rate and regular " rhythm.      Pulses:           Carotid pulses are 2+ on the right side and 2+ on the left side.     Heart sounds: S1 normal and S2 normal. No murmur heard.  No friction rub. No gallop.       Comments: L infraclavicular ICD site well healed  Pulmonary:      Effort: Pulmonary effort is normal. No respiratory distress.      Breath sounds: Normal breath sounds. No stridor. No wheezing, rhonchi or rales.   Chest:      Chest wall: No tenderness.   Abdominal:      General: There is no distension.      Palpations: Abdomen is soft.   Musculoskeletal:         General: No swelling or tenderness. Normal range of motion.      Cervical back: Normal range of motion and neck supple. No edema or rigidity.      Right lower leg: No edema.      Left lower leg: No edema.   Feet:      Right foot:      Skin integrity: No ulcer.      Left foot:      Skin integrity: No ulcer.   Skin:     General: Skin is warm and dry.      Coloration: Skin is not jaundiced.      Findings: No erythema or rash.   Neurological:      General: No focal deficit present.      Mental Status: She is alert and oriented to person, place, and time.      Cranial Nerves: No cranial nerve deficit.   Psychiatric:         Mood and Affect: Mood normal.         Speech: Speech normal.         Behavior: Behavior normal. Behavior is cooperative.         DATA:   EKG: (personally reviewed tracing(s))  1/24/22 SR 71, inflat ST abnl ?isch, new vs 1/14/22    Laboratory:  CBC:  Recent Labs   Lab 12/28/21  0742 12/31/21  1851 01/14/22  0807   WBC 6.84 6.88 6.91   Hemoglobin 11.3 L 11.4 L 11.9 L   Hematocrit 35.2 L 35.2 L 37.9   Platelets 312 288 297       CHEMISTRIES:  Recent Labs   Lab 07/28/21  0640 08/08/21  0727 08/12/21  2116 09/15/21  0834 10/01/21  2312 10/20/21  0830 12/31/21  1851 01/05/22  1139 01/14/22  0807   Glucose 83   < > 100   < > 119 H   < > 84 119 H 96   Sodium 137   < > 140   < > 140   < > 138 136 140   Potassium 3.5   < > 3.7   < > 3.5   < > 3.8 3.6 4.2   BUN 15    < > 9   < > 16   < > 14 11 12   Creatinine 0.7   < > 0.7   < > 0.8   < > 0.8 0.9 0.8   eGFR if African American >60   < > >60   < > >60   < > >60 >60.0 >60   eGFR if non African American >60   < > >60   < > >60   < > >60 >60.0 >60   Calcium 8.3 L   < > 9.6   < > 9.5   < > 9.1 9.3 8.6 L   Magnesium 2.0  --  1.9  --  2.1  --   --   --   --     < > = values in this interval not displayed.       CARDIAC BIOMARKERS:  Recent Labs   Lab 07/24/21  1802 07/24/21  2136 12/31/21  1851 01/14/22  0807 01/14/22  1511   CPK 77  --   --   --   --    Troponin I 0.035 H   < > <0.006 <0.006 <0.006    < > = values in this interval not displayed.       COAGS:        LIPIDS/LFTS:  Recent Labs   Lab 05/24/19  1927 05/25/19  0438 08/16/21  1409 09/15/21  0834 12/28/21  0742 12/31/21  1851 01/14/22  0807   Cholesterol 170  --  164  --   --   --   --    Triglycerides 46  --  106  --   --   --   --    HDL 43  --  36 L  --   --   --   --    LDL Cholesterol 117.8  --  106.8  --   --   --   --    Non-HDL Cholesterol 127  --  128  --   --   --   --    AST  --    < >  --    < > 18 17 13   ALT  --    < >  --    < > 13 15 10    < > = values in this interval not displayed.     Lab Results   Component Value Date    TSH 1.386 12/28/2021         Cardiovascular Testing:  Echo 10/6/21  · The left ventricle is severely enlarged with eccentric hypertrophy and severely decreased systolic function.  · The estimated ejection fraction is 15%.  · Grade III left ventricular diastolic dysfunction.  · Normal right ventricular size with normal right ventricular systolic function.  · Moderate left atrial enlargement.  · Mild mitral regurgitation.  · Normal central venous pressure (3 mmHg).  · The estimated PA systolic pressure is 15 mmHg.    Cath 4/18/17  B. Summary/Post-Operative Diagnosis    Normal coronary arteries.    Systolic dysfunction.    Mildly elevated left Filling Pressures.    Normal Pulmonary Hypertension.  D. Hemodynamic Results  Ejection Fraction:  20%  Global LV Function: severely depressed  PW:  (4)  PA: 24  CO_THERM: 4.4  RV: 25  RA:  (5)  LVEDP: 17   E. Angiographic Results       Patient has a right dominant coronary artery.      - Left Main Coronary Artery:             The LM is normal. There is DANNY 3 flow.     - Left Anterior Descending Artery:             The LAD is normal. There is DANNY 3 flow.     - Left Circumflex Artery:             The LCX is normal. There is DANNY 3 flow.     - Right Coronary Artery:             The RCA is normal. There is DANNY 3 flow.     - Left Renal Artery:             The ostial left renal is normal.     - Right Renal Artery:             The ostial right renal is normal.     - Common Femoral Artery:             The right CFA is normal.      ASSESSMENT:   # NICM, appears euvolemic.  Complaining of CP (?anginal) with new EKG changes, sxs ongoing for >1 week.  # hx COVID19 in late July 2021.  # MDT ICD  # hx NSVT, last in 8/12/21  # HTN, controlled  # hx CP, noncardiac ?GI    PLAN:   Cont med rx  Follow up with CHF svc as planned for RHC, ?needs LHC/cors as well.  RTC 1 month with MDT ICD check      Kashif Peguero MD, FACC

## 2022-01-25 ENCOUNTER — LAB VISIT (OUTPATIENT)
Dept: PRIMARY CARE CLINIC | Facility: CLINIC | Age: 37
End: 2022-01-25
Attending: INTERNAL MEDICINE
Payer: COMMERCIAL

## 2022-01-25 DIAGNOSIS — Z01.818 PRE-OP TESTING: ICD-10-CM

## 2022-01-25 PROCEDURE — U0003 INFECTIOUS AGENT DETECTION BY NUCLEIC ACID (DNA OR RNA); SEVERE ACUTE RESPIRATORY SYNDROME CORONAVIRUS 2 (SARS-COV-2) (CORONAVIRUS DISEASE [COVID-19]), AMPLIFIED PROBE TECHNIQUE, MAKING USE OF HIGH THROUGHPUT TECHNOLOGIES AS DESCRIBED BY CMS-2020-01-R: HCPCS | Mod: HCNC,NTX | Performed by: INTERNAL MEDICINE

## 2022-01-25 PROCEDURE — U0005 INFEC AGEN DETEC AMPLI PROBE: HCPCS | Performed by: INTERNAL MEDICINE

## 2022-01-26 ENCOUNTER — LAB VISIT (OUTPATIENT)
Dept: LAB | Facility: HOSPITAL | Age: 37
End: 2022-01-26
Attending: INTERNAL MEDICINE
Payer: COMMERCIAL

## 2022-01-26 DIAGNOSIS — I13.0 HYPERTENSIVE CARDIOVASCULAR-RENAL DISEASE, STAGE 1-4 OR UNSPECIFIED CHRONIC KIDNEY DISEASE, WITH HEART FAILURE: ICD-10-CM

## 2022-01-26 DIAGNOSIS — I50.42 CHRONIC COMBINED SYSTOLIC AND DIASTOLIC CONGESTIVE HEART FAILURE: ICD-10-CM

## 2022-01-26 DIAGNOSIS — R06.02 SOB (SHORTNESS OF BREATH): ICD-10-CM

## 2022-01-26 LAB
ANION GAP SERPL CALC-SCNC: 7 MMOL/L (ref 8–16)
BASOPHILS # BLD AUTO: 0.03 K/UL (ref 0–0.2)
BASOPHILS # BLD AUTO: 0.03 K/UL (ref 0–0.2)
BASOPHILS NFR BLD: 0.5 % (ref 0–1.9)
BASOPHILS NFR BLD: 0.5 % (ref 0–1.9)
BNP SERPL-MCNC: 265 PG/ML (ref 0–99)
BUN SERPL-MCNC: 17 MG/DL (ref 6–20)
CALCIUM SERPL-MCNC: 9.1 MG/DL (ref 8.7–10.5)
CHLORIDE SERPL-SCNC: 107 MMOL/L (ref 95–110)
CO2 SERPL-SCNC: 26 MMOL/L (ref 23–29)
CREAT SERPL-MCNC: 0.8 MG/DL (ref 0.5–1.4)
DIFFERENTIAL METHOD: ABNORMAL
DIFFERENTIAL METHOD: ABNORMAL
EOSINOPHIL # BLD AUTO: 0.1 K/UL (ref 0–0.5)
EOSINOPHIL # BLD AUTO: 0.1 K/UL (ref 0–0.5)
EOSINOPHIL NFR BLD: 1.5 % (ref 0–8)
EOSINOPHIL NFR BLD: 1.5 % (ref 0–8)
ERYTHROCYTE [DISTWIDTH] IN BLOOD BY AUTOMATED COUNT: 13.7 % (ref 11.5–14.5)
ERYTHROCYTE [DISTWIDTH] IN BLOOD BY AUTOMATED COUNT: 13.7 % (ref 11.5–14.5)
EST. GFR  (AFRICAN AMERICAN): >60 ML/MIN/1.73 M^2
EST. GFR  (NON AFRICAN AMERICAN): >60 ML/MIN/1.73 M^2
GLUCOSE SERPL-MCNC: 75 MG/DL (ref 70–110)
HCT VFR BLD AUTO: 38.5 % (ref 37–48.5)
HCT VFR BLD AUTO: 38.5 % (ref 37–48.5)
HGB BLD-MCNC: 12.2 G/DL (ref 12–16)
HGB BLD-MCNC: 12.2 G/DL (ref 12–16)
IMM GRANULOCYTES # BLD AUTO: 0.01 K/UL (ref 0–0.04)
IMM GRANULOCYTES # BLD AUTO: 0.01 K/UL (ref 0–0.04)
IMM GRANULOCYTES NFR BLD AUTO: 0.2 % (ref 0–0.5)
IMM GRANULOCYTES NFR BLD AUTO: 0.2 % (ref 0–0.5)
LYMPHOCYTES # BLD AUTO: 1.3 K/UL (ref 1–4.8)
LYMPHOCYTES # BLD AUTO: 1.3 K/UL (ref 1–4.8)
LYMPHOCYTES NFR BLD: 20.5 % (ref 18–48)
LYMPHOCYTES NFR BLD: 20.5 % (ref 18–48)
MCH RBC QN AUTO: 29.8 PG (ref 27–31)
MCH RBC QN AUTO: 29.8 PG (ref 27–31)
MCHC RBC AUTO-ENTMCNC: 31.7 G/DL (ref 32–36)
MCHC RBC AUTO-ENTMCNC: 31.7 G/DL (ref 32–36)
MCV RBC AUTO: 94 FL (ref 82–98)
MCV RBC AUTO: 94 FL (ref 82–98)
MONOCYTES # BLD AUTO: 0.5 K/UL (ref 0.3–1)
MONOCYTES # BLD AUTO: 0.5 K/UL (ref 0.3–1)
MONOCYTES NFR BLD: 8.4 % (ref 4–15)
MONOCYTES NFR BLD: 8.4 % (ref 4–15)
NEUTROPHILS # BLD AUTO: 4.3 K/UL (ref 1.8–7.7)
NEUTROPHILS # BLD AUTO: 4.3 K/UL (ref 1.8–7.7)
NEUTROPHILS NFR BLD: 68.9 % (ref 38–73)
NEUTROPHILS NFR BLD: 68.9 % (ref 38–73)
NRBC BLD-RTO: 0 /100 WBC
NRBC BLD-RTO: 0 /100 WBC
PLATELET # BLD AUTO: 311 K/UL (ref 150–450)
PLATELET # BLD AUTO: 311 K/UL (ref 150–450)
PMV BLD AUTO: 10.6 FL (ref 9.2–12.9)
PMV BLD AUTO: 10.6 FL (ref 9.2–12.9)
POTASSIUM SERPL-SCNC: 4.3 MMOL/L (ref 3.5–5.1)
RBC # BLD AUTO: 4.1 M/UL (ref 4–5.4)
RBC # BLD AUTO: 4.1 M/UL (ref 4–5.4)
SARS-COV-2 RNA RESP QL NAA+PROBE: NOT DETECTED
SARS-COV-2- CYCLE NUMBER: NORMAL
SODIUM SERPL-SCNC: 140 MMOL/L (ref 136–145)
WBC # BLD AUTO: 6.16 K/UL (ref 3.9–12.7)
WBC # BLD AUTO: 6.16 K/UL (ref 3.9–12.7)

## 2022-01-26 PROCEDURE — 36415 COLL VENOUS BLD VENIPUNCTURE: CPT | Mod: HCNC,PO,TXP | Performed by: INTERNAL MEDICINE

## 2022-01-26 PROCEDURE — 85025 COMPLETE CBC W/AUTO DIFF WBC: CPT | Mod: HCNC,NTX | Performed by: INTERNAL MEDICINE

## 2022-01-26 PROCEDURE — 83880 ASSAY OF NATRIURETIC PEPTIDE: CPT | Mod: HCNC,TXP | Performed by: INTERNAL MEDICINE

## 2022-01-26 PROCEDURE — 80048 BASIC METABOLIC PNL TOTAL CA: CPT | Mod: HCNC,NTX | Performed by: INTERNAL MEDICINE

## 2022-01-27 ENCOUNTER — HOSPITAL ENCOUNTER (OUTPATIENT)
Dept: CARDIOLOGY | Facility: HOSPITAL | Age: 37
Discharge: HOME | End: 2022-01-27
Attending: INTERNAL MEDICINE
Payer: MEDICARE

## 2022-01-27 ENCOUNTER — HOSPITAL ENCOUNTER (OUTPATIENT)
Facility: HOSPITAL | Age: 37
Discharge: HOME | End: 2022-01-27
Attending: INTERNAL MEDICINE | Admitting: INTERNAL MEDICINE
Payer: MEDICARE

## 2022-01-27 VITALS
WEIGHT: 175 LBS | HEIGHT: 70 IN | BODY MASS INDEX: 25.05 KG/M2 | HEART RATE: 61 BPM | DIASTOLIC BLOOD PRESSURE: 80 MMHG | SYSTOLIC BLOOD PRESSURE: 108 MMHG

## 2022-01-27 VITALS
RESPIRATION RATE: 16 BRPM | DIASTOLIC BLOOD PRESSURE: 76 MMHG | HEART RATE: 74 BPM | SYSTOLIC BLOOD PRESSURE: 121 MMHG | TEMPERATURE: 98 F | WEIGHT: 175.38 LBS | OXYGEN SATURATION: 100 % | BODY MASS INDEX: 25.98 KG/M2 | HEIGHT: 69 IN

## 2022-01-27 DIAGNOSIS — I50.9 CONGESTIVE HEART FAILURE, UNSPECIFIED HF CHRONICITY, UNSPECIFIED HEART FAILURE TYPE: ICD-10-CM

## 2022-01-27 DIAGNOSIS — I42.8 NONISCHEMIC CARDIOMYOPATHY: Chronic | ICD-10-CM

## 2022-01-27 DIAGNOSIS — I50.9 CHF (CONGESTIVE HEART FAILURE): ICD-10-CM

## 2022-01-27 DIAGNOSIS — I13.0 HYPERTENSIVE CARDIOVASCULAR-RENAL DISEASE, STAGE 1-4 OR UNSPECIFIED CHRONIC KIDNEY DISEASE, WITH HEART FAILURE: ICD-10-CM

## 2022-01-27 DIAGNOSIS — I50.42 CHRONIC COMBINED SYSTOLIC AND DIASTOLIC CONGESTIVE HEART FAILURE: ICD-10-CM

## 2022-01-27 DIAGNOSIS — I50.42 CHRONIC COMBINED SYSTOLIC AND DIASTOLIC CONGESTIVE HEART FAILURE: Primary | ICD-10-CM

## 2022-01-27 LAB
CV STRESS BASE HR: 61 BPM
DIASTOLIC BLOOD PRESSURE: 80 MMHG
OHS CV CPX 1 MINUTE RECOVERY HEART RATE: 90 BPM
OHS CV CPX 85 PERCENT MAX PREDICTED HEART RATE MALE: 149
OHS CV CPX DATA GRADE - PEAK: 5.1
OHS CV CPX DATA O2 SAT - PEAK: 98
OHS CV CPX DATA O2 SAT - REST: 98
OHS CV CPX DATA SPEED - PEAK: 3.1
OHS CV CPX DATA TIME - PEAK: 8.08
OHS CV CPX DATA VE/VCO2 - PEAK: 36
OHS CV CPX DATA VE/VO2 - PEAK: 31
OHS CV CPX DATA VO2 - PEAK: 16.2
OHS CV CPX DATA VO2 - REST: 3.2
OHS CV CPX FEV1/FVC: 0.77
OHS CV CPX FORCED EXPIRATORY VOLUME: 1.94
OHS CV CPX FORCED VITAL CAPACITY (FVC): 2.52
OHS CV CPX HIGHEST VO: 35.2
OHS CV CPX MAX PREDICTED HEART RATE: 175
OHS CV CPX MAXIMAL VOLUNTARY VENTILATION (MVV) PREDICTED: 77.6
OHS CV CPX MAXIMAL VOLUNTARY VENTILATION (MVV): 36
OHS CV CPX MAXIUMUM EXERCISE VENTILATION (VE MAX): 45.3
OHS CV CPX PATIENT AGE: 35
OHS CV CPX PATIENT HEIGHT IN: 70
OHS CV CPX PATIENT IS FEMALE AGE 11-19: 0
OHS CV CPX PATIENT IS FEMALE AGE GREATER THAN 19: 1
OHS CV CPX PATIENT IS FEMALE AGE LESS THAN 11: 0
OHS CV CPX PATIENT IS FEMALE: 1
OHS CV CPX PATIENT IS MALE AGE 11-25: 0
OHS CV CPX PATIENT IS MALE AGE GREATER THAN 25: 0
OHS CV CPX PATIENT IS MALE AGE LESS THAN 11: 0
OHS CV CPX PATIENT IS MALE GREATER THAN 18: 0
OHS CV CPX PATIENT IS MALE LESS THAN OR EQUAL TO 18: 0
OHS CV CPX PATIENT IS MALE: 0
OHS CV CPX PATIENT WEIGHT RETURNED IN OZ: 2800
OHS CV CPX PEAK DIASTOLIC BLOOD PRESSURE: 67 MMHG
OHS CV CPX PEAK HEAR RATE: 108 BPM
OHS CV CPX PEAK RATE PRESSURE PRODUCT: NORMAL
OHS CV CPX PEAK SYSTOLIC BLOOD PRESSURE: 132 MMHG
OHS CV CPX PERCENT BODY FAT: 25.4
OHS CV CPX PERCENT MAX PREDICTED HEART RATE ACHIEVED: 62
OHS CV CPX PREDICTED VO2: 35.2 ML/KG/MIN
OHS CV CPX RATE PRESSURE PRODUCT PRESENTING: 6588
OHS CV CPX REST PET CO2: 31
OHS CV CPX VE/VCO2 SLOPE: 34.5
STRESS ECHO POST EXERCISE DUR MIN: 8 MINUTES
STRESS ECHO POST EXERCISE DUR SEC: 5 SECONDS
STRESS ST DEPRESSION: 0.3 MM
SYSTOLIC BLOOD PRESSURE: 108 MMHG

## 2022-01-27 PROCEDURE — 25000003 PHARM REV CODE 250: Mod: HCNC,TXP | Performed by: INTERNAL MEDICINE

## 2022-01-27 PROCEDURE — C1751 CATH, INF, PER/CENT/MIDLINE: HCPCS | Mod: HCNC,TXP | Performed by: INTERNAL MEDICINE

## 2022-01-27 PROCEDURE — 94621 CARDIOPULM EXERCISE TESTING: CPT | Mod: HCNC,TXP

## 2022-01-27 PROCEDURE — 25000003 PHARM REV CODE 250: Mod: HCNC,NTX | Performed by: INTERNAL MEDICINE

## 2022-01-27 PROCEDURE — 94621 CARDIOPULMONARY EXERCISE TESTING (CUPID ONLY): ICD-10-PCS | Mod: 26,HCNC,NTX, | Performed by: INTERNAL MEDICINE

## 2022-01-27 PROCEDURE — C1894 INTRO/SHEATH, NON-LASER: HCPCS | Mod: HCNC,TXP | Performed by: INTERNAL MEDICINE

## 2022-01-27 PROCEDURE — 94621 CARDIOPULM EXERCISE TESTING: CPT | Mod: 26,HCNC,NTX, | Performed by: INTERNAL MEDICINE

## 2022-01-27 PROCEDURE — 93451 PR RIGHT HEART CATH O2 SATURATION & CARDIAC OUTPUT: ICD-10-PCS | Mod: 26,HCNC,NTX, | Performed by: INTERNAL MEDICINE

## 2022-01-27 PROCEDURE — 93451 RIGHT HEART CATH: CPT | Mod: HCNC,NTX | Performed by: INTERNAL MEDICINE

## 2022-01-27 PROCEDURE — 93451 RIGHT HEART CATH: CPT | Mod: 26,HCNC,NTX, | Performed by: INTERNAL MEDICINE

## 2022-01-27 RX ORDER — LIDOCAINE HYDROCHLORIDE 20 MG/ML
INJECTION, SOLUTION EPIDURAL; INFILTRATION; INTRACAUDAL; PERINEURAL
Status: DISCONTINUED | OUTPATIENT
Start: 2022-01-27 | End: 2022-01-27 | Stop reason: HOSPADM

## 2022-01-27 NOTE — Clinical Note
The PA catheter was repositioned to the pulmonary wedge. Hemodynamics were performed. O2 saturation was measured at 91%.

## 2022-01-27 NOTE — SUBJECTIVE & OBJECTIVE
Past Medical History:   Diagnosis Date    CHF (congestive heart failure)     Chronic combined systolic and diastolic congestive heart failure 2019    Essential hypertension 2012    Hypertension     dx at 16y.o.   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 disease, with heart failure 2021    ICD (implantable cardioverter-defibrillator), single, in situ 2020    Nonischemic cardiomyopathy 2019    Pacemaker 2017       Past Surgical History:   Procedure Laterality Date    CARDIAC DEFIBRILLATOR PLACEMENT  2017     SECTION      x 1    INDUCED       TUBAL LIGATION         Review of patient's allergies indicates:   Allergen Reactions    Aldactone [spironolactone] Swelling     Lips swelled       No current facility-administered medications for this encounter.     Family History     Problem Relation (Age of Onset)    Breast cancer Other    Cancer Maternal Grandmother, Paternal Grandmother, Paternal Aunt, Paternal Uncle    Diabetes Other    Hypertension Mother, Other    No Known Problems Sister, Brother, Son, Brother        Tobacco Use    Smoking status: Never Smoker    Smokeless tobacco: Never Used   Substance and Sexual Activity    Alcohol use: Not Currently     Comment: rarely    Drug use: No     Comment: in past very little marijuana    Sexual activity: Yes     Partners: Male     Birth control/protection: None     Review of Systems   Constitutional: Positive for fatigue. Negative for chills.   HENT: Negative for nosebleeds and tinnitus.    Eyes: Negative for photophobia and visual disturbance.   Respiratory: Positive for shortness of breath. Negative for wheezing.    Cardiovascular: Negative for chest pain, palpitations and leg swelling.   Gastrointestinal: Negative for abdominal distention, abdominal pain, nausea and vomiting.   Genitourinary: Negative for dysuria, flank pain and hematuria.   Musculoskeletal:  "Negative for gait problem and joint swelling.   Skin: Negative for rash and wound.   Neurological: Negative for seizures and syncope.     Objective:     Vital Signs (Most Recent):  Temp: 98.4 °F (36.9 °C) (01/27/22 0955)  Pulse: 69 (01/27/22 0955)  Resp: 18 (01/27/22 0955)  BP: 103/66 (01/27/22 0956)  SpO2: 100 % (01/27/22 0955) Vital Signs (24h Range):  Temp:  [98.4 °F (36.9 °C)] 98.4 °F (36.9 °C)  Pulse:  [61-69] 69  Resp:  [18] 18  SpO2:  [100 %] 100 %  BP: (101-108)/(66-80) 103/66     Patient Vitals for the past 72 hrs (Last 3 readings):   Weight   01/27/22 0955 79.5 kg (175 lb 6 oz)     Body mass index is 25.9 kg/m².    No intake or output data in the 24 hours ending 01/27/22 1033    Physical Exam    Significant Labs:  CBC:  Recent Labs   Lab 01/26/22 0934   WBC 6.16  6.16   RBC 4.10  4.10   HGB 12.2  12.2   HCT 38.5  38.5     311   MCV 94  94   MCH 29.8  29.8   MCHC 31.7*  31.7*     BNP:  Recent Labs   Lab 01/26/22 0934   *     CMP:  Recent Labs   Lab 01/26/22 0934   GLU 75   CALCIUM 9.1      K 4.3   CO2 26      BUN 17   CREATININE 0.8      Coagulation:   No results for input(s): PT, INR, APTT in the last 168 hours.  LDH:  No results for input(s): LDH in the last 72 hours.  Microbiology:  Microbiology Results (last 7 days)     ** No results found for the last 168 hours. **          {Select Labs:78747::"I have reviewed all pertinent labs within the past 24 hours."}    Diagnostic Results:  {Imaging Review:[id number]}  "

## 2022-01-27 NOTE — PROGRESS NOTES
Report received from DIGNA Haas. Patient s/p RHC. Dressing to R IJ cdi,soft. No complaints from patient. Vss. Call light in reach.

## 2022-01-27 NOTE — PROCEDURES
Brief Operative Note:    : Timothy Prajapati Jr., MD     Referring Physician: Timothy Prajapati Jr.     All Operators: Surgeon(s):  MD Aruna Ornelas Jr., MD     Preoperative Diagnosis: Congestive heart failure, unspecified HF chronicity, unspecified heart failure type [I50.9]     Postop Diagnosis: Congestive heart failure, unspecified HF chronicity, unspecified heart failure type [I50.9]    Treatments/Procedures: Procedure(s) (LRB):  INSERTION, CATHETER, RIGHT HEART (Right)    Findings: Normal filling pressure on both left and right side.  See catheterization report for full details.    Estimated Blood loss: 20 cc    Specimens removed: No    Aruna Marks     Pt care discussed with Dr Prajapati

## 2022-01-27 NOTE — Clinical Note
The site was marked. The right neck was prepped. The site was prepped with ChloraPrep. The site was clipped. The patient was draped. The patient was positioned supine. (Right IJ)

## 2022-01-27 NOTE — Clinical Note
The PA catheter was repositioned to the main pulmonary artery. Hemodynamics were performed. O2 saturation was measured at 70%.  CO = 5.22   CI = 2.67

## 2022-01-27 NOTE — INTERVAL H&P NOTE
The patient has been examined and the H&P has been reviewed:    37 yo BF diagnosis CHF due to nonischemic cardiomyopathy in 2017 with angiogram at that time without evidence of coronary artery disease. She was referred by Dr. Kashif Peguero to [facility] [place] for consideration for advanced options and is here today for RHC for risk stratification    Procedure risks, benefits and alternative options discussed and understood by patient/family.    Patient seen and examined. No interval change since last H&P. Here for a RHC to assess her hemodynamics.   Access via the right IJ  7 Fr venous sheath  Risks and benefits of the procedure were explained to the patient. All questions were answered.  Consents were signed and in the chart.    Aruna Marks MD    Pt care discussed with Dr Prajapati

## 2022-01-31 DIAGNOSIS — I13.0 HYPERTENSIVE CARDIOVASCULAR-RENAL DISEASE, STAGE 1-4 OR UNSPECIFIED CHRONIC KIDNEY DISEASE, WITH HEART FAILURE: ICD-10-CM

## 2022-01-31 DIAGNOSIS — I50.42 CHRONIC COMBINED SYSTOLIC AND DIASTOLIC CONGESTIVE HEART FAILURE: Primary | ICD-10-CM

## 2022-01-31 DIAGNOSIS — I42.8 NONISCHEMIC CARDIOMYOPATHY: Chronic | ICD-10-CM

## 2022-01-31 DIAGNOSIS — R06.09 DOE (DYSPNEA ON EXERTION): ICD-10-CM

## 2022-01-31 DIAGNOSIS — Z95.810 ICD (IMPLANTABLE CARDIOVERTER-DEFIBRILLATOR), SINGLE, IN SITU: ICD-10-CM

## 2022-01-31 NOTE — DISCHARGE SUMMARY
[facility] Short Stay Cardiac Unit  Discharge Note  Short Stay    Procedure(s) (LRB):  INSERTION, CATHETER, RIGHT HEART (Right)    OUTCOME: Patient tolerated treatment/procedure well without complication and is now ready for discharge.    DISPOSITION: Home or Self Care    FINAL DIAGNOSIS:  Chronic combined systolic and diastolic congestive heart failure    FOLLOWUP: In clinic  With Dr. Prajapati and Marielena.  The RHC looks good while CPX demonstrated failure to attain anaerobic threshold and RER suboptimal effort making determination of result more challenging.  Discussed with patient will follow monthly with 6 MWT each visit and repeat CPX within 3 months.    DISCHARGE INSTRUCTIONS:  No discharge procedures on file.     TIME SPENT ON DISCHARGE: 10 minutes

## 2022-01-31 NOTE — DISCHARGE SUMMARY
[facility] Short Stay Cardiac [facility]  Discharge Note  Short Stay    Procedure(s) (LRB):  INSERTION, CATHETER, RIGHT HEART (Right)    OUTCOME: Patient tolerated treatment/procedure well without complication and is now ready for discharge.    DISPOSITION: Home or Self Care    FINAL DIAGNOSIS:  <principal problem not specified>    FOLLOWUP: In clinic    DISCHARGE INSTRUCTIONS:  No discharge procedures on file.     TIME SPENT ON DISCHARGE: 10 minutes

## 2022-02-01 ENCOUNTER — OFFICE VISIT (OUTPATIENT)
Dept: NEUROLOGY | Facility: CLINIC | Age: 37
End: 2022-02-01
Payer: MEDICARE

## 2022-02-01 VITALS
HEIGHT: 69 IN | SYSTOLIC BLOOD PRESSURE: 112 MMHG | DIASTOLIC BLOOD PRESSURE: 68 MMHG | HEART RATE: 67 BPM | BODY MASS INDEX: 26.51 KG/M2 | WEIGHT: 179 LBS

## 2022-02-01 DIAGNOSIS — R55 SYNCOPE, UNSPECIFIED SYNCOPE TYPE: Primary | ICD-10-CM

## 2022-02-01 PROCEDURE — 99999 PR PBB SHADOW E&M-EST. PATIENT-LVL III: CPT | Mod: PBBFAC,HCNC,TXP, | Performed by: NEUROLOGICAL SURGERY

## 2022-02-01 PROCEDURE — 3008F BODY MASS INDEX DOCD: CPT | Mod: HCNC,CPTII,S$GLB,TXP | Performed by: NEUROLOGICAL SURGERY

## 2022-02-01 PROCEDURE — 4010F PR ACE/ARB THEARPY RXD/TAKEN: ICD-10-PCS | Mod: HCNC,CPTII,S$GLB,TXP | Performed by: NEUROLOGICAL SURGERY

## 2022-02-01 PROCEDURE — 3074F PR MOST RECENT SYSTOLIC BLOOD PRESSURE < 130 MM HG: ICD-10-PCS | Mod: HCNC,CPTII,S$GLB,TXP | Performed by: NEUROLOGICAL SURGERY

## 2022-02-01 PROCEDURE — 3074F SYST BP LT 130 MM HG: CPT | Mod: HCNC,CPTII,S$GLB,TXP | Performed by: NEUROLOGICAL SURGERY

## 2022-02-01 PROCEDURE — 3078F DIAST BP <80 MM HG: CPT | Mod: HCNC,CPTII,S$GLB,TXP | Performed by: NEUROLOGICAL SURGERY

## 2022-02-01 PROCEDURE — 3008F PR BODY MASS INDEX (BMI) DOCUMENTED: ICD-10-PCS | Mod: HCNC,CPTII,S$GLB,TXP | Performed by: NEUROLOGICAL SURGERY

## 2022-02-01 PROCEDURE — 1159F MED LIST DOCD IN RCRD: CPT | Mod: HCNC,CPTII,S$GLB,TXP | Performed by: NEUROLOGICAL SURGERY

## 2022-02-01 PROCEDURE — 99214 OFFICE O/P EST MOD 30 MIN: CPT | Mod: HCNC,S$GLB,TXP, | Performed by: NEUROLOGICAL SURGERY

## 2022-02-01 PROCEDURE — 3078F PR MOST RECENT DIASTOLIC BLOOD PRESSURE < 80 MM HG: ICD-10-PCS | Mod: HCNC,CPTII,S$GLB,TXP | Performed by: NEUROLOGICAL SURGERY

## 2022-02-01 PROCEDURE — 99214 PR OFFICE/OUTPT VISIT, EST, LEVL IV, 30-39 MIN: ICD-10-PCS | Mod: HCNC,S$GLB,TXP, | Performed by: NEUROLOGICAL SURGERY

## 2022-02-01 PROCEDURE — 1159F PR MEDICATION LIST DOCUMENTED IN MEDICAL RECORD: ICD-10-PCS | Mod: HCNC,CPTII,S$GLB,TXP | Performed by: NEUROLOGICAL SURGERY

## 2022-02-01 PROCEDURE — 99999 PR PBB SHADOW E&M-EST. PATIENT-LVL III: ICD-10-PCS | Mod: PBBFAC,HCNC,TXP, | Performed by: NEUROLOGICAL SURGERY

## 2022-02-01 PROCEDURE — 4010F ACE/ARB THERAPY RXD/TAKEN: CPT | Mod: HCNC,CPTII,S$GLB,TXP | Performed by: NEUROLOGICAL SURGERY

## 2022-02-01 NOTE — PROGRESS NOTES
Chief Complaint   Patient presents with    Follow-up     EEG results         Nasra Marks is a 36 y.o. female with a history of multiple medical diagnoses as listed below that presents for evaluation of headaches and an episode of syncope.  She does not remember syncopal event as the last thing that she can recall was sitting down watching television.  After which she woke up to her mother's print her name.  She thinks that she may have bitten her tongue because it was hurting after she awoke.  Her mother said that she had compulsive activity throughout body which her mother felt like resemble seizure activity.  She has no previous history of head trauma.  No history of seizures.  No family history of seizures.  She does have cardiac disease was been followed with cardiology and recently she has been sent to the congestive Heart failure Service for further evaluation.  In addition to the aforementioned problems and also been having headache that seems to be mainly a bifrontal throbbing pain.  Pain can also be a constant pressure.  Symptoms are typically in 8/10 but can arise in intensity especially if she has exposure to bright light.  She typically has nausea with her headaches but does not have any vomiting.    Interval History  02/01/2022  She has been doing fairly well overall since she was last seen in clinic without any significant changes in her baseline.  This syncopal event that occurred may have been related to a cardiac source that she says that her defibrillator was thought to have discharged after she had already passed out which would account for her being unaware of the discharge.  She had stress testing recently but was unable to reach adequate results has further testing upcoming with her cardiologist.  EEG was done as recommended.    PAST MEDICAL HISTORY:  Past Medical History:   Diagnosis Date    CHF (congestive heart failure)     Chronic combined systolic and diastolic congestive heart  failure 2019    Essential hypertension 2012    Hypertension     dx at 16y.o.   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 disease, with heart failure 2021    ICD (implantable cardioverter-defibrillator), single, in situ 2020    Nonischemic cardiomyopathy 2019    Pacemaker 2017       PAST SURGICAL HISTORY:  Past Surgical History:   Procedure Laterality Date    CARDIAC DEFIBRILLATOR PLACEMENT  2017     SECTION      x 1    INDUCED       RIGHT HEART CATHETERIZATION Right 2022    Procedure: INSERTION, CATHETER, RIGHT HEART;  Surgeon: Timothy Prajapati Jr., MD;  Location: [facility] CATH LAB;  Service: Cardiology;  Laterality: Right;    TUBAL LIGATION         SOCIAL HISTORY:  Social History     Socioeconomic History    Marital status:    Occupational History     Employer: [employer]   Tobacco Use    Smoking status: Never Smoker    Smokeless tobacco: Never Used   Substance and Sexual Activity    Alcohol use: Not Currently     Comment: rarely    Drug use: No     Comment: in past very little marijuana    Sexual activity: Yes     Partners: Male     Birth control/protection: None       FAMILY HISTORY:  Family History   Problem Relation Age of Onset    Hypertension Mother     Cancer Maternal Grandmother         breast x 2    Cancer Paternal Grandmother         breast    No Known Problems Sister     No Known Problems Brother     No Known Problems Son     No Known Problems Brother     Hypertension Other     Diabetes Other     Breast cancer Other     Cancer Paternal Aunt         breast    Cancer Paternal Uncle        ALLERGIES AND MEDICATIONS: updated and reviewed.  Review of patient's allergies indicates:   Allergen Reactions    Aldactone [spironolactone] Swelling     Lips swelled     Current Outpatient Medications   Medication Sig Dispense Refill    carvediloL (COREG) 25 MG tablet Take 1 tablet (25  mg total) by mouth 2 (two) times daily with meals. 60 tablet 5    dapagliflozin (FARXIGA) 10 mg tablet Take 1 tablet (10 mg total) by mouth once daily. 30 tablet 5    eplerenone (INSPRA) 25 MG Tab Take 1 tablet (25 mg total) by mouth once daily. 30 tablet 11    furosemide (LASIX) 40 MG tablet Take 1 tablet (40 mg total) by mouth Daily. 90 tablet 3    sacubitriL-valsartan (ENTRESTO)  mg per tablet Take 1 tablet by mouth 2 (two) times daily. 180 tablet 3     No current facility-administered medications for this visit.       Review of Systems   Constitutional: Negative for activity change, appetite change, fever and unexpected weight change.   HENT: Negative for trouble swallowing and voice change.    Eyes: Positive for photophobia and visual disturbance.   Respiratory: Negative for apnea and shortness of breath.    Cardiovascular: Negative for chest pain and leg swelling.   Gastrointestinal: Positive for nausea and vomiting. Negative for constipation.   Genitourinary: Negative for difficulty urinating.   Musculoskeletal: Negative for back pain, gait problem and neck pain.   Skin: Negative for color change and pallor.   Neurological: Positive for headaches. Negative for dizziness, seizures, syncope, weakness and numbness.   Hematological: Negative for adenopathy.   Psychiatric/Behavioral: Negative for agitation, confusion and decreased concentration.       Neurologic Exam     Mental Status   Oriented to person, place, and time.   Registration: recalls 3 of 3 objects.   Attention: normal. Concentration: normal.   Speech: speech is normal   Level of consciousness: alert  Knowledge: good.     Cranial Nerves     CN II   Right visual field deficit: none  Left visual field deficit: none     CN III, IV, VI   Extraocular motions are normal.   Right pupil: Size: 3 mm. Shape: regular.   Left pupil: Size: 3 mm. Shape: regular.   CN III: no CN III palsy  CN VI: no CN VI palsy  Nystagmus: none   Diplopia:  "none  Ophthalmoparesis: none  Upgaze: normal  Downgaze: normal  Conjugate gaze: present    CN VII   Facial expression full, symmetric.   Right facial weakness: none  Left facial weakness: none    CN VIII   CN VIII normal.     CN XI   CN XI normal.     CN XII   CN XII normal.   Tongue deviation: none    Motor Exam   Muscle bulk: normal  Overall muscle tone: normal  Right arm tone: normal  Left arm tone: normal  Right leg tone: normal  Left leg tone: normal    Strength   Strength 5/5 throughout.     Gait, Coordination, and Reflexes     Gait  Gait: normal    Coordination   Finger to nose coordination: normal    Tremor   Resting tremor: absent      Physical Exam  Vitals reviewed.   Constitutional:       Appearance: She is well-developed.   HENT:      Head: Normocephalic and atraumatic.   Eyes:      Extraocular Movements: EOM normal.   Pulmonary:      Effort: Pulmonary effort is normal. No respiratory distress.   Musculoskeletal:         General: Normal range of motion.      Cervical back: Normal range of motion.   Neurological:      Mental Status: She is alert and oriented to person, place, and time.      Coordination: Finger-Nose-Finger Test normal.      Gait: Gait is intact.      Deep Tendon Reflexes: Strength normal.   Psychiatric:         Speech: Speech normal.         Behavior: Behavior normal.         Thought Content: Thought content normal.         Vitals:    02/01/22 0919   BP: 112/68   Pulse: 67   Weight: 81.2 kg (179 lb 0.2 oz)   Height: 5' 9" (1.753 m)       Assessment & Plan:    Problem List Items Addressed This Visit     Syncope - Primary    Overview     Question whether the event was cardiac convulsive syncope. Routine EEG normal; ambulatory EEG if cardiac workup unrevealing.               Follow-up: Follow up if symptoms worsen or fail to improve.    This note was done with the assistance of voice recognition software. Some errors may be present after proofreading.          "

## 2022-02-15 ENCOUNTER — OFFICE VISIT (OUTPATIENT)
Dept: CARDIOLOGY | Facility: CLINIC | Age: 37
End: 2022-02-15
Payer: COMMERCIAL

## 2022-02-15 VITALS
SYSTOLIC BLOOD PRESSURE: 100 MMHG | RESPIRATION RATE: 15 BRPM | HEIGHT: 69 IN | BODY MASS INDEX: 26.51 KG/M2 | HEART RATE: 81 BPM | WEIGHT: 179 LBS | OXYGEN SATURATION: 95 % | DIASTOLIC BLOOD PRESSURE: 59 MMHG

## 2022-02-15 DIAGNOSIS — I50.42 CHRONIC COMBINED SYSTOLIC AND DIASTOLIC CONGESTIVE HEART FAILURE: Primary | ICD-10-CM

## 2022-02-15 DIAGNOSIS — Z95.810 ICD (IMPLANTABLE CARDIOVERTER-DEFIBRILLATOR), SINGLE, IN SITU: ICD-10-CM

## 2022-02-15 DIAGNOSIS — R07.89 CHEST PAIN, NON-CARDIAC: ICD-10-CM

## 2022-02-15 DIAGNOSIS — I10 ESSENTIAL HYPERTENSION: ICD-10-CM

## 2022-02-15 DIAGNOSIS — I42.8 NONISCHEMIC CARDIOMYOPATHY: ICD-10-CM

## 2022-02-15 PROCEDURE — 1160F PR REVIEW ALL MEDS BY PRESCRIBER/CLIN PHARMACIST DOCUMENTED: ICD-10-PCS | Mod: HCNC,CPTII,NTX,S$GLB | Performed by: INTERNAL MEDICINE

## 2022-02-15 PROCEDURE — 1160F RVW MEDS BY RX/DR IN RCRD: CPT | Mod: HCNC,CPTII,NTX,S$GLB | Performed by: INTERNAL MEDICINE

## 2022-02-15 PROCEDURE — 4010F ACE/ARB THERAPY RXD/TAKEN: CPT | Mod: HCNC,CPTII,NTX,S$GLB | Performed by: INTERNAL MEDICINE

## 2022-02-15 PROCEDURE — 99214 OFFICE O/P EST MOD 30 MIN: CPT | Mod: HCNC,NTX,S$GLB, | Performed by: INTERNAL MEDICINE

## 2022-02-15 PROCEDURE — 99999 PR PBB SHADOW E&M-EST. PATIENT-LVL III: CPT | Mod: PBBFAC,HCNC,TXP, | Performed by: INTERNAL MEDICINE

## 2022-02-15 PROCEDURE — 99499 UNLISTED E&M SERVICE: CPT | Mod: HCNC,S$GLB,, | Performed by: INTERNAL MEDICINE

## 2022-02-15 PROCEDURE — 99999 PR PBB SHADOW E&M-EST. PATIENT-LVL III: ICD-10-PCS | Mod: PBBFAC,HCNC,TXP, | Performed by: INTERNAL MEDICINE

## 2022-02-15 PROCEDURE — 99214 PR OFFICE/OUTPT VISIT, EST, LEVL IV, 30-39 MIN: ICD-10-PCS | Mod: HCNC,NTX,S$GLB, | Performed by: INTERNAL MEDICINE

## 2022-02-15 PROCEDURE — 93282 PRGRMG EVAL IMPLANTABLE DFB: CPT | Mod: 26,HCNC,NTX,S$GLB | Performed by: INTERNAL MEDICINE

## 2022-02-15 PROCEDURE — 1159F PR MEDICATION LIST DOCUMENTED IN MEDICAL RECORD: ICD-10-PCS | Mod: HCNC,CPTII,NTX,S$GLB | Performed by: INTERNAL MEDICINE

## 2022-02-15 PROCEDURE — 3008F PR BODY MASS INDEX (BMI) DOCUMENTED: ICD-10-PCS | Mod: HCNC,CPTII,NTX,S$GLB | Performed by: INTERNAL MEDICINE

## 2022-02-15 PROCEDURE — 3074F SYST BP LT 130 MM HG: CPT | Mod: HCNC,CPTII,NTX,S$GLB | Performed by: INTERNAL MEDICINE

## 2022-02-15 PROCEDURE — 3074F PR MOST RECENT SYSTOLIC BLOOD PRESSURE < 130 MM HG: ICD-10-PCS | Mod: HCNC,CPTII,NTX,S$GLB | Performed by: INTERNAL MEDICINE

## 2022-02-15 PROCEDURE — 93282 PR PROGRAM EVAL (IN PERSON) IMPLANT DEVICE,CARDVERT/DEFIB,1 LEAD: ICD-10-PCS | Mod: 26,HCNC,NTX,S$GLB | Performed by: INTERNAL MEDICINE

## 2022-02-15 PROCEDURE — 3078F PR MOST RECENT DIASTOLIC BLOOD PRESSURE < 80 MM HG: ICD-10-PCS | Mod: HCNC,CPTII,NTX,S$GLB | Performed by: INTERNAL MEDICINE

## 2022-02-15 PROCEDURE — 1159F MED LIST DOCD IN RCRD: CPT | Mod: HCNC,CPTII,NTX,S$GLB | Performed by: INTERNAL MEDICINE

## 2022-02-15 PROCEDURE — 3008F BODY MASS INDEX DOCD: CPT | Mod: HCNC,CPTII,NTX,S$GLB | Performed by: INTERNAL MEDICINE

## 2022-02-15 PROCEDURE — 3078F DIAST BP <80 MM HG: CPT | Mod: HCNC,CPTII,NTX,S$GLB | Performed by: INTERNAL MEDICINE

## 2022-02-15 PROCEDURE — 4010F PR ACE/ARB THEARPY RXD/TAKEN: ICD-10-PCS | Mod: HCNC,CPTII,NTX,S$GLB | Performed by: INTERNAL MEDICINE

## 2022-02-15 PROCEDURE — 99499 RISK ADDL DX/OHS AUDIT: ICD-10-PCS | Mod: HCNC,S$GLB,, | Performed by: INTERNAL MEDICINE

## 2022-02-15 RX ORDER — EPLERENONE 25 MG/1
25 TABLET, FILM COATED ORAL DAILY
Qty: 90 TABLET | Refills: 3 | Status: SHIPPED | OUTPATIENT
Start: 2022-02-15 | End: 2023-07-21 | Stop reason: SDUPTHER

## 2022-02-15 RX ORDER — FUROSEMIDE 40 MG/1
40 TABLET ORAL 2 TIMES DAILY
Qty: 180 TABLET | Refills: 3 | Status: SHIPPED | OUTPATIENT
Start: 2022-02-15 | End: 2023-07-28 | Stop reason: SDUPTHER

## 2022-02-15 RX ORDER — CARVEDILOL 25 MG/1
25 TABLET ORAL 2 TIMES DAILY WITH MEALS
Qty: 180 TABLET | Refills: 3 | Status: SHIPPED | OUTPATIENT
Start: 2022-02-15 | End: 2022-02-25

## 2022-02-15 NOTE — PROGRESS NOTES
CARDIOVASCULAR PROGRESS NOTE    REASON FOR CONSULT:   Nasra Marks is a 36 y.o. female who presents for follow up of MINDY, MDT ICD.    PCP: Redd  Gyn: Labadie, Juan  CHF: Eiswirth  HISTORY OF PRESENT ILLNESS:   COVID+ 7/22/21    The patient returns for follow-up.  In the interim since her last office visit, she underwent right heart catheterization.  She tells me that she has doubled up on her Lasix and that her chest discomfort has abated.  She seems to think that this may cycle with her menstrual cycle.  She otherwise had no palpitations, syncope, or defibrillator discharges.  She does continue to report orthopnea without PND or lower extremity edema.  There has been no melena, hematuria, or claudicant symptoms.  She is also complaining of some generalized fatigue.    The Medtronic ICD was interrogated in the office today.  Current rhythm is ventricular sensed at 73 beats per minute.  She is currently paced 0.1% time the ventricle.  Estimated longevity is 8.5 years.  Sensing and pacing thresholds as well as impedance are normal.  There been no high ventricular rate episodes noted.  The device is functioning normally and no programming changes were made.    CARDIOVASCULAR HISTORY:   NICM (cath 4/2017 with normal cors)  ICD (Donta 12/2017, MDT single chamber)    PAST MEDICAL HISTORY:     Past Medical History:   Diagnosis Date    CHF (congestive heart failure)     Chronic combined systolic and diastolic congestive heart failure 5/24/2019    Essential hypertension 11/19/2012    Hypertension     dx at 16y.o.   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 disease, with heart failure 12/28/2021    ICD (implantable cardioverter-defibrillator), single, in situ 2/17/2020    Nonischemic cardiomyopathy 5/24/2019    Pacemaker 12/18/2017       PAST SURGICAL HISTORY:     Past Surgical History:   Procedure Laterality Date    CARDIAC DEFIBRILLATOR PLACEMENT  12/2017      SECTION      x 1    INDUCED       RIGHT HEART CATHETERIZATION Right 2022    Procedure: INSERTION, CATHETER, RIGHT HEART;  Surgeon: Timothy Prajapati Jr., MD;  Location: [facility] CATH LAB;  Service: Cardiology;  Laterality: Right;    TUBAL LIGATION         ALLERGIES AND MEDICATION:     Review of patient's allergies indicates:   Allergen Reactions    Aldactone [spironolactone] Swelling     Lips swelled        Medication List          Accurate as of February 15, 2022  1:37 PM. If you have any questions, ask your nurse or doctor.            CONTINUE taking these medications    carvediloL 25 MG tablet  Commonly known as: COREG  Take 1 tablet (25 mg total) by mouth 2 (two) times daily with meals.     dapagliflozin 10 mg tablet  Commonly known as: FARXIGA  Take 1 tablet (10 mg total) by mouth once daily.     ENTRESTO  mg per tablet  Generic drug: sacubitriL-valsartan  Take 1 tablet by mouth 2 (two) times daily.     eplerenone 25 MG Tab  Commonly known as: INSPRA  Take 1 tablet (25 mg total) by mouth once daily.     furosemide 40 MG tablet  Commonly known as: LASIX  Take 1 tablet (40 mg total) by mouth Daily.            SOCIAL HISTORY:     Social History     Socioeconomic History    Marital status:    Occupational History     Employer: [employer]   Tobacco Use    Smoking status: Never Smoker    Smokeless tobacco: Never Used   Substance and Sexual Activity    Alcohol use: Not Currently     Comment: rarely    Drug use: No     Comment: in past very little marijuana    Sexual activity: Yes     Partners: Male     Birth control/protection: None       FAMILY HISTORY:     Family History   Problem Relation Age of Onset    Hypertension Mother     Cancer Maternal Grandmother         breast x 2    Cancer Paternal Grandmother         breast    No Known Problems Sister     No Known Problems Brother     No Known Problems Son     No Known Problems Brother     Hypertension Other      "Diabetes Other     Breast cancer Other     Cancer Paternal Aunt         breast    Cancer Paternal Uncle        REVIEW OF SYSTEMS:   Review of Systems   Constitutional: Positive for malaise/fatigue. Negative for chills, diaphoresis and fever.   HENT: Negative for nosebleeds.    Eyes: Negative for blurred vision, double vision and photophobia.   Respiratory: Negative for hemoptysis, shortness of breath and wheezing.    Cardiovascular: Positive for orthopnea. Negative for chest pain, palpitations, claudication, leg swelling and PND.   Gastrointestinal: Negative for abdominal pain, blood in stool, heartburn, melena, nausea and vomiting.   Genitourinary: Negative for flank pain and hematuria.   Musculoskeletal: Negative for falls, myalgias and neck pain.   Skin: Negative for rash.   Neurological: Negative for dizziness, seizures, loss of consciousness, weakness and headaches.   Endo/Heme/Allergies: Negative for polydipsia. Does not bruise/bleed easily.   Psychiatric/Behavioral: Negative for depression and memory loss. The patient is not nervous/anxious.        PHYSICAL EXAM:     Vitals:    02/15/22 1308   BP: (!) 100/59   Pulse: 81   Resp: 15    Body mass index is 26.43 kg/m².  Weight: 81.2 kg (179 lb)   Height: 5' 9" (175.3 cm)     Physical Exam  Vitals reviewed.   Constitutional:       General: She is not in acute distress.     Appearance: Normal appearance. She is well-developed. She is not ill-appearing, toxic-appearing or diaphoretic.   HENT:      Head: Normocephalic and atraumatic.   Eyes:      General: No scleral icterus.     Extraocular Movements: Extraocular movements intact.      Conjunctiva/sclera: Conjunctivae normal.      Pupils: Pupils are equal, round, and reactive to light.   Neck:      Thyroid: No thyromegaly.      Vascular: Normal carotid pulses. No carotid bruit or JVD.      Trachea: Trachea normal. No tracheal deviation.   Cardiovascular:      Rate and Rhythm: Normal rate and regular rhythm.      " Pulses:           Carotid pulses are 2+ on the right side and 2+ on the left side.     Heart sounds: S1 normal and S2 normal. No murmur heard.  No friction rub. No gallop.       Comments: L infraclavicular ICD site well healed  Pulmonary:      Effort: Pulmonary effort is normal. No respiratory distress.      Breath sounds: Normal breath sounds. No stridor. No wheezing, rhonchi or rales.   Chest:      Chest wall: No tenderness.   Abdominal:      General: There is no distension.      Palpations: Abdomen is soft.   Musculoskeletal:         General: No swelling or tenderness. Normal range of motion.      Cervical back: Normal range of motion and neck supple. No edema or rigidity.      Right lower leg: No edema.      Left lower leg: No edema.   Feet:      Right foot:      Skin integrity: No ulcer.      Left foot:      Skin integrity: No ulcer.   Skin:     General: Skin is warm and dry.      Coloration: Skin is not jaundiced.      Findings: No erythema or rash.   Neurological:      General: No focal deficit present.      Mental Status: She is alert and oriented to person, place, and time.      Cranial Nerves: No cranial nerve deficit.   Psychiatric:         Mood and Affect: Mood normal.         Speech: Speech normal.         Behavior: Behavior normal. Behavior is cooperative.         DATA:   EKG: (personally reviewed tracing(s))  1/24/22 SR 71, inflat ST abnl ?isch, new vs 1/14/22    Laboratory:  CBC:  Recent Labs   Lab 12/31/21  1851 01/14/22  0807 01/26/22  0934   WBC 6.88 6.91 6.16  6.16   Hemoglobin 11.4 L 11.9 L 12.2  12.2   Hematocrit 35.2 L 37.9 38.5  38.5   Platelets 288 297 311  311       CHEMISTRIES:  Recent Labs   Lab 07/28/21  0640 08/08/21  0727 08/12/21  2116 09/15/21  0834 10/01/21  2312 10/20/21  0830 01/05/22  1139 01/14/22  0807 01/26/22  0934   Glucose 83   < > 100   < > 119 H   < > 119 H 96 75   Sodium 137   < > 140   < > 140   < > 136 140 140   Potassium 3.5   < > 3.7   < > 3.5   < > 3.6 4.2 4.3    BUN 15   < > 9   < > 16   < > 11 12 17   Creatinine 0.7   < > 0.7   < > 0.8   < > 0.9 0.8 0.8   eGFR if African American >60   < > >60   < > >60   < > >60.0 >60 >60.0   eGFR if non African American >60   < > >60   < > >60   < > >60.0 >60 >60.0   Calcium 8.3 L   < > 9.6   < > 9.5   < > 9.3 8.6 L 9.1   Magnesium 2.0  --  1.9  --  2.1  --   --   --   --     < > = values in this interval not displayed.       CARDIAC BIOMARKERS:  Recent Labs   Lab 07/24/21  1802 07/24/21  2136 12/31/21  1851 01/14/22  0807 01/14/22  1511   CPK 77  --   --   --   --    Troponin I 0.035 H   < > <0.006 <0.006 <0.006    < > = values in this interval not displayed.       COAGS:        LIPIDS/LFTS:  Recent Labs   Lab 05/24/19  1927 05/25/19  0438 08/16/21  1409 09/15/21  0834 12/28/21  0742 12/31/21  1851 01/14/22  0807   Cholesterol 170  --  164  --   --   --   --    Triglycerides 46  --  106  --   --   --   --    HDL 43  --  36 L  --   --   --   --    LDL Cholesterol 117.8  --  106.8  --   --   --   --    Non-HDL Cholesterol 127  --  128  --   --   --   --    AST  --    < >  --    < > 18 17 13   ALT  --    < >  --    < > 13 15 10    < > = values in this interval not displayed.     Lab Results   Component Value Date    TSH 1.386 12/28/2021         Cardiovascular Testing:  RHC 1/27/22   Right atrial pressure is normal.  Pulmonary capillary wedge pressure is normal.  Pulmonary artery pressure is normal.  Pulmonary vascular resistance is normal.  Systemic vascular resistance is 1180.  Jeovany cardiac output and index is normal.  RA: 8/ 5/ 7 RV: 26/ 8 PA: 23/ 10/ 14 PWP: 15/ 12/ 12 .   Cardiac output was 5.22 by Jeovany. Cardiac index is 2.67 L/min/m2.   O2 Sat: PA 70%.   Pulmonary vascular resistance: 31. Systemic vascular resistance: 1180.   These hemodynamic are acceptable for cardiac transplant listing.     Echo 10/6/21  · The left ventricle is severely enlarged with eccentric hypertrophy and severely decreased systolic function.  · The estimated  ejection fraction is 15%.  · Grade III left ventricular diastolic dysfunction.  · Normal right ventricular size with normal right ventricular systolic function.  · Moderate left atrial enlargement.  · Mild mitral regurgitation.  · Normal central venous pressure (3 mmHg).  · The estimated PA systolic pressure is 15 mmHg.    Cath 4/18/17  B. Summary/Post-Operative Diagnosis    Normal coronary arteries.    Systolic dysfunction.    Mildly elevated left Filling Pressures.    Normal Pulmonary Hypertension.  D. Hemodynamic Results  Ejection Fraction: 20%  Global LV Function: severely depressed  PW:  (4)  PA: 24  CO_THERM: 4.4  RV: 25  RA:  (5)  LVEDP: 17   E. Angiographic Results       Patient has a right dominant coronary artery.      - Left Main Coronary Artery:             The LM is normal. There is DANNY 3 flow.     - Left Anterior Descending Artery:             The LAD is normal. There is DANNY 3 flow.     - Left Circumflex Artery:             The LCX is normal. There is DANNY 3 flow.     - Right Coronary Artery:             The RCA is normal. There is DANNY 3 flow.     - Left Renal Artery:             The ostial left renal is normal.     - Right Renal Artery:             The ostial right renal is normal.     - Common Femoral Artery:             The right CFA is normal.      ASSESSMENT:   # NICM, appears euvolemic.  CP abated.  # hx COVID19 in late July 2021.  # MDT ICD  # hx NSVT, last in 8/12/21  # HTN, controlled    PLAN:   Cont med rx  Follow up with CHF svc as planned  Will hold off on [facility] for time being given absence of angina.  I will ask Dr. Prajapati to comment on if it is still needed.  Inc lasix to 40 bid  RTC 3 months with MDT ICD check (May 2022)      Kashif Peguero MD, [facility]    Addendum 3/4/22:    MD Kashif Ornelas Jr., MD Michael, I do not think the [facility] needs to be repeated.     Hope all is well for you   Quan

## 2022-02-25 ENCOUNTER — HOSPITAL ENCOUNTER (OUTPATIENT)
Dept: PULMONOLOGY | Facility: CLINIC | Age: 37
Discharge: HOME | End: 2022-02-25
Attending: INTERNAL MEDICINE
Payer: COMMERCIAL

## 2022-02-25 ENCOUNTER — OFFICE VISIT (OUTPATIENT)
Dept: TRANSPLANT | Facility: CLINIC | Age: 37
End: 2022-02-25
Attending: INTERNAL MEDICINE
Payer: MEDICAID

## 2022-02-25 ENCOUNTER — LAB VISIT (OUTPATIENT)
Dept: LAB | Facility: HOSPITAL | Age: 37
End: 2022-02-25
Attending: INTERNAL MEDICINE
Payer: COMMERCIAL

## 2022-02-25 VITALS
HEIGHT: 69 IN | BODY MASS INDEX: 26.51 KG/M2 | HEART RATE: 57 BPM | HEIGHT: 69 IN | WEIGHT: 179.44 LBS | SYSTOLIC BLOOD PRESSURE: 122 MMHG | WEIGHT: 179 LBS | DIASTOLIC BLOOD PRESSURE: 77 MMHG | BODY MASS INDEX: 26.58 KG/M2

## 2022-02-25 DIAGNOSIS — Z95.810 ICD (IMPLANTABLE CARDIOVERTER-DEFIBRILLATOR), SINGLE, IN SITU: ICD-10-CM

## 2022-02-25 DIAGNOSIS — I42.8 NONISCHEMIC CARDIOMYOPATHY: Chronic | ICD-10-CM

## 2022-02-25 DIAGNOSIS — R06.09 DOE (DYSPNEA ON EXERTION): ICD-10-CM

## 2022-02-25 DIAGNOSIS — I50.42 CHRONIC COMBINED SYSTOLIC AND DIASTOLIC HEART FAILURE: Primary | ICD-10-CM

## 2022-02-25 DIAGNOSIS — I13.0 HYPERTENSIVE CARDIOVASCULAR-RENAL DISEASE, STAGE 1-4 OR UNSPECIFIED CHRONIC KIDNEY DISEASE, WITH HEART FAILURE: ICD-10-CM

## 2022-02-25 DIAGNOSIS — I50.42 CHRONIC COMBINED SYSTOLIC AND DIASTOLIC CONGESTIVE HEART FAILURE: ICD-10-CM

## 2022-02-25 DIAGNOSIS — I10 ESSENTIAL HYPERTENSION: Chronic | ICD-10-CM

## 2022-02-25 LAB
ANION GAP SERPL CALC-SCNC: 10 MMOL/L (ref 8–16)
BNP SERPL-MCNC: 605 PG/ML (ref 0–99)
BUN SERPL-MCNC: 10 MG/DL (ref 6–20)
CALCIUM SERPL-MCNC: 9.1 MG/DL (ref 8.7–10.5)
CHLORIDE SERPL-SCNC: 103 MMOL/L (ref 95–110)
CO2 SERPL-SCNC: 26 MMOL/L (ref 23–29)
CREAT SERPL-MCNC: 0.8 MG/DL (ref 0.5–1.4)
EST. GFR  (AFRICAN AMERICAN): >60 ML/MIN/1.73 M^2
EST. GFR  (NON AFRICAN AMERICAN): >60 ML/MIN/1.73 M^2
GLUCOSE SERPL-MCNC: 78 MG/DL (ref 70–110)
POTASSIUM SERPL-SCNC: 3.5 MMOL/L (ref 3.5–5.1)
SODIUM SERPL-SCNC: 139 MMOL/L (ref 136–145)

## 2022-02-25 PROCEDURE — 36415 COLL VENOUS BLD VENIPUNCTURE: CPT | Mod: HCNC,NTX | Performed by: INTERNAL MEDICINE

## 2022-02-25 PROCEDURE — 99499 RISK ADDL DX/OHS AUDIT: ICD-10-PCS | Mod: S$GLB,,, | Performed by: INTERNAL MEDICINE

## 2022-02-25 PROCEDURE — 83880 ASSAY OF NATRIURETIC PEPTIDE: CPT | Mod: HCNC,NTX | Performed by: INTERNAL MEDICINE

## 2022-02-25 PROCEDURE — 99499 UNLISTED E&M SERVICE: CPT | Mod: S$GLB,,, | Performed by: INTERNAL MEDICINE

## 2022-02-25 PROCEDURE — 80048 BASIC METABOLIC PNL TOTAL CA: CPT | Mod: HCNC,NTX | Performed by: INTERNAL MEDICINE

## 2022-02-25 PROCEDURE — 99214 PR OFFICE/OUTPT VISIT, EST, LEVL IV, 30-39 MIN: ICD-10-PCS | Mod: S$PBB,HCNC,TXP, | Performed by: INTERNAL MEDICINE

## 2022-02-25 PROCEDURE — 99214 OFFICE O/P EST MOD 30 MIN: CPT | Mod: S$PBB,HCNC,TXP, | Performed by: INTERNAL MEDICINE

## 2022-02-25 PROCEDURE — 94618 PULMONARY STRESS TESTING: CPT | Mod: HCNC,NTX,S$GLB, | Performed by: INTERNAL MEDICINE

## 2022-02-25 PROCEDURE — 94618 PULMONARY STRESS TESTING: ICD-10-PCS | Mod: HCNC,NTX,S$GLB, | Performed by: INTERNAL MEDICINE

## 2022-02-25 PROCEDURE — 99999 PR PBB SHADOW E&M-EST. PATIENT-LVL IV: ICD-10-PCS | Mod: PBBFAC,HCNC,TXP, | Performed by: INTERNAL MEDICINE

## 2022-02-25 PROCEDURE — 99999 PR PBB SHADOW E&M-EST. PATIENT-LVL IV: CPT | Mod: PBBFAC,HCNC,TXP, | Performed by: INTERNAL MEDICINE

## 2022-02-25 RX ORDER — METOPROLOL SUCCINATE 100 MG/1
100 TABLET, EXTENDED RELEASE ORAL DAILY
Qty: 30 TABLET | Refills: 11 | Status: SHIPPED | OUTPATIENT
Start: 2022-02-25 | End: 2022-09-20 | Stop reason: ALTCHOICE

## 2022-02-25 NOTE — PROGRESS NOTES
Subjective:     Patient ID:  Nasra Marks is a 36 y.o. female who presents for evaluation of Congestive Heart Failure    HPI:    37 yo BF diagnosis CHF due to nonischemic cardiomyopathy (LVEF 15%, LVEDD 6.6) in 2017 with angiogram at that time without evidence of coronary artery disease.  She developed hypertension at the age of 16 but did not take her medications regularly.   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 She was referred by Dr. Kashif Peguero.    She reports today that she has been Compliant with her medications (Entresto full dose, Coreg 25mg BID, Eplerenone 25, lasix 40mg daily and takes another 40mg prn, Farxiga 10mg daily    At this time she reports stable dyspnea on exertion. She does not recall any recent weight changes. She thinks she accumulates fluid around the time of her menstrual period and she takes another lasix 40mg during that time. Her main complaint today is fatigue and feeling tired, however she is able to do most of her daily life activities and she walked 457m today on 6mwt and was able to walk from parking to clinic without stopping, however felt really tired at the end. She had an RHC done which showed normal left and right sided filling pressures and CPX demonstrated failure to attain anaerobic threshold and RER suboptimal effort making determination of result more challenging.        RHC 22  RA:  RV: 26/ 8 PA: 23/ 10/ 14 PWP: 15/ 12/ 12 .   Cardiac output was 5.22  by Jeovany. Cardiac index is 2.67 L/min/m2.   O2 Sat: PA 70%.   Pulmonary vascular resistance: 31. Systemic vascular resistance: 1180.     Past Medical History:   Diagnosis Date    Chronic combined systolic and diastolic congestive heart failure 2019    Hypertension     dx at 16y.o.   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 disease, with heart failure 2021    ICD  (implantable cardioverter-defibrillator) 2020    Nonischemic cardiomyopathy 2019       Past Surgical History:   Procedure Laterality Date    CARDIAC DEFIBRILLATOR PLACEMENT  2017     SECTION      x 1    INDUCED       RIGHT HEART CATHETERIZATION Right 2022    Procedure: INSERTION, CATHETER, RIGHT HEART;  Surgeon: Timothy Prajapati Jr., MD;  Location[facility]ATH LAB;  Service: Cardiology;  Laterality: Right;    TUBAL LIGATION         Family History   Problem Relation Age of Onset    Hypertension Mother     Cancer Maternal Grandmother         breast x 2    Cancer Paternal Grandmother         breast    No Known Problems Sister     No Known Problems Brother     No Known Problems Son     No Known Problems Brother     Hypertension Other     Diabetes Other     Breast cancer Other     Cancer Paternal Aunt         breast    Cancer Paternal Uncle    He denies any history of familial cardiomyopathy    Social History     Socioeconomic History    Marital status:    Occupational History     Employer: [employer]   Tobacco Use    Smoking status: Never Smoker    Smokeless tobacco: Never Used   Substance and Sexual Activity    Alcohol use: Rare     Comment: rarely    Drug use: No     Comment: in past very little marijuana       Medication Sig    furosemide (LASIX) 40 MG tablet Take 1 tablet (40 mg total) by mouth Daily.    sacubitriL-valsartan (ENTRESTO)  mg per tablet She was mistaking 2 (two) times daily.    carvediloL (COREG) 12.5 MG tablet Take 1 tablet (12.5 mg total) by mouth 2 (two) times daily with meals.    dapagliflozin (FARXIGA) 10 mg tablet Take 1 tablet (10 mg total) by mouth once daily.    eplerenone (INSPRA) 25 MG Tab Take 1 tablet (25 mg total) by mouth once daily.     Review of patient's allergies indicates:   Allergen Reactions    Aldactone [spironolactone] Swelling     Lips swelled       Review of Systems   Constitutional: Positive for  "malaise/fatigue. Negative for chills, fever, night sweats and weight loss.   Cardiovascular: Positive for dyspnea on exertion and orthopnea. Negative for chest pain, irregular heartbeat, leg swelling, near-syncope, palpitations and syncope.        Chest tightness and congestion on and off   Respiratory: Negative.  Negative for sputum production and wheezing.    Endocrine: Negative.    Hematologic/Lymphatic: Negative.  Does not bruise/bleed easily.   Musculoskeletal: Negative for arthritis, joint pain and stiffness.   Gastrointestinal: Negative for hematochezia and melena.   Genitourinary: Negative for hematuria.        She has take extra furosemide due to fluid retention around her periods   Neurological: Negative for brief paralysis, focal weakness, seizures and weakness.   Psychiatric/Behavioral: The patient has insomnia.         Objective:   Physical Exam  Constitutional:       General: She is not in acute distress.     Appearance: She is well-developed. She is not ill-appearing, toxic-appearing or diaphoretic.      Comments: /77   Pulse (!) 57   Ht 5' 9" (1.753 m)   Wt 81.4 kg (179 lb 7.3 oz)   BMI 26.50 kg/m²        HENT:      Head: Normocephalic and atraumatic.   Eyes:      General: No scleral icterus.        Right eye: No discharge.         Left eye: No discharge.      Conjunctiva/sclera: Conjunctivae normal.   Neck:      Thyroid: No thyromegaly.      Vascular: No JVD.      Trachea: No tracheal deviation.      Comments: Normal JVD  Cardiovascular:      Rate and Rhythm: Normal rate and regular rhythm.      Heart sounds: S1 normal and S2 normal. No murmur heard.    No S4 sounds.   Pulmonary:      Effort: Pulmonary effort is normal.      Breath sounds: Normal breath sounds.   Abdominal:      General: Bowel sounds are normal. There is no distension (Liver span normal 10 cm).      Palpations: Abdomen is soft. There is no mass.      Tenderness: There is no abdominal tenderness. There is no guarding or " rebound.   Musculoskeletal:         General: No swelling or tenderness.      Right lower leg: No edema.      Left lower leg: No edema.   Skin:     General: Skin is warm and dry.   Neurological:      General: No focal deficit present.      Mental Status: She is alert and oriented to person, place, and time. Mental status is at baseline.   Psychiatric:         Mood and Affect: Mood normal.         Behavior: Behavior normal.         Thought Content: Thought content normal.         Judgment: Judgment normal.          12/28/2021 6 minute walk test 459.4 m without desaturation    10/06/2021 echocardiogram  · The left ventricle is severely enlarged with eccentric hypertrophy and severely decreased systolic function.  · The estimated ejection fraction is 15%.  · Grade III left ventricular diastolic dysfunction.  · Normal right ventricular size with normal right ventricular systolic function.  · Moderate left atrial enlargement.  · Mild mitral regurgitation.  · Normal central venous pressure (3 mmHg).  · The estimated PA systolic pressure is 15 mmHg.      12/31/2021EKG normal sinus rhythm nonspecific ST-T abnormality    Lab Results   Component Value Date     (H) 02/25/2022     (H) 01/26/2022     (H) 01/14/2022     Lab Results   Component Value Date     (H) 02/25/2022     02/25/2022    K 3.5 02/25/2022    MG 2.1 10/01/2021     02/25/2022    CO2 26 02/25/2022    PHOS 3.5 07/28/2021    BUN 10 02/25/2022    CREATININE 0.8 02/25/2022    GLU 78 02/25/2022    HGBA1C 5.4 08/16/2021    AST 13 01/14/2022    ALT 10 01/14/2022    ALBUMIN 3.7 01/14/2022    PROT 7.6 01/14/2022    BILITOT 0.4 01/14/2022    WBC 6.16 01/26/2022    WBC 6.16 01/26/2022    HGB 12.2 01/26/2022    HGB 12.2 01/26/2022    HCT 38.5 01/26/2022    HCT 38.5 01/26/2022     01/26/2022     01/26/2022    INR 1.0 04/30/2018     07/24/2021    TSH 1.386 12/28/2021    CHOL 164 08/16/2021    HDL 36 (L) 08/16/2021     LDLCALC 106.8 08/16/2021    TRIG 106 08/16/2021     Assessment:     1. Chronic combined systolic and diastolic heart failure    2. Essential hypertension    3. Nonischemic cardiomyopathy      Pt is euvolemic today, NYHA class II symptoms and main complaint today is fatigue  Plan:   Continue Entresto, Epleronone and farxiga  Stop Carvedilol as it might be contributing to the fatigue and start Metoprolol succinate 100mg daily  Continue lasix 40 mg daily with prn extra dose  RTC in 6 months with repeat CPX and BNP/BMP    Aruna Marks MD    Pt care discussed with Dr Prajapati who agrees with the above assessment and plan

## 2022-02-25 NOTE — LETTER
February 25, 2022        Kashif Peguero  [address] [facility] [address]  [address]  [place] [address]  Phone: [phone]  Fax: [phone]             [id number]  [address]  [place] [address]  Phone: [phone]   Patient: Nasra Marks   MR Number: [id number]   YOB: 1985   Date of Visit: 2/25/2022       Dear Dr. Kashif Peguero    Thank you for referring Nasra Marks to me for evaluation. Attached you will find relevant portions of my assessment and plan of care.    If you have questions, please do not hesitate to call me. I look forward to following Nasra Marks along with you.    Sincerely,    Timothy Prajapati Jr, MD    Enclosure    If you would like to receive this communication electronically, please contact [email or web address] or [phone] to request [employer] access.    [employer] is a tool which provides read-only access to select patient information with whom you have a relationship. Its easy to use and provides real time access to review your patients record including encounter summaries, notes, results, and demographic information.    If you feel you have received this communication in error or would no longer like to receive these types of communications, please e-mail [email or web address]

## 2022-02-25 NOTE — PATIENT INSTRUCTIONS
Stop Taking carvedilol  Start taking metoprolol Succinate (Toprol XL) 100mg daily  Check your heart rate in 2 weeks and call us with the number

## 2022-02-25 NOTE — PROCEDURES
Nasra Marks is a 36 y.o.  female patient, who presents for a 6 minute walk test ordered by MD Victorina.  The diagnosis is Cardiomyopathy.  The patient's BMI is 26.4 kg/m2.  Predicted distance (lower limit of normal) is 509.21 meters.      Test Results:    The test was completed without stopping.  The total time walked was 360 seconds.  During walking, the patient reported:  Dyspnea.  The patient used no assistive devices during testing.     02/25/2022---------Distance: 426.72 meters (1400 feet)     O2 Sat % Supplemental Oxygen Heart Rate Blood Pressure Krishna Scale   Pre-exercise  (Resting) 100 % Room Air 60 bpm 128/77 mmHg 4   During Exercise 98 % Room Air 86 bpm 123/77 mmHg 7-8   Post-exercise  (Recovery) 99 % Room Air  70 bpm       Recovery Time: 60 seconds    Performing nurse/tech: Estopinal JOY      PREVIOUS STUDY:   12/28/2021---------Distance: 415.14 meters (1362 feet)       O2 Sat % Supplemental Oxygen Heart Rate Blood Pressure Krishna Scale   Pre-exercise  (Resting) 99 % Room Air 89 bpm 126/86 mmHg 4   During Exercise 98 % Room Air 107 bpm 129/84 mmHg 5-6   Post-exercise  (Recovery) 99 % Room Air  96 bpm   mmHg        CLINICAL INTERPRETATION:  Six minute walk distance is 426.72 meters (1400 feet) with very heavy dyspnea.  During exercise, there was no significant desaturation while breathing room air.  Blood pressure remained stable and Heart rate increased significantly with walking.  The patient did not report non-pulmonary symptoms during exercise.  Since the previous study in December 2021, exercise capacity is unchanged.  Based upon age and body mass index, exercise capacity is less than predicted.

## 2022-03-03 ENCOUNTER — TELEPHONE (OUTPATIENT)
Dept: TRANSPLANT | Facility: CLINIC | Age: 37
End: 2022-03-03
Payer: COMMERCIAL

## 2022-03-03 NOTE — TELEPHONE ENCOUNTER
----- Message from Carmela Lou RN sent at 3/3/2022  4:19 PM CST -----  Regarding: FW: HF or Preht section?    ----- Message -----  From: Timothy Prajapati Jr., MD  Sent: 3/3/2022   3:56 PM CST  To: Carmela Lou RN  Subject: RE: HF or Preht section?                         With VE/VCO2 keep in pre for now  ----- Message -----  From: Carmela Lou RN  Sent: 2/25/2022   4:18 PM CST  To: Timothy Prajapati Jr., MD  Subject: HF or Preht section?                             Hi Dr Prajapati  I see that this pt will return to clinic in 6 months.  Would you like me to move her to the HF section (High risk to return)?  Or keep in Pre heart?  Thanks  Carmela

## 2022-04-04 ENCOUNTER — HOSPITAL ENCOUNTER (EMERGENCY)
Facility: HOSPITAL | Age: 37
Discharge: HOME | End: 2022-04-04
Attending: EMERGENCY MEDICINE
Payer: COMMERCIAL

## 2022-04-04 VITALS
RESPIRATION RATE: 16 BRPM | WEIGHT: 178 LBS | SYSTOLIC BLOOD PRESSURE: 127 MMHG | DIASTOLIC BLOOD PRESSURE: 83 MMHG | BODY MASS INDEX: 26.29 KG/M2 | OXYGEN SATURATION: 100 % | TEMPERATURE: 98 F | HEART RATE: 63 BPM

## 2022-04-04 DIAGNOSIS — R07.9 CHEST PAIN: ICD-10-CM

## 2022-04-04 DIAGNOSIS — M79.601 RIGHT ARM PAIN: ICD-10-CM

## 2022-04-04 DIAGNOSIS — R07.9 CHEST PAIN, UNSPECIFIED TYPE: Primary | ICD-10-CM

## 2022-04-04 DIAGNOSIS — R53.83 FATIGUE, UNSPECIFIED TYPE: ICD-10-CM

## 2022-04-04 LAB
ALBUMIN SERPL-MCNC: 3.6 G/DL (ref 3.3–5.5)
ALP SERPL-CCNC: 50 U/L (ref 42–141)
B-HCG UR QL: NEGATIVE
BILIRUB SERPL-MCNC: 0.4 MG/DL (ref 0.2–1.6)
BUN SERPL-MCNC: 13 MG/DL (ref 7–22)
CALCIUM SERPL-MCNC: 9.5 MG/DL (ref 8–10.3)
CHLORIDE SERPL-SCNC: 108 MMOL/L (ref 98–108)
CREAT SERPL-MCNC: 0.8 MG/DL (ref 0.6–1.2)
CTP QC/QA: YES
CTP QC/QA: YES
GLUCOSE SERPL-MCNC: 98 MG/DL (ref 73–118)
INFLUENZA A ANTIGEN, POC: NEGATIVE
INFLUENZA B ANTIGEN, POC: NEGATIVE
POC ALT (SGPT): 11 U/L (ref 10–47)
POC AST (SGOT): 30 U/L (ref 11–38)
POC CARDIAC TROPONIN I: 0.01 NG/ML
POC TCO2: 26 MMOL/L (ref 18–33)
POTASSIUM BLD-SCNC: 4.1 MMOL/L (ref 3.6–5.1)
PROTEIN, POC: 7.5 G/DL (ref 6.4–8.1)
SAMPLE: NORMAL
SARS-COV-2 RDRP RESP QL NAA+PROBE: NEGATIVE
SODIUM BLD-SCNC: 144 MMOL/L (ref 128–145)

## 2022-04-04 PROCEDURE — 93010 ELECTROCARDIOGRAM REPORT: CPT | Mod: HCNC,NTX,, | Performed by: INTERNAL MEDICINE

## 2022-04-04 PROCEDURE — 84484 ASSAY OF TROPONIN QUANT: CPT | Mod: HCNC,ER,NTX

## 2022-04-04 PROCEDURE — 80053 COMPREHEN METABOLIC PANEL: CPT | Mod: HCNC,ER,NTX

## 2022-04-04 PROCEDURE — 99285 EMERGENCY DEPT VISIT HI MDM: CPT | Mod: 25,HCNC,ER,NTX

## 2022-04-04 PROCEDURE — U0002 COVID-19 LAB TEST NON-CDC: HCPCS | Mod: HCNC,ER,NTX | Performed by: NURSE PRACTITIONER

## 2022-04-04 PROCEDURE — 87804 INFLUENZA ASSAY W/OPTIC: CPT | Mod: HCNC,ER,NTX

## 2022-04-04 PROCEDURE — 85025 COMPLETE CBC W/AUTO DIFF WBC: CPT | Mod: HCNC,ER,NTX

## 2022-04-04 PROCEDURE — 81025 URINE PREGNANCY TEST: CPT | Mod: HCNC,ER,NTX | Performed by: EMERGENCY MEDICINE

## 2022-04-04 PROCEDURE — 93010 EKG 12-LEAD: ICD-10-PCS | Mod: HCNC,NTX,, | Performed by: INTERNAL MEDICINE

## 2022-04-04 PROCEDURE — 93005 ELECTROCARDIOGRAM TRACING: CPT | Mod: HCNC,ER,NTX

## 2022-04-04 NOTE — DISCHARGE INSTRUCTIONS
Your ER work was normal.  No signs of acute heart failure, infection, dehydration, or acute heart attack.  Rest.  Continue your current medications.  Take tylenol for pain.  See your doctor if your symptoms continue.

## 2022-04-04 NOTE — ED PROVIDER NOTES
"Encounter Date: 2022    SCRIBE #1 NOTE: I, Jose Miguel Colby, am scribing for, and in the presence of,  Aylin Quinn MD. I have scribed the following portions of the note - Other sections scribed: HPI,ROS,PE.       History     Chief Complaint   Patient presents with    Chest Pain     Pt states," I have pains in my chest and pain in my right arm today."     Patient is a 36 year old female with PMHx of HTN, CHF, and Renal disease who presents to ED with complaints of chest pain, right arm pain, and extreme fatigue that began today.  Symptoms began with pain in her right mid arm near the elbow.  No associated numbness or weakness but about the same time she felt chest pain that  "felt like someone grabbing my heart from the back and squeezing it."  Symptoms lasted about 1 hour and began around 1pm today.  She also reports extrememe fatigue.  She had associated nausea and 1 episode of diarrhea. She has not taken any medication for relief of symptoms. Patient denies any associated symptoms of fever or vomiting. She denies alcohol, tobacco, or recreational drug use. No other complaints at this time.     The history is provided by the patient. No  was used.     Review of patient's allergies indicates:   Allergen Reactions    Aldactone [spironolactone] Swelling     Lips swelled     Past Medical History:   Diagnosis Date    CHF (congestive heart failure)     Chronic combined systolic and diastolic congestive heart failure 2019    Essential hypertension 2012    Hypertension     dx at 16y.o.   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 disease, with heart failure 2021    ICD (implantable cardioverter-defibrillator), single, in situ 2020    Nonischemic cardiomyopathy 2019    Pacemaker 2017     Past Surgical History:   Procedure Laterality Date    CARDIAC DEFIBRILLATOR PLACEMENT  2017     SECTION      x 1    INDUCED "       RIGHT HEART CATHETERIZATION Right 2022    Procedure: INSERTION, CATHETER, RIGHT HEART;  Surgeon: Timothy Prajapati Jr., MD;  Location: [facility] CATH LAB;  Service: Cardiology;  Laterality: Right;    TUBAL LIGATION       Family History   Problem Relation Age of Onset    Hypertension Mother     Cancer Maternal Grandmother         breast x 2    Cancer Paternal Grandmother         breast    No Known Problems Sister     No Known Problems Brother     No Known Problems Son     No Known Problems Brother     Hypertension Other     Diabetes Other     Breast cancer Other     Cancer Paternal Aunt         breast    Cancer Paternal Uncle      Social History     Tobacco Use    Smoking status: Never Smoker    Smokeless tobacco: Never Used   Substance Use Topics    Alcohol use: Not Currently     Comment: rarely    Drug use: No     Comment: in past very little marijuana     Review of Systems   Constitutional: Positive for fatigue. Negative for activity change, appetite change, chills and fever.   HENT: Negative for congestion, rhinorrhea, sneezing and sore throat.    Respiratory: Negative for cough, choking, shortness of breath and wheezing.    Cardiovascular: Positive for chest pain. Negative for palpitations.   Gastrointestinal: Positive for diarrhea and nausea. Negative for abdominal pain and vomiting.   Musculoskeletal: Positive for arthralgias and myalgias.   Neurological: Negative for dizziness, syncope, light-headedness and headaches.   All other systems reviewed and are negative.      Physical Exam     Initial Vitals [22 1654]   BP Pulse Resp Temp SpO2   125/79 66 16 98.3 °F (36.8 °C) 98 %      MAP       --         Physical Exam    Nursing note and vitals reviewed.  Constitutional: She appears well-developed and well-nourished. No distress.   HENT:   Head: Normocephalic and atraumatic.   Eyes: Conjunctivae are normal.   Neck:   Normal range of motion.  Cardiovascular: Normal rate,  regular rhythm and normal heart sounds.   No murmur heard.  Pulmonary/Chest: Breath sounds normal. No respiratory distress.   Abdominal: Bowel sounds are normal. She exhibits no distension.   Musculoskeletal:         General: Normal range of motion.      Cervical back: Normal range of motion.      Comments: No edema of lower extremities.     Neurological: She is alert and oriented to person, place, and time.   Skin: Skin is warm and dry.   Psychiatric: She has a normal mood and affect. Her behavior is normal.         ED Course   Procedures  Labs Reviewed   TROPONIN ISTAT   POCT URINE PREGNANCY   SARS-COV-2 RDRP GENE    Narrative:     This test utilizes isothermal nucleic acid amplification   technology to detect the SARS-CoV-2 RdRp nucleic acid segment.   The analytical sensitivity (limit of detection) is 125 genome   equivalents/mL.   A POSITIVE result implies infection with the SARS-CoV-2 virus;   the patient is presumed to be contagious.     A NEGATIVE result means that SARS-CoV-2 nucleic acids are not   present above the limit of detection. A NEGATIVE result should be   treated as presumptive. It does not rule out the possibility of   COVID-19 and should not be the sole basis for treatment decisions.   If COVID-19 is strongly suspected based on clinical and exposure   history, re-testing using an alternate molecular assay should be   considered.   This test is only for use under the Food and Drug   Administration s Emergency Use Authorization (EUA).   Commercial kits are provided by [facility].   Performance characteristics of the EUA have been independently   verified by [facility] Department of   Pathology and Laboratory Medicine.   _________________________________________________________________   The authorized Fact Sheet for Healthcare Providers and the authorized Fact   Sheet for Patients of the ID NOW COVID-19 are available on the FDA   website:      https://www.fda.gov/media/952268/download  https://www.fda.gov/media/813314/download           POCT CBC   POCT INFLUENZA A/B MOLECULAR   POCT CMP   POCT TROPONIN   POCT CMP   POCT RAPID INFLUENZA A/B           EKG Readings: (Independently Interpreted)   Initial Reading: No STEMI. Rhythm: Normal Sinus Rhythm. Heart Rate: 65. Ectopy: No Ectopy. Conduction: Normal. ST Segments: Normal ST Segments. T Waves Flipped: V3, V4, V6 and V5. Clinical Impression: Normal Sinus Rhythm       Imaging Results          X-Ray Chest PA And Lateral (Final result)  Result time 04/04/22 18:30:10    Final result by Yifan Gonsalves MD (04/04/22 18:30:10)                 Impression:      1. No acute cardiopulmonary process.      Electronically signed by: Yifan Gonsalves MD  Date:    04/04/2022  Time:    18:30             Narrative:    EXAMINATION:  XR CHEST PA AND LATERAL    CLINICAL HISTORY:  Chest pain, unspecified    TECHNIQUE:  PA and lateral views of the chest were performed.    COMPARISON:  01/14/2022    FINDINGS:  Left chest wall pacer noted.  The cardiomediastinal silhouette is prominent, and appears somewhat smaller than on the previous exam..  There is no pleural effusion.  The trachea is midline.  The lungs are symmetrically expanded bilaterally without evidence of acute parenchymal process. No large focal consolidation seen.  There is no pneumothorax.  The osseous structures are unremarkable.                                 Medications - No data to display  Medical Decision Making:   History:   Old Medical Records: I decided to obtain old medical records.  Independently Interpreted Test(s):   I have ordered and independently interpreted X-rays - see prior notes.  I have ordered and independently interpreted EKG Reading(s) - see prior notes  Clinical Tests:   Lab Tests: Ordered and Reviewed  Radiological Study: Ordered and Reviewed  Medical Tests: Ordered and Reviewed  ED Management:  Chest pain - no signs of acute MI or  acute heart failure.  No pneumonia or pneumothorax.    Generalized weakness - no anemia, dehydration, infection, covid or flu.  Uncertain etiology.  Pt advised to rest, continue home meds, follow up as needed.          Scribe Attestation:   Scribe #1: I performed the above scribed service and the documentation accurately describes the services I performed. I attest to the accuracy of the note.               I, Dr. Aylin Quinn, personally performed the services described in this documentation.   All medical record entries made by the scribe were at my direction and in my presence.   I have reviewed the chart and agree that the record is accurate and complete.   Aylin Quinn MD.  6:26 PM 04/04/2022       Clinical Impression:   Final diagnoses:  [R07.9] Chest pain  [R07.9] Chest pain, unspecified type (Primary)  [M79.601] Right arm pain  [R53.83] Fatigue, unspecified type          ED Disposition Condition    Discharge Stable        ED Prescriptions     None        Follow-up Information     Follow up With Specialties Details Why Contact Info    Veronika Rainey MD Family Medicine, Wound Care  As needed [address]  [place] [address]  [phone]             Aylin Quinn MD  04/04/22 3323

## 2022-04-04 NOTE — FIRST PROVIDER EVALUATION
" Emergency Department TeleTriage Encounter Note      CHIEF COMPLAINT    Chief Complaint   Patient presents with    Chest Pain     Pt states," I have pains in my chest and pain in my right arm today."       VITAL SIGNS   Initial Vitals [04/04/22 1654]   BP Pulse Resp Temp SpO2   125/79 66 16 98.3 °F (36.8 °C) 98 %      MAP       --            ALLERGIES    Review of patient's allergies indicates:   Allergen Reactions    Aldactone [spironolactone] Swelling     Lips swelled       PROVIDER TRIAGE NOTE  This is a teletriage evaluation of a 36 y.o. female presenting to the ED complaining of fatigue, CP, and diarrhea today.  Reports that she has "a cold."      Initial orders will be placed and care will be transferred to an alternate provider when patient is roomed for a full evaluation. Any additional orders and the final disposition will be determined by that provider.           ORDERS  Labs Reviewed   POCT URINE PREGNANCY   POCT INFLUENZA A/B MOLECULAR   SARS-COV-2 RDRP GENE       ED Orders (720h ago, onward)    Start Ordered     Status Ordering Provider    04/04/22 1706 04/04/22 1705  X-Ray Chest PA And Lateral  1 time imaging         Ordered BILL HATHAWAY NMiquel    04/04/22 1706 04/04/22 1706  POCT Influenza A/B Molecular  Once         Ordered BILL HATHAWAY NMiquel    04/04/22 1706 04/04/22 1706  POCT COVID-19 Rapid Screening  Once         Ordered BILL HATHAWAY.    04/04/22 1657 04/04/22 1656  POCT urine pregnancy  Once         Ordered MIGUEL MERINO    04/04/22 1657 04/04/22 1656  EKG 12-lead  Once         Ordered MIGUEL MERINO            Virtual Visit Note: The provider triage portion of this emergency department evaluation and documentation was performed via [employer], a HIPAA-compliant telemedicine application, in concert with a tele-presenter in the room. A face to face patient evaluation with one of my colleagues will occur once the patient is placed in an emergency department " room.      DISCLAIMER: This note was prepared with [employer] voice recognition transcription software. Garbled syntax, mangled pronouns, and other bizarre constructions may be attributed to that software system.

## 2022-05-02 ENCOUNTER — OFFICE VISIT (OUTPATIENT)
Dept: CARDIOLOGY | Facility: CLINIC | Age: 37
End: 2022-05-02
Payer: COMMERCIAL

## 2022-05-02 VITALS
HEIGHT: 69 IN | WEIGHT: 181.44 LBS | OXYGEN SATURATION: 98 % | HEART RATE: 88 BPM | SYSTOLIC BLOOD PRESSURE: 124 MMHG | BODY MASS INDEX: 26.87 KG/M2 | DIASTOLIC BLOOD PRESSURE: 80 MMHG | RESPIRATION RATE: 15 BRPM

## 2022-05-02 DIAGNOSIS — I10 ESSENTIAL HYPERTENSION: ICD-10-CM

## 2022-05-02 DIAGNOSIS — I50.42 CHRONIC COMBINED SYSTOLIC AND DIASTOLIC CONGESTIVE HEART FAILURE: Primary | ICD-10-CM

## 2022-05-02 DIAGNOSIS — I42.8 NONISCHEMIC CARDIOMYOPATHY: ICD-10-CM

## 2022-05-02 DIAGNOSIS — Z95.810 ICD (IMPLANTABLE CARDIOVERTER-DEFIBRILLATOR), SINGLE, IN SITU: ICD-10-CM

## 2022-05-02 DIAGNOSIS — R07.89 CHEST PAIN, NON-CARDIAC: ICD-10-CM

## 2022-05-02 DIAGNOSIS — Z86.16 HISTORY OF COVID-19: ICD-10-CM

## 2022-05-02 PROCEDURE — 4010F PR ACE/ARB THEARPY RXD/TAKEN: ICD-10-PCS | Mod: HCNC,CPTII,NTX,S$GLB | Performed by: INTERNAL MEDICINE

## 2022-05-02 PROCEDURE — 99499 UNLISTED E&M SERVICE: CPT | Mod: HCNC,S$GLB,, | Performed by: INTERNAL MEDICINE

## 2022-05-02 PROCEDURE — 99999 PR PBB SHADOW E&M-EST. PATIENT-LVL III: CPT | Mod: PBBFAC,HCNC,TXP, | Performed by: INTERNAL MEDICINE

## 2022-05-02 PROCEDURE — 3074F SYST BP LT 130 MM HG: CPT | Mod: HCNC,CPTII,NTX,S$GLB | Performed by: INTERNAL MEDICINE

## 2022-05-02 PROCEDURE — 3074F PR MOST RECENT SYSTOLIC BLOOD PRESSURE < 130 MM HG: ICD-10-PCS | Mod: HCNC,CPTII,NTX,S$GLB | Performed by: INTERNAL MEDICINE

## 2022-05-02 PROCEDURE — 3079F PR MOST RECENT DIASTOLIC BLOOD PRESSURE 80-89 MM HG: ICD-10-PCS | Mod: HCNC,CPTII,NTX,S$GLB | Performed by: INTERNAL MEDICINE

## 2022-05-02 PROCEDURE — 4010F ACE/ARB THERAPY RXD/TAKEN: CPT | Mod: HCNC,CPTII,NTX,S$GLB | Performed by: INTERNAL MEDICINE

## 2022-05-02 PROCEDURE — 99214 PR OFFICE/OUTPT VISIT, EST, LEVL IV, 30-39 MIN: ICD-10-PCS | Mod: HCNC,NTX,S$GLB, | Performed by: INTERNAL MEDICINE

## 2022-05-02 PROCEDURE — 1160F PR REVIEW ALL MEDS BY PRESCRIBER/CLIN PHARMACIST DOCUMENTED: ICD-10-PCS | Mod: HCNC,CPTII,NTX,S$GLB | Performed by: INTERNAL MEDICINE

## 2022-05-02 PROCEDURE — 99999 PR PBB SHADOW E&M-EST. PATIENT-LVL III: ICD-10-PCS | Mod: PBBFAC,HCNC,TXP, | Performed by: INTERNAL MEDICINE

## 2022-05-02 PROCEDURE — 1159F PR MEDICATION LIST DOCUMENTED IN MEDICAL RECORD: ICD-10-PCS | Mod: HCNC,CPTII,NTX,S$GLB | Performed by: INTERNAL MEDICINE

## 2022-05-02 PROCEDURE — 3079F DIAST BP 80-89 MM HG: CPT | Mod: HCNC,CPTII,NTX,S$GLB | Performed by: INTERNAL MEDICINE

## 2022-05-02 PROCEDURE — 3008F BODY MASS INDEX DOCD: CPT | Mod: HCNC,CPTII,NTX,S$GLB | Performed by: INTERNAL MEDICINE

## 2022-05-02 PROCEDURE — 1159F MED LIST DOCD IN RCRD: CPT | Mod: HCNC,CPTII,NTX,S$GLB | Performed by: INTERNAL MEDICINE

## 2022-05-02 PROCEDURE — 1160F RVW MEDS BY RX/DR IN RCRD: CPT | Mod: HCNC,CPTII,NTX,S$GLB | Performed by: INTERNAL MEDICINE

## 2022-05-02 PROCEDURE — 99499 RISK ADDL DX/OHS AUDIT: ICD-10-PCS | Mod: HCNC,S$GLB,, | Performed by: INTERNAL MEDICINE

## 2022-05-02 PROCEDURE — 3008F PR BODY MASS INDEX (BMI) DOCUMENTED: ICD-10-PCS | Mod: HCNC,CPTII,NTX,S$GLB | Performed by: INTERNAL MEDICINE

## 2022-05-02 PROCEDURE — 99214 OFFICE O/P EST MOD 30 MIN: CPT | Mod: HCNC,NTX,S$GLB, | Performed by: INTERNAL MEDICINE

## 2022-05-02 NOTE — PROGRESS NOTES
CARDIOVASCULAR PROGRESS NOTE    REASON FOR CONSULT:   Nasra Marks is a 36 y.o. female who presents for follow up of MINDY, MDT ICD.    PCP: Redd  Gyn: Labadie, Juan  CHF: Victorina  HISTORY OF PRESENT ILLNESS:   COVID+ 21    The patient returns for follow-up.  She reports continued episodic chest discomfort of a nonexertional nature.  She also has some weight gain, but is not edematous on exam.  She has been instructed to take some extra Lasix.  She denies any palpitations, syncope, or defibrillator discharges.  There has been no PND, orthopnea, melena, hematuria, or claudicant symptoms.    CARDIOVASCULAR HISTORY:   NICM (cath 2017 with normal cors)  ICD (Donta 2017, MDT single chamber)    PAST MEDICAL HISTORY:     Past Medical History:   Diagnosis Date    CHF (congestive heart failure)     Chronic combined systolic and diastolic congestive heart failure 2019    Essential hypertension 2012    Hypertension     dx at 16y.o.   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 disease, with heart failure 2021    ICD (implantable cardioverter-defibrillator), single, in situ 2020    Nonischemic cardiomyopathy 2019    Pacemaker 2017       PAST SURGICAL HISTORY:     Past Surgical History:   Procedure Laterality Date    CARDIAC DEFIBRILLATOR PLACEMENT  2017     SECTION      x 1    INDUCED       RIGHT HEART CATHETERIZATION Right 2022    Procedure: INSERTION, CATHETER, RIGHT HEART;  Surgeon: Timothy Prajapati Jr., MD;  Location: [facility] CATH LAB;  Service: Cardiology;  Laterality: Right;    TUBAL LIGATION         ALLERGIES AND MEDICATION:     Review of patient's allergies indicates:   Allergen Reactions    Aldactone [spironolactone] Swelling     Lips swelled        Medication List          Accurate as of May 2, 2022 11:38 AM. If you have any questions, ask your nurse or doctor.            CONTINUE taking  these medications    dapagliflozin 10 mg tablet  Commonly known as: FARXIGA  Take 1 tablet (10 mg total) by mouth once daily.     ENTRESTO  mg per tablet  Generic drug: sacubitriL-valsartan  Take 1 tablet by mouth 2 (two) times daily.     eplerenone 25 MG Tab  Commonly known as: INSPRA  Take 1 tablet (25 mg total) by mouth once daily.     furosemide 40 MG tablet  Commonly known as: LASIX  Take 1 tablet (40 mg total) by mouth 2 (two) times a day.     metoprolol succinate 100 MG 24 hr tablet  Commonly known as: TOPROL-XL  Take 1 tablet (100 mg total) by mouth once daily.     sars-cov-2 (covid-19) 30 mcg/0.3 ml injection  Commonly known as: Pfizer COVID-19            SOCIAL HISTORY:     Social History     Socioeconomic History    Marital status:    Occupational History     Employer: [employer]   Tobacco Use    Smoking status: Never Smoker    Smokeless tobacco: Never Used   Substance and Sexual Activity    Alcohol use: Not Currently     Comment: rarely    Drug use: No     Comment: in past very little marijuana    Sexual activity: Yes     Partners: Male     Birth control/protection: None       FAMILY HISTORY:     Family History   Problem Relation Age of Onset    Hypertension Mother     Cancer Maternal Grandmother         breast x 2    Cancer Paternal Grandmother         breast    No Known Problems Sister     No Known Problems Brother     No Known Problems Son     No Known Problems Brother     Hypertension Other     Diabetes Other     Breast cancer Other     Cancer Paternal Aunt         breast    Cancer Paternal Uncle        REVIEW OF SYSTEMS:   Review of Systems   Constitutional: Negative for chills, diaphoresis, fever and malaise/fatigue.   HENT: Negative for nosebleeds.    Eyes: Negative for blurred vision, double vision and photophobia.   Respiratory: Negative for hemoptysis, shortness of breath and wheezing.    Cardiovascular: Negative for chest pain, palpitations, orthopnea,  "claudication, leg swelling and PND.   Gastrointestinal: Negative for abdominal pain, blood in stool, heartburn, melena, nausea and vomiting.   Genitourinary: Negative for flank pain and hematuria.   Musculoskeletal: Negative for falls, myalgias and neck pain.   Skin: Negative for rash.   Neurological: Negative for dizziness, seizures, loss of consciousness, weakness and headaches.   Endo/Heme/Allergies: Negative for polydipsia. Does not bruise/bleed easily.   Psychiatric/Behavioral: Negative for depression and memory loss. The patient is not nervous/anxious.        PHYSICAL EXAM:     Vitals:    05/02/22 1134   BP: 124/80   Pulse: 88   Resp: 15    Body mass index is 26.79 kg/m².  Weight: 82.3 kg (181 lb 7 oz)   Height: 5' 9" (175.3 cm)     Physical Exam  Vitals reviewed.   Constitutional:       General: She is not in acute distress.     Appearance: Normal appearance. She is well-developed. She is not ill-appearing, toxic-appearing or diaphoretic.   HENT:      Head: Normocephalic and atraumatic.   Eyes:      General: No scleral icterus.     Extraocular Movements: Extraocular movements intact.      Conjunctiva/sclera: Conjunctivae normal.      Pupils: Pupils are equal, round, and reactive to light.   Neck:      Thyroid: No thyromegaly.      Vascular: Normal carotid pulses. No carotid bruit or JVD.      Trachea: Trachea normal. No tracheal deviation.   Cardiovascular:      Rate and Rhythm: Normal rate and regular rhythm.      Pulses:           Carotid pulses are 2+ on the right side and 2+ on the left side.     Heart sounds: S1 normal and S2 normal. No murmur heard.    No friction rub. No gallop.      Comments: L infraclavicular ICD site well healed  Pulmonary:      Effort: Pulmonary effort is normal. No respiratory distress.      Breath sounds: Normal breath sounds. No stridor. No wheezing, rhonchi or rales.   Chest:      Chest wall: No tenderness.   Abdominal:      General: There is no distension.      Palpations: " Abdomen is soft.   Musculoskeletal:         General: No swelling or tenderness. Normal range of motion.      Cervical back: Normal range of motion and neck supple. No edema or rigidity.      Right lower leg: No edema.      Left lower leg: No edema.   Feet:      Right foot:      Skin integrity: No ulcer.      Left foot:      Skin integrity: No ulcer.   Skin:     General: Skin is warm and dry.      Coloration: Skin is not jaundiced.   Neurological:      General: No focal deficit present.      Mental Status: She is alert and oriented to person, place, and time.      Cranial Nerves: No cranial nerve deficit.   Psychiatric:         Mood and Affect: Mood normal.         Speech: Speech normal.         Behavior: Behavior normal. Behavior is cooperative.         DATA:   EKG: (personally reviewed tracing(s))  1/24/22 SR 71, inflat ST abnl ?isch, new vs 1/14/22    Laboratory:  CBC:  Recent Labs   Lab 12/31/21  1851 01/14/22  0807 01/26/22  0934   WBC 6.88 6.91 6.16  6.16   Hemoglobin 11.4 L 11.9 L 12.2  12.2   Hematocrit 35.2 L 37.9 38.5  38.5   Platelets 288 297 311  311       CHEMISTRIES:  Recent Labs   Lab 07/28/21  0640 08/08/21  0727 08/12/21  2116 09/15/21  0834 10/01/21  2312 10/20/21  0830 01/14/22  0807 01/26/22  0934 02/25/22  1035   Glucose 83   < > 100   < > 119 H   < > 96 75 78   Sodium 137   < > 140   < > 140   < > 140 140 139   Potassium 3.5   < > 3.7   < > 3.5   < > 4.2 4.3 3.5   BUN 15   < > 9   < > 16   < > 12 17 10   Creatinine 0.7   < > 0.7   < > 0.8   < > 0.8 0.8 0.8   eGFR if African American >60   < > >60   < > >60   < > >60 >60.0 >60.0   eGFR if non African American >60   < > >60   < > >60   < > >60 >60.0 >60.0   Calcium 8.3 L   < > 9.6   < > 9.5   < > 8.6 L 9.1 9.1   Magnesium 2.0  --  1.9  --  2.1  --   --   --   --     < > = values in this interval not displayed.       CARDIAC BIOMARKERS:  Recent Labs   Lab 07/24/21  1802 07/24/21  2136 12/31/21  1851 01/14/22  0807 01/14/22  1511   CPK 77  --    --   --   --    Troponin I 0.035 H   < > <0.006 <0.006 <0.006    < > = values in this interval not displayed.       COAGS:        LIPIDS/LFTS:  Recent Labs   Lab 05/24/19  1927 05/25/19  0438 08/16/21  1409 09/15/21  0834 12/28/21  0742 12/31/21  1851 01/14/22  0807   Cholesterol 170  --  164  --   --   --   --    Triglycerides 46  --  106  --   --   --   --    HDL 43  --  36 L  --   --   --   --    LDL Cholesterol 117.8  --  106.8  --   --   --   --    Non-HDL Cholesterol 127  --  128  --   --   --   --    AST  --    < >  --    < > 18 17 13   ALT  --    < >  --    < > 13 15 10    < > = values in this interval not displayed.     Lab Results   Component Value Date    TSH 1.386 12/28/2021         Cardiovascular Testing:  RHC 1/27/22   Right atrial pressure is normal.  Pulmonary capillary wedge pressure is normal.  Pulmonary artery pressure is normal.  Pulmonary vascular resistance is normal.  Systemic vascular resistance is 1180.  Jeovany cardiac output and index is normal.  RA: 8/ 5/ 7 RV: 26/ 8 PA: 23/ 10/ 14 PWP: 15/ 12/ 12 .   Cardiac output was 5.22 by Jeovany. Cardiac index is 2.67 L/min/m2.   O2 Sat: PA 70%.   Pulmonary vascular resistance: 31. Systemic vascular resistance: 1180.   These hemodynamic are acceptable for cardiac transplant listing.     Echo 10/6/21  · The left ventricle is severely enlarged with eccentric hypertrophy and severely decreased systolic function.  · The estimated ejection fraction is 15%.  · Grade III left ventricular diastolic dysfunction.  · Normal right ventricular size with normal right ventricular systolic function.  · Moderate left atrial enlargement.  · Mild mitral regurgitation.  · Normal central venous pressure (3 mmHg).  · The estimated PA systolic pressure is 15 mmHg.    Cath 4/18/17  B. Summary/Post-Operative Diagnosis    Normal coronary arteries.    Systolic dysfunction.    Mildly elevated left Filling Pressures.    Normal Pulmonary Hypertension.  D. Hemodynamic  Results  Ejection Fraction: 20%  Global LV Function: severely depressed  PW:  (4)  PA: 24  CO_THERM: 4.4  RV: 25  RA:  (5)  LVEDP: 17   E. Angiographic Results       Patient has a right dominant coronary artery.      - Left Main Coronary Artery:             The LM is normal. There is DANNY 3 flow.     - Left Anterior Descending Artery:             The LAD is normal. There is DANNY 3 flow.     - Left Circumflex Artery:             The LCX is normal. There is DANNY 3 flow.     - Right Coronary Artery:             The RCA is normal. There is DANNY 3 flow.     - Left Renal Artery:             The ostial left renal is normal.     - Right Renal Artery:             The ostial right renal is normal.     - Common Femoral Artery:             The right CFA is normal.      ASSESSMENT:   # NICM, appears euvolemic, but complaining of weight gain.   # hx COVID19 in late July 2021.  # MDT ICD  # hx NSVT, last in 8/12/21  # HTN, controlled    PLAN:   Cont med rx  Take extra lasix for weight gain  RTC 1 month with MDT ICD check (June 2022)      Kashif Peguero MD, FACC

## 2022-06-07 ENCOUNTER — PATIENT MESSAGE (OUTPATIENT)
Dept: TRANSPLANT | Facility: CLINIC | Age: 37
End: 2022-06-07
Payer: COMMERCIAL

## 2022-06-20 ENCOUNTER — HOSPITAL ENCOUNTER (EMERGENCY)
Facility: HOSPITAL | Age: 37
Discharge: HOME | End: 2022-06-20
Attending: EMERGENCY MEDICINE
Payer: COMMERCIAL

## 2022-06-20 VITALS
OXYGEN SATURATION: 98 % | WEIGHT: 177 LBS | RESPIRATION RATE: 16 BRPM | HEIGHT: 70 IN | BODY MASS INDEX: 25.34 KG/M2 | DIASTOLIC BLOOD PRESSURE: 85 MMHG | TEMPERATURE: 98 F | SYSTOLIC BLOOD PRESSURE: 126 MMHG | HEART RATE: 70 BPM

## 2022-06-20 DIAGNOSIS — M77.9 TENDINITIS: Primary | ICD-10-CM

## 2022-06-20 DIAGNOSIS — M79.644 PAIN OF FINGER OF RIGHT HAND: ICD-10-CM

## 2022-06-20 LAB
B-HCG UR QL: NEGATIVE
CTP QC/QA: YES

## 2022-06-20 PROCEDURE — 99283 EMERGENCY DEPT VISIT LOW MDM: CPT | Mod: 25,HCNC,ER,NTX

## 2022-06-20 PROCEDURE — 81025 URINE PREGNANCY TEST: CPT | Mod: ER,NTX | Performed by: EMERGENCY MEDICINE

## 2022-06-20 RX ORDER — PREDNISONE 20 MG/1
40 TABLET ORAL DAILY
Qty: 6 TABLET | Refills: 0 | Status: SHIPPED | OUTPATIENT
Start: 2022-06-20 | End: 2022-06-23

## 2022-06-20 NOTE — DISCHARGE INSTRUCTIONS
Please return immediately if you get worse or if new problems develop.  Please follow-up with the primary care doctor in 1 week.  Prednisone as directed.  You may also use Tylenol 1000 mg by mouth every 8 hours as needed for pain.

## 2022-06-20 NOTE — ED PROVIDER NOTES
Encounter Date: 2022       History     Chief Complaint   Patient presents with    Hand Pain     Complains of pain to 4th digit of R hand that radiates up arm x3 days. Pain worsens with movement. Denies known trauma or injury.     HPI   This 37-year-old white female presents to the emergency room complaining of a 3 day history of pain in the ring finger the right hand that radiates up to the forearm for 4 days.  There is no history of trauma.  The patient denies neurologic deficit she is otherwise well.  There is no swelling or redness.  There is no wound.  Review of patient's allergies indicates:   Allergen Reactions    Aldactone [spironolactone] Swelling     Lips swelled     Past Medical History:   Diagnosis Date    CHF (congestive heart failure)     Chronic combined systolic and diastolic congestive heart failure 2019    Essential hypertension 2012    Hypertension     dx at 16y.o.   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 disease, with heart failure 2021    ICD (implantable cardioverter-defibrillator), single, in situ 2020    Nonischemic cardiomyopathy 2019    Pacemaker 2017     Past Surgical History:   Procedure Laterality Date    CARDIAC DEFIBRILLATOR PLACEMENT  2017     SECTION      x 1    INDUCED       RIGHT HEART CATHETERIZATION Right 2022    Procedure: INSERTION, CATHETER, RIGHT HEART;  Surgeon: Timothy Prajapati Jr., MD;  Location: [facility] CATH LAB;  Service: Cardiology;  Laterality: Right;    TUBAL LIGATION       Family History   Problem Relation Age of Onset    Hypertension Mother     Cancer Maternal Grandmother         breast x 2    Cancer Paternal Grandmother         breast    No Known Problems Sister     No Known Problems Brother     No Known Problems Son     No Known Problems Brother     Hypertension Other     Diabetes Other     Breast cancer Other     Cancer Paternal Aunt          breast    Cancer Paternal Uncle      Social History     Tobacco Use    Smoking status: Never Smoker    Smokeless tobacco: Never Used   Substance Use Topics    Alcohol use: Not Currently     Comment: rarely    Drug use: No     Comment: in past very little marijuana     Review of Systems  The patient was questioned specifically with regard to the following.  General: Fever, chills, sweats. Neuro: Headache. Eyes: eye problems. ENT: Ear pain, sore throat. Cardiovascular: Chest pain. Respiratory: Cough, shortness of breath. Gastrointestinal: Abdominal pain, vomiting, diarrhea. Genitourinary: Painful urination.  Musculoskeletal: Arm and leg problems. Skin: Rash.  The review of systems was negative except for the following:  Right ring finger pain, pain in the palm of the right hand pain radiating up to the volar right forearm.  Physical Exam     Initial Vitals [06/20/22 0725]   BP Pulse Resp Temp SpO2   (!) 152/88 70 16 98.1 °F (36.7 °C) 98 %      MAP       --         Physical Exam  The patient was examined specifically for the following:   General:No significant distress, Good color, Warm and dry. Head and neck:Scalp atraumatic, Neck supple. Neurological:Appropriate conversation, Gross motor deficits. Eyes:Conjugate gaze, Clear corneas. ENT: No epistaxis. Cardiac: Regular rate and rhythm, Grossly normal heart tones. Pulmonary: Wheezing, Rales. Gastrointestinal: Abdominal tenderness, Abdominal distention. Musculoskeletal: Extremity deformity, Apparent pain with range of motion of the joints. Skin: Rash.   The findings on examination were normal except for the following:  Patient has minimal tenderness of the volar right ring finger right home.  There is minimal tenderness of the right forearm.  Neurovascular and tendon function are intact.  Patient has a brisk right radial pulse.  ED Course   Procedures  Labs Reviewed   POCT URINE PREGNANCY          Imaging Results    None       Medical decision making:   Given the above this patient presents to the emergency room with right ring finger pain right hand pain radius the right forearm.  There are no significant physical findings specifically with regard to swelling redness tenderness or neurologic deficit.  There is no tendon deficit.       Medications - No data to display                       Clinical Impression:   Final diagnoses:  [M77.9] Tendinitis (Primary)  [M79.644] Pain of finger of right hand          ED Disposition Condition    Discharge Stable        ED Prescriptions     Medication Sig Dispense Start Date End Date Auth. Provider    predniSONE (DELTASONE) 20 MG tablet Take 2 tablets (40 mg total) by mouth once daily. for 3 days 6 tablet 6/20/2022 6/23/2022 Rayray Mason MD        Follow-up Information     Follow up With Specialties Details Why Contact Info    Veronika Rainey MD Family Medicine, Wound Care In 1 week  [address] [facility]  [place] [id number]  [phone]             Rayray Mason MD  06/20/22 0822

## 2022-06-24 ENCOUNTER — PATIENT MESSAGE (OUTPATIENT)
Dept: TRANSPLANT | Facility: CLINIC | Age: 37
End: 2022-06-24
Payer: COMMERCIAL

## 2022-06-29 ENCOUNTER — OFFICE VISIT (OUTPATIENT)
Dept: CARDIOLOGY | Facility: CLINIC | Age: 37
End: 2022-06-29
Payer: COMMERCIAL

## 2022-06-29 VITALS
HEIGHT: 70 IN | DIASTOLIC BLOOD PRESSURE: 68 MMHG | OXYGEN SATURATION: 97 % | SYSTOLIC BLOOD PRESSURE: 110 MMHG | RESPIRATION RATE: 15 BRPM | BODY MASS INDEX: 25.43 KG/M2 | WEIGHT: 177.63 LBS | HEART RATE: 70 BPM

## 2022-06-29 DIAGNOSIS — I10 ESSENTIAL HYPERTENSION: ICD-10-CM

## 2022-06-29 DIAGNOSIS — I42.8 NONISCHEMIC CARDIOMYOPATHY: Primary | ICD-10-CM

## 2022-06-29 DIAGNOSIS — Z95.810 ICD (IMPLANTABLE CARDIOVERTER-DEFIBRILLATOR), SINGLE, IN SITU: ICD-10-CM

## 2022-06-29 DIAGNOSIS — Z86.16 HISTORY OF COVID-19: ICD-10-CM

## 2022-06-29 PROCEDURE — 99999 PR PBB SHADOW E&M-EST. PATIENT-LVL III: ICD-10-PCS | Mod: PBBFAC,HCNC,TXP, | Performed by: INTERNAL MEDICINE

## 2022-06-29 PROCEDURE — 93282 PR PROGRAM EVAL (IN PERSON) IMPLANT DEVICE,CARDVERT/DEFIB,1 LEAD: ICD-10-PCS | Mod: 26,HCNC,NTX,S$GLB | Performed by: INTERNAL MEDICINE

## 2022-06-29 PROCEDURE — 3008F BODY MASS INDEX DOCD: CPT | Mod: HCNC,CPTII,NTX,S$GLB | Performed by: INTERNAL MEDICINE

## 2022-06-29 PROCEDURE — 99213 OFFICE O/P EST LOW 20 MIN: CPT | Mod: HCNC,25,NTX,S$GLB | Performed by: INTERNAL MEDICINE

## 2022-06-29 PROCEDURE — 3008F PR BODY MASS INDEX (BMI) DOCUMENTED: ICD-10-PCS | Mod: HCNC,CPTII,NTX,S$GLB | Performed by: INTERNAL MEDICINE

## 2022-06-29 PROCEDURE — 3074F SYST BP LT 130 MM HG: CPT | Mod: HCNC,CPTII,NTX,S$GLB | Performed by: INTERNAL MEDICINE

## 2022-06-29 PROCEDURE — 4010F PR ACE/ARB THEARPY RXD/TAKEN: ICD-10-PCS | Mod: HCNC,CPTII,NTX,S$GLB | Performed by: INTERNAL MEDICINE

## 2022-06-29 PROCEDURE — 93282 PRGRMG EVAL IMPLANTABLE DFB: CPT | Mod: 26,HCNC,NTX,S$GLB | Performed by: INTERNAL MEDICINE

## 2022-06-29 PROCEDURE — 99999 PR PBB SHADOW E&M-EST. PATIENT-LVL III: CPT | Mod: PBBFAC,HCNC,TXP, | Performed by: INTERNAL MEDICINE

## 2022-06-29 PROCEDURE — 1160F RVW MEDS BY RX/DR IN RCRD: CPT | Mod: HCNC,CPTII,NTX,S$GLB | Performed by: INTERNAL MEDICINE

## 2022-06-29 PROCEDURE — 3078F DIAST BP <80 MM HG: CPT | Mod: HCNC,CPTII,NTX,S$GLB | Performed by: INTERNAL MEDICINE

## 2022-06-29 PROCEDURE — 3078F PR MOST RECENT DIASTOLIC BLOOD PRESSURE < 80 MM HG: ICD-10-PCS | Mod: HCNC,CPTII,NTX,S$GLB | Performed by: INTERNAL MEDICINE

## 2022-06-29 PROCEDURE — 99499 RISK ADDL DX/OHS AUDIT: ICD-10-PCS | Mod: HCNC,S$GLB,, | Performed by: INTERNAL MEDICINE

## 2022-06-29 PROCEDURE — 3074F PR MOST RECENT SYSTOLIC BLOOD PRESSURE < 130 MM HG: ICD-10-PCS | Mod: HCNC,CPTII,NTX,S$GLB | Performed by: INTERNAL MEDICINE

## 2022-06-29 PROCEDURE — 99499 UNLISTED E&M SERVICE: CPT | Mod: HCNC,S$GLB,, | Performed by: INTERNAL MEDICINE

## 2022-06-29 PROCEDURE — 1160F PR REVIEW ALL MEDS BY PRESCRIBER/CLIN PHARMACIST DOCUMENTED: ICD-10-PCS | Mod: HCNC,CPTII,NTX,S$GLB | Performed by: INTERNAL MEDICINE

## 2022-06-29 PROCEDURE — 4010F ACE/ARB THERAPY RXD/TAKEN: CPT | Mod: HCNC,CPTII,NTX,S$GLB | Performed by: INTERNAL MEDICINE

## 2022-06-29 PROCEDURE — 1159F MED LIST DOCD IN RCRD: CPT | Mod: HCNC,CPTII,NTX,S$GLB | Performed by: INTERNAL MEDICINE

## 2022-06-29 PROCEDURE — 99213 PR OFFICE/OUTPT VISIT, EST, LEVL III, 20-29 MIN: ICD-10-PCS | Mod: HCNC,25,NTX,S$GLB | Performed by: INTERNAL MEDICINE

## 2022-06-29 PROCEDURE — 1159F PR MEDICATION LIST DOCUMENTED IN MEDICAL RECORD: ICD-10-PCS | Mod: HCNC,CPTII,NTX,S$GLB | Performed by: INTERNAL MEDICINE

## 2022-06-29 NOTE — PROGRESS NOTES
CARDIOVASCULAR PROGRESS NOTE    REASON FOR CONSULT:   Nasra Marks is a 37 y.o. female who presents for follow up of CY GUILLEN ICD.    PCP: Redd  Gyn: Labadie, Juan  CHF: Victorina  HISTORY OF PRESENT ILLNESS:   COVID+ 21    The patient returns for follow-up.  She reports generally asymptomatic status without angina, dyspnea, palpitations, or syncope.  There has been no PND, orthopnea, or lower extremity edema.  She denies melena, hematuria, or claudicant symptoms.    The Medtronic single-chamber ICD was interrogated in the office today.  Underlying rhythm is sinus at 70 beats per minute.  The patient is not pacing the ventricle at all.  Estimated longevity is 8.1 years.  Sensing and pacing thresholds as well as impedance are normal.  No high rate episodes were noted.  No programming changes were made.    CARDIOVASCULAR HISTORY:   NICM (cath 2017 with normal cors)  ICD (Donta 2017, MDT single chamber)    PAST MEDICAL HISTORY:     Past Medical History:   Diagnosis Date    CHF (congestive heart failure)     Chronic combined systolic and diastolic congestive heart failure 2019    Essential hypertension 2012    Hypertension     dx at 16y.o.   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 disease, with heart failure 2021    ICD (implantable cardioverter-defibrillator), single, in situ 2020    Nonischemic cardiomyopathy 2019    Pacemaker 2017       PAST SURGICAL HISTORY:     Past Surgical History:   Procedure Laterality Date    CARDIAC DEFIBRILLATOR PLACEMENT  2017     SECTION      x 1    INDUCED       RIGHT HEART CATHETERIZATION Right 2022    Procedure: INSERTION, CATHETER, RIGHT HEART;  Surgeon: Timothy Prajapati Jr., MD;  Location: [facility] CATH LAB;  Service: Cardiology;  Laterality: Right;    TUBAL LIGATION         ALLERGIES AND MEDICATION:     Review of patient's allergies indicates:   Allergen  Reactions    Aldactone [spironolactone] Swelling     Lips swelled        Medication List          Accurate as of June 29, 2022  1:16 PM. If you have any questions, ask your nurse or doctor.            CONTINUE taking these medications    dapagliflozin 10 mg tablet  Commonly known as: FARXIGA  Take 1 tablet (10 mg total) by mouth once daily.     ENTRESTO  mg per tablet  Generic drug: sacubitriL-valsartan  Take 1 tablet by mouth 2 (two) times daily.     eplerenone 25 MG Tab  Commonly known as: INSPRA  Take 1 tablet (25 mg total) by mouth once daily.     furosemide 40 MG tablet  Commonly known as: LASIX  Take 1 tablet (40 mg total) by mouth 2 (two) times a day.     metoprolol succinate 100 MG 24 hr tablet  Commonly known as: TOPROL-XL  Take 1 tablet (100 mg total) by mouth once daily.     sars-cov-2 (covid-19) 30 mcg/0.3 ml injection  Commonly known as: Pfizer COVID-19            SOCIAL HISTORY:     Social History     Socioeconomic History    Marital status:    Occupational History     Employer: [employer]   Tobacco Use    Smoking status: Never Smoker    Smokeless tobacco: Never Used   Substance and Sexual Activity    Alcohol use: Not Currently     Comment: rarely    Drug use: No     Comment: in past very little marijuana    Sexual activity: Yes     Partners: Male     Birth control/protection: None       FAMILY HISTORY:     Family History   Problem Relation Age of Onset    Hypertension Mother     Cancer Maternal Grandmother         breast x 2    Cancer Paternal Grandmother         breast    No Known Problems Sister     No Known Problems Brother     No Known Problems Son     No Known Problems Brother     Hypertension Other     Diabetes Other     Breast cancer Other     Cancer Paternal Aunt         breast    Cancer Paternal Uncle        REVIEW OF SYSTEMS:   Review of Systems   Constitutional: Negative for chills, diaphoresis, fever and malaise/fatigue.   HENT: Negative for nosebleeds.   "  Eyes: Negative for blurred vision, double vision and photophobia.   Respiratory: Negative for hemoptysis, shortness of breath and wheezing.    Cardiovascular: Negative for chest pain, palpitations, orthopnea, claudication, leg swelling and PND.   Gastrointestinal: Negative for abdominal pain, blood in stool, heartburn, melena, nausea and vomiting.   Genitourinary: Negative for flank pain and hematuria.   Musculoskeletal: Negative for falls, myalgias and neck pain.   Skin: Negative for rash.   Neurological: Negative for dizziness, seizures, loss of consciousness, weakness and headaches.   Endo/Heme/Allergies: Negative for polydipsia. Does not bruise/bleed easily.   Psychiatric/Behavioral: Negative for depression and memory loss. The patient is not nervous/anxious.        PHYSICAL EXAM:     Vitals:    06/29/22 1313   BP: 110/68   Pulse: 70   Resp: 15    Body mass index is 25.49 kg/m².  Weight: 80.6 kg (177 lb 10.4 oz)   Height: 5' 10" (177.8 cm)     Physical Exam  Vitals reviewed.   Constitutional:       General: She is not in acute distress.     Appearance: Normal appearance. She is well-developed. She is not ill-appearing, toxic-appearing or diaphoretic.   HENT:      Head: Normocephalic and atraumatic.   Eyes:      General: No scleral icterus.     Extraocular Movements: Extraocular movements intact.      Conjunctiva/sclera: Conjunctivae normal.      Pupils: Pupils are equal, round, and reactive to light.   Neck:      Thyroid: No thyromegaly.      Vascular: Normal carotid pulses. No carotid bruit or JVD.      Trachea: Trachea normal. No tracheal deviation.   Cardiovascular:      Rate and Rhythm: Normal rate and regular rhythm.      Pulses:           Carotid pulses are 2+ on the right side and 2+ on the left side.     Heart sounds: S1 normal and S2 normal. No murmur heard.    No friction rub. No gallop.      Comments: L infraclavicular ICD site well healed  Pulmonary:      Effort: Pulmonary effort is normal. No " respiratory distress.      Breath sounds: Normal breath sounds. No stridor. No wheezing, rhonchi or rales.   Chest:      Chest wall: No tenderness.   Abdominal:      General: There is no distension.      Palpations: Abdomen is soft.   Musculoskeletal:         General: No swelling or tenderness. Normal range of motion.      Cervical back: Normal range of motion and neck supple. No edema or rigidity.      Right lower leg: No edema.      Left lower leg: No edema.   Feet:      Right foot:      Skin integrity: No ulcer.      Left foot:      Skin integrity: No ulcer.   Skin:     General: Skin is warm and dry.      Coloration: Skin is not jaundiced.   Neurological:      General: No focal deficit present.      Mental Status: She is alert and oriented to person, place, and time.      Cranial Nerves: No cranial nerve deficit.   Psychiatric:         Mood and Affect: Mood normal.         Speech: Speech normal.         Behavior: Behavior normal. Behavior is cooperative.         DATA:   EKG: (personally reviewed tracing(s))  1/24/22 SR 71, inflat ST abnl ?isch, new vs 1/14/22    Laboratory:  CBC:  Recent Labs   Lab 12/31/21  1851 01/14/22  0807 01/26/22  0934   WBC 6.88 6.91 6.16  6.16   Hemoglobin 11.4 L 11.9 L 12.2  12.2   Hematocrit 35.2 L 37.9 38.5  38.5   Platelets 288 297 311  311       CHEMISTRIES:  Recent Labs   Lab 07/28/21  0640 08/08/21  0727 08/12/21  2116 09/15/21  0834 10/01/21  2312 10/20/21  0830 01/14/22  0807 01/26/22  0934 02/25/22  1035   Glucose 83   < > 100   < > 119 H   < > 96 75 78   Sodium 137   < > 140   < > 140   < > 140 140 139   Potassium 3.5   < > 3.7   < > 3.5   < > 4.2 4.3 3.5   BUN 15   < > 9   < > 16   < > 12 17 10   Creatinine 0.7   < > 0.7   < > 0.8   < > 0.8 0.8 0.8   eGFR if African American >60   < > >60   < > >60   < > >60 >60.0 >60.0   eGFR if non African American >60   < > >60   < > >60   < > >60 >60.0 >60.0   Calcium 8.3 L   < > 9.6   < > 9.5   < > 8.6 L 9.1 9.1   Magnesium 2.0  --   1.9  --  2.1  --   --   --   --     < > = values in this interval not displayed.       CARDIAC BIOMARKERS:  Recent Labs   Lab 07/24/21  1802 07/24/21  2136 12/31/21  1851 01/14/22  0807 01/14/22  1511   CPK 77  --   --   --   --    Troponin I 0.035 H   < > <0.006 <0.006 <0.006    < > = values in this interval not displayed.       COAGS:        LIPIDS/LFTS:  Recent Labs   Lab 08/16/21  1409 09/15/21  0834 12/28/21  0742 12/31/21  1851 01/14/22  0807   Cholesterol 164  --   --   --   --    Triglycerides 106  --   --   --   --    HDL 36 L  --   --   --   --    LDL Cholesterol 106.8  --   --   --   --    Non-HDL Cholesterol 128  --   --   --   --    AST  --    < > 18 17 13   ALT  --    < > 13 15 10    < > = values in this interval not displayed.     Lab Results   Component Value Date    TSH 1.386 12/28/2021         Cardiovascular Testing:  RHC 1/27/22   Right atrial pressure is normal.  Pulmonary capillary wedge pressure is normal.  Pulmonary artery pressure is normal.  Pulmonary vascular resistance is normal.  Systemic vascular resistance is 1180.  Jeovany cardiac output and index is normal.  RA: 8/ 5/ 7 RV: 26/ 8 PA: 23/ 10/ 14 PWP: 15/ 12/ 12 .   Cardiac output was 5.22 by Jeovany. Cardiac index is 2.67 L/min/m2.   O2 Sat: PA 70%.   Pulmonary vascular resistance: 31. Systemic vascular resistance: 1180.   These hemodynamic are acceptable for cardiac transplant listing.     Echo 10/6/21  · The left ventricle is severely enlarged with eccentric hypertrophy and severely decreased systolic function.  · The estimated ejection fraction is 15%.  · Grade III left ventricular diastolic dysfunction.  · Normal right ventricular size with normal right ventricular systolic function.  · Moderate left atrial enlargement.  · Mild mitral regurgitation.  · Normal central venous pressure (3 mmHg).  · The estimated PA systolic pressure is 15 mmHg.    Cath 4/18/17  B. Summary/Post-Operative Diagnosis    Normal coronary arteries.    Systolic  dysfunction.    Mildly elevated left Filling Pressures.    Normal Pulmonary Hypertension.  D. Hemodynamic Results  Ejection Fraction: 20%  Global LV Function: severely depressed  PW:  (4)  PA: 24  CO_THERM: 4.4  RV: 25  RA:  (5)  LVEDP: 17   E. Angiographic Results       Patient has a right dominant coronary artery.      - Left Main Coronary Artery:             The LM is normal. There is DANNY 3 flow.     - Left Anterior Descending Artery:             The LAD is normal. There is ADNNY 3 flow.     - Left Circumflex Artery:             The LCX is normal. There is DANNY 3 flow.     - Right Coronary Artery:             The RCA is normal. There is DANNY 3 flow.     - Left Renal Artery:             The ostial left renal is normal.     - Right Renal Artery:             The ostial right renal is normal.     - Common Femoral Artery:             The right CFA is normal.      ASSESSMENT:   # NICM, appears euvolemic, but complaining of weight gain.   # hx COVID19 in late July 2021.  # MDT ICD, functioning normally  # hx NSVT, last in 8/12/21  # HTN, controlled    PLAN:   Cont med rx  RTC 3 months with MDT ICD check (late Sept 2022)      Kashif Peguero MD, FACC

## 2022-08-26 ENCOUNTER — HOSPITAL ENCOUNTER (EMERGENCY)
Facility: HOSPITAL | Age: 37
Discharge: HOME | End: 2022-08-26
Attending: EMERGENCY MEDICINE
Payer: COMMERCIAL

## 2022-08-26 VITALS
TEMPERATURE: 99 F | DIASTOLIC BLOOD PRESSURE: 74 MMHG | HEIGHT: 70 IN | HEART RATE: 60 BPM | RESPIRATION RATE: 14 BRPM | SYSTOLIC BLOOD PRESSURE: 128 MMHG | OXYGEN SATURATION: 98 % | BODY MASS INDEX: 24.91 KG/M2 | WEIGHT: 174 LBS

## 2022-08-26 DIAGNOSIS — R07.9 CHEST PAIN: ICD-10-CM

## 2022-08-26 DIAGNOSIS — I50.9 ACUTE ON CHRONIC CONGESTIVE HEART FAILURE, UNSPECIFIED HEART FAILURE TYPE: Primary | ICD-10-CM

## 2022-08-26 LAB
ALBUMIN SERPL BCP-MCNC: 3.7 G/DL (ref 3.5–5.2)
ALP SERPL-CCNC: 55 U/L (ref 55–135)
ALT SERPL W/O P-5'-P-CCNC: 9 U/L (ref 10–44)
ANION GAP SERPL CALC-SCNC: 11 MMOL/L (ref 8–16)
AST SERPL-CCNC: 18 U/L (ref 10–40)
B-HCG UR QL: NEGATIVE
BACTERIA #/AREA URNS HPF: ABNORMAL /HPF
BASOPHILS # BLD AUTO: 0.04 K/UL (ref 0–0.2)
BASOPHILS NFR BLD: 0.5 % (ref 0–1.9)
BILIRUB SERPL-MCNC: 0.3 MG/DL (ref 0.1–1)
BILIRUB UR QL STRIP: NEGATIVE
BNP SERPL-MCNC: 169 PG/ML (ref 0–99)
BUN SERPL-MCNC: 14 MG/DL (ref 6–20)
CALCIUM SERPL-MCNC: 9.6 MG/DL (ref 8.7–10.5)
CHLORIDE SERPL-SCNC: 106 MMOL/L (ref 95–110)
CLARITY UR: CLEAR
CO2 SERPL-SCNC: 24 MMOL/L (ref 23–29)
COLOR UR: YELLOW
CREAT SERPL-MCNC: 0.9 MG/DL (ref 0.5–1.4)
CTP QC/QA: YES
DIFFERENTIAL METHOD: NORMAL
EOSINOPHIL # BLD AUTO: 0.1 K/UL (ref 0–0.5)
EOSINOPHIL NFR BLD: 0.9 % (ref 0–8)
ERYTHROCYTE [DISTWIDTH] IN BLOOD BY AUTOMATED COUNT: 12.8 % (ref 11.5–14.5)
EST. GFR  (NO RACE VARIABLE): >60 ML/MIN/1.73 M^2
GLUCOSE SERPL-MCNC: 78 MG/DL (ref 70–110)
GLUCOSE UR QL STRIP: ABNORMAL
HCT VFR BLD AUTO: 37.5 % (ref 37–48.5)
HGB BLD-MCNC: 12.4 G/DL (ref 12–16)
HGB UR QL STRIP: ABNORMAL
IMM GRANULOCYTES # BLD AUTO: 0.02 K/UL (ref 0–0.04)
IMM GRANULOCYTES NFR BLD AUTO: 0.2 % (ref 0–0.5)
KETONES UR QL STRIP: NEGATIVE
LEUKOCYTE ESTERASE UR QL STRIP: NEGATIVE
LYMPHOCYTES # BLD AUTO: 1.9 K/UL (ref 1–4.8)
LYMPHOCYTES NFR BLD: 23.4 % (ref 18–48)
MCH RBC QN AUTO: 29.8 PG (ref 27–31)
MCHC RBC AUTO-ENTMCNC: 33.1 G/DL (ref 32–36)
MCV RBC AUTO: 90 FL (ref 82–98)
MICROSCOPIC COMMENT: ABNORMAL
MONOCYTES # BLD AUTO: 0.7 K/UL (ref 0.3–1)
MONOCYTES NFR BLD: 8.2 % (ref 4–15)
NEUTROPHILS # BLD AUTO: 5.5 K/UL (ref 1.8–7.7)
NEUTROPHILS NFR BLD: 66.8 % (ref 38–73)
NITRITE UR QL STRIP: NEGATIVE
NRBC BLD-RTO: 0 /100 WBC
PH UR STRIP: 6 [PH] (ref 5–8)
PLATELET # BLD AUTO: 322 K/UL (ref 150–450)
PMV BLD AUTO: 9.6 FL (ref 9.2–12.9)
POTASSIUM SERPL-SCNC: 3.5 MMOL/L (ref 3.5–5.1)
PROT SERPL-MCNC: 7.7 G/DL (ref 6–8.4)
PROT UR QL STRIP: NEGATIVE
RBC # BLD AUTO: 4.16 M/UL (ref 4–5.4)
RBC #/AREA URNS HPF: 14 /HPF (ref 0–4)
SODIUM SERPL-SCNC: 141 MMOL/L (ref 136–145)
SP GR UR STRIP: 1.02 (ref 1–1.03)
SQUAMOUS #/AREA URNS HPF: 1 /HPF
TROPONIN I SERPL DL<=0.01 NG/ML-MCNC: 0.01 NG/ML (ref 0–0.03)
TROPONIN I SERPL DL<=0.01 NG/ML-MCNC: 0.01 NG/ML (ref 0–0.03)
URN SPEC COLLECT METH UR: ABNORMAL
UROBILINOGEN UR STRIP-ACNC: ABNORMAL EU/DL
WBC # BLD AUTO: 8.17 K/UL (ref 3.9–12.7)
YEAST URNS QL MICRO: ABNORMAL

## 2022-08-26 PROCEDURE — 63600175 PHARM REV CODE 636 W HCPCS: Mod: HCNC,NTX | Performed by: EMERGENCY MEDICINE

## 2022-08-26 PROCEDURE — 96374 THER/PROPH/DIAG INJ IV PUSH: CPT | Mod: HCNC,NTX

## 2022-08-26 PROCEDURE — 99285 EMERGENCY DEPT VISIT HI MDM: CPT | Mod: 25,HCNC,NTX

## 2022-08-26 PROCEDURE — 81000 URINALYSIS NONAUTO W/SCOPE: CPT | Mod: HCNC,NTX | Performed by: NURSE PRACTITIONER

## 2022-08-26 PROCEDURE — 80053 COMPREHEN METABOLIC PANEL: CPT | Mod: HCNC,NTX | Performed by: NURSE PRACTITIONER

## 2022-08-26 PROCEDURE — 93010 EKG 12-LEAD: ICD-10-PCS | Mod: HCNC,NTX,, | Performed by: INTERNAL MEDICINE

## 2022-08-26 PROCEDURE — 93010 ELECTROCARDIOGRAM REPORT: CPT | Mod: HCNC,NTX,, | Performed by: INTERNAL MEDICINE

## 2022-08-26 PROCEDURE — 81025 URINE PREGNANCY TEST: CPT | Mod: HCNC,NTX | Performed by: NURSE PRACTITIONER

## 2022-08-26 PROCEDURE — 84484 ASSAY OF TROPONIN QUANT: CPT | Mod: HCNC,NTX | Performed by: NURSE PRACTITIONER

## 2022-08-26 PROCEDURE — 93005 ELECTROCARDIOGRAM TRACING: CPT | Mod: HCNC,NTX

## 2022-08-26 PROCEDURE — 83880 ASSAY OF NATRIURETIC PEPTIDE: CPT | Mod: HCNC,NTX | Performed by: NURSE PRACTITIONER

## 2022-08-26 PROCEDURE — 85025 COMPLETE CBC W/AUTO DIFF WBC: CPT | Mod: HCNC,NTX | Performed by: NURSE PRACTITIONER

## 2022-08-26 RX ORDER — FUROSEMIDE 10 MG/ML
40 INJECTION INTRAMUSCULAR; INTRAVENOUS
Status: COMPLETED | OUTPATIENT
Start: 2022-08-26 | End: 2022-08-26

## 2022-08-26 RX ADMIN — FUROSEMIDE 40 MG: 10 INJECTION, SOLUTION INTRAMUSCULAR; INTRAVENOUS at 11:08

## 2022-08-27 NOTE — ED TRIAGE NOTES
Pt comes in w/CP and SOB. State SOB increases w/ambulation. Pt has known hx of CHF. Pt states she compliant w/medications, describes a feeling of bloating in midsection, states she was 3lbs heavier today when she weighed herself. Pt is A&Ox4, skin is W/D/I, color WDL, able to speak incomplete sentences w/out SOB.

## 2022-08-27 NOTE — FIRST PROVIDER EVALUATION
"Medical screening exam completed.  I have conducted a focused provider triage encounter, findings are as follows:    Brief history of present illness:  CP described as tightness since noon; h/o CHF, with SOB and 3 lb weight gain     Vitals:    08/26/22 1955   BP: 132/79   BP Location: Left arm   Patient Position: Sitting   Pulse: 65   Resp: 16   Temp: 98.5 °F (36.9 °C)   TempSrc: Oral   SpO2: 99%   Weight: 78.9 kg (174 lb)   Height: 5' 10" (1.778 m)       Pertinent physical exam:  NAD    Brief workup plan:  EKG, CXR, UA, UPT, labs    Preliminary workup initiated; this workup will be continued and followed by the physician or advanced practice provider that is assigned to the patient when roomed.  "

## 2022-08-27 NOTE — ED PROVIDER NOTES
Encounter Date: 2022    SCRIBE #1 NOTE: I, Jennifer Orellana, am scribing for, and in the presence of,  Zhang Walker MD. I have scribed the following portions of the note - Other sections scribed: HPI & ROS.       History     Chief Complaint   Patient presents with    Chest Pain     Chest pain with tightness since noon; has CHF, 3lb weight gain with SOB     Nasra Marks is a 37 y.o. female, with a PMHx of CHF and HTN, who presents to the ED with complaints of chest pain X 2 days and SOB X 3 days. Additionally reports feeling fatigued for the last few days and especially congested at night. Patient endorses symptoms feeling related to CHF and reports 3 lb gain after weighing herself this AM. States SOB exacerbated when laying down and on exertion. No other modifying factors. Patient reports compliancy with Lasix 40 mg once daily, with last dose taken today. No other associated symptoms.       The history is provided by the patient. No  was used.     Review of patient's allergies indicates:   Allergen Reactions    Aldactone [spironolactone] Swelling     Lips swelled     Past Medical History:   Diagnosis Date    CHF (congestive heart failure)     Chronic combined systolic and diastolic congestive heart failure 2019    Essential hypertension 2012    Hypertension     dx at 16y.o.   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 disease, with heart failure 2021    ICD (implantable cardioverter-defibrillator), single, in situ 2020    Nonischemic cardiomyopathy 2019    Pacemaker 2017     Past Surgical History:   Procedure Laterality Date    CARDIAC DEFIBRILLATOR PLACEMENT  2017     SECTION      x 1    INDUCED       RIGHT HEART CATHETERIZATION Right 2022    Procedure: INSERTION, CATHETER, RIGHT HEART;  Surgeon: Timothy Prajapati Jr., MD;  Location: [facility] CATH LAB;  Service:  Cardiology;  Laterality: Right;    TUBAL LIGATION       Family History   Problem Relation Age of Onset    Hypertension Mother     Cancer Maternal Grandmother         breast x 2    Cancer Paternal Grandmother         breast    No Known Problems Sister     No Known Problems Brother     No Known Problems Son     No Known Problems Brother     Hypertension Other     Diabetes Other     Breast cancer Other     Cancer Paternal Aunt         breast    Cancer Paternal Uncle      Social History     Tobacco Use    Smoking status: Never Smoker    Smokeless tobacco: Never Used   Substance Use Topics    Alcohol use: Not Currently     Comment: rarely    Drug use: No     Comment: in past very little marijuana     Review of Systems   Constitutional: Positive for fatigue.   HENT: Positive for congestion.    Eyes: Negative.    Respiratory: Positive for shortness of breath.    Cardiovascular: Positive for chest pain.   Gastrointestinal: Negative.    Genitourinary: Negative.    Musculoskeletal: Negative.    Skin: Negative.    Neurological: Negative.        Physical Exam     Initial Vitals [08/26/22 1955]   BP Pulse Resp Temp SpO2   132/79 65 16 98.5 °F (36.9 °C) 99 %      MAP       --         Physical Exam    Nursing note and vitals reviewed.  Constitutional: She appears well-developed and well-nourished. She is not diaphoretic. No distress.   HENT:   Head: Normocephalic and atraumatic.   Nose: Nose normal.   Eyes: EOM are normal. Pupils are equal, round, and reactive to light.   Neck: Neck supple. No JVD present.   Normal range of motion.  Cardiovascular: Normal rate, regular rhythm, normal heart sounds and intact distal pulses.   Pulmonary/Chest: Breath sounds normal. No stridor. No respiratory distress. She has no wheezes. She has no rales.   Abdominal: Abdomen is soft. Bowel sounds are normal. She exhibits no distension. There is no abdominal tenderness.   Musculoskeletal:         General: No tenderness or edema.  Normal range of motion.      Cervical back: Normal range of motion and neck supple.     Neurological: She is alert and oriented to person, place, and time. She has normal strength.   Skin: Skin is warm and dry. Capillary refill takes less than 2 seconds. No rash noted. No erythema.         ED Course   Procedures  Labs Reviewed   COMPREHENSIVE METABOLIC PANEL - Abnormal; Notable for the following components:       Result Value    ALT 9 (*)     All other components within normal limits   B-TYPE NATRIURETIC PEPTIDE - Abnormal; Notable for the following components:     (*)     All other components within normal limits   URINALYSIS, REFLEX TO URINE CULTURE - Abnormal; Notable for the following components:    Glucose, UA 4+ (*)     Occult Blood UA 1+ (*)     Urobilinogen, UA 2.0-3.0 (*)     All other components within normal limits    Narrative:     Specimen Source->Urine   URINALYSIS MICROSCOPIC - Abnormal; Notable for the following components:    RBC, UA 14 (*)     All other components within normal limits    Narrative:     Specimen Source->Urine   CBC W/ AUTO DIFFERENTIAL   TROPONIN I   TROPONIN I   POCT URINE PREGNANCY          Imaging Results          X-Ray Chest PA And Lateral (Final result)  Result time 08/26/22 20:59:44    Final result by Kasandra Erazo MD (08/26/22 20:59:44)                 Impression:      No acute cardiopulmonary process identified.      Electronically signed by: Kasandra Erazo MD  Date:    08/26/2022  Time:    20:59             Narrative:    EXAMINATION:  XR CHEST PA AND LATERAL    CLINICAL HISTORY:  Chest Pain;    TECHNIQUE:  PA and lateral views of the chest were performed.    COMPARISON:  04/04/2022.    FINDINGS:  Cardiac silhouette is stable in size.  Left-sided pacer device is seen.  Lungs are symmetrically expanded.  No evidence of focal consolidative process, pneumothorax, or significant pleural effusion.  No acute osseous abnormality identified.                                  Medications   furosemide injection 40 mg (40 mg Intravenous Given 8/26/22 7818)     Medical Decision Making:   History:   Old Medical Records: I decided to obtain old medical records.  Independently Interpreted Test(s):   I have ordered and independently interpreted EKG Reading(s) - see prior notes  Clinical Tests:   Lab Tests: Ordered and Reviewed  Radiological Study: Ordered and Reviewed  Medical Tests: Ordered and Reviewed    MDM:    37 y.o.female with PMHx as noted above, presents with SOB. Physical exam as noted above.  ED workup remarkable for EKG - nsr, rate 63 bpm, non specific ST/T wave changes noted, no STEMI, trop - 0.010, BNP - 169, CBC/CMP - unremarkable, CXR - unremarkable. Pt presentation consistent with suspected extremely mild CHF exacerbation, patient noting a weight gain, has symptoms consistent with mild volume overload, BNP however marginal chest x-rays unremarkable and delta troponin is negative.  Patient given an additional dose of Lasix tonight, advised on increase Lasix based on her weight through the weekend follow-up with Cardiology early next week.  At this time given patient's history, physical exam, and ED workup do not suspect arrhythmia, respiratory failure, MI/ACS, PE, PTX, aortic dissection, pericarditis, PNA, shingles, or any further malignant cause.  Discussed diagnosis and further treatment with patient including f/u, return precautions given and all questions answered.  Patient in understanding of plan.  Pt discharged to home improved and stable.          Scribe Attestation:   Scribe #1: I performed the above scribed service and the documentation accurately describes the services I performed. I attest to the accuracy of the note.                 Clinical Impression:   Final diagnoses:  [R07.9] Chest pain  [I50.9] Acute on chronic congestive heart failure, unspecified heart failure type (Primary)          ED Disposition Condition    Discharge Stable       I, Zhang Walker,  M.D., personally performed the services described in this documentation. All medical record entries made by the scribe were at my direction and in my presence. I have reviewed the chart and agree that the record reflects my personal performance and is accurate and complete.    ED Prescriptions     None        Follow-up Information     Follow up With Specialties Details Why Contact Info    [facility] Emergency Medicine Go to  If symptoms worsen [address]  [facility] [id number] [phone]    Kashif Peguero MD Cardiology, Interventional Cardiology Schedule an appointment as soon as possible for a visit in 3 days  [address]  [address]  [facility] [address]  [phone]             Zhang Walker MD  08/26/22 7894

## 2022-09-07 DIAGNOSIS — I47.29 NSVT (NONSUSTAINED VENTRICULAR TACHYCARDIA): ICD-10-CM

## 2022-09-07 DIAGNOSIS — I50.9 CONGESTIVE HEART FAILURE, UNSPECIFIED HF CHRONICITY, UNSPECIFIED HEART FAILURE TYPE: ICD-10-CM

## 2022-09-07 DIAGNOSIS — Z95.810 ICD (IMPLANTABLE CARDIOVERTER-DEFIBRILLATOR), SINGLE, IN SITU: ICD-10-CM

## 2022-09-07 DIAGNOSIS — I42.8 NONISCHEMIC CARDIOMYOPATHY: Primary | ICD-10-CM

## 2022-09-07 DIAGNOSIS — Z01.810 PREOP CARDIOVASCULAR EXAM: ICD-10-CM

## 2022-09-07 DIAGNOSIS — Z86.16 HISTORY OF COVID-19: ICD-10-CM

## 2022-09-19 ENCOUNTER — HOSPITAL ENCOUNTER (OUTPATIENT)
Dept: CARDIOLOGY | Facility: HOSPITAL | Age: 37
Discharge: HOME | End: 2022-09-19
Attending: INTERNAL MEDICINE
Payer: COMMERCIAL

## 2022-09-19 ENCOUNTER — HOSPITAL ENCOUNTER (OUTPATIENT)
Dept: CARDIOLOGY | Facility: HOSPITAL | Age: 37
Discharge: HOME | End: 2022-09-19
Attending: FAMILY MEDICINE
Payer: COMMERCIAL

## 2022-09-19 VITALS
BODY MASS INDEX: 25.62 KG/M2 | SYSTOLIC BLOOD PRESSURE: 134 MMHG | WEIGHT: 179 LBS | DIASTOLIC BLOOD PRESSURE: 89 MMHG | HEIGHT: 70 IN | HEART RATE: 63 BPM

## 2022-09-19 DIAGNOSIS — R06.09 DOE (DYSPNEA ON EXERTION): ICD-10-CM

## 2022-09-19 DIAGNOSIS — I42.8 NONISCHEMIC CARDIOMYOPATHY: ICD-10-CM

## 2022-09-19 DIAGNOSIS — I50.42 CHRONIC COMBINED SYSTOLIC AND DIASTOLIC CONGESTIVE HEART FAILURE: ICD-10-CM

## 2022-09-19 DIAGNOSIS — I13.0 HYPERTENSIVE CARDIOVASCULAR-RENAL DISEASE, STAGE 1-4 OR UNSPECIFIED CHRONIC KIDNEY DISEASE, WITH HEART FAILURE: ICD-10-CM

## 2022-09-19 LAB
CTP QC/QA: YES
CV STRESS BASE HR: 63 BPM
DIASTOLIC BLOOD PRESSURE: 89 MMHG
OHS CV CPX 1 MINUTE RECOVERY HEART RATE: 142 BPM
OHS CV CPX 85 PERCENT MAX PREDICTED HEART RATE MALE: 148
OHS CV CPX ANAEROBIC THRESHOLD DIASTOLIC BLOOD PRESSURE: 88 MMHG
OHS CV CPX ANAEROBIC THRESHOLD HEART RATE: 109
OHS CV CPX ANAEROBIC THRESHOLD RATE PRESSURE PRODUCT: NORMAL
OHS CV CPX ANAEROBIC THRESHOLD SYSTOLIC BLOOD PRESSURE: 137
OHS CV CPX DATA GRADE - AT: 8.4
OHS CV CPX DATA GRADE - PEAK: 16.9
OHS CV CPX DATA O2 SAT - PEAK: 98
OHS CV CPX DATA O2 SAT - REST: 97
OHS CV CPX DATA SPEED - AT: 2.6
OHS CV CPX DATA SPEED - PEAK: 4.3
OHS CV CPX DATA TIME - AT: 4.03
OHS CV CPX DATA TIME - PEAK: 8.12
OHS CV CPX DATA VE/VCO2 - AT: 34
OHS CV CPX DATA VE/VCO2 - PEAK: 40
OHS CV CPX DATA VE/VO2 - AT: 27
OHS CV CPX DATA VE/VO2 - PEAK: 53
OHS CV CPX DATA VO2 - AT: 14.1
OHS CV CPX DATA VO2 - PEAK: 20.5
OHS CV CPX DATA VO2 - REST: 4.1
OHS CV CPX FEV1/FVC: 0.8
OHS CV CPX FORCED EXPIRATORY VOLUME: 2.3
OHS CV CPX FORCED VITAL CAPACITY (FVC): 2.86
OHS CV CPX HIGHEST VO: 35.2
OHS CV CPX MAX PREDICTED HEART RATE: 174
OHS CV CPX MAXIMAL VOLUNTARY VENTILATION (MVV) PREDICTED: 92
OHS CV CPX MAXIMAL VOLUNTARY VENTILATION (MVV): 71
OHS CV CPX MAXIUMUM EXERCISE VENTILATION (VE MAX): 86.9
OHS CV CPX PATIENT AGE: 36
OHS CV CPX PATIENT HEIGHT IN: 70
OHS CV CPX PATIENT IS FEMALE AGE 11-19: 0
OHS CV CPX PATIENT IS FEMALE AGE GREATER THAN 19: 1
OHS CV CPX PATIENT IS FEMALE AGE LESS THAN 11: 0
OHS CV CPX PATIENT IS FEMALE: 1
OHS CV CPX PATIENT IS MALE AGE 11-25: 0
OHS CV CPX PATIENT IS MALE AGE GREATER THAN 25: 0
OHS CV CPX PATIENT IS MALE AGE LESS THAN 11: 0
OHS CV CPX PATIENT IS MALE GREATER THAN 18: 0
OHS CV CPX PATIENT IS MALE LESS THAN OR EQUAL TO 18: 0
OHS CV CPX PATIENT IS MALE: 0
OHS CV CPX PATIENT WEIGHT RETURNED IN OZ: 2864
OHS CV CPX PEAK DIASTOLIC BLOOD PRESSURE: 93 MMHG
OHS CV CPX PEAK HEAR RATE: 174 BPM
OHS CV CPX PEAK RATE PRESSURE PRODUCT: NORMAL
OHS CV CPX PEAK SYSTOLIC BLOOD PRESSURE: 152 MMHG
OHS CV CPX PERCENT BODY FAT: 21.9
OHS CV CPX PERCENT MAX PREDICTED HEART RATE ACHIEVED: 100
OHS CV CPX PREDICTED VO2: 35.2 ML/KG/MIN
OHS CV CPX RATE PRESSURE PRODUCT PRESENTING: 8442
OHS CV CPX REST PET CO2: 29
OHS CV CPX VE/VCO2 SLOPE: 30
SARS-COV-2 AG RESP QL IA.RAPID: NEGATIVE
STRESS ECHO POST EXERCISE DUR MIN: 8 MINUTES
STRESS ECHO POST EXERCISE DUR SEC: 7 SECONDS
STRESS ST DEPRESSION: 1 MM
SYSTOLIC BLOOD PRESSURE: 134 MMHG

## 2022-09-19 PROCEDURE — 94621 CARDIOPULM EXERCISE TESTING: CPT | Mod: HCNC,NTX

## 2022-09-19 PROCEDURE — 94621 CARDIOPULM EXERCISE TESTING: CPT | Mod: 26,HCNC,NTX, | Performed by: INTERNAL MEDICINE

## 2022-09-19 PROCEDURE — 94621 CARDIOPULMONARY EXERCISE TESTING (CUPID ONLY): ICD-10-PCS | Mod: 26,HCNC,NTX, | Performed by: INTERNAL MEDICINE

## 2022-09-20 ENCOUNTER — OFFICE VISIT (OUTPATIENT)
Dept: TRANSPLANT | Facility: CLINIC | Age: 37
End: 2022-09-20
Attending: INTERNAL MEDICINE
Payer: COMMERCIAL

## 2022-09-20 VITALS
WEIGHT: 178.13 LBS | HEIGHT: 70 IN | BODY MASS INDEX: 25.5 KG/M2 | HEART RATE: 64 BPM | SYSTOLIC BLOOD PRESSURE: 135 MMHG | DIASTOLIC BLOOD PRESSURE: 87 MMHG

## 2022-09-20 DIAGNOSIS — I42.8 NONISCHEMIC CARDIOMYOPATHY: ICD-10-CM

## 2022-09-20 DIAGNOSIS — I50.42 CHRONIC COMBINED SYSTOLIC AND DIASTOLIC CONGESTIVE HEART FAILURE: Primary | ICD-10-CM

## 2022-09-20 PROCEDURE — 99999 PR PBB SHADOW E&M-EST. PATIENT-LVL IV: ICD-10-PCS | Mod: PBBFAC,HCNC,TXP, | Performed by: INTERNAL MEDICINE

## 2022-09-20 PROCEDURE — 4010F ACE/ARB THERAPY RXD/TAKEN: CPT | Mod: HCNC,CPTII,S$GLB, | Performed by: INTERNAL MEDICINE

## 2022-09-20 PROCEDURE — 3008F BODY MASS INDEX DOCD: CPT | Mod: HCNC,CPTII,S$GLB, | Performed by: INTERNAL MEDICINE

## 2022-09-20 PROCEDURE — 1159F PR MEDICATION LIST DOCUMENTED IN MEDICAL RECORD: ICD-10-PCS | Mod: HCNC,CPTII,S$GLB, | Performed by: INTERNAL MEDICINE

## 2022-09-20 PROCEDURE — 1160F RVW MEDS BY RX/DR IN RCRD: CPT | Mod: HCNC,CPTII,S$GLB, | Performed by: INTERNAL MEDICINE

## 2022-09-20 PROCEDURE — 99214 OFFICE O/P EST MOD 30 MIN: CPT | Mod: HCNC,S$GLB,, | Performed by: INTERNAL MEDICINE

## 2022-09-20 PROCEDURE — 1160F PR REVIEW ALL MEDS BY PRESCRIBER/CLIN PHARMACIST DOCUMENTED: ICD-10-PCS | Mod: HCNC,CPTII,S$GLB, | Performed by: INTERNAL MEDICINE

## 2022-09-20 PROCEDURE — 3008F PR BODY MASS INDEX (BMI) DOCUMENTED: ICD-10-PCS | Mod: HCNC,CPTII,S$GLB, | Performed by: INTERNAL MEDICINE

## 2022-09-20 PROCEDURE — 99214 PR OFFICE/OUTPT VISIT, EST, LEVL IV, 30-39 MIN: ICD-10-PCS | Mod: HCNC,S$GLB,, | Performed by: INTERNAL MEDICINE

## 2022-09-20 PROCEDURE — 3075F PR MOST RECENT SYSTOLIC BLOOD PRESS GE 130-139MM HG: ICD-10-PCS | Mod: HCNC,CPTII,S$GLB, | Performed by: INTERNAL MEDICINE

## 2022-09-20 PROCEDURE — 1159F MED LIST DOCD IN RCRD: CPT | Mod: HCNC,CPTII,S$GLB, | Performed by: INTERNAL MEDICINE

## 2022-09-20 PROCEDURE — 3075F SYST BP GE 130 - 139MM HG: CPT | Mod: HCNC,CPTII,S$GLB, | Performed by: INTERNAL MEDICINE

## 2022-09-20 PROCEDURE — 3079F DIAST BP 80-89 MM HG: CPT | Mod: HCNC,CPTII,S$GLB, | Performed by: INTERNAL MEDICINE

## 2022-09-20 PROCEDURE — 3079F PR MOST RECENT DIASTOLIC BLOOD PRESSURE 80-89 MM HG: ICD-10-PCS | Mod: HCNC,CPTII,S$GLB, | Performed by: INTERNAL MEDICINE

## 2022-09-20 PROCEDURE — 99999 PR PBB SHADOW E&M-EST. PATIENT-LVL IV: CPT | Mod: PBBFAC,HCNC,TXP, | Performed by: INTERNAL MEDICINE

## 2022-09-20 PROCEDURE — 4010F PR ACE/ARB THEARPY RXD/TAKEN: ICD-10-PCS | Mod: HCNC,CPTII,S$GLB, | Performed by: INTERNAL MEDICINE

## 2022-09-20 RX ORDER — BISOPROLOL FUMARATE 10 MG/1
10 TABLET, FILM COATED ORAL DAILY
Qty: 30 TABLET | Refills: 11 | Status: SHIPPED | OUTPATIENT
Start: 2022-09-20 | End: 2022-10-12 | Stop reason: ALTCHOICE

## 2022-09-20 RX ORDER — ISOSORBIDE DINITRATE 20 MG/1
20 TABLET ORAL 3 TIMES DAILY
Qty: 90 TABLET | Refills: 3 | Status: SHIPPED | OUTPATIENT
Start: 2022-09-20 | End: 2022-09-29

## 2022-09-20 RX ORDER — HYDRALAZINE HYDROCHLORIDE 50 MG/1
50 TABLET, FILM COATED ORAL 3 TIMES DAILY
Qty: 90 TABLET | Refills: 3 | Status: SHIPPED | OUTPATIENT
Start: 2022-09-20 | End: 2022-09-29

## 2022-09-20 NOTE — LETTER
September 20, 2022        Kashif Peguero  [address]  [address]  [place] [address]  Phone: [phone]  Fax: [phone]             [facility]  [address]  [place] [address]  Phone: [phone]   Patient: Nasra Marks   MR Number: [id number]   YOB: 1985   Date of Visit: 9/20/2022       Dear Dr. Kashif Peguero    Thank you for referring Nasra Marks to me for evaluation. Attached you will find relevant portions of my assessment and plan of care.    If you have questions, please do not hesitate to call me. I look forward to following Nasra Marks along with you.    Sincerely,    Timothy Prajapati Jr, MD    Enclosure    If you would like to receive this communication electronically, please contact [email or web address] or [phone] to request [employer] access.    [employer] is a tool which provides read-only access to select patient information with whom you have a relationship. Its easy to use and provides real time access to review your patients record including encounter summaries, notes, results, and demographic information.    If you feel you have received this communication in error or would no longer like to receive these types of communications, please e-mail [email or web address]

## 2022-09-20 NOTE — PATIENT INSTRUCTIONS
To see if I can help fatigue change the beta blocker metoprolol to bisoprolol 10 mg once a day    Reduce fluid intake to 1.5 to 2 quarts a day    Start taking half a tablet of hydralazine and half a tablet of isosorbide both are taken 3 times a day and in 2 weeks change to full tablet of each    Call in 6-8 weeks as we can increase this medication combination further if needed for symptoms

## 2022-09-20 NOTE — PROGRESS NOTES
"Subjective:     Patient ID:  Nasra Marks is a 37 y.o. female who presents for follow-up of Congestive Heart Failure    HPI:  36 yo BF diagnosis CHF due to non-ischemic cardiomyopathy in 2017 with angiogram at that time without evidence of coronary artery disease.  She developed hypertension at the age of 16 but did not take her medications regularly.   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 She was referred by Dr. Kashif Peguero.      22: Stopped Carvedilol and started Metoprolol succinate 100mg daily for fatigue     Today's visit 22  she still notes fatigue but otherwise thinks that she has made good progress with much improvement in functional capacity and shortness of breath.  No orthopnea, PND or symptomatic arrhythmia.    Review of Systems   Constitutional: Positive for malaise/fatigue. Negative for chills, fever, night sweats, weight gain and weight loss.   Cardiovascular:  Positive for dyspnea on exertion (much improved). Negative for chest pain, irregular heartbeat, leg swelling, near-syncope, orthopnea, palpitations, paroxysmal nocturnal dyspnea and syncope.   Respiratory:  Negative for cough, sputum production and wheezing.    Hematologic/Lymphatic: Does not bruise/bleed easily.   Musculoskeletal:  Negative for arthritis, joint pain and stiffness.   Gastrointestinal:  Negative for hematochezia and melena.   Genitourinary:  Negative for hematuria.   Neurological:  Negative for brief paralysis, focal weakness, seizures and weakness.      Objective:   Physical Exam  Constitutional:       General: She is not in acute distress.     Appearance: She is well-developed. She is not ill-appearing, toxic-appearing or diaphoretic.      Comments: /87   Pulse 64   Ht 5' 10" (1.778 m)   Wt 80.8 kg (178 lb 2.1 oz)   BMI 25.56 kg/m²   Last visit wt 179#  Friendly black female in no acute distress       HENT:      Head: " Normocephalic and atraumatic.   Eyes:      General: No scleral icterus.        Right eye: No discharge.         Left eye: No discharge.      Conjunctiva/sclera: Conjunctivae normal.   Neck:      Thyroid: No thyromegaly.      Vascular: No JVD.      Trachea: No tracheal deviation.      Comments: Normal JVD  Cardiovascular:      Rate and Rhythm: Normal rate and regular rhythm.      Heart sounds: S1 normal and S2 normal. No murmur heard.    No S4 sounds.   Pulmonary:      Effort: Pulmonary effort is normal.      Breath sounds: Normal breath sounds.   Abdominal:      General: Bowel sounds are normal. There is no distension (Liver span normal 10 cm).      Palpations: Abdomen is soft. There is no mass.      Tenderness: There is no abdominal tenderness. There is no guarding or rebound.   Musculoskeletal:         General: No swelling or tenderness.      Right lower leg: No edema.      Left lower leg: No edema.   Skin:     General: Skin is warm and dry.   Neurological:      General: No focal deficit present.      Mental Status: She is alert and oriented to person, place, and time. Mental status is at baseline.   Psychiatric:         Mood and Affect: Mood normal.         Behavior: Behavior normal.         Thought Content: Thought content normal.         Judgment: Judgment normal.      Lab Results   Component Value Date     (L) 09/19/2022     08/26/2022    K 3.7 09/19/2022    K 3.5 08/26/2022    GLU 83 09/19/2022    GLU 78 08/26/2022    BUN 13 09/19/2022    BUN 14 08/26/2022    CREATININE 0.7 09/19/2022    CREATININE 0.9 08/26/2022     Lab Results   Component Value Date     (H) 09/19/2022     (H) 08/26/2022     (H) 02/25/2022 1/31/2022 CPX  Metabolic Findings Resting spirometry reveals an FVC = 2.86L which is 70.1% of predicted, an FEV1 of 2.30L, which is 68.27% of predicted and an FEV1/FVC ratio of 80.42%. The MVV = 92 L/min, which is 78.32% of predicted.     The respiratory exchange ratio  (RER) was 1.32, suggesting an excellent effort.     The breathing reserve is calculated at 5.54%, which is reduced. Oxygen saturation with exercise remained normal.     The peak VO2 was 20.5 ml/kg/min which is 58.24% of predicted equating to a functional capacity of 5.86 METS indicating moderate functional impairment.     The anaerobic threshold (AT), which occurred at a heart rate of 109bpm, was 14.1 ml/kg/min, which is 40.06% of the predicted VO2 and is borderline reduced.     The peak VO2 Lean was 26.25 ml/kg of lean body weight/min indicating a good prognosis in heart failure.     The VE/VCO2 [place] was 30.0. The Resting PetCO2 was 29.0.     Moderate functional impairment associated with a reduced breathing reserve, normal oxygen stauration, an excellent effort, and a borderline reduced AT. These findings are indicative of functional impairment secondary to circulatory insufficiency, ventilatory impairment.     12/28/2021 6 minute walk test 459.4 m without desaturation    10/06/2021 echocardiogram  The left ventricle is severely enlarged with eccentric hypertrophy and severely decreased systolic function.  The estimated ejection fraction is 15%.  Grade III left ventricular diastolic dysfunction.  Normal right ventricular size with normal right ventricular systolic function.  Moderate left atrial enlargement.  Mild mitral regurgitation.  Normal central venous pressure (3 mmHg).  The estimated PA systolic pressure is 15 mmHg.      12/31/2021EKG normal sinus rhythm nonspecific ST-T abnormality  Assessment:     1. Chronic combined systolic and diastolic congestive heart failure    2. Nonischemic cardiomyopathy      Plan:   To see if I can help fatigue change the beta blocker metoprolol to bisoprolol 10 mg once a day    Reduce fluid intake to 1.5 to 2 quarts a day    Start taking half a tablet of hydralazine (50 mg tab) and half a tablet of isosorbide dinitrate (20 mg tab) both are taken 3 times a day and in 2 weeks  change to full tablet of each    Call in 6-8 weeks as we can increase this medication combination further if needed for symptoms    RTC 4 month with BMP and BNP

## 2022-09-21 ENCOUNTER — DOCUMENTATION ONLY (OUTPATIENT)
Dept: TRANSPLANT | Facility: CLINIC | Age: 37
End: 2022-09-21
Payer: COMMERCIAL

## 2022-09-21 NOTE — PROGRESS NOTES
Care of patient is being transferred to CHF section from Preht section, per Dr. Prajapati.    Dx:  NICM  Reason: medically stable  Pt is not on home inotrope therapy.    Outstanding orders scheduled:  RTC January 2023

## 2022-09-25 ENCOUNTER — HOSPITAL ENCOUNTER (EMERGENCY)
Facility: HOSPITAL | Age: 37
Discharge: HOME | End: 2022-09-26
Attending: EMERGENCY MEDICINE
Payer: COMMERCIAL

## 2022-09-25 DIAGNOSIS — R07.9 CHEST PAIN, UNSPECIFIED TYPE: ICD-10-CM

## 2022-09-25 DIAGNOSIS — E87.6 HYPOKALEMIA: ICD-10-CM

## 2022-09-25 DIAGNOSIS — F12.922 CANNABIS INTOXICATION WITH PERCEPTUAL DISTURBANCE: ICD-10-CM

## 2022-09-25 DIAGNOSIS — Z95.810 AICD (AUTOMATIC CARDIOVERTER/DEFIBRILLATOR) PRESENT: Primary | ICD-10-CM

## 2022-09-25 LAB
ANISOCYTOSIS BLD QL SMEAR: SLIGHT
B-HCG UR QL: NEGATIVE
BASOPHILS # BLD AUTO: 0.05 K/UL (ref 0–0.2)
BASOPHILS NFR BLD: 0.5 % (ref 0–1.9)
CTP QC/QA: YES
DIFFERENTIAL METHOD: NORMAL
EOSINOPHIL # BLD AUTO: 0.1 K/UL (ref 0–0.5)
EOSINOPHIL NFR BLD: 0.6 % (ref 0–8)
ERYTHROCYTE [DISTWIDTH] IN BLOOD BY AUTOMATED COUNT: 12.7 % (ref 11.5–14.5)
ETHANOL SERPL-MCNC: <10 MG/DL
HCT VFR BLD AUTO: 38.5 % (ref 37–48.5)
HGB BLD-MCNC: 12.9 G/DL (ref 12–16)
IMM GRANULOCYTES # BLD AUTO: 0.02 K/UL (ref 0–0.04)
IMM GRANULOCYTES NFR BLD AUTO: 0.2 % (ref 0–0.5)
LYMPHOCYTES # BLD AUTO: 3.1 K/UL (ref 1–4.8)
LYMPHOCYTES NFR BLD: 28.6 % (ref 18–48)
MCH RBC QN AUTO: 30.3 PG (ref 27–31)
MCHC RBC AUTO-ENTMCNC: 33.5 G/DL (ref 32–36)
MCV RBC AUTO: 90 FL (ref 82–98)
MONOCYTES # BLD AUTO: 1 K/UL (ref 0.3–1)
MONOCYTES NFR BLD: 9.7 % (ref 4–15)
NEUTROPHILS # BLD AUTO: 6.4 K/UL (ref 1.8–7.7)
NEUTROPHILS NFR BLD: 60.4 % (ref 38–73)
NRBC BLD-RTO: 0 /100 WBC
PLATELET # BLD AUTO: 419 K/UL (ref 150–450)
PMV BLD AUTO: 9.3 FL (ref 9.2–12.9)
RBC # BLD AUTO: 4.26 M/UL (ref 4–5.4)
WBC # BLD AUTO: 10.65 K/UL (ref 3.9–12.7)

## 2022-09-25 PROCEDURE — 85379 FIBRIN DEGRADATION QUANT: CPT | Mod: HCNC | Performed by: EMERGENCY MEDICINE

## 2022-09-25 PROCEDURE — 93010 ELECTROCARDIOGRAM REPORT: CPT | Mod: HCNC,,, | Performed by: INTERNAL MEDICINE

## 2022-09-25 PROCEDURE — 80307 DRUG TEST PRSMV CHEM ANLYZR: CPT | Mod: HCNC | Performed by: EMERGENCY MEDICINE

## 2022-09-25 PROCEDURE — 93005 ELECTROCARDIOGRAM TRACING: CPT | Mod: HCNC

## 2022-09-25 PROCEDURE — 81000 URINALYSIS NONAUTO W/SCOPE: CPT | Mod: HCNC,59 | Performed by: EMERGENCY MEDICINE

## 2022-09-25 PROCEDURE — 96374 THER/PROPH/DIAG INJ IV PUSH: CPT | Mod: HCNC

## 2022-09-25 PROCEDURE — 85025 COMPLETE CBC W/AUTO DIFF WBC: CPT | Mod: HCNC | Performed by: EMERGENCY MEDICINE

## 2022-09-25 PROCEDURE — 84484 ASSAY OF TROPONIN QUANT: CPT | Mod: HCNC | Performed by: EMERGENCY MEDICINE

## 2022-09-25 PROCEDURE — 93010 EKG 12-LEAD: ICD-10-PCS | Mod: HCNC,,, | Performed by: INTERNAL MEDICINE

## 2022-09-25 PROCEDURE — 83735 ASSAY OF MAGNESIUM: CPT | Mod: HCNC | Performed by: EMERGENCY MEDICINE

## 2022-09-25 PROCEDURE — 83880 ASSAY OF NATRIURETIC PEPTIDE: CPT | Mod: HCNC | Performed by: EMERGENCY MEDICINE

## 2022-09-25 PROCEDURE — 99285 EMERGENCY DEPT VISIT HI MDM: CPT | Mod: 25,HCNC

## 2022-09-25 PROCEDURE — 80053 COMPREHEN METABOLIC PANEL: CPT | Mod: HCNC | Performed by: EMERGENCY MEDICINE

## 2022-09-25 PROCEDURE — 82077 ASSAY SPEC XCP UR&BREATH IA: CPT | Mod: HCNC | Performed by: EMERGENCY MEDICINE

## 2022-09-25 PROCEDURE — 81025 URINE PREGNANCY TEST: CPT | Mod: HCNC | Performed by: EMERGENCY MEDICINE

## 2022-09-25 PROCEDURE — 63600175 PHARM REV CODE 636 W HCPCS: Mod: HCNC | Performed by: EMERGENCY MEDICINE

## 2022-09-25 RX ORDER — LORAZEPAM 2 MG/ML
1 INJECTION INTRAMUSCULAR
Status: COMPLETED | OUTPATIENT
Start: 2022-09-25 | End: 2022-09-25

## 2022-09-25 RX ADMIN — LORAZEPAM 1 MG: 2 INJECTION INTRAMUSCULAR; INTRAVENOUS at 11:09

## 2022-09-26 ENCOUNTER — PES CALL (OUTPATIENT)
Dept: ADMINISTRATIVE | Facility: CLINIC | Age: 37
End: 2022-09-26
Payer: COMMERCIAL

## 2022-09-26 VITALS
TEMPERATURE: 98 F | SYSTOLIC BLOOD PRESSURE: 114 MMHG | DIASTOLIC BLOOD PRESSURE: 65 MMHG | HEART RATE: 102 BPM | OXYGEN SATURATION: 100 % | WEIGHT: 180 LBS | BODY MASS INDEX: 28.93 KG/M2 | HEIGHT: 66 IN | RESPIRATION RATE: 16 BRPM

## 2022-09-26 LAB
ALBUMIN SERPL BCP-MCNC: 4.1 G/DL (ref 3.5–5.2)
ALP SERPL-CCNC: 55 U/L (ref 55–135)
ALT SERPL W/O P-5'-P-CCNC: 13 U/L (ref 10–44)
AMPHET+METHAMPHET UR QL: NEGATIVE
ANION GAP SERPL CALC-SCNC: 16 MMOL/L (ref 8–16)
AST SERPL-CCNC: 15 U/L (ref 10–40)
BACTERIA #/AREA URNS HPF: ABNORMAL /HPF
BARBITURATES UR QL SCN>200 NG/ML: NEGATIVE
BENZODIAZ UR QL SCN>200 NG/ML: NEGATIVE
BILIRUB SERPL-MCNC: 0.2 MG/DL (ref 0.1–1)
BILIRUB UR QL STRIP: NEGATIVE
BNP SERPL-MCNC: 168 PG/ML (ref 0–99)
BUN SERPL-MCNC: 12 MG/DL (ref 6–20)
BZE UR QL SCN: NEGATIVE
CALCIUM SERPL-MCNC: 9.7 MG/DL (ref 8.7–10.5)
CANNABINOIDS UR QL SCN: ABNORMAL
CHLORIDE SERPL-SCNC: 106 MMOL/L (ref 95–110)
CLARITY UR: CLEAR
CO2 SERPL-SCNC: 20 MMOL/L (ref 23–29)
COLOR UR: YELLOW
CREAT SERPL-MCNC: 1 MG/DL (ref 0.5–1.4)
CREAT UR-MCNC: 135.4 MG/DL (ref 15–325)
D DIMER PPP IA.FEU-MCNC: 0.35 MG/L FEU
EST. GFR  (NO RACE VARIABLE): >60 ML/MIN/1.73 M^2
GLUCOSE SERPL-MCNC: 144 MG/DL (ref 70–110)
GLUCOSE UR QL STRIP: ABNORMAL
HGB UR QL STRIP: ABNORMAL
KETONES UR QL STRIP: NEGATIVE
LEUKOCYTE ESTERASE UR QL STRIP: NEGATIVE
MAGNESIUM SERPL-MCNC: 2 MG/DL (ref 1.6–2.6)
METHADONE UR QL SCN>300 NG/ML: NEGATIVE
MICROSCOPIC COMMENT: ABNORMAL
NITRITE UR QL STRIP: NEGATIVE
OPIATES UR QL SCN: NEGATIVE
PCP UR QL SCN>25 NG/ML: NEGATIVE
PH UR STRIP: 6 [PH] (ref 5–8)
POTASSIUM SERPL-SCNC: 2.8 MMOL/L (ref 3.5–5.1)
PROT SERPL-MCNC: 8.4 G/DL (ref 6–8.4)
PROT UR QL STRIP: ABNORMAL
RBC #/AREA URNS HPF: 20 /HPF (ref 0–4)
SODIUM SERPL-SCNC: 142 MMOL/L (ref 136–145)
SP GR UR STRIP: >1.03 (ref 1–1.03)
SQUAMOUS #/AREA URNS HPF: 1 /HPF
TOXICOLOGY INFORMATION: ABNORMAL
TROPONIN I SERPL DL<=0.01 NG/ML-MCNC: <0.006 NG/ML (ref 0–0.03)
TROPONIN I SERPL DL<=0.01 NG/ML-MCNC: <0.006 NG/ML (ref 0–0.03)
URN SPEC COLLECT METH UR: ABNORMAL
UROBILINOGEN UR STRIP-ACNC: NEGATIVE EU/DL
YEAST URNS QL MICRO: ABNORMAL

## 2022-09-26 PROCEDURE — 84484 ASSAY OF TROPONIN QUANT: CPT | Mod: HCNC | Performed by: EMERGENCY MEDICINE

## 2022-09-26 PROCEDURE — 25000003 PHARM REV CODE 250: Mod: HCNC | Performed by: EMERGENCY MEDICINE

## 2022-09-26 RX ADMIN — POTASSIUM BICARBONATE 50 MEQ: 977.5 TABLET, EFFERVESCENT ORAL at 01:09

## 2022-09-26 NOTE — ED PROVIDER NOTES
"Encounter Date: 2022       History     Chief Complaint   Patient presents with    AICD Problem     Pt reports her AICD fired approx 30 min PTA in ED and then again while in triage. Pt reports having her AICD for approx 4 years and states this has never happened before. Pt endorses having an "edible gummie" earlier today.      37-year-old female with the AICD/defibrillator, CHF, hypertension presenting today after taking an edible marijuana at about 9:00 p.m. and is feeling anxious and feels like her AICD has been firing.  States that she had a fever a couple days ago that has resolved.  No runny nose cough congestion.  No sore throat.  No urinary complaints.  No trauma to her chest.  No abdominal pain.  No rashes or lesions.  No pain outside the "firing".  Patient also had wine coolers earlier this evening    Review of patient's allergies indicates:   Allergen Reactions    Aldactone [spironolactone] Swelling     Lips swelled     Past Medical History:   Diagnosis Date    CHF (congestive heart failure)     Chronic combined systolic and diastolic congestive heart failure 2019    Essential hypertension 2012    Hypertension     dx at 16y.o.    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 disease, with heart failure 2021    ICD (implantable cardioverter-defibrillator), single, in situ 2020    Nonischemic cardiomyopathy 2019    Pacemaker 2017     Past Surgical History:   Procedure Laterality Date    CARDIAC DEFIBRILLATOR PLACEMENT  2017     SECTION      x 1    INDUCED       RIGHT HEART CATHETERIZATION Right 2022    Procedure: INSERTION, CATHETER, RIGHT HEART;  Surgeon: Timothy Prajapati Jr., MD;  Location: [facility] CATH LAB;  Service: Cardiology;  Laterality: Right;    TUBAL LIGATION       Family History   Problem Relation Age of Onset    Hypertension Mother     Cancer Maternal Grandmother         breast x 2    Cancer Paternal " Grandmother         breast    No Known Problems Sister     No Known Problems Brother     No Known Problems Son     No Known Problems Brother     Hypertension Other     Diabetes Other     Breast cancer Other     Cancer Paternal Aunt         breast    Cancer Paternal Uncle      Social History     Tobacco Use    Smoking status: Never    Smokeless tobacco: Never   Substance Use Topics    Alcohol use: Yes     Comment: occasionally    Drug use: Yes     Types: Marijuana     Comment: in past very little marijuana     Review of Systems   Constitutional:  Negative for fever.   HENT:  Negative for sore throat.    Respiratory:  Negative for shortness of breath.    Cardiovascular:  Positive for chest pain.   Gastrointestinal:  Negative for nausea.   Genitourinary:  Negative for dysuria.   Musculoskeletal:  Negative for back pain.   Skin:  Negative for rash.   Neurological:  Negative for weakness.   Hematological:  Does not bruise/bleed easily.   Psychiatric/Behavioral:  The patient is nervous/anxious.    All other systems reviewed and are negative.    Physical Exam     Initial Vitals [09/25/22 2316]   BP Pulse Resp Temp SpO2   (!) 146/88 104 20 98.2 °F (36.8 °C) 100 %      MAP       --         Physical Exam    Nursing note and vitals reviewed.  Constitutional: She appears well-developed and well-nourished.   HENT:   Head: Normocephalic and atraumatic.   Mouth/Throat: Oropharynx is clear and moist and mucous membranes are normal.   Eyes: Conjunctivae and EOM are normal. Pupils are equal, round, and reactive to light. Right conjunctiva is not injected. Left conjunctiva is not injected. No scleral icterus.   Neck: Neck supple.   Normal range of motion.   Full passive range of motion without pain.     Cardiovascular:  Regular rhythm, S1 normal, S2 normal, normal heart sounds and normal pulses.     Exam reveals no gallop and no friction rub.       No murmur heard.  Pulses:       Radial pulses are 2+ on the right side and 2+ on the  left side.   Pulse in low 100s   Pulmonary/Chest: Effort normal and breath sounds normal. No respiratory distress.   Abdominal: Abdomen is soft. She exhibits no distension. There is no abdominal tenderness.   Musculoskeletal:         General: No edema. Normal range of motion.      Cervical back: Full passive range of motion without pain, normal range of motion and neck supple.      Comments: Good active ROM of all extremities. No lower extremity edema or cyanosis.      Neurological: No cranial nerve deficit. Gait normal.   A&Ox4, normal speech.   Skin: Skin is warm. No ecchymosis and no rash noted.   Psychiatric: Thought content normal.   Anxious.       ED Course   Procedures  Labs Reviewed   B-TYPE NATRIURETIC PEPTIDE - Abnormal; Notable for the following components:       Result Value     (*)     All other components within normal limits   COMPREHENSIVE METABOLIC PANEL - Abnormal; Notable for the following components:    Potassium 2.8 (*)     CO2 20 (*)     Glucose 144 (*)     All other components within normal limits   DRUG SCREEN PANEL, URINE EMERGENCY - Abnormal; Notable for the following components:    THC Presumptive Positive (*)     All other components within normal limits    Narrative:     Specimen Source->Urine   URINALYSIS, REFLEX TO URINE CULTURE - Abnormal; Notable for the following components:    Specific Gravity, UA >1.030 (*)     Protein, UA Trace (*)     Glucose, UA 4+ (*)     Occult Blood UA 3+ (*)     All other components within normal limits    Narrative:     Specimen Source->Urine   URINALYSIS MICROSCOPIC - Abnormal; Notable for the following components:    RBC, UA 20 (*)     All other components within normal limits    Narrative:     Specimen Source->Urine   CBC W/ AUTO DIFFERENTIAL   TROPONIN I   MAGNESIUM   ALCOHOL,MEDICAL (ETHANOL)   D DIMER, QUANTITATIVE   TROPONIN I   POCT URINE PREGNANCY     EKG Readings: (Independently Interpreted)   EKG done 2320 showing sinus tachycardia rate  of 108.  No ST elevation.  Wavy baseline.  Flat T-waves to flipped T-waves diffusely.  Normal axis.  QRS is 494.  Abnormal EKG.  Compared to previous and different.     Imaging Results              X-Ray Chest AP Portable (Final result)  Result time 09/26/22 00:11:48      Final result by Lee Iasacs MD (09/26/22 00:11:48)                   Impression:      Mild hazy opacity at the left lung base may relate to mild ground-glass infiltrate, mild pleural fluid thought less likely, as discussed above.      Electronically signed by: Lee Isaacs  Date:    09/26/2022  Time:    00:11               Narrative:    EXAMINATION:  XR CHEST AP PORTABLE    CLINICAL HISTORY:  aicid;    TECHNIQUE:  Single frontal view of the chest was performed.    COMPARISON:  Chest radiograph August 26, 2022    FINDINGS:  Single portable chest view is submitted.  Single lead cardiac pacemaker again noted.  There is mild diminished depth of inspiration, when accounting for difference in position, technique and depth of inspiration, the appearance of the cardiomediastinal silhouette is stable.    Accentuation of pulmonary bronchovascular markings consistent with diminished depth of inspiration noted.  There is appearance of mild hazy opacity at the left lung base, this may relate to mild ground-glass infiltrate or potentially mild pleural fluid although significant blunting of the left costophrenic angle is not appreciated.  There is no evidence for large pleural effusion and there is no evidence for pneumothorax.    The visualized osseous structures appear intact.                                       Medications   lorazepam injection 1 mg (1 mg Intravenous Given 9/25/22 6710)   potassium bicarbonate disintegrating tablet 50 mEq (50 mEq Oral Given 9/26/22 0117)     Medical Decision Making:   Initial Assessment:   37-year-old female presenting secondary to feeling like her AICD has been firing.  Will interrogate.  Patient is very anxious.   Highest on differential is actually marijuana induced anxiety along with AICD firing.  Lower suspicion AICD.  If no firing will send off a D-dimer.  No current fevers or chills.  Previous 1 episode of fever days ago.  No urinary complaints.  No chest wall tenderness.  Lungs are clear.  Doubt pneumonia or dissection or pneumothorax or pneumonia.  Ativan for anxiety    11:55 PM  Interrogation shows no episodes of firing     Labs reassuring.  First troponin negative.  D-dimer negative.  Chest x-ray reassuring.  Patient resting comfortably in bed with no symptoms.  Pending 2nd troponin.    Second troponin negative.  Vitals reassuring.  Resting comfortably.  Discharge instructions for patient.  Family to take her home. I discussed with the patient/family the diagnosis, treatment plan, indications for return to the emergency department, and for expected follow-up. The patient/family verbalized an understanding. The patient/family is asked if there are any questions or concerns. We discuss the case, until all issues are addressed to the patient/family's satisfaction. Patient/family understands and is agreeable to the plan.   Jimmy Marks        Clinical Tests:   Lab Tests: Ordered and Reviewed  Radiological Study: Reviewed and Ordered  Medical Tests: Ordered and Reviewed                        Clinical Impression:   Final diagnoses:  [Z95.810] AICD (automatic cardioverter/defibrillator) present (Primary)  [R07.9] Chest pain, unspecified type  [F12.922] Cannabis intoxication with perceptual disturbance  [E87.6] Hypokalemia        ED Disposition Condition    Discharge Stable          ED Prescriptions    None       Follow-up Information       Follow up With Specialties Details Why Contact Info    Veronika Rainey MD Family Medicine, Wound Care Schedule an appointment as soon as possible for a visit in 2 days  [address] [facility] [address]  [phone]               Jimmy Marks MD  09/26/22 0158

## 2022-09-26 NOTE — ED NOTES
Pt reports relief from meds. Discharge education/instructions given to pt and mother at BS, to f/u with PCP/return to ER if condition worsens. Denies questions. Mother at BS to drive pt home. Pt assisted into WC and into passenger side of car, mother driving

## 2022-09-26 NOTE — ED TRIAGE NOTES
Reports taking edible marijuana at 2100 this day and her defibrillator fired. Reports chest tenderness, 7/10. Denies SOB or nausea.

## 2022-09-26 NOTE — DISCHARGE INSTRUCTIONS
Thank you for coming to our Emergency Department today. It is important to remember that some problems are difficult to diagnose and may not be found during your first visit. Be sure to follow up with your primary care doctor and review any labs/imaging that was performed with them. If you do not have a primary care doctor, you may contact the one listed on your discharge paperwork or you may also call the [facility] Appointment Desk at [phone] to schedule an appointment with one.     All medications may potentially have side effects and it is impossible to predict which medications may give you side effects. If you feel that you are having a negative effect of any medication you should immediately stop taking them and seek medical attention.    Return to the ER with any questions/concerns, new/concerning symptoms, worsening or failure to improve. Do not drive or make any important decisions for 24 hours if you have received any pain medications, sedatives or mood altering drugs during your ER visit.

## 2022-09-29 ENCOUNTER — OFFICE VISIT (OUTPATIENT)
Dept: CARDIOLOGY | Facility: CLINIC | Age: 37
End: 2022-09-29
Payer: COMMERCIAL

## 2022-09-29 VITALS
DIASTOLIC BLOOD PRESSURE: 75 MMHG | OXYGEN SATURATION: 99 % | HEIGHT: 70 IN | WEIGHT: 175.13 LBS | BODY MASS INDEX: 25.07 KG/M2 | SYSTOLIC BLOOD PRESSURE: 127 MMHG | RESPIRATION RATE: 18 BRPM | HEART RATE: 75 BPM

## 2022-09-29 DIAGNOSIS — I42.8 NONISCHEMIC CARDIOMYOPATHY: Primary | ICD-10-CM

## 2022-09-29 DIAGNOSIS — Z95.810 ICD (IMPLANTABLE CARDIOVERTER-DEFIBRILLATOR), SINGLE, IN SITU: ICD-10-CM

## 2022-09-29 DIAGNOSIS — Z86.16 HISTORY OF COVID-19: ICD-10-CM

## 2022-09-29 DIAGNOSIS — I10 ESSENTIAL HYPERTENSION: ICD-10-CM

## 2022-09-29 DIAGNOSIS — I50.42 CHRONIC COMBINED SYSTOLIC AND DIASTOLIC CONGESTIVE HEART FAILURE: ICD-10-CM

## 2022-09-29 PROCEDURE — 4010F PR ACE/ARB THEARPY RXD/TAKEN: ICD-10-PCS | Mod: HCNC,CPTII,S$GLB, | Performed by: INTERNAL MEDICINE

## 2022-09-29 PROCEDURE — 1160F PR REVIEW ALL MEDS BY PRESCRIBER/CLIN PHARMACIST DOCUMENTED: ICD-10-PCS | Mod: HCNC,CPTII,S$GLB, | Performed by: INTERNAL MEDICINE

## 2022-09-29 PROCEDURE — 3078F PR MOST RECENT DIASTOLIC BLOOD PRESSURE < 80 MM HG: ICD-10-PCS | Mod: HCNC,CPTII,S$GLB, | Performed by: INTERNAL MEDICINE

## 2022-09-29 PROCEDURE — 3074F SYST BP LT 130 MM HG: CPT | Mod: HCNC,CPTII,S$GLB, | Performed by: INTERNAL MEDICINE

## 2022-09-29 PROCEDURE — 93282 PR PROGRAM EVAL (IN PERSON) IMPLANT DEVICE,CARDVERT/DEFIB,1 LEAD: ICD-10-PCS | Mod: 26,HCNC,, | Performed by: INTERNAL MEDICINE

## 2022-09-29 PROCEDURE — 1159F MED LIST DOCD IN RCRD: CPT | Mod: HCNC,CPTII,S$GLB, | Performed by: INTERNAL MEDICINE

## 2022-09-29 PROCEDURE — 3008F BODY MASS INDEX DOCD: CPT | Mod: HCNC,CPTII,S$GLB, | Performed by: INTERNAL MEDICINE

## 2022-09-29 PROCEDURE — 3008F PR BODY MASS INDEX (BMI) DOCUMENTED: ICD-10-PCS | Mod: HCNC,CPTII,S$GLB, | Performed by: INTERNAL MEDICINE

## 2022-09-29 PROCEDURE — 4010F ACE/ARB THERAPY RXD/TAKEN: CPT | Mod: HCNC,CPTII,S$GLB, | Performed by: INTERNAL MEDICINE

## 2022-09-29 PROCEDURE — 99999 PR PBB SHADOW E&M-EST. PATIENT-LVL III: CPT | Mod: PBBFAC,HCNC,, | Performed by: INTERNAL MEDICINE

## 2022-09-29 PROCEDURE — 93282 PRGRMG EVAL IMPLANTABLE DFB: CPT | Mod: 26,HCNC,, | Performed by: INTERNAL MEDICINE

## 2022-09-29 PROCEDURE — 99999 PR PBB SHADOW E&M-EST. PATIENT-LVL III: ICD-10-PCS | Mod: PBBFAC,HCNC,, | Performed by: INTERNAL MEDICINE

## 2022-09-29 PROCEDURE — 1160F RVW MEDS BY RX/DR IN RCRD: CPT | Mod: HCNC,CPTII,S$GLB, | Performed by: INTERNAL MEDICINE

## 2022-09-29 PROCEDURE — 99214 OFFICE O/P EST MOD 30 MIN: CPT | Mod: HCNC,S$GLB,, | Performed by: INTERNAL MEDICINE

## 2022-09-29 PROCEDURE — 99499 UNLISTED E&M SERVICE: CPT | Mod: S$GLB,,, | Performed by: INTERNAL MEDICINE

## 2022-09-29 PROCEDURE — 99499 RISK ADDL DX/OHS AUDIT: ICD-10-PCS | Mod: S$GLB,,, | Performed by: INTERNAL MEDICINE

## 2022-09-29 PROCEDURE — 1159F PR MEDICATION LIST DOCUMENTED IN MEDICAL RECORD: ICD-10-PCS | Mod: HCNC,CPTII,S$GLB, | Performed by: INTERNAL MEDICINE

## 2022-09-29 PROCEDURE — 3074F PR MOST RECENT SYSTOLIC BLOOD PRESSURE < 130 MM HG: ICD-10-PCS | Mod: HCNC,CPTII,S$GLB, | Performed by: INTERNAL MEDICINE

## 2022-09-29 PROCEDURE — 99214 PR OFFICE/OUTPT VISIT, EST, LEVL IV, 30-39 MIN: ICD-10-PCS | Mod: HCNC,S$GLB,, | Performed by: INTERNAL MEDICINE

## 2022-09-29 PROCEDURE — 3078F DIAST BP <80 MM HG: CPT | Mod: HCNC,CPTII,S$GLB, | Performed by: INTERNAL MEDICINE

## 2022-09-29 NOTE — PROGRESS NOTES
CARDIOVASCULAR PROGRESS NOTE    REASON FOR CONSULT:   Nasra Marks is a 37 y.o. female who presents for follow up of MD MINDYT ICD.    PCP: Redd  Gyn: Labadie, Juan  CHF: Victorina  HISTORY OF PRESENT ILLNESS:   COVID+ 7/22/21    The patient returns for follow-up.  She was recently seen in the emergency room after she thought she was shocked by her ICD.  It turns out that she had taken a marijuana edible prior to this visit.  Apparent interrogation of the device did not reveal any shocks.  She returns today without any complaints.  She tells me she is no longer taking hydralazine or Imdur.  She does continue to take the a plantar known and Entresto.  She has had no syncopal episodes.  She denies any angina, dyspnea, or palpitations.  There has been no PND, orthopnea, melena, hematuria, or claudicant symptoms.  I have asked the patient to contact Dr. Prajapati to discuss her current medication regimen.    The Medtronic single-chamber ICD was interrogated in the office today.  Current rhythm is sinus at 65.  She is not pacing in the ventricle.  Estimated longevity is 7.7 years.  Sensing and pacing thresholds as well as impedance are normal.  There been no high ventricular rate episodes.  No programming changes were made.    CARDIOVASCULAR HISTORY:   NICM (cath 4/2017 with normal cors)  ICD (Donta 12/2017, MDT single chamber)    PAST MEDICAL HISTORY:     Past Medical History:   Diagnosis Date    CHF (congestive heart failure)     Chronic combined systolic and diastolic congestive heart failure 5/24/2019    Essential hypertension 11/19/2012    Hypertension     dx at 16y.o.    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 disease, with heart failure 12/28/2021    ICD (implantable cardioverter-defibrillator), single, in situ 2/17/2020    Nonischemic cardiomyopathy 5/24/2019    Pacemaker 12/18/2017       PAST SURGICAL HISTORY:     Past Surgical History:   Procedure Laterality Date     CARDIAC DEFIBRILLATOR PLACEMENT  2017     SECTION      x 1    INDUCED       RIGHT HEART CATHETERIZATION Right 2022    Procedure: INSERTION, CATHETER, RIGHT HEART;  Surgeon: Timothy Prajapati Jr., MD;  Location: [facility] CATH LAB;  Service: Cardiology;  Laterality: Right;    TUBAL LIGATION         ALLERGIES AND MEDICATION:     Review of patient's allergies indicates:   Allergen Reactions    Aldactone [spironolactone] Swelling     Lips swelled        Medication List            Accurate as of 2022  2:00 PM. If you have any questions, ask your nurse or doctor.                CONTINUE taking these medications      bisoprolol 10 MG tablet  Commonly known as: ZEBETA  Take 1 tablet (10 mg total) by mouth once daily.     ENTRESTO  mg per tablet  Generic drug: sacubitriL-valsartan  Take 1 tablet by mouth 2 (two) times daily.     eplerenone 25 MG Tab  Commonly known as: INSPRA  Take 1 tablet (25 mg total) by mouth once daily.     FARXIGA 10 mg tablet  Generic drug: dapagliflozin  Take 1 tablet by mouth once daily     furosemide 40 MG tablet  Commonly known as: LASIX  Take 1 tablet (40 mg total) by mouth 2 (two) times a day.     sars-cov-2 (covid-19) 30 mcg/0.3 ml injection  Commonly known as: Pfizer COVID-19            STOP taking these medications      hydrALAZINE 50 MG tablet  Commonly known as: APRESOLINE  Stopped by: Kashif Peguero MD     isosorbide dinitrate 20 MG tablet  Commonly known as: ISORDIL  Stopped by: Kashif Peguero MD              SOCIAL HISTORY:     Social History     Socioeconomic History    Marital status:    Occupational History     Employer: [employer]   Tobacco Use    Smoking status: Never    Smokeless tobacco: Never   Substance and Sexual Activity    Alcohol use: Yes     Comment: occasionally    Drug use: Yes     Types: Marijuana     Comment: in past very little marijuana    Sexual activity: Yes     Partners: Male     Birth control/protection:  "None       FAMILY HISTORY:     Family History   Problem Relation Age of Onset    Hypertension Mother     Cancer Maternal Grandmother         breast x 2    Cancer Paternal Grandmother         breast    No Known Problems Sister     No Known Problems Brother     No Known Problems Son     No Known Problems Brother     Hypertension Other     Diabetes Other     Breast cancer Other     Cancer Paternal Aunt         breast    Cancer Paternal Uncle        REVIEW OF SYSTEMS:   Review of Systems   Constitutional:  Negative for chills, diaphoresis, fever and malaise/fatigue.   HENT:  Negative for nosebleeds.    Eyes:  Negative for blurred vision, double vision and photophobia.   Respiratory:  Negative for hemoptysis, shortness of breath and wheezing.    Cardiovascular:  Negative for chest pain, palpitations, orthopnea, claudication, leg swelling and PND.   Gastrointestinal:  Negative for abdominal pain, blood in stool, heartburn, melena, nausea and vomiting.   Genitourinary:  Negative for flank pain and hematuria.   Musculoskeletal:  Negative for falls, myalgias and neck pain.   Skin:  Negative for rash.   Neurological:  Negative for dizziness, seizures, loss of consciousness, weakness and headaches.   Endo/Heme/Allergies:  Negative for polydipsia. Does not bruise/bleed easily.   Psychiatric/Behavioral:  Negative for depression and memory loss. The patient is not nervous/anxious.      PHYSICAL EXAM:     Vitals:    09/29/22 1353   BP: 127/75   Pulse: 75   Resp: 18    Body mass index is 25.13 kg/m².  Weight: 79.5 kg (175 lb 2.5 oz)   Height: 5' 10" (177.8 cm)     Physical Exam  Vitals reviewed.   Constitutional:       General: She is not in acute distress.     Appearance: Normal appearance. She is well-developed. She is not ill-appearing, toxic-appearing or diaphoretic.   HENT:      Head: Normocephalic and atraumatic.   Eyes:      General: No scleral icterus.     Extraocular Movements: Extraocular movements intact.      " Conjunctiva/sclera: Conjunctivae normal.      Pupils: Pupils are equal, round, and reactive to light.   Neck:      Thyroid: No thyromegaly.      Vascular: Normal carotid pulses. No carotid bruit or JVD.      Trachea: Trachea normal. No tracheal deviation.   Cardiovascular:      Rate and Rhythm: Normal rate and regular rhythm.      Pulses:           Carotid pulses are 2+ on the right side and 2+ on the left side.     Heart sounds: S1 normal and S2 normal. No murmur heard.    No friction rub. No gallop.      Comments: L infraclavicular ICD site well healed  Pulmonary:      Effort: Pulmonary effort is normal. No respiratory distress.      Breath sounds: Normal breath sounds. No stridor. No wheezing, rhonchi or rales.   Chest:      Chest wall: No tenderness.   Abdominal:      General: There is no distension.      Palpations: Abdomen is soft.   Musculoskeletal:         General: No swelling or tenderness. Normal range of motion.      Cervical back: Normal range of motion and neck supple. No edema or rigidity.      Right lower leg: No edema.      Left lower leg: No edema.   Feet:      Right foot:      Skin integrity: No ulcer.      Left foot:      Skin integrity: No ulcer.   Skin:     General: Skin is warm and dry.      Coloration: Skin is not jaundiced.   Neurological:      General: No focal deficit present.      Mental Status: She is alert and oriented to person, place, and time.      Cranial Nerves: No cranial nerve deficit.   Psychiatric:         Mood and Affect: Mood normal.         Speech: Speech normal.         Behavior: Behavior normal. Behavior is cooperative.       DATA:   EKG: (personally reviewed tracing(s))  9/25/22 , inflat ST dep ?isch    Laboratory:  CBC:  Recent Labs   Lab 01/26/22  0934 08/26/22  2043 09/25/22  2335   WBC 6.16  6.16 8.17 10.65   Hemoglobin 12.2  12.2 12.4 12.9   Hematocrit 38.5  38.5 37.5 38.5   Platelets 311  311 322 419         CHEMISTRIES:  Recent Labs   Lab 08/12/21 2116  09/15/21  0834 10/01/21  2312 10/20/21  0830 01/14/22  0807 01/26/22  0934 02/25/22  1035 08/26/22 2043 09/19/22  1002 09/25/22  2335   Glucose 100   < > 119 H   < > 96 75 78 78 83 144 H   Sodium 140   < > 140   < > 140 140 139 141 134 L 142   Potassium 3.7   < > 3.5   < > 4.2 4.3 3.5 3.5 3.7 2.8 L   BUN 9   < > 16   < > 12 17 10 14 13 12   Creatinine 0.7   < > 0.8   < > 0.8 0.8 0.8 0.9 0.7 1.0   eGFR if  >60   < > >60   < > >60 >60.0 >60.0  --   --   --    eGFR if non African American >60   < > >60   < > >60 >60.0 >60.0  --   --   --    Calcium 9.6   < > 9.5   < > 8.6 L 9.1 9.1 9.6 9.4 9.7   Magnesium 1.9  --  2.1  --   --   --   --   --   --  2.0    < > = values in this interval not displayed.         CARDIAC BIOMARKERS:  Recent Labs   Lab 07/24/21  1802 07/24/21 2136 08/26/22 2253 09/25/22 2335 09/26/22  0121   CPK 77  --   --   --   --    Troponin I 0.035 H   < > 0.009 <0.006 <0.006    < > = values in this interval not displayed.         COAGS:        LIPIDS/LFTS:  Recent Labs   Lab 08/16/21  1409 09/15/21  0834 01/14/22  0807 08/26/22 2043 09/25/22  2335   Cholesterol 164  --   --   --   --    Triglycerides 106  --   --   --   --    HDL 36 L  --   --   --   --    LDL Cholesterol 106.8  --   --   --   --    Non-HDL Cholesterol 128  --   --   --   --    AST  --    < > 13 18 15   ALT  --    < > 10 9 L 13    < > = values in this interval not displayed.       Lab Results   Component Value Date    TSH 1.386 12/28/2021         Cardiovascular Testing:  RHC 1/27/22   Right atrial pressure is normal.  Pulmonary capillary wedge pressure is normal.  Pulmonary artery pressure is normal.  Pulmonary vascular resistance is normal.  Systemic vascular resistance is 1180.  Jeovany cardiac output and index is normal.  RA: 8/ 5/ 7 RV: 26/ 8 PA: 23/ 10/ 14 PWP: 15/ 12/ 12 .   Cardiac output was 5.22 by Jeovany. Cardiac index is 2.67 L/min/m2.   O2 Sat: PA 70%.   Pulmonary vascular resistance: 31. Systemic vascular  resistance: 1180.   These hemodynamic are acceptable for cardiac transplant listing.     Echo 10/6/21  The left ventricle is severely enlarged with eccentric hypertrophy and severely decreased systolic function.  The estimated ejection fraction is 15%.  Grade III left ventricular diastolic dysfunction.  Normal right ventricular size with normal right ventricular systolic function.  Moderate left atrial enlargement.  Mild mitral regurgitation.  Normal central venous pressure (3 mmHg).  The estimated PA systolic pressure is 15 mmHg.    Cath 4/18/17  B. Summary/Post-Operative Diagnosis    Normal coronary arteries.    Systolic dysfunction.    Mildly elevated left Filling Pressures.    Normal Pulmonary Hypertension.  D. Hemodynamic Results  Ejection Fraction: 20%  Global LV Function: severely depressed  PW:  (4)  PA: 24  CO_THERM: 4.4  RV: 25  RA:  (5)  LVEDP: 17   E. Angiographic Results       Patient has a right dominant coronary artery.      - Left Main Coronary Artery:             The LM is normal. There is DANNY 3 flow.     - Left Anterior Descending Artery:             The LAD is normal. There is DANNY 3 flow.     - Left Circumflex Artery:             The LCX is normal. There is DANNY 3 flow.     - Right Coronary Artery:             The RCA is normal. There is DANNY 3 flow.     - Left Renal Artery:             The ostial left renal is normal.     - Right Renal Artery:             The ostial right renal is normal.     - Common Femoral Artery:             The right CFA is normal.      ASSESSMENT:   # NICM, appears euvolemic.  Following with Dr. Prajapati.  # MDT ICD, functioning normally  # hx NSVT, last in 8/12/21  # HTN, controlled    PLAN:   Cont med rx  RTC 3 months with MDT ICD check (Jan 2023)      Kashif Peguero MD, [facility]

## 2022-10-12 ENCOUNTER — TELEPHONE (OUTPATIENT)
Dept: TRANSPLANT | Facility: CLINIC | Age: 37
End: 2022-10-12
Payer: COMMERCIAL

## 2022-10-12 DIAGNOSIS — I50.42 CHRONIC COMBINED SYSTOLIC AND DIASTOLIC CONGESTIVE HEART FAILURE: Primary | ICD-10-CM

## 2022-10-12 RX ORDER — METOPROLOL SUCCINATE 100 MG/1
100 TABLET, EXTENDED RELEASE ORAL DAILY
Qty: 30 TABLET | Refills: 11 | Status: SHIPPED | OUTPATIENT
Start: 2022-10-12 | End: 2023-11-10

## 2022-10-12 NOTE — TELEPHONE ENCOUNTER
10/12/22 - Received call from patient stating she would like to switch back to metoprolol.  When patient was last seen in clinic 9/20 by VIJAY Prajapati M.D., patient C/O fatigue from metoprolol.  She was changed to Zebeta 10 mg daily.  Patient states since switching to Zebeta she started having severe headaches.  Discussed/Reviewed with VIJAY Prajapati M.D.  VORB: VIJAY Prajapati M.D./DI Geiger RN: Discontinue Zebeta and start metoprolol 100 mg daily.  Instructions given to patient and patient verbalized understanding.  Prescription routed to VIJAY Prajapati M.D., for signature.

## 2022-10-17 ENCOUNTER — PES CALL (OUTPATIENT)
Dept: ADMINISTRATIVE | Facility: OTHER | Age: 37
End: 2022-10-17
Payer: COMMERCIAL

## 2022-11-25 ENCOUNTER — HOSPITAL ENCOUNTER (EMERGENCY)
Facility: HOSPITAL | Age: 37
Discharge: HOME | End: 2022-11-25
Attending: EMERGENCY MEDICINE
Payer: COMMERCIAL

## 2022-11-25 VITALS
HEIGHT: 70 IN | DIASTOLIC BLOOD PRESSURE: 66 MMHG | BODY MASS INDEX: 25.05 KG/M2 | SYSTOLIC BLOOD PRESSURE: 113 MMHG | RESPIRATION RATE: 16 BRPM | TEMPERATURE: 98 F | WEIGHT: 175 LBS | HEART RATE: 60 BPM | OXYGEN SATURATION: 100 %

## 2022-11-25 DIAGNOSIS — Z86.73 HISTORY OF CVA (CEREBROVASCULAR ACCIDENT): ICD-10-CM

## 2022-11-25 DIAGNOSIS — R07.9 CHEST PAIN: ICD-10-CM

## 2022-11-25 DIAGNOSIS — R51.9 NONINTRACTABLE HEADACHE, UNSPECIFIED CHRONICITY PATTERN, UNSPECIFIED HEADACHE TYPE: Primary | ICD-10-CM

## 2022-11-25 DIAGNOSIS — J32.1 FRONTAL SINUSITIS, UNSPECIFIED CHRONICITY: ICD-10-CM

## 2022-11-25 DIAGNOSIS — M54.31 SCIATICA OF RIGHT SIDE: ICD-10-CM

## 2022-11-25 LAB
ALBUMIN SERPL BCP-MCNC: 4 G/DL (ref 3.5–5.2)
ALP SERPL-CCNC: 69 U/L (ref 55–135)
ALT SERPL W/O P-5'-P-CCNC: 11 U/L (ref 10–44)
ANION GAP SERPL CALC-SCNC: 8 MMOL/L (ref 8–16)
AST SERPL-CCNC: 15 U/L (ref 10–40)
BASOPHILS # BLD AUTO: 0.05 K/UL (ref 0–0.2)
BASOPHILS NFR BLD: 0.7 % (ref 0–1.9)
BILIRUB SERPL-MCNC: 0.5 MG/DL (ref 0.1–1)
BNP SERPL-MCNC: 107 PG/ML (ref 0–99)
BUN SERPL-MCNC: 12 MG/DL (ref 6–20)
CALCIUM SERPL-MCNC: 9 MG/DL (ref 8.7–10.5)
CHLORIDE SERPL-SCNC: 105 MMOL/L (ref 95–110)
CO2 SERPL-SCNC: 23 MMOL/L (ref 23–29)
CREAT SERPL-MCNC: 0.8 MG/DL (ref 0.5–1.4)
CTP QC/QA: YES
CTP QC/QA: YES
DIFFERENTIAL METHOD: ABNORMAL
EOSINOPHIL # BLD AUTO: 0.1 K/UL (ref 0–0.5)
EOSINOPHIL NFR BLD: 1.7 % (ref 0–8)
ERYTHROCYTE [DISTWIDTH] IN BLOOD BY AUTOMATED COUNT: 12.2 % (ref 11.5–14.5)
EST. GFR  (NO RACE VARIABLE): >60 ML/MIN/1.73 M^2
GLUCOSE SERPL-MCNC: 88 MG/DL (ref 70–110)
HCT VFR BLD AUTO: 39.6 % (ref 37–48.5)
HGB BLD-MCNC: 13.4 G/DL (ref 12–16)
IMM GRANULOCYTES # BLD AUTO: 0.02 K/UL (ref 0–0.04)
IMM GRANULOCYTES NFR BLD AUTO: 0.3 % (ref 0–0.5)
LYMPHOCYTES # BLD AUTO: 1.9 K/UL (ref 1–4.8)
LYMPHOCYTES NFR BLD: 26.9 % (ref 18–48)
MAGNESIUM SERPL-MCNC: 1.9 MG/DL (ref 1.6–2.6)
MCH RBC QN AUTO: 30 PG (ref 27–31)
MCHC RBC AUTO-ENTMCNC: 33.8 G/DL (ref 32–36)
MCV RBC AUTO: 89 FL (ref 82–98)
MONOCYTES # BLD AUTO: 0.7 K/UL (ref 0.3–1)
MONOCYTES NFR BLD: 9.6 % (ref 4–15)
NEUTROPHILS # BLD AUTO: 4.4 K/UL (ref 1.8–7.7)
NEUTROPHILS NFR BLD: 60.8 % (ref 38–73)
NRBC BLD-RTO: 0 /100 WBC
PLATELET # BLD AUTO: 377 K/UL (ref 150–450)
PMV BLD AUTO: 9 FL (ref 9.2–12.9)
POC MOLECULAR INFLUENZA A AGN: NEGATIVE
POC MOLECULAR INFLUENZA B AGN: NEGATIVE
POTASSIUM SERPL-SCNC: 3.6 MMOL/L (ref 3.5–5.1)
PROT SERPL-MCNC: 8.3 G/DL (ref 6–8.4)
RBC # BLD AUTO: 4.46 M/UL (ref 4–5.4)
SARS-COV-2 RDRP RESP QL NAA+PROBE: NEGATIVE
SODIUM SERPL-SCNC: 136 MMOL/L (ref 136–145)
TROPONIN I SERPL DL<=0.01 NG/ML-MCNC: 0.01 NG/ML (ref 0–0.03)
TROPONIN I SERPL DL<=0.01 NG/ML-MCNC: 0.01 NG/ML (ref 0–0.03)
WBC # BLD AUTO: 7.18 K/UL (ref 3.9–12.7)

## 2022-11-25 PROCEDURE — 84484 ASSAY OF TROPONIN QUANT: CPT | Mod: HCNC | Performed by: EMERGENCY MEDICINE

## 2022-11-25 PROCEDURE — 83880 ASSAY OF NATRIURETIC PEPTIDE: CPT | Mod: HCNC | Performed by: EMERGENCY MEDICINE

## 2022-11-25 PROCEDURE — 96375 TX/PRO/DX INJ NEW DRUG ADDON: CPT | Mod: HCNC,59

## 2022-11-25 PROCEDURE — 87635 SARS-COV-2 COVID-19 AMP PRB: CPT | Mod: HCNC | Performed by: EMERGENCY MEDICINE

## 2022-11-25 PROCEDURE — 25000003 PHARM REV CODE 250: Mod: HCNC | Performed by: EMERGENCY MEDICINE

## 2022-11-25 PROCEDURE — 99291 CRITICAL CARE FIRST HOUR: CPT | Mod: 25,HCNC

## 2022-11-25 PROCEDURE — 96374 THER/PROPH/DIAG INJ IV PUSH: CPT | Mod: HCNC

## 2022-11-25 PROCEDURE — 93010 EKG 12-LEAD: ICD-10-PCS | Mod: HCNC,,, | Performed by: INTERNAL MEDICINE

## 2022-11-25 PROCEDURE — 93005 ELECTROCARDIOGRAM TRACING: CPT | Mod: HCNC

## 2022-11-25 PROCEDURE — 85025 COMPLETE CBC W/AUTO DIFF WBC: CPT | Mod: HCNC | Performed by: EMERGENCY MEDICINE

## 2022-11-25 PROCEDURE — 93010 ELECTROCARDIOGRAM REPORT: CPT | Mod: HCNC,,, | Performed by: INTERNAL MEDICINE

## 2022-11-25 PROCEDURE — 25500020 PHARM REV CODE 255: Mod: HCNC | Performed by: EMERGENCY MEDICINE

## 2022-11-25 PROCEDURE — 63600175 PHARM REV CODE 636 W HCPCS: Mod: HCNC | Performed by: EMERGENCY MEDICINE

## 2022-11-25 PROCEDURE — 83735 ASSAY OF MAGNESIUM: CPT | Mod: HCNC | Performed by: EMERGENCY MEDICINE

## 2022-11-25 PROCEDURE — 87502 INFLUENZA DNA AMP PROBE: CPT | Mod: HCNC

## 2022-11-25 PROCEDURE — 80053 COMPREHEN METABOLIC PANEL: CPT | Mod: HCNC | Performed by: EMERGENCY MEDICINE

## 2022-11-25 RX ORDER — ASPIRIN 81 MG/1
81 TABLET ORAL DAILY
Qty: 30 TABLET | Refills: 0 | Status: SHIPPED | OUTPATIENT
Start: 2022-11-25 | End: 2023-11-25

## 2022-11-25 RX ORDER — BUTALBITAL, ACETAMINOPHEN AND CAFFEINE 50; 325; 40 MG/1; MG/1; MG/1
1 TABLET ORAL EVERY 8 HOURS PRN
Qty: 15 TABLET | Refills: 0 | Status: SHIPPED | OUTPATIENT
Start: 2022-11-25 | End: 2022-12-25

## 2022-11-25 RX ORDER — FLUTICASONE PROPIONATE 50 MCG
1 SPRAY, SUSPENSION (ML) NASAL 2 TIMES DAILY
Qty: 15 G | Refills: 0 | Status: SHIPPED | OUTPATIENT
Start: 2022-11-25 | End: 2022-12-22

## 2022-11-25 RX ORDER — ATORVASTATIN CALCIUM 10 MG/1
10 TABLET, FILM COATED ORAL DAILY
Qty: 30 TABLET | Refills: 0 | Status: SHIPPED | OUTPATIENT
Start: 2022-11-25 | End: 2022-12-22 | Stop reason: SDUPTHER

## 2022-11-25 RX ORDER — METHOCARBAMOL 500 MG/1
1000 TABLET, FILM COATED ORAL 3 TIMES DAILY PRN
Qty: 20 TABLET | Refills: 0 | Status: SHIPPED | OUTPATIENT
Start: 2022-11-25 | End: 2022-12-22

## 2022-11-25 RX ORDER — FAMOTIDINE 20 MG/1
20 TABLET, FILM COATED ORAL
Status: COMPLETED | OUTPATIENT
Start: 2022-11-25 | End: 2022-11-25

## 2022-11-25 RX ORDER — ASPIRIN 325 MG
325 TABLET ORAL
Status: COMPLETED | OUTPATIENT
Start: 2022-11-25 | End: 2022-11-25

## 2022-11-25 RX ORDER — KETOROLAC TROMETHAMINE 30 MG/ML
15 INJECTION, SOLUTION INTRAMUSCULAR; INTRAVENOUS
Status: COMPLETED | OUTPATIENT
Start: 2022-11-25 | End: 2022-11-25

## 2022-11-25 RX ORDER — DIPHENHYDRAMINE HYDROCHLORIDE 50 MG/ML
25 INJECTION INTRAMUSCULAR; INTRAVENOUS
Status: COMPLETED | OUTPATIENT
Start: 2022-11-25 | End: 2022-11-25

## 2022-11-25 RX ADMIN — ASPIRIN 325 MG ORAL TABLET 325 MG: 325 PILL ORAL at 02:11

## 2022-11-25 RX ADMIN — KETOROLAC TROMETHAMINE 15 MG: 30 INJECTION, SOLUTION INTRAMUSCULAR at 02:11

## 2022-11-25 RX ADMIN — DIPHENHYDRAMINE HYDROCHLORIDE 25 MG: 50 INJECTION, SOLUTION INTRAMUSCULAR; INTRAVENOUS at 05:11

## 2022-11-25 RX ADMIN — FAMOTIDINE 20 MG: 20 TABLET ORAL at 02:11

## 2022-11-25 RX ADMIN — IOHEXOL 100 ML: 350 INJECTION, SOLUTION INTRAVENOUS at 05:11

## 2022-11-25 NOTE — ED PROVIDER NOTES
Encounter Date: 2022    SCRIBE #1 NOTE: I, Floridalma Moore, am scribing for, and in the presence of,  Bairon Carrizales MD. I have scribed the following portions of the note - Other sections scribed: HPI, ROS, PE.     History     Chief Complaint   Patient presents with    Chest Pain     Left sided chest discomfort x 2 days. Also endorses Nausea, HA, and right sided hip pain from sciatica. VSS, NAD, AAOx4    Headache    Nausea    Hip Pain     Nasra Marks is a 37 y.o. female, with a PMHx of HTN, CHF, and ICD, who presents to the ED with intermittent left-sided chest pain for the past two days. Patient notes the pain is worse when she is lying down and is associated with nausea. Patient is also complaining of generalized weakness and a headache for the past 3 days. Patient endorsed Tylenol PTA without relief. Patient denies being around anyone sick. Patient endorses compliance of all her medications. No other exacerbating or alleviating factors. Patient denies vomiting, abdominal pain, diarrhea, dysuria, visual disturbance, numbness, or other associated symptoms.    The history is provided by the patient. No  was used.   Review of patient's allergies indicates:   Allergen Reactions    Aldactone [spironolactone] Swelling     Lips swelled     Past Medical History:   Diagnosis Date    CHF (congestive heart failure)     Chronic combined systolic and diastolic congestive heart failure 2019    Essential hypertension 2012    Hypertension     dx at 16y.o.    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 disease, with heart failure 2021    ICD (implantable cardioverter-defibrillator), single, in situ 2020    Nonischemic cardiomyopathy 2019    Pacemaker 2017     Past Surgical History:   Procedure Laterality Date    CARDIAC DEFIBRILLATOR PLACEMENT  2017     SECTION      x 1    INDUCED       RIGHT HEART  CATHETERIZATION Right 1/27/2022    Procedure: INSERTION, CATHETER, RIGHT HEART;  Surgeon: Timothy Prajapati Jr., MD;  Location: [facility] CATH LAB;  Service: Cardiology;  Laterality: Right;    TUBAL LIGATION       Family History   Problem Relation Age of Onset    Hypertension Mother     Cancer Maternal Grandmother         breast x 2    Cancer Paternal Grandmother         breast    No Known Problems Sister     No Known Problems Brother     No Known Problems Son     No Known Problems Brother     Hypertension Other     Diabetes Other     Breast cancer Other     Cancer Paternal Aunt         breast    Cancer Paternal Uncle      Social History     Tobacco Use    Smoking status: Never    Smokeless tobacco: Never   Substance Use Topics    Alcohol use: Yes     Comment: occasionally    Drug use: Yes     Types: Marijuana     Comment: in past very little marijuana     Review of Systems   Constitutional:  Negative for fever.   HENT:  Negative for facial swelling and voice change.    Eyes:  Negative for pain and visual disturbance.   Respiratory:  Negative for choking and shortness of breath.    Cardiovascular:  Positive for chest pain (left-sided).   Gastrointestinal:  Positive for nausea. Negative for abdominal pain, diarrhea and vomiting.   Genitourinary:  Negative for dysuria and frequency.   Musculoskeletal:  Negative for back pain.   Skin:  Negative for rash.   Neurological:  Positive for weakness (generalized) and headaches. Negative for numbness.   Psychiatric/Behavioral:  Negative for self-injury.      Physical Exam     Initial Vitals [11/25/22 1317]   BP Pulse Resp Temp SpO2   127/84 67 17 98.2 °F (36.8 °C) 99 %      MAP       --         Physical Exam    Nursing note and vitals reviewed.  Constitutional: She is not diaphoretic. No distress.   HENT:   Head: Normocephalic and atraumatic.   Protecting airway   Eyes: Conjunctivae and EOM are normal. No scleral icterus.   Neck: Neck supple. No tracheal deviation present.    Normal range of motion.  Cardiovascular:  Normal rate, regular rhythm and intact distal pulses.           Pulmonary/Chest: No stridor. No respiratory distress.   Speaking in full sentences   Abdominal: Abdomen is soft. She exhibits no distension. There is no abdominal tenderness.   Musculoskeletal:         General: No tenderness or edema.      Cervical back: Normal range of motion and neck supple.     Neurological: She is alert. No cranial nerve deficit or sensory deficit.   Mild right upper extremity weakness   Skin: Skin is warm and dry.   Psychiatric: She has a normal mood and affect.       ED Course   Procedures  Labs Reviewed   CBC W/ AUTO DIFFERENTIAL - Abnormal; Notable for the following components:       Result Value    MPV 9.0 (*)     All other components within normal limits   B-TYPE NATRIURETIC PEPTIDE - Abnormal; Notable for the following components:     (*)     All other components within normal limits   COMPREHENSIVE METABOLIC PANEL   TROPONIN I   MAGNESIUM   TROPONIN I   SARS-COV-2 RDRP GENE   POCT INFLUENZA A/B MOLECULAR     EKG Readings: (Independently Interpreted)   Rhythm: Normal Sinus Rhythm. Heart Rate: 65. Ectopy: No Ectopy. Conduction: Normal. ST Segments: Normal ST Segments. Axis: Normal. Clinical Impression: Normal Sinus Rhythm   Non-specific T wave abnormality      Imaging Results              CTA Chest Non-Coronary (PE Studies) (Final result)  Result time 11/25/22 17:54:06      Final result by Kasandra Erazo MD (11/25/22 17:54:06)                   Impression:      No evidence of PE.  No acute intrathoracic abnormalities identified.      Electronically signed by: Kasandra Erazo MD  Date:    11/25/2022  Time:    17:54               Narrative:    EXAMINATION:  CTA CHEST NON CORONARY (PE STUDIES)    CLINICAL HISTORY:  Pulmonary embolism (PE) suspected, high prob;    TECHNIQUE:  Low dose axial images, sagittal and coronal reformations were obtained from the thoracic inlet to the  lung bases following the IV administration of 100 mL of Omnipaque 350.  Contrast timing was optimized to evaluate the pulmonary arteries.  MIP images were performed.    COMPARISON:  None    FINDINGS:  Structures at the base of the neck are unremarkable.  Left-sided pacer device is seen.  Aorta is non-aneurysmal.  The heart is normal in size without pericardial effusion.  No intraluminal filling defects within the pulmonary arteries to suggest pulmonary thromboembolism through the proximal segmental branches.  There is no evidence of mediastinal, axillary, or hilar lymph node enlargement.  The esophagus is unremarkable along its course.    The trachea and bronchi are patent.  The lungs are symmetrically expanded.  Lungs show no consolidation, pleural effusion, pulmonary hemorrhage, or infarction.    The visualized abdominal structures show no acute abnormalities.  Partially visualized possible renal cyst is noted on the right.  Small gallstones are seen.  No acute osseous abnormality identified.  Extrathoracic soft tissues are unremarkable.                                       X-Ray Chest AP Portable (Final result)  Result time 11/25/22 14:14:47      Final result by Vitaly Portillo MD (11/25/22 14:14:47)                   Impression:      Cardiac device without detrimental change or radiographic acute intrathoracic process seen on this single view.      Electronically signed by: Vitaly Portillo MD  Date:    11/25/2022  Time:    14:14               Narrative:    EXAMINATION:  XR CHEST AP PORTABLE    CLINICAL HISTORY:  Chest Pain;    TECHNIQUE:  Single frontal view of the chest was performed.    COMPARISON:  Chest radiograph 09/26/2022, CT thorax 03/04/2021    FINDINGS:  Patient is somewhat rotated.  Hair artifact overlies the right lung apex.    Left chest single lead cardiac device in place.  Cardiomediastinal silhouette is midline and prominent similar to prior without evidence of failure.  Pulmonary vasculature and  hilar contours are within normal limits.  The lungs are well expanded without consolidation, pleural effusion or pneumothorax.  Minimal biapical pleuroparenchymal scarring.  No acute osseous process seen.  PA and lateral views can be obtained.                                       CT Head Without Contrast (Final result)  Result time 11/25/22 14:12:19      Final result by Tomy Sanchez MD (11/25/22 14:12:19)                   Impression:      Remote right PICA territory infarct, new from 08/25/2021.  No evidence of acute infarction.  No intracranial hemorrhage or mass.      Electronically signed by: Tomy Sanchez  Date:    11/25/2022  Time:    14:12               Narrative:    EXAMINATION:  CT HEAD WITHOUT CONTRAST    CLINICAL HISTORY:  Headache, chronic, new features or increased frequency;    TECHNIQUE:  Low dose axial images were obtained through the head.  Coronal and sagittal reformations were also performed. Contrast was not administered.    COMPARISON:  CT head 08/25/2021    FINDINGS:  BRAIN: Encephalomalacia involving the right posteroinferior cerebellum (602:42), new from 08/25/2021, likely a chronic PICA territory infarct.  No evidence of acute infarction.  No intracranial mass or hemorrhage.    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    VESSELS: The major intracranial vessels are unremarkable.    BONES: No fracture or focal osseous lesion. Unchanged opacification of the right frontal sinus and anterior ethmoid air cells.  Paranasal sinuses and mastoid air cells are otherwise well aerated.    SOFT TISSUES: Extracranial soft tissues are unremarkable.                                       Medications   aspirin tablet 325 mg (325 mg Oral Given 11/25/22 1410)   ketorolac injection 15 mg (15 mg Intravenous Given 11/25/22 1410)   famotidine tablet 20 mg (20 mg Oral Given 11/25/22 1410)   iohexoL (OMNIPAQUE 350) injection 100  mL (100 mLs Intravenous Given 11/25/22 8513)   diphenhydrAMINE injection 25 mg (25 mg Intravenous Given 11/25/22 1719)     Medical Decision Making:   History:   Old Medical Records: I decided to obtain old medical records.  Differential Diagnosis:   DDx includes but is not limited to: electrolyte abnormality, ACS, Viral syndrome. Will obtain labs and imaging labs including a chest x-ray and CT head without contrast. Will monitor and frequently reassess pending results of labs, treatments, and final disposition.  Independently Interpreted Test(s):   I have ordered and independently interpreted EKG Reading(s) - see prior notes  Clinical Tests:   Lab Tests: Ordered and Reviewed  Radiological Study: Ordered and Reviewed  Medical Tests: Ordered and Reviewed  ED Management:  Patient is afebrile and in no acute distress at time history and physical.  EKG without definite acute ischemic changes.  Vitals within acceptable ranges.  She has no cranial nerve deficits.  She has mild right upper extremity weakness.  She is ambulatory without ataxia.  Labs without leukocytosis.  Or significant electrolyte abnormality.  Troponins are negative x2.  CTA of the chest does not demonstrate PE.  CT of the brain is notable for remote right PICA territory infarct that is new from 8/25/21.  There is no evidence of acute infarction.  Patient reports current symptoms have been ongoing for several days.    Consult: I have spoken with Dr. Pablo from the Neurology service regarding the patient's presentation and study results.  At this time, the recommendation is patient does not appear to require acute intervention.  Recommends initiation of aspirin, statin, close outpatient follow-up in neurology clinic.    Patient informed of findings and plan of care.  She expresses comfort with discharge to follow-up with her primary physician, neurologist, cardiologist. counseled on supportive care, appropriate medication usage, concerning symptoms for  which to return to ER and the importance of follow up. Understanding and agreement with treatment plan was expressed.    Please put in 60 minutes of critical care due to patient having a high risk of CNS failure.   Separate from teaching and exclusive of procedure and ekg time  Includes:  Time at bedside  Time reviewing test results  Time discussing case with staff  Time documenting the medical record  Time spent with family members  Time spent with consults  Management   This chart was completed using dictation software, as a result there may be some transcription errors.           Scribe Attestation:   Scribe #1: I performed the above scribed service and the documentation accurately describes the services I performed. I attest to the accuracy of the note.                   Clinical Impression:   Final diagnoses:  [R07.9] Chest pain  [R51.9] Nonintractable headache, unspecified chronicity pattern, unspecified headache type (Primary)  [J32.1] Frontal sinusitis, unspecified chronicity  [M54.31] Sciatica of right side  [Z86.73] History of CVA (cerebrovascular accident)        ED Disposition Condition    Discharge Stable        I, Bairon Carrizales , personally performed the services described in this documentation. All medical record entries made by the scribe were at my direction and in my presence. I have reviewed the chart and agree that the record reflects my personal performance and is accurate and complete.   ED Prescriptions    None       Follow-up Information       Follow up With Specialties Details Why Contact Info    Dao Evans MD Neurology Schedule an appointment as soon as possible for a visit   [address]  [address]  [place] [id number] [phone]      Daphney Pablo MD Neurology Schedule an appointment as soon as possible for a visit   [address]  [address]  [place] [address]  [phone]      Veronika Rainey MD Family Medicine, Wound Care Schedule an appointment as soon as possible for a visit    [address] [facility] [address]  [phone]      Kashif Peguero MD Cardiology, Interventional Cardiology Schedule an appointment as soon as possible for a visit   [address]  [address]  [facility] [id number] [phone]               Bairon Carrizales MD  11/25/22 5279

## 2022-11-25 NOTE — ED TRIAGE NOTES
Pt comes in w/complaint of CP and HA, nausea w/out emesis. States hx of HTN and CHF, compliant w/medications. Denies hx of migraines. Pt is A&OX3, skin w/d/I, resp e/u, no active emesis at this time. States head pain 7/10.

## 2022-11-26 NOTE — DISCHARGE INSTRUCTIONS
CT scan suggest that you have had a stroke in the past but does not suggest an acute stroke.  It is very important that you begin the aspirin and atorvastatin prescribed to help prevent further strokes.  Continue your medication as you have been prescribed.  Use Fioricet as needed for headache.  Use Flonase to decrease sinus congestion.  Use Robaxin as needed for sciatica pain.  Perform the sciatica stretching exercises on the discharge papers provided.  It is very important that you schedule close follow-up with your primary physician, Neurology as well as Cardiology.  Return to the emergency department for new numbness, new weakness, difficulty speaking, difficulty walking or any new, worsening or significantly concerning symptoms    Thank you for coming to our Emergency Department today. It is important to remember that some problems are difficult to diagnose and may not be found during your first visit. Be sure to follow up with your primary care doctor and review any labs/imaging that was performed with them. If you do not have a primary care doctor, you may contact the one listed on your discharge paperwork or you may also call the [facility] Appointment Desk at [phone] to schedule an appointment with one.     All medications may potentially have side effects and it is impossible to predict which medications may give you side effects. If you feel that you are having a negative effect of any medication you should immediately stop taking them and seek medical attention.    Return to the ER with any questions/concerns, new/concerning symptoms, worsening or failure to improve. Do not drive or make any important decisions for 24 hours if you have received any pain medications, sedatives or mood altering drugs during your ER visit.

## 2022-11-30 ENCOUNTER — OFFICE VISIT (OUTPATIENT)
Dept: NEUROLOGY | Facility: CLINIC | Age: 37
End: 2022-11-30
Payer: COMMERCIAL

## 2022-11-30 VITALS
BODY MASS INDEX: 25.73 KG/M2 | WEIGHT: 179.69 LBS | HEIGHT: 70 IN | SYSTOLIC BLOOD PRESSURE: 128 MMHG | HEART RATE: 75 BPM | DIASTOLIC BLOOD PRESSURE: 82 MMHG

## 2022-11-30 DIAGNOSIS — G44.229 CHRONIC TENSION-TYPE HEADACHE, NOT INTRACTABLE: ICD-10-CM

## 2022-11-30 DIAGNOSIS — Z86.73 OLD CEREBRAL INFARCT WITHOUT RESIDUAL DEFICIT: Primary | ICD-10-CM

## 2022-11-30 DIAGNOSIS — I10 ESSENTIAL HYPERTENSION: Chronic | ICD-10-CM

## 2022-11-30 PROCEDURE — 3074F PR MOST RECENT SYSTOLIC BLOOD PRESSURE < 130 MM HG: ICD-10-PCS | Mod: HCNC,CPTII,S$GLB,

## 2022-11-30 PROCEDURE — 1159F PR MEDICATION LIST DOCUMENTED IN MEDICAL RECORD: ICD-10-PCS | Mod: HCNC,CPTII,S$GLB,

## 2022-11-30 PROCEDURE — 99214 OFFICE O/P EST MOD 30 MIN: CPT | Mod: HCNC,S$GLB,,

## 2022-11-30 PROCEDURE — 3008F PR BODY MASS INDEX (BMI) DOCUMENTED: ICD-10-PCS | Mod: HCNC,CPTII,S$GLB,

## 2022-11-30 PROCEDURE — 99214 PR OFFICE/OUTPT VISIT, EST, LEVL IV, 30-39 MIN: ICD-10-PCS | Mod: HCNC,S$GLB,,

## 2022-11-30 PROCEDURE — 99999 PR PBB SHADOW E&M-EST. PATIENT-LVL III: ICD-10-PCS | Mod: PBBFAC,HCNC,,

## 2022-11-30 PROCEDURE — 99999 PR PBB SHADOW E&M-EST. PATIENT-LVL III: CPT | Mod: PBBFAC,HCNC,,

## 2022-11-30 PROCEDURE — 3079F DIAST BP 80-89 MM HG: CPT | Mod: HCNC,CPTII,S$GLB,

## 2022-11-30 PROCEDURE — 4010F ACE/ARB THERAPY RXD/TAKEN: CPT | Mod: HCNC,CPTII,S$GLB,

## 2022-11-30 PROCEDURE — 1159F MED LIST DOCD IN RCRD: CPT | Mod: HCNC,CPTII,S$GLB,

## 2022-11-30 PROCEDURE — 3074F SYST BP LT 130 MM HG: CPT | Mod: HCNC,CPTII,S$GLB,

## 2022-11-30 PROCEDURE — 4010F PR ACE/ARB THEARPY RXD/TAKEN: ICD-10-PCS | Mod: HCNC,CPTII,S$GLB,

## 2022-11-30 PROCEDURE — 3008F BODY MASS INDEX DOCD: CPT | Mod: HCNC,CPTII,S$GLB,

## 2022-11-30 PROCEDURE — 3079F PR MOST RECENT DIASTOLIC BLOOD PRESSURE 80-89 MM HG: ICD-10-PCS | Mod: HCNC,CPTII,S$GLB,

## 2022-12-21 NOTE — PROGRESS NOTES
"Subjective:       Patient ID: Nasra Marks is a 37 y.o. female.      History of Present Illness  37 year old female who presents today for follow up evaluation after ER visit. Past medical history below. She states she recently went to the ER for multiple complaints including nausea, headache and chest pain. CT head was done and showed a remote right PICA territory infarct, that was new from imaging done 2021. She denies any symptoms of facial droop, speech/vision changes, numbness, or weakness in any extremities. She states she took man edible and "went crazy" a month ago and was not sure if that would be the cause. She was prescribed Lipitor and ASA in the ER. States she has been compliant with these medications. Also prescribed Fioricet for her headaches which have been helpful.     Does have hx of CHF with ICD.     Past Medical History:   Diagnosis Date    CHF (congestive heart failure)     Chronic combined systolic and diastolic congestive heart failure 2019    Essential hypertension 2012    Hypertension     dx at 16y.o.    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 disease, with heart failure 2021    ICD (implantable cardioverter-defibrillator), single, in situ 2020    Nonischemic cardiomyopathy 2019    Pacemaker 2017       Past Surgical History:   Procedure Laterality Date    CARDIAC DEFIBRILLATOR PLACEMENT  2017     SECTION      x 1    INDUCED       RIGHT HEART CATHETERIZATION Right 2022    Procedure: INSERTION, CATHETER, RIGHT HEART;  Surgeon: Timothy Prajapati Jr., MD;  Location: [facility] CATH LAB;  Service: Cardiology;  Laterality: Right;    TUBAL LIGATION         Family History   Problem Relation Age of Onset    Hypertension Mother     Cancer Maternal Grandmother         breast x 2    Cancer Paternal Grandmother         breast    No Known Problems Sister     No Known Problems Brother     No Known " Problems Son     No Known Problems Brother     Hypertension Other     Diabetes Other     Breast cancer Other     Cancer Paternal Aunt         breast    Cancer Paternal Uncle        Social History     Socioeconomic History    Marital status:    Occupational History     Employer: [employer]   Tobacco Use    Smoking status: Never    Smokeless tobacco: Never   Substance and Sexual Activity    Alcohol use: Yes     Comment: occasionally    Drug use: Yes     Types: Marijuana     Comment: in past very little marijuana    Sexual activity: Yes     Partners: Male     Birth control/protection: None       Current Outpatient Medications   Medication Sig Dispense Refill    aspirin (ECOTRIN) 81 MG EC tablet Take 1 tablet (81 mg total) by mouth once daily. 30 tablet 0    atorvastatin (LIPITOR) 10 MG tablet Take 1 tablet (10 mg total) by mouth once daily. 30 tablet 0    butalbital-acetaminophen-caffeine -40 mg (FIORICET, ESGIC) -40 mg per tablet Take 1 tablet by mouth every 8 (eight) hours as needed for Headaches. 15 tablet 0    dapagliflozin (FARXIGA) 10 mg tablet Take 1 tablet by mouth once daily 30 tablet 3    eplerenone (INSPRA) 25 MG Tab Take 1 tablet (25 mg total) by mouth once daily. 90 tablet 3    fluticasone propionate (FLONASE) 50 mcg/actuation nasal spray 1 spray (50 mcg total) by Each Nostril route 2 (two) times daily. 15 g 0    furosemide (LASIX) 40 MG tablet Take 1 tablet (40 mg total) by mouth 2 (two) times a day. 180 tablet 3    methocarbamoL (ROBAXIN) 500 MG Tab Take 2 tablets (1,000 mg total) by mouth 3 (three) times daily as needed (sciatica pian). 20 tablet 0    metoprolol succinate (TOPROL-XL) 100 MG 24 hr tablet Take 1 tablet (100 mg total) by mouth once daily. 30 tablet 11    sacubitriL-valsartan (ENTRESTO)  mg per tablet Take 1 tablet by mouth 2 (two) times daily. 180 tablet 3    sars-cov-2, covid-19, (PFIZER COVID-19) 30 mcg/0.3 ml injection        No current facility-administered  medications for this visit.       Review of patient's allergies indicates:   Allergen Reactions    Aldactone [spironolactone] Swelling     Lips swelled        Review of Systems  Review of Systems   Constitutional: Negative.    HENT: Negative.     Eyes: Negative.    Respiratory: Negative.     Cardiovascular: Negative.    Gastrointestinal: Negative.    Endocrine: Negative.    Genitourinary: Negative.    Skin: Negative.    Neurological:  Positive for headaches.   Hematological: Negative.    Psychiatric/Behavioral: Negative.       Objective:      Neurologic Exam     Mental Status   Oriented to person, place, and time.   Attention: normal. Concentration: normal.   Speech: speech is normal   Level of consciousness: alert  Knowledge: good.     Cranial Nerves   Cranial nerves II through XII intact.     Motor Exam   Muscle bulk: normal  Overall muscle tone: normal  Right arm pronator drift: absent  Left arm pronator drift: absent    Strength   Right neck flexion: 5/5  Left neck flexion: 5/5  Right neck extension: 5/5  Left neck extension: 5/5  Right deltoid: 5/5  Left deltoid: 5/5  Right biceps: 5/5  Left biceps: 5/5  Right triceps: 5/5  Left triceps: 5/5  Right wrist flexion: 5/5  Left wrist flexion: 5/5  Right wrist extension: 5/5  Left wrist extension: 5/5  Right interossei: 5/5  Left interossei: 5/5  Right abdominals: 5/5  Left abdominals: 5/5  Right iliopsoas: 5/5  Left iliopsoas: 5/5  Right quadriceps: 5/5  Left quadriceps: 5/5  Right hamstrin/5  Left hamstrin/5  Right glutei: 5/5  Left glutei: 5/5  Right anterior tibial: 5/5  Left anterior tibial: 5/5  Right posterior tibial: 5/5  Left posterior tibial: 5/5  Right peroneal: 5/5  Left peroneal: 5/5  Right gastroc: 5/5  Left gastroc: 5/5    Sensory Exam   Light touch normal.   Vibration normal.   Proprioception normal.   Pinprick normal.     Gait, Coordination, and Reflexes     Gait  Gait: normal    Coordination   Romberg: negative    Tremor   Resting tremor:  absent    Reflexes   Right brachioradialis: 2+  Left brachioradialis: 2+  Right biceps: 2+  Left biceps: 2+  Right triceps: 2+  Left triceps: 2+  Right patellar: 2+  Left patellar: 2+  Right achilles: 2+  Left achilles: 2+  Right : 2+  Left : 2+    Physical Exam  HENT:      Head: Normocephalic and atraumatic.   Pulmonary:      Effort: Pulmonary effort is normal.   Skin:     General: Skin is warm and dry.   Neurological:      Mental Status: She is alert and oriented to person, place, and time.      Cranial Nerves: Cranial nerves 2-12 are intact.      Coordination: Romberg Test normal.      Gait: Gait is intact.      Deep Tendon Reflexes:      Reflex Scores:       Tricep reflexes are 2+ on the right side and 2+ on the left side.       Bicep reflexes are 2+ on the right side and 2+ on the left side.       Brachioradialis reflexes are 2+ on the right side and 2+ on the left side.       Patellar reflexes are 2+ on the right side and 2+ on the left side.       Achilles reflexes are 2+ on the right side and 2+ on the left side.  Psychiatric:         Mood and Affect: Mood normal.         Speech: Speech normal.         Behavior: Behavior is cooperative.         Assessment and Plan:      1. Old cerebral infarct without residual deficit  -Remote right PICA territory infarct seen on CT scan   -Continue taking ASA and Lipitor  -Educated patient on s/s of stroke such as facial droop, speech changes, numbness/tingling, arm/leg weakness. Emphasized the importance of time and brain loss. If s/s of stroke are present and suspected patient educated to call 911. Patient and family verbalized understanding      2. Essential hypertension  -stable BP today   -continue taking Entresto     3. Chronic tension-type headache, not intractable  -continue Fioricet as needed    Follow up in 6 months

## 2022-12-22 ENCOUNTER — OFFICE VISIT (OUTPATIENT)
Dept: CARDIOLOGY | Facility: CLINIC | Age: 37
End: 2022-12-22
Payer: COMMERCIAL

## 2022-12-22 VITALS
WEIGHT: 180.88 LBS | RESPIRATION RATE: 18 BRPM | SYSTOLIC BLOOD PRESSURE: 132 MMHG | BODY MASS INDEX: 25.95 KG/M2 | HEART RATE: 66 BPM | DIASTOLIC BLOOD PRESSURE: 88 MMHG | OXYGEN SATURATION: 98 %

## 2022-12-22 DIAGNOSIS — Z95.810 ICD (IMPLANTABLE CARDIOVERTER-DEFIBRILLATOR), SINGLE, IN SITU: ICD-10-CM

## 2022-12-22 DIAGNOSIS — I10 ESSENTIAL HYPERTENSION: ICD-10-CM

## 2022-12-22 DIAGNOSIS — I50.42 CHRONIC COMBINED SYSTOLIC AND DIASTOLIC CONGESTIVE HEART FAILURE: Primary | ICD-10-CM

## 2022-12-22 DIAGNOSIS — Z86.73 HISTORY OF CVA (CEREBROVASCULAR ACCIDENT): ICD-10-CM

## 2022-12-22 DIAGNOSIS — I42.8 NONISCHEMIC CARDIOMYOPATHY: ICD-10-CM

## 2022-12-22 PROCEDURE — 99214 OFFICE O/P EST MOD 30 MIN: CPT | Mod: HCNC,S$GLB,, | Performed by: INTERNAL MEDICINE

## 2022-12-22 PROCEDURE — 1160F PR REVIEW ALL MEDS BY PRESCRIBER/CLIN PHARMACIST DOCUMENTED: ICD-10-PCS | Mod: HCNC,CPTII,S$GLB, | Performed by: INTERNAL MEDICINE

## 2022-12-22 PROCEDURE — 99999 PR PBB SHADOW E&M-EST. PATIENT-LVL IV: CPT | Mod: PBBFAC,HCNC,, | Performed by: INTERNAL MEDICINE

## 2022-12-22 PROCEDURE — 99499 UNLISTED E&M SERVICE: CPT | Mod: S$PBB,,, | Performed by: INTERNAL MEDICINE

## 2022-12-22 PROCEDURE — 99499 RISK ADDL DX/OHS AUDIT: ICD-10-PCS | Mod: S$PBB,,, | Performed by: INTERNAL MEDICINE

## 2022-12-22 PROCEDURE — 1159F MED LIST DOCD IN RCRD: CPT | Mod: HCNC,CPTII,S$GLB, | Performed by: INTERNAL MEDICINE

## 2022-12-22 PROCEDURE — 3008F BODY MASS INDEX DOCD: CPT | Mod: HCNC,CPTII,S$GLB, | Performed by: INTERNAL MEDICINE

## 2022-12-22 PROCEDURE — 3075F PR MOST RECENT SYSTOLIC BLOOD PRESS GE 130-139MM HG: ICD-10-PCS | Mod: HCNC,CPTII,S$GLB, | Performed by: INTERNAL MEDICINE

## 2022-12-22 PROCEDURE — 99999 PR PBB SHADOW E&M-EST. PATIENT-LVL IV: ICD-10-PCS | Mod: PBBFAC,HCNC,, | Performed by: INTERNAL MEDICINE

## 2022-12-22 PROCEDURE — 4010F ACE/ARB THERAPY RXD/TAKEN: CPT | Mod: HCNC,CPTII,S$GLB, | Performed by: INTERNAL MEDICINE

## 2022-12-22 PROCEDURE — 1160F RVW MEDS BY RX/DR IN RCRD: CPT | Mod: HCNC,CPTII,S$GLB, | Performed by: INTERNAL MEDICINE

## 2022-12-22 PROCEDURE — 4010F PR ACE/ARB THEARPY RXD/TAKEN: ICD-10-PCS | Mod: HCNC,CPTII,S$GLB, | Performed by: INTERNAL MEDICINE

## 2022-12-22 PROCEDURE — 99214 PR OFFICE/OUTPT VISIT, EST, LEVL IV, 30-39 MIN: ICD-10-PCS | Mod: HCNC,S$GLB,, | Performed by: INTERNAL MEDICINE

## 2022-12-22 PROCEDURE — 3075F SYST BP GE 130 - 139MM HG: CPT | Mod: HCNC,CPTII,S$GLB, | Performed by: INTERNAL MEDICINE

## 2022-12-22 PROCEDURE — 3008F PR BODY MASS INDEX (BMI) DOCUMENTED: ICD-10-PCS | Mod: HCNC,CPTII,S$GLB, | Performed by: INTERNAL MEDICINE

## 2022-12-22 PROCEDURE — 3079F DIAST BP 80-89 MM HG: CPT | Mod: HCNC,CPTII,S$GLB, | Performed by: INTERNAL MEDICINE

## 2022-12-22 PROCEDURE — 3079F PR MOST RECENT DIASTOLIC BLOOD PRESSURE 80-89 MM HG: ICD-10-PCS | Mod: HCNC,CPTII,S$GLB, | Performed by: INTERNAL MEDICINE

## 2022-12-22 PROCEDURE — 1159F PR MEDICATION LIST DOCUMENTED IN MEDICAL RECORD: ICD-10-PCS | Mod: HCNC,CPTII,S$GLB, | Performed by: INTERNAL MEDICINE

## 2022-12-22 RX ORDER — ATORVASTATIN CALCIUM 20 MG/1
20 TABLET, FILM COATED ORAL NIGHTLY
Qty: 90 TABLET | Refills: 3 | Status: SHIPPED | OUTPATIENT
Start: 2022-12-22 | End: 2023-07-21 | Stop reason: SDUPTHER

## 2022-12-22 NOTE — PROGRESS NOTES
CARDIOVASCULAR PROGRESS NOTE    REASON FOR CONSULT:   Nasra Marks is a 37 y.o. female who presents for follow up of MINDY, MDT ICD.    PCP: Redd  Gyn: Labadie, Juan  CHF: Victorina  HISTORY OF PRESENT ILLNESS:   COVID+ 21    The patient comes in today earlier than planned after recent ER visit with chest pain and headache.  CT angiography of the chest was negative.  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  The patient has no neurologic deficits.  She is had no further chest discomfort but does describe some headache as well as depression.  I have encouraged to follow-up with her primary care physician for management of this.  She otherwise denies palpitations, lightheadedness, dizziness, syncope, or defibrillator discharges.  There has been no PND, orthopnea, melena, hematuria, or claudicant symptoms.    CARDIOVASCULAR HISTORY:   NICM (cath 2017 with normal cors)  ICD (Donta 2017, MDT single chamber)    PAST MEDICAL HISTORY:     Past Medical History:   Diagnosis Date    CHF (congestive heart failure)     Chronic combined systolic and diastolic congestive heart failure 2019    Essential hypertension 2012    Hypertension     dx at 16y.o.    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 disease, with heart failure 2021    ICD (implantable cardioverter-defibrillator), single, in situ 2020    Nonischemic cardiomyopathy 2019    Pacemaker 2017       PAST SURGICAL HISTORY:     Past Surgical History:   Procedure Laterality Date    CARDIAC DEFIBRILLATOR PLACEMENT  2017     SECTION      x 1    INDUCED       RIGHT HEART CATHETERIZATION Right 2022    Procedure: INSERTION, CATHETER, RIGHT HEART;  Surgeon: Timothy Prajapati Jr., MD;  Location: [facility] CATH LAB;  Service: Cardiology;  Laterality: Right;    TUBAL LIGATION         ALLERGIES AND MEDICATION:     Review of patient's allergies indicates:   Allergen  Reactions    Aldactone [spironolactone] Swelling     Lips swelled        Medication List            Accurate as of December 22, 2022 11:35 AM. If you have any questions, ask your nurse or doctor.                CONTINUE taking these medications      aspirin 81 MG EC tablet  Commonly known as: ECOTRIN  Take 1 tablet (81 mg total) by mouth once daily.     atorvastatin 10 MG tablet  Commonly known as: LIPITOR  Take 1 tablet (10 mg total) by mouth once daily.     butalbital-acetaminophen-caffeine -40 mg -40 mg per tablet  Commonly known as: FIORICET, ESGIC  Take 1 tablet by mouth every 8 (eight) hours as needed for Headaches.     ENTRESTO  mg per tablet  Generic drug: sacubitriL-valsartan  Take 1 tablet by mouth 2 (two) times daily.     eplerenone 25 MG Tab  Commonly known as: INSPRA  Take 1 tablet (25 mg total) by mouth once daily.     FARXIGA 10 mg tablet  Generic drug: dapagliflozin  Take 1 tablet by mouth once daily     furosemide 40 MG tablet  Commonly known as: LASIX  Take 1 tablet (40 mg total) by mouth 2 (two) times a day.     metoprolol succinate 100 MG 24 hr tablet  Commonly known as: TOPROL-XL  Take 1 tablet (100 mg total) by mouth once daily.     sars-cov-2 (covid-19) 30 mcg/0.3 ml injection  Commonly known as: Pfizer COVID-19            STOP taking these medications      fluticasone propionate 50 mcg/actuation nasal spray  Commonly known as: FLONASE  Stopped by: Kashif Peguero MD     methocarbamoL 500 MG Tab  Commonly known as: ROBAXIN  Stopped by: Kashif Peguero MD              SOCIAL HISTORY:     Social History     Socioeconomic History    Marital status:    Occupational History     Employer: [employer]   Tobacco Use    Smoking status: Never    Smokeless tobacco: Never   Substance and Sexual Activity    Alcohol use: Yes     Comment: occasionally    Drug use: Yes     Types: Marijuana     Comment: in past very little marijuana    Sexual activity: Yes     Partners: Male      Birth control/protection: None       FAMILY HISTORY:     Family History   Problem Relation Age of Onset    Hypertension Mother     Cancer Maternal Grandmother         breast x 2    Cancer Paternal Grandmother         breast    No Known Problems Sister     No Known Problems Brother     No Known Problems Son     No Known Problems Brother     Hypertension Other     Diabetes Other     Breast cancer Other     Cancer Paternal Aunt         breast    Cancer Paternal Uncle        REVIEW OF SYSTEMS:   Review of Systems   Constitutional:  Negative for chills, diaphoresis, fever and malaise/fatigue.   HENT:  Negative for nosebleeds.    Eyes:  Negative for blurred vision, double vision and photophobia.   Respiratory:  Negative for hemoptysis, shortness of breath and wheezing.    Cardiovascular:  Negative for chest pain, palpitations, orthopnea, claudication, leg swelling and PND.   Gastrointestinal:  Negative for abdominal pain, blood in stool, heartburn, melena, nausea and vomiting.   Genitourinary:  Negative for flank pain and hematuria.   Musculoskeletal:  Negative for falls, myalgias and neck pain.   Skin:  Negative for rash.   Neurological:  Positive for headaches. Negative for dizziness, seizures, loss of consciousness and weakness.   Endo/Heme/Allergies:  Negative for polydipsia. Does not bruise/bleed easily.   Psychiatric/Behavioral:  Positive for depression. Negative for memory loss. The patient is nervous/anxious.      PHYSICAL EXAM:     Vitals:    12/22/22 1116   BP: 132/88   Pulse: 66   Resp: 18    Body mass index is 25.95 kg/m².  Weight: 82.1 kg (180 lb 14.2 oz)         Physical Exam  Vitals reviewed.   Constitutional:       General: She is not in acute distress.     Appearance: Normal appearance. She is well-developed. She is not ill-appearing, toxic-appearing or diaphoretic.   HENT:      Head: Normocephalic and atraumatic.   Eyes:      General: No scleral icterus.     Extraocular Movements: Extraocular movements  intact.      Conjunctiva/sclera: Conjunctivae normal.      Pupils: Pupils are equal, round, and reactive to light.   Neck:      Thyroid: No thyromegaly.      Vascular: Normal carotid pulses. No carotid bruit or JVD.      Trachea: Trachea normal. No tracheal deviation.   Cardiovascular:      Rate and Rhythm: Normal rate and regular rhythm.      Pulses:           Carotid pulses are 2+ on the right side and 2+ on the left side.     Heart sounds: S1 normal and S2 normal. No murmur heard.    No friction rub. No gallop.      Comments: L infraclavicular ICD site well healed  Pulmonary:      Effort: Pulmonary effort is normal. No respiratory distress.      Breath sounds: Normal breath sounds. No stridor. No wheezing, rhonchi or rales.   Chest:      Chest wall: No tenderness.   Abdominal:      General: There is no distension.      Palpations: Abdomen is soft.   Musculoskeletal:         General: No swelling or tenderness. Normal range of motion.      Cervical back: Normal range of motion and neck supple. No edema or rigidity.      Right lower leg: No edema.      Left lower leg: No edema.   Feet:      Right foot:      Skin integrity: No ulcer.      Left foot:      Skin integrity: No ulcer.   Skin:     General: Skin is warm and dry.      Coloration: Skin is not jaundiced.   Neurological:      General: No focal deficit present.      Mental Status: She is alert and oriented to person, place, and time.      Cranial Nerves: No cranial nerve deficit.   Psychiatric:         Mood and Affect: Mood normal.         Speech: Speech normal.         Behavior: Behavior normal. Behavior is cooperative.       DATA:   EKG: (personally reviewed tracing(s))  9/25/22 , inflat ST dep ?isch    Laboratory:  CBC:  Recent Labs   Lab 08/26/22  2043 09/25/22  2335 11/25/22  1408   WBC 8.17 10.65 7.18   Hemoglobin 12.4 12.9 13.4   Hematocrit 37.5 38.5 39.6   Platelets 322 419 377         CHEMISTRIES:  Recent Labs   Lab 10/01/21  2312 10/20/21  0830  01/14/22  0807 01/26/22  0934 02/25/22  1035 08/26/22 2043 09/19/22  1002 09/25/22  2335 11/25/22  1408   Glucose 119 H   < > 96 75 78   < > 83 144 H 88   Sodium 140   < > 140 140 139   < > 134 L 142 136   Potassium 3.5   < > 4.2 4.3 3.5   < > 3.7 2.8 L 3.6   BUN 16   < > 12 17 10   < > 13 12 12   Creatinine 0.8   < > 0.8 0.8 0.8   < > 0.7 1.0 0.8   eGFR if African American >60   < > >60 >60.0 >60.0  --   --   --   --    eGFR if non African American >60   < > >60 >60.0 >60.0  --   --   --   --    Calcium 9.5   < > 8.6 L 9.1 9.1   < > 9.4 9.7 9.0   Magnesium 2.1  --   --   --   --   --   --  2.0 1.9    < > = values in this interval not displayed.         CARDIAC BIOMARKERS:  Recent Labs   Lab 07/24/21  1802 07/24/21  2136 09/26/22  0121 11/25/22  1408 11/25/22  1700   CPK 77  --   --   --   --    Troponin I 0.035 H   < > <0.006 0.012 0.013    < > = values in this interval not displayed.         COAGS:  Recent Labs   Lab 08/02/20  1329   INR 1.0         LIPIDS/LFTS:  Recent Labs   Lab 08/16/21  1409 09/15/21  0834 08/26/22 2043 09/25/22  2335 11/25/22  1408   Cholesterol 164  --   --   --   --    Triglycerides 106  --   --   --   --    HDL 36 L  --   --   --   --    LDL Cholesterol 106.8  --   --   --   --    Non-HDL Cholesterol 128  --   --   --   --    AST  --    < > 18 15 15   ALT  --    < > 9 L 13 11    < > = values in this interval not displayed.       Lab Results   Component Value Date    TSH 1.386 12/28/2021         Cardiovascular Testing:  [facility] 1/27/22   Right atrial pressure is normal.  Pulmonary capillary wedge pressure is normal.  Pulmonary artery pressure is normal.  Pulmonary vascular resistance is normal.  Systemic vascular resistance is 1180.  Jeovany cardiac output and index is normal.  RA: 8/ 5/ 7 RV: 26/ 8 PA: 23/ 10/ 14 PWP: 15/ 12/ 12 .   Cardiac output was 5.22 by Jeovany. Cardiac index is 2.67 L/min/m2.   O2 Sat: PA 70%.   Pulmonary vascular resistance: 31. Systemic vascular resistance: 1180.   These  hemodynamic are acceptable for cardiac transplant listing.     Echo 10/6/21  The left ventricle is severely enlarged with eccentric hypertrophy and severely decreased systolic function.  The estimated ejection fraction is 15%.  Grade III left ventricular diastolic dysfunction.  Normal right ventricular size with normal right ventricular systolic function.  Moderate left atrial enlargement.  Mild mitral regurgitation.  Normal central venous pressure (3 mmHg).  The estimated PA systolic pressure is 15 mmHg.    Cath 4/18/17  B. Summary/Post-Operative Diagnosis    Normal coronary arteries.    Systolic dysfunction.    Mildly elevated left Filling Pressures.    Normal Pulmonary Hypertension.  D. Hemodynamic Results  Ejection Fraction: 20%  Global LV Function: severely depressed  PW:  (4)  PA: 24  CO_THERM: 4.4  RV: 25  RA:  (5)  LVEDP: 17   E. Angiographic Results       Patient has a right dominant coronary artery.      - Left Main Coronary Artery:             The LM is normal. There is DANNY 3 flow.     - Left Anterior Descending Artery:             The LAD is normal. There is DANNY 3 flow.     - Left Circumflex Artery:             The LCX is normal. There is DANNY 3 flow.     - Right Coronary Artery:             The RCA is normal. There is DANNY 3 flow.     - Left Renal Artery:             The ostial left renal is normal.     - Right Renal Artery:             The ostial right renal is normal.     - Common Femoral Artery:             The right CFA is normal.      ASSESSMENT:   # NICM, appears euvolemic.  Following with Dr. Prajapati.  # MDT ICD, functioning normally  # hx NSVT, last in 8/12/21  # HTN, controlled  # remote CVA (PICA territory on CT head 11/25/22)    PLAN:   Cont med rx  Cont ASA 81mg qd  Carotid US  Inc atorva 20mg qhs  RTC 1 month with MDT ICD check (Jan 2023)  Check lipids/LFT 6 months (June 2023)      Kashif Peguero MD, FACC

## 2023-01-17 ENCOUNTER — HOSPITAL ENCOUNTER (EMERGENCY)
Facility: HOSPITAL | Age: 38
Discharge: HOME | End: 2023-01-17
Attending: STUDENT IN AN ORGANIZED HEALTH CARE EDUCATION/TRAINING PROGRAM
Payer: MEDICARE

## 2023-01-17 VITALS
WEIGHT: 180 LBS | HEIGHT: 70 IN | OXYGEN SATURATION: 100 % | TEMPERATURE: 98 F | RESPIRATION RATE: 16 BRPM | DIASTOLIC BLOOD PRESSURE: 78 MMHG | BODY MASS INDEX: 25.77 KG/M2 | HEART RATE: 67 BPM | SYSTOLIC BLOOD PRESSURE: 124 MMHG

## 2023-01-17 DIAGNOSIS — R07.9 CHEST PAIN, UNSPECIFIED TYPE: Primary | ICD-10-CM

## 2023-01-17 DIAGNOSIS — R51.9 NONINTRACTABLE HEADACHE, UNSPECIFIED CHRONICITY PATTERN, UNSPECIFIED HEADACHE TYPE: ICD-10-CM

## 2023-01-17 DIAGNOSIS — Z86.73 HISTORY OF CVA (CEREBROVASCULAR ACCIDENT): ICD-10-CM

## 2023-01-17 LAB
ALBUMIN SERPL BCP-MCNC: 3.6 G/DL (ref 3.5–5.2)
ALP SERPL-CCNC: 63 U/L (ref 55–135)
ALT SERPL W/O P-5'-P-CCNC: 9 U/L (ref 10–44)
ANION GAP SERPL CALC-SCNC: 8 MMOL/L (ref 8–16)
AST SERPL-CCNC: 13 U/L (ref 10–40)
B-HCG UR QL: NEGATIVE
BACTERIA #/AREA URNS HPF: ABNORMAL /HPF
BASOPHILS # BLD AUTO: 0.05 K/UL (ref 0–0.2)
BASOPHILS NFR BLD: 0.6 % (ref 0–1.9)
BILIRUB SERPL-MCNC: 0.1 MG/DL (ref 0.1–1)
BILIRUB UR QL STRIP: NEGATIVE
BNP SERPL-MCNC: 75 PG/ML (ref 0–99)
BUN SERPL-MCNC: 13 MG/DL (ref 6–20)
CALCIUM SERPL-MCNC: 8.8 MG/DL (ref 8.7–10.5)
CHLORIDE SERPL-SCNC: 110 MMOL/L (ref 95–110)
CLARITY UR: CLEAR
CO2 SERPL-SCNC: 22 MMOL/L (ref 23–29)
COLOR UR: YELLOW
CREAT SERPL-MCNC: 0.8 MG/DL (ref 0.5–1.4)
CTP QC/QA: YES
DIFFERENTIAL METHOD: NORMAL
EOSINOPHIL # BLD AUTO: 0.2 K/UL (ref 0–0.5)
EOSINOPHIL NFR BLD: 1.8 % (ref 0–8)
ERYTHROCYTE [DISTWIDTH] IN BLOOD BY AUTOMATED COUNT: 12.5 % (ref 11.5–14.5)
EST. GFR  (NO RACE VARIABLE): >60 ML/MIN/1.73 M^2
GLUCOSE SERPL-MCNC: 110 MG/DL (ref 70–110)
GLUCOSE UR QL STRIP: ABNORMAL
HCT VFR BLD AUTO: 37.8 % (ref 37–48.5)
HGB BLD-MCNC: 12.7 G/DL (ref 12–16)
HGB UR QL STRIP: ABNORMAL
IMM GRANULOCYTES # BLD AUTO: 0.03 K/UL (ref 0–0.04)
IMM GRANULOCYTES NFR BLD AUTO: 0.3 % (ref 0–0.5)
KETONES UR QL STRIP: NEGATIVE
LEUKOCYTE ESTERASE UR QL STRIP: NEGATIVE
LYMPHOCYTES # BLD AUTO: 2 K/UL (ref 1–4.8)
LYMPHOCYTES NFR BLD: 22.2 % (ref 18–48)
MAGNESIUM SERPL-MCNC: 2.2 MG/DL (ref 1.6–2.6)
MCH RBC QN AUTO: 29.7 PG (ref 27–31)
MCHC RBC AUTO-ENTMCNC: 33.6 G/DL (ref 32–36)
MCV RBC AUTO: 88 FL (ref 82–98)
MICROSCOPIC COMMENT: ABNORMAL
MONOCYTES # BLD AUTO: 0.9 K/UL (ref 0.3–1)
MONOCYTES NFR BLD: 9.6 % (ref 4–15)
NEUTROPHILS # BLD AUTO: 5.8 K/UL (ref 1.8–7.7)
NEUTROPHILS NFR BLD: 65.5 % (ref 38–73)
NITRITE UR QL STRIP: NEGATIVE
NRBC BLD-RTO: 0 /100 WBC
PH UR STRIP: 6 [PH] (ref 5–8)
PLATELET # BLD AUTO: 342 K/UL (ref 150–450)
PMV BLD AUTO: 9.3 FL (ref 9.2–12.9)
POTASSIUM SERPL-SCNC: 3.5 MMOL/L (ref 3.5–5.1)
PROT SERPL-MCNC: 7.6 G/DL (ref 6–8.4)
PROT UR QL STRIP: NEGATIVE
RBC # BLD AUTO: 4.28 M/UL (ref 4–5.4)
RBC #/AREA URNS HPF: 35 /HPF (ref 0–4)
SODIUM SERPL-SCNC: 140 MMOL/L (ref 136–145)
SP GR UR STRIP: >1.03 (ref 1–1.03)
SQUAMOUS #/AREA URNS HPF: 2 /HPF
TROPONIN I SERPL DL<=0.01 NG/ML-MCNC: <0.006 NG/ML (ref 0–0.03)
URN SPEC COLLECT METH UR: ABNORMAL
UROBILINOGEN UR STRIP-ACNC: NEGATIVE EU/DL
WBC # BLD AUTO: 8.84 K/UL (ref 3.9–12.7)
WBC #/AREA URNS HPF: 3 /HPF (ref 0–5)
YEAST URNS QL MICRO: ABNORMAL

## 2023-01-17 PROCEDURE — 80053 COMPREHEN METABOLIC PANEL: CPT | Mod: HCNC | Performed by: PHYSICIAN ASSISTANT

## 2023-01-17 PROCEDURE — 93010 EKG 12-LEAD: ICD-10-PCS | Mod: HCNC,,, | Performed by: INTERNAL MEDICINE

## 2023-01-17 PROCEDURE — 83880 ASSAY OF NATRIURETIC PEPTIDE: CPT | Mod: HCNC | Performed by: PHYSICIAN ASSISTANT

## 2023-01-17 PROCEDURE — 96375 TX/PRO/DX INJ NEW DRUG ADDON: CPT | Mod: HCNC

## 2023-01-17 PROCEDURE — 25000003 PHARM REV CODE 250: Mod: HCNC | Performed by: PHYSICIAN ASSISTANT

## 2023-01-17 PROCEDURE — 96374 THER/PROPH/DIAG INJ IV PUSH: CPT | Mod: HCNC

## 2023-01-17 PROCEDURE — 83735 ASSAY OF MAGNESIUM: CPT | Mod: HCNC | Performed by: PHYSICIAN ASSISTANT

## 2023-01-17 PROCEDURE — 63600175 PHARM REV CODE 636 W HCPCS: Mod: HCNC | Performed by: PHYSICIAN ASSISTANT

## 2023-01-17 PROCEDURE — 81025 URINE PREGNANCY TEST: CPT | Mod: HCNC | Performed by: PHYSICIAN ASSISTANT

## 2023-01-17 PROCEDURE — 81000 URINALYSIS NONAUTO W/SCOPE: CPT | Mod: HCNC | Performed by: PHYSICIAN ASSISTANT

## 2023-01-17 PROCEDURE — 93005 ELECTROCARDIOGRAM TRACING: CPT | Mod: HCNC

## 2023-01-17 PROCEDURE — 85025 COMPLETE CBC W/AUTO DIFF WBC: CPT | Mod: HCNC | Performed by: PHYSICIAN ASSISTANT

## 2023-01-17 PROCEDURE — 99285 EMERGENCY DEPT VISIT HI MDM: CPT | Mod: 25,HCNC

## 2023-01-17 PROCEDURE — 93010 ELECTROCARDIOGRAM REPORT: CPT | Mod: HCNC,,, | Performed by: INTERNAL MEDICINE

## 2023-01-17 PROCEDURE — 84484 ASSAY OF TROPONIN QUANT: CPT | Mod: HCNC | Performed by: PHYSICIAN ASSISTANT

## 2023-01-17 RX ORDER — PROCHLORPERAZINE EDISYLATE 5 MG/ML
10 INJECTION INTRAMUSCULAR; INTRAVENOUS ONCE
Status: COMPLETED | OUTPATIENT
Start: 2023-01-17 | End: 2023-01-17

## 2023-01-17 RX ORDER — BUTALBITAL, ACETAMINOPHEN AND CAFFEINE 50; 325; 40 MG/1; MG/1; MG/1
1 TABLET ORAL EVERY 4 HOURS PRN
Qty: 12 TABLET | Refills: 0 | Status: SHIPPED | OUTPATIENT
Start: 2023-01-17 | End: 2023-02-16

## 2023-01-17 RX ORDER — DIPHENHYDRAMINE HYDROCHLORIDE 50 MG/ML
25 INJECTION INTRAMUSCULAR; INTRAVENOUS
Status: COMPLETED | OUTPATIENT
Start: 2023-01-17 | End: 2023-01-17

## 2023-01-17 RX ORDER — ACETAMINOPHEN 500 MG
1000 TABLET ORAL
Status: COMPLETED | OUTPATIENT
Start: 2023-01-17 | End: 2023-01-17

## 2023-01-17 RX ADMIN — DIPHENHYDRAMINE HYDROCHLORIDE 25 MG: 50 INJECTION, SOLUTION INTRAMUSCULAR; INTRAVENOUS at 05:01

## 2023-01-17 RX ADMIN — PROCHLORPERAZINE EDISYLATE 10 MG: 5 INJECTION INTRAMUSCULAR; INTRAVENOUS at 05:01

## 2023-01-17 RX ADMIN — ACETAMINOPHEN 1000 MG: 500 TABLET ORAL at 05:01

## 2023-01-17 NOTE — DISCHARGE INSTRUCTIONS
Follow-up with  for re-evaluation of chest pain.     Follow-up with urologist re-evaluation of headache.    Continue with your current medication regimen.  Fioricet as needed for headache.    Please return to this ED if you experience worsening chest pain or shortness of breath, worsening headache despite treatment, if you develop any new arm or leg weakness, if unable to walk due to unsteadiness or lightheadedness, if you begin with frequent vomiting, if you develop fever, if you develop leg swelling, if any other problems occur.    Thank you for coming to our Emergency Department today. It is important to remember that some problems or medical conditions are difficult to diagnose and may not be found or addressed during your Emergency Department visit.     Be sure to follow up with your primary care doctor and review all labs/imaging/tests that were performed during your ER visit with them. Some labs/imaging/tests may be outside of the normal range, and require non-emergent follow-up and/or further investigation/treatment/procedures/testing to help diagnose/exclude/prevent complications or other potentially serious medical conditions that were not discussed or addressed during your ER visit.    If you do not have a primary care doctor, you may contact the one listed on your discharge paperwork or you may also call the [facility] Appointment Desk at [phone] to schedule an appointment and establish care with one. It is important to your health that you have a primary care doctor.    Please take all medications as directed. All medications may potentially have side-effects and it is impossible to predict which medications may give you side-effects or what side-effects (if any) they will give you.. If you feel that you are having a negative effect or side-effect of any medication you should immediately stop taking them and seek medical attention. If you feel that you are having a life-threatening  reaction call 911.    Return to the ER with any questions/concerns, new/concerning symptoms, worsening or failure to improve.     Do not drive, swim, climb to height, take a bath, operate heavy machinery, drink alcohol or take potentially sedating medications, sign any legal documents or make any important decisions for 24 hours if you have received any pain medications, sedatives or mood altering drugs during your ER visit or within 24 hours of taking them if they have been prescribed to you.     You can find additional resources for Dentists, hearing aids, durable medical equipment, low cost pharmacies and other resources at https://[employer].org

## 2023-01-17 NOTE — ED NOTES
Pt reports left side cp described as pressure that is worse when lying down. Pain is intermittent and radiates to left shoulder and left arm. Onset 4 days ago. Also reports headache that started yesterday. Hx of CHF

## 2023-01-17 NOTE — ED PROVIDER NOTES
Encounter Date: 1/17/2023       History     Chief Complaint   Patient presents with    Chest Pain     Chest pain, described as pressure and sharp, and orthopnea  x2-3 days that has worsened. Hx CHF        38yo F with chief complaint frontal HA, SOB, CP.    Pt began with substernal CP 4d ago. Pain constant, worse with lying recumbent. Pain worse with movement. Admits to new orthopnea over the past few nights. Admits to paroxysmal nocturnal dyspnea tonight. States compliant with medications. No high salt diet. States typically presumed CHF symptoms are exacerbated by menses. LMP 1/15. No cough. No fever. No recent illness. CP sharp, nonradiating. No associated palpitations, diaphoresis, syncope or near syncope. No new leg swelling. No hx VTE. No exogenous estrogen. No issues with AICD.    Also with frontal HA since Sunday or Monday. HA constant, frontal, associated nausea. Admits to hx similar HAs. No meds taken to abort headache. Admits to hx CVA incidentally found on imaging. Admits to chronic RUE weakness. No new unilateral weakness, slurred speech, facial droop, unsteady gait, aphasia.     PMH:  NICM  AICD in place  Hx NSVT  HTN  Chronic tension headache   Remote CVA      RHC 1/27/22   Right atrial pressure is normal.  Pulmonary capillary wedge pressure is normal.  Pulmonary artery pressure is normal.  Pulmonary vascular resistance is normal.  Systemic vascular resistance is 1180.  Jeovany cardiac output and index is normal.  RA: 8/ 5/ 7 RV: 26/ 8 PA: 23/ 10/ 14 PWP: 15/ 12/ 12 .   Cardiac output was 5.22 by Jeovany. Cardiac index is 2.67 L/min/m2.   O2 Sat: PA 70%.   Pulmonary vascular resistance: 31. Systemic vascular resistance: 1180.   These hemodynamic are acceptable for cardiac transplant listing.      Echo 10/6/21  The left ventricle is severely enlarged with eccentric hypertrophy and severely decreased systolic function.  The estimated ejection fraction is 15%.  Grade III left ventricular diastolic  dysfunction.  Normal right ventricular size with normal right ventricular systolic function.  Moderate left atrial enlargement.  Mild mitral regurgitation.  Normal central venous pressure (3 mmHg).  The estimated PA systolic pressure is 15 mmHg.    Cath 17:  E. Angiographic Results       Patient has a right dominant coronary artery.      - Left Main Coronary Artery:             The LM is normal. There is DANNY 3 flow.     - Left Anterior Descending Artery:             The LAD is normal. There is DANNY 3 flow.     - Left Circumflex Artery:             The LCX is normal. There is DANNY 3 flow.     - Right Coronary Artery:             The RCA is normal. There is DANNY 3 flow.     - Left Renal Artery:             The ostial left renal is normal.     - Right Renal Artery:             The ostial right renal is normal.     - Common Femoral Artery:             The right CFA is normal.    Review of patient's allergies indicates:   Allergen Reactions    Aldactone [spironolactone] Swelling     Lips swelled     Past Medical History:   Diagnosis Date    CHF (congestive heart failure)     Chronic combined systolic and diastolic congestive heart failure 2019    Essential hypertension 2012    Hypertension     dx at 16y.o.    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 disease, with heart failure 2021    ICD (implantable cardioverter-defibrillator), single, in situ 2020    Nonischemic cardiomyopathy 2019    Pacemaker 2017     Past Surgical History:   Procedure Laterality Date    CARDIAC DEFIBRILLATOR PLACEMENT  2017     SECTION      x 1    INDUCED       RIGHT HEART CATHETERIZATION Right 2022    Procedure: INSERTION, CATHETER, RIGHT HEART;  Surgeon: Timothy Prajapati Jr., MD;  Location: [facility] CATH LAB;  Service: Cardiology;  Laterality: Right;    TUBAL LIGATION       Family History   Problem Relation Age of Onset    Hypertension Mother      Cancer Maternal Grandmother         breast x 2    Cancer Paternal Grandmother         breast    No Known Problems Sister     No Known Problems Brother     No Known Problems Son     No Known Problems Brother     Hypertension Other     Diabetes Other     Breast cancer Other     Cancer Paternal Aunt         breast    Cancer Paternal Uncle      Social History     Tobacco Use    Smoking status: Never    Smokeless tobacco: Never   Substance Use Topics    Alcohol use: Yes     Comment: occasionally    Drug use: Yes     Types: Marijuana     Comment: in past very little marijuana     Review of Systems   Constitutional:  Negative for chills, diaphoresis and fever.   Respiratory:  Positive for shortness of breath. Negative for cough.    Cardiovascular:  Positive for chest pain. Negative for palpitations and leg swelling.   Gastrointestinal:  Positive for nausea. Negative for abdominal pain and vomiting.   Musculoskeletal:  Negative for back pain.   Neurological:  Positive for headaches. Negative for syncope, facial asymmetry, speech difficulty, weakness and light-headedness.     Physical Exam     Initial Vitals [01/17/23 0405]   BP Pulse Resp Temp SpO2   137/80 75 18 98.2 °F (36.8 °C) 99 %      MAP       --         Physical Exam    Nursing note and vitals reviewed.  Constitutional: She appears well-developed and well-nourished. She is not diaphoretic. No distress.   HENT:   Head: Normocephalic and atraumatic.   Neck: Neck supple.   Normal range of motion.  Cardiovascular:            No pretibial edema.  No unilateral leg swelling or calf tenderness.  Normal sinus rhythm.   Pulmonary/Chest: Breath sounds normal. No respiratory distress.   L infraclavicular AICD in place   Musculoskeletal:      Cervical back: Normal range of motion and neck supple.     Neurological: She is alert and oriented to person, place, and time. GCS score is 15. GCS eye subscore is 4. GCS verbal subscore is 5. GCS motor subscore is 6.   3/5 RUE  strength  5/5 LUE strength   Skin: Skin is warm. Capillary refill takes less than 2 seconds.   Psychiatric: She has a normal mood and affect. Thought content normal.       ED Course   Procedures  Labs Reviewed   COMPREHENSIVE METABOLIC PANEL - Abnormal; Notable for the following components:       Result Value    CO2 22 (*)     ALT 9 (*)     All other components within normal limits   URINALYSIS, REFLEX TO URINE CULTURE - Abnormal; Notable for the following components:    Specific Gravity, UA >1.030 (*)     Glucose, UA 4+ (*)     Occult Blood UA 3+ (*)     All other components within normal limits    Narrative:     Specimen Source->Urine   URINALYSIS MICROSCOPIC - Abnormal; Notable for the following components:    RBC, UA 35 (*)     All other components within normal limits    Narrative:     Specimen Source->Urine   CBC W/ AUTO DIFFERENTIAL   TROPONIN I   B-TYPE NATRIURETIC PEPTIDE   MAGNESIUM   POCT URINE PREGNANCY     EKG Readings: (Independently Interpreted)   Normal sinus rhythm, ventricular rate 68 beats per minute.  Normal GA, normal QT.  No right axis deviation.  No ST elevation.  No gross change from previous dated 11/25/2022.     Imaging Results              X-Ray Chest AP Portable (In process)                      CT Head Without Contrast (Final result)  Result time 01/17/23 06:05:02      Final result by Deshawn Salas MD (01/17/23 06:05:02)                   Impression:      No CT evidence of acute intracranial abnormality.    Remote infarct in the right cerebellum.      Electronically signed by: Deshawn Salas MD  Date:    01/17/2023  Time:    06:05               Narrative:    EXAMINATION:  CT HEAD WITHOUT CONTRAST    CLINICAL HISTORY:  Headache, chronic, new features or increased frequency;    TECHNIQUE:  Low dose axial images were obtained through the head.  Coronal and sagittal reformations were also performed. Contrast was not administered.    COMPARISON:  11/25/2022.    FINDINGS:  Grossly stable  moderate-sized region of encephalomalacia in the inferior aspect of the right cerebellum.    No evidence of acute territorial infarct, hemorrhage, mass effect, or midline shift.    Ventricles are normal in size and configuration.    No displaced calvarial fracture.    Minimal mucosal thickening in the ethmoid air cells.  Otherwise, the visualized paranasal sinuses and mastoid air cells are essentially clear.                                       Medications   acetaminophen tablet 1,000 mg (1,000 mg Oral Given 1/17/23 9154)   prochlorperazine injection Soln 10 mg (10 mg Intravenous Given 1/17/23 6250)   diphenhydrAMINE injection 25 mg (25 mg Intravenous Given 1/17/23 2585)     Medical Decision Making:   Clinical Tests:   Lab Tests: Ordered  Radiological Study: Ordered  Medical Tests: Ordered and Reviewed  ED Management:  Patient states right upper extremity strength discrepancy is chronic.  No acute weakness.  No other worrisome neurologic complaints.  States has had similar headaches in the past.    Chest pain has been constant, chest pain worse with movement.  She does admit to orthopnea and paroxysmal nocturnal dyspnea.  No evidence of volume overload on exam or imaging.  Labs are reassuring.  Do not think this represents acute CHF exacerbation.    On re-evaluation headache has improved. States Fioricet typically does help with headache.  I will refill this medication.    Overall, I have low suspicion for emergent process although we have discussed red flags and reasons for return.  I have asked her to contact  for follow-up and re-evaluation of chest pain.  She will return if worsening orthopnea, worsening paroxysmal nocturnal dyspnea, leg swelling, shortness of breath, dyspnea on exertion, worsening headache, new weakness, if any other problems occur.      07:21 This is Colton Swartz NP dictating. I assumed care of this patient from KATARINA Uribe at the end of his shift pending chest x-ray  results.  Chest x-ray without emergent acute findings.  Will discharge with Cardiology follow-up as outlined above.  ED return precautions given.  Patient expressed understanding.           ED Course as of 01/17/23 0619   Tue Jan 17, 2023   0511 RBC, UA(!): 35  Suspect vaginal bleeding contaminant. [SM]      ED Course User Index  [SM] Miguelito Castañeda PA-C                 Clinical Impression:   Final diagnoses:  [R06.02] Shortness of breath               Colton Swartz NP  01/17/23 0759

## 2023-01-19 ENCOUNTER — OFFICE VISIT (OUTPATIENT)
Dept: NEUROLOGY | Facility: CLINIC | Age: 38
End: 2023-01-19
Payer: MEDICARE

## 2023-01-19 ENCOUNTER — PATIENT MESSAGE (OUTPATIENT)
Dept: NEUROLOGY | Facility: CLINIC | Age: 38
End: 2023-01-19

## 2023-01-19 ENCOUNTER — TELEPHONE (OUTPATIENT)
Dept: FAMILY MEDICINE | Facility: CLINIC | Age: 38
End: 2023-01-19
Payer: MEDICARE

## 2023-01-19 VITALS
HEIGHT: 70 IN | SYSTOLIC BLOOD PRESSURE: 144 MMHG | HEART RATE: 73 BPM | BODY MASS INDEX: 26.29 KG/M2 | DIASTOLIC BLOOD PRESSURE: 91 MMHG | WEIGHT: 183.63 LBS

## 2023-01-19 DIAGNOSIS — Z86.73 HISTORY OF CVA (CEREBROVASCULAR ACCIDENT): Primary | ICD-10-CM

## 2023-01-19 DIAGNOSIS — I63.531 CEREBRAL INFARCTION DUE TO UNSPECIFIED OCCLUSION OR STENOSIS OF RIGHT POSTERIOR CEREBRAL ARTERY: ICD-10-CM

## 2023-01-19 DIAGNOSIS — R51.9 NONINTRACTABLE HEADACHE, UNSPECIFIED CHRONICITY PATTERN, UNSPECIFIED HEADACHE TYPE: ICD-10-CM

## 2023-01-19 DIAGNOSIS — Z86.73 HX OF ISCHEMIC RIGHT PCA STROKE: Primary | ICD-10-CM

## 2023-01-19 PROCEDURE — 99214 PR OFFICE/OUTPT VISIT, EST, LEVL IV, 30-39 MIN: ICD-10-PCS | Mod: HCNC,S$GLB,, | Performed by: STUDENT IN AN ORGANIZED HEALTH CARE EDUCATION/TRAINING PROGRAM

## 2023-01-19 PROCEDURE — 99999 PR PBB SHADOW E&M-EST. PATIENT-LVL III: ICD-10-PCS | Mod: PBBFAC,HCNC,, | Performed by: STUDENT IN AN ORGANIZED HEALTH CARE EDUCATION/TRAINING PROGRAM

## 2023-01-19 PROCEDURE — 3077F PR MOST RECENT SYSTOLIC BLOOD PRESSURE >= 140 MM HG: ICD-10-PCS | Mod: HCNC,CPTII,S$GLB, | Performed by: STUDENT IN AN ORGANIZED HEALTH CARE EDUCATION/TRAINING PROGRAM

## 2023-01-19 PROCEDURE — 3080F PR MOST RECENT DIASTOLIC BLOOD PRESSURE >= 90 MM HG: ICD-10-PCS | Mod: HCNC,CPTII,S$GLB, | Performed by: STUDENT IN AN ORGANIZED HEALTH CARE EDUCATION/TRAINING PROGRAM

## 2023-01-19 PROCEDURE — 4010F PR ACE/ARB THEARPY RXD/TAKEN: ICD-10-PCS | Mod: HCNC,CPTII,S$GLB, | Performed by: STUDENT IN AN ORGANIZED HEALTH CARE EDUCATION/TRAINING PROGRAM

## 2023-01-19 PROCEDURE — 4010F ACE/ARB THERAPY RXD/TAKEN: CPT | Mod: HCNC,CPTII,S$GLB, | Performed by: STUDENT IN AN ORGANIZED HEALTH CARE EDUCATION/TRAINING PROGRAM

## 2023-01-19 PROCEDURE — 3080F DIAST BP >= 90 MM HG: CPT | Mod: HCNC,CPTII,S$GLB, | Performed by: STUDENT IN AN ORGANIZED HEALTH CARE EDUCATION/TRAINING PROGRAM

## 2023-01-19 PROCEDURE — 99214 OFFICE O/P EST MOD 30 MIN: CPT | Mod: HCNC,S$GLB,, | Performed by: STUDENT IN AN ORGANIZED HEALTH CARE EDUCATION/TRAINING PROGRAM

## 2023-01-19 PROCEDURE — 3008F BODY MASS INDEX DOCD: CPT | Mod: HCNC,CPTII,S$GLB, | Performed by: STUDENT IN AN ORGANIZED HEALTH CARE EDUCATION/TRAINING PROGRAM

## 2023-01-19 PROCEDURE — 3077F SYST BP >= 140 MM HG: CPT | Mod: HCNC,CPTII,S$GLB, | Performed by: STUDENT IN AN ORGANIZED HEALTH CARE EDUCATION/TRAINING PROGRAM

## 2023-01-19 PROCEDURE — 99999 PR PBB SHADOW E&M-EST. PATIENT-LVL III: CPT | Mod: PBBFAC,HCNC,, | Performed by: STUDENT IN AN ORGANIZED HEALTH CARE EDUCATION/TRAINING PROGRAM

## 2023-01-19 PROCEDURE — 3008F PR BODY MASS INDEX (BMI) DOCUMENTED: ICD-10-PCS | Mod: HCNC,CPTII,S$GLB, | Performed by: STUDENT IN AN ORGANIZED HEALTH CARE EDUCATION/TRAINING PROGRAM

## 2023-01-19 RX ORDER — PREDNISONE 50 MG/1
TABLET ORAL
Qty: 3 TABLET | Refills: 0 | Status: SHIPPED | OUTPATIENT
Start: 2023-01-19 | End: 2023-07-21

## 2023-01-19 RX ORDER — DIPHENHYDRAMINE HYDROCHLORIDE 50 MG/ML
25 INJECTION INTRAMUSCULAR; INTRAVENOUS ONCE
Qty: 0.5 ML | Refills: 0 | Status: SHIPPED | OUTPATIENT
Start: 2023-01-19 | End: 2023-01-19

## 2023-01-19 RX ORDER — DIPHENHYDRAMINE HCL 50 MG
50 CAPSULE ORAL ONCE
Qty: 1 CAPSULE | Refills: 0 | Status: SHIPPED | OUTPATIENT
Start: 2023-01-19 | End: 2023-01-20

## 2023-01-19 NOTE — TELEPHONE ENCOUNTER
----- Message from Nicki Lyons sent at 1/19/2023 11:39 AM CST -----  Regarding: CAll BAck  Name of Who is Calling: Kaye With [facility]              What is the request in detail: Kaye requesting the patient insurance no. Please assist              Can the clinic reply by [facility]: No              What Number to Call Back if not in [facility]: Kaye ph. [phone]

## 2023-01-20 ENCOUNTER — HOSPITAL ENCOUNTER (OUTPATIENT)
Dept: CARDIOLOGY | Facility: HOSPITAL | Age: 38
Discharge: HOME | End: 2023-01-20
Attending: INTERNAL MEDICINE
Payer: MEDICARE

## 2023-01-20 ENCOUNTER — OFFICE VISIT (OUTPATIENT)
Dept: CARDIOLOGY | Facility: CLINIC | Age: 38
End: 2023-01-20
Payer: MEDICARE

## 2023-01-20 ENCOUNTER — OFFICE VISIT (OUTPATIENT)
Dept: TRANSPLANT | Facility: CLINIC | Age: 38
End: 2023-01-20
Attending: INTERNAL MEDICINE
Payer: MEDICARE

## 2023-01-20 VITALS
HEART RATE: 64 BPM | DIASTOLIC BLOOD PRESSURE: 80 MMHG | OXYGEN SATURATION: 98 % | BODY MASS INDEX: 26.43 KG/M2 | WEIGHT: 184.63 LBS | HEIGHT: 70 IN | RESPIRATION RATE: 18 BRPM | SYSTOLIC BLOOD PRESSURE: 136 MMHG

## 2023-01-20 VITALS
BODY MASS INDEX: 26.33 KG/M2 | DIASTOLIC BLOOD PRESSURE: 84 MMHG | HEIGHT: 70 IN | WEIGHT: 183.88 LBS | SYSTOLIC BLOOD PRESSURE: 130 MMHG | HEART RATE: 61 BPM

## 2023-01-20 DIAGNOSIS — Z86.16 HISTORY OF COVID-19: ICD-10-CM

## 2023-01-20 DIAGNOSIS — I42.8 NONISCHEMIC CARDIOMYOPATHY: Chronic | ICD-10-CM

## 2023-01-20 DIAGNOSIS — R07.89 NON-CARDIAC CHEST PAIN: ICD-10-CM

## 2023-01-20 DIAGNOSIS — I42.8 NONISCHEMIC CARDIOMYOPATHY: ICD-10-CM

## 2023-01-20 DIAGNOSIS — I50.42 CHRONIC COMBINED SYSTOLIC AND DIASTOLIC CONGESTIVE HEART FAILURE: Primary | ICD-10-CM

## 2023-01-20 DIAGNOSIS — I13.0 HYPERTENSIVE CARDIOVASCULAR-RENAL DISEASE, STAGE 1-4 OR UNSPECIFIED CHRONIC KIDNEY DISEASE, WITH HEART FAILURE: ICD-10-CM

## 2023-01-20 DIAGNOSIS — Z86.73 HISTORY OF CVA (CEREBROVASCULAR ACCIDENT): ICD-10-CM

## 2023-01-20 DIAGNOSIS — Z95.810 ICD (IMPLANTABLE CARDIOVERTER-DEFIBRILLATOR), SINGLE, IN SITU: ICD-10-CM

## 2023-01-20 DIAGNOSIS — I10 ESSENTIAL HYPERTENSION: ICD-10-CM

## 2023-01-20 DIAGNOSIS — I10 ESSENTIAL HYPERTENSION: Chronic | ICD-10-CM

## 2023-01-20 DIAGNOSIS — E78.5 DYSLIPIDEMIA: ICD-10-CM

## 2023-01-20 LAB
LEFT CBA DIAS: 18 CM/S
LEFT CBA SYS: 56 CM/S
LEFT CCA DIST DIAS: 16 CM/S
LEFT CCA DIST SYS: 59 CM/S
LEFT CCA MID DIAS: 20 CM/S
LEFT CCA MID SYS: 79 CM/S
LEFT CCA PROX DIAS: 16 CM/S
LEFT CCA PROX SYS: 78 CM/S
LEFT ECA DIAS: 19 CM/S
LEFT ECA SYS: 123 CM/S
LEFT ICA DIST DIAS: 39 CM/S
LEFT ICA DIST SYS: 81 CM/S
LEFT ICA MID DIAS: 30 CM/S
LEFT ICA MID SYS: 68 CM/S
LEFT ICA PROX DIAS: 22 CM/S
LEFT ICA PROX SYS: 64 CM/S
LEFT VERTEBRAL DIAS: 13 CM/S
LEFT VERTEBRAL SYS: 49 CM/S
OHS CV CAROTID RIGHT ICA EDV HIGHEST: 29
OHS CV CAROTID ULTRASOUND LEFT ICA/CCA RATIO: 1.37
OHS CV CAROTID ULTRASOUND RIGHT ICA/CCA RATIO: 1.16
OHS CV PV CAROTID LEFT HIGHEST CCA: 79
OHS CV PV CAROTID LEFT HIGHEST ICA: 81
OHS CV PV CAROTID RIGHT HIGHEST CCA: 76
OHS CV PV CAROTID RIGHT HIGHEST ICA: 71
OHS CV US CAROTID LEFT HIGHEST EDV: 39
RIGHT CBA DIAS: 18 CM/S
RIGHT CBA SYS: 64 CM/S
RIGHT CCA DIST DIAS: 18 CM/S
RIGHT CCA DIST SYS: 61 CM/S
RIGHT CCA MID DIAS: 19 CM/S
RIGHT CCA MID SYS: 76 CM/S
RIGHT CCA PROX DIAS: 11 CM/S
RIGHT CCA PROX SYS: 70 CM/S
RIGHT ECA DIAS: 18 CM/S
RIGHT ECA SYS: 81 CM/S
RIGHT ICA DIST DIAS: 29 CM/S
RIGHT ICA DIST SYS: 68 CM/S
RIGHT ICA MID DIAS: 29 CM/S
RIGHT ICA MID SYS: 71 CM/S
RIGHT ICA PROX DIAS: 20 CM/S
RIGHT ICA PROX SYS: 63 CM/S
RIGHT VERTEBRAL DIAS: 18 CM/S
RIGHT VERTEBRAL SYS: 45 CM/S

## 2023-01-20 PROCEDURE — 99999 PR PBB SHADOW E&M-EST. PATIENT-LVL IV: ICD-10-PCS | Mod: PBBFAC,HCNC,, | Performed by: INTERNAL MEDICINE

## 2023-01-20 PROCEDURE — 3008F PR BODY MASS INDEX (BMI) DOCUMENTED: ICD-10-PCS | Mod: HCNC,CPTII,S$GLB, | Performed by: INTERNAL MEDICINE

## 2023-01-20 PROCEDURE — 99214 OFFICE O/P EST MOD 30 MIN: CPT | Mod: HCNC,S$GLB,, | Performed by: INTERNAL MEDICINE

## 2023-01-20 PROCEDURE — 3079F DIAST BP 80-89 MM HG: CPT | Mod: HCNC,CPTII,S$GLB, | Performed by: INTERNAL MEDICINE

## 2023-01-20 PROCEDURE — 1159F PR MEDICATION LIST DOCUMENTED IN MEDICAL RECORD: ICD-10-PCS | Mod: HCNC,CPTII,S$GLB, | Performed by: INTERNAL MEDICINE

## 2023-01-20 PROCEDURE — 99214 OFFICE O/P EST MOD 30 MIN: CPT | Mod: HCNC,25,S$GLB, | Performed by: INTERNAL MEDICINE

## 2023-01-20 PROCEDURE — 3075F SYST BP GE 130 - 139MM HG: CPT | Mod: HCNC,CPTII,S$GLB, | Performed by: INTERNAL MEDICINE

## 2023-01-20 PROCEDURE — 3075F PR MOST RECENT SYSTOLIC BLOOD PRESS GE 130-139MM HG: ICD-10-PCS | Mod: HCNC,CPTII,S$GLB, | Performed by: INTERNAL MEDICINE

## 2023-01-20 PROCEDURE — 99499 UNLISTED E&M SERVICE: CPT | Mod: S$GLB,,, | Performed by: INTERNAL MEDICINE

## 2023-01-20 PROCEDURE — 1160F PR REVIEW ALL MEDS BY PRESCRIBER/CLIN PHARMACIST DOCUMENTED: ICD-10-PCS | Mod: HCNC,CPTII,S$GLB, | Performed by: INTERNAL MEDICINE

## 2023-01-20 PROCEDURE — 99999 PR PBB SHADOW E&M-EST. PATIENT-LVL IV: CPT | Mod: PBBFAC,HCNC,, | Performed by: INTERNAL MEDICINE

## 2023-01-20 PROCEDURE — 99214 PR OFFICE/OUTPT VISIT, EST, LEVL IV, 30-39 MIN: ICD-10-PCS | Mod: HCNC,25,S$GLB, | Performed by: INTERNAL MEDICINE

## 2023-01-20 PROCEDURE — 3079F PR MOST RECENT DIASTOLIC BLOOD PRESSURE 80-89 MM HG: ICD-10-PCS | Mod: HCNC,CPTII,S$GLB, | Performed by: INTERNAL MEDICINE

## 2023-01-20 PROCEDURE — 1159F MED LIST DOCD IN RCRD: CPT | Mod: HCNC,CPTII,S$GLB, | Performed by: INTERNAL MEDICINE

## 2023-01-20 PROCEDURE — 93282 PRGRMG EVAL IMPLANTABLE DFB: CPT | Mod: 26,HCNC,, | Performed by: INTERNAL MEDICINE

## 2023-01-20 PROCEDURE — 99499 RISK ADDL DX/OHS AUDIT: ICD-10-PCS | Mod: S$GLB,,, | Performed by: INTERNAL MEDICINE

## 2023-01-20 PROCEDURE — 1160F RVW MEDS BY RX/DR IN RCRD: CPT | Mod: HCNC,CPTII,S$GLB, | Performed by: INTERNAL MEDICINE

## 2023-01-20 PROCEDURE — 93880 EXTRACRANIAL BILAT STUDY: CPT | Mod: HCNC

## 2023-01-20 PROCEDURE — 99214 PR OFFICE/OUTPT VISIT, EST, LEVL IV, 30-39 MIN: ICD-10-PCS | Mod: HCNC,S$GLB,, | Performed by: INTERNAL MEDICINE

## 2023-01-20 PROCEDURE — 93880 CV US DOPPLER CAROTID (CUPID ONLY): ICD-10-PCS | Mod: 26,HCNC,, | Performed by: INTERNAL MEDICINE

## 2023-01-20 PROCEDURE — 93880 EXTRACRANIAL BILAT STUDY: CPT | Mod: 26,HCNC,, | Performed by: INTERNAL MEDICINE

## 2023-01-20 PROCEDURE — 93282 PR PROGRAM EVAL (IN PERSON) IMPLANT DEVICE,CARDVERT/DEFIB,1 LEAD: ICD-10-PCS | Mod: 26,HCNC,, | Performed by: INTERNAL MEDICINE

## 2023-01-20 PROCEDURE — 3008F BODY MASS INDEX DOCD: CPT | Mod: HCNC,CPTII,S$GLB, | Performed by: INTERNAL MEDICINE

## 2023-01-20 NOTE — PROGRESS NOTES
Subjective:     Patient ID:  Nasra Marks is a 37 y.o. female who presents for follow-up of Congestive Heart Failure    HPI:  38 yo BF diagnosis CHF due to non-ischemic cardiomyopathy in 2017 with angiogram at that time without evidence of coronary artery disease.  She developed hypertension at the age of 16 but did not take her medications regularly.   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 She was referred by Dr. Kashif Peguero.      22: Stopped Carvedilol and started Metoprolol succinate 100mg daily for fatigue     At visit 22  she had made good progress with much improvement in functional capacity and shortness of breath but still with fatigue.  At that visit plan was:  To see if I can help fatigue change the beta blocker metoprolol to bisoprolol 10 mg once a day    Reduce fluid intake to 1.5 to 2 quarts a day    Start taking half a tablet of hydralazine (50 mg tab) and half a tablet of isosorbide dinitrate (20 mg tab) both are taken 3 times a day and in 2 weeks change to full tablet of each    Call in 6-8 weeks as we can increase this medication combination further if needed for symptoms    Today's visit 23 I learned that she went to ED last week with atypical CP and negative evaluation.  No CAD at cath .  She sees Dr. Peguero.  Her description is sharp/ache/tight worse at night or positional and notes recurs around cycle.  She does not wear sport bras but bras are old.    Reports 2022 had CT head dx with PICA infarct.  Had HA, nausea when went to ED.  Dr. Peguero ordering tests.    No tightness, pressure or heaviness in chest, neck, arms, throat, jaw or back with or without exertion.   Mild ELENA but no orthopnea, PND, palpitations, pre-syncope or syncope.    ICD followed by Dr. Peguero--no shocks    Review of Systems   Constitutional: Positive for malaise/fatigue and weight gain (up 5# on my scale).  "Negative for chills, fever, night sweats and weight loss.   Cardiovascular:  Positive for dyspnea on exertion (much improved). Negative for chest pain, irregular heartbeat, leg swelling, near-syncope, orthopnea, palpitations, paroxysmal nocturnal dyspnea and syncope.   Respiratory:  Negative for cough, sputum production and wheezing.    Hematologic/Lymphatic: Does not bruise/bleed easily.   Musculoskeletal:  Positive for back pain (with sciatica). Negative for arthritis, joint pain and stiffness.   Gastrointestinal:  Negative for hematochezia and melena.   Genitourinary:  Negative for hematuria.   Neurological:  Negative for brief paralysis, focal weakness, seizures and weakness.      Objective:   Physical Exam  Constitutional:       General: She is not in acute distress.     Appearance: She is well-developed. She is not ill-appearing, toxic-appearing or diaphoretic.      Comments: BP (!) 140/94 (BP Location: Left arm, Patient Position: Sitting, BP Method: Medium (Automatic))   Pulse 61   Ht 5' 10" (1.778 m)   Wt 83.4 kg (183 lb 13.8 oz)   BMI 26.38 kg/m² later by me manual cuff /84 sitting left arm  Last 2 visits wt 178 and 179#  Friendly black female in no acute distress       HENT:      Head: Normocephalic and atraumatic.   Eyes:      General: No scleral icterus.        Right eye: No discharge.         Left eye: No discharge.      Conjunctiva/sclera: Conjunctivae normal.   Neck:      Thyroid: No thyromegaly.      Vascular: No JVD.      Trachea: No tracheal deviation.      Comments: Normal JVD  Cardiovascular:      Rate and Rhythm: Normal rate and regular rhythm.      Heart sounds: S1 normal and S2 normal. No murmur heard.    No S4 sounds.   Pulmonary:      Effort: Pulmonary effort is normal.      Breath sounds: Normal breath sounds.   Abdominal:      General: Bowel sounds are normal. There is no distension (Liver span normal 10 cm).      Palpations: Abdomen is soft. There is no mass.      Tenderness: " There is no abdominal tenderness. There is no guarding or rebound.   Musculoskeletal:         General: No swelling or tenderness.      Right lower leg: No edema.      Left lower leg: No edema.   Skin:     General: Skin is warm and dry.   Neurological:      General: No focal deficit present.      Mental Status: She is alert and oriented to person, place, and time. Mental status is at baseline.   Psychiatric:         Mood and Affect: Mood normal.         Behavior: Behavior normal.         Thought Content: Thought content normal.         Judgment: Judgment normal.      Lab Results   Component Value Date     01/17/2023     11/25/2022    K 3.5 01/17/2023    K 3.6 11/25/2022     01/17/2023    GLU 88 11/25/2022    BUN 13 01/17/2023    BUN 12 11/25/2022    CREATININE 0.8 01/17/2023    CREATININE 0.8 11/25/2022     Lab Results   Component Value Date    BNP 75 01/17/2023     (H) 11/25/2022     (H) 09/25/2022 1/17/2023 CXR read as mild cardiomegaly and possible mild perihilar interstitial edema.  I reviewed images and lungs are clear; also BNP only 75 down from 600's in March 2022 1/31/2022 CPX  Metabolic Findings Resting spirometry reveals an FVC = 2.86L which is 70.1% of predicted, an FEV1 of 2.30L, which is 68.27% of predicted and an FEV1/FVC ratio of 80.42%. The MVV = 92 L/min, which is 78.32% of predicted.     The respiratory exchange ratio (RER) was 1.32, suggesting an excellent effort.     The breathing reserve is calculated at 5.54%, which is reduced. Oxygen saturation with exercise remained normal.     The peak VO2 was 20.5 ml/kg/min which is 58.24% of predicted equating to a functional capacity of 5.86 METS indicating moderate functional impairment.     The anaerobic threshold (AT), which occurred at a heart rate of 109bpm, was 14.1 ml/kg/min, which is 40.06% of the predicted VO2 and is borderline reduced.     The peak VO2 Lean was 26.25 ml/kg of lean body weight/min indicating  a good prognosis in heart failure.     The VE/VCO2 Haines was 30.0. The Resting PetCO2 was 29.0.     Moderate functional impairment associated with a reduced breathing reserve, normal oxygen stauration, an excellent effort, and a borderline reduced AT. These findings are indicative of functional impairment secondary to circulatory insufficiency, ventilatory impairment.     12/28/2021 6 minute walk test 459.4 m without desaturation    10/06/2021 echocardiogram  The left ventricle is severely enlarged with eccentric hypertrophy and severely decreased systolic function.  The estimated ejection fraction is 15%.  Grade III left ventricular diastolic dysfunction.  Normal right ventricular size with normal right ventricular systolic function.  Moderate left atrial enlargement.  Mild mitral regurgitation.  Normal central venous pressure (3 mmHg).  The estimated PA systolic pressure is 15 mmHg.      12/31/2021 EKG normal sinus rhythm nonspecific ST-T abnormality    Cath 4/18/17:  Angiographic Results       Patient has a right dominant coronary artery.      - Left Main Coronary Artery:             The LM is normal. There is DANNY 3 flow.     - Left Anterior Descending Artery:             The LAD is normal. There is DANNY 3 flow.     - Left Circumflex Artery:             The LCX is normal. There is DANNY 3 flow.     - Right Coronary Artery:             The RCA is normal. There is DANNY 3 flow.     - Left Renal Artery:             The ostial left renal is normal.     - Right Renal Artery:             The ostial right renal is normal.     - Common Femoral Artery:             The right CFA is normal.  Assessment:     1. Chronic combined systolic and diastolic congestive heart failure    2. 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 disease, with heart failure    3. Essential hypertension    4. Nonischemic cardiomyopathy    5. ICD (implantable cardioverter-defibrillator), single, in situ    6.  Non-cardiac chest pain      Plan:   For chest pain recommend bra fitting and new bras as she shows me discomfort location is in upper breasts bilaterally  BP target < 130/80--she is intolerant to hydralazine and isosorbide so if more treatment needed would add amlodipine.  She needs f/u echo  RTC 6 month with BMP and BNP    Addendum 1/22/23 5:40 PM  Lab Results   Component Value Date     01/20/2023    K 3.1 (L) 01/20/2023     01/20/2023    CO2 25 01/20/2023    BUN 13 01/20/2023    CREATININE 0.8 01/20/2023    CALCIUM 9.1 01/20/2023    ANIONGAP 8 01/20/2023    ESTGFRAFRICA >60.0 02/25/2022    EGFRNONAA >60.0 02/25/2022       (H) 01/20/2023     I found these labs when I was closing Friday's note tonight.  She is not on potassium.  Spoke with her this afternoon and sent script to [facility].  She is to take 4 of these first day (take 2 and then 1 every 2 hrs x2) and is not to take furosemide until she gets the potassium replaced with this 80 meq total dose.  She has been taking extra furosemide as needed so will take KCl 20 meq with each furosemide 40 mg dose.    Obtain BMP in 2 weeks

## 2023-01-20 NOTE — PROGRESS NOTES
CARDIOVASCULAR PROGRESS NOTE    REASON FOR CONSULT:   Nasra Marks is a 37 y.o. female who presents for follow up of MINDY, MDT ICD.    PCP: Redd  Gyn: Labadie, Juan  CHF: Victorina  HISTORY OF PRESENT ILLNESS:   COVID+ 21    The patient comes in today for follow-up.  She reports generally stable status without angina, dyspnea, palpitations, syncope, or defibrillator discharges.  There has been no PND, orthopnea, melena, hematuria, or claudicant symptoms.  She continues to follow with [facility] heart failure service.    The Medtronic defibrillator was interrogated in the office today.  This is a single-chamber device and she is pacing less than 1% of time in the ventricle.  Sensing and pacing thresholds as well as impedance are normal.  Estimated longevity 7.2 years.  There were no high atrial or ventricular rate episodes.  No programming changes were made.    CARDIOVASCULAR HISTORY:   NICM (cath 2017 with normal cors)  ICD (Donta 2017, MDT single chamber)    PAST MEDICAL HISTORY:     Past Medical History:   Diagnosis Date    CHF (congestive heart failure)     Chronic combined systolic and diastolic congestive heart failure 2019    Essential hypertension 2012    Hypertension     dx at 16y.o.    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 disease, with heart failure 2021    ICD (implantable cardioverter-defibrillator), single, in situ 2020    Nonischemic cardiomyopathy 2019    Pacemaker 2017       PAST SURGICAL HISTORY:     Past Surgical History:   Procedure Laterality Date    CARDIAC DEFIBRILLATOR PLACEMENT  2017     SECTION      x 1    INDUCED       RIGHT HEART CATHETERIZATION Right 2022    Procedure: INSERTION, CATHETER, RIGHT HEART;  Surgeon: Timothy Prajapati Jr., MD;  Location: [facility] CATH LAB;  Service: Cardiology;  Laterality: Right;    TUBAL LIGATION         ALLERGIES AND MEDICATION:      Review of patient's allergies indicates:   Allergen Reactions    Aldactone [spironolactone] Swelling     Lips swelled    Hydralazine analogues Other (See Comments)     headaches    Isosorbide Other (See Comments)     headaches        Medication List            Accurate as of January 20, 2023  1:20 PM. If you have any questions, ask your nurse or doctor.                CONTINUE taking these medications      aspirin 81 MG EC tablet  Commonly known as: ECOTRIN  Take 1 tablet (81 mg total) by mouth once daily.     atorvastatin 20 MG tablet  Commonly known as: LIPITOR  Take 1 tablet (20 mg total) by mouth every evening.     butalbital-acetaminophen-caffeine -40 mg -40 mg per tablet  Commonly known as: FIORICET, ESGIC  Take 1 tablet by mouth every 4 (four) hours as needed for Headaches.     diphenhydrAMINE 50 MG capsule  Commonly known as: BENADRYL  Take 1 capsule (50 mg total) by mouth once. Prior to MRI for 1 dose     ENTRESTO  mg per tablet  Generic drug: sacubitriL-valsartan  Take 1 tablet by mouth 2 (two) times daily.     eplerenone 25 MG Tab  Commonly known as: INSPRA  Take 1 tablet (25 mg total) by mouth once daily.     FARXIGA 10 mg tablet  Generic drug: dapagliflozin  Take 1 tablet by mouth once daily     furosemide 40 MG tablet  Commonly known as: LASIX  Take 1 tablet (40 mg total) by mouth 2 (two) times a day.     metoprolol succinate 100 MG 24 hr tablet  Commonly known as: TOPROL-XL  Take 1 tablet (100 mg total) by mouth once daily.     predniSONE 50 MG Tab  Commonly known as: DELTASONE  Take 1 tablet (50mg) 13 hours prior to MRI, 2nd tablet (50mg) 7 hours prior to MRI, and 3rd tablet (50mg) 1 hour prior to MRi     sars-cov-2 (covid-19) 30 mcg/0.3 ml injection  Commonly known as: Pfizer COVID-19              SOCIAL HISTORY:     Social History     Socioeconomic History    Marital status:    Occupational History     Employer: [employer]   Tobacco Use    Smoking status: Never     "Smokeless tobacco: Never   Substance and Sexual Activity    Alcohol use: Yes     Comment: occasionally    Drug use: Yes     Types: Marijuana     Comment: in past very little marijuana    Sexual activity: Yes     Partners: Male     Birth control/protection: None       FAMILY HISTORY:     Family History   Problem Relation Age of Onset    Hypertension Mother     Cancer Maternal Grandmother         breast x 2    Cancer Paternal Grandmother         breast    No Known Problems Sister     No Known Problems Brother     No Known Problems Son     No Known Problems Brother     Hypertension Other     Diabetes Other     Breast cancer Other     Cancer Paternal Aunt         breast    Cancer Paternal Uncle        REVIEW OF SYSTEMS:   Review of Systems   Constitutional:  Negative for chills, diaphoresis, fever and malaise/fatigue.   HENT:  Negative for nosebleeds.    Eyes:  Negative for blurred vision, double vision and photophobia.   Respiratory:  Negative for hemoptysis, shortness of breath and wheezing.    Cardiovascular:  Negative for chest pain, palpitations, orthopnea, claudication, leg swelling and PND.   Gastrointestinal:  Negative for abdominal pain, blood in stool, heartburn, melena, nausea and vomiting.   Genitourinary:  Negative for flank pain and hematuria.   Musculoskeletal:  Negative for falls, myalgias and neck pain.   Skin:  Negative for rash.   Neurological:  Negative for dizziness, seizures, loss of consciousness, weakness and headaches.   Endo/Heme/Allergies:  Negative for polydipsia. Does not bruise/bleed easily.   Psychiatric/Behavioral:  Negative for depression and memory loss. The patient is not nervous/anxious.      PHYSICAL EXAM:     Vitals:    01/20/23 1309   BP: 136/80   Pulse: 64   Resp: 18    Body mass index is 26.49 kg/m².  Weight: 83.7 kg (184 lb 10.2 oz)   Height: 5' 10" (177.8 cm)     Physical Exam  Vitals reviewed.   Constitutional:       General: She is not in acute distress.     Appearance: " Normal appearance. She is well-developed. She is not ill-appearing, toxic-appearing or diaphoretic.   HENT:      Head: Normocephalic and atraumatic.   Eyes:      General: No scleral icterus.     Extraocular Movements: Extraocular movements intact.      Conjunctiva/sclera: Conjunctivae normal.      Pupils: Pupils are equal, round, and reactive to light.   Neck:      Thyroid: No thyromegaly.      Vascular: Normal carotid pulses. No carotid bruit or JVD.      Trachea: Trachea normal. No tracheal deviation.   Cardiovascular:      Rate and Rhythm: Normal rate and regular rhythm.      Pulses:           Carotid pulses are 2+ on the right side and 2+ on the left side.     Heart sounds: S1 normal and S2 normal. No murmur heard.    No friction rub. No gallop.      Comments: L infraclavicular ICD site well healed  Pulmonary:      Effort: Pulmonary effort is normal. No respiratory distress.      Breath sounds: Normal breath sounds. No stridor. No wheezing, rhonchi or rales.   Chest:      Chest wall: No tenderness.   Abdominal:      General: There is no distension.      Palpations: Abdomen is soft.   Musculoskeletal:         General: No swelling or tenderness. Normal range of motion.      Cervical back: Normal range of motion and neck supple. No edema or rigidity.      Right lower leg: No edema.      Left lower leg: No edema.   Feet:      Right foot:      Skin integrity: No ulcer.      Left foot:      Skin integrity: No ulcer.   Skin:     General: Skin is warm and dry.      Coloration: Skin is not jaundiced.   Neurological:      General: No focal deficit present.      Mental Status: She is alert and oriented to person, place, and time.      Cranial Nerves: No cranial nerve deficit.   Psychiatric:         Mood and Affect: Mood normal.         Speech: Speech normal.         Behavior: Behavior normal. Behavior is cooperative.       DATA:   EKG: (personally reviewed tracing(s))  9/25/22 , inflat ST dep  ?isch    Laboratory:  CBC:  Recent Labs   Lab 09/25/22  2335 11/25/22  1408 01/17/23  0434   WBC 10.65 7.18 8.84   Hemoglobin 12.9 13.4 12.7   Hematocrit 38.5 39.6 37.8   Platelets 419 377 342         CHEMISTRIES:  Recent Labs   Lab 01/14/22  0807 01/26/22  0934 02/25/22  1035 08/26/22  2043 09/25/22 2335 11/25/22  1408 01/17/23  0434 01/20/23  0720   Glucose 96 75 78   < > 144 H 88 110 88   Sodium 140 140 139   < > 142 136 140 138   Potassium 4.2 4.3 3.5   < > 2.8 L 3.6 3.5 3.1 L   BUN 12 17 10   < > 12 12 13 13   Creatinine 0.8 0.8 0.8   < > 1.0 0.8 0.8 0.8   eGFR if African American >60 >60.0 >60.0  --   --   --   --   --    eGFR if non African American >60 >60.0 >60.0  --   --   --   --   --    Calcium 8.6 L 9.1 9.1   < > 9.7 9.0 8.8 9.1   Magnesium  --   --   --   --  2.0 1.9 2.2  --     < > = values in this interval not displayed.         CARDIAC BIOMARKERS:  Recent Labs   Lab 07/24/21  1802 07/24/21  2136 11/25/22 1408 11/25/22  1700 01/17/23  0434   CPK 77  --   --   --   --    Troponin I 0.035 H   < > 0.012 0.013 <0.006    < > = values in this interval not displayed.         COAGS:  Recent Labs   Lab 08/02/20  1329   INR 1.0         LIPIDS/LFTS:  Recent Labs   Lab 08/16/21  1409 09/15/21  0834 11/25/22  1408 01/17/23  0434 01/20/23  0720   Cholesterol 164  --   --   --   --    Triglycerides 106  --   --   --   --    HDL 36 L  --   --   --   --    LDL Cholesterol 106.8  --   --   --   --    Non-HDL Cholesterol 128  --   --   --   --    AST  --    < > 15 13 15   ALT  --    < > 11 9 L 12    < > = values in this interval not displayed.       Lab Results   Component Value Date    TSH 1.386 12/28/2021         Cardiovascular Testing:  Carotid US 1/20/23  There is 0-19% right Internal Carotid Stenosis.  There is 0-19% left Internal Carotid Stenosis.    RHC 1/27/22   Right atrial pressure is normal.  Pulmonary capillary wedge pressure is normal.  Pulmonary artery pressure is normal.  Pulmonary vascular resistance  is normal.  Systemic vascular resistance is 1180.  Jeovany cardiac output and index is normal.  RA: 8/ 5/ 7 RV: 26/ 8 PA: 23/ 10/ 14 PWP: 15/ 12/ 12 .   Cardiac output was 5.22 by Jeovany. Cardiac index is 2.67 L/min/m2.   O2 Sat: PA 70%.   Pulmonary vascular resistance: 31. Systemic vascular resistance: 1180.   These hemodynamic are acceptable for cardiac transplant listing.     Echo 10/6/21  The left ventricle is severely enlarged with eccentric hypertrophy and severely decreased systolic function.  The estimated ejection fraction is 15%.  Grade III left ventricular diastolic dysfunction.  Normal right ventricular size with normal right ventricular systolic function.  Moderate left atrial enlargement.  Mild mitral regurgitation.  Normal central venous pressure (3 mmHg).  The estimated PA systolic pressure is 15 mmHg.    Cath 4/18/17  B. Summary/Post-Operative Diagnosis    Normal coronary arteries.    Systolic dysfunction.    Mildly elevated left Filling Pressures.    Normal Pulmonary Hypertension.  D. Hemodynamic Results  Ejection Fraction: 20%  Global LV Function: severely depressed  PW:  (4)  PA: 24  CO_THERM: 4.4  RV: 25  RA:  (5)  LVEDP: 17   E. Angiographic Results       Patient has a right dominant coronary artery.      - Left Main Coronary Artery:             The LM is normal. There is DANNY 3 flow.     - Left Anterior Descending Artery:             The LAD is normal. There is DANNY 3 flow.     - Left Circumflex Artery:             The LCX is normal. There is DANNY 3 flow.     - Right Coronary Artery:             The RCA is normal. There is DANNY 3 flow.     - Left Renal Artery:             The ostial left renal is normal.     - Right Renal Artery:             The ostial right renal is normal.     - Common Femoral Artery:             The right CFA is normal.      ASSESSMENT:   # NICM, appears euvolemic.  Following with Dr. Prajapati.  # MDT ICD, functioning normally  # hx NSVT, last in 8/12/21  # HTN, controlled  #  remote CVA (PICA territory on CT head 11/25/22).  Carotid US 1/20/23 neg.    PLAN:   Cont med rx  Cont ASA 81mg qd  RTC 6 months with MDT ICD check and lipids/LFT (July 2023)      Kasihf Peguero MD, [facility]

## 2023-01-20 NOTE — Clinical Note
I found these labs when I was closing Friday's note tonight.  She is not on potassium.  Spoke with her this afternoon and sent script to [facility].  She is to take 4 of these first day (take 2 and then 1 every 2 hrs x2) and is not to take furosemide until she gets the potassium replaced with this 80 meq total dose.  She has been taking extra furosemide as needed so will take KCl 20 meq with each furosemide 40 mg dose.   Obtain BMP in 2 weeks

## 2023-01-20 NOTE — LETTER
January 22, 2023        Kashif Peguero  [address]  [address]  [place] [address]  Phone: [phone]  Fax: [phone]             [facility]  [address]  [place] [address]  Phone: [phone]   Patient: Nasra Marks   MR Number: [id number]   YOB: 1985   Date of Visit: 1/20/2023       Dear Dr. Kashif Peguero    Thank you for referring Nasra Marks to me for evaluation. Attached you will find relevant portions of my assessment and plan of care.    If you have questions, please do not hesitate to call me. I look forward to following Nasra Marks along with you.    Sincerely,    Timothy Prajapati Jr, MD    Enclosure    If you would like to receive this communication electronically, please contact [email or web address] or [phone] to request Nitro Link access.    Nitro Link is a tool which provides read-only access to select patient information with whom you have a relationship. Its easy to use and provides real time access to review your patients record including encounter summaries, notes, results, and demographic information.    If you feel you have received this communication in error or would no longer like to receive these types of communications, please e-mail [email or web address]

## 2023-01-22 RX ORDER — POTASSIUM CHLORIDE 20 MEQ/1
TABLET, EXTENDED RELEASE ORAL
Qty: 64 TABLET | Refills: 5 | Status: SHIPPED | OUTPATIENT
Start: 2023-01-22 | End: 2023-08-14 | Stop reason: SDUPTHER

## 2023-01-23 ENCOUNTER — HOSPITAL ENCOUNTER (OUTPATIENT)
Dept: RADIOLOGY | Facility: HOSPITAL | Age: 38
Discharge: HOME | End: 2023-01-23
Attending: STUDENT IN AN ORGANIZED HEALTH CARE EDUCATION/TRAINING PROGRAM
Payer: MEDICARE

## 2023-01-23 DIAGNOSIS — Z86.73 HISTORY OF CVA (CEREBROVASCULAR ACCIDENT): ICD-10-CM

## 2023-01-23 PROCEDURE — 70498 CTA HEAD AND NECK (XPD): ICD-10-PCS | Mod: 26,HCNC,, | Performed by: RADIOLOGY

## 2023-01-23 PROCEDURE — 70498 CT ANGIOGRAPHY NECK: CPT | Mod: 26,HCNC,, | Performed by: RADIOLOGY

## 2023-01-23 PROCEDURE — 25500020 PHARM REV CODE 255: Mod: HCNC | Performed by: STUDENT IN AN ORGANIZED HEALTH CARE EDUCATION/TRAINING PROGRAM

## 2023-01-23 PROCEDURE — 70496 CTA HEAD AND NECK (XPD): ICD-10-PCS | Mod: 26,HCNC,, | Performed by: RADIOLOGY

## 2023-01-23 PROCEDURE — 70496 CT ANGIOGRAPHY HEAD: CPT | Mod: 26,HCNC,, | Performed by: RADIOLOGY

## 2023-01-23 PROCEDURE — 70496 CT ANGIOGRAPHY HEAD: CPT | Mod: TC,HCNC

## 2023-01-23 RX ADMIN — IOHEXOL 85 ML: 350 INJECTION, SOLUTION INTRAVENOUS at 09:01

## 2023-02-06 ENCOUNTER — LAB VISIT (OUTPATIENT)
Dept: LAB | Facility: HOSPITAL | Age: 38
End: 2023-02-06
Attending: INTERNAL MEDICINE
Payer: MEDICARE

## 2023-02-06 DIAGNOSIS — I50.42 CHRONIC COMBINED SYSTOLIC AND DIASTOLIC CONGESTIVE HEART FAILURE: ICD-10-CM

## 2023-02-06 LAB
ANION GAP SERPL CALC-SCNC: 11 MMOL/L (ref 8–16)
BUN SERPL-MCNC: 13 MG/DL (ref 6–20)
CALCIUM SERPL-MCNC: 9.4 MG/DL (ref 8.7–10.5)
CHLORIDE SERPL-SCNC: 106 MMOL/L (ref 95–110)
CO2 SERPL-SCNC: 19 MMOL/L (ref 23–29)
CREAT SERPL-MCNC: 0.8 MG/DL (ref 0.5–1.4)
EST. GFR  (NO RACE VARIABLE): >60 ML/MIN/1.73 M^2
GLUCOSE SERPL-MCNC: 85 MG/DL (ref 70–110)
POTASSIUM SERPL-SCNC: 3.7 MMOL/L (ref 3.5–5.1)
SODIUM SERPL-SCNC: 136 MMOL/L (ref 136–145)

## 2023-02-06 PROCEDURE — 36415 COLL VENOUS BLD VENIPUNCTURE: CPT | Mod: HCNC,PO | Performed by: INTERNAL MEDICINE

## 2023-02-06 PROCEDURE — 80048 BASIC METABOLIC PNL TOTAL CA: CPT | Mod: HCNC | Performed by: INTERNAL MEDICINE

## 2023-02-07 ENCOUNTER — HOSPITAL ENCOUNTER (OUTPATIENT)
Dept: CARDIOLOGY | Facility: HOSPITAL | Age: 38
Discharge: HOME | End: 2023-02-07
Attending: INTERNAL MEDICINE
Payer: MEDICARE

## 2023-02-07 ENCOUNTER — TELEPHONE (OUTPATIENT)
Dept: TRANSPLANT | Facility: CLINIC | Age: 38
End: 2023-02-07
Payer: MEDICARE

## 2023-02-07 VITALS
SYSTOLIC BLOOD PRESSURE: 130 MMHG | BODY MASS INDEX: 26.34 KG/M2 | HEIGHT: 70 IN | HEART RATE: 70 BPM | DIASTOLIC BLOOD PRESSURE: 80 MMHG | WEIGHT: 184 LBS

## 2023-02-07 DIAGNOSIS — I13.0 HYPERTENSIVE CARDIOVASCULAR-RENAL DISEASE, STAGE 1-4 OR UNSPECIFIED CHRONIC KIDNEY DISEASE, WITH HEART FAILURE: ICD-10-CM

## 2023-02-07 DIAGNOSIS — I50.42 CHRONIC COMBINED SYSTOLIC AND DIASTOLIC CONGESTIVE HEART FAILURE: ICD-10-CM

## 2023-02-07 DIAGNOSIS — I42.8 NONISCHEMIC CARDIOMYOPATHY: ICD-10-CM

## 2023-02-07 LAB
ASCENDING AORTA: 3.09 CM
AV INDEX (PROSTH): 0.73
AV MEAN GRADIENT: 4 MMHG
AV PEAK GRADIENT: 7 MMHG
AV VALVE AREA: 2.96 CM2
AV VELOCITY RATIO: 0.74
BSA FOR ECHO PROCEDURE: 2.03 M2
CV ECHO LV RWT: 0.32 CM
DOP CALC AO PEAK VEL: 1.3 M/S
DOP CALC AO VTI: 20.66 CM
DOP CALC LVOT AREA: 4.1 CM2
DOP CALC LVOT DIAMETER: 2.28 CM
DOP CALC LVOT PEAK VEL: 0.96 M/S
DOP CALC LVOT STROKE VOLUME: 61.17 CM3
DOP CALCLVOT PEAK VEL VTI: 14.99 CM
E WAVE DECELERATION TIME: 286.5 MSEC
E/A RATIO: 0.68
E/E' RATIO: 5.82 M/S
ECHO LV POSTERIOR WALL: 0.92 CM (ref 0.6–1.1)
EJECTION FRACTION: 40 %
FRACTIONAL SHORTENING: 26 % (ref 28–44)
INTERVENTRICULAR SEPTUM: 0.69 CM (ref 0.6–1.1)
IVRT: 122.74 MSEC
LA MAJOR: 4.3 CM
LA MINOR: 4.33 CM
LA WIDTH: 4.08 CM
LEFT ATRIUM SIZE: 3.24 CM
LEFT ATRIUM VOLUME INDEX MOD: 24.7 ML/M2
LEFT ATRIUM VOLUME INDEX: 24.1 ML/M2
LEFT ATRIUM VOLUME MOD: 49.66 CM3
LEFT ATRIUM VOLUME: 48.48 CM3
LEFT INTERNAL DIMENSION IN SYSTOLE: 4.33 CM (ref 2.1–4)
LEFT VENTRICLE DIASTOLIC VOLUME INDEX: 84.19 ML/M2
LEFT VENTRICLE DIASTOLIC VOLUME: 169.23 ML
LEFT VENTRICLE MASS INDEX: 89 G/M2
LEFT VENTRICLE SYSTOLIC VOLUME INDEX: 42.1 ML/M2
LEFT VENTRICLE SYSTOLIC VOLUME: 84.61 ML
LEFT VENTRICULAR INTERNAL DIMENSION IN DIASTOLE: 5.84 CM (ref 3.5–6)
LEFT VENTRICULAR MASS: 178.91 G
LV LATERAL E/E' RATIO: 4.57 M/S
LV SEPTAL E/E' RATIO: 8 M/S
MV A" WAVE DURATION": 15.7 MSEC
MV PEAK A VEL: 0.47 M/S
MV PEAK E VEL: 0.32 M/S
MV STENOSIS PRESSURE HALF TIME: 83.09 MS
MV VALVE AREA P 1/2 METHOD: 2.65 CM2
PISA TR MAX VEL: 1.93 M/S
PULM VEIN S/D RATIO: 1
PV PEAK D VEL: 0.34 M/S
PV PEAK S VEL: 0.34 M/S
RA MAJOR: 3.85 CM
RA PRESSURE: 3 MMHG
RA WIDTH: 3.36 CM
RIGHT VENTRICULAR END-DIASTOLIC DIMENSION: 3.14 CM
RV TISSUE DOPPLER FREE WALL SYSTOLIC VELOCITY 1 (APICAL 4 CHAMBER VIEW): 10.78 CM/S
SINUS: 3.19 CM
STJ: 2.66 CM
TDI LATERAL: 0.07 M/S
TDI SEPTAL: 0.04 M/S
TDI: 0.06 M/S
TR MAX PG: 15 MMHG
TRICUSPID ANNULAR PLANE SYSTOLIC EXCURSION: 2.36 CM
TV REST PULMONARY ARTERY PRESSURE: 18 MMHG

## 2023-02-07 PROCEDURE — 93306 TTE W/DOPPLER COMPLETE: CPT | Mod: 26,HCNC,, | Performed by: INTERNAL MEDICINE

## 2023-02-07 PROCEDURE — 93306 TTE W/DOPPLER COMPLETE: CPT | Mod: HCNC

## 2023-02-07 PROCEDURE — 93306 ECHO (CUPID ONLY): ICD-10-PCS | Mod: 26,HCNC,, | Performed by: INTERNAL MEDICINE

## 2023-02-07 NOTE — TELEPHONE ENCOUNTER
2/7/23 - Lab results reviewed per VIJAY Prajapati and below written orders received.:    Potassium much improved.  Continue to take KCl 20 meq with each furosemide 40 mg dose.       Called/spoke with patient and notified her of above.  Patient repeated instructions back correctly and verbalized understanding.  Also instructed patient to contact our office if she has any problems.

## 2023-02-08 PROBLEM — Z86.73 HISTORY OF CVA (CEREBROVASCULAR ACCIDENT): Status: ACTIVE | Noted: 2023-02-08

## 2023-02-08 PROBLEM — R51.9 NONINTRACTABLE HEADACHE: Status: ACTIVE | Noted: 2023-02-08

## 2023-02-08 PROBLEM — I63.531: Status: ACTIVE | Noted: 2023-02-08

## 2023-02-08 NOTE — PROGRESS NOTES
"  Subjective:       Patient ID: Nasra Marks is a 37 y.o. female.      History of Present Illness  37 year old female who presents today for follow up evaluation after ER visit. Past medical history below. She states she recently went to the ER for multiple complaints including nausea, headache and chest pain. CT head was done and showed a remote right PICA territory infarct, that was new from imaging done 2021. She denies any symptoms of facial droop, speech/vision changes, numbness, or weakness in any extremities. She states she took man edible and "went crazy" a month ago and was not sure if that would be the cause. She was prescribed Lipitor and ASA in the ER. States she has been compliant with these medications. Also prescribed Fioricet for her headaches which have been helpful.     Does have hx of CHF with ICD.     Headache     Interim period:  23 - referred back to neurology w hx of stroke, also headaches.    Past Medical History:   Diagnosis Date    CHF (congestive heart failure)     Chronic combined systolic and diastolic congestive heart failure 2019    Essential hypertension 2012    Hypertension     dx at 16y.o.    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 disease, with heart failure 2021    ICD (implantable cardioverter-defibrillator), single, in situ 2020    Nonischemic cardiomyopathy 2019    Pacemaker 2017       Past Surgical History:   Procedure Laterality Date    CARDIAC DEFIBRILLATOR PLACEMENT  2017     SECTION      x 1    INDUCED       RIGHT HEART CATHETERIZATION Right 2022    Procedure: INSERTION, CATHETER, RIGHT HEART;  Surgeon: Timothy Prajapati Jr., MD;  Location: [facility] CATH LAB;  Service: Cardiology;  Laterality: Right;    TUBAL LIGATION         Family History   Problem Relation Age of Onset    Hypertension Mother     Cancer Maternal Grandmother         breast x 2    Cancer " Paternal Grandmother         breast    No Known Problems Sister     No Known Problems Brother     No Known Problems Son     No Known Problems Brother     Hypertension Other     Diabetes Other     Breast cancer Other     Cancer Paternal Aunt         breast    Cancer Paternal Uncle        Social History     Socioeconomic History    Marital status:    Occupational History     Employer: [employer]   Tobacco Use    Smoking status: Never    Smokeless tobacco: Never   Substance and Sexual Activity    Alcohol use: Yes     Comment: occasionally    Drug use: Yes     Types: Marijuana     Comment: in past very little marijuana    Sexual activity: Yes     Partners: Male     Birth control/protection: None       Current Outpatient Medications   Medication Sig Dispense Refill    aspirin (ECOTRIN) 81 MG EC tablet Take 1 tablet (81 mg total) by mouth once daily. 30 tablet 0    atorvastatin (LIPITOR) 20 MG tablet Take 1 tablet (20 mg total) by mouth every evening. 90 tablet 3    butalbital-acetaminophen-caffeine -40 mg (FIORICET, ESGIC) -40 mg per tablet Take 1 tablet by mouth every 4 (four) hours as needed for Headaches. 12 tablet 0    dapagliflozin (FARXIGA) 10 mg tablet Take 1 tablet by mouth once daily 30 tablet 0    eplerenone (INSPRA) 25 MG Tab Take 1 tablet (25 mg total) by mouth once daily. 90 tablet 3    furosemide (LASIX) 40 MG tablet Take 1 tablet (40 mg total) by mouth 2 (two) times a day. 180 tablet 3    metoprolol succinate (TOPROL-XL) 100 MG 24 hr tablet Take 1 tablet (100 mg total) by mouth once daily. 30 tablet 11    potassium chloride SA (K-DUR,KLOR-CON) 20 MEQ tablet Take 4 tablets first day this is started and thereafter take one with every dose of furosemide. 64 tablet 5    predniSONE (DELTASONE) 50 MG Tab Take 1 tablet (50mg) 13 hours prior to MRI, 2nd tablet (50mg) 7 hours prior to MRI, and 3rd tablet (50mg) 1 hour prior to MRi 3 tablet 0    sacubitriL-valsartan (ENTRESTO)  mg per tablet  Take 1 tablet by mouth 2 (two) times daily. 180 tablet 3    sars-cov-2, covid-19, (PFIZER COVID-19) 30 mcg/0.3 ml injection        No current facility-administered medications for this visit.       Review of patient's allergies indicates:   Allergen Reactions    Aldactone [spironolactone] Swelling     Lips swelled    Hydralazine analogues Other (See Comments)     headaches    Isosorbide Other (See Comments)     headaches        Review of Systems  Review of Systems   Constitutional: Negative.    HENT: Negative.     Eyes: Negative.    Respiratory: Negative.     Cardiovascular: Negative.    Gastrointestinal: Negative.    Endocrine: Negative.    Genitourinary: Negative.    Skin: Negative.    Neurological:  Positive for headaches.   Hematological: Negative.    Psychiatric/Behavioral: Negative.       Objective:      Neurologic Exam     Mental Status   Oriented to person, place, and time.   Attention: normal. Concentration: normal.   Speech: speech is normal   Level of consciousness: alert  Knowledge: good.     Cranial Nerves   Cranial nerves II through XII intact.     Motor Exam   Muscle bulk: normal  Overall muscle tone: normal  Right arm pronator drift: absent  Left arm pronator drift: absent    Strength   Right neck flexion: 5/5  Left neck flexion: 5/5  Right neck extension: 5/5  Left neck extension: 5/5  Right deltoid: 5/5  Left deltoid: 5/5  Right biceps: 5/5  Left biceps: 5/5  Right triceps: 5/5  Left triceps: 5/5  Right wrist flexion: 5/5  Left wrist flexion: 5/5  Right wrist extension: 5/5  Left wrist extension: 5/5  Right interossei: 5/5  Left interossei: 5/5  Right abdominals: 5/5  Left abdominals: 5/5  Right iliopsoas: 5/5  Left iliopsoas: 5/5  Right quadriceps: 5/5  Left quadriceps: 5/5  Right hamstrin/5  Left hamstrin/5  Right glutei: 5/5  Left glutei: 5/5  Right anterior tibial: 5/5  Left anterior tibial: 5/5  Right posterior tibial: 5/5  Left posterior tibial: 5/5  Right peroneal: 5/5  Left  peroneal: 5/5  Right gastroc: 5/5  Left gastroc: 5/5    Sensory Exam   Light touch normal.   Vibration normal.   Proprioception normal.   Pinprick normal.     Gait, Coordination, and Reflexes     Gait  Gait: normal    Coordination   Romberg: negative    Tremor   Resting tremor: absent    Reflexes   Right brachioradialis: 2+  Left brachioradialis: 2+  Right biceps: 2+  Left biceps: 2+  Right triceps: 2+  Left triceps: 2+  Right patellar: 2+  Left patellar: 2+  Right achilles: 2+  Left achilles: 2+  Right : 2+  Left : 2+    Physical Exam  HENT:      Head: Normocephalic and atraumatic.   Pulmonary:      Effort: Pulmonary effort is normal.   Skin:     General: Skin is warm and dry.   Neurological:      Mental Status: She is alert and oriented to person, place, and time.      Cranial Nerves: Cranial nerves 2-12 are intact.      Coordination: Romberg Test normal.      Gait: Gait is intact.      Deep Tendon Reflexes:      Reflex Scores:       Tricep reflexes are 2+ on the right side and 2+ on the left side.       Bicep reflexes are 2+ on the right side and 2+ on the left side.       Brachioradialis reflexes are 2+ on the right side and 2+ on the left side.       Patellar reflexes are 2+ on the right side and 2+ on the left side.       Achilles reflexes are 2+ on the right side and 2+ on the left side.  Psychiatric:         Mood and Affect: Mood normal.         Speech: Speech normal.         Behavior: Behavior is cooperative.         Assessment and Plan:     Problem List Items Addressed This Visit          Neuro    History of CVA (cerebrovascular accident) - Primary    Relevant Orders    CTA Head and Neck (xpd) (Completed)    Nonintractable headache    Cerebral infarction due to unspecified occlusion or stenosis of right posterior cerebral artery        1. Old cerebral infarct without residual deficit  -Remote right PICA territory infarct seen on CT scan   -Get CTA H&N to evaluate posterior circulation, if patient  would require DAPT  -Continue taking ASA and Lipitor  -Educated patient on s/s of stroke such as facial droop, speech changes, numbness/tingling, arm/leg weakness. Emphasized the importance of time and brain loss. If s/s of stroke are present and suspected patient educated to call 911. Patient and family verbalized understanding      2. Chronic tension-type headache, not intractable  -continue Fioricet as needed      Follow-up: Follow up in about 6 months (around 7/19/2023).

## 2023-02-09 DIAGNOSIS — Z00.00 ENCOUNTER FOR MEDICARE ANNUAL WELLNESS EXAM: ICD-10-CM

## 2023-02-15 DIAGNOSIS — I50.42 CHRONIC COMBINED SYSTOLIC AND DIASTOLIC CONGESTIVE HEART FAILURE: Primary | ICD-10-CM

## 2023-02-15 RX ORDER — SACUBITRIL AND VALSARTAN 97; 103 MG/1; MG/1
1 TABLET, FILM COATED ORAL 2 TIMES DAILY
Qty: 180 TABLET | Refills: 3 | Status: SHIPPED | OUTPATIENT
Start: 2023-02-15

## 2023-02-15 NOTE — PROGRESS NOTES
Discussed her echo results and good news.  She is on correct GDMT as does not need H/I added unless NYHA Class 3 or needed for BP    Same treatment and see me approx July/Aug 2023 with BMP and BNP (she serves as own control)

## 2023-03-24 ENCOUNTER — HOSPITAL ENCOUNTER (EMERGENCY)
Facility: HOSPITAL | Age: 38
Discharge: HOME | End: 2023-03-25
Attending: INTERNAL MEDICINE
Payer: MEDICARE

## 2023-03-24 DIAGNOSIS — R07.9 CHEST PAIN: ICD-10-CM

## 2023-03-24 LAB
ALBUMIN SERPL-MCNC: 3.5 G/DL (ref 3.3–5.5)
ALP SERPL-CCNC: 64 U/L (ref 42–141)
B-HCG UR QL: NEGATIVE
BILIRUB SERPL-MCNC: 0.5 MG/DL (ref 0.2–1.6)
BUN SERPL-MCNC: 13 MG/DL (ref 7–22)
CALCIUM SERPL-MCNC: 9 MG/DL (ref 8–10.3)
CHLORIDE SERPL-SCNC: 108 MMOL/L (ref 98–108)
CREAT SERPL-MCNC: 0.9 MG/DL (ref 0.6–1.2)
CTP QC/QA: YES
GLUCOSE SERPL-MCNC: 89 MG/DL (ref 73–118)
HCT, POC: NORMAL
HGB, POC: NORMAL (ref 14–18)
MCH, POC: NORMAL
MCHC, POC: NORMAL
MCV, POC: NORMAL
MPV, POC: NORMAL
POC ALT (SGPT): 13 U/L (ref 10–47)
POC AST (SGOT): 28 U/L (ref 11–38)
POC CARDIAC TROPONIN I: 0.01 NG/ML (ref 0–0.08)
POC PLATELET COUNT: NORMAL
POC TCO2: 27 MMOL/L (ref 18–33)
POTASSIUM BLD-SCNC: 3.8 MMOL/L (ref 3.6–5.1)
PROTEIN, POC: 7.7 G/DL (ref 6.4–8.1)
RBC, POC: NORMAL
RDW, POC: NORMAL
SAMPLE: NORMAL
SODIUM BLD-SCNC: 144 MMOL/L (ref 128–145)
WBC, POC: NORMAL

## 2023-03-24 PROCEDURE — 25000003 PHARM REV CODE 250: Mod: HCNC,ER | Performed by: INTERNAL MEDICINE

## 2023-03-24 PROCEDURE — 84484 ASSAY OF TROPONIN QUANT: CPT | Mod: HCNC,ER

## 2023-03-24 PROCEDURE — 81025 URINE PREGNANCY TEST: CPT | Mod: HCNC,ER | Performed by: INTERNAL MEDICINE

## 2023-03-24 PROCEDURE — 93010 EKG 12-LEAD: ICD-10-PCS | Mod: HCNC,,, | Performed by: INTERNAL MEDICINE

## 2023-03-24 PROCEDURE — 99285 EMERGENCY DEPT VISIT HI MDM: CPT | Mod: 25,HCNC,ER

## 2023-03-24 PROCEDURE — 93010 ELECTROCARDIOGRAM REPORT: CPT | Mod: HCNC,,, | Performed by: INTERNAL MEDICINE

## 2023-03-24 PROCEDURE — 93005 ELECTROCARDIOGRAM TRACING: CPT | Mod: HCNC,ER

## 2023-03-24 PROCEDURE — 80053 COMPREHEN METABOLIC PANEL: CPT | Mod: HCNC,ER

## 2023-03-24 RX ORDER — ASPIRIN 325 MG
325 TABLET ORAL
Status: COMPLETED | OUTPATIENT
Start: 2023-03-24 | End: 2023-03-24

## 2023-03-24 RX ADMIN — ASPIRIN 325 MG ORAL TABLET 325 MG: 325 PILL ORAL at 10:03

## 2023-03-25 VITALS
TEMPERATURE: 99 F | OXYGEN SATURATION: 98 % | DIASTOLIC BLOOD PRESSURE: 78 MMHG | RESPIRATION RATE: 20 BRPM | HEIGHT: 70 IN | WEIGHT: 181 LBS | HEART RATE: 64 BPM | BODY MASS INDEX: 25.91 KG/M2 | SYSTOLIC BLOOD PRESSURE: 133 MMHG

## 2023-03-25 LAB
POC CARDIAC TROPONIN I: 0.01 NG/ML (ref 0–0.08)
SAMPLE: NORMAL

## 2023-03-25 PROCEDURE — 84484 ASSAY OF TROPONIN QUANT: CPT | Mod: HCNC,ER

## 2023-03-25 NOTE — ED PROVIDER NOTES
Encounter Date: 3/24/2023    SCRIBE #1 NOTE: I, Torri Trinidad, am scribing for, and in the presence of,  Jostin Woods MD. I have scribed the following portions of the note - Other sections scribed: HPI, ROS, PE.     History     Chief Complaint   Patient presents with    Chest Pain     Reports chest pain x 2 hours with nausea x 1 hour. Also reports intermittent HA x 3 days. Reports Hx of CHF x 5 years. Also has defibrillator that was placed 4 years ago     Nasra Marks is a 37 y.o. female, with a past medical history of CHF and HTN, who presents to the ED with chest pain that began 2 hours ago and nausea that began 1 hour ago. Patient reports the chest pain is more of a tightness now. Patient also reports intermittent headache for 3 days. Patient reports taking tylenol yesterday or today. Patient has a defibrillator that was placed 4 years ago. No other exacerbating or alleviating factors. Patient denies fever, or other associated symptoms. Patient has allergy to Aldactone, hydralazine analogues, and isosorbide.     The history is provided by the patient. No  was used.   Review of patient's allergies indicates:   Allergen Reactions    Aldactone [spironolactone] Swelling     Lips swelled    Hydralazine analogues Other (See Comments)     headaches    Isosorbide Other (See Comments)     headaches     Past Medical History:   Diagnosis Date    CHF (congestive heart failure)     Chronic combined systolic and diastolic congestive heart failure 5/24/2019    Essential hypertension 11/19/2012    Hypertension     dx at 16y.o.    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 disease, with heart failure 12/28/2021    ICD (implantable cardioverter-defibrillator), single, in situ 2/17/2020    Nonischemic cardiomyopathy 5/24/2019    Pacemaker 12/18/2017     Past Surgical History:   Procedure Laterality Date    CARDIAC DEFIBRILLATOR PLACEMENT  12/2017      SECTION      x 1    INDUCED       RIGHT HEART CATHETERIZATION Right 2022    Procedure: INSERTION, CATHETER, RIGHT HEART;  Surgeon: Timothy Prajapati Jr., MD;  Location: [facility] CATH LAB;  Service: Cardiology;  Laterality: Right;    TUBAL LIGATION       Family History   Problem Relation Age of Onset    Hypertension Mother     Cancer Maternal Grandmother         breast x 2    Cancer Paternal Grandmother         breast    No Known Problems Sister     No Known Problems Brother     No Known Problems Son     No Known Problems Brother     Hypertension Other     Diabetes Other     Breast cancer Other     Cancer Paternal Aunt         breast    Cancer Paternal Uncle      Social History     Tobacco Use    Smoking status: Never    Smokeless tobacco: Never   Substance Use Topics    Alcohol use: Yes     Comment: occasionally    Drug use: Yes     Types: Marijuana     Comment: in past very little marijuana     Review of Systems   Constitutional:  Negative for fever.   HENT:  Negative for sore throat.    Respiratory:  Negative for shortness of breath.    Cardiovascular:  Positive for chest pain.   Gastrointestinal:  Positive for nausea. Negative for abdominal pain, diarrhea and vomiting.   Genitourinary:  Negative for dysuria.   Musculoskeletal:  Negative for back pain.   Skin:  Negative for rash.   Neurological:  Positive for headaches. Negative for weakness.   Psychiatric/Behavioral:  Negative for behavioral problems.    All other systems reviewed and are negative.    Physical Exam     Initial Vitals [23]   BP Pulse Resp Temp SpO2   (!) 133/93 71 20 98.7 °F (37.1 °C) 99 %      MAP       --         Physical Exam    Nursing note and vitals reviewed.  Constitutional: She appears well-developed and well-nourished.   HENT:   Head: Normocephalic and atraumatic.   Eyes: Conjunctivae are normal.   Neck: Neck supple.   Normal range of motion.  Cardiovascular:  Normal rate, regular rhythm and normal heart  sounds.     Exam reveals no gallop and no friction rub.       No murmur heard.  Pulmonary/Chest: Breath sounds normal. No respiratory distress. She has no wheezes. She has no rhonchi. She has no rales.   Abdominal: Abdomen is soft. There is no abdominal tenderness.   Musculoskeletal:         General: No edema. Normal range of motion.      Cervical back: Normal range of motion and neck supple.     Neurological: She is alert and oriented to person, place, and time. GCS score is 15. GCS eye subscore is 4. GCS verbal subscore is 5. GCS motor subscore is 6.   Skin: Skin is warm and dry.   Psychiatric: She has a normal mood and affect.       ED Course   Procedures  Labs Reviewed   TROPONIN ISTAT   TROPONIN ISTAT   POCT CBC   POCT URINE PREGNANCY   POCT CMP   POCT TROPONIN   POCT CMP   POCT TROPONIN       EKG Readings: (Independently Interpreted)   Normal sinus rhythm, rate of 72 beats per minute, normal ST segment, flipped T-wave in V1, R-wave progression     Imaging Results              X-Ray Chest AP Portable (Final result)  Result time 03/24/23 22:45:16      Final result by Kasandra Erazo MD (03/24/23 22:45:16)                   Impression:      No acute cardiopulmonary process identified.      Electronically signed by: Kasandra Erazo MD  Date:    03/24/2023  Time:    22:45               Narrative:    EXAMINATION:  XR CHEST AP PORTABLE    CLINICAL HISTORY:  Chest Pain;    TECHNIQUE:  Single frontal view of the chest was performed.    COMPARISON:  01/17/2023.    FINDINGS:  Cardiac silhouette is stable in size.  Left-sided pacer device is seen.  Lungs are symmetrically expanded.  No evidence of focal consolidative process, pneumothorax, or significant pleural effusion.  No acute osseous abnormality identified.                                         Medications   aspirin tablet 325 mg (325 mg Oral Given 3/24/23 2207)     Medical Decision Making:   History:   Old Medical Records: I decided to obtain old medical  records.  Initial Assessment:   Nasra Marks is a 37 y.o. female, with a past medical history of CHF and HTN, who presents to the ED with chest pain that began 2 hours ago and nausea that began 1 hour ago. Patient reports the chest pain is more of a tightness now. Patient also reports intermittent headache for 3 days. Patient reports taking tylenol yesterday or today. Patient has a defibrillator that was placed 4 years ago. No other exacerbating or alleviating factors. Patient denies fever, or other associated symptoms. Patient has allergy to Aldactone, hydralazine analogues, and isosorbide.     Clinical Tests:   Lab Tests: Ordered and Reviewed  Radiological Study: Ordered and Reviewed  ED Management:  Troponins were negative x2.  CBC and CMP are reassuring.  Chest x-ray shows no acute disease.  EKG shows no acute changes.  Patient received aspirin in the emergency department and states chest pain has resolved prior to discharge.  She was given instructions for noncardiac chest pain and advised to follow-up with her primary care physician or cardiologist within the next week for re-evaluation/return to the emergency department if condition worsens.        Scribe Attestation:   Scribe #1: I performed the above scribed service and the documentation accurately describes the services I performed. I attest to the accuracy of the note.                 This document was produced by a scribe under my direction and in my presence. I agree with the content of the note and have made any necessary edits.     Dr. Woods    03/25/2023 1:31 AM    Clinical Impression:   Final diagnoses:  [R07.9] Chest pain        ED Disposition Condition    Discharge Stable          ED Prescriptions    None       Follow-up Information       Follow up With Specialties Details Why Contact Info    Veronika Rainey MD Family Medicine, Wound Care Schedule an appointment as soon as possible for a visit in 2 days For reevaluation [address]  [place] [address]  [phone]               Jostin Woods MD  03/25/23 0134

## 2023-03-27 ENCOUNTER — HOSPITAL ENCOUNTER (EMERGENCY)
Facility: HOSPITAL | Age: 38
Discharge: HOME | End: 2023-03-27
Attending: INTERNAL MEDICINE
Payer: MEDICARE

## 2023-03-27 VITALS
HEIGHT: 70 IN | SYSTOLIC BLOOD PRESSURE: 138 MMHG | WEIGHT: 181 LBS | HEART RATE: 73 BPM | OXYGEN SATURATION: 98 % | DIASTOLIC BLOOD PRESSURE: 80 MMHG | RESPIRATION RATE: 16 BRPM | BODY MASS INDEX: 25.91 KG/M2 | TEMPERATURE: 98 F

## 2023-03-27 DIAGNOSIS — R07.9 CHEST PAIN: ICD-10-CM

## 2023-03-27 DIAGNOSIS — I50.42 CHRONIC COMBINED SYSTOLIC AND DIASTOLIC CONGESTIVE HEART FAILURE: Primary | ICD-10-CM

## 2023-03-27 LAB
ALBUMIN SERPL BCP-MCNC: 3.9 G/DL (ref 3.5–5.2)
ALP SERPL-CCNC: 65 U/L (ref 55–135)
ALT SERPL W/O P-5'-P-CCNC: 18 U/L (ref 10–44)
ANION GAP SERPL CALC-SCNC: 9 MMOL/L (ref 8–16)
AST SERPL-CCNC: 21 U/L (ref 10–40)
B-HCG UR QL: NEGATIVE
BASOPHILS # BLD AUTO: 0.05 K/UL (ref 0–0.2)
BASOPHILS NFR BLD: 0.7 % (ref 0–1.9)
BILIRUB SERPL-MCNC: 0.4 MG/DL (ref 0.1–1)
BNP SERPL-MCNC: 190 PG/ML (ref 0–99)
BUN SERPL-MCNC: 10 MG/DL (ref 6–20)
CALCIUM SERPL-MCNC: 8.7 MG/DL (ref 8.7–10.5)
CHLORIDE SERPL-SCNC: 105 MMOL/L (ref 95–110)
CO2 SERPL-SCNC: 27 MMOL/L (ref 23–29)
CREAT SERPL-MCNC: 0.9 MG/DL (ref 0.5–1.4)
CTP QC/QA: YES
DIFFERENTIAL METHOD: NORMAL
EOSINOPHIL # BLD AUTO: 0.1 K/UL (ref 0–0.5)
EOSINOPHIL NFR BLD: 1.6 % (ref 0–8)
ERYTHROCYTE [DISTWIDTH] IN BLOOD BY AUTOMATED COUNT: 12.9 % (ref 11.5–14.5)
EST. GFR  (NO RACE VARIABLE): >60 ML/MIN/1.73 M^2
GLUCOSE SERPL-MCNC: 102 MG/DL (ref 70–110)
HCT VFR BLD AUTO: 39.2 % (ref 37–48.5)
HGB BLD-MCNC: 12.8 G/DL (ref 12–16)
IMM GRANULOCYTES # BLD AUTO: 0.01 K/UL (ref 0–0.04)
IMM GRANULOCYTES NFR BLD AUTO: 0.1 % (ref 0–0.5)
LYMPHOCYTES # BLD AUTO: 1.8 K/UL (ref 1–4.8)
LYMPHOCYTES NFR BLD: 25 % (ref 18–48)
MCH RBC QN AUTO: 29.1 PG (ref 27–31)
MCHC RBC AUTO-ENTMCNC: 32.7 G/DL (ref 32–36)
MCV RBC AUTO: 89 FL (ref 82–98)
MONOCYTES # BLD AUTO: 0.6 K/UL (ref 0.3–1)
MONOCYTES NFR BLD: 8.3 % (ref 4–15)
NEUTROPHILS # BLD AUTO: 4.7 K/UL (ref 1.8–7.7)
NEUTROPHILS NFR BLD: 64.3 % (ref 38–73)
NRBC BLD-RTO: 0 /100 WBC
PLATELET # BLD AUTO: 378 K/UL (ref 150–450)
PMV BLD AUTO: 9.3 FL (ref 9.2–12.9)
POTASSIUM SERPL-SCNC: 3.6 MMOL/L (ref 3.5–5.1)
PROT SERPL-MCNC: 8.1 G/DL (ref 6–8.4)
RBC # BLD AUTO: 4.4 M/UL (ref 4–5.4)
SODIUM SERPL-SCNC: 141 MMOL/L (ref 136–145)
TROPONIN I SERPL DL<=0.01 NG/ML-MCNC: <0.006 NG/ML (ref 0–0.03)
TROPONIN I SERPL DL<=0.01 NG/ML-MCNC: <0.006 NG/ML (ref 0–0.03)
WBC # BLD AUTO: 7.31 K/UL (ref 3.9–12.7)

## 2023-03-27 PROCEDURE — 93010 EKG 12-LEAD: ICD-10-PCS | Mod: HCNC,,, | Performed by: INTERNAL MEDICINE

## 2023-03-27 PROCEDURE — 85025 COMPLETE CBC W/AUTO DIFF WBC: CPT | Mod: HCNC | Performed by: STUDENT IN AN ORGANIZED HEALTH CARE EDUCATION/TRAINING PROGRAM

## 2023-03-27 PROCEDURE — 84484 ASSAY OF TROPONIN QUANT: CPT | Mod: 91,HCNC | Performed by: STUDENT IN AN ORGANIZED HEALTH CARE EDUCATION/TRAINING PROGRAM

## 2023-03-27 PROCEDURE — 93005 ELECTROCARDIOGRAM TRACING: CPT | Mod: HCNC

## 2023-03-27 PROCEDURE — 81025 URINE PREGNANCY TEST: CPT | Mod: HCNC | Performed by: STUDENT IN AN ORGANIZED HEALTH CARE EDUCATION/TRAINING PROGRAM

## 2023-03-27 PROCEDURE — 80053 COMPREHEN METABOLIC PANEL: CPT | Mod: HCNC | Performed by: STUDENT IN AN ORGANIZED HEALTH CARE EDUCATION/TRAINING PROGRAM

## 2023-03-27 PROCEDURE — 93010 ELECTROCARDIOGRAM REPORT: CPT | Mod: HCNC,,, | Performed by: INTERNAL MEDICINE

## 2023-03-27 PROCEDURE — 83880 ASSAY OF NATRIURETIC PEPTIDE: CPT | Mod: HCNC | Performed by: STUDENT IN AN ORGANIZED HEALTH CARE EDUCATION/TRAINING PROGRAM

## 2023-03-27 PROCEDURE — 99285 EMERGENCY DEPT VISIT HI MDM: CPT | Mod: 25,HCNC

## 2023-03-27 NOTE — FIRST PROVIDER EVALUATION
"Medical screening examination initiated.  I have conducted a focused provider triage encounter, findings are as follows:    Brief history of present illness:  38 yo female with a PMH of CHF, nonischemic cardiomyopathy, pacemaker, ICD, HTN who presents with left sided chest pain for the past 3 days.  Patient also reports associated shortness of breath, headache and nausea.  She describes chest pain as a fluttering sensation.    Vitals:    03/27/23 1624   BP: (!) 150/90   BP Location: Left arm   Patient Position: Sitting   Pulse: 78   Resp: 18   Temp: 98.6 °F (37 °C)   TempSrc: Oral   SpO2: 98%   Weight: 82.1 kg (181 lb)   Height: 5' 10" (1.778 m)       Pertinent physical exam:  Non-toxic appearing, no respiratory distress, no focal neurologic deficits, ambulatory without difficulty or assistance.       Brief workup plan:  chest xray, EKG, labs, IV     Preliminary workup initiated; this workup will be continued and followed by the physician or advanced practice provider that is assigned to the patient when roomed.  "

## 2023-03-28 NOTE — ED PROVIDER NOTES
"Encounter Date: 3/27/2023    SCRIBE #1 NOTE: I, Alejandrina Hunter, am scribing for, and in the presence of,  Jostin Woods MD. I have scribed the following portions of the note - Other sections scribed: HPI, ROS, EKG.     History     Chief Complaint   Patient presents with    Chest Pain     Pt states that she started to have L sided chest pain x3 days ago with SOB, headache and nausea. She describes it as a fluttering sensation.  Pt has hx of CHF and defibrillator.       Nasra Marks is a 37 y.o. female, with a PMHx of CHF, HTN, who presents to the ED with chest pain, SOB, and a headache that began 3 days ago. Patient reports that she "feels like her heart is working really hard," and that she does not feel this way normally. Pt presented to the ED also for chest pain before the current visit on 3/24/23. Pt has endorsed 2 regular strengthTylenol every 8 hrs as attempted Tx for headaches. No other exacerbating or alleviating factors.     The history is provided by the patient. No  was used.   Review of patient's allergies indicates:   Allergen Reactions    Aldactone [spironolactone] Swelling     Lips swelled    Hydralazine analogues Other (See Comments)     headaches    Isosorbide Other (See Comments)     headaches     Past Medical History:   Diagnosis Date    CHF (congestive heart failure)     Chronic combined systolic and diastolic congestive heart failure 2019    Essential hypertension 2012    Hypertension     dx at 16y.o.    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 disease, with heart failure 2021    ICD (implantable cardioverter-defibrillator), single, in situ 2020    Nonischemic cardiomyopathy 2019    Pacemaker 2017     Past Surgical History:   Procedure Laterality Date    CARDIAC DEFIBRILLATOR PLACEMENT  2017     SECTION      x 1    INDUCED       RIGHT HEART CATHETERIZATION Right " 1/27/2022    Procedure: INSERTION, CATHETER, RIGHT HEART;  Surgeon: Timothy Prajapati Jr., MD;  Location: [facility] CATH LAB;  Service: Cardiology;  Laterality: Right;    TUBAL LIGATION       Family History   Problem Relation Age of Onset    Hypertension Mother     Cancer Maternal Grandmother         breast x 2    Cancer Paternal Grandmother         breast    No Known Problems Sister     No Known Problems Brother     No Known Problems Son     No Known Problems Brother     Hypertension Other     Diabetes Other     Breast cancer Other     Cancer Paternal Aunt         breast    Cancer Paternal Uncle      Social History     Tobacco Use    Smoking status: Never    Smokeless tobacco: Never   Substance Use Topics    Alcohol use: Yes     Comment: occasionally    Drug use: Yes     Types: Marijuana     Comment: in past very little marijuana     Review of Systems   Constitutional:  Negative for fatigue and fever.   HENT:  Negative for ear pain and sore throat.    Eyes: Negative.    Respiratory:  Positive for shortness of breath. Negative for cough and wheezing.    Cardiovascular:  Positive for chest pain.   Gastrointestinal:  Negative for abdominal pain, nausea and vomiting.   Endocrine: Negative.    Musculoskeletal:  Negative for myalgias.   Skin: Negative.    Neurological:  Positive for headaches.   Hematological: Negative.    Psychiatric/Behavioral: Negative.     All other systems reviewed and are negative.    Physical Exam     Initial Vitals [03/27/23 1624]   BP Pulse Resp Temp SpO2   (!) 150/90 78 18 98.6 °F (37 °C) 98 %      MAP       --         Physical Exam    Nursing note and vitals reviewed.  Constitutional: She appears well-developed and well-nourished.   HENT:   Head: Normocephalic and atraumatic.   Eyes: EOM are normal. Pupils are equal, round, and reactive to light.   Neck: Neck supple.   Normal range of motion.  Cardiovascular:  Normal rate, regular rhythm and normal heart sounds.           Pulmonary/Chest: Breath  sounds normal. No respiratory distress.   Abdominal: Abdomen is soft. Bowel sounds are normal.   Musculoskeletal:         General: Normal range of motion.      Cervical back: Normal range of motion and neck supple.     Neurological: She is alert and oriented to person, place, and time. She has normal strength.   Skin: Skin is warm and dry.       ED Course   Procedures  Labs Reviewed   B-TYPE NATRIURETIC PEPTIDE - Abnormal; Notable for the following components:       Result Value     (*)     All other components within normal limits   CBC W/ AUTO DIFFERENTIAL   COMPREHENSIVE METABOLIC PANEL   TROPONIN I   TROPONIN I   POCT URINE PREGNANCY     EKG Readings: (Independently Interpreted)   Initial Reading: No STEMI. Rhythm: Normal Sinus Rhythm. Heart Rate: 82. Conduction: Normal. ST Segments: Normal ST Segments. T Waves: Normal. Axis: Normal. Clinical Impression: Normal Sinus Rhythm     Imaging Results              X-Ray Chest PA And Lateral (Final result)  Result time 03/27/23 17:04:36      Final result by Kyle Campos MD (03/27/23 17:04:36)                   Impression:      No acute radiographic abnormality.      Electronically signed by: Kyle Campos  Date:    03/27/2023  Time:    17:04               Narrative:    EXAMINATION:  XR CHEST PA AND LATERAL    CLINICAL HISTORY:  Chest pain, unspecified    TECHNIQUE:  PA and lateral views of the chest were performed.    COMPARISON:  03/24/2023    FINDINGS:  Cardiac defibrillator device on the left.    The lungs are clear, with normal appearance of pulmonary vasculature and no pleural effusion or pneumothorax.    The cardiac silhouette is normal in size. The hilar and mediastinal contours are unremarkable.    Bones are intact.    No significant change.                                       Medications - No data to display  Medical Decision Making:   History:   Old Medical Records: I decided to obtain old medical records.  Initial Assessment:   Nasra Marks  "is a 37 y.o. female, with a PMHx of CHF, HTN, who presents to the ED with chest pain, SOB, and a headache that began 3 days ago. Patient reports that she "feels like her heart is working really hard," and that she does not feel this way at baseline. Pt presented to the ED also for chest pain before the current visit on 3/24/23. Pt has endorse 2 regular strength Tylenol  every 8 hrs as attempted Tx for headaches. No other exacerbating or alleviating factors.     Clinical Tests:   Lab Tests: Ordered and Reviewed  Radiological Study: Ordered and Reviewed  ED Management:  Course of ED stay:  EKG revealed no acute changes, troponins were normal x2 and patient denies chest pain after arriving to the emergency department.  BNP was 190 and CBC/CMP were reassuring.  Chest x-ray showed no acute disease.  Instructions for noncardiac chest pain and CHF were given and patient was advised to follow-up with her cardiologist within the next week for re-evaluation/return to the emergency department if condition worsens.        Scribe Attestation:   Scribe #1: I performed the above scribed service and the documentation accurately describes the services I performed. I attest to the accuracy of the note.                   Clinical Impression:   Final diagnoses:  [R07.9] Chest pain  [I50.42] Chronic combined systolic and diastolic congestive heart failure (Primary)     This document was produced by a scribe under my direction and in my presence. I agree with the content of the note and have made any necessary edits.     Dr. Woods    03/28/2023 3:11 AM       ED Disposition Condition    Discharge Stable          ED Prescriptions    None       Follow-up Information       Follow up With Specialties Details Why Contact Info    Veronika Rainey MD Family Medicine, Wound Care Schedule an appointment as soon as possible for a visit in 2 days For reevaluation [address] [facility] [address]  [phone]               Jostin Woods, " MD  03/28/23 031

## 2023-04-12 ENCOUNTER — PATIENT MESSAGE (OUTPATIENT)
Dept: ADMINISTRATIVE | Facility: HOSPITAL | Age: 38
End: 2023-04-12
Payer: MEDICARE

## 2023-05-23 DIAGNOSIS — I50.42 CHRONIC COMBINED SYSTOLIC AND DIASTOLIC CONGESTIVE HEART FAILURE: Primary | ICD-10-CM

## 2023-05-24 ENCOUNTER — OFFICE VISIT (OUTPATIENT)
Dept: FAMILY MEDICINE | Facility: CLINIC | Age: 38
End: 2023-05-24
Payer: MEDICARE

## 2023-05-24 VITALS
OXYGEN SATURATION: 97 % | TEMPERATURE: 98 F | WEIGHT: 181.44 LBS | HEART RATE: 80 BPM | DIASTOLIC BLOOD PRESSURE: 83 MMHG | BODY MASS INDEX: 25.98 KG/M2 | HEIGHT: 70 IN | SYSTOLIC BLOOD PRESSURE: 137 MMHG

## 2023-05-24 DIAGNOSIS — I50.9 CONGESTIVE HEART FAILURE, UNSPECIFIED HF CHRONICITY, UNSPECIFIED HEART FAILURE TYPE: ICD-10-CM

## 2023-05-24 DIAGNOSIS — I42.8 NONISCHEMIC CARDIOMYOPATHY: Chronic | ICD-10-CM

## 2023-05-24 DIAGNOSIS — K59.09 CHRONIC CONSTIPATION: Primary | ICD-10-CM

## 2023-05-24 DIAGNOSIS — Z86.73 HISTORY OF CVA (CEREBROVASCULAR ACCIDENT): ICD-10-CM

## 2023-05-24 DIAGNOSIS — E66.3 OVERWEIGHT (BMI 25.0-29.9): ICD-10-CM

## 2023-05-24 DIAGNOSIS — I50.42 CHRONIC COMBINED SYSTOLIC AND DIASTOLIC CONGESTIVE HEART FAILURE: ICD-10-CM

## 2023-05-24 DIAGNOSIS — Z95.810 ICD (IMPLANTABLE CARDIOVERTER-DEFIBRILLATOR), SINGLE, IN SITU: ICD-10-CM

## 2023-05-24 DIAGNOSIS — I10 ESSENTIAL HYPERTENSION: Chronic | ICD-10-CM

## 2023-05-24 PROCEDURE — 3008F BODY MASS INDEX DOCD: CPT | Mod: CPTII,S$GLB,, | Performed by: FAMILY MEDICINE

## 2023-05-24 PROCEDURE — 1160F RVW MEDS BY RX/DR IN RCRD: CPT | Mod: CPTII,S$GLB,, | Performed by: FAMILY MEDICINE

## 2023-05-24 PROCEDURE — 99999 PR PBB SHADOW E&M-EST. PATIENT-LVL III: ICD-10-PCS | Mod: PBBFAC,,, | Performed by: FAMILY MEDICINE

## 2023-05-24 PROCEDURE — 3079F DIAST BP 80-89 MM HG: CPT | Mod: CPTII,S$GLB,, | Performed by: FAMILY MEDICINE

## 2023-05-24 PROCEDURE — 1160F PR REVIEW ALL MEDS BY PRESCRIBER/CLIN PHARMACIST DOCUMENTED: ICD-10-PCS | Mod: CPTII,S$GLB,, | Performed by: FAMILY MEDICINE

## 2023-05-24 PROCEDURE — 99214 PR OFFICE/OUTPT VISIT, EST, LEVL IV, 30-39 MIN: ICD-10-PCS | Mod: S$GLB,,, | Performed by: FAMILY MEDICINE

## 2023-05-24 PROCEDURE — 4010F PR ACE/ARB THEARPY RXD/TAKEN: ICD-10-PCS | Mod: CPTII,S$GLB,, | Performed by: FAMILY MEDICINE

## 2023-05-24 PROCEDURE — 3079F PR MOST RECENT DIASTOLIC BLOOD PRESSURE 80-89 MM HG: ICD-10-PCS | Mod: CPTII,S$GLB,, | Performed by: FAMILY MEDICINE

## 2023-05-24 PROCEDURE — 1159F MED LIST DOCD IN RCRD: CPT | Mod: CPTII,S$GLB,, | Performed by: FAMILY MEDICINE

## 2023-05-24 PROCEDURE — 3075F PR MOST RECENT SYSTOLIC BLOOD PRESS GE 130-139MM HG: ICD-10-PCS | Mod: CPTII,S$GLB,, | Performed by: FAMILY MEDICINE

## 2023-05-24 PROCEDURE — 3075F SYST BP GE 130 - 139MM HG: CPT | Mod: CPTII,S$GLB,, | Performed by: FAMILY MEDICINE

## 2023-05-24 PROCEDURE — 4010F ACE/ARB THERAPY RXD/TAKEN: CPT | Mod: CPTII,S$GLB,, | Performed by: FAMILY MEDICINE

## 2023-05-24 PROCEDURE — 3008F PR BODY MASS INDEX (BMI) DOCUMENTED: ICD-10-PCS | Mod: CPTII,S$GLB,, | Performed by: FAMILY MEDICINE

## 2023-05-24 PROCEDURE — 99999 PR PBB SHADOW E&M-EST. PATIENT-LVL III: CPT | Mod: PBBFAC,,, | Performed by: FAMILY MEDICINE

## 2023-05-24 PROCEDURE — 99214 OFFICE O/P EST MOD 30 MIN: CPT | Mod: S$GLB,,, | Performed by: FAMILY MEDICINE

## 2023-05-24 PROCEDURE — 1159F PR MEDICATION LIST DOCUMENTED IN MEDICAL RECORD: ICD-10-PCS | Mod: CPTII,S$GLB,, | Performed by: FAMILY MEDICINE

## 2023-05-24 RX ORDER — POLYETHYLENE GLYCOL 3350 17 G/17G
17 POWDER, FOR SOLUTION ORAL DAILY
Qty: 507 G | Refills: 11 | Status: SHIPPED | OUTPATIENT
Start: 2023-05-24 | End: 2023-08-14

## 2023-05-24 RX ORDER — DAPAGLIFLOZIN 10 MG/1
TABLET, FILM COATED ORAL
Qty: 30 TABLET | Refills: 6 | Status: SHIPPED | OUTPATIENT
Start: 2023-05-24

## 2023-05-24 RX ORDER — DOCUSATE SODIUM 100 MG/1
100 CAPSULE, LIQUID FILLED ORAL 2 TIMES DAILY
Qty: 180 CAPSULE | Refills: 3 | Status: SHIPPED | OUTPATIENT
Start: 2023-05-24 | End: 2023-08-14

## 2023-05-24 NOTE — PROGRESS NOTES
"  Physical Exam  /83   Pulse 80   Temp 98.3 °F (36.8 °C) (Oral)   Ht 5' 10" (1.778 m)   Wt 82.3 kg (181 lb 7 oz)   LMP 2023   SpO2 97%   BMI 26.03 kg/m²      Office Visit    Patient Name: Nsara Marks    : 1985  MRN: [id number]      Assessment/Plan:  Nasra Marks is a 37 y.o. female who presents today for :    Constipation  -     polyethylene glycol (GLYCOLAX) 17 gram/dose powder; Take 17 g by mouth once daily.  Dispense: 507 g; Refill: 11  -advised adequate hydration, high fiber diet with at least 30gm of dietary fiber daily - handout on high fiber food options given to patient.  -Miralax daily as needed      Essential hypertension  Overweight (BMI 25.0-29.9)  History of CVA (cerebrovascular accident)  -continue current medication regimen  -DASH diet, regular cardiovascular exercises  -weight loss      Chronic combined systolic and diastolic congestive heart failure  ICD (implantable cardioverter-defibrillator), single, in situ  Nonischemic cardiomyopathy  -stable, continue current regimen   -f/u Cardiology as needed          Follow up PRN for worsening Sx.        This note was created by combination of typed  and MModal dictation.  Transcription errors may be present.  If there are any questions, please contact me.      ----------------------------------------------------------------------------------------------------------------------      HPI:  Patient Care Team:  Veronika Rainey MD as PCP - General (Family Medicine)  India Vidales LPN as Licensed Practical Nurse  Carito Geiger RN as Heart Failure Coordinator (Advanced Heart Failure & Transplant Cardiology)  Jodi Justin RN as Heart Failure Coordinator (Advanced Heart Failure & Transplant Cardiology)    Nasra is a 37 y.o. female with      Patient Active Problem List   Diagnosis    Essential hypertension    Chest pain    Sciatica of left side    Chronic combined systolic and diastolic congestive " heart failure    Nonischemic cardiomyopathy    ICD (implantable cardioverter-defibrillator), single, in situ    Shortness of breath    Pleurisy    Congestive heart failure    Troponin level elevated    Pneumonia due to COVID-19 virus    History of COVID-19    Syncope    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 disease, with heart failure    History of CVA (cerebrovascular accident)    Nonintractable headache    Cerebral infarction due to unspecified occlusion or stenosis of right posterior cerebral artery     This patient is new to me       Nasra presents today for:  Constipation    Which she has been having for about 3 weeks. She has have a Hx of chronic constipation in the past that comes and goes. She denies any abd pain. No blood in stool. She does admit that she could consume more fiber in her diet. Her last BM was yesterday, which was a small amount, and the one prior to that was a week ago. She denies any pain/discomfort today.           CHF/HTN/NICM -  compliant with meds as prescribed, denies any side effects.  She follows Cardiology regularly - No CP/SOB/ELENA/vision changes/urinary changes/leg swelling. Otherwise, no other acute issues during this visit.      Additional ROS      CONST: no fever, no activity change, weight stable.   EYES: no vision change.   ENT: no sore throat. No dysphagia.   CV: no CP with exertion  RESP: no SOB  GI: no N/V/diarrhea/, +onstipation  : no urinary concerns  MSK: no new myalgias or arthralgias.   SKIN: no new rashes  NEURO: no focal deficits.   PSYCH: no new issues.   ENDOCRINE: no polyuria.                 Patient Active Problem List   Diagnosis    Essential hypertension    Chest pain    Sciatica of left side    Chronic combined systolic and diastolic congestive heart failure    Nonischemic cardiomyopathy    ICD (implantable cardioverter-defibrillator), single, in situ    Shortness of breath    Pleurisy    Congestive heart failure     Troponin level elevated    Pneumonia due to COVID-19 virus    History of COVID-19    Syncope    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 disease, with heart failure    History of CVA (cerebrovascular accident)    Nonintractable headache    Cerebral infarction due to unspecified occlusion or stenosis of right posterior cerebral artery       Current Medications  Medications reviewed/updated.     Current Outpatient Medications on File Prior to Visit   Medication Sig Dispense Refill    aspirin (ECOTRIN) 81 MG EC tablet Take 1 tablet (81 mg total) by mouth once daily. 30 tablet 0    atorvastatin (LIPITOR) 20 MG tablet Take 1 tablet (20 mg total) by mouth every evening. 90 tablet 3    dapagliflozin (FARXIGA) 10 mg tablet Take 1 tablet by mouth once daily 30 tablet 0    eplerenone (INSPRA) 25 MG Tab Take 1 tablet (25 mg total) by mouth once daily. 90 tablet 3    furosemide (LASIX) 40 MG tablet Take 1 tablet (40 mg total) by mouth 2 (two) times a day. 180 tablet 3    metoprolol succinate (TOPROL-XL) 100 MG 24 hr tablet Take 1 tablet (100 mg total) by mouth once daily. 30 tablet 11    potassium chloride SA (K-DUR,KLOR-CON) 20 MEQ tablet Take 4 tablets first day this is started and thereafter take one with every dose of furosemide. 64 tablet 5    sacubitriL-valsartan (ENTRESTO)  mg per tablet Take 1 tablet by mouth 2 (two) times daily. 180 tablet 3    predniSONE (DELTASONE) 50 MG Tab Take 1 tablet (50mg) 13 hours prior to MRI, 2nd tablet (50mg) 7 hours prior to MRI, and 3rd tablet (50mg) 1 hour prior to MRi (Patient not taking: Reported on 2023) 3 tablet 0    sars-cov-2, covid-19, (PFIZER COVID-19) 30 mcg/0.3 ml injection        No current facility-administered medications on file prior to visit.           Past Surgical History:   Procedure Laterality Date    CARDIAC DEFIBRILLATOR PLACEMENT  2017     SECTION      x 1    INDUCED       RIGHT HEART  "CATHETERIZATION Right 1/27/2022    Procedure: INSERTION, CATHETER, RIGHT HEART;  Surgeon: Timothy Prajapati Jr., MD;  Location: [facility] CATH LAB;  Service: Cardiology;  Laterality: Right;    TUBAL LIGATION         Family History   Problem Relation Age of Onset    Hypertension Mother     Cancer Maternal Grandmother         breast x 2    Cancer Paternal Grandmother         breast    No Known Problems Sister     No Known Problems Brother     No Known Problems Son     No Known Problems Brother     Hypertension Other     Diabetes Other     Breast cancer Other     Cancer Paternal Aunt         breast    Cancer Paternal Uncle        Social History     Socioeconomic History    Marital status:    Occupational History     Employer: [employer]   Tobacco Use    Smoking status: Never    Smokeless tobacco: Never   Substance and Sexual Activity    Alcohol use: Yes     Comment: occasionally    Drug use: Yes     Types: Marijuana     Comment: in past very little marijuana    Sexual activity: Yes     Partners: Male     Birth control/protection: None             Allergies   Review of patient's allergies indicates:   Allergen Reactions    Aldactone [spironolactone] Swelling     Lips swelled    Hydralazine analogues Other (See Comments)     headaches    Isosorbide Other (See Comments)     headaches             Review of Systems  See HPI      [unfilled]  /83   Pulse 80   Temp 98.3 °F (36.8 °C) (Oral)   Ht 5' 10" (1.778 m)   Wt 82.3 kg (181 lb 7 oz)   LMP 05/18/2023   SpO2 97%   BMI 26.03 kg/m²       GEN: NAD, pleasant  HEENT: NCAT, PERRLA, EOMI, sclera clear, anicteric  NECK: normal, supple  LUNGS: CTAB, no w/r/r, normal respiratory effort  HEART: RRR, normal S1 and S2, no m/r/g, no palpitations, no edema  ABD: no generalized abd TTP, soft/non-distended, NABS, no organomegaly, no masses, no hernias, no rebound, no guarding. No RLQ/LLQ TTP. Suprapubic tenderness absent. No CVA tenderness.  SKIN: warm and dry with normal " turgor, no rashes, no other lesions.   PSYCH: AOx3, appropriate mood and affect.   MSK: extremities warm/well perfused, normal ROM in all 4 extremities, no c/c/e.   NEURO: normal without focal findings, CN II-XII are intact.  Sensation grossly normal, gait and station normal.

## 2023-05-31 ENCOUNTER — PES CALL (OUTPATIENT)
Dept: ADMINISTRATIVE | Facility: CLINIC | Age: 38
End: 2023-05-31
Payer: MEDICARE

## 2023-07-11 ENCOUNTER — LAB VISIT (OUTPATIENT)
Dept: LAB | Facility: HOSPITAL | Age: 38
End: 2023-07-11
Attending: INTERNAL MEDICINE
Payer: MEDICARE

## 2023-07-11 DIAGNOSIS — E78.5 DYSLIPIDEMIA: ICD-10-CM

## 2023-07-11 LAB
CHOLEST SERPL-MCNC: 134 MG/DL (ref 120–199)
CHOLEST/HDLC SERPL: 3.4 {RATIO} (ref 2–5)
HDLC SERPL-MCNC: 40 MG/DL (ref 40–75)
HDLC SERPL: 29.9 % (ref 20–50)
LDLC SERPL CALC-MCNC: 86.2 MG/DL (ref 63–159)
NONHDLC SERPL-MCNC: 94 MG/DL
TRIGL SERPL-MCNC: 39 MG/DL (ref 30–150)

## 2023-07-11 PROCEDURE — 80061 LIPID PANEL: CPT | Performed by: INTERNAL MEDICINE

## 2023-07-21 ENCOUNTER — TELEPHONE (OUTPATIENT)
Dept: FAMILY MEDICINE | Facility: CLINIC | Age: 38
End: 2023-07-21
Payer: MEDICARE

## 2023-07-21 ENCOUNTER — OFFICE VISIT (OUTPATIENT)
Dept: CARDIOLOGY | Facility: CLINIC | Age: 38
End: 2023-07-21
Payer: MEDICARE

## 2023-07-21 VITALS
HEART RATE: 68 BPM | OXYGEN SATURATION: 95 % | BODY MASS INDEX: 26.34 KG/M2 | WEIGHT: 184 LBS | DIASTOLIC BLOOD PRESSURE: 93 MMHG | HEIGHT: 70 IN | SYSTOLIC BLOOD PRESSURE: 135 MMHG | RESPIRATION RATE: 18 BRPM

## 2023-07-21 DIAGNOSIS — Z95.810 ICD (IMPLANTABLE CARDIOVERTER-DEFIBRILLATOR), SINGLE, IN SITU: ICD-10-CM

## 2023-07-21 DIAGNOSIS — Z86.73 HISTORY OF CVA (CEREBROVASCULAR ACCIDENT): ICD-10-CM

## 2023-07-21 DIAGNOSIS — E78.5 DYSLIPIDEMIA: ICD-10-CM

## 2023-07-21 DIAGNOSIS — I50.42 CHRONIC COMBINED SYSTOLIC AND DIASTOLIC CONGESTIVE HEART FAILURE: ICD-10-CM

## 2023-07-21 DIAGNOSIS — I42.8 NONISCHEMIC CARDIOMYOPATHY: Primary | ICD-10-CM

## 2023-07-21 DIAGNOSIS — I10 ESSENTIAL HYPERTENSION: ICD-10-CM

## 2023-07-21 PROCEDURE — 99999 PR PBB SHADOW E&M-EST. PATIENT-LVL IV: CPT | Mod: PBBFAC,,, | Performed by: INTERNAL MEDICINE

## 2023-07-21 PROCEDURE — 3075F PR MOST RECENT SYSTOLIC BLOOD PRESS GE 130-139MM HG: ICD-10-PCS | Mod: CPTII,S$GLB,, | Performed by: INTERNAL MEDICINE

## 2023-07-21 PROCEDURE — 99999 PR PBB SHADOW E&M-EST. PATIENT-LVL IV: ICD-10-PCS | Mod: PBBFAC,,, | Performed by: INTERNAL MEDICINE

## 2023-07-21 PROCEDURE — 1159F MED LIST DOCD IN RCRD: CPT | Mod: CPTII,S$GLB,, | Performed by: INTERNAL MEDICINE

## 2023-07-21 PROCEDURE — 4010F PR ACE/ARB THEARPY RXD/TAKEN: ICD-10-PCS | Mod: CPTII,S$GLB,, | Performed by: INTERNAL MEDICINE

## 2023-07-21 PROCEDURE — 1160F PR REVIEW ALL MEDS BY PRESCRIBER/CLIN PHARMACIST DOCUMENTED: ICD-10-PCS | Mod: CPTII,S$GLB,, | Performed by: INTERNAL MEDICINE

## 2023-07-21 PROCEDURE — 99214 PR OFFICE/OUTPT VISIT, EST, LEVL IV, 30-39 MIN: ICD-10-PCS | Mod: S$GLB,,, | Performed by: INTERNAL MEDICINE

## 2023-07-21 PROCEDURE — 3080F PR MOST RECENT DIASTOLIC BLOOD PRESSURE >= 90 MM HG: ICD-10-PCS | Mod: CPTII,S$GLB,, | Performed by: INTERNAL MEDICINE

## 2023-07-21 PROCEDURE — 1160F RVW MEDS BY RX/DR IN RCRD: CPT | Mod: CPTII,S$GLB,, | Performed by: INTERNAL MEDICINE

## 2023-07-21 PROCEDURE — 3008F PR BODY MASS INDEX (BMI) DOCUMENTED: ICD-10-PCS | Mod: CPTII,S$GLB,, | Performed by: INTERNAL MEDICINE

## 2023-07-21 PROCEDURE — 1159F PR MEDICATION LIST DOCUMENTED IN MEDICAL RECORD: ICD-10-PCS | Mod: CPTII,S$GLB,, | Performed by: INTERNAL MEDICINE

## 2023-07-21 PROCEDURE — 93282 PR PROGRAM EVAL (IN PERSON) IMPLANT DEVICE,CARDVERT/DEFIB,1 LEAD: ICD-10-PCS | Mod: 26,,, | Performed by: INTERNAL MEDICINE

## 2023-07-21 PROCEDURE — 3075F SYST BP GE 130 - 139MM HG: CPT | Mod: CPTII,S$GLB,, | Performed by: INTERNAL MEDICINE

## 2023-07-21 PROCEDURE — 3008F BODY MASS INDEX DOCD: CPT | Mod: CPTII,S$GLB,, | Performed by: INTERNAL MEDICINE

## 2023-07-21 PROCEDURE — 93282 PRGRMG EVAL IMPLANTABLE DFB: CPT | Mod: 26,,, | Performed by: INTERNAL MEDICINE

## 2023-07-21 PROCEDURE — 99214 OFFICE O/P EST MOD 30 MIN: CPT | Mod: S$GLB,,, | Performed by: INTERNAL MEDICINE

## 2023-07-21 PROCEDURE — 4010F ACE/ARB THERAPY RXD/TAKEN: CPT | Mod: CPTII,S$GLB,, | Performed by: INTERNAL MEDICINE

## 2023-07-21 PROCEDURE — 3080F DIAST BP >= 90 MM HG: CPT | Mod: CPTII,S$GLB,, | Performed by: INTERNAL MEDICINE

## 2023-07-21 RX ORDER — ATORVASTATIN CALCIUM 40 MG/1
40 TABLET, FILM COATED ORAL NIGHTLY
Qty: 90 TABLET | Refills: 3 | Status: SHIPPED | OUTPATIENT
Start: 2023-07-21 | End: 2024-07-20

## 2023-07-21 RX ORDER — EPLERENONE 25 MG/1
25 TABLET, FILM COATED ORAL DAILY
Qty: 90 TABLET | Refills: 3 | Status: SHIPPED | OUTPATIENT
Start: 2023-07-21 | End: 2023-07-28 | Stop reason: SDUPTHER

## 2023-07-21 NOTE — TELEPHONE ENCOUNTER
----- Message from Magali More, Patient Care Assistant sent at 7/21/2023  4:36 PM CDT -----  Regarding: BP Check  Please reach out to patient and assist within scheduling for a blood pressure recheck within two weeks of today's visit per the request of Dr. Peguero.     Thank you,     Magali More MA

## 2023-07-21 NOTE — PROGRESS NOTES
CARDIOVASCULAR PROGRESS NOTE    REASON FOR CONSULT:   Nasra Marks is a 38 y.o. female who presents for follow up of MINDY, MDT ICD.    PCP: Redd  Gyn: Labadie, Juan  CHF: Eiswirth  HISTORY OF PRESENT ILLNESS:   COVID+ 21    The patient returns for follow-up.  She reports generally asymptomatic status without angiogram dyspnea, palpitations, syncope, or defibrillator discharges.  There has been no PND, orthopnea, melena, hematuria, or claudication symptoms.  She has some concerns as her disability appears to have run its course and she needs to find a job.  I suggested that this is a good idea for her as I do not think that she should be on long-term disability.      The patient tells me that she is run out of her eplerenone, and her blood pressure is slightly out of range.    The Medtronic defibrillator was interrogated in the office today.  Current rhythm is V sensed at 73 beats per minute.  This is a single-chamber device.  Sensing and pacing thresholds as well as impedance are normal.  Battery life is at 7 years.  No high atrial or ventricular rates have been observed.  No programming changes were made.    CARDIOVASCULAR HISTORY:   NICM (cath 2017 with normal cors)  ICD (Donta 2017, MDT single chamber)    PAST MEDICAL HISTORY:     Past Medical History:   Diagnosis Date    CHF (congestive heart failure)     Chronic combined systolic and diastolic congestive heart failure 2019    Essential hypertension 2012    Hypertension     dx at 16y.o.    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 disease, with heart failure 2021    ICD (implantable cardioverter-defibrillator), single, in situ 2020    Nonischemic cardiomyopathy 2019    Pacemaker 2017       PAST SURGICAL HISTORY:     Past Surgical History:   Procedure Laterality Date    CARDIAC DEFIBRILLATOR PLACEMENT  2017     SECTION      x 1    INDUCED       RIGHT  HEART CATHETERIZATION Right 1/27/2022    Procedure: INSERTION, CATHETER, RIGHT HEART;  Surgeon: Timothy Prajapati Jr., MD;  Location: [facility] CATH LAB;  Service: Cardiology;  Laterality: Right;    TUBAL LIGATION         ALLERGIES AND MEDICATION:     Review of patient's allergies indicates:   Allergen Reactions    Aldactone [spironolactone] Swelling     Lips swelled    Hydralazine analogues Other (See Comments)     headaches    Isosorbide Other (See Comments)     headaches        Medication List            Accurate as of July 21, 2023  1:18 PM. If you have any questions, ask your nurse or doctor.                CONTINUE taking these medications      aspirin 81 MG EC tablet  Commonly known as: ECOTRIN  Take 1 tablet (81 mg total) by mouth once daily.     atorvastatin 20 MG tablet  Commonly known as: LIPITOR  Take 1 tablet (20 mg total) by mouth every evening.     docusate sodium 100 MG capsule  Commonly known as: COLACE  Take 1 capsule (100 mg total) by mouth 2 (two) times daily.     ENTRESTO  mg per tablet  Generic drug: sacubitriL-valsartan  Take 1 tablet by mouth 2 (two) times daily.     eplerenone 25 MG Tab  Commonly known as: INSPRA  Take 1 tablet (25 mg total) by mouth once daily.     FARXIGA 10 mg tablet  Generic drug: dapagliflozin propanediol  Take 1 tablet by mouth once daily     furosemide 40 MG tablet  Commonly known as: LASIX  Take 1 tablet (40 mg total) by mouth 2 (two) times a day.     metoprolol succinate 100 MG 24 hr tablet  Commonly known as: TOPROL-XL  Take 1 tablet (100 mg total) by mouth once daily.     polyethylene glycol 17 gram/dose powder  Commonly known as: GLYCOLAX  Take 17 g by mouth once daily.     potassium chloride SA 20 MEQ tablet  Commonly known as: K-DUR,KLOR-CON  Take 4 tablets first day this is started and thereafter take one with every dose of furosemide.     sars-cov-2 (covid-19) 30 mcg/0.3 ml injection  Commonly known as: Pfizer COVID-19            STOP taking these  medications      predniSONE 50 MG Tab  Commonly known as: DELTASONE  Stopped by: Kashif Peguero MD              SOCIAL HISTORY:     Social History     Socioeconomic History    Marital status:    Occupational History     Employer: [employer]   Tobacco Use    Smoking status: Never    Smokeless tobacco: Never   Substance and Sexual Activity    Alcohol use: Yes     Comment: occasionally    Drug use: Yes     Types: Marijuana     Comment: in past very little marijuana    Sexual activity: Yes     Partners: Male     Birth control/protection: None       FAMILY HISTORY:     Family History   Problem Relation Age of Onset    Hypertension Mother     Cancer Maternal Grandmother         breast x 2    Cancer Paternal Grandmother         breast    No Known Problems Sister     No Known Problems Brother     No Known Problems Son     No Known Problems Brother     Hypertension Other     Diabetes Other     Breast cancer Other     Cancer Paternal Aunt         breast    Cancer Paternal Uncle        REVIEW OF SYSTEMS:   Review of Systems   Constitutional:  Negative for chills, diaphoresis, fever and malaise/fatigue.   HENT:  Negative for nosebleeds.    Eyes:  Negative for blurred vision, double vision and photophobia.   Respiratory:  Negative for hemoptysis, shortness of breath and wheezing.    Cardiovascular:  Negative for chest pain, palpitations, orthopnea, claudication, leg swelling and PND.   Gastrointestinal:  Negative for abdominal pain, blood in stool, heartburn, melena, nausea and vomiting.   Genitourinary:  Negative for flank pain and hematuria.   Musculoskeletal:  Negative for falls, myalgias and neck pain.   Skin:  Negative for rash.   Neurological:  Negative for dizziness, seizures, loss of consciousness, weakness and headaches.   Endo/Heme/Allergies:  Negative for polydipsia. Does not bruise/bleed easily.   Psychiatric/Behavioral:  Negative for depression and memory loss. The patient is not nervous/anxious.   "    PHYSICAL EXAM:     Vitals:    07/21/23 1311   BP: (!) 135/93   Pulse: 68   Resp: 18    Body mass index is 26.4 kg/m².  Weight: 83.4 kg (183 lb 15.6 oz)   Height: 5' 10" (177.8 cm)     Physical Exam  Vitals reviewed.   Constitutional:       General: She is not in acute distress.     Appearance: Normal appearance. She is well-developed. She is not ill-appearing, toxic-appearing or diaphoretic.   HENT:      Head: Normocephalic and atraumatic.   Eyes:      General: No scleral icterus.     Extraocular Movements: Extraocular movements intact.      Conjunctiva/sclera: Conjunctivae normal.      Pupils: Pupils are equal, round, and reactive to light.   Neck:      Thyroid: No thyromegaly.      Vascular: Normal carotid pulses. No carotid bruit or JVD.      Trachea: Trachea normal. No tracheal deviation.   Cardiovascular:      Rate and Rhythm: Normal rate and regular rhythm.      Pulses:           Carotid pulses are 2+ on the right side and 2+ on the left side.     Heart sounds: S1 normal and S2 normal. No murmur heard.    No friction rub. No gallop.      Comments: L infraclavicular ICD site well healed  Pulmonary:      Effort: Pulmonary effort is normal. No respiratory distress.      Breath sounds: Normal breath sounds. No stridor. No wheezing, rhonchi or rales.   Chest:      Chest wall: No tenderness.   Abdominal:      General: There is no distension.      Palpations: Abdomen is soft.   Musculoskeletal:         General: No swelling or tenderness. Normal range of motion.      Cervical back: Normal range of motion and neck supple. No edema or rigidity.      Right lower leg: No edema.      Left lower leg: No edema.   Feet:      Right foot:      Skin integrity: No ulcer.      Left foot:      Skin integrity: No ulcer.   Skin:     General: Skin is warm and dry.      Coloration: Skin is not jaundiced.   Neurological:      General: No focal deficit present.      Mental Status: She is alert and oriented to person, place, and time. "      Cranial Nerves: No cranial nerve deficit.   Psychiatric:         Mood and Affect: Mood normal.         Speech: Speech normal.         Behavior: Behavior normal. Behavior is cooperative.       DATA:   EKG: (personally reviewed tracing(s))  3/27/23 SR 82, NSSTTW changes    Laboratory:  CBC:  Recent Labs   Lab 11/25/22  1408 01/17/23  0434 03/27/23  1659   WBC 7.18 8.84 7.31   Hemoglobin 13.4 12.7 12.8   Hematocrit 39.6 37.8 39.2   Platelets 377 342 378         CHEMISTRIES:  Recent Labs   Lab 01/14/22  0807 01/26/22  0934 02/25/22  1035 08/26/22  2043 09/25/22  2335 11/25/22  1408 01/17/23  0434 01/20/23  0720 02/06/23  1151 03/27/23  1659   Glucose 96 75 78   < > 144 H 88 110 88 85 102   Sodium 140 140 139   < > 142 136 140 138 136 141   Potassium 4.2 4.3 3.5   < > 2.8 L 3.6 3.5 3.1 L 3.7 3.6   BUN 12 17 10   < > 12 12 13 13 13 10   Creatinine 0.8 0.8 0.8   < > 1.0 0.8 0.8 0.8 0.8 0.9   eGFR if African American >60 >60.0 >60.0  --   --   --   --   --   --   --    eGFR if non African American >60 >60.0 >60.0  --   --   --   --   --   --   --    Calcium 8.6 L 9.1 9.1   < > 9.7 9.0 8.8 9.1 9.4 8.7   Magnesium  --   --   --   --  2.0 1.9 2.2  --   --   --     < > = values in this interval not displayed.         CARDIAC BIOMARKERS:  Recent Labs   Lab 07/24/21  1802 07/24/21  2136 01/17/23 0434 03/27/23  1659 03/27/23 2052   CPK 77  --   --   --   --    Troponin I 0.035 H   < > <0.006 <0.006 <0.006    < > = values in this interval not displayed.         COAGS:  Recent Labs   Lab 08/02/20  1329   INR 1.0         LIPIDS/LFTS:  Recent Labs   Lab 08/16/21  1409 09/15/21  0834 01/20/23  0720 03/27/23  1659 07/11/23  0836   Cholesterol 164  --   --   --  134   Triglycerides 106  --   --   --  39   HDL 36 L  --   --   --  40   LDL Cholesterol 106.8  --   --   --  86.2   Non-HDL Cholesterol 128  --   --   --  94   AST  --    < > 15 21 20   ALT  --    < > 12 18 15    < > = values in this interval not displayed.       Lab  Results   Component Value Date    TSH 1.386 12/28/2021         Cardiovascular Testing:  Carotid US 1/20/23  There is 0-19% right Internal Carotid Stenosis.  There is 0-19% left Internal Carotid Stenosis.    RHC 1/27/22   Right atrial pressure is normal.  Pulmonary capillary wedge pressure is normal.  Pulmonary artery pressure is normal.  Pulmonary vascular resistance is normal.  Systemic vascular resistance is 1180.  Jeovany cardiac output and index is normal.  RA: 8/ 5/ 7 RV: 26/ 8 PA: 23/ 10/ 14 PWP: 15/ 12/ 12 .   Cardiac output was 5.22 by Jeovany. Cardiac index is 2.67 L/min/m2.   O2 Sat: PA 70%.   Pulmonary vascular resistance: 31. Systemic vascular resistance: 1180.   These hemodynamic are acceptable for cardiac transplant listing.     Echo 10/6/21  The left ventricle is severely enlarged with eccentric hypertrophy and severely decreased systolic function.  The estimated ejection fraction is 15%.  Grade III left ventricular diastolic dysfunction.  Normal right ventricular size with normal right ventricular systolic function.  Moderate left atrial enlargement.  Mild mitral regurgitation.  Normal central venous pressure (3 mmHg).  The estimated PA systolic pressure is 15 mmHg.    Cath 4/18/17  B. Summary/Post-Operative Diagnosis    Normal coronary arteries.    Systolic dysfunction.    Mildly elevated left Filling Pressures.    Normal Pulmonary Hypertension.  D. Hemodynamic Results  Ejection Fraction: 20%  Global LV Function: severely depressed  PW:  (4)  PA: 24  CO_THERM: 4.4  RV: 25  RA:  (5)  LVEDP: 17   E. Angiographic Results       Patient has a right dominant coronary artery.      - Left Main Coronary Artery:             The LM is normal. There is DANNY 3 flow.     - Left Anterior Descending Artery:             The LAD is normal. There is DANNY 3 flow.     - Left Circumflex Artery:             The LCX is normal. There is DANNY 3 flow.     - Right Coronary Artery:             The RCA is normal. There is DANNY 3  flow.     - Left Renal Artery:             The ostial left renal is normal.     - Right Renal Artery:             The ostial right renal is normal.     - Common Femoral Artery:             The right CFA is normal.      ASSESSMENT:   # NICM, appears euvolemic.  Following with Dr. Prajapati.  # MDT ICD, functioning normally  # hx NSVT, last in 8/12/21  # HTN, uncontrolled  # HLP on atorva 20mg  # remote CVA (PICA territory on CT head 11/25/22).  Carotid US 1/20/23 neg.    PLAN:   Cont med rx  Cont ASA 81mg qd  In atorva 40mg qhs  Refill eplerenone  RN BP check 2 weeks (Dr. Rainey)  RTC 6 months with MDT ICD check and lipids/LFT (Jan 2024)      Kashif Peguero MD, FACC

## 2023-07-28 DIAGNOSIS — R07.89 CHEST PAIN, NON-CARDIAC: ICD-10-CM

## 2023-07-28 DIAGNOSIS — I50.42 CHRONIC COMBINED SYSTOLIC AND DIASTOLIC CONGESTIVE HEART FAILURE: ICD-10-CM

## 2023-07-29 RX ORDER — FUROSEMIDE 40 MG/1
40 TABLET ORAL 2 TIMES DAILY
Qty: 180 TABLET | Refills: 3 | Status: SHIPPED | OUTPATIENT
Start: 2023-07-29 | End: 2024-03-14 | Stop reason: SDUPTHER

## 2023-07-29 RX ORDER — EPLERENONE 25 MG/1
25 TABLET, FILM COATED ORAL DAILY
Qty: 90 TABLET | Refills: 3 | Status: SHIPPED | OUTPATIENT
Start: 2023-07-29

## 2023-07-31 ENCOUNTER — PATIENT MESSAGE (OUTPATIENT)
Dept: ADMINISTRATIVE | Facility: HOSPITAL | Age: 38
End: 2023-07-31
Payer: MEDICARE

## 2023-08-04 ENCOUNTER — CLINICAL SUPPORT (OUTPATIENT)
Dept: FAMILY MEDICINE | Facility: CLINIC | Age: 38
End: 2023-08-04
Payer: MEDICARE

## 2023-08-04 VITALS — HEART RATE: 74 BPM | OXYGEN SATURATION: 99 % | SYSTOLIC BLOOD PRESSURE: 132 MMHG | DIASTOLIC BLOOD PRESSURE: 92 MMHG

## 2023-08-04 DIAGNOSIS — I10 ESSENTIAL HYPERTENSION: Primary | ICD-10-CM

## 2023-08-04 PROCEDURE — 99999 PR PBB SHADOW E&M-EST. PATIENT-LVL II: CPT | Mod: PBBFAC,,,

## 2023-08-04 PROCEDURE — 99999 PR PBB SHADOW E&M-EST. PATIENT-LVL II: ICD-10-PCS | Mod: PBBFAC,,,

## 2023-08-04 NOTE — PROGRESS NOTES
Nasra Marks 38 y.o. female is here today for Blood Pressure check.   History of HTN yes.    Review of patient's allergies indicates:   Allergen Reactions    Aldactone [spironolactone] Swelling     Lips swelled    Hydralazine analogues Other (See Comments)     headaches    Isosorbide Other (See Comments)     headaches     Creatinine   Date Value Ref Range Status   03/27/2023 0.9 0.5 - 1.4 mg/dL Final     Sodium   Date Value Ref Range Status   03/27/2023 141 136 - 145 mmol/L Final     Potassium   Date Value Ref Range Status   03/27/2023 3.6 3.5 - 5.1 mmol/L Final   ]  Patient denies taking blood pressure medications on a regular basis at the same time of the day.     Current Outpatient Medications:     aspirin (ECOTRIN) 81 MG EC tablet, Take 1 tablet (81 mg total) by mouth once daily., Disp: 30 tablet, Rfl: 0    atorvastatin (LIPITOR) 40 MG tablet, Take 1 tablet (40 mg total) by mouth every evening., Disp: 90 tablet, Rfl: 3    docusate sodium (COLACE) 100 MG capsule, Take 1 capsule (100 mg total) by mouth 2 (two) times daily., Disp: 180 capsule, Rfl: 3    eplerenone (INSPRA) 25 MG Tab, Take 1 tablet (25 mg total) by mouth once daily., Disp: 90 tablet, Rfl: 3    FARXIGA 10 mg tablet, Take 1 tablet by mouth once daily, Disp: 30 tablet, Rfl: 6    furosemide (LASIX) 40 MG tablet, Take 1 tablet (40 mg total) by mouth 2 (two) times a day., Disp: 180 tablet, Rfl: 3    metoprolol succinate (TOPROL-XL) 100 MG 24 hr tablet, Take 1 tablet (100 mg total) by mouth once daily., Disp: 30 tablet, Rfl: 11    polyethylene glycol (GLYCOLAX) 17 gram/dose powder, Take 17 g by mouth once daily., Disp: 507 g, Rfl: 11    potassium chloride SA (K-DUR,KLOR-CON) 20 MEQ tablet, Take 4 tablets first day this is started and thereafter take one with every dose of furosemide., Disp: 64 tablet, Rfl: 5    sacubitriL-valsartan (ENTRESTO)  mg per tablet, Take 1 tablet by mouth 2 (two) times daily., Disp: 180 tablet, Rfl: 3    sars-cov-2,  covid-19, (PFIZER COVID-19) 30 mcg/0.3 ml injection, , Disp: , Rfl:   Does patient have record of home blood pressure readings no.   Last dose of blood pressure medication was taken at 1000 on 8/3/23, patient has not taken her blood pressure medication on today.  Patient is asymptomatic. Patient informed that her Provider won't be able to make a true judgement of her blood pressure readings if she hasn't taken her blood pressure medication.  Complains of N/A.    BP: (!) 132/90 , Pulse: 72 .    Blood pressure reading after 15 minutes was 132/92, Pulse 74.  Dr. Kashif Peguero notified.  Patient instructed to take her medication once she gets home and take her blood pressure with her automated cuff about an hour or so after she takes her medication and call with results. Patient stated 100% of understanding.

## 2023-08-07 NOTE — PROGRESS NOTES
Bp is still a little elevated  Is Patient monitoring salt intake?  Make sure to decrease salt intake   Please reschedule bp nurse visit in 2 weeks, if still elevated, will add another bp medication to regimen

## 2023-08-14 ENCOUNTER — OFFICE VISIT (OUTPATIENT)
Dept: TRANSPLANT | Facility: CLINIC | Age: 38
End: 2023-08-14
Attending: INTERNAL MEDICINE
Payer: MEDICARE

## 2023-08-14 ENCOUNTER — LAB VISIT (OUTPATIENT)
Dept: LAB | Facility: HOSPITAL | Age: 38
End: 2023-08-14
Attending: INTERNAL MEDICINE
Payer: MEDICARE

## 2023-08-14 ENCOUNTER — TELEPHONE (OUTPATIENT)
Dept: FAMILY MEDICINE | Facility: CLINIC | Age: 38
End: 2023-08-14
Payer: MEDICARE

## 2023-08-14 VITALS
WEIGHT: 182.56 LBS | SYSTOLIC BLOOD PRESSURE: 136 MMHG | DIASTOLIC BLOOD PRESSURE: 88 MMHG | HEART RATE: 80 BPM | HEIGHT: 70 IN | BODY MASS INDEX: 26.14 KG/M2

## 2023-08-14 DIAGNOSIS — T50.2X5A DIURETIC-INDUCED HYPOKALEMIA: ICD-10-CM

## 2023-08-14 DIAGNOSIS — I50.42 CHRONIC COMBINED SYSTOLIC AND DIASTOLIC CONGESTIVE HEART FAILURE: ICD-10-CM

## 2023-08-14 DIAGNOSIS — I13.0 HYPERTENSIVE CARDIOVASCULAR-RENAL DISEASE, STAGE 1-4 OR UNSPECIFIED CHRONIC KIDNEY DISEASE, WITH HEART FAILURE: Primary | ICD-10-CM

## 2023-08-14 DIAGNOSIS — E87.6 DIURETIC-INDUCED HYPOKALEMIA: ICD-10-CM

## 2023-08-14 DIAGNOSIS — I50.42 CHRONIC COMBINED SYSTOLIC AND DIASTOLIC CONGESTIVE HEART FAILURE: Primary | ICD-10-CM

## 2023-08-14 DIAGNOSIS — I42.8 NONISCHEMIC CARDIOMYOPATHY: Chronic | ICD-10-CM

## 2023-08-14 DIAGNOSIS — Z95.810 ICD (IMPLANTABLE CARDIOVERTER-DEFIBRILLATOR), SINGLE, IN SITU: ICD-10-CM

## 2023-08-14 LAB
ANION GAP SERPL CALC-SCNC: 8 MMOL/L (ref 8–16)
BNP SERPL-MCNC: 90 PG/ML (ref 0–99)
BUN SERPL-MCNC: 9 MG/DL (ref 6–20)
CALCIUM SERPL-MCNC: 8.8 MG/DL (ref 8.7–10.5)
CHLORIDE SERPL-SCNC: 108 MMOL/L (ref 95–110)
CO2 SERPL-SCNC: 23 MMOL/L (ref 23–29)
CREAT SERPL-MCNC: 0.8 MG/DL (ref 0.5–1.4)
EST. GFR  (NO RACE VARIABLE): >60 ML/MIN/1.73 M^2
GLUCOSE SERPL-MCNC: 101 MG/DL (ref 70–110)
POTASSIUM SERPL-SCNC: 2.9 MMOL/L (ref 3.5–5.1)
SODIUM SERPL-SCNC: 139 MMOL/L (ref 136–145)

## 2023-08-14 PROCEDURE — 1159F MED LIST DOCD IN RCRD: CPT | Mod: CPTII,S$GLB,, | Performed by: INTERNAL MEDICINE

## 2023-08-14 PROCEDURE — 1160F RVW MEDS BY RX/DR IN RCRD: CPT | Mod: CPTII,S$GLB,, | Performed by: INTERNAL MEDICINE

## 2023-08-14 PROCEDURE — 36415 COLL VENOUS BLD VENIPUNCTURE: CPT | Performed by: INTERNAL MEDICINE

## 2023-08-14 PROCEDURE — 83880 ASSAY OF NATRIURETIC PEPTIDE: CPT | Performed by: INTERNAL MEDICINE

## 2023-08-14 PROCEDURE — 99214 PR OFFICE/OUTPT VISIT, EST, LEVL IV, 30-39 MIN: ICD-10-PCS | Mod: S$GLB,,, | Performed by: INTERNAL MEDICINE

## 2023-08-14 PROCEDURE — 4010F ACE/ARB THERAPY RXD/TAKEN: CPT | Mod: CPTII,S$GLB,, | Performed by: INTERNAL MEDICINE

## 2023-08-14 PROCEDURE — 3079F DIAST BP 80-89 MM HG: CPT | Mod: CPTII,S$GLB,, | Performed by: INTERNAL MEDICINE

## 2023-08-14 PROCEDURE — 1159F PR MEDICATION LIST DOCUMENTED IN MEDICAL RECORD: ICD-10-PCS | Mod: CPTII,S$GLB,, | Performed by: INTERNAL MEDICINE

## 2023-08-14 PROCEDURE — 99214 OFFICE O/P EST MOD 30 MIN: CPT | Mod: S$GLB,,, | Performed by: INTERNAL MEDICINE

## 2023-08-14 PROCEDURE — 3008F PR BODY MASS INDEX (BMI) DOCUMENTED: ICD-10-PCS | Mod: CPTII,S$GLB,, | Performed by: INTERNAL MEDICINE

## 2023-08-14 PROCEDURE — 3075F SYST BP GE 130 - 139MM HG: CPT | Mod: CPTII,S$GLB,, | Performed by: INTERNAL MEDICINE

## 2023-08-14 PROCEDURE — 4010F PR ACE/ARB THEARPY RXD/TAKEN: ICD-10-PCS | Mod: CPTII,S$GLB,, | Performed by: INTERNAL MEDICINE

## 2023-08-14 PROCEDURE — 80048 BASIC METABOLIC PNL TOTAL CA: CPT | Performed by: INTERNAL MEDICINE

## 2023-08-14 PROCEDURE — 99999 PR PBB SHADOW E&M-EST. PATIENT-LVL IV: CPT | Mod: PBBFAC,,, | Performed by: INTERNAL MEDICINE

## 2023-08-14 PROCEDURE — 3008F BODY MASS INDEX DOCD: CPT | Mod: CPTII,S$GLB,, | Performed by: INTERNAL MEDICINE

## 2023-08-14 PROCEDURE — 3079F PR MOST RECENT DIASTOLIC BLOOD PRESSURE 80-89 MM HG: ICD-10-PCS | Mod: CPTII,S$GLB,, | Performed by: INTERNAL MEDICINE

## 2023-08-14 PROCEDURE — 99999 PR PBB SHADOW E&M-EST. PATIENT-LVL IV: ICD-10-PCS | Mod: PBBFAC,,, | Performed by: INTERNAL MEDICINE

## 2023-08-14 PROCEDURE — 1160F PR REVIEW ALL MEDS BY PRESCRIBER/CLIN PHARMACIST DOCUMENTED: ICD-10-PCS | Mod: CPTII,S$GLB,, | Performed by: INTERNAL MEDICINE

## 2023-08-14 PROCEDURE — 3075F PR MOST RECENT SYSTOLIC BLOOD PRESS GE 130-139MM HG: ICD-10-PCS | Mod: CPTII,S$GLB,, | Performed by: INTERNAL MEDICINE

## 2023-08-14 RX ORDER — AMLODIPINE BESYLATE 5 MG/1
5 TABLET ORAL DAILY
Qty: 90 TABLET | Refills: 3 | Status: SHIPPED | OUTPATIENT
Start: 2023-08-14 | End: 2024-02-22

## 2023-08-14 RX ORDER — POTASSIUM CHLORIDE 20 MEQ/1
40 TABLET, EXTENDED RELEASE ORAL 2 TIMES DAILY
Qty: 120 TABLET | Refills: 5 | Status: SHIPPED | OUTPATIENT
Start: 2023-08-14

## 2023-08-14 NOTE — PROGRESS NOTES
Subjective:     Patient ID:  Nasra Marks is a 38 y.o. female who presents for follow-up of Congestive Heart Failure    HPI:  38 yo BF diagnosis CHF due to non-ischemic cardiomyopathy in 2017 with angiogram at that time without evidence of coronary artery disease.  She developed hypertension at the age of 16 but did not take her medications regularly.   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 She was referred by Dr. Kashif Peguero.      22: Stopped Carvedilol and started Metoprolol succinate 100mg daily for fatigue     At visit 22  she had made good progress with much improvement in functional capacity and shortness of breath but still with fatigue.  At that visit plan was:  To see if I can help fatigue change the beta blocker metoprolol to bisoprolol 10 mg once a day    Reduce fluid intake to 1.5 to 2 quarts a day    Start taking half a tablet of hydralazine (50 mg tab) and half a tablet of isosorbide dinitrate (20 mg tab) both are taken 3 times a day and in 2 weeks change to full tablet of each    Call in 6-8 weeks as we can increase this medication combination further if needed for symptoms    At visit 2023 I learned that she had gone to ED last week with atypical CP and negative evaluation.  No CAD at cath .  She sees Dr. Peguero.  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  Had HA, nausea when went to ED.  Dr. Peguero ordering tests.    Today's visit 23 No tightness, pressure or heaviness in chest, neck, arms, throat, jaw or back with or without exertion.  ELENA which fluctuates but does not really limit her at this point.  No orthopnea, PND, palpitations, pre-syncope or syncope.    ICD followed by Dr. Peguero--no shocks    Review of Systems   Constitutional: Positive for malaise/fatigue. Negative for chills, fever, night sweats, weight gain and weight loss.   Cardiovascular:  Negative for  "chest pain, dyspnea on exertion, irregular heartbeat, leg swelling, near-syncope, orthopnea, palpitations, paroxysmal nocturnal dyspnea and syncope.   Respiratory:  Negative for cough, sputum production and wheezing.    Hematologic/Lymphatic: Does not bruise/bleed easily.   Musculoskeletal:  Positive for back pain (with sciatica). Negative for arthritis, joint pain and stiffness.   Gastrointestinal:  Negative for hematochezia and melena.   Genitourinary:  Negative for hematuria.   Neurological:  Negative for brief paralysis, focal weakness, seizures and weakness.        Objective:   Physical Exam  Constitutional:       General: She is not in acute distress.     Appearance: She is well-developed. She is not ill-appearing, toxic-appearing or diaphoretic.      Comments: /88 (BP Location: Right arm, Patient Position: Sitting, BP Method: Medium (Automatic))   Pulse 80   Ht 5' 10" (1.778 m)   Wt 82.8 kg (182 lb 8.7 oz)   BMI 26.19 kg/m²   Last 2 visits weight 184 and 178 lb  Friendly black female in no acute distress       HENT:      Head: Normocephalic and atraumatic.   Eyes:      General: No scleral icterus.        Right eye: No discharge.         Left eye: No discharge.      Conjunctiva/sclera: Conjunctivae normal.   Neck:      Thyroid: No thyromegaly.      Vascular: No JVD.      Trachea: No tracheal deviation.      Comments: Normal JVD  Cardiovascular:      Rate and Rhythm: Normal rate and regular rhythm.      Heart sounds: S1 normal and S2 normal. No murmur heard.     No S4 sounds.   Pulmonary:      Effort: Pulmonary effort is normal.      Breath sounds: Normal breath sounds.   Abdominal:      General: Bowel sounds are normal. There is no distension (Liver span normal 10 cm).      Palpations: Abdomen is soft. There is no mass.      Tenderness: There is no abdominal tenderness. There is no guarding or rebound.   Musculoskeletal:         General: No swelling or tenderness.      Right lower leg: No edema.      " Left lower leg: No edema.   Skin:     General: Skin is warm and dry.   Neurological:      General: No focal deficit present.      Mental Status: She is alert and oriented to person, place, and time. Mental status is at baseline.   Psychiatric:         Mood and Affect: Mood normal.         Behavior: Behavior normal.         Thought Content: Thought content normal.         Judgment: Judgment normal.       Lab Results   Component Value Date     08/14/2023     03/27/2023    K 2.9 (L) 08/14/2023    K 3.6 03/27/2023     08/14/2023     03/27/2023    BUN 9 08/14/2023    BUN 10 03/27/2023    CREATININE 0.8 08/14/2023    CREATININE 0.9 03/27/2023     Lab Results   Component Value Date    BNP 90 08/14/2023     (H) 03/27/2023     (H) 01/20/2023 1/17/2023 CXR read as mild cardiomegaly and possible mild perihilar interstitial edema.  I reviewed images and lungs are clear; also BNP only 75 down from 600's in March 2022 1/31/2022 CPX  Metabolic Findings Resting spirometry reveals an FVC = 2.86L which is 70.1% of predicted, an FEV1 of 2.30L, which is 68.27% of predicted and an FEV1/FVC ratio of 80.42%. The MVV = 92 L/min, which is 78.32% of predicted.     The respiratory exchange ratio (RER) was 1.32, suggesting an excellent effort.     The breathing reserve is calculated at 5.54%, which is reduced. Oxygen saturation with exercise remained normal.     The peak VO2 was 20.5 ml/kg/min which is 58.24% of predicted equating to a functional capacity of 5.86 METS indicating moderate functional impairment.     The anaerobic threshold (AT), which occurred at a heart rate of 109bpm, was 14.1 ml/kg/min, which is 40.06% of the predicted VO2 and is borderline reduced.     The peak VO2 Lean was 26.25 ml/kg of lean body weight/min indicating a good prognosis in heart failure.     The VE/VCO2 Walker was 30.0. The Resting PetCO2 was 29.0.     Moderate functional impairment associated with a reduced  breathing reserve, normal oxygen stauration, an excellent effort, and a borderline reduced AT. These findings are indicative of functional impairment secondary to circulatory insufficiency, ventilatory impairment.     12/28/2021 6 minute walk test 459.4 m without desaturation    10/06/2021 echocardiogram  The left ventricle is severely enlarged with eccentric hypertrophy and severely decreased systolic function.  The estimated ejection fraction is 15%.  Grade III left ventricular diastolic dysfunction.  Normal right ventricular size with normal right ventricular systolic function.  Moderate left atrial enlargement.  Mild mitral regurgitation.  Normal central venous pressure (3 mmHg).  The estimated PA systolic pressure is 15 mmHg.      12/31/2021 EKG normal sinus rhythm nonspecific ST-T abnormality    Cath 4/18/17:  Angiographic Results       Patient has a right dominant coronary artery.      - Left Main Coronary Artery:             The LM is normal. There is DANNY 3 flow.     - Left Anterior Descending Artery:             The LAD is normal. There is DANNY 3 flow.     - Left Circumflex Artery:             The LCX is normal. There is DANNY 3 flow.     - Right Coronary Artery:             The RCA is normal. There is DANNY 3 flow.     - Left Renal Artery:             The ostial left renal is normal.     - Right Renal Artery:             The ostial right renal is normal.     - Common Femoral Artery:             The right CFA is normal.  Assessment:     1. 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 disease, with heart failure    2. Nonischemic cardiomyopathy    3. Chronic combined systolic and diastolic congestive heart failure    4. ICD (implantable cardioverter-defibrillator), single, in situ    5. Diuretic-induced hypokalemia      Plan:   Labs returned after she left so I called her at 12:15 p.m. and explained the low-potassium.  She will take 40 mEq when she gets home followed by  20 mEq every 2 hours for 2 doses and then start taking 40 mEq twice daily tonight.  I will recheck BMP in 1 week.  I added to her patient instruction sheet after I called her    Start amlodipine 5 mg daily    Target BP top below 130 and bottom below 80    Call if BP remains above this goal Sept 1    Return to clinic 6 months with echo, BMP and BNP

## 2023-08-14 NOTE — LETTER
August 14, 2023        Kashif Peguero  [address] [address]  [address]  [place] [address]  Phone: [phone]  Fax: [phone]             [id number]  [address]  [place] [address]  Phone: [phone]   Patient: Nasra Marks   MR Number: [id number]   YOB: 1985   Date of Visit: 8/14/2023       Dear Dr. Kashif Peguero    Thank you for referring Nasra Marks to me for evaluation. Attached you will find relevant portions of my assessment and plan of care.    If you have questions, please do not hesitate to call me. I look forward to following Nasra Marks along with you.    Sincerely,    Timothy Prajapati Jr, MD    Enclosure    If you would like to receive this communication electronically, please contact [email or web address] or [phone] to request [employer] Link access.    [employer] is a tool which provides read-only access to select patient information with whom you have a relationship. Its easy to use and provides real time access to review your patients record including encounter summaries, notes, results, and demographic information.    If you feel you have received this communication in error or would no longer like to receive these types of communications, please e-mail [email or web address]

## 2023-08-14 NOTE — PATIENT INSTRUCTIONS
Labs returned after she left so I called her at 12:15 p.m. and explained the low-potassium.  She will take 40 mEq when she gets home followed by 20 mEq every 2 hours for 2 doses and then start taking 40 mEq twice daily tonight.  I will recheck BMP in 1 week.    Start amlodipine 5 mg daily    Target BP top below 130 and bottom below 80    Call if BP remains above this goal Sept 1

## 2023-08-14 NOTE — TELEPHONE ENCOUNTER
Spoke with patient to schedule Nurse blood pressure check with patient. Appointment set up for Friday 8/18/23 @ 3:00pm.

## 2023-08-18 ENCOUNTER — CLINICAL SUPPORT (OUTPATIENT)
Dept: FAMILY MEDICINE | Facility: CLINIC | Age: 38
End: 2023-08-18
Payer: MEDICARE

## 2023-08-18 VITALS — HEART RATE: 73 BPM | OXYGEN SATURATION: 99 % | DIASTOLIC BLOOD PRESSURE: 86 MMHG | SYSTOLIC BLOOD PRESSURE: 124 MMHG

## 2023-08-18 DIAGNOSIS — I10 ESSENTIAL HYPERTENSION: Primary | ICD-10-CM

## 2023-08-18 PROCEDURE — 99999 PR PBB SHADOW E&M-EST. PATIENT-LVL II: ICD-10-PCS | Mod: PBBFAC,,,

## 2023-08-18 PROCEDURE — 99999 PR PBB SHADOW E&M-EST. PATIENT-LVL II: CPT | Mod: PBBFAC,,,

## 2023-08-18 NOTE — PROGRESS NOTES
Nasra Marks 38 y.o. female is here today for Blood Pressure check.   History of HTN yes.    Review of patient's allergies indicates:   Allergen Reactions    Aldactone [spironolactone] Swelling     Lips swelled    Hydralazine analogues Other (See Comments)     headaches    Isosorbide Other (See Comments)     headaches     Creatinine   Date Value Ref Range Status   08/14/2023 0.8 0.5 - 1.4 mg/dL Final     Sodium   Date Value Ref Range Status   08/14/2023 139 136 - 145 mmol/L Final     Potassium   Date Value Ref Range Status   08/14/2023 2.9 (L) 3.5 - 5.1 mmol/L Final   ]  Patient verifies taking blood pressure medications on a regular basis at the same time of the day.     Current Outpatient Medications:     amLODIPine (NORVASC) 5 MG tablet, Take 1 tablet (5 mg total) by mouth once daily., Disp: 90 tablet, Rfl: 3    aspirin (ECOTRIN) 81 MG EC tablet, Take 1 tablet (81 mg total) by mouth once daily., Disp: 30 tablet, Rfl: 0    atorvastatin (LIPITOR) 40 MG tablet, Take 1 tablet (40 mg total) by mouth every evening., Disp: 90 tablet, Rfl: 3    eplerenone (INSPRA) 25 MG Tab, Take 1 tablet (25 mg total) by mouth once daily., Disp: 90 tablet, Rfl: 3    FARXIGA 10 mg tablet, Take 1 tablet by mouth once daily, Disp: 30 tablet, Rfl: 6    furosemide (LASIX) 40 MG tablet, Take 1 tablet (40 mg total) by mouth 2 (two) times a day., Disp: 180 tablet, Rfl: 3    metoprolol succinate (TOPROL-XL) 100 MG 24 hr tablet, Take 1 tablet (100 mg total) by mouth once daily., Disp: 30 tablet, Rfl: 11    potassium chloride SA (K-DUR,KLOR-CON) 20 MEQ tablet, Take 2 tablets (40 mEq total) by mouth 2 (two) times daily., Disp: 120 tablet, Rfl: 5    sacubitriL-valsartan (ENTRESTO)  mg per tablet, Take 1 tablet by mouth 2 (two) times daily., Disp: 180 tablet, Rfl: 3    sars-cov-2, covid-19, (PFIZER COVID-19) 30 mcg/0.3 ml injection, , Disp: , Rfl:   Does patient have record of home blood pressure readings no.   Last dose of blood pressure  medication was taken at 12 noon.  Patient is asymptomatic.   Complains of none.    Vitals:    08/18/23 1509   BP: 124/86   BP Location: Left arm   Patient Position: Sitting   BP Method: Large (Manual)   Pulse: 73   SpO2: 99%         Dr. Rainey informed of nurse visit.

## 2023-08-21 ENCOUNTER — LAB VISIT (OUTPATIENT)
Dept: LAB | Facility: HOSPITAL | Age: 38
End: 2023-08-21
Attending: INTERNAL MEDICINE
Payer: MEDICARE

## 2023-08-21 DIAGNOSIS — I50.42 CHRONIC COMBINED SYSTOLIC AND DIASTOLIC CONGESTIVE HEART FAILURE: ICD-10-CM

## 2023-08-21 LAB
ANION GAP SERPL CALC-SCNC: 8 MMOL/L (ref 8–16)
BUN SERPL-MCNC: 11 MG/DL (ref 6–20)
CALCIUM SERPL-MCNC: 9.2 MG/DL (ref 8.7–10.5)
CHLORIDE SERPL-SCNC: 105 MMOL/L (ref 95–110)
CO2 SERPL-SCNC: 25 MMOL/L (ref 23–29)
CREAT SERPL-MCNC: 0.8 MG/DL (ref 0.5–1.4)
EST. GFR  (NO RACE VARIABLE): >60 ML/MIN/1.73 M^2
GLUCOSE SERPL-MCNC: 81 MG/DL (ref 70–110)
POTASSIUM SERPL-SCNC: 3.8 MMOL/L (ref 3.5–5.1)
SODIUM SERPL-SCNC: 138 MMOL/L (ref 136–145)

## 2023-08-21 PROCEDURE — 36415 COLL VENOUS BLD VENIPUNCTURE: CPT | Mod: PO | Performed by: INTERNAL MEDICINE

## 2023-08-21 PROCEDURE — 80048 BASIC METABOLIC PNL TOTAL CA: CPT | Performed by: INTERNAL MEDICINE

## 2023-10-10 ENCOUNTER — HOSPITAL ENCOUNTER (EMERGENCY)
Facility: HOSPITAL | Age: 38
Discharge: HOME | End: 2023-10-10
Attending: EMERGENCY MEDICINE
Payer: MEDICARE

## 2023-10-10 VITALS
WEIGHT: 173 LBS | DIASTOLIC BLOOD PRESSURE: 84 MMHG | RESPIRATION RATE: 18 BRPM | TEMPERATURE: 98 F | SYSTOLIC BLOOD PRESSURE: 140 MMHG | BODY MASS INDEX: 24.82 KG/M2 | OXYGEN SATURATION: 100 % | HEART RATE: 66 BPM

## 2023-10-10 DIAGNOSIS — J06.9 VIRAL URI WITH COUGH: Primary | ICD-10-CM

## 2023-10-10 DIAGNOSIS — R06.02 SHORTNESS OF BREATH: ICD-10-CM

## 2023-10-10 LAB
B-HCG UR QL: NEGATIVE
CTP QC/QA: YES
POC MOLECULAR INFLUENZA A AGN: NEGATIVE
POC MOLECULAR INFLUENZA B AGN: NEGATIVE
SARS-COV-2 RDRP RESP QL NAA+PROBE: NEGATIVE

## 2023-10-10 PROCEDURE — 99284 EMERGENCY DEPT VISIT MOD MDM: CPT | Mod: 25

## 2023-10-10 PROCEDURE — 87502 INFLUENZA DNA AMP PROBE: CPT

## 2023-10-10 PROCEDURE — 93010 ELECTROCARDIOGRAM REPORT: CPT | Mod: ,,, | Performed by: INTERNAL MEDICINE

## 2023-10-10 PROCEDURE — 93005 ELECTROCARDIOGRAM TRACING: CPT

## 2023-10-10 PROCEDURE — 93010 EKG 12-LEAD: ICD-10-PCS | Mod: ,,, | Performed by: INTERNAL MEDICINE

## 2023-10-10 PROCEDURE — 87635 SARS-COV-2 COVID-19 AMP PRB: CPT

## 2023-10-10 PROCEDURE — 81025 URINE PREGNANCY TEST: CPT

## 2023-10-10 RX ORDER — ONDANSETRON 4 MG/1
4 TABLET, ORALLY DISINTEGRATING ORAL EVERY 6 HOURS PRN
Qty: 15 TABLET | Refills: 0 | Status: SHIPPED | OUTPATIENT
Start: 2023-10-10 | End: 2024-03-14

## 2023-10-10 RX ORDER — CETIRIZINE HYDROCHLORIDE 10 MG/1
10 TABLET ORAL DAILY PRN
Qty: 30 TABLET | Refills: 0 | Status: SHIPPED | OUTPATIENT
Start: 2023-10-10 | End: 2024-03-14

## 2023-10-10 RX ORDER — GUAIFENESIN 100 MG/5ML
100-200 SOLUTION ORAL EVERY 4 HOURS PRN
Qty: 118 ML | Refills: 0 | Status: SHIPPED | OUTPATIENT
Start: 2023-10-10 | End: 2024-03-14

## 2023-10-10 RX ORDER — BENZONATATE 100 MG/1
100 CAPSULE ORAL 3 TIMES DAILY PRN
Qty: 30 CAPSULE | Refills: 0 | Status: SHIPPED | OUTPATIENT
Start: 2023-10-10 | End: 2024-03-14

## 2023-10-10 RX ORDER — FLUTICASONE PROPIONATE 50 MCG
1 SPRAY, SUSPENSION (ML) NASAL 2 TIMES DAILY PRN
Qty: 15 G | Refills: 0 | Status: SHIPPED | OUTPATIENT
Start: 2023-10-10 | End: 2024-03-14

## 2023-10-10 NOTE — ED PROVIDER NOTES
Encounter Date: 10/10/2023       History     Chief Complaint   Patient presents with    Cough     Pt reports fever (99.7), congestion and cough x 2 days  Pt mary seltzer, tylenol and Theraflu.    Pt with hx of CHF.       38-year-old female with past medical history of hypertension, CHF, CKD, and pacemaker presents to ED for cough.  Patient states it has been going on for 3 days.  Patient has tried [place], Tylenol, and TheraFlu with no relief.  Patient denies any sick contacts.  Patient admits to sweats, congestion, runny nose, sneezing, dry cough, shortness of breath, nausea, body aches, and headache.  Patient denies fever, chills, sore throat, chest pain, abdominal pain, vomiting, diarrhea, constipation, urinary symptoms dizziness, and lightheadedness.      Review of patient's allergies indicates:   Allergen Reactions    Aldactone [spironolactone] Swelling     Lips swelled    Hydralazine analogues Other (See Comments)     headaches    Isosorbide Other (See Comments)     headaches     Past Medical History:   Diagnosis Date    CHF (congestive heart failure)     Chronic combined systolic and diastolic congestive heart failure 2019    Essential hypertension 2012    Hypertension     dx at 16y.o.    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 disease, with heart failure 2021    ICD (implantable cardioverter-defibrillator), single, in situ 2020    Nonischemic cardiomyopathy 2019    Pacemaker 2017     Past Surgical History:   Procedure Laterality Date    CARDIAC DEFIBRILLATOR PLACEMENT  2017     SECTION      x 1    INDUCED       RIGHT HEART CATHETERIZATION Right 2022    Procedure: INSERTION, CATHETER, RIGHT HEART;  Surgeon: Timothy Prajapati Jr., MD;  Location: [facility] CATH LAB;  Service: Cardiology;  Laterality: Right;    TUBAL LIGATION       Family History   Problem Relation Age of Onset    Hypertension Mother     Cancer  Maternal Grandmother         breast x 2    Cancer Paternal Grandmother         breast    No Known Problems Sister     No Known Problems Brother     No Known Problems Son     No Known Problems Brother     Hypertension Other     Diabetes Other     Breast cancer Other     Cancer Paternal Aunt         breast    Cancer Paternal Uncle      Social History     Tobacco Use    Smoking status: Never    Smokeless tobacco: Never   Substance Use Topics    Alcohol use: Yes     Comment: occasionally    Drug use: Yes     Types: Marijuana     Comment: in past very little marijuana     Review of Systems   Constitutional:  Positive for diaphoresis. Negative for chills and fever.   HENT:  Positive for congestion, rhinorrhea and sneezing. Negative for sore throat.    Respiratory:  Positive for cough and shortness of breath.    Cardiovascular:  Negative for chest pain.   Gastrointestinal:  Positive for nausea. Negative for abdominal pain, constipation, diarrhea and vomiting.   Genitourinary:  Negative for decreased urine volume, difficulty urinating, frequency and urgency.   Musculoskeletal:  Positive for myalgias (body aches).   Skin:  Negative for rash.   Neurological:  Positive for headaches. Negative for dizziness, weakness and light-headedness.       Physical Exam     Initial Vitals [10/10/23 1441]   BP Pulse Resp Temp SpO2   (!) 140/84 66 18 98 °F (36.7 °C) 100 %      MAP       --         Physical Exam    Nursing note and vitals reviewed.  Constitutional: She appears well-developed and well-nourished. She is not diaphoretic. She is active. She does not appear ill. No distress.   HENT:   Head: Normocephalic and atraumatic.   Right Ear: External ear normal.   Left Ear: External ear normal.   Nose: Nose normal.   Mouth/Throat: Uvula is midline, oropharynx is clear and moist and mucous membranes are normal.   Eyes: Conjunctivae, EOM and lids are normal. Pupils are equal, round, and reactive to light. Right eye exhibits no discharge.  Left eye exhibits no discharge.   Neck: Phonation normal. Neck supple.   Normal range of motion.   Full passive range of motion without pain.     Cardiovascular:  Normal rate and regular rhythm.           Pulmonary/Chest: Effort normal and breath sounds normal. No respiratory distress.   Abdominal: She exhibits no distension.   Musculoskeletal:         General: Normal range of motion.      Cervical back: Full passive range of motion without pain, normal range of motion and neck supple.     Neurological: She is alert and oriented to person, place, and time. She has normal strength. GCS eye subscore is 4. GCS verbal subscore is 5. GCS motor subscore is 6.   Skin: Capillary refill takes less than 2 seconds.         ED Course   Procedures  Labs Reviewed   SARS-COV-2 RDRP GENE   POCT INFLUENZA A/B MOLECULAR   POCT URINE PREGNANCY     EKG Readings: (Independently Interpreted)   EKG showed normal sinus rhythm with a rate of 81 beats per minute.  TN interval 148 milliseconds.  QRS of 86 milliseconds.  QTC of 453 milliseconds.  Normal EKG.  No STEMI noted.  Signed by Dr. Mason.       Imaging Results              X-Ray Chest PA And Lateral (Final result)  Result time 10/10/23 15:51:23      Final result by Georgina Mccray MD (10/10/23 15:51:23)                   Impression:      No detrimental change or acute finding      Electronically signed by: Georgina Mccray MD  Date:    10/10/2023  Time:    15:51               Narrative:    EXAMINATION:  XR CHEST PA AND LATERAL    CLINICAL HISTORY:  Shortness of breath    TECHNIQUE:  PA and lateral views of the chest were performed.    COMPARISON:  March 2023    FINDINGS:  Heart size is stable, prominent in size.  There is no pleural effusion.  Cardiac device over the left chest.  The lungs are clear.  The osseous structures are intact.                                       Medications - No data to display  Medical Decision Making  38-year-old female with past medical history of  hypertension, CHF, CKD, and pacemaker presents to ED for cough.  Patient's chart and medical history reviewed.    Ddx:  COVID  Flu  Viral URI  Pneumonia   Bronchitis  PE  MI  Pleural effusion   Pneumothorax    Patient's vitals reviewed.  Afebrile, no respiratory distress, and nontoxic-appearing in the ED. patient's physical exam was unremarkable.  Patient denied pain medication.  UPT was negative.  Patient is COVID, flu negative.  EKG showed normal sinus rhythm with a rate of 81 beats per minute.  IA interval 148 milliseconds.  QRS of 86 milliseconds.  QTC of 453 milliseconds.  Normal EKG.  No STEMI noted.  Signed by Dr. Mason. Chest x-ray was unremarkable per my personal interpretation. Official chest x-ray interpretation showed No detrimental change or acute finding. Discussed with patient this is most likely a viral upper respiratory infection which will take time to clear from her system.  Discussed with patient to stay well rested and hydrated. Patient given will be sent home on zofran, Flonase, Zyrtec, Tessalon Perles, benzocaine lozenges, and guaifenesin cough syrup for symptomatic control.  Patient agrees with this plan. Discussed with her strict return precautions, she verbalized understanding. Patient is stable for discharge.         Amount and/or Complexity of Data Reviewed  Labs: ordered.  Radiology: ordered.  ECG/medicine tests: ordered.    Risk  OTC drugs.  Prescription drug management.                               Clinical Impression:   Final diagnoses:  [R06.02] Shortness of breath  [J06.9] Viral URI with cough (Primary)        ED Disposition Condition    Discharge Stable          ED Prescriptions       Medication Sig Dispense Start Date End Date Auth. Provider    benzocaine-menthoL 6-10 mg lozenge Take 1 lozenge by mouth every 2 (two) hours as needed for Pain (sore throat). 18 tablet 10/10/2023 -- Holdsworth, Alayna, PA-C    guaiFENesin 100 mg/5 ml (ROBITUSSIN) 100 mg/5 mL syrup Take 5-10 mLs  (100-200 mg total) by mouth every 4 (four) hours as needed for Cough or Congestion. 118 mL 10/10/2023 -- Holdsworth, Alayna, PA-C    cetirizine (ZYRTEC) 10 MG tablet Take 1 tablet (10 mg total) by mouth daily as needed for Allergies or Rhinitis. 30 tablet 10/10/2023 -- Holdsworth, Alayna, PA-C    fluticasone propionate (FLONASE) 50 mcg/actuation nasal spray 1 spray (50 mcg total) by Each Nostril route 2 (two) times daily as needed for Rhinitis or Allergies. 15 g 10/10/2023 -- Holdsworth, Alayna, PA-C    benzonatate (TESSALON) 100 MG capsule Take 1 capsule (100 mg total) by mouth 3 (three) times daily as needed for Cough. 30 capsule 10/10/2023 -- Holdsworth, Alayna, PA-C    ondansetron (ZOFRAN-ODT) 4 MG TbDL Take 1 tablet (4 mg total) by mouth every 6 (six) hours as needed (Nausea). 15 tablet 10/10/2023 -- Holdsworth, Alayna, PA-C          Follow-up Information       Follow up With Specialties Details Why Contact Info    Veronika Rainey MD Family Medicine, Wound Care   [address] [place] [address]  [place] [address] [phone]               Holdsworth, Alayna, PA-C  10/10/23 9732

## 2023-10-10 NOTE — ED TRIAGE NOTES
Pt. Reports she had a temp of 99.7 and was told she has a fevers, explained to pt that a fever starts at 100.3. pt. Reports she has SOB and has gained 4 ibs in since yesterday. Pt. Reports she weight 170 on yesterday and now weighs 174. Pt. Reports due to CHF she weighs herself daily. Pt. Reports her b/p was elevated at home and is was concerns with this.

## 2023-10-10 NOTE — DISCHARGE INSTRUCTIONS

## 2023-10-23 ENCOUNTER — HOSPITAL ENCOUNTER (EMERGENCY)
Facility: HOSPITAL | Age: 38
Discharge: HOME | End: 2023-10-23
Attending: EMERGENCY MEDICINE
Payer: MEDICARE

## 2023-10-23 VITALS
SYSTOLIC BLOOD PRESSURE: 126 MMHG | BODY MASS INDEX: 24.77 KG/M2 | TEMPERATURE: 98 F | OXYGEN SATURATION: 100 % | HEIGHT: 70 IN | DIASTOLIC BLOOD PRESSURE: 83 MMHG | WEIGHT: 173 LBS | HEART RATE: 60 BPM | RESPIRATION RATE: 18 BRPM

## 2023-10-23 DIAGNOSIS — R07.81 PLEURITIC CHEST PAIN: Primary | ICD-10-CM

## 2023-10-23 DIAGNOSIS — R07.9 CHEST PAIN: ICD-10-CM

## 2023-10-23 LAB
ALBUMIN SERPL-MCNC: 3.7 G/DL (ref 3.3–5.5)
ALP SERPL-CCNC: 70 U/L (ref 42–141)
B-HCG UR QL: NEGATIVE
BILIRUB SERPL-MCNC: 0.5 MG/DL (ref 0.2–1.6)
BUN SERPL-MCNC: 14 MG/DL (ref 7–22)
CALCIUM SERPL-MCNC: 9.7 MG/DL (ref 8–10.3)
CHLORIDE SERPL-SCNC: 109 MMOL/L (ref 98–108)
CREAT SERPL-MCNC: 0.6 MG/DL (ref 0.6–1.2)
CTP QC/QA: YES
GLUCOSE SERPL-MCNC: 95 MG/DL (ref 73–118)
HCT, POC: NORMAL
HGB, POC: NORMAL (ref 14–18)
MCH, POC: NORMAL
MCHC, POC: NORMAL
MCV, POC: NORMAL
MPV, POC: NORMAL
POC ALT (SGPT): 15 U/L (ref 10–47)
POC AST (SGOT): 26 U/L (ref 11–38)
POC B-TYPE NATRIURETIC PEPTIDE: 92.8 PG/ML (ref 0–100)
POC CARDIAC TROPONIN I: 0 NG/ML (ref 0–0.08)
POC D-DI: 202 NG/ML (ref 0–450)
POC PLATELET COUNT: NORMAL
POC PTINR: 1.1 (ref 0.9–1.2)
POC PTWBT: 13.6 SEC (ref 9.7–14.3)
POC TCO2: 32 MMOL/L (ref 18–33)
POTASSIUM BLD-SCNC: 3.7 MMOL/L (ref 3.6–5.1)
PROTEIN, POC: 7.6 G/DL (ref 6.4–8.1)
RBC, POC: NORMAL
RDW, POC: NORMAL
SAMPLE: NORMAL
SAMPLE: NORMAL
SODIUM BLD-SCNC: 142 MMOL/L (ref 128–145)
WBC, POC: NORMAL

## 2023-10-23 PROCEDURE — 84484 ASSAY OF TROPONIN QUANT: CPT | Mod: ER

## 2023-10-23 PROCEDURE — 83880 ASSAY OF NATRIURETIC PEPTIDE: CPT | Mod: ER

## 2023-10-23 PROCEDURE — 93010 EKG 12-LEAD: ICD-10-PCS | Mod: ,,, | Performed by: INTERNAL MEDICINE

## 2023-10-23 PROCEDURE — 99284 EMERGENCY DEPT VISIT MOD MDM: CPT | Mod: 25,ER

## 2023-10-23 PROCEDURE — 96374 THER/PROPH/DIAG INJ IV PUSH: CPT | Mod: ER

## 2023-10-23 PROCEDURE — 81025 URINE PREGNANCY TEST: CPT | Mod: ER | Performed by: EMERGENCY MEDICINE

## 2023-10-23 PROCEDURE — 93010 ELECTROCARDIOGRAM REPORT: CPT | Mod: ,,, | Performed by: INTERNAL MEDICINE

## 2023-10-23 PROCEDURE — 93005 ELECTROCARDIOGRAM TRACING: CPT | Mod: ER

## 2023-10-23 PROCEDURE — 80053 COMPREHEN METABOLIC PANEL: CPT | Mod: ER

## 2023-10-23 PROCEDURE — 81025 URINE PREGNANCY TEST: CPT | Mod: ER

## 2023-10-23 PROCEDURE — 63600175 PHARM REV CODE 636 W HCPCS: Mod: ER | Performed by: EMERGENCY MEDICINE

## 2023-10-23 RX ORDER — KETOROLAC TROMETHAMINE 30 MG/ML
15 INJECTION, SOLUTION INTRAMUSCULAR; INTRAVENOUS
Status: COMPLETED | OUTPATIENT
Start: 2023-10-23 | End: 2023-10-23

## 2023-10-23 RX ORDER — PREDNISONE 20 MG/1
40 TABLET ORAL DAILY
Qty: 10 TABLET | Refills: 0 | Status: SHIPPED | OUTPATIENT
Start: 2023-10-23 | End: 2023-10-28

## 2023-10-23 RX ADMIN — KETOROLAC TROMETHAMINE 15 MG: 30 INJECTION, SOLUTION INTRAMUSCULAR; INTRAVENOUS at 10:10

## 2023-10-24 NOTE — ED PROVIDER NOTES
Encounter Date: 10/23/2023       History     Chief Complaint   Patient presents with    Chest Pain     Pt c/o L sided cp starting yesterday radiating to her back which pt states has been getting worse     HPI  Review of patient's allergies indicates:   Allergen Reactions    Aldactone [spironolactone] Swelling     Lips swelled    Hydralazine analogues Other (See Comments)     headaches    Isosorbide Other (See Comments)     headaches     Past Medical History:   Diagnosis Date    CHF (congestive heart failure)     Chronic combined systolic and diastolic congestive heart failure 2019    Essential hypertension 2012    Hypertension     dx at 16y.o.   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 disease, with heart failure 2021    ICD (implantable cardioverter-defibrillator), single, in situ 2020    Nonischemic cardiomyopathy 2019    Pacemaker 2017     Past Surgical History:   Procedure Laterality Date    CARDIAC DEFIBRILLATOR PLACEMENT  2017     SECTION      x 1    INDUCED       RIGHT HEART CATHETERIZATION Right 2022    Procedure: INSERTION, CATHETER, RIGHT HEART;  Surgeon: Timothy Prajapati Jr., MD;  Location: [facility] CATH LAB;  Service: Cardiology;  Laterality: Right;    TUBAL LIGATION       Family History   Problem Relation Age of Onset    Hypertension Mother     Cancer Maternal Grandmother         breast x 2    Cancer Paternal Grandmother         breast    No Known Problems Sister     No Known Problems Brother     No Known Problems Son     No Known Problems Brother     Hypertension Other     Diabetes Other     Breast cancer Other     Cancer Paternal Aunt         breast    Cancer Paternal Uncle      Social History     Tobacco Use    Smoking status: Never    Smokeless tobacco: Never   Substance Use Topics    Alcohol use: Yes     Comment: occasionally    Drug use: Yes     Types: Marijuana      Comment: in past very little marijuana     Review of Systems    Physical Exam     Initial Vitals [10/23/23 2025]   BP Pulse Resp Temp SpO2   139/80 63 18 97.6 °F (36.4 °C) 99 %      MAP       --         Physical Exam    ED Course   Procedures  Labs Reviewed   POCT CMP - Abnormal; Notable for the following components:       Result Value    POC Chloride 109 (*)     All other components within normal limits   TROPONIN ISTAT   POCT URINE PREGNANCY   POCT CBC   POCT CMP   POCT PROTIME-INR   POCT TROPONIN   POCT B-TYPE NATRIURETIC PEPTIDE (BNP)   POCT D DIMER   ISTAT PROCEDURE   POCT B-TYPE NATRIURETIC PEPTIDE (BNP)   POCT D DIMER     EKG Readings: (Independently Interpreted)   Initial Reading: No STEMI. Rhythm: Normal Sinus Rhythm. Heart Rate: 62. Ectopy: No Ectopy. Conduction: Normal. ST Segments: Normal ST Segments. T Waves: Normal. Clinical Impression: Normal Sinus Rhythm       Imaging Results              X-Ray Chest AP Portable (Final result)  Result time 10/23/23 20:58:12      Final result by Kasandra Erazo MD (10/23/23 20:58:12)                   Impression:      No acute cardiopulmonary process identified.      Electronically signed by: Kasandra Erazo MD  Date:    10/23/2023  Time:    20:58               Narrative:    EXAMINATION:  XR CHEST AP PORTABLE    CLINICAL HISTORY:  Chest pain, unspecified    TECHNIQUE:  Single frontal view of the chest was performed.    COMPARISON:  10/10/2023.    FINDINGS:  Cardiac silhouette is normal in size.  Left-sided pacer device is seen.  Lungs are symmetrically expanded.  No evidence of focal consolidative process, pneumothorax, or significant pleural effusion.  No acute osseous abnormality identified.                                       Medications   ketorolac injection 15 mg (15 mg Intravenous Given 10/23/23 2219)     Medical Decision Making  Amount and/or Complexity of Data Reviewed  Labs: ordered.    Risk  Prescription drug management.                                Clinical Impression:   Final diagnoses:  [R07.9] Chest pain  [R07.81] Pleuritic chest pain (Primary)        ED Disposition Condition    Discharge Stable          ED Prescriptions       Medication Sig Dispense Start Date End Date Auth. Provider    predniSONE (DELTASONE) 20 MG tablet Take 2 tablets (40 mg total) by mouth once daily. for 5 days 10 tablet 10/23/2023 10/28/2023 Kashif Leyva MD          Follow-up Information       Follow up With Specialties Details Why Contact Info    Veronika Rainey MD Family Medicine, Wound Care Schedule an appointment as soon as possible for a visit in 3 days  [address] [facility] [id number] [phone]

## 2023-10-24 NOTE — ED PROVIDER NOTES
"Encounter Date: 10/23/2023       History     Chief Complaint   Patient presents with    Chest Pain     Pt c/o L sided cp starting yesterday radiating to her back which pt states has been getting worse     This patient who has had similar symptoms in the past presents with" I feel like I am suffocating".  Patient states she has a history of nonischemic cardiomyopathy essential hypertension as well as a defibrillator/pacemaker.  Patient states that she was pain in her anterior left chest that radiates straight through to her posterior left chest.  She describes it as a sharp stabbing pain.    The history is provided by the patient.     Review of patient's allergies indicates:   Allergen Reactions    Aldactone [spironolactone] Swelling     Lips swelled    Hydralazine analogues Other (See Comments)     headaches    Isosorbide Other (See Comments)     headaches     Past Medical History:   Diagnosis Date    CHF (congestive heart failure)     Chronic combined systolic and diastolic congestive heart failure 2019    Essential hypertension 2012    Hypertension     dx at 16y.o.    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 disease, with heart failure 2021    ICD (implantable cardioverter-defibrillator), single, in situ 2020    Nonischemic cardiomyopathy 2019    Pacemaker 2017     Past Surgical History:   Procedure Laterality Date    CARDIAC DEFIBRILLATOR PLACEMENT  2017     SECTION      x 1    INDUCED       RIGHT HEART CATHETERIZATION Right 2022    Procedure: INSERTION, CATHETER, RIGHT HEART;  Surgeon: Timothy Prajapati Jr., MD;  Location: [facility] CATH LAB;  Service: Cardiology;  Laterality: Right;    TUBAL LIGATION       Family History   Problem Relation Age of Onset    Hypertension Mother     Cancer Maternal Grandmother         breast x 2    Cancer Paternal Grandmother         breast    No Known Problems Sister     No Known " Problems Brother     No Known Problems Son     No Known Problems Brother     Hypertension Other     Diabetes Other     Breast cancer Other     Cancer Paternal Aunt         breast    Cancer Paternal Uncle      Social History     Tobacco Use    Smoking status: Never    Smokeless tobacco: Never   Substance Use Topics    Alcohol use: Yes     Comment: occasionally    Drug use: Yes     Types: Marijuana     Comment: in past very little marijuana     Review of Systems   Constitutional: Negative.    HENT: Negative.     Eyes: Negative.    Respiratory:  Positive for shortness of breath.    Cardiovascular:  Positive for chest pain.   Gastrointestinal: Negative.    Endocrine: Negative.    Genitourinary: Negative.    Musculoskeletal: Negative.    Skin: Negative.    Allergic/Immunologic: Negative.    Neurological: Negative.    Hematological: Negative.    Psychiatric/Behavioral: Negative.     All other systems reviewed and are negative.      Physical Exam     Initial Vitals [10/23/23 2025]   BP Pulse Resp Temp SpO2   139/80 63 18 97.6 °F (36.4 °C) 99 %      MAP       --         Physical Exam    Nursing note and vitals reviewed.  Constitutional: Vital signs are normal. She appears well-developed. She is active and cooperative.   HENT:   Head: Normocephalic and atraumatic.   Eyes: Conjunctivae, EOM and lids are normal. Pupils are equal, round, and reactive to light.   Neck: Trachea normal. Neck supple. No thyroid mass present.    Full passive range of motion without pain.     Cardiovascular:  Normal rate, regular rhythm, S1 normal, S2 normal, normal heart sounds, intact distal pulses and normal pulses.           Pulmonary/Chest: Effort normal and breath sounds normal.   Abdominal: Abdomen is soft. Bowel sounds are normal. There is no abdominal tenderness.   Musculoskeletal:         General: Normal range of motion.      Cervical back: Full passive range of motion without pain and neck supple.     Lymphadenopathy:     She has no  axillary adenopathy.   Neurological: She is alert and oriented to person, place, and time.   Skin: Skin is warm, dry and intact.   Psychiatric: She has a normal mood and affect. Her speech is normal and behavior is normal. Judgment and thought content normal. Cognition and memory are normal.         ED Course   Procedures  Labs Reviewed   POCT CMP - Abnormal; Notable for the following components:       Result Value    POC Chloride 109 (*)     All other components within normal limits   TROPONIN ISTAT   POCT URINE PREGNANCY   POCT CBC   POCT CMP   POCT PROTIME-INR   POCT TROPONIN   POCT B-TYPE NATRIURETIC PEPTIDE (BNP)   POCT D DIMER   ISTAT PROCEDURE   POCT B-TYPE NATRIURETIC PEPTIDE (BNP)   POCT D DIMER          Imaging Results              X-Ray Chest AP Portable (Final result)  Result time 10/23/23 20:58:12      Final result by Kasandra Erazo MD (10/23/23 20:58:12)                   Impression:      No acute cardiopulmonary process identified.      Electronically signed by: Kasandra Erazo MD  Date:    10/23/2023  Time:    20:58               Narrative:    EXAMINATION:  XR CHEST AP PORTABLE    CLINICAL HISTORY:  Chest pain, unspecified    TECHNIQUE:  Single frontal view of the chest was performed.    COMPARISON:  10/10/2023.    FINDINGS:  Cardiac silhouette is normal in size.  Left-sided pacer device is seen.  Lungs are symmetrically expanded.  No evidence of focal consolidative process, pneumothorax, or significant pleural effusion.  No acute osseous abnormality identified.                                       Medications   ketorolac injection 15 mg (15 mg Intravenous Given 10/23/23 2219)     Medical Decision Making  This patient who has extensive cardiac disease presents to the emergency department with pleuritic type pain.  Course I was worried about GI cardiopulmonary vascular musculoskeletal and pulmonary etiologies.  Greatest concern was possibility of a PE the patient's vital signs are completely  normal with no tachypnea no tachycardia and the D-dimer was negative.  Feel the patient is suffering from pleurisy subsequently I will discharge her on steroid therapy and have her follow up with the primary health care physician.  I do not believe that there is any evidence to support or suggest acute coronary insufficiency.    Amount and/or Complexity of Data Reviewed  Labs: ordered. Decision-making details documented in ED Course.  Radiology:  Decision-making details documented in ED Course.    Risk  Prescription drug management.                               Clinical Impression:   Final diagnoses:  [R07.9] Chest pain  [R07.81] Pleuritic chest pain (Primary)        ED Disposition Condition    Discharge Stable          ED Prescriptions       Medication Sig Dispense Start Date End Date Auth. Provider    predniSONE (DELTASONE) 20 MG tablet Take 2 tablets (40 mg total) by mouth once daily. for 5 days 10 tablet 10/23/2023 10/28/2023 Kashif Leyva MD          Follow-up Information       Follow up With Specialties Details Why Contact Info    Veronika Rainey MD Family Medicine, Wound Care Schedule an appointment as soon as possible for a visit in 3 days  [address] [facility]  [place] [address]  [phone]               Kashif Leyva MD  10/24/23 4085

## 2023-11-10 DIAGNOSIS — I50.42 CHRONIC COMBINED SYSTOLIC AND DIASTOLIC CONGESTIVE HEART FAILURE: ICD-10-CM

## 2023-11-10 RX ORDER — METOPROLOL SUCCINATE 100 MG/1
100 TABLET, EXTENDED RELEASE ORAL
Qty: 90 TABLET | Refills: 3 | Status: SHIPPED | OUTPATIENT
Start: 2023-11-10 | End: 2024-03-14 | Stop reason: SDUPTHER

## 2023-12-21 ENCOUNTER — PATIENT MESSAGE (OUTPATIENT)
Dept: CARDIOLOGY | Facility: CLINIC | Age: 38
End: 2023-12-21
Payer: MEDICARE

## 2024-01-22 ENCOUNTER — HOSPITAL ENCOUNTER (EMERGENCY)
Facility: HOSPITAL | Age: 39
Discharge: HOME | End: 2024-01-23
Attending: EMERGENCY MEDICINE
Payer: MEDICARE

## 2024-01-22 DIAGNOSIS — J06.9 UPPER RESPIRATORY TRACT INFECTION, UNSPECIFIED TYPE: Primary | ICD-10-CM

## 2024-01-22 DIAGNOSIS — R07.9 CHEST PAIN: ICD-10-CM

## 2024-01-22 DIAGNOSIS — R05.9 COUGH, UNSPECIFIED TYPE: ICD-10-CM

## 2024-01-22 DIAGNOSIS — R06.02 SHORTNESS OF BREATH: ICD-10-CM

## 2024-01-22 LAB
B-HCG UR QL: NEGATIVE
BASOPHILS # BLD AUTO: 0.04 K/UL (ref 0–0.2)
BASOPHILS NFR BLD: 0.5 % (ref 0–1.9)
CTP QC/QA: YES
DIFFERENTIAL METHOD BLD: ABNORMAL
EOSINOPHIL # BLD AUTO: 0.1 K/UL (ref 0–0.5)
EOSINOPHIL NFR BLD: 1.4 % (ref 0–8)
ERYTHROCYTE [DISTWIDTH] IN BLOOD BY AUTOMATED COUNT: 12.9 % (ref 11.5–14.5)
HCT VFR BLD AUTO: 35.6 % (ref 37–48.5)
HGB BLD-MCNC: 11.5 G/DL (ref 12–16)
IMM GRANULOCYTES # BLD AUTO: 0.04 K/UL (ref 0–0.04)
IMM GRANULOCYTES NFR BLD AUTO: 0.5 % (ref 0–0.5)
LYMPHOCYTES # BLD AUTO: 1.9 K/UL (ref 1–4.8)
LYMPHOCYTES NFR BLD: 22.5 % (ref 18–48)
MCH RBC QN AUTO: 29.2 PG (ref 27–31)
MCHC RBC AUTO-ENTMCNC: 32.3 G/DL (ref 32–36)
MCV RBC AUTO: 90 FL (ref 82–98)
MOLECULAR STREP A: NEGATIVE
MONOCYTES # BLD AUTO: 0.6 K/UL (ref 0.3–1)
MONOCYTES NFR BLD: 6.7 % (ref 4–15)
NEUTROPHILS # BLD AUTO: 5.8 K/UL (ref 1.8–7.7)
NEUTROPHILS NFR BLD: 68.4 % (ref 38–73)
NRBC BLD-RTO: 0 /100 WBC
PLATELET # BLD AUTO: 324 K/UL (ref 150–450)
PMV BLD AUTO: 9.3 FL (ref 9.2–12.9)
POC MOLECULAR INFLUENZA A AGN: NEGATIVE
POC MOLECULAR INFLUENZA B AGN: NEGATIVE
RBC # BLD AUTO: 3.94 M/UL (ref 4–5.4)
SARS-COV-2 RDRP RESP QL NAA+PROBE: NEGATIVE
WBC # BLD AUTO: 8.45 K/UL (ref 3.9–12.7)

## 2024-01-22 PROCEDURE — 83880 ASSAY OF NATRIURETIC PEPTIDE: CPT | Performed by: EMERGENCY MEDICINE

## 2024-01-22 PROCEDURE — 84484 ASSAY OF TROPONIN QUANT: CPT | Performed by: EMERGENCY MEDICINE

## 2024-01-22 PROCEDURE — 80053 COMPREHEN METABOLIC PANEL: CPT | Performed by: EMERGENCY MEDICINE

## 2024-01-22 PROCEDURE — 85025 COMPLETE CBC W/AUTO DIFF WBC: CPT | Performed by: EMERGENCY MEDICINE

## 2024-01-22 PROCEDURE — 87502 INFLUENZA DNA AMP PROBE: CPT

## 2024-01-22 PROCEDURE — 87635 SARS-COV-2 COVID-19 AMP PRB: CPT

## 2024-01-22 PROCEDURE — 81025 URINE PREGNANCY TEST: CPT | Performed by: EMERGENCY MEDICINE

## 2024-01-22 PROCEDURE — 96374 THER/PROPH/DIAG INJ IV PUSH: CPT

## 2024-01-22 PROCEDURE — 63600175 PHARM REV CODE 636 W HCPCS: Performed by: EMERGENCY MEDICINE

## 2024-01-22 PROCEDURE — 93010 ELECTROCARDIOGRAM REPORT: CPT | Mod: ,,, | Performed by: INTERNAL MEDICINE

## 2024-01-22 PROCEDURE — 87651 STREP A DNA AMP PROBE: CPT

## 2024-01-22 PROCEDURE — 99285 EMERGENCY DEPT VISIT HI MDM: CPT | Mod: 25

## 2024-01-22 PROCEDURE — 93005 ELECTROCARDIOGRAM TRACING: CPT

## 2024-01-22 RX ORDER — ONDANSETRON HYDROCHLORIDE 2 MG/ML
4 INJECTION, SOLUTION INTRAVENOUS
Status: COMPLETED | OUTPATIENT
Start: 2024-01-22 | End: 2024-01-22

## 2024-01-22 RX ADMIN — ONDANSETRON 4 MG: 2 INJECTION INTRAMUSCULAR; INTRAVENOUS at 11:01

## 2024-01-23 VITALS
TEMPERATURE: 99 F | WEIGHT: 178 LBS | RESPIRATION RATE: 16 BRPM | SYSTOLIC BLOOD PRESSURE: 120 MMHG | OXYGEN SATURATION: 99 % | HEART RATE: 64 BPM | BODY MASS INDEX: 25.54 KG/M2 | DIASTOLIC BLOOD PRESSURE: 77 MMHG

## 2024-01-23 LAB
ALBUMIN SERPL BCP-MCNC: 3.8 G/DL (ref 3.5–5.2)
ALP SERPL-CCNC: 56 U/L (ref 55–135)
ALT SERPL W/O P-5'-P-CCNC: 13 U/L (ref 10–44)
ANION GAP SERPL CALC-SCNC: 9 MMOL/L (ref 8–16)
AST SERPL-CCNC: 15 U/L (ref 10–40)
BILIRUB SERPL-MCNC: 0.3 MG/DL (ref 0.1–1)
BNP SERPL-MCNC: 88 PG/ML (ref 0–99)
BUN SERPL-MCNC: 14 MG/DL (ref 6–20)
CALCIUM SERPL-MCNC: 9.1 MG/DL (ref 8.7–10.5)
CHLORIDE SERPL-SCNC: 108 MMOL/L (ref 95–110)
CO2 SERPL-SCNC: 23 MMOL/L (ref 23–29)
CREAT SERPL-MCNC: 0.8 MG/DL (ref 0.5–1.4)
EST. GFR  (NO RACE VARIABLE): >60 ML/MIN/1.73 M^2
GLUCOSE SERPL-MCNC: 92 MG/DL (ref 70–110)
POTASSIUM SERPL-SCNC: 3.4 MMOL/L (ref 3.5–5.1)
PROT SERPL-MCNC: 7.6 G/DL (ref 6–8.4)
SODIUM SERPL-SCNC: 140 MMOL/L (ref 136–145)
TROPONIN I SERPL DL<=0.01 NG/ML-MCNC: <0.006 NG/ML (ref 0–0.03)

## 2024-01-23 PROCEDURE — 25000003 PHARM REV CODE 250: Performed by: EMERGENCY MEDICINE

## 2024-01-23 RX ORDER — GUAIFENESIN 600 MG/1
1200 TABLET, EXTENDED RELEASE ORAL 2 TIMES DAILY
Qty: 40 TABLET | Refills: 0 | Status: SHIPPED | OUTPATIENT
Start: 2024-01-23 | End: 2024-02-02

## 2024-01-23 RX ORDER — BENZONATATE 100 MG/1
100 CAPSULE ORAL 3 TIMES DAILY PRN
Qty: 30 CAPSULE | Refills: 0 | Status: SHIPPED | OUTPATIENT
Start: 2024-01-23 | End: 2024-02-02

## 2024-01-23 RX ORDER — POTASSIUM CHLORIDE 20 MEQ/1
20 TABLET, EXTENDED RELEASE ORAL
Status: COMPLETED | OUTPATIENT
Start: 2024-01-23 | End: 2024-01-23

## 2024-01-23 RX ADMIN — POTASSIUM CHLORIDE 20 MEQ: 1500 TABLET, EXTENDED RELEASE ORAL at 12:01

## 2024-01-23 NOTE — ED PROVIDER NOTES
Encounter Date: 2024    SCRIBE #1 NOTE: I, Caitlyn Meza, am scribing for, and in the presence of,  Rayray Simons MD. I have scribed the following portions of the note - Other sections scribed: HPI,ROS,PE.       History     Chief Complaint   Patient presents with    Chest Pain     Since yesterday with SOB and nausea. Sitting when discomfort started. Took theraflu because of throat pain.     Nasra Marks is a 38 y.o. female, with a PMHx of CHF,HTN,Chronic kidney disease,Chronic combined systolic and diastolic CHF,ICD, who presents to the ED with worsening dyspnea onset yesterday. Pt describes dyspnea. Patient reports associated sx of dry cough, orthopnea, generalized myalgias, congestion,nausea, chest pain that worsens on exertion and laying down. No other exacerbating or alleviating factors. Denies Fever,vomiting, or other associated symptoms. Pt denies hx of PE or DVT and recent surgeries. Pt states last known menstrual period was regular.       The history is provided by the patient. No  was used.     Review of patient's allergies indicates:   Allergen Reactions    Aldactone [spironolactone] Swelling     Lips swelled    Hydralazine analogues Other (See Comments)     headaches    Isosorbide Other (See Comments)     headaches     Past Medical History:   Diagnosis Date    CHF (congestive heart failure)     Chronic combined systolic and diastolic congestive heart failure 2019    Essential hypertension 2012    Hypertension     dx at 16y.o.    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 disease, with heart failure 2021    ICD (implantable cardioverter-defibrillator), single, in situ 2020    Nonischemic cardiomyopathy 2019    Pacemaker 2017     Past Surgical History:   Procedure Laterality Date    CARDIAC DEFIBRILLATOR PLACEMENT  2017     SECTION      x 1    INDUCED       RIGHT HEART  CATHETERIZATION Right 1/27/2022    Procedure: INSERTION, CATHETER, RIGHT HEART;  Surgeon: Timothy Prajapati Jr., MD;  Location: [facility] CATH LAB;  Service: Cardiology;  Laterality: Right;    TUBAL LIGATION       Family History   Problem Relation Age of Onset    Hypertension Mother     Cancer Maternal Grandmother         breast x 2    Cancer Paternal Grandmother         breast    No Known Problems Sister     No Known Problems Brother     No Known Problems Son     No Known Problems Brother     Hypertension Other     Diabetes Other     Breast cancer Other     Cancer Paternal Aunt         breast    Cancer Paternal Uncle      Social History     Tobacco Use    Smoking status: Never    Smokeless tobacco: Never   Substance Use Topics    Alcohol use: Yes     Comment: occasionally    Drug use: Yes     Types: Marijuana     Comment: in past very little marijuana     Review of Systems   Constitutional:  Negative for fever.   HENT:  Positive for congestion and sore throat.    Respiratory:  Positive for cough, chest tightness and shortness of breath.         + orthopnea   Cardiovascular:  Positive for chest pain.   Gastrointestinal:  Positive for nausea. Negative for vomiting.   Musculoskeletal:  Positive for myalgias (generalized).       Physical Exam     Initial Vitals [01/22/24 2052]   BP Pulse Resp Temp SpO2   129/82 71 18 99.3 °F (37.4 °C) 100 %      MAP       --         Physical Exam    Nursing note and vitals reviewed.  Constitutional: She appears well-developed and well-nourished. She is not diaphoretic. No distress.   HENT:   Head: Normocephalic and atraumatic.   Mouth/Throat: No oropharyngeal exudate.   No Dysphonia   Eyes: Conjunctivae are normal.   Neck: Neck supple.   Normal range of motion.  Cardiovascular:  Normal rate, regular rhythm and normal heart sounds.           No murmur heard.  Pulmonary/Chest: Breath sounds normal. No respiratory distress.   Posterior lung sounds clear   No Stridor   Abdominal: Abdomen is  soft. Bowel sounds are normal. There is no abdominal tenderness.   Musculoskeletal:         General: No edema. Normal range of motion.      Cervical back: Normal range of motion and neck supple.      Right lower leg: No tenderness. No edema.      Left lower leg: No tenderness. No edema.     Neurological: She is alert and oriented to person, place, and time.   Skin: Skin is warm and dry. Capillary refill takes less than 2 seconds. No erythema.   Psychiatric: She has a normal mood and affect.         ED Course   Procedures  Labs Reviewed   CBC W/ AUTO DIFFERENTIAL - Abnormal; Notable for the following components:       Result Value    RBC 3.94 (*)     Hemoglobin 11.5 (*)     Hematocrit 35.6 (*)     All other components within normal limits   COMPREHENSIVE METABOLIC PANEL - Abnormal; Notable for the following components:    Potassium 3.4 (*)     All other components within normal limits   B-TYPE NATRIURETIC PEPTIDE   TROPONIN I   SARS-COV-2 RDRP GENE   POCT INFLUENZA A/B MOLECULAR   POCT STREP A MOLECULAR   POCT URINE PREGNANCY          Imaging Results              X-Ray Chest AP Portable (Final result)  Result time 01/22/24 22:56:56   Procedure changed from X-Ray Chest PA And Lateral     Final result by Ana Chase MD (01/22/24 22:56:56)                   Impression:      No acute intrathoracic abnormality detected.  Mild cardiomegaly.      Electronically signed by: Ana Chase  Date:    01/22/2024  Time:    22:56               Narrative:    EXAMINATION:  AP PORTABLE CHEST    CLINICAL HISTORY:  Chest pain, unspecifiedchest pain;chest pain;    TECHNIQUE:  AP portable chest radiograph was submitted.    COMPARISON:  10/23/2023    FINDINGS:  A left subclavian pacer is present.  AP portable chest radiograph demonstrates mild enlargement of the cardiac silhouette.  There is no focal consolidation, pneumothorax, or pleural effusion.                                       Medications   ondansetron injection 4 mg  (4 mg Intravenous Given 1/22/24 0584)   potassium chloride SA CR tablet 20 mEq (20 mEq Oral Given 1/23/24 0030)     Medical Decision Making    38-year-old female presenting with dyspnea, rhinorrhea, cough.  Given the patient's history cardiomyopathy and CHF differential diagnosis include cardiac versus viral cause.  There was consideration for possible combination of the 2 diagnosis.  Patient does not appear to be fluid overload on physical exam.  No lower extremity edema.  No pulmonary edema noted on chest x-ray.  No respiratory distress.  BNP within normal limits.  Patient does not appear to be overweight to account for possible false negative BNP.  Given the patient is reporting URI symptoms suspect this is the patient's cause of symptoms.  Patient provided symptomatic care for the time being.  I discussed that if symptoms worsen to seek medical care immediately.  Patient is to follow up with the primary care provider for re-evaluation.      Differential diagnosis includes URI, CHF exacerbation, lower respiratory tract infection including pneumonia    Amount and/or Complexity of Data Reviewed  Labs: ordered. Decision-making details documented in ED Course.  ECG/medicine tests:  Decision-making details documented in ED Course.    Risk  OTC drugs.  Prescription drug management.            Scribe Attestation:   Scribe #1: I performed the above scribed service and the documentation accurately describes the services I performed. I attest to the accuracy of the note.        ED Course as of 01/23/24 0629   Mon Jan 22, 2024   2307 EKG 12-lead  Time 8:43 p.m.     Rate 71, sinus, regular rhythm, normal axis.   QRS 96 QTC of 421.  No ST elevation or depression.  No T-wave inversion or hyperacute T-waves.  No Q-waves present.      Normal sinus rhythm.   [JM]   2300 History of cardiomyopathy with systolic and diastolic dysfunction and defibrillator in place, previous CVA, hypertension    Echocardiogram  01/2023  · The  left ventricle is mildly enlarged with moderately decreased systolic function.  · The estimated ejection fraction is 40% ( upper 30s to low 40s and much better than before).  · There is abnormal septal wall motion.  · There are segmental left ventricular wall motion abnormalities.  · Grade I left ventricular diastolic dysfunction.  · Normal right ventricular size with normal right ventricular systolic function.  · Normal central venous pressure (3 mmHg).  · The estimated PA systolic pressure is 18 mmHg.   []   Tue Jan 23, 2024   0013 Molecular Strep A, POC: Negative [JM]   0013 POC Molecular Influenza A Ag: Negative [JM]   0013 SARS-CoV-2 RNA, Amplification, Qual: Negative [JM]   0013 Preg Test, Ur: Negative  PERC negative [JM]   0020 Labs and imaging reviewed with the patient.  I discussed that at this time symptoms may be more related to URI rather than CHF.  Given the negative chest x-ray, normal troponin, normal BNP, hemodynamic stability less likely heart related at this time.  Patient is to follow up with the primary care provider within the next 1 week.  I discussed with the patient to have a low threshold to return if worsening. [JM]      ED Course User Index  [JM] Rayray Simons MD                       I, Rayray Simons, personally performed the services described in this documentation. All medical record entries made by the scribe were at my direction and in my presence. I have reviewed the chart and agree that the record reflects my personal performance and is accurate and complete.      Clinical Impression:  Final diagnoses:  [R07.9] Chest pain  [R06.02] Shortness of breath  [J06.9] Upper respiratory tract infection, unspecified type (Primary)  [R05.9] Cough, unspecified type          ED Disposition Condition    Discharge Stable          ED Prescriptions       Medication Sig Dispense Start Date End Date Auth. Provider    benzonatate (TESSALON) 100 MG capsule Take 1 capsule (100 mg total) by mouth 3  (three) times daily as needed for Cough. 30 capsule 1/23/2024 2/2/2024 Rayray Simons MD    guaiFENesin (MUCINEX) 600 mg 12 hr tablet Take 2 tablets (1,200 mg total) by mouth 2 (two) times daily. for 10 days 40 tablet 1/23/2024 2/2/2024 Rayray Simons MD          Follow-up Information       Follow up With Specialties Details Why Contact Info    Veronika Rainey MD Family Medicine, Wound Care Schedule an appointment as soon as possible for a visit in 3 days Primary care [address] [facility] [id number]  [phone]      [facility] Emergency Medicine  If symptoms worsen [address] [facility] [place] [address] [phone]             Rayray Simons MD  01/23/24 0644

## 2024-01-23 NOTE — DISCHARGE INSTRUCTIONS
Please schedule follow up appointment with your primary care provider for re-evaluation in the next 1 week.    Seek medical care if symptoms are worsening or any concerns.    Recommend Tylenol as needed for fever or body aches.

## 2024-02-16 ENCOUNTER — LAB VISIT (OUTPATIENT)
Dept: LAB | Facility: HOSPITAL | Age: 39
End: 2024-02-16
Attending: INTERNAL MEDICINE
Payer: MEDICARE

## 2024-02-16 DIAGNOSIS — E78.5 DYSLIPIDEMIA: ICD-10-CM

## 2024-02-16 LAB
ALBUMIN SERPL BCP-MCNC: 4.1 G/DL (ref 3.5–5.2)
ALP SERPL-CCNC: 64 U/L (ref 55–135)
ALT SERPL W/O P-5'-P-CCNC: 14 U/L (ref 10–44)
AST SERPL-CCNC: 19 U/L (ref 10–40)
BILIRUB DIRECT SERPL-MCNC: 0.3 MG/DL (ref 0.1–0.3)
BILIRUB SERPL-MCNC: 0.5 MG/DL (ref 0.1–1)
CHOLEST SERPL-MCNC: 91 MG/DL (ref 120–199)
CHOLEST/HDLC SERPL: 2 {RATIO} (ref 2–5)
HDLC SERPL-MCNC: 46 MG/DL (ref 40–75)
HDLC SERPL: 50.5 % (ref 20–50)
LDLC SERPL CALC-MCNC: 40.4 MG/DL (ref 63–159)
NONHDLC SERPL-MCNC: 45 MG/DL
PROT SERPL-MCNC: 7.1 G/DL (ref 6–8.4)
TRIGL SERPL-MCNC: 23 MG/DL (ref 30–150)

## 2024-02-16 PROCEDURE — 80076 HEPATIC FUNCTION PANEL: CPT | Performed by: INTERNAL MEDICINE

## 2024-02-16 PROCEDURE — 80061 LIPID PANEL: CPT | Performed by: INTERNAL MEDICINE

## 2024-02-16 PROCEDURE — 36415 COLL VENOUS BLD VENIPUNCTURE: CPT | Mod: PO | Performed by: INTERNAL MEDICINE

## 2024-02-19 ENCOUNTER — HOSPITAL ENCOUNTER (EMERGENCY)
Facility: HOSPITAL | Age: 39
Discharge: HOME | End: 2024-02-19
Attending: EMERGENCY MEDICINE
Payer: MEDICARE

## 2024-02-19 VITALS
BODY MASS INDEX: 26.51 KG/M2 | HEIGHT: 69 IN | HEART RATE: 72 BPM | RESPIRATION RATE: 18 BRPM | OXYGEN SATURATION: 100 % | SYSTOLIC BLOOD PRESSURE: 144 MMHG | TEMPERATURE: 99 F | DIASTOLIC BLOOD PRESSURE: 90 MMHG | WEIGHT: 179 LBS

## 2024-02-19 DIAGNOSIS — R11.0 NAUSEA: ICD-10-CM

## 2024-02-19 DIAGNOSIS — R06.02 SHORTNESS OF BREATH: Primary | ICD-10-CM

## 2024-02-19 LAB
ALBUMIN SERPL-MCNC: 4.3 G/DL (ref 3.3–5.5)
ALP SERPL-CCNC: 60 U/L (ref 42–141)
B-HCG UR QL: NEGATIVE
BILIRUB SERPL-MCNC: 0.8 MG/DL (ref 0.2–1.6)
BUN SERPL-MCNC: 11 MG/DL (ref 7–22)
CALCIUM SERPL-MCNC: 9.4 MG/DL (ref 8–10.3)
CHLORIDE SERPL-SCNC: 105 MMOL/L (ref 98–108)
CREAT SERPL-MCNC: 0.6 MG/DL (ref 0.6–1.2)
CTP QC/QA: YES
CTP QC/QA: YES
GLUCOSE SERPL-MCNC: 97 MG/DL (ref 73–118)
HCT, POC: NORMAL
HGB, POC: NORMAL (ref 14–18)
INFLUENZA A ANTIGEN, POC: NEGATIVE
INFLUENZA B ANTIGEN, POC: NEGATIVE
MCH, POC: NORMAL
MCHC, POC: NORMAL
MCV, POC: NORMAL
MPV, POC: NORMAL
POC ALT (SGPT): 11 U/L (ref 10–47)
POC AST (SGOT): 27 U/L (ref 11–38)
POC B-TYPE NATRIURETIC PEPTIDE: 131 PG/ML (ref 0–100)
POC CARDIAC TROPONIN I: 0 NG/ML (ref 0–0.08)
POC D-DI: 111 NG/ML (ref 0–450)
POC PLATELET COUNT: NORMAL
POC TCO2: 30 MMOL/L (ref 18–33)
POTASSIUM BLD-SCNC: 3.5 MMOL/L (ref 3.6–5.1)
PROTEIN, POC: 8.4 G/DL (ref 6.4–8.1)
RBC, POC: NORMAL
RDW, POC: NORMAL
SAMPLE: NORMAL
SARS-COV-2 RDRP RESP QL NAA+PROBE: NEGATIVE
SODIUM BLD-SCNC: 143 MMOL/L (ref 128–145)
WBC, POC: NORMAL

## 2024-02-19 PROCEDURE — 93010 ELECTROCARDIOGRAM REPORT: CPT | Mod: ,,, | Performed by: INTERNAL MEDICINE

## 2024-02-19 PROCEDURE — 87635 SARS-COV-2 COVID-19 AMP PRB: CPT | Mod: ER | Performed by: NURSE PRACTITIONER

## 2024-02-19 PROCEDURE — 99284 EMERGENCY DEPT VISIT MOD MDM: CPT | Mod: 25,ER

## 2024-02-19 PROCEDURE — 93005 ELECTROCARDIOGRAM TRACING: CPT | Mod: ER

## 2024-02-19 PROCEDURE — 81025 URINE PREGNANCY TEST: CPT | Mod: ER | Performed by: NURSE PRACTITIONER

## 2024-02-19 PROCEDURE — 87804 INFLUENZA ASSAY W/OPTIC: CPT | Mod: ER

## 2024-02-19 NOTE — ED PROVIDER NOTES
"  EM PHYSICIAN NOTE       This patient presents with a complaint of   Chief Complaint   Patient presents with    Shortness of Breath     Pt complains of SOB, diarrhea, and vomiting since this morning about 3am. Hx of CHF.        Source of HPI & ROS: patient    HPI:  This is a 38-year-old woman with a history of CHF who presents the emergency department with a complaint of shortness of breath, epigastric fullness, diarrhea and vomiting that started around 3:00 a.m. she has been trying to treat herself at home but she wanted to come in and get checked to make sure her is okay.  She reports that she feels as if her CHF is getting worse and so she took an extra dose of her Lasix this morning.  She reports that her blood pressure was elevated during the night.  She has an appointment with the cardiologist later this month.  She reports she has an internal defibrillator due to her CHF.  She has not felt a shock. She does not know what is the cause of her CHF.      Review of patient's allergies indicates:   Allergen Reactions    Aldactone [spironolactone] Swelling     Lips swelled    Hydralazine analogues Other (See Comments)     headaches    Isosorbide Other (See Comments)     headaches          Pertinent REVIEW of SYSTEMS  GENERAL/CONSTITUTIONAL: There is not a report of fever   CARDIOVASCULAR:  See HPI  RESPIRATORY:  See HPI  GASTROINTESTINAL:  See HPI  HEMATOLOGIC/LYMPHATIC:  She takes an aspirin every day    The nurse's notes and triage vital signs were reviewed.    PHYSICAL EXAMINATION    ED Triage Vitals [02/19/24 1232]   Enc Vitals Group      BP (!) 144/90      Pulse 72      Resp 18      Temp 98.5 °F (36.9 °C)      Temp Source Oral      SpO2 100 %      Weight 179 lb      Height 5' 9"      Head Circumference       Peak Flow       Pain Score       Pain Loc       Pain Edu?       Excl. in GC?      Vital signs and Pulse Ox reviewed in clinical context. Abnormalities noted:  Blood pressure is elevated  Body mass index is " 26.43 kg/m².  Pt's level of consciousness is Awake and Alert, and the patient is in no distress.  Skin: warm, pink and dry.  Capillary refill is less than 2 seconds.  Mucosa: normal  Head and Neck: no JVD, neck supple  Cardiac exam: RRR I did not appreciate a murmur.  Pulmonary exam: unlabored and clear  Abd Exam: soft nontender.  No Burger or McBurney point tenderness.  Musculoskeletal: no joint tenderness, deformity or swelling.  There has no calf pain or swelling.  Neurologic: GCS 15; moving all extremities equally, no facial droop       Medical decision making:   Nurses notes and Vital Signs reviewed.     Problems: Today's visit reveals shortness of breath and nausea which is a/an  Acute on Chronic problem with a differential diagnosis which includes CHF exacerbation, ACS, COVID, influenza.    Other problems today include elevated blood pressure not at goal     MDM Components integrated into this visit: Social determinants of health impacting care today: Access to PCP impaired because patient was unable to obtain appointment with PCP in a timely manner, Decision-making pertaining to admit/discharge, imaging or labs:  I discussed with the patient at length the ability to place her in observation due to the new appearing flipped T-waves on her EKG.  She does not wish to be admitted.  She will follow-up with the cardiologist as an outpatient.  She demonstrates capacity to understand the risks versus benefits of this.  She had no recurrence of her symptoms during her 3 hour observation here.  Patient does tell me that she missed a few doses of her medicines throughout the last week and that she took a double dose of her Lasix last night to try to treat her symptoms.  She may very well have had an early CHF exacerbation which she was able to abort at home.    Data: see ER course below for lab test ordered, results reviewed, independent interpretation of images or EKG, discussion with consultants, data obtained from  sources other than patient:  ED Course as of 02/19/24 1535   Mon Feb 19, 2024   1316 My independent interpretation of the EKG is normal sinus rhythm at a rate of 76.  There is flipped T-waves across the precordium.  There has no ST segment elevation or depression concerning for acute occlusive infarct.  QT interval is 460  when compared to EKGs performed outside of the ED visit, the flipped Ts are more prominent they have been in the past []   1327 COVID and influenza are negative []   1444 D-dimer is normal. [MH]   1444 BNP is 131 [MH]   1444 CMP is normal [MH]   1444 Troponin is normal [MH]   1444 Chest x-ray is normal []   1500 Chart review reveals that the patient's echo last year had showed improvement of her EF up to 40% from a low of 15% the year prior. []   1516 I discussed with the patient the findings of my workup, including the flipped T-waves.  She does not wish to be admitted.  She wishes to follow-up with her cardiologist.  She reports she is taken 2 doses of Lasix this morning and does not want another 1 here.  She reports she missed a few doses of her medicines over the past week which may contribute to her symptoms today.  She does not wish to stay for further evaluation but understands that she can call EMS at anytime if she changes her mind []      ED Course User Index  [MH] Pauline George MD           Orders Placed This Encounter   Procedures    X-Ray Chest PA And Lateral    Troponin ISTAT    POCT Influenza A/B Molecular    POCT COVID-19 Rapid Screening    POCT urine pregnancy    POCT CBC    EKG 12-lead     Medications - No data to display        Diagnoses that have been ruled out:   None   Diagnoses that are still under consideration:   None   Final diagnoses:   Shortness of breath   Nausea          Referral for follow-up  Follow-up Information       Follow up With Specialties Details Why Contact Info    Veronika Rainey MD Family Medicine, Wound Care  Call to schedule an appointment  [address]  [place] [id number]  [phone]            Future Appointments   Date Time Provider Department Center   3/14/2024  1:20 PM Kashif Peguero MD [facility] [place] [place]         Prescription management:  ED Prescriptions    Need a refill on any of her medications.         Disposition:  Discharge    Pauline George      This note was created using dictation software.  This program may occasionally mistype words and phrases.             Pauline George MD  02/19/24 1532       Pauline George MD  02/19/24 1535

## 2024-02-19 NOTE — DISCHARGE INSTRUCTIONS
Future Appointments   Date Time Provider Department Center   3/14/2024  1:20 PM Kashif Peguero MD [facility] [place] [place]       As we discussed, it is important that you return to the ER for any new concerns or symptoms, worsening of your existing symptoms, if you do not completely improve, or if you are unable to be seen by your primary care provider.  An ER visit cannot replace the evaluation by your primary care provider and your cardiologist!!    In the emergency department our goal is to rule-out life threatening conditions and make sure that you are safe to be discharged home. Many medical conditions are difficult to diagnose and may be impossible to be diagnosed during a single ER visit. These conditions often start with non-specific symptoms and can only be diagnosed on follow up visits with your primary care physician or specialist when the symptoms continue or change. Please remember that all medical conditions can change, and we cannot predict how you will be feeling tomorrow or the next day, so if you have any worsening or new symptoms, you should not hesitate to return to the emergency department for re-evaluation.        Be sure to follow up with your primary care doctor for a recheck and to review any labs/imaging that were performed today.  It is very common for us to identify non-emergent incidental findings on labs and imaging which must be followed up with your primary care physician. If you do not have a primary care doctor, you may contact the one listed on your discharge paperwork or you may also call the [facility] Appointment Desk at [phone] to schedule an appointment with one.       All medications have side effects.  We have done our best to select a medication for you that will treat your health problem and have the least amount of side effects.  If at any time while or after you take this medication you develops new symptoms or have any concerns, it is important you stop  taking the medication and call your primary care provider or return to the emergency department for evaluation.

## 2024-02-20 LAB
OHS QRS DURATION: 102 MS
OHS QTC CALCULATION: 468 MS

## 2024-02-22 ENCOUNTER — OFFICE VISIT (OUTPATIENT)
Dept: CARDIOLOGY | Facility: CLINIC | Age: 39
End: 2024-02-22
Payer: MEDICARE

## 2024-02-22 VITALS
OXYGEN SATURATION: 99 % | BODY MASS INDEX: 25.25 KG/M2 | SYSTOLIC BLOOD PRESSURE: 120 MMHG | HEART RATE: 86 BPM | WEIGHT: 176.38 LBS | HEIGHT: 70 IN | DIASTOLIC BLOOD PRESSURE: 82 MMHG

## 2024-02-22 DIAGNOSIS — Z86.73 HISTORY OF CVA (CEREBROVASCULAR ACCIDENT): ICD-10-CM

## 2024-02-22 DIAGNOSIS — E78.5 DYSLIPIDEMIA: ICD-10-CM

## 2024-02-22 DIAGNOSIS — I42.8 NONISCHEMIC CARDIOMYOPATHY: Primary | ICD-10-CM

## 2024-02-22 DIAGNOSIS — I50.42 CHRONIC COMBINED SYSTOLIC AND DIASTOLIC CONGESTIVE HEART FAILURE: ICD-10-CM

## 2024-02-22 DIAGNOSIS — R06.09 DOE (DYSPNEA ON EXERTION): ICD-10-CM

## 2024-02-22 DIAGNOSIS — I10 ESSENTIAL HYPERTENSION: ICD-10-CM

## 2024-02-22 DIAGNOSIS — Z95.810 ICD (IMPLANTABLE CARDIOVERTER-DEFIBRILLATOR), SINGLE, IN SITU: ICD-10-CM

## 2024-02-22 PROCEDURE — 99214 OFFICE O/P EST MOD 30 MIN: CPT | Mod: S$PBB,,, | Performed by: INTERNAL MEDICINE

## 2024-02-22 PROCEDURE — 99999 PR PBB SHADOW E&M-EST. PATIENT-LVL III: CPT | Mod: PBBFAC,,, | Performed by: INTERNAL MEDICINE

## 2024-02-22 PROCEDURE — 99213 OFFICE O/P EST LOW 20 MIN: CPT | Mod: PBBFAC | Performed by: INTERNAL MEDICINE

## 2024-02-22 NOTE — PROGRESS NOTES
CARDIOVASCULAR PROGRESS NOTE    REASON FOR CONSULT:   Nasra Marks is a 38 y.o. female who presents for follow up of MINDY, MDT ICD.    PCP: Redd  Gyn: Labadie, Juan  CHF: Victorina  HISTORY OF PRESENT ILLNESS:   COVID+ 21    The patient comes in today at her urgent request.  She was recently seen in the emergency room with complaints of fatigue and some shortness of breath.  BNP was drawn and was mildly elevated for which she was referred back to me urgently.  The patient is on Entresto and we should be checking pro BNP.  Nevertheless, the patient is having some mild symptoms.  Mostly related to fatigue.  She denies any angina, dyspnea, palpitations, syncope, or defibrillator discharges.  There has been no PND, orthopnea, melena, hematuria, or claudication symptoms.  She appears euvolemic on examination.  She is due to come back in 2 weeks for [employer] ICD check.    CARDIOVASCULAR HISTORY:   NICM (cath 2017 with normal cors)  ICD (Donta 2017, MDT single chamber)    PAST MEDICAL HISTORY:     Past Medical History:   Diagnosis Date    CHF (congestive heart failure)     Chronic combined systolic and diastolic congestive heart failure 2019    Essential hypertension 2012    Hypertension     dx at 16y.o.    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 disease, with heart failure 2021    ICD (implantable cardioverter-defibrillator), single, in situ 2020    Nonischemic cardiomyopathy 2019    Pacemaker 2017       PAST SURGICAL HISTORY:     Past Surgical History:   Procedure Laterality Date    CARDIAC DEFIBRILLATOR PLACEMENT  2017     SECTION      x 1    INDUCED       RIGHT HEART CATHETERIZATION Right 2022    Procedure: INSERTION, CATHETER, RIGHT HEART;  Surgeon: Timothy Prajapati Jr., MD;  Location: [facility] CATH LAB;  Service: Cardiology;  Laterality: Right;    TUBAL LIGATION         ALLERGIES AND MEDICATION:      Review of patient's allergies indicates:   Allergen Reactions    Aldactone [spironolactone] Swelling     Lips swelled    Hydralazine analogues Other (See Comments)     headaches    Isosorbide Other (See Comments)     headaches        Medication List            Accurate as of February 22, 2024  2:45 PM. If you have any questions, ask your nurse or doctor.                CONTINUE taking these medications      amLODIPine 5 MG tablet  Commonly known as: NORVASC  Take 1 tablet (5 mg total) by mouth once daily.     aspirin 81 MG EC tablet  Commonly known as: ECOTRIN  Take 1 tablet (81 mg total) by mouth once daily.     atorvastatin 40 MG tablet  Commonly known as: LIPITOR  Take 1 tablet (40 mg total) by mouth every evening.     benzocaine-menthoL 6-10 mg lozenge  Take 1 lozenge by mouth every 2 (two) hours as needed for Pain (sore throat).     benzonatate 100 MG capsule  Commonly known as: TESSALON  Take 1 capsule (100 mg total) by mouth 3 (three) times daily as needed for Cough.     cetirizine 10 MG tablet  Commonly known as: ZYRTEC  Take 1 tablet (10 mg total) by mouth daily as needed for Allergies or Rhinitis.     ENTRESTO  mg per tablet  Generic drug: sacubitriL-valsartan  Take 1 tablet by mouth 2 (two) times daily.     eplerenone 25 MG Tab  Commonly known as: INSPRA  Take 1 tablet (25 mg total) by mouth once daily.     FARXIGA 10 mg tablet  Generic drug: dapagliflozin propanediol  Take 1 tablet by mouth once daily     fluticasone propionate 50 mcg/actuation nasal spray  Commonly known as: FLONASE  1 spray (50 mcg total) by Each Nostril route 2 (two) times daily as needed for Rhinitis or Allergies.     furosemide 40 MG tablet  Commonly known as: LASIX  Take 1 tablet (40 mg total) by mouth 2 (two) times a day.     guaiFENesin 100 mg/5 ml 100 mg/5 mL syrup  Commonly known as: ROBITUSSIN  Take 5-10 mLs (100-200 mg total) by mouth every 4 (four) hours as needed for Cough or Congestion.     metoprolol succinate  100 MG 24 hr tablet  Commonly known as: TOPROL-XL  Take 1 tablet by mouth once daily     ondansetron 4 MG Tbdl  Commonly known as: ZOFRAN-ODT  Take 1 tablet (4 mg total) by mouth every 6 (six) hours as needed (Nausea).     potassium chloride SA 20 MEQ tablet  Commonly known as: K-DUR,KLOR-CON  Take 2 tablets (40 mEq total) by mouth 2 (two) times daily.            STOP taking these medications      sars-cov-2 (covid-19) 30 mcg/0.3 ml injection  Commonly known as: Pfizer COVID-19  Stopped by: Kashif Peguero MD              SOCIAL HISTORY:     Social History     Socioeconomic History    Marital status:    Occupational History     Employer: [employer]   Tobacco Use    Smoking status: Never    Smokeless tobacco: Never   Substance and Sexual Activity    Alcohol use: Yes     Comment: occasionally    Drug use: Yes     Types: Marijuana     Comment: in past very little marijuana    Sexual activity: Yes     Partners: Male     Birth control/protection: None       FAMILY HISTORY:     Family History   Problem Relation Age of Onset    Hypertension Mother     Cancer Maternal Grandmother         breast x 2    Cancer Paternal Grandmother         breast    No Known Problems Sister     No Known Problems Brother     No Known Problems Son     No Known Problems Brother     Hypertension Other     Diabetes Other     Breast cancer Other     Cancer Paternal Aunt         breast    Cancer Paternal Uncle        REVIEW OF SYSTEMS:   Review of Systems   Constitutional:  Positive for malaise/fatigue. Negative for chills, diaphoresis and fever.   HENT:  Negative for nosebleeds.    Eyes:  Negative for blurred vision, double vision and photophobia.   Respiratory:  Negative for hemoptysis, shortness of breath and wheezing.    Cardiovascular:  Negative for chest pain, palpitations, orthopnea, claudication, leg swelling and PND.   Gastrointestinal:  Negative for abdominal pain, blood in stool, heartburn, melena, nausea and vomiting.  "  Genitourinary:  Negative for flank pain and hematuria.   Musculoskeletal:  Negative for falls, myalgias and neck pain.   Skin:  Negative for rash.   Neurological:  Negative for dizziness, seizures, loss of consciousness, weakness and headaches.   Endo/Heme/Allergies:  Negative for polydipsia. Does not bruise/bleed easily.   Psychiatric/Behavioral:  Negative for depression and memory loss. The patient is not nervous/anxious.        PHYSICAL EXAM:     Vitals:    02/22/24 1416   BP: 120/82   Pulse: 86    Body mass index is 25.31 kg/m².  Weight: 80 kg (176 lb 5.9 oz)   Height: 5' 10" (177.8 cm)     Physical Exam  Vitals reviewed.   Constitutional:       General: She is not in acute distress.     Appearance: Normal appearance. She is well-developed. She is not ill-appearing, toxic-appearing or diaphoretic.   HENT:      Head: Normocephalic and atraumatic.   Eyes:      General: No scleral icterus.     Extraocular Movements: Extraocular movements intact.      Conjunctiva/sclera: Conjunctivae normal.      Pupils: Pupils are equal, round, and reactive to light.   Neck:      Thyroid: No thyromegaly.      Vascular: Normal carotid pulses. No carotid bruit or JVD.      Trachea: Trachea normal. No tracheal deviation.   Cardiovascular:      Rate and Rhythm: Normal rate and regular rhythm.      Pulses:           Carotid pulses are 2+ on the right side and 2+ on the left side.     Heart sounds: S1 normal and S2 normal. No murmur heard.     No friction rub. No gallop.      Comments: L infraclavicular ICD site well healed  Pulmonary:      Effort: Pulmonary effort is normal. No respiratory distress.      Breath sounds: Normal breath sounds. No stridor. No wheezing, rhonchi or rales.   Chest:      Chest wall: No tenderness.   Abdominal:      General: There is no distension.      Palpations: Abdomen is soft.   Musculoskeletal:         General: No swelling or tenderness. Normal range of motion.      Cervical back: Normal range of motion " and neck supple. No edema or rigidity.      Right lower leg: No edema.      Left lower leg: No edema.   Feet:      Right foot:      Skin integrity: No ulcer.      Left foot:      Skin integrity: No ulcer.   Skin:     General: Skin is warm and dry.      Coloration: Skin is not jaundiced.   Neurological:      General: No focal deficit present.      Mental Status: She is alert and oriented to person, place, and time.      Cranial Nerves: No cranial nerve deficit.   Psychiatric:         Mood and Affect: Mood normal.         Speech: Speech normal.         Behavior: Behavior normal. Behavior is cooperative.         DATA:   EKG: (personally reviewed tracing(s))  2/19/24 SR 76, NSSTTW changes    Laboratory:  CBC:  Recent Labs   Lab 01/17/23  0434 03/27/23  1659 01/22/24  2320   WBC 8.84 7.31 8.45   Hemoglobin 12.7 12.8 11.5 L   Hematocrit 37.8 39.2 35.6 L   Platelets 342 378 324         CHEMISTRIES:  Recent Labs   Lab 01/14/22  0807 01/26/22  0934 02/25/22  1035 08/26/22  2043 09/25/22  2335 11/25/22  1408 01/17/23  0434 01/20/23  0720 08/14/23  1006 08/21/23  1050 01/22/24  2320   Glucose 96 75 78   < > 144 H 88 110   < > 101 81 92   Sodium 140 140 139   < > 142 136 140   < > 139 138 140   Potassium 4.2 4.3 3.5   < > 2.8 L 3.6 3.5   < > 2.9 L 3.8 3.4 L   BUN 12 17 10   < > 12 12 13   < > 9 11 14   Creatinine 0.8 0.8 0.8   < > 1.0 0.8 0.8   < > 0.8 0.8 0.8   eGFR if African American >60 >60.0 >60.0  --   --   --   --   --   --   --   --    eGFR if non African American >60 >60.0 >60.0  --   --   --   --   --   --   --   --    Calcium 8.6 L 9.1 9.1   < > 9.7 9.0 8.8   < > 8.8 9.2 9.1   Magnesium  --   --   --   --  2.0 1.9 2.2  --   --   --   --     < > = values in this interval not displayed.         CARDIAC BIOMARKERS:  Recent Labs   Lab 07/24/21  1802 07/24/21  2136 03/27/23  1659 03/27/23 2052 01/22/24  2320   CPK 77  --   --   --   --    Troponin I 0.035 H   < > <0.006 <0.006 <0.006    < > = values in this interval not  displayed.         COAGS:  Recent Labs   Lab 10/23/23  2218   INR 1.1         LIPIDS/LFTS:  Recent Labs   Lab 08/16/21  1409 09/15/21  0834 07/11/23  0836 01/22/24  2320 02/16/24  0946   Cholesterol 164  --  134  --  91 L   Triglycerides 106  --  39  --  23 L   HDL 36 L  --  40  --  46   LDL Cholesterol 106.8  --  86.2  --  40.4 L   Non-HDL Cholesterol 128  --  94  --  45   AST  --    < > 20 15 19   ALT  --    < > 15 13 14    < > = values in this interval not displayed.       Lab Results   Component Value Date    TSH 1.386 12/28/2021         Cardiovascular Testing:  Echo 2/7/23 (EF improved from 15% on report 10/2021)  The left ventricle is mildly enlarged with moderately decreased systolic function.  The estimated ejection fraction is 40% ( upper 30s to low 40s and much better than before).  There is abnormal septal wall motion.  There are segmental left ventricular wall motion abnormalities.  Grade I left ventricular diastolic dysfunction.  Normal right ventricular size with normal right ventricular systolic function.  Normal central venous pressure (3 mmHg).  The estimated PA systolic pressure is 18 mmHg.    Carotid US 1/20/23  There is 0-19% right Internal Carotid Stenosis.  There is 0-19% left Internal Carotid Stenosis.    RHC 1/27/22   Right atrial pressure is normal.  Pulmonary capillary wedge pressure is normal.  Pulmonary artery pressure is normal.  Pulmonary vascular resistance is normal.  Systemic vascular resistance is 1180.  Jeovany cardiac output and index is normal.  RA: 8/ 5/ 7 RV: 26/ 8 PA: 23/ 10/ 14 PWP: 15/ 12/ 12 .   Cardiac output was 5.22 by Jeovany. Cardiac index is 2.67 L/min/m2.   O2 Sat: PA 70%.   Pulmonary vascular resistance: 31. Systemic vascular resistance: 1180.   These hemodynamic are acceptable for cardiac transplant listing.     Cath 4/18/17  B. Summary/Post-Operative Diagnosis    Normal coronary arteries.    Systolic dysfunction.    Mildly elevated left Filling Pressures.    Normal  Pulmonary Hypertension.  D. Hemodynamic Results  Ejection Fraction: 20%  Global LV Function: severely depressed  PW:  (4)  PA: 24  CO_THERM: 4.4  RV: 25  RA:  (5)  LVEDP: 17   E. Angiographic Results       Patient has a right dominant coronary artery.      - Left Main Coronary Artery:             The LM is normal. There is DANNY 3 flow.     - Left Anterior Descending Artery:             The LAD is normal. There is DANNY 3 flow.     - Left Circumflex Artery:             The LCX is normal. There is DANNY 3 flow.     - Right Coronary Artery:             The RCA is normal. There is DANNY 3 flow.     - Left Renal Artery:             The ostial left renal is normal.     - Right Renal Artery:             The ostial right renal is normal.     - Common Femoral Artery:             The right CFA is normal.      ASSESSMENT:   # NICM, appears euvolemic.  Following with Dr. Prajapati.  # MDT ICD, functioning normally  # hx NSVT, last in 8/12/21  # HTN, controlled, ?overtreated  # HLP on atorva 40mg  # remote CVA (PICA territory on CT head 11/25/22).  Carotid US 1/20/23 neg.    PLAN:   Cont med rx  Cont ASA 81mg qd  Cont GDMT  Stop amlod  Check echo  Check Pro-BNP  RTC 2 weeks with MDT ICD check as prev planned (Mar 2024)      Kashif Peguero MD, FACC

## 2024-02-27 ENCOUNTER — HOSPITAL ENCOUNTER (OUTPATIENT)
Dept: CARDIOLOGY | Facility: HOSPITAL | Age: 39
Discharge: HOME | End: 2024-02-27
Attending: INTERNAL MEDICINE
Payer: MEDICARE

## 2024-02-27 DIAGNOSIS — I50.42 CHRONIC COMBINED SYSTOLIC AND DIASTOLIC CONGESTIVE HEART FAILURE: ICD-10-CM

## 2024-02-27 DIAGNOSIS — I42.8 NONISCHEMIC CARDIOMYOPATHY: ICD-10-CM

## 2024-02-27 LAB
ASCENDING AORTA: 2.9 CM
AV INDEX (PROSTH): 0.83
AV MEAN GRADIENT: 7 MMHG
AV PEAK GRADIENT: 10 MMHG
AV VALVE AREA BY VELOCITY RATIO: 3.43 CM²
AV VALVE AREA: 3.4 CM²
AV VELOCITY RATIO: 0.83
CV ECHO LV RWT: 0.28 CM
DOP CALC AO PEAK VEL: 1.61 M/S
DOP CALC AO VTI: 28.2 CM
DOP CALC LVOT AREA: 4.1 CM2
DOP CALC LVOT DIAMETER: 2.29 CM
DOP CALC LVOT PEAK VEL: 1.34 M/S
DOP CALC LVOT STROKE VOLUME: 95.92 CM3
DOP CALCLVOT PEAK VEL VTI: 23.3 CM
E WAVE DECELERATION TIME: 191.84 MSEC
E/A RATIO: 0.78
E/E' RATIO: 11.09 M/S
ECHO LV POSTERIOR WALL: 0.91 CM (ref 0.6–1.1)
FRACTIONAL SHORTENING: 17 % (ref 28–44)
INTERVENTRICULAR SEPTUM: 0.97 CM (ref 0.6–1.1)
IVC DIAMETER: 1.33 CM
IVRT: 83.73 MSEC
LA MAJOR: 4.62 CM
LA MINOR: 5.46 CM
LA WIDTH: 4.4 CM
LEFT ATRIUM SIZE: 3.99 CM
LEFT ATRIUM VOLUME: 74.69 CM3
LEFT INTERNAL DIMENSION IN SYSTOLE: 5.35 CM (ref 2.1–4)
LEFT VENTRICLE DIASTOLIC VOLUME: 213.06 ML
LEFT VENTRICLE SYSTOLIC VOLUME: 138.54 ML
LEFT VENTRICULAR INTERNAL DIMENSION IN DIASTOLE: 6.46 CM (ref 3.5–6)
LEFT VENTRICULAR MASS: 258.94 G
LV LATERAL E/E' RATIO: 7.63 M/S
LV SEPTAL E/E' RATIO: 20.33 M/S
LVOT MG: 4.31 MMHG
LVOT MV: 0.97 CM/S
MV PEAK A VEL: 0.78 M/S
MV PEAK E VEL: 0.61 M/S
MV STENOSIS PRESSURE HALF TIME: 55.63 MS
MV VALVE AREA P 1/2 METHOD: 3.95 CM2
PISA TR MAX VEL: 1.81 M/S
PULM VEIN S/D RATIO: 1.89
PV PEAK D VEL: 0.36 M/S
PV PEAK GRADIENT: 3 MMHG
PV PEAK S VEL: 0.68 M/S
PV PEAK VELOCITY: 0.93 M/S
RA MAJOR: 4.53 CM
RA PRESSURE ESTIMATED: 3 MMHG
RA WIDTH: 3.4 CM
RIGHT VENTRICULAR END-DIASTOLIC DIMENSION: 2.98 CM
RV TB RVSP: 5 MMHG
RV TISSUE DOPPLER FREE WALL SYSTOLIC VELOCITY 1 (APICAL 4 CHAMBER VIEW): 14.78 CM/S
SINUS: 2.91 CM
STJ: 2.57 CM
TDI LATERAL: 0.08 M/S
TDI SEPTAL: 0.03 M/S
TDI: 0.06 M/S
TR MAX PG: 13 MMHG
TRICUSPID ANNULAR PLANE SYSTOLIC EXCURSION: 2.79 CM
TV PEAK GRADIENT: 1 MMHG
TV REST PULMONARY ARTERY PRESSURE: 16 MMHG

## 2024-02-27 PROCEDURE — 93306 TTE W/DOPPLER COMPLETE: CPT

## 2024-02-27 PROCEDURE — 93306 TTE W/DOPPLER COMPLETE: CPT | Mod: 26,,, | Performed by: INTERNAL MEDICINE

## 2024-03-14 ENCOUNTER — OFFICE VISIT (OUTPATIENT)
Dept: CARDIOLOGY | Facility: CLINIC | Age: 39
End: 2024-03-14
Payer: MEDICARE

## 2024-03-14 VITALS
WEIGHT: 182.56 LBS | DIASTOLIC BLOOD PRESSURE: 90 MMHG | RESPIRATION RATE: 15 BRPM | BODY MASS INDEX: 26.14 KG/M2 | HEART RATE: 98 BPM | HEIGHT: 70 IN | SYSTOLIC BLOOD PRESSURE: 142 MMHG | OXYGEN SATURATION: 98 %

## 2024-03-14 DIAGNOSIS — R07.89 CHEST PAIN, NON-CARDIAC: ICD-10-CM

## 2024-03-14 DIAGNOSIS — I50.42 CHRONIC COMBINED SYSTOLIC AND DIASTOLIC CONGESTIVE HEART FAILURE: ICD-10-CM

## 2024-03-14 PROCEDURE — 93282 PRGRMG EVAL IMPLANTABLE DFB: CPT | Mod: PBBFAC | Performed by: INTERNAL MEDICINE

## 2024-03-14 PROCEDURE — 99999 PR PBB SHADOW E&M-EST. PATIENT-LVL III: CPT | Mod: PBBFAC,,, | Performed by: INTERNAL MEDICINE

## 2024-03-14 PROCEDURE — 99214 OFFICE O/P EST MOD 30 MIN: CPT | Mod: S$PBB,,, | Performed by: INTERNAL MEDICINE

## 2024-03-14 PROCEDURE — 93282 PRGRMG EVAL IMPLANTABLE DFB: CPT | Mod: 26,S$PBB,, | Performed by: INTERNAL MEDICINE

## 2024-03-14 PROCEDURE — 99213 OFFICE O/P EST LOW 20 MIN: CPT | Mod: PBBFAC | Performed by: INTERNAL MEDICINE

## 2024-03-14 RX ORDER — FUROSEMIDE 40 MG/1
40 TABLET ORAL DAILY
Qty: 90 TABLET | Refills: 3
Start: 2024-03-14 | End: 2024-04-25

## 2024-03-14 RX ORDER — METOPROLOL SUCCINATE 200 MG/1
200 TABLET, EXTENDED RELEASE ORAL DAILY
Qty: 90 TABLET | Refills: 3 | Status: SHIPPED | OUTPATIENT
Start: 2024-03-14

## 2024-03-14 NOTE — PROGRESS NOTES
CARDIOVASCULAR PROGRESS NOTE    REASON FOR CONSULT:   Nasra Marks is a 38 y.o. female who presents for follow up of MINDY, MDT ICD.    PCP: Redd  Gyn: Labadie, Juan  CHF: Victorina  HISTORY OF PRESENT ILLNESS:   COVID+ 21    The patient comes in today for follow up.  She denies intercurrent angina, dyspnea, palpitations, or syncope.  There were no defibrillator discharges.  She denies PND, orthopnea, melena, hematuria, or claudication symptoms.      The Medtronic defibrillator was interrogated in the office today.  Current rhythm is V sensed at 104 beats per minute.  She is pacing less than 0.1% of time in the ventricle.  Sensing and pacing thresholds as well as impedance are normal.  No programming changes were made.    CARDIOVASCULAR HISTORY:   NICM (cath 2017 with normal cors)  ICD (Donta 2017, MDT single chamber)    PAST MEDICAL HISTORY:     Past Medical History:   Diagnosis Date    CHF (congestive heart failure)     Chronic combined systolic and diastolic congestive heart failure 2019    Essential hypertension 2012    Hypertension     dx at 16y.o.    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 disease, with heart failure 2021    ICD (implantable cardioverter-defibrillator), single, in situ 2020    Nonischemic cardiomyopathy 2019    Pacemaker 2017       PAST SURGICAL HISTORY:     Past Surgical History:   Procedure Laterality Date    CARDIAC DEFIBRILLATOR PLACEMENT  2017     SECTION      x 1    INDUCED       RIGHT HEART CATHETERIZATION Right 2022    Procedure: INSERTION, CATHETER, RIGHT HEART;  Surgeon: Timothy Prajapati Jr., MD;  Location: [facility] CATH LAB;  Service: Cardiology;  Laterality: Right;    TUBAL LIGATION         ALLERGIES AND MEDICATION:     Review of patient's allergies indicates:   Allergen Reactions    Aldactone [spironolactone] Swelling     Lips swelled    Hydralazine analogues Other  (See Comments)     headaches    Isosorbide Other (See Comments)     headaches        Medication List            Accurate as of March 14, 2024  1:23 PM. If you have any questions, ask your nurse or doctor.                CONTINUE taking these medications      aspirin 81 MG EC tablet  Commonly known as: ECOTRIN  Take 1 tablet (81 mg total) by mouth once daily.     atorvastatin 40 MG tablet  Commonly known as: LIPITOR  Take 1 tablet (40 mg total) by mouth every evening.     benzocaine-menthoL 6-10 mg lozenge  Take 1 lozenge by mouth every 2 (two) hours as needed for Pain (sore throat).     benzonatate 100 MG capsule  Commonly known as: TESSALON  Take 1 capsule (100 mg total) by mouth 3 (three) times daily as needed for Cough.     cetirizine 10 MG tablet  Commonly known as: ZYRTEC  Take 1 tablet (10 mg total) by mouth daily as needed for Allergies or Rhinitis.     ENTRESTO  mg per tablet  Generic drug: sacubitriL-valsartan  Take 1 tablet by mouth 2 (two) times daily.     eplerenone 25 MG Tab  Commonly known as: INSPRA  Take 1 tablet (25 mg total) by mouth once daily.     FARXIGA 10 mg tablet  Generic drug: dapagliflozin propanediol  Take 1 tablet by mouth once daily     fluticasone propionate 50 mcg/actuation nasal spray  Commonly known as: FLONASE  1 spray (50 mcg total) by Each Nostril route 2 (two) times daily as needed for Rhinitis or Allergies.     furosemide 40 MG tablet  Commonly known as: LASIX  Take 1 tablet (40 mg total) by mouth 2 (two) times a day.     guaiFENesin 100 mg/5 ml 100 mg/5 mL syrup  Commonly known as: ROBITUSSIN  Take 5-10 mLs (100-200 mg total) by mouth every 4 (four) hours as needed for Cough or Congestion.     metoprolol succinate 100 MG 24 hr tablet  Commonly known as: TOPROL-XL  Take 1 tablet by mouth once daily     ondansetron 4 MG Tbdl  Commonly known as: ZOFRAN-ODT  Take 1 tablet (4 mg total) by mouth every 6 (six) hours as needed (Nausea).     potassium chloride SA 20 MEQ  tablet  Commonly known as: K-DUR,KLOR-CON  Take 2 tablets (40 mEq total) by mouth 2 (two) times daily.              SOCIAL HISTORY:     Social History     Socioeconomic History    Marital status:    Occupational History     Employer: [employer]   Tobacco Use    Smoking status: Never    Smokeless tobacco: Never   Substance and Sexual Activity    Alcohol use: Yes     Comment: occasionally    Drug use: Yes     Types: Marijuana     Comment: in past very little marijuana    Sexual activity: Yes     Partners: Male     Birth control/protection: None       FAMILY HISTORY:     Family History   Problem Relation Age of Onset    Hypertension Mother     Cancer Maternal Grandmother         breast x 2    Cancer Paternal Grandmother         breast    No Known Problems Sister     No Known Problems Brother     No Known Problems Son     No Known Problems Brother     Hypertension Other     Diabetes Other     Breast cancer Other     Cancer Paternal Aunt         breast    Cancer Paternal Uncle        REVIEW OF SYSTEMS:   Review of Systems   Constitutional:  Negative for chills, diaphoresis, fever and malaise/fatigue.   HENT:  Negative for nosebleeds.    Eyes:  Negative for blurred vision, double vision and photophobia.   Respiratory:  Negative for hemoptysis, shortness of breath and wheezing.    Cardiovascular:  Negative for chest pain, palpitations, orthopnea, claudication, leg swelling and PND.   Gastrointestinal:  Negative for abdominal pain, blood in stool, heartburn, melena, nausea and vomiting.   Genitourinary:  Negative for flank pain and hematuria.   Musculoskeletal:  Negative for falls, myalgias and neck pain.   Skin:  Negative for rash.   Neurological:  Negative for dizziness, seizures, loss of consciousness, weakness and headaches.   Endo/Heme/Allergies:  Negative for polydipsia. Does not bruise/bleed easily.   Psychiatric/Behavioral:  Negative for depression and memory loss. The patient is not nervous/anxious.   "      PHYSICAL EXAM:     Vitals:    03/14/24 1323   BP: (!) 142/90   Pulse: 98   Resp: 15    Body mass index is 26.19 kg/m².  Weight: 82.8 kg (182 lb 8.7 oz)   Height: 5' 10" (177.8 cm)     Physical Exam  Vitals reviewed.   Constitutional:       General: She is not in acute distress.     Appearance: Normal appearance. She is well-developed. She is not ill-appearing, toxic-appearing or diaphoretic.   HENT:      Head: Normocephalic and atraumatic.   Eyes:      General: No scleral icterus.     Extraocular Movements: Extraocular movements intact.      Conjunctiva/sclera: Conjunctivae normal.      Pupils: Pupils are equal, round, and reactive to light.   Neck:      Thyroid: No thyromegaly.      Vascular: Normal carotid pulses. No carotid bruit or JVD.      Trachea: Trachea normal. No tracheal deviation.   Cardiovascular:      Rate and Rhythm: Normal rate and regular rhythm.      Pulses:           Carotid pulses are 2+ on the right side and 2+ on the left side.     Heart sounds: S1 normal and S2 normal. No murmur heard.     No friction rub. No gallop.      Comments: L infraclavicular ICD site well healed  Pulmonary:      Effort: Pulmonary effort is normal. No respiratory distress.      Breath sounds: Normal breath sounds. No stridor. No wheezing, rhonchi or rales.   Chest:      Chest wall: No tenderness.   Abdominal:      General: There is no distension.      Palpations: Abdomen is soft.   Musculoskeletal:         General: No swelling or tenderness. Normal range of motion.      Cervical back: Normal range of motion and neck supple. No edema or rigidity.      Right lower leg: No edema.      Left lower leg: No edema.   Feet:      Right foot:      Skin integrity: No ulcer.      Left foot:      Skin integrity: No ulcer.   Skin:     General: Skin is warm and dry.      Coloration: Skin is not jaundiced.   Neurological:      General: No focal deficit present.      Mental Status: She is alert and oriented to person, place, and " time.      Cranial Nerves: No cranial nerve deficit.   Psychiatric:         Mood and Affect: Mood normal.         Speech: Speech normal.         Behavior: Behavior normal. Behavior is cooperative.         DATA:   EKG: (personally reviewed tracing(s))  2/19/24 SR 76, NSSTTW changes    Laboratory:  CBC:  Recent Labs   Lab 01/17/23  0434 03/27/23  1659 01/22/24  2320   WBC 8.84 7.31 8.45   Hemoglobin 12.7 12.8 11.5 L   Hematocrit 37.8 39.2 35.6 L   Platelets 342 378 324         CHEMISTRIES:  Recent Labs   Lab 01/14/22  0807 01/26/22  0934 02/25/22  1035 08/26/22  2043 09/25/22  2335 11/25/22  1408 01/17/23  0434 01/20/23  0720 08/14/23  1006 08/21/23  1050 01/22/24  2320   Glucose 96 75 78   < > 144 H 88 110   < > 101 81 92   Sodium 140 140 139   < > 142 136 140   < > 139 138 140   Potassium 4.2 4.3 3.5   < > 2.8 L 3.6 3.5   < > 2.9 L 3.8 3.4 L   BUN 12 17 10   < > 12 12 13   < > 9 11 14   Creatinine 0.8 0.8 0.8   < > 1.0 0.8 0.8   < > 0.8 0.8 0.8   eGFR if African American >60 >60.0 >60.0  --   --   --   --   --   --   --   --    eGFR if non African American >60 >60.0 >60.0  --   --   --   --   --   --   --   --    Calcium 8.6 L 9.1 9.1   < > 9.7 9.0 8.8   < > 8.8 9.2 9.1   Magnesium  --   --   --   --  2.0 1.9 2.2  --   --   --   --     < > = values in this interval not displayed.         CARDIAC BIOMARKERS:  Recent Labs   Lab 07/24/21  1802 07/24/21 2136 03/27/23 1659 03/27/23 2052 01/22/24  2320   CPK 77  --   --   --   --    Troponin I 0.035 H   < > <0.006 <0.006 <0.006    < > = values in this interval not displayed.         COAGS:  Recent Labs   Lab 10/23/23  2218   INR 1.1         LIPIDS/LFTS:  Recent Labs   Lab 08/16/21  1409 09/15/21  0834 07/11/23  0836 01/22/24  2320 02/16/24  0946   Cholesterol 164  --  134  --  91 L   Triglycerides 106  --  39  --  23 L   HDL 36 L  --  40  --  46   LDL Cholesterol 106.8  --  86.2  --  40.4 L   Non-HDL Cholesterol 128  --  94  --  45   AST  --    < > 20 15 19   ALT  --     < > 15 13 14    < > = values in this interval not displayed.       Lab Results   Component Value Date    TSH 1.386 12/28/2021     Pro- (2/27/24)    Cardiovascular Testing:  Echo 2/27/24 (EF 40% on report 2/2023)    Left Ventricle: The left ventricle is moderately dilated. Normal wall thickness. Moderate global hypokinesis present. There is moderately reduced systolic function with a visually estimated ejection fraction of 30 - 35%. Grade I diastolic dysfunction.    Right Ventricle: Normal right ventricular cavity size. Systolic function is normal.    Pulmonary Artery: The estimated pulmonary artery systolic pressure is 16 mmHg.    Carotid US 1/20/23  There is 0-19% right Internal Carotid Stenosis.  There is 0-19% left Internal Carotid Stenosis.    RHC 1/27/22   Right atrial pressure is normal.  Pulmonary capillary wedge pressure is normal.  Pulmonary artery pressure is normal.  Pulmonary vascular resistance is normal.  Systemic vascular resistance is 1180.  Jeovany cardiac output and index is normal.  RA: 8/ 5/ 7 RV: 26/ 8 PA: 23/ 10/ 14 PWP: 15/ 12/ 12 .   Cardiac output was 5.22 by Jeovany. Cardiac index is 2.67 L/min/m2.   O2 Sat: PA 70%.   Pulmonary vascular resistance: 31. Systemic vascular resistance: 1180.   These hemodynamic are acceptable for cardiac transplant listing.     Cath 4/18/17  B. Summary/Post-Operative Diagnosis    Normal coronary arteries.    Systolic dysfunction.    Mildly elevated left Filling Pressures.    Normal Pulmonary Hypertension.  D. Hemodynamic Results  Ejection Fraction: 20%  Global LV Function: severely depressed  PW:  (4)  PA: 24  CO_THERM: 4.4  RV: 25  RA:  (5)  LVEDP: 17   E. Angiographic Results       Patient has a right dominant coronary artery.      - Left Main Coronary Artery:             The LM is normal. There is DANNY 3 flow.     - Left Anterior Descending Artery:             The LAD is normal. There is DANNY 3 flow.     - Left Circumflex Artery:             The LCX is  normal. There is DANNY 3 flow.     - Right Coronary Artery:             The RCA is normal. There is DANNY 3 flow.     - Left Renal Artery:             The ostial left renal is normal.     - Right Renal Artery:             The ostial right renal is normal.     - Common Femoral Artery:             The right CFA is normal.      ASSESSMENT:   # NICM, appears euvolemic.  Following with Dr. Prajapati.  # MDT ICD, functioning normally  # hx NSVT, last in 8/12/21  # HTN, uncontrolled  # HLP on atorva 40mg  # remote CVA (PICA territory on CT head 11/25/22).  Carotid US 1/20/23 neg.    PLAN:   Cont med rx  Cont ASA 81mg qd  Cont GDMT (entresto/farxiga/eplerenone/toprol)  Inc toprol 200mg qd  Dec lasix 40mg qd  RTC 6 months (Sept 2024)  MDT ICD check 1 year (Mar 2025)    Kashif Peguero MD, FACC

## 2024-04-16 ENCOUNTER — PATIENT MESSAGE (OUTPATIENT)
Dept: ADMINISTRATIVE | Facility: HOSPITAL | Age: 39
End: 2024-04-16
Payer: MEDICARE

## 2024-04-22 ENCOUNTER — PATIENT OUTREACH (OUTPATIENT)
Dept: ADMINISTRATIVE | Facility: HOSPITAL | Age: 39
End: 2024-04-22
Payer: MEDICARE

## 2024-04-22 ENCOUNTER — TELEPHONE (OUTPATIENT)
Dept: FAMILY MEDICINE | Facility: CLINIC | Age: 39
End: 2024-04-22
Payer: MEDICARE

## 2024-04-22 VITALS — SYSTOLIC BLOOD PRESSURE: 132 MMHG | DIASTOLIC BLOOD PRESSURE: 80 MMHG

## 2024-04-25 ENCOUNTER — HOSPITAL ENCOUNTER (EMERGENCY)
Facility: HOSPITAL | Age: 39
Discharge: HOME | End: 2024-04-25
Attending: EMERGENCY MEDICINE
Payer: MEDICARE

## 2024-04-25 VITALS
RESPIRATION RATE: 19 BRPM | HEART RATE: 86 BPM | BODY MASS INDEX: 27.12 KG/M2 | SYSTOLIC BLOOD PRESSURE: 139 MMHG | DIASTOLIC BLOOD PRESSURE: 79 MMHG | OXYGEN SATURATION: 99 % | TEMPERATURE: 99 F | WEIGHT: 189 LBS

## 2024-04-25 DIAGNOSIS — G43.809 OTHER MIGRAINE WITHOUT STATUS MIGRAINOSUS, NOT INTRACTABLE: Primary | ICD-10-CM

## 2024-04-25 DIAGNOSIS — R07.89 CHEST PAIN, NON-CARDIAC: ICD-10-CM

## 2024-04-25 DIAGNOSIS — R07.9 CHEST PAIN: ICD-10-CM

## 2024-04-25 DIAGNOSIS — R06.02 SOB (SHORTNESS OF BREATH): ICD-10-CM

## 2024-04-25 LAB
ALBUMIN SERPL BCP-MCNC: 3.4 G/DL (ref 3.5–5.2)
ALP SERPL-CCNC: 57 U/L (ref 55–135)
ALT SERPL W/O P-5'-P-CCNC: 13 U/L (ref 10–44)
ANION GAP SERPL CALC-SCNC: 3 MMOL/L (ref 8–16)
AST SERPL-CCNC: 17 U/L (ref 10–40)
B-HCG UR QL: NEGATIVE
BASOPHILS # BLD AUTO: 0.05 K/UL (ref 0–0.2)
BASOPHILS NFR BLD: 0.6 % (ref 0–1.9)
BILIRUB SERPL-MCNC: 0.3 MG/DL (ref 0.1–1)
BNP SERPL-MCNC: 547 PG/ML (ref 0–99)
BUN SERPL-MCNC: 8 MG/DL (ref 6–20)
CALCIUM SERPL-MCNC: 8.8 MG/DL (ref 8.7–10.5)
CHLORIDE SERPL-SCNC: 110 MMOL/L (ref 95–110)
CO2 SERPL-SCNC: 26 MMOL/L (ref 23–29)
CREAT SERPL-MCNC: 0.8 MG/DL (ref 0.5–1.4)
CTP QC/QA: YES
DIFFERENTIAL METHOD BLD: ABNORMAL
EOSINOPHIL # BLD AUTO: 0.1 K/UL (ref 0–0.5)
EOSINOPHIL NFR BLD: 1 % (ref 0–8)
ERYTHROCYTE [DISTWIDTH] IN BLOOD BY AUTOMATED COUNT: 13.4 % (ref 11.5–14.5)
EST. GFR  (NO RACE VARIABLE): >60 ML/MIN/1.73 M^2
GLUCOSE SERPL-MCNC: 88 MG/DL (ref 70–110)
HCT VFR BLD AUTO: 30.4 % (ref 37–48.5)
HGB BLD-MCNC: 9.9 G/DL (ref 12–16)
IMM GRANULOCYTES # BLD AUTO: 0.03 K/UL (ref 0–0.04)
IMM GRANULOCYTES NFR BLD AUTO: 0.4 % (ref 0–0.5)
LYMPHOCYTES # BLD AUTO: 1.8 K/UL (ref 1–4.8)
LYMPHOCYTES NFR BLD: 21 % (ref 18–48)
MAGNESIUM SERPL-MCNC: 1.9 MG/DL (ref 1.6–2.6)
MCH RBC QN AUTO: 28.4 PG (ref 27–31)
MCHC RBC AUTO-ENTMCNC: 32.6 G/DL (ref 32–36)
MCV RBC AUTO: 87 FL (ref 82–98)
MONOCYTES # BLD AUTO: 0.6 K/UL (ref 0.3–1)
MONOCYTES NFR BLD: 7.7 % (ref 4–15)
NEUTROPHILS # BLD AUTO: 5.8 K/UL (ref 1.8–7.7)
NEUTROPHILS NFR BLD: 69.3 % (ref 38–73)
NRBC BLD-RTO: 0 /100 WBC
PHOSPHATE SERPL-MCNC: 2.9 MG/DL (ref 2.7–4.5)
PLATELET # BLD AUTO: 273 K/UL (ref 150–450)
PMV BLD AUTO: 10.1 FL (ref 9.2–12.9)
POTASSIUM SERPL-SCNC: 2.8 MMOL/L (ref 3.5–5.1)
PROT SERPL-MCNC: 6.9 G/DL (ref 6–8.4)
RBC # BLD AUTO: 3.48 M/UL (ref 4–5.4)
SODIUM SERPL-SCNC: 139 MMOL/L (ref 136–145)
TROPONIN I SERPL DL<=0.01 NG/ML-MCNC: 0.01 NG/ML (ref 0–0.03)
WBC # BLD AUTO: 8.32 K/UL (ref 3.9–12.7)

## 2024-04-25 PROCEDURE — 84100 ASSAY OF PHOSPHORUS: CPT | Performed by: EMERGENCY MEDICINE

## 2024-04-25 PROCEDURE — 96365 THER/PROPH/DIAG IV INF INIT: CPT

## 2024-04-25 PROCEDURE — 81025 URINE PREGNANCY TEST: CPT | Performed by: EMERGENCY MEDICINE

## 2024-04-25 PROCEDURE — 80053 COMPREHEN METABOLIC PANEL: CPT | Performed by: EMERGENCY MEDICINE

## 2024-04-25 PROCEDURE — 84484 ASSAY OF TROPONIN QUANT: CPT

## 2024-04-25 PROCEDURE — 83880 ASSAY OF NATRIURETIC PEPTIDE: CPT | Performed by: EMERGENCY MEDICINE

## 2024-04-25 PROCEDURE — 99284 EMERGENCY DEPT VISIT MOD MDM: CPT | Mod: 25

## 2024-04-25 PROCEDURE — 96368 THER/DIAG CONCURRENT INF: CPT

## 2024-04-25 PROCEDURE — 96375 TX/PRO/DX INJ NEW DRUG ADDON: CPT

## 2024-04-25 PROCEDURE — 83735 ASSAY OF MAGNESIUM: CPT | Performed by: EMERGENCY MEDICINE

## 2024-04-25 PROCEDURE — 93010 ELECTROCARDIOGRAM REPORT: CPT | Mod: ,,, | Performed by: INTERNAL MEDICINE

## 2024-04-25 PROCEDURE — 25000003 PHARM REV CODE 250: Performed by: EMERGENCY MEDICINE

## 2024-04-25 PROCEDURE — 93005 ELECTROCARDIOGRAM TRACING: CPT

## 2024-04-25 PROCEDURE — 85025 COMPLETE CBC W/AUTO DIFF WBC: CPT | Performed by: EMERGENCY MEDICINE

## 2024-04-25 PROCEDURE — 63600175 PHARM REV CODE 636 W HCPCS: Performed by: EMERGENCY MEDICINE

## 2024-04-25 PROCEDURE — 96366 THER/PROPH/DIAG IV INF ADDON: CPT

## 2024-04-25 RX ORDER — BUTALBITAL, ACETAMINOPHEN AND CAFFEINE 50; 325; 40 MG/1; MG/1; MG/1
1 TABLET ORAL EVERY 4 HOURS PRN
Qty: 12 TABLET | Refills: 0 | Status: SHIPPED | OUTPATIENT
Start: 2024-04-25 | End: 2024-05-24

## 2024-04-25 RX ORDER — DIPHENHYDRAMINE HYDROCHLORIDE 50 MG/ML
25 INJECTION INTRAMUSCULAR; INTRAVENOUS
Status: COMPLETED | OUTPATIENT
Start: 2024-04-25 | End: 2024-04-25

## 2024-04-25 RX ORDER — FUROSEMIDE 40 MG/1
40 TABLET ORAL DAILY
Qty: 90 TABLET | Refills: 2 | Status: SHIPPED | OUTPATIENT
Start: 2024-04-25 | End: 2025-01-20

## 2024-04-25 RX ORDER — FUROSEMIDE 10 MG/ML
60 INJECTION INTRAMUSCULAR; INTRAVENOUS
Status: COMPLETED | OUTPATIENT
Start: 2024-04-25 | End: 2024-04-25

## 2024-04-25 RX ORDER — PROCHLORPERAZINE EDISYLATE 5 MG/ML
10 INJECTION INTRAMUSCULAR; INTRAVENOUS ONCE
Status: COMPLETED | OUTPATIENT
Start: 2024-04-25 | End: 2024-04-25

## 2024-04-25 RX ORDER — POTASSIUM CHLORIDE 20 MEQ/1
40 TABLET, EXTENDED RELEASE ORAL
Status: COMPLETED | OUTPATIENT
Start: 2024-04-25 | End: 2024-04-25

## 2024-04-25 RX ORDER — POTASSIUM CHLORIDE 7.45 MG/ML
10 INJECTION INTRAVENOUS
Status: COMPLETED | OUTPATIENT
Start: 2024-04-25 | End: 2024-04-25

## 2024-04-25 RX ORDER — MAGNESIUM SULFATE 1 G/100ML
1 INJECTION INTRAVENOUS
Status: COMPLETED | OUTPATIENT
Start: 2024-04-25 | End: 2024-04-25

## 2024-04-25 RX ORDER — POTASSIUM CHLORIDE 750 MG/1
10 TABLET, EXTENDED RELEASE ORAL 2 TIMES DAILY
Qty: 60 TABLET | Refills: 0 | Status: SHIPPED | OUTPATIENT
Start: 2024-04-25 | End: 2024-05-25

## 2024-04-25 RX ADMIN — DIPHENHYDRAMINE HYDROCHLORIDE 25 MG: 50 INJECTION, SOLUTION INTRAMUSCULAR; INTRAVENOUS at 05:04

## 2024-04-25 RX ADMIN — POTASSIUM CHLORIDE 10 MEQ: 7.46 INJECTION, SOLUTION INTRAVENOUS at 10:04

## 2024-04-25 RX ADMIN — MAGNESIUM SULFATE 1 G: 1 INJECTION INTRAVENOUS at 07:04

## 2024-04-25 RX ADMIN — POTASSIUM CHLORIDE 40 MEQ: 1500 TABLET, EXTENDED RELEASE ORAL at 07:04

## 2024-04-25 RX ADMIN — POTASSIUM CHLORIDE 10 MEQ: 7.46 INJECTION, SOLUTION INTRAVENOUS at 07:04

## 2024-04-25 RX ADMIN — FUROSEMIDE 60 MG: 10 INJECTION, SOLUTION INTRAVENOUS at 08:04

## 2024-04-25 RX ADMIN — PROCHLORPERAZINE EDISYLATE 10 MG: 5 INJECTION INTRAMUSCULAR; INTRAVENOUS at 05:04

## 2024-04-25 NOTE — ED PROVIDER NOTES
Encounter Date: 4/25/2024    SCRIBE #1 NOTE: I, Jacqui Lowe, am scribing for, and in the presence of,  Zhang Walker MD. I have scribed the following portions of the note - Other sections scribed: HPI, ROS.       History     Chief Complaint   Patient presents with    Headache     X3 days    Chest Pain     L chest pain and sob x1 day. Hx chf     Nasra Marks is a 38 y.o. female with a PMHx of HTN, CHF, and CKD that presents to the ED for a headache that has been occurring for about 3 days. She reports that she feels the headache the most in her forehead region and the top of her head. She also reports that the headache pain radiated down the back of her head once this morning. She notes that she does not get headaches frequently. The patient notes associated symptoms of chest pain and tightness that started 2 days ago, palpitations, and an influx of fluid retention (7lbs), which she notes is similar to her CHF symptoms. Reports that her defibrillator has not gone off. The patient reports taking tylenol in attempt to alleviate symptoms, but denies relief. Reports compliance with her daily medications. The patient denies associated nausea, vomiting, abdominal pain, and other symptoms.    Other relevant history includes that she denies taking her lasix 40 mg medication in the last few days (prescribed as take as needed). She reports that the last time she visited for a headache, she was diagnosed with cysts on her brain.    The history is provided by the patient. No  was used.     Review of patient's allergies indicates:   Allergen Reactions    Aldactone [spironolactone] Swelling     Lips swelled    Hydralazine analogues Other (See Comments)     headaches    Isosorbide Other (See Comments)     headaches     Past Medical History:   Diagnosis Date    CHF (congestive heart failure)     Chronic combined systolic and diastolic congestive heart failure 5/24/2019    Essential hypertension  2012    Hypertension     dx at 16y.o.    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 disease, with heart failure 2021    ICD (implantable cardioverter-defibrillator), single, in situ 2020    Nonischemic cardiomyopathy 2019    Pacemaker 2017     Past Surgical History:   Procedure Laterality Date    CARDIAC DEFIBRILLATOR PLACEMENT  2017     SECTION      x 1    INDUCED       RIGHT HEART CATHETERIZATION Right 2022    Procedure: INSERTION, CATHETER, RIGHT HEART;  Surgeon: Timothy Prajapati Jr., MD;  Location: [facility] CATH LAB;  Service: Cardiology;  Laterality: Right;    TUBAL LIGATION       Family History   Problem Relation Name Age of Onset    Hypertension Mother      Cancer Maternal Grandmother          breast x 2    Cancer Paternal Grandmother          breast    No Known Problems Sister      No Known Problems Brother      No Known Problems Son      No Known Problems Brother      Hypertension Other      Diabetes Other      Breast cancer Other      Cancer Paternal Aunt          breast    Cancer Paternal Uncle       Social History     Tobacco Use    Smoking status: Never    Smokeless tobacco: Never   Substance Use Topics    Alcohol use: Yes     Comment: occasionally    Drug use: Yes     Types: Marijuana     Comment: in past very little marijuana     Review of Systems   Constitutional: Negative.    HENT: Negative.     Eyes: Negative.    Respiratory:  Positive for chest tightness and shortness of breath.    Cardiovascular:  Positive for chest pain, palpitations and leg swelling.   Gastrointestinal: Negative.  Negative for abdominal pain, nausea and vomiting.   Genitourinary: Negative.    Musculoskeletal: Negative.    Skin: Negative.    Neurological:  Positive for headaches.       Physical Exam     Initial Vitals [24 1546]   BP Pulse Resp Temp SpO2   (!) 160/92 90 18 98.8 °F (37.1 °C) 99 %      MAP       --         Physical  Exam    Nursing note and vitals reviewed.  Constitutional: She appears well-developed. She is not diaphoretic. She appears distressed (mildly).   HENT:   Head: Normocephalic and atraumatic.   Nose: Nose normal.   Eyes: EOM are normal. Pupils are equal, round, and reactive to light.   Neck: Neck supple. No JVD present.   Normal range of motion.  Cardiovascular:  Normal rate, regular rhythm, normal heart sounds and intact distal pulses.           Pulmonary/Chest: Breath sounds normal. No stridor. No respiratory distress. She has no wheezes. She has no rhonchi. She has no rales.   Abdominal: Abdomen is soft. Bowel sounds are normal. She exhibits no distension. There is no abdominal tenderness.   Musculoskeletal:         General: Edema (2+ pitting edema BLE) present. No tenderness. Normal range of motion.      Cervical back: Normal range of motion and neck supple.     Neurological: She is alert and oriented to person, place, and time. She has normal strength. No cranial nerve deficit.   Skin: Skin is warm and dry. Capillary refill takes less than 2 seconds. No rash noted. No erythema.         ED Course   Procedures  Labs Reviewed   CBC W/ AUTO DIFFERENTIAL - Abnormal; Notable for the following components:       Result Value    RBC 3.48 (*)     Hemoglobin 9.9 (*)     Hematocrit 30.4 (*)     All other components within normal limits   COMPREHENSIVE METABOLIC PANEL - Abnormal; Notable for the following components:    Potassium 2.8 (*)     Albumin 3.4 (*)     Anion Gap 3 (*)     All other components within normal limits   B-TYPE NATRIURETIC PEPTIDE - Abnormal; Notable for the following components:     (*)     All other components within normal limits   TROPONIN I   MAGNESIUM   PHOSPHORUS   POCT URINE PREGNANCY          Imaging Results              CT Head Without Contrast (Final result)  Result time 04/25/24 17:56:40      Final result by Ana Chase MD (04/25/24 17:56:40)                   Impression:       No acute intracranial abnormality detected.  Remote right cerebellar infarct.      Electronically signed by: Ana Chase  Date:    04/25/2024  Time:    17:56               Narrative:    EXAMINATION:  CT OF THE HEAD WITHOUT    CLINICAL HISTORY:  Headache, chronic, new features or increased frequency;    TECHNIQUE:  5 mm unenhanced axial images were obtained from the skull base to the vertex.    COMPARISON:  CTA head neck 01/23/2023    FINDINGS:  The ventricles, basal cisterns, and cortical sulci are within normal limits for patient's stated age.  Encephalomalacia changes are seen right cerebellum, which may be related to previous infarct.  There is no acute intracranial hemorrhage, territorial infarct or mass effect, or midline shift. In the visualized paranasal sinuses, there is redemonstration of mucoperiosteal thickening seen in the hypoplastic right frontal sinus and anterior most right ethmoid air cells.  Adenoidal hypertrophy is present.                                       X-Ray Chest AP Portable (Final result)  Result time 04/25/24 16:48:14      Final result by Jonathan Eli MD (04/25/24 16:48:14)                   Impression:      1. No acute cardiopulmonary process appreciated.      Electronically signed by: Jonathan Eli  Date:    04/25/2024  Time:    16:48               Narrative:    EXAMINATION:  XR CHEST AP PORTABLE    CLINICAL HISTORY:  Shortness of breath    TECHNIQUE:  Single frontal portable view of the chest was performed.    COMPARISON:  Chest radiograph 02/19/2024    FINDINGS:  Left chest cardiac device with single left subclavian vein-approach lead projecting over the expected location of the right ventricle, unchanged.    Cardiomediastinal silhouette is within normal limits.    No focal consolidation, overt interstitial edema, sizable pleural effusion or pneumothorax.    Visualized osseous structures are grossly unremarkable.                                       Medications    potassium chloride 10 mEq in 100 mL IVPB (has no administration in time range)   prochlorperazine injection Soln 10 mg (10 mg Intravenous Given 4/25/24 1749)   diphenhydrAMINE injection 25 mg (25 mg Intravenous Given 4/25/24 1749)   potassium chloride 10 mEq in 100 mL IVPB (10 mEq Intravenous New Bag 4/25/24 1930)   potassium chloride SA CR tablet 40 mEq (40 mEq Oral Given 4/25/24 1930)   magnesium sulfate in dextrose IVPB (premix) 1 g (1 g Intravenous New Bag 4/25/24 1935)   furosemide injection 60 mg (60 mg Intravenous Given 4/25/24 2057)     Medical Decision Making  Amount and/or Complexity of Data Reviewed  Labs: ordered.  Radiology: ordered.    Risk  Prescription drug management.      MDM:    38-year-old female with past medical history as noted above presenting with headache and chest pain.  Differential Diagnosis includes:  Intracranial hemorrhage, acute CVA/TIA, CHF exacerbation, COPD exacerbation, acute mi/ACS, arrhythmia.  Physical exam as noted above.  ED workup notable for magnesium 1.9, phosphorus 2.9, white blood cell count 8.32, hemoglobin 9.9, potassium 2.8, creatinine 0.8, , pregnancy negative, troponin 0.010, CT head negative, chest x-ray unremarkable.  EKG shows sinus rhythm with frequent PVCs rate of 89 beats per minute,  MS, PVCs present, no STEMI.  Patient presentation appears consistent with mild CHF exacerbation, will diurese here with hypokalemia, potassium replaced as well as magnesium and will continue oral replacement.  Migraine cocktail given with complete improvement in symptoms, she is neurologically intact.  Will continue to treat with supportive care outpatient. Do not suspect any additional surgical or medical emergency. Discussed diagnosis and further treatment with patient, including f/u.  Return precautions given and all questions answered.  Patient in understanding of plan.  Pt discharged to home improved and stable.        Note was created using voice  recognition software. Note may have occasional typographical or grammatical errors, garbled syntax, and other bizarre constructions that may not have been identified and edited despite good addi initial review prior to signing.               Scribe Attestation:   Scribe #1: I performed the above scribed service and the documentation accurately describes the services I performed. I attest to the accuracy of the note.                         I, Zhang Walker M.D., personally performed the services described in this documentation. All medical record entries made by the scribe were at my direction and in my presence. I have reviewed the chart and agree that the record reflects my personal performance and is accurate and complete.        Clinical Impression:  Final diagnoses:  [R07.9] Chest pain  [R06.02] SOB (shortness of breath)  [G43.809] Other migraine without status migrainosus, not intractable (Primary)          ED Disposition Condition    Discharge Stable          ED Prescriptions       Medication Sig Dispense Start Date End Date Auth. Provider    butalbital-acetaminophen-caffeine -40 mg (FIORICET, ESGIC) -40 mg per tablet Take 1 tablet by mouth every 4 (four) hours as needed for Headaches. 12 tablet 4/25/2024 5/25/2024 Zhang Walker MD    potassium chloride (KLOR-CON) 10 MEQ TbSR Take 1 tablet (10 mEq total) by mouth 2 (two) times daily. 60 tablet 4/25/2024 5/25/2024 Zhang Walker MD    furosemide (LASIX) 40 MG tablet Take 1 tablet (40 mg total) by mouth once daily. 90 tablet 4/25/2024 1/20/2025 Zhang Walker MD          Follow-up Information       Follow up With Specialties Details Why Contact Info    [facility] - Emergency Dept Emergency Medicine Go to  If symptoms worsen [address] [facility] [place] [address] [phone]    Veronika Rainey MD Family Medicine, Wound Care Go in 1 week As needed [id number]  [place] [id number]  [phone]               Zhang Walker MD  04/25/24 [id number]

## 2024-04-26 NOTE — DISCHARGE INSTRUCTIONS
Please Increase your lasix to twice daily for the next 3 days.    Please take your potassium consistently twice a day for the next week at least and incorporate potassium rich foods into your diet.    Please follow up with your regular doctor in the next 1-2 weeks.

## 2024-04-26 NOTE — ED NOTES
Blood hemolyzed. Will recollect   
Pt reports shooting headache unrelieved by tylenol. Pt reports she also has CHF,  so she is unsure if that is related. Pt reports last time she was here, they told her she had a cyst on her brain. Pt reports blurred vision, denies slurred speech, facial droop, sob. Pt aaox4, talking in complete sentences . Pt reports she takes 40mg lasix daily   
Report received from DIGNA Zapata. Pt resting comfortably in stretcher and connected to cardiac monitor. VS WNL. Call light within reach.  
98.9

## 2024-04-28 LAB
OHS QRS DURATION: 98 MS
OHS QTC CALCULATION: 484 MS

## 2024-05-24 ENCOUNTER — HOSPITAL ENCOUNTER (EMERGENCY)
Facility: HOSPITAL | Age: 39
Discharge: HOME | End: 2024-05-24
Attending: EMERGENCY MEDICINE
Payer: MEDICARE

## 2024-05-24 VITALS
BODY MASS INDEX: 26.05 KG/M2 | RESPIRATION RATE: 20 BRPM | HEIGHT: 70 IN | OXYGEN SATURATION: 100 % | DIASTOLIC BLOOD PRESSURE: 81 MMHG | SYSTOLIC BLOOD PRESSURE: 129 MMHG | TEMPERATURE: 98 F | HEART RATE: 76 BPM | WEIGHT: 182 LBS

## 2024-05-24 DIAGNOSIS — R07.9 CHEST PAIN: ICD-10-CM

## 2024-05-24 DIAGNOSIS — R51.9 ACUTE NONINTRACTABLE HEADACHE, UNSPECIFIED HEADACHE TYPE: Primary | ICD-10-CM

## 2024-05-24 LAB
ALBUMIN SERPL BCP-MCNC: 3.7 G/DL (ref 3.5–5.2)
ALP SERPL-CCNC: 62 U/L (ref 55–135)
ALT SERPL W/O P-5'-P-CCNC: 10 U/L (ref 10–44)
ANION GAP SERPL CALC-SCNC: 6 MMOL/L (ref 8–16)
AST SERPL-CCNC: 15 U/L (ref 10–40)
B-HCG UR QL: NEGATIVE
BACTERIA #/AREA URNS HPF: NORMAL /HPF
BASOPHILS # BLD AUTO: 0.06 K/UL (ref 0–0.2)
BASOPHILS NFR BLD: 0.6 % (ref 0–1.9)
BILIRUB SERPL-MCNC: 0.3 MG/DL (ref 0.1–1)
BILIRUB UR QL STRIP: NEGATIVE
BNP SERPL-MCNC: 173 PG/ML (ref 0–99)
BUN SERPL-MCNC: 8 MG/DL (ref 6–20)
CALCIUM SERPL-MCNC: 9 MG/DL (ref 8.7–10.5)
CHLORIDE SERPL-SCNC: 109 MMOL/L (ref 95–110)
CLARITY UR: CLEAR
CO2 SERPL-SCNC: 22 MMOL/L (ref 23–29)
COLOR UR: COLORLESS
CREAT SERPL-MCNC: 0.8 MG/DL (ref 0.5–1.4)
CTP QC/QA: YES
DIFFERENTIAL METHOD BLD: ABNORMAL
EOSINOPHIL # BLD AUTO: 0.1 K/UL (ref 0–0.5)
EOSINOPHIL NFR BLD: 0.9 % (ref 0–8)
ERYTHROCYTE [DISTWIDTH] IN BLOOD BY AUTOMATED COUNT: 13.9 % (ref 11.5–14.5)
EST. GFR  (NO RACE VARIABLE): >60 ML/MIN/1.73 M^2
GLUCOSE SERPL-MCNC: 91 MG/DL (ref 70–110)
GLUCOSE UR QL STRIP: NEGATIVE
HCT VFR BLD AUTO: 36.5 % (ref 37–48.5)
HGB BLD-MCNC: 11.9 G/DL (ref 12–16)
HGB UR QL STRIP: ABNORMAL
IMM GRANULOCYTES # BLD AUTO: 0.02 K/UL (ref 0–0.04)
IMM GRANULOCYTES NFR BLD AUTO: 0.2 % (ref 0–0.5)
KETONES UR QL STRIP: NEGATIVE
LEUKOCYTE ESTERASE UR QL STRIP: NEGATIVE
LYMPHOCYTES # BLD AUTO: 1.8 K/UL (ref 1–4.8)
LYMPHOCYTES NFR BLD: 18.3 % (ref 18–48)
MCH RBC QN AUTO: 28.4 PG (ref 27–31)
MCHC RBC AUTO-ENTMCNC: 32.6 G/DL (ref 32–36)
MCV RBC AUTO: 87 FL (ref 82–98)
MICROSCOPIC COMMENT: NORMAL
MONOCYTES # BLD AUTO: 0.8 K/UL (ref 0.3–1)
MONOCYTES NFR BLD: 8.1 % (ref 4–15)
NEUTROPHILS # BLD AUTO: 7 K/UL (ref 1.8–7.7)
NEUTROPHILS NFR BLD: 71.9 % (ref 38–73)
NITRITE UR QL STRIP: NEGATIVE
NRBC BLD-RTO: 0 /100 WBC
PH UR STRIP: 5 [PH] (ref 5–8)
PLATELET # BLD AUTO: 329 K/UL (ref 150–450)
PMV BLD AUTO: 10 FL (ref 9.2–12.9)
POC MOLECULAR INFLUENZA A AGN: NEGATIVE
POC MOLECULAR INFLUENZA B AGN: NEGATIVE
POTASSIUM SERPL-SCNC: 4 MMOL/L (ref 3.5–5.1)
PROT SERPL-MCNC: 7.5 G/DL (ref 6–8.4)
PROT UR QL STRIP: NEGATIVE
RBC # BLD AUTO: 4.19 M/UL (ref 4–5.4)
RBC #/AREA URNS HPF: 2 /HPF (ref 0–4)
SARS-COV-2 RDRP RESP QL NAA+PROBE: NEGATIVE
SODIUM SERPL-SCNC: 137 MMOL/L (ref 136–145)
SP GR UR STRIP: 1.01 (ref 1–1.03)
SQUAMOUS #/AREA URNS HPF: 2 /HPF
TROPONIN I SERPL DL<=0.01 NG/ML-MCNC: <0.006 NG/ML (ref 0–0.03)
TROPONIN I SERPL DL<=0.01 NG/ML-MCNC: <0.006 NG/ML (ref 0–0.03)
URN SPEC COLLECT METH UR: ABNORMAL
UROBILINOGEN UR STRIP-ACNC: NEGATIVE EU/DL
WBC # BLD AUTO: 9.79 K/UL (ref 3.9–12.7)
WBC #/AREA URNS HPF: 2 /HPF (ref 0–5)

## 2024-05-24 PROCEDURE — 87635 SARS-COV-2 COVID-19 AMP PRB: CPT | Performed by: EMERGENCY MEDICINE

## 2024-05-24 PROCEDURE — 93005 ELECTROCARDIOGRAM TRACING: CPT

## 2024-05-24 PROCEDURE — 80053 COMPREHEN METABOLIC PANEL: CPT | Performed by: EMERGENCY MEDICINE

## 2024-05-24 PROCEDURE — 93010 ELECTROCARDIOGRAM REPORT: CPT | Mod: ,,, | Performed by: INTERNAL MEDICINE

## 2024-05-24 PROCEDURE — 99285 EMERGENCY DEPT VISIT HI MDM: CPT | Mod: 25

## 2024-05-24 PROCEDURE — 83880 ASSAY OF NATRIURETIC PEPTIDE: CPT | Performed by: EMERGENCY MEDICINE

## 2024-05-24 PROCEDURE — 81000 URINALYSIS NONAUTO W/SCOPE: CPT | Performed by: EMERGENCY MEDICINE

## 2024-05-24 PROCEDURE — 84484 ASSAY OF TROPONIN QUANT: CPT | Mod: 91 | Performed by: EMERGENCY MEDICINE

## 2024-05-24 PROCEDURE — 87502 INFLUENZA DNA AMP PROBE: CPT

## 2024-05-24 PROCEDURE — 85025 COMPLETE CBC W/AUTO DIFF WBC: CPT | Performed by: EMERGENCY MEDICINE

## 2024-05-24 PROCEDURE — 81025 URINE PREGNANCY TEST: CPT | Performed by: EMERGENCY MEDICINE

## 2024-05-24 RX ORDER — BUTALBITAL, ACETAMINOPHEN AND CAFFEINE 50; 325; 40 MG/1; MG/1; MG/1
1 TABLET ORAL EVERY 6 HOURS PRN
Qty: 12 TABLET | Refills: 0 | Status: SHIPPED | OUTPATIENT
Start: 2024-05-24 | End: 2024-06-23

## 2024-05-24 NOTE — ED TRIAGE NOTES
Pt reports left sided cp around her defibrillator x 3 days.  Intermittent, duration 2-3 mins.  Describes as tightness, ache.  Non radiating.  Reports hypotension, left and right shoulder pain x 3 days.

## 2024-05-24 NOTE — ED PROVIDER NOTES
"Encounter Date: 2024    SCRIBE #1 NOTE: I, Nahed Peter, am scribing for, and in the presence of,  Donna Cook MD.       History     Chief Complaint   Patient presents with    Headache     Pt c/o headache, fatigue and chest pain x3 days and worsened today.     Nasra Marks is a 38 y.o. female, with a PMHx of ICD, nonischemic cardiomyopathy, CKD, CHF, CKD, HTN, CVA, nonintractable headache, migraines, EF 30-35% per TTE 2024, who presents to the ED with fatigue x3 days. Patient reports associated headache and CP. She reports she has been congested and has had rhinorrhea lately which she attributes to her environmental allergies. She also states she is retaining more fluid, reporting gaining about 3lbs in the last few days. She also reports a cough which is consistent with her CHF in the past and "comes and goes." She reports taking 1 extra furosemide yesterday, which she takes extra doses as needed for weight gain. She describes her headache as intermittent "whack" localized at the top of her right forehead consistent with Hx of headaches. She describes her CP as a tightness/ache on the left side of her chest that radiates to her left shoulder. She reports CP is worse w/ exertion, with associated nausea, light-headedness and diaphoresis. No other exacerbating or alleviating factors. Denies unilateral numbness/weakness, loss of vision, vomiting, fever, sore throat, trouble swallowing, vaginal bleeding or other associated symptoms. PSHx of cardiac defibrillator placement,  section, tubal ligation, right heart catheterization. Allergies to aldactone, hydralazine, isosorbide. Reports she still gets a menstrual period, denying abnormally heavy bleeding recently. Patient denies EtOH, tobacco, or illicit drug use. Daily medications of aspirin, atorvastatin, lasix, inspra.     The history is provided by the patient. No  was used.     Review of patient's allergies indicates: "   Allergen Reactions    Aldactone [spironolactone] Swelling     Lips swelled    Hydralazine analogues Other (See Comments)     headaches    Isosorbide Other (See Comments)     headaches     Past Medical History:   Diagnosis Date    CHF (congestive heart failure)     Chronic combined systolic and diastolic congestive heart failure 2019    Essential hypertension 2012    Hypertension     dx at 16y.o.    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 disease, with heart failure 2021    ICD (implantable cardioverter-defibrillator), single, in situ 2020    Nonischemic cardiomyopathy 2019    Pacemaker 2017     Past Surgical History:   Procedure Laterality Date    CARDIAC DEFIBRILLATOR PLACEMENT  2017     SECTION      x 1    INDUCED       RIGHT HEART CATHETERIZATION Right 2022    Procedure: INSERTION, CATHETER, RIGHT HEART;  Surgeon: Timothy Prajapati Jr., MD;  Location: [facility];  Service: Cardiology;  Laterality: Right;    TUBAL LIGATION       Family History   Problem Relation Name Age of Onset    Hypertension Mother      Cancer Maternal Grandmother          breast x 2    Cancer Paternal Grandmother          breast    No Known Problems Sister      No Known Problems Brother      No Known Problems Son      No Known Problems Brother      Hypertension Other      Diabetes Other      Breast cancer Other      Cancer Paternal Aunt          breast    Cancer Paternal Uncle       Social History     Tobacco Use    Smoking status: Never    Smokeless tobacco: Never   Substance Use Topics    Alcohol use: Yes     Comment: occasionally    Drug use: Yes     Types: Marijuana     Comment: in past very little marijuana     Review of Systems   Constitutional:  Positive for diaphoresis and fatigue. Negative for chills and fever.   HENT:  Positive for congestion and rhinorrhea. Negative for sore throat and trouble swallowing.    Eyes:  Negative  for photophobia and visual disturbance.   Respiratory:  Positive for cough, chest tightness and shortness of breath.    Cardiovascular:  Positive for chest pain. Negative for leg swelling.   Gastrointestinal:  Positive for nausea. Negative for abdominal pain, blood in stool, constipation, diarrhea and vomiting.   Genitourinary:  Negative for dysuria, flank pain, frequency, hematuria, urgency and vaginal bleeding.   Musculoskeletal:  Positive for myalgias. Negative for neck pain and neck stiffness.   Skin:  Negative for rash and wound.   Allergic/Immunologic: Positive for environmental allergies.   Neurological:  Positive for light-headedness and headaches. Negative for weakness and numbness.   Psychiatric/Behavioral:  Negative for confusion and suicidal ideas.    All other systems reviewed and are negative.      Physical Exam     Initial Vitals [05/24/24 1453]   BP Pulse Resp Temp SpO2   111/73 83 16 98 °F (36.7 °C) 100 %      MAP       --         Physical Exam    Nursing note and vitals reviewed.  Constitutional: She appears well-developed and well-nourished. She is not diaphoretic. No distress.   HENT:   Head: Normocephalic and atraumatic.   Mouth/Throat: Oropharynx is clear and moist. No oropharyngeal exudate.   Eyes: Conjunctivae and EOM are normal. Pupils are equal, round, and reactive to light. Right eye exhibits no discharge. Left eye exhibits no discharge.   Neck: Neck supple. No JVD present.   Normal range of motion.  Cardiovascular:  Normal rate, regular rhythm, normal heart sounds and intact distal pulses.     Exam reveals no gallop and no friction rub.       No murmur heard.  Pulmonary/Chest: Breath sounds normal. No respiratory distress. She has no wheezes. She has no rhonchi. She has no rales. She exhibits no tenderness.   Abdominal: Abdomen is soft. Bowel sounds are normal. She exhibits no distension. There is no abdominal tenderness. There is no rebound and no guarding.   Musculoskeletal:          General: No tenderness or edema.      Cervical back: Normal range of motion and neck supple.     Lymphadenopathy:     She has no cervical adenopathy.   Neurological: She is alert and oriented to person, place, and time. She has normal strength. No cranial nerve deficit or sensory deficit. GCS score is 15. GCS eye subscore is 4. GCS verbal subscore is 5. GCS motor subscore is 6.   Moves all extremities and carries on conversation. CN- II: PERRL; III/IV/VI: EOMI w/out evidence of nystagmus; V: no deficits appreciated to light touch bilateral face; VII: no facial weakness, no facial asymmetry. Eyebrow raise symmetric. Smile symmetric; IX/X: palate midline, and raises symmetrically; XI: shoulder shrug 5/5 bilaterally; XII: tongue is midline w/out asymmetry. Strength 5/5 to bilateral upper and lower extremities, sensation intact to light touch,     Skin: Skin is warm and dry. Capillary refill takes less than 2 seconds.   Psychiatric: She has a normal mood and affect. Thought content normal.         ED Course   Procedures  Labs Reviewed   CBC W/ AUTO DIFFERENTIAL - Abnormal; Notable for the following components:       Result Value    Hemoglobin 11.9 (*)     Hematocrit 36.5 (*)     All other components within normal limits   COMPREHENSIVE METABOLIC PANEL - Abnormal; Notable for the following components:    CO2 22 (*)     Anion Gap 6 (*)     All other components within normal limits   B-TYPE NATRIURETIC PEPTIDE - Abnormal; Notable for the following components:     (*)     All other components within normal limits   URINALYSIS, REFLEX TO URINE CULTURE - Abnormal; Notable for the following components:    Color, UA Colorless (*)     Occult Blood UA 1+ (*)     All other components within normal limits    Narrative:     Specimen Source->Urine   TROPONIN I   TROPONIN I   URINALYSIS MICROSCOPIC    Narrative:     Specimen Source->Urine   POCT URINE PREGNANCY   SARS-COV-2 RDRP GENE   POCT INFLUENZA A/B MOLECULAR           Imaging Results              X-Ray Chest AP Portable (Final result)  Result time 05/24/24 15:42:02      Final result by Raúl Yadav MD (05/24/24 15:42:02)                   Impression:      No acute findings.      Electronically signed by: Raúl Yadav MD  Date:    05/24/2024  Time:    15:42               Narrative:    EXAMINATION:  XR CHEST AP PORTABLE    CLINICAL HISTORY:  Chest Pain;    TECHNIQUE:  Single frontal view of the chest was performed.    COMPARISON:  04/25/2024    FINDINGS:  Enlarged cardiac silhouette similar to the prior exam.  Left cardiac pacer lead wire stable.The lungs are grossly clear.  No pneumothorax.No pleural effusions.                                       Medications - No data to display  Medical Decision Making  Per chart review, TTE 2/22/2024 there is moderately reduced systolic function with a visually estimated ejection fraction of 30 - 35%. Grade I diastolic dysfunction.  Pt seen at this ED on 4/25/2024 by Dr. Walker comlaining of a headache x3 days. Reported pain at the front and top of her head. She reported pain radiated  down her back. She also reported CP and chest tightness, palpitations, fluid retention x2 days. She reported the last time she was worked up for a headache she was told she had cysts on her brain. On exam, patient was in mild distress, 2+ pitting edema to BLE. ED workup notable for magnesium 1.9, phosphorus 2.9, white blood cell count 8.32, hemoglobin 9.9, potassium 2.8, creatinine 0.8, , pregnancy negative, troponin 0.010, CT head negative, chest x-ray unremarkable.  EKG shows sinus rhythm with frequent PVCs rate of 89 beats per minute,  MS, PVCs present, no STEMI. Presentation consistent with CHF exacerbation. Migraine cocktail given with complete improvement. discharged home improved and stable.     Amount and/or Complexity of Data Reviewed  External Data Reviewed: notes.     Details: See [facility]  Labs: ordered. Decision-making details  documented in ED Course.  Radiology: ordered. Decision-making details documented in ED Course.  ECG/medicine tests: ordered and independent interpretation performed. Decision-making details documented in ED Course.    Risk  Prescription drug management.      Additional MDM:   Heart Score:    History:          Moderately suspicious.  ECG:             Nonspecific repolarisation disturbance  Age:               Less than 45 years  Risk factors: 1-2 risk factors  Troponin:       Less than or equal to normal limit  Heart Score = 3        MDM  37 yo female with PMhx of nonischemic cardiomyopathy, CHF with EF of 35%, headaches presents with fatigue, headache and chest pressure. No longer has her fioricet at home. Reports follows closely with Dr. Peguero and has been seen by neurology in the past as well for headaches. On exam no peripheral edema, no rales, no JVD. No chest wall tenderness. Normal neuro exam.   DDx includes but is not limited to ACS, tension headache, migraine, CHF, viral illness, PNA, metabolic derangement anemia.     Labs with negative trop x 2. UA without UTI. Covid and flu negative. Hgb 11.9, which is improved from her previous. No leukocytosis. Normal PLTs. No metabolic derangement. , which is improved from her previous. Not fluid overloaded on exam. Advised patient to take extra lasix for next 2-3 days until back to her normal weight and strict return precautions given. Low salt diet. UPT negative. CXR no acute findings.     Discussed with patient, will refill fioricet and she states she was scheduled for MRI with neurology which was not done. I see it ordered and canceled in Jan. Will refer back to neurology to discuss ongoing headaches. Patient follows closely with cardiology, advised to call to discuss recent ED visit. Patient given strict return precautions and in agreement with plan. Will discharge at this time.      Scribe Attestation:   Scribe #1: I performed the above scribed service and  the documentation accurately describes the services I performed. I attest to the accuracy of the note.        ED Course as of 05/24/24 2142   Fri May 24, 2024   1824 EKG 12-lead  EKG normal sinus rhythm at 79.  No ST elevation or significant depression.  T-wave flattening in V3 through V6 as well as 3.  Normal axis.  QTC is 465.  When compared to this previous EKG this EKG is similar including the morphology of V4 V5 V6.  She would PVCs on her previous EKG. [JT]      ED Course User Index  [JT] Donna Cook MD                         I, Donna Cook, personally performed the services described in this documentation.  All medical record entries made by the scribe were at my direction and in my presence.  I have reviewed the chart and agree that the record reflects my personal performance and is accurate and complete.    Clinical Impression:  Final diagnoses:  [R07.9] Chest pain  [R51.9] Acute nonintractable headache, unspecified headache type (Primary)          ED Disposition Condition    Discharge Stable          ED Prescriptions       Medication Sig Dispense Start Date End Date Auth. Provider    butalbital-acetaminophen-caffeine -40 mg (FIORICET, ESGIC) -40 mg per tablet Take 1 tablet by mouth every 6 (six) hours as needed for Headaches. 12 tablet 5/24/2024 6/23/2024 Donna Cook MD          Follow-up Information       Follow up With Specialties Details Why Contact Info Additional Information    Veronika Rainey MD Family Medicine, Wound Care Schedule an appointment as soon as possible for a visit in 2 days to discuss recent ED visit, to establish primary doctor [facility] [place] [address]  [phone]       [facility] Emergency Medicine  As needed, If symptoms worsen [address] [facility] [place] [address] [phone]     [facility] - Cardiology Cardiology Schedule an appointment as soon as possible for a visit in 2 days  to discuss recent ED visit [address]  [address]  [facility] [id number] [phone] Please park in garage or Medical Office Bldg. surface lot and use Medical Ofc Bldg elevator. Check in at [facility] [address].    [facility] Neurology Neurology Schedule an appointment as soon as possible for a visit in 2 days to discuss recent ED visit [address]  [address]  [facility] [phone] Please park in garage or Medical Ofc Bldg surface lot and use Medical Office Bldg elevator. Check in at [address].              Donna Cook MD  05/24/24 6638

## 2024-05-25 NOTE — DISCHARGE INSTRUCTIONS
Please return for any worsening weight gain, shortness of breath, chest pain, numbness, weakness, vision change, worsening headache or any other concerns. Please make an appt to see your primary care doctor in 2-3 days for recheck of symptoms. Please follow up closely with neurology and cardiology for recheck of symptoms and to discuss recent ED visit.     Thank you for coming to our Emergency Department today. As we discussed, it is important to remember that some problems are difficult to diagnose and may not be found during your Emergency Department visit. Be sure to follow up with your primary care doctor and review all labs/imaging/tests that were performed during this visit with them. Some labs/tests may be outside of the normal range and require non-emergent follow-up and further investigation to help diagnose/exclude/prevent complications or other medical conditions.    If you do not have a primary care doctor, you may contact the one listed on your discharge paperwork or you may also call the [facility] Appointment Desk at [phone] to schedule an appointment and establish care with one. It is important to your health that you have a primary care doctor.    Please take all medications as directed. All medications may potentially have side-effects and it is impossible to predict which medications may give you side-effects or what side-effects (if any) they will give you.. If you feel that you are having a negative effect or side-effect of any medication you should immediately stop taking them and seek medical attention. If you feel that you are having a life-threatening reaction call 911.    Return to the ER with any questions/concerns, new/concerning symptoms, worsening or failure to improve.     Do not drive, swim, climb to height, take a bath or make any important decisions for 24 hours if you have received any pain medications, sedatives or mood altering drugs during your ER visit.

## 2024-05-27 LAB
OHS QRS DURATION: 92 MS
OHS QTC CALCULATION: 465 MS

## 2024-06-17 ENCOUNTER — PATIENT OUTREACH (OUTPATIENT)
Dept: ADMINISTRATIVE | Facility: HOSPITAL | Age: 39
End: 2024-06-17
Payer: MEDICARE

## 2024-07-13 ENCOUNTER — HOSPITAL ENCOUNTER (EMERGENCY)
Facility: HOSPITAL | Age: 39
Discharge: HOME | End: 2024-07-13
Attending: EMERGENCY MEDICINE
Payer: MEDICARE

## 2024-07-13 VITALS
TEMPERATURE: 98 F | HEART RATE: 74 BPM | OXYGEN SATURATION: 99 % | HEIGHT: 70 IN | WEIGHT: 189 LBS | RESPIRATION RATE: 18 BRPM | DIASTOLIC BLOOD PRESSURE: 78 MMHG | SYSTOLIC BLOOD PRESSURE: 124 MMHG | BODY MASS INDEX: 27.06 KG/M2

## 2024-07-13 DIAGNOSIS — M79.645 FINGER PAIN, LEFT: Primary | ICD-10-CM

## 2024-07-13 LAB
B-HCG UR QL: NEGATIVE
CTP QC/QA: YES

## 2024-07-13 PROCEDURE — 81025 URINE PREGNANCY TEST: CPT | Mod: ER

## 2024-07-13 PROCEDURE — 99284 EMERGENCY DEPT VISIT MOD MDM: CPT | Mod: 25,ER

## 2024-07-13 PROCEDURE — 63600175 PHARM REV CODE 636 W HCPCS: Mod: ER | Performed by: EMERGENCY MEDICINE

## 2024-07-13 PROCEDURE — 96372 THER/PROPH/DIAG INJ SC/IM: CPT | Performed by: EMERGENCY MEDICINE

## 2024-07-13 PROCEDURE — 81025 URINE PREGNANCY TEST: CPT | Mod: ER | Performed by: EMERGENCY MEDICINE

## 2024-07-13 RX ORDER — DICLOFENAC SODIUM 10 MG/G
GEL TOPICAL
Qty: 50 G | Refills: 0 | Status: SHIPPED | OUTPATIENT
Start: 2024-07-13

## 2024-07-13 RX ORDER — KETOROLAC TROMETHAMINE 30 MG/ML
30 INJECTION, SOLUTION INTRAMUSCULAR; INTRAVENOUS
Status: COMPLETED | OUTPATIENT
Start: 2024-07-13 | End: 2024-07-13

## 2024-07-13 RX ADMIN — KETOROLAC TROMETHAMINE 30 MG: 30 INJECTION, SOLUTION INTRAMUSCULAR at 09:07

## 2024-07-13 NOTE — ED PROVIDER NOTES
"Encounter Date: 7/13/2024    SCRIBE #1 NOTE: I, Torri Trinidad, am scribing for, and in the presence of,  Aylin Quinn MD.       History     Chief Complaint   Patient presents with    Finger Pain     Patient presents w/ a c/o of left 3rd digit pain for approximately three weeks. Reports stubbing her finger, and pain has been persistent since then. Reports now radiating to her hand.      Nasra Marks is a 39 y.o. female, with a past medical history of CKD, HTN, and CHF (EF 30-35% per TTE 2/22/24, defibrillator in place), who presents to the ED with left 3rd finger pain described as "burning" that began 2-3 weeks ago. Patient reports she jammed her finger into her leg and has had pain ever since and now pain is radiating up her left arm. Patient reports associated intermittent mild swelling to the left 3rd finger. Patient denies attempting any at home treatment. Patient denies numbness. Patient denies smoking tobacco, but admits to occasional EtOH and marijuana usage. Patient has allergy to aldactone, hydralazine analogues, and isosorbide.    The history is provided by the patient. No  was used.     Review of patient's allergies indicates:   Allergen Reactions    Aldactone [spironolactone] Swelling     Lips swelled    Hydralazine analogues Other (See Comments)     headaches    Isosorbide Other (See Comments)     headaches     Past Medical History:   Diagnosis Date    CHF (congestive heart failure)     Chronic combined systolic and diastolic congestive heart failure 5/24/2019    Essential hypertension 11/19/2012    Hypertension     dx at 16y.o.    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 disease, with heart failure 12/28/2021    ICD (implantable cardioverter-defibrillator), single, in situ 2/17/2020    Nonischemic cardiomyopathy 5/24/2019    Pacemaker 12/18/2017     Past Surgical History:   Procedure Laterality Date    CARDIAC DEFIBRILLATOR " PLACEMENT  2017     SECTION      x 1    INDUCED       RIGHT HEART CATHETERIZATION Right 2022    Procedure: INSERTION, CATHETER, RIGHT HEART;  Surgeon: Timothy Prajapati Jr., MD;  Location: [facility] CATH LAB;  Service: Cardiology;  Laterality: Right;    TUBAL LIGATION       Family History   Problem Relation Name Age of Onset    Hypertension Mother      Cancer Maternal Grandmother          breast x 2    Cancer Paternal Grandmother          breast    No Known Problems Sister      No Known Problems Brother      No Known Problems Son      No Known Problems Brother      Hypertension Other      Diabetes Other      Breast cancer Other      Cancer Paternal Aunt          breast    Cancer Paternal Uncle       Social History     Tobacco Use    Smoking status: Never    Smokeless tobacco: Never   Substance Use Topics    Alcohol use: Yes     Comment: occasionally    Drug use: Yes     Types: Marijuana     Comment: in past very little marijuana     Review of Systems   Constitutional:  Negative for activity change, appetite change, chills and fever.   HENT:  Negative for congestion, rhinorrhea, sneezing and sore throat.    Respiratory:  Negative for cough, choking, shortness of breath and wheezing.    Cardiovascular:  Negative for chest pain and palpitations.   Gastrointestinal:  Negative for abdominal pain, diarrhea, nausea and vomiting.   Musculoskeletal:  Positive for arthralgias (left 3rd finger, radiating up left arm) and joint swelling (mild, left 3rd finger).   Skin:  Negative for rash.   Neurological:  Negative for dizziness, syncope, weakness, light-headedness, numbness and headaches.   All other systems reviewed and are negative.      Physical Exam     Initial Vitals [24 0925]   BP Pulse Resp Temp SpO2   129/86 76 20 98.1 °F (36.7 °C) 98 %      MAP       --         Physical Exam    Nursing note and vitals reviewed.  Constitutional: She appears well-developed and well-nourished. No distress.    HENT:   Head: Normocephalic and atraumatic.   Eyes: Conjunctivae are normal.   Neck:   Normal range of motion.  Cardiovascular:  Normal rate, regular rhythm and normal heart sounds.           No murmur heard.  Pulmonary/Chest: Breath sounds normal. No respiratory distress.   Abdominal: Bowel sounds are normal. She exhibits no distension.   Musculoskeletal:         General: Normal range of motion.      Left hand: Normal range of motion. Normal sensation.      Cervical back: Normal range of motion.      Comments: Tenderness to the mid phalanx of the left long finger, no swelling or bruising. Normal range of motion and sensation.      Neurological: She is alert and oriented to person, place, and time.   Skin: Skin is warm and dry.   Psychiatric: She has a normal mood and affect. Her behavior is normal.         ED Course   Procedures  Labs Reviewed   POCT URINE PREGNANCY          Imaging Results              X-Ray Finger 2 or More Views Left (Final result)  Result time 07/13/24 10:53:20      Final result by Jarvis Loco MD (07/13/24 10:53:20)                   Impression:      No convincing evidence of acute fracture or dislocation.      Electronically signed by: Jarvis Loco  Date:    07/13/2024  Time:    10:53               Narrative:    EXAMINATION:  XR FINGER 2 OR MORE VIEWS LEFT    CLINICAL HISTORY:  left 3rd finger injury;    TECHNIQUE:  Three views of the left 3rd digit.    COMPARISON:  None    FINDINGS:  No definite evidence of acute fracture or dislocation.  Joint spaces appear grossly maintained.  No evidence of radiopaque foreign body.                                       Medications   ketorolac injection 30 mg (30 mg Intramuscular Given 7/13/24 0952)     Medical Decision Making  39F with HTN, CKD, and CHF (EF 30-35%), presents with right 3rd finger pain for 2-3 weeks s/p jamming finger. On exam, patient has tenderness to the mid phalanx of the left long finger with no swelling or bruising, normal  range of motion and sensation. In shared decision making with the patient I will order labs and x-ray of left fingers. Xray with no fracture or dislocation. Sounds like finger was jammed and she is still having pain. Limited use of NSAIDs due to heart failure. Will treat with voltaren gel and tylenol.    Amount and/or Complexity of Data Reviewed  Labs: ordered. Decision-making details documented in ED Course.  Radiology: ordered. Decision-making details documented in ED Course.    Risk  Prescription drug management.            Scribe Attestation:   Scribe #1: I performed the above scribed service and the documentation accurately describes the services I performed. I attest to the accuracy of the note.                             I, Dr. Aylin Quinn, personally performed the services described in this documentation.   All medical record entries made by the scribe were at my direction and in my presence.   I have reviewed the chart and agree that the record is accurate and complete.   Aylin Quinn MD.  9:59 AM 07/13/2024     Clinical Impression:  Final diagnoses:  [M79.645] Finger pain, left (Primary)          ED Disposition Condition    Discharge Stable          ED Prescriptions       Medication Sig Dispense Start Date End Date Auth. Provider    diclofenac sodium (VOLTAREN ARTHRITIS PAIN) 1 % Gel Apply to area of pain 3 times a day. 50 g 7/13/2024 -- Aylin Quinn MD          Follow-up Information       Follow up With Specialties Details Why Contact Info    Lionel Tripathi MD Orthopedic Surgery, Hand Surgery Schedule an appointment as soon as possible for a visit   [address] [place] [address] [phone]               Aylin Quinn MD  07/13/24 4966

## 2024-07-13 NOTE — DISCHARGE INSTRUCTIONS
Your xray is normal. You likely sprained your finger. Use gel as directed. Take two 500mg tylenol every 8 hours. If you still have pain, see a hand specialist.

## 2024-07-24 ENCOUNTER — PATIENT MESSAGE (OUTPATIENT)
Dept: CARDIOLOGY | Facility: CLINIC | Age: 39
End: 2024-07-24
Payer: MEDICARE

## 2024-07-24 DIAGNOSIS — I50.42 CHRONIC COMBINED SYSTOLIC AND DIASTOLIC CONGESTIVE HEART FAILURE: ICD-10-CM

## 2024-07-24 RX ORDER — SACUBITRIL AND VALSARTAN 97; 103 MG/1; MG/1
1 TABLET, FILM COATED ORAL 2 TIMES DAILY
Qty: 180 TABLET | Refills: 3 | Status: SHIPPED | OUTPATIENT
Start: 2024-07-24

## 2024-08-08 DIAGNOSIS — I50.42 CHRONIC COMBINED SYSTOLIC AND DIASTOLIC CONGESTIVE HEART FAILURE: Primary | ICD-10-CM

## 2024-08-09 ENCOUNTER — HOSPITAL ENCOUNTER (OUTPATIENT)
Dept: CARDIOLOGY | Facility: HOSPITAL | Age: 39
Discharge: HOME | End: 2024-08-09
Attending: INTERNAL MEDICINE
Payer: MEDICARE

## 2024-08-09 ENCOUNTER — OFFICE VISIT (OUTPATIENT)
Dept: TRANSPLANT | Facility: CLINIC | Age: 39
End: 2024-08-09
Attending: INTERNAL MEDICINE
Payer: MEDICARE

## 2024-08-09 VITALS
DIASTOLIC BLOOD PRESSURE: 79 MMHG | HEART RATE: 71 BPM | SYSTOLIC BLOOD PRESSURE: 119 MMHG | WEIGHT: 190.25 LBS | BODY MASS INDEX: 28.18 KG/M2 | HEIGHT: 69 IN

## 2024-08-09 VITALS
HEIGHT: 70 IN | WEIGHT: 187.38 LBS | DIASTOLIC BLOOD PRESSURE: 78 MMHG | BODY MASS INDEX: 26.82 KG/M2 | HEART RATE: 75 BPM | SYSTOLIC BLOOD PRESSURE: 124 MMHG

## 2024-08-09 DIAGNOSIS — I42.8 NONISCHEMIC CARDIOMYOPATHY: Chronic | ICD-10-CM

## 2024-08-09 DIAGNOSIS — I10 ESSENTIAL HYPERTENSION: Chronic | ICD-10-CM

## 2024-08-09 DIAGNOSIS — I50.42 CHRONIC COMBINED SYSTOLIC AND DIASTOLIC CONGESTIVE HEART FAILURE: Primary | ICD-10-CM

## 2024-08-09 DIAGNOSIS — I50.42 CHRONIC COMBINED SYSTOLIC AND DIASTOLIC CONGESTIVE HEART FAILURE: ICD-10-CM

## 2024-08-09 DIAGNOSIS — I13.0 HYPERTENSIVE CARDIOVASCULAR-RENAL DISEASE, STAGE 1-4 OR UNSPECIFIED CHRONIC KIDNEY DISEASE, WITH HEART FAILURE: ICD-10-CM

## 2024-08-09 DIAGNOSIS — R06.02 SOB (SHORTNESS OF BREATH): ICD-10-CM

## 2024-08-09 DIAGNOSIS — Z95.810 ICD (IMPLANTABLE CARDIOVERTER-DEFIBRILLATOR), SINGLE, IN SITU: ICD-10-CM

## 2024-08-09 DIAGNOSIS — R53.83 FATIGUE, UNSPECIFIED TYPE: ICD-10-CM

## 2024-08-09 LAB
ASCENDING AORTA: 2.96 CM
AV MEAN GRADIENT: 5 MMHG
AV PEAK GRADIENT: 9 MMHG
BSA FOR ECHO PROCEDURE: 2.04 M2
CV ECHO LV RWT: 0.33 CM
DOP CALC AO PEAK VEL: 1.54 M/S
DOP CALC AO VTI: 28.19 CM
DOP CALC LVOT AREA: 3.8 CM2
DOP CALC LVOT DIAMETER: 2.19 CM
E WAVE DECELERATION TIME: 407.87 MSEC
E/A RATIO: 0.9
E/E' RATIO: 11.4 M/S
ECHO LV POSTERIOR WALL: 1.11 CM (ref 0.6–1.1)
FRACTIONAL SHORTENING: 21 % (ref 28–44)
INTERVENTRICULAR SEPTUM: 0.86 CM (ref 0.6–1.1)
IVRT: 156.04 MSEC
LA MAJOR: 5.19 CM
LA MINOR: 5.12 CM
LA WIDTH: 4.26 CM
LEFT ATRIUM SIZE: 4.01 CM
LEFT ATRIUM VOLUME INDEX MOD: 34.1 ML/M2
LEFT ATRIUM VOLUME INDEX: 37.1 ML/M2
LEFT ATRIUM VOLUME MOD: 68.83 CM3
LEFT ATRIUM VOLUME: 74.85 CM3
LEFT INTERNAL DIMENSION IN SYSTOLE: 5.31 CM (ref 2.1–4)
LEFT VENTRICLE DIASTOLIC VOLUME INDEX: 114.1 ML/M2
LEFT VENTRICLE DIASTOLIC VOLUME: 230.49 ML
LEFT VENTRICLE MASS INDEX: 144 G/M2
LEFT VENTRICLE SYSTOLIC VOLUME INDEX: 67.4 ML/M2
LEFT VENTRICLE SYSTOLIC VOLUME: 136.22 ML
LEFT VENTRICULAR INTERNAL DIMENSION IN DIASTOLE: 6.69 CM (ref 3.5–6)
LEFT VENTRICULAR MASS: 291.84 G
LV LATERAL E/E' RATIO: 11.4 M/S
LV SEPTAL E/E' RATIO: 11.4 M/S
MV A" WAVE DURATION": 16.37 MSEC
MV PEAK A VEL: 0.63 M/S
MV PEAK E VEL: 0.57 M/S
PISA MRMAX VEL: 0.05 M/S
PISA TR MAX VEL: 1.98 M/S
PULM VEIN S/D RATIO: 1.09
PV PEAK D VEL: 0.35 M/S
PV PEAK S VEL: 0.38 M/S
RA MAJOR: 4.52 CM
RA PRESSURE ESTIMATED: 3 MMHG
RA WIDTH: 3.4 CM
RIGHT VENTRICLE DIASTOLIC BASEL DIMENSION: 3.4 CM
RV TB RVSP: 5 MMHG
SINUS: 2.81 CM
STJ: 2.42 CM
TDI LATERAL: 0.05 M/S
TDI SEPTAL: 0.05 M/S
TDI: 0.05 M/S
TR MAX PG: 16 MMHG
TRICUSPID ANNULAR PLANE SYSTOLIC EXCURSION: 2.17 CM
TV REST PULMONARY ARTERY PRESSURE: 19 MMHG
Z-SCORE OF LEFT VENTRICULAR DIMENSION IN END DIASTOLE: 1.17
Z-SCORE OF LEFT VENTRICULAR DIMENSION IN END SYSTOLE: 2.96

## 2024-08-09 PROCEDURE — 99999 PR PBB SHADOW E&M-EST. PATIENT-LVL III: CPT | Mod: PBBFAC,,, | Performed by: INTERNAL MEDICINE

## 2024-08-09 PROCEDURE — 99214 OFFICE O/P EST MOD 30 MIN: CPT | Mod: S$PBB,,, | Performed by: INTERNAL MEDICINE

## 2024-08-09 PROCEDURE — 93306 TTE W/DOPPLER COMPLETE: CPT | Mod: 26,,, | Performed by: INTERNAL MEDICINE

## 2024-08-09 PROCEDURE — 93306 TTE W/DOPPLER COMPLETE: CPT

## 2024-08-09 PROCEDURE — 99213 OFFICE O/P EST LOW 20 MIN: CPT | Mod: PBBFAC,25 | Performed by: INTERNAL MEDICINE

## 2024-08-09 RX ORDER — AMLODIPINE BESYLATE 5 MG/1
5 TABLET ORAL
COMMUNITY
Start: 2024-05-17

## 2024-08-09 NOTE — Clinical Note
Please schedule her for right heart catheterization and CPX 1 day when I am in the lab towards the end of this month.  Have the CPX done 1st.  Even if they tell you that no outpatient  allowed (I am covering consult and cath lab) tell them I gave the okay

## 2024-08-09 NOTE — PROGRESS NOTES
Subjective:     Patient ID:  Nasra Marks is a 39 y.o. female who presents for follow-up of Congestive Heart Failure      THIS IS IMPORTED NOTE Aug 2023    HPI:  40 yo BF diagnosis CHF due to non-ischemic cardiomyopathy in 2017 with angiogram at that time without evidence of coronary artery disease.  She developed hypertension at the age of 16 but did not take her medications regularly.   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 She was referred by Dr. Kashif Peguero.      22: Stopped Carvedilol and started Metoprolol succinate 100mg daily for fatigue     At visit 22  she had made good progress with much improvement in functional capacity and shortness of breath but still with fatigue.  At that visit plan was:  To see if I can help fatigue change the beta blocker metoprolol to bisoprolol 10 mg once a day    Reduce fluid intake to 1.5 to 2 quarts a day    Start taking half a tablet of hydralazine (50 mg tab) and half a tablet of isosorbide dinitrate (20 mg tab) both are taken 3 times a day and in 2 weeks change to full tablet of each    Call in 6-8 weeks as we can increase this medication combination further if needed for symptoms    At visit 2023 I learned that she had gone to ED last week with atypical CP and negative evaluation.  No CAD at cath .  She sees Dr. Peguero.  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  Had HA, nausea when went to ED.  Dr. Peguero ordering tests.    At visit 23  I added amlodipine 5 mg daily  For blood pressure and subsequent potassium for hypokalemia.    She returns today, 2024  No tightness, pressure or heaviness in chest, neck, arms, throat, jaw or back with or without exertion.    She notes dyspnea on exertion.  She sleeps on 4 pillows.  No PND.  Biggest complaint is profound fatigue.  She had an echocardiogram today which I read as an ejection fraction of  "20-25%.  I also reviewed the echocardiogram from February 27, 2024 and debride that ejection fraction is 20-25% as well.  Therefore I am reading the ejection fraction lower than that reported on the studies.    ICD followed by Dr. Peguero--no shocks    Review of Systems   Constitutional: Positive for malaise/fatigue. Negative for chills, fever, night sweats, weight gain and weight loss.   Cardiovascular:  Positive for dyspnea on exertion and orthopnea (Four pillows). Negative for chest pain, irregular heartbeat, leg swelling, near-syncope, palpitations, paroxysmal nocturnal dyspnea and syncope.   Respiratory:  Negative for cough, sputum production and wheezing.    Hematologic/Lymphatic: Does not bruise/bleed easily.   Gastrointestinal:  Negative for hematochezia and melena.   Genitourinary:  Negative for hematuria.   Neurological:  Negative for brief paralysis, dizziness, focal weakness, light-headedness and weakness.        Objective:   Physical Exam  Constitutional:       General: She is not in acute distress.     Appearance: She is well-developed. She is not ill-appearing, toxic-appearing or diaphoretic.      Comments: /79   Pulse 71   Ht 5' 9" (1.753 m)   Wt 86.3 kg (190 lb 4.1 oz)   BMI 28.10 kg/m²     Last 3 visits weight 183, 184 and 178 lb  Friendly black female in no acute distress       HENT:      Head: Normocephalic and atraumatic.   Eyes:      General: No scleral icterus.        Right eye: No discharge.         Left eye: No discharge.      Conjunctiva/sclera: Conjunctivae normal.   Neck:      Thyroid: No thyromegaly.      Vascular: No JVD.      Trachea: No tracheal deviation.   Cardiovascular:      Rate and Rhythm: Normal rate and regular rhythm.      Heart sounds: S1 normal and S2 normal. No murmur heard.     No gallop. No S4 sounds.   Pulmonary:      Effort: Pulmonary effort is normal.      Breath sounds: Normal breath sounds.   Abdominal:      General: Bowel sounds are normal. There is no " distension.      Palpations: Abdomen is soft. There is no hepatomegaly (10 cm) or mass.      Tenderness: There is no abdominal tenderness. There is no guarding or rebound.   Musculoskeletal:         General: No swelling or tenderness.      Right lower leg: No edema.      Left lower leg: No edema.   Skin:     General: Skin is warm and dry.   Neurological:      General: No focal deficit present.      Mental Status: She is alert and oriented to person, place, and time. Mental status is at baseline.   Psychiatric:         Mood and Affect: Mood normal.         Behavior: Behavior normal.         Thought Content: Thought content normal.         Judgment: Judgment normal.       Lab Results   Component Value Date     08/09/2024     05/24/2024    K 3.7 08/09/2024    K 4.0 05/24/2024    GLU 85 08/09/2024    GLU 91 05/24/2024    BUN 12 08/09/2024    BUN 8 05/24/2024    CREATININE 0.9 08/09/2024    CREATININE 0.8 05/24/2024     Lab Results   Component Value Date     (H) 08/09/2024     (H) 05/24/2024     (H) 04/25/2024 8/9/2024 ECHO-- I reviewed these images directly at today's visit estimate ejection fraction 20 25%    Left Ventricle: The left ventricle is moderately dilated. Normal wall thickness. Moderate global hypokinesis present. There is moderately reduced systolic function with a visually estimated ejection fraction of 30 - 35%. Grade II diastolic dysfunction. Normal left ventricular filling pressure.    Right Ventricle: Normal right ventricular cavity size. Wall thickness is normal. Right ventricle wall motion  is normal. Systolic function is normal.    Left Atrium: Normal left atrial size.    Right Atrium: Normal right atrial size.    Aortic Valve: The aortic valve is a trileaflet valve. There is mild aortic valve sclerosis. There is mild annular calcification present.    Mitral Valve: There is mild bileaflet sclerosis. There is mild regurgitation.    Aorta: Aortic root is normal  in size measuring 2.81 cm. Ascending aorta is normal measuring 2.96 cm.    Pulmonary Artery: The estimated pulmonary artery systolic pressure is 19 mmHg.    IVC/SVC: Normal venous pressure at 3 mmHg.    May 2024 EKG I could not get the images to pull up but did read the report EKG  did not demonstrate any conduction disturbance      2/27/2024 ECHO-- I reviewed these images directly at today's visit and estimate ejection fraction 20-25%    Left Ventricle: The left ventricle is moderately dilated. Normal wall thickness. Moderate global hypokinesis present. There is moderately reduced systolic function with a visually estimated ejection fraction of 30 - 35%. Grade I diastolic dysfunction.    Right Ventricle: Normal right ventricular cavity size. Systolic function is normal.    Pulmonary Artery: The estimated pulmonary artery systolic pressure is 16 mmHg.    1/17/2023 CXR read as mild cardiomegaly and possible mild perihilar interstitial edema.  I reviewed images and lungs are clear; also BNP only 75 down from 600's in March 2022 1/31/2022 CPX  Metabolic Findings Resting spirometry reveals an FVC = 2.86L which is 70.1% of predicted, an FEV1 of 2.30L, which is 68.27% of predicted and an FEV1/FVC ratio of 80.42%. The MVV = 92 L/min, which is 78.32% of predicted.     The respiratory exchange ratio (RER) was 1.32, suggesting an excellent effort.     The breathing reserve is calculated at 5.54%, which is reduced. Oxygen saturation with exercise remained normal.     The peak VO2 was 20.5 ml/kg/min which is 58.24% of predicted equating to a functional capacity of 5.86 METS indicating moderate functional impairment.     The anaerobic threshold (AT), which occurred at a heart rate of 109bpm, was 14.1 ml/kg/min, which is 40.06% of the predicted VO2 and is borderline reduced.     The peak VO2 Lean was 26.25 ml/kg of lean body weight/min indicating a good prognosis in heart failure.     The VE/VCO2 Green was 30.0. The Resting  PetCO2 was 29.0.     Moderate functional impairment associated with a reduced breathing reserve, normal oxygen stauration, an excellent effort, and a borderline reduced AT. These findings are indicative of functional impairment secondary to circulatory insufficiency, ventilatory impairment.     12/28/2021 6 minute walk test 459.4 m without desaturation    10/06/2021 echocardiogram  The left ventricle is severely enlarged with eccentric hypertrophy and severely decreased systolic function.  The estimated ejection fraction is 15%.  Grade III left ventricular diastolic dysfunction.  Normal right ventricular size with normal right ventricular systolic function.  Moderate left atrial enlargement.  Mild mitral regurgitation.  Normal central venous pressure (3 mmHg).  The estimated PA systolic pressure is 15 mmHg.      12/31/2021 EKG normal sinus rhythm nonspecific ST-T abnormality    Cath 4/18/17:  Angiographic Results       Patient has a right dominant coronary artery.      - Left Main Coronary Artery:             The LM is normal. There is DANNY 3 flow.     - Left Anterior Descending Artery:             The LAD is normal. There is DANNY 3 flow.     - Left Circumflex Artery:             The LCX is normal. There is DANNY 3 flow.     - Right Coronary Artery:             The RCA is normal. There is DANNY 3 flow.     - Left Renal Artery:             The ostial left renal is normal.     - Right Renal Artery:             The ostial right renal is normal.     - Common Femoral Artery:             The right CFA is normal.  Assessment:     1. Chronic combined systolic and diastolic congestive heart failure    2. Nonischemic cardiomyopathy    3. SOB (shortness of breath)    4. Fatigue, unspecified type    5. Essential hypertension    6. 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 disease, with heart failure    7. ICD (implantable cardioverter-defibrillator), single, in situ        Plan:    I feel  that her cardiomyopathy has relapsed and feel that the ejection fraction is 20-25% both on the current study and that of February 20, 2024.  I recommend a right heart catheterization and CPX.  She is agreeable to proceed.     Same treatment    Return to clinic 6 months with echo, BMP and BNP

## 2024-08-09 NOTE — LETTER
August 11, 2024        Kashif Peguero  [address] [facility] [address]  [address]  [place] [address]  Phone: [phone]  Fax: [phone]             [facility]  [address]  [place] [address]  Phone: [phone]   Patient: Nasra Marks   MR Number: [id number]   YOB: 1985   Date of Visit: 8/9/2024       Dear Dr. Kashif Peguero    Thank you for referring Nasra Marks to me for evaluation. Attached you will find relevant portions of my assessment and plan of care.    If you have questions, please do not hesitate to call me. I look forward to following Nasra Marks along with you.    Sincerely,    Timothy Prajapati Jr, MD    Enclosure    If you would like to receive this communication electronically, please contact [email or web address] or [phone] to request [employer] Link access.    [employer] Link is a tool which provides read-only access to select patient information with whom you have a relationship. Its easy to use and provides real time access to review your patients record including encounter summaries, notes, results, and demographic information.    If you feel you have received this communication in error or would no longer like to receive these types of communications, please e-mail [email or web address]

## 2024-08-18 ENCOUNTER — HOSPITAL ENCOUNTER (EMERGENCY)
Facility: HOSPITAL | Age: 39
Discharge: HOME | End: 2024-08-18
Attending: EMERGENCY MEDICINE
Payer: MEDICARE

## 2024-08-18 VITALS
RESPIRATION RATE: 17 BRPM | DIASTOLIC BLOOD PRESSURE: 87 MMHG | WEIGHT: 189 LBS | SYSTOLIC BLOOD PRESSURE: 122 MMHG | HEIGHT: 69 IN | OXYGEN SATURATION: 99 % | HEART RATE: 66 BPM | TEMPERATURE: 98 F | BODY MASS INDEX: 27.99 KG/M2

## 2024-08-18 DIAGNOSIS — M79.644 PAIN OF RIGHT THUMB: Primary | ICD-10-CM

## 2024-08-18 DIAGNOSIS — S60.111A SUBUNGUAL HEMATOMA OF RIGHT THUMB, INITIAL ENCOUNTER: ICD-10-CM

## 2024-08-18 LAB
B-HCG UR QL: NEGATIVE
CTP QC/QA: YES

## 2024-08-18 PROCEDURE — 81025 URINE PREGNANCY TEST: CPT | Mod: ER | Performed by: EMERGENCY MEDICINE

## 2024-08-18 PROCEDURE — 25000003 PHARM REV CODE 250: Mod: ER | Performed by: EMERGENCY MEDICINE

## 2024-08-18 PROCEDURE — 99284 EMERGENCY DEPT VISIT MOD MDM: CPT | Mod: 25,ER

## 2024-08-18 PROCEDURE — 11740 EVACUATION SUBUNGUAL HMTMA: CPT | Mod: ER

## 2024-08-18 PROCEDURE — 63600175 PHARM REV CODE 636 W HCPCS: Mod: JZ,JG,ER | Performed by: EMERGENCY MEDICINE

## 2024-08-18 PROCEDURE — 81025 URINE PREGNANCY TEST: CPT | Mod: ER

## 2024-08-18 RX ORDER — BUPIVACAINE HYDROCHLORIDE 5 MG/ML
5 INJECTION, SOLUTION EPIDURAL; INTRACAUDAL
Status: COMPLETED | OUTPATIENT
Start: 2024-08-18 | End: 2024-08-18

## 2024-08-18 RX ORDER — CEPHALEXIN 500 MG/1
500 CAPSULE ORAL 4 TIMES DAILY
Qty: 20 CAPSULE | Refills: 0 | Status: SHIPPED | OUTPATIENT
Start: 2024-08-18 | End: 2024-08-18

## 2024-08-18 RX ORDER — LIDOCAINE HYDROCHLORIDE 10 MG/ML
5 INJECTION, SOLUTION INFILTRATION; PERINEURAL
Status: COMPLETED | OUTPATIENT
Start: 2024-08-18 | End: 2024-08-18

## 2024-08-18 RX ORDER — ACETAMINOPHEN 500 MG
500 TABLET ORAL EVERY 6 HOURS PRN
Qty: 30 TABLET | Refills: 0 | Status: SHIPPED | OUTPATIENT
Start: 2024-08-18

## 2024-08-18 RX ORDER — OXYCODONE AND ACETAMINOPHEN 5; 325 MG/1; MG/1
1 TABLET ORAL EVERY 6 HOURS PRN
Qty: 8 EACH | Refills: 0 | Status: SHIPPED | OUTPATIENT
Start: 2024-08-18

## 2024-08-18 RX ORDER — MUPIROCIN 20 MG/G
OINTMENT TOPICAL 3 TIMES DAILY
Qty: 30 G | Refills: 0 | Status: SHIPPED | OUTPATIENT
Start: 2024-08-18 | End: 2024-08-28

## 2024-08-18 RX ORDER — CEPHALEXIN 500 MG/1
500 CAPSULE ORAL 4 TIMES DAILY
Qty: 28 CAPSULE | Refills: 0 | Status: SHIPPED | OUTPATIENT
Start: 2024-08-18 | End: 2024-08-25

## 2024-08-18 RX ADMIN — LIDOCAINE HYDROCHLORIDE 5 ML: 10 INJECTION, SOLUTION INFILTRATION; PERINEURAL at 11:08

## 2024-08-18 RX ADMIN — BACITRACIN ZINC, NEOMYCIN, POLYMYXIN B 1 EACH: 400; 3.5; 5 OINTMENT TOPICAL at 12:08

## 2024-08-18 RX ADMIN — BUPIVACAINE HYDROCHLORIDE 25 MG: 5 INJECTION, SOLUTION EPIDURAL; INTRACAUDAL; PERINEURAL at 11:08

## 2024-08-18 NOTE — ED PROVIDER NOTES
"Encounter Date: 2024    SCRIBE #1 NOTE: I, Stanley Hutchins Do, am scribing for, and in the presence of,  Suzie Fuentes DO. I have scribed the following portions of the note - Other sections scribed: HPI, ROS, PE, MDM.       History     Chief Complaint   Patient presents with    Finger Injury     Right thumb smashed in door on Thursday, but pain has been persistent. "Feels like something needs to be released."     39 y.o. female with a pertinent PMHx of HTN, who presents to the ED for chief complaint of throbbing right thumb pain s/p smashing her right thumb between her door with associated black discoloration to the nailbed of her right thumb onset 3 days ago. She endorses current periods. No other exacerbating or alleviating factors. Patient does not recall her last previous tetanus vaccination.     The history is provided by the patient. No  was used.     Review of patient's allergies indicates:   Allergen Reactions    Aldactone [spironolactone] Swelling     Lips swelled    Hydralazine analogues Other (See Comments)     headaches    Isosorbide Other (See Comments)     headaches     Past Medical History:   Diagnosis Date    CHF (congestive heart failure)     Chronic combined systolic and diastolic congestive heart failure 2019    Essential hypertension 2012    Hypertension     dx at 16y.o.    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 disease, with heart failure 2021    ICD (implantable cardioverter-defibrillator), single, in situ 2020    Nonischemic cardiomyopathy 2019    Pacemaker 2017     Past Surgical History:   Procedure Laterality Date    CARDIAC DEFIBRILLATOR PLACEMENT  2017     SECTION      x 1    INDUCED       RIGHT HEART CATHETERIZATION Right 2022    Procedure: INSERTION, CATHETER, RIGHT HEART;  Surgeon: Timothy Prajapati Jr., MD;  Location: [facility] CATH LAB;  Service: Cardiology;  " Laterality: Right;    TUBAL LIGATION       Family History   Problem Relation Name Age of Onset    Hypertension Mother      Cancer Maternal Grandmother          breast x 2    Cancer Paternal Grandmother          breast    No Known Problems Sister      No Known Problems Brother      No Known Problems Son      No Known Problems Brother      Hypertension Other      Diabetes Other      Breast cancer Other      Cancer Paternal Aunt          breast    Cancer Paternal Uncle       Social History     Tobacco Use    Smoking status: Never    Smokeless tobacco: Never   Substance Use Topics    Alcohol use: Yes     Comment: occasionally    Drug use: Not Currently     Types: Marijuana     Comment: in past very little marijuana     Review of Systems   Musculoskeletal:  Positive for arthralgias.   All other systems reviewed and are negative.      Physical Exam     Initial Vitals [08/18/24 1003]   BP Pulse Resp Temp SpO2   123/83 62 18 98.2 °F (36.8 °C) 100 %      MAP       --         Physical Exam    Constitutional: Vital signs are normal. She appears well-developed and well-nourished.   Patient gave consent to have physical exam performed.     HENT:   Head: Normocephalic and atraumatic.   Right Ear: External ear normal.   Left Ear: External ear normal.   Nose: Nose normal.   Mouth/Throat: Oropharynx is clear and moist. No oropharyngeal exudate, posterior oropharyngeal edema or posterior oropharyngeal erythema.   Eyes: Conjunctivae are normal.   Neck: Neck supple.   Normal range of motion.  Cardiovascular:  Normal rate, regular rhythm and normal heart sounds.     Exam reveals no gallop and no friction rub.       No murmur heard.  Abdominal: Abdomen is soft. Bowel sounds are normal. She exhibits no distension. There is no abdominal tenderness. There is no rebound and no guarding.   Musculoskeletal:      Cervical back: Normal range of motion and neck supple.     Neurological: She is alert and oriented to person, place, and time.    Skin: Skin is warm and dry. Capillary refill takes less than 2 seconds. No rash noted.   Subungual hematoma to the right thumb.   Psychiatric: She has a normal mood and affect.         ED Course   I & D - Incision and Drainage    Date/Time: 8/18/2024 12:04 PM  Location procedure was performed: [facility] EMERGENCY DEPARTMENT    Performed by: Suzie Fuentes DO  Authorized by: Suzie Fuentes DO  Consent Done: Emergent Situation  Type: subungual hematoma  Anesthesia: digital block    Anesthesia:  Anesthetic total: 3 mL    Patient sedated: no  Incision type: single straight  Incision depth: subungual  Drainage: bloody  Drainage amount: moderate  Wound treatment: wound left open  Complications: No  Estimated blood loss (mL): 3  Specimens: No  Implants: No  Patient tolerance: Patient tolerated the procedure well with no immediate complications  Comments: Patient reports feeling significantly better postprocedure.  Digital block completed for pain control using anatomical orientation for block.  Capillary refill postprocedure 2 seconds.    Incision depth: subungual        Labs Reviewed   POCT URINE PREGNANCY       Result Value    POC Preg Test, Ur Negative       Acceptable Yes            Imaging Results              X-Ray Hand 3 view Right (Final result)  Result time 08/18/24 11:14:08      Final result by Cale Grewal DO (08/18/24 11:14:08)                   Impression:      As above      Electronically signed by: Cale Grewal DO  Date:    08/18/2024  Time:    11:14               Narrative:    EXAMINATION:  XR HAND COMPLETE 3 VIEW RIGHT    CLINICAL HISTORY:  Right thumb pain;    TECHNIQUE:  PA, lateral, and oblique views of the right hand were performed.    COMPARISON:  None    FINDINGS:  No acute fracture or dislocation.  The joint spaces appear to be well maintained.                                       Medications   BUPivacaine (PF) 0.5% (5 mg/mL) injection 25 mg (25 mg Subcutaneous Given 8/18/24 1145)  "  LIDOcaine HCL 10 mg/ml (1%) injection 5 mL (5 mLs Infiltration Given 8/18/24 1145)   neomycin-bacitracnZn-polymyxnB packet (1 each Topical (Top) Given 8/18/24 1227)     Medical Decision Making  Amount and/or Complexity of Data Reviewed  Labs: ordered. Decision-making details documented in ED Course.  Radiology: ordered. Decision-making details documented in ED Course.    Risk  OTC drugs.  Prescription drug management.    Medical Decision Making:    This is an evaluation of a 39 y.o. female that presents to the Emergency Department for   Chief Complaint   Patient presents with    Finger Injury     Right thumb smashed in door on Thursday, but pain has been persistent. "Feels like something needs to be released."     The patient is a non-toxic and well appearing patient. On physical exam, patient appears well hydrated with moist mucus membranes. Breath sounds are clear and equal bilaterally with no adventitious breath sounds, tachypnea or respiratory distress. Regular rate and rhythm. No murmurs. Patient is tolerating PO without difficulty. Physical exam otherwise as above.     I have reviewed vital signs and nursing notes.   Vital Signs Are Reassuring.     Based on the patient's symptoms, I am considering and evaluating for the following differential diagnoses: subungual hematoma, thumb pain, Sprain, Strain, Contusion, Dislocation, Fracture.      ED Course:Treatment in the ED included Physical Exam and medications given in ED  Medications   BUPivacaine (PF) 0.5% (5 mg/mL) injection 25 mg (25 mg Subcutaneous Given 8/18/24 1145)   LIDOcaine HCL 10 mg/ml (1%) injection 5 mL (5 mLs Infiltration Given 8/18/24 1145)   neomycin-bacitracnZn-polymyxnB packet (1 each Topical (Top) Given 8/18/24 1227)   .   Patient reports feeling better after treatment in the ER.   Vital signs reviewed  Nurse's notes reviewed  External Data/Documents Reviewed: Previous medical records and vital signs reviewed, see HPI and Physical exam. "   Labs: ordered and reviewed.  Pregnancy test negative.  Radiology: ordered as indicated and reviewed.  Per Radiology report no acute fracture.    Risk  Diagnosis or treatment significantly limited by the following social determinants of health: Body mass index is 27.91 kg/m².     In shared decision making with the patient, we discussed treatment, prescriptions, labs, and imaging results.  Discussed with patient prescriptions to go home with, follow up with Orthopedics.  As patient's injury occurred 4 days ago concern for possible infection will place patient on Keflex out of an abundance of caution.  Patient and her  are concerned she may have cellulitis as well.  We discussed that Keflex was a good option for treatment of cellulitis    Discharge home with   ED Prescriptions       Medication Sig Dispense Start Date End Date Auth. Provider    cephALEXin (KEFLEX) 500 MG capsule  (Status: Discontinued) Take 1 capsule (500 mg total) by mouth 4 (four) times daily. for 5 days 20 capsule 8/18/2024 8/18/2024 Suzie Fuentes DO    oxyCODONE-acetaminophen (PERCOCET) 5-325 mg per tablet Take 1 tablet by mouth every 6 (six) hours as needed for Pain (As needed for severe 10/10 pain). 8 each 8/18/2024 -- Suzie Fuentes DO    acetaminophen (TYLENOL) 500 MG tablet Take 1 tablet (500 mg total) by mouth every 6 (six) hours as needed for Pain (As needed for pain and fever). 30 tablet 8/18/2024 -- Suzie Fuentes DO    mupirocin (BACTROBAN) 2 % ointment Apply topically 3 (three) times daily. for 10 days 30 g 8/18/2024 8/28/2024 Suzie Fuentes DO    cephALEXin (KEFLEX) 500 MG capsule Take 1 capsule (500 mg total) by mouth 4 (four) times daily. for 7 days 28 capsule 8/18/2024 8/25/2024 Suzei Fuentes DO          Fill and take prescriptions as directed.  Return to the ED if symptoms worsen or do not resolve.   Answered questions and discussed discharge plan.    Patient reports resolution of symptoms and is ready for discharge.  Follow up  with PCP/specialist in 1 day    The following labs and imaging were reviewed:    Admission on 08/18/2024   Component Date Value Ref Range Status    POC Preg Test, Ur 08/18/2024 Negative  Negative Final     Acceptable 08/18/2024 Yes   Final        Imaging Results              X-Ray Hand 3 view Right (Final result)  Result time 08/18/24 11:14:08      Final result by Cale Grewal DO (08/18/24 11:14:08)                   Impression:      As above      Electronically signed by: Cale Grewal DO  Date:    08/18/2024  Time:    11:14               Narrative:    EXAMINATION:  XR HAND COMPLETE 3 VIEW RIGHT    CLINICAL HISTORY:  Right thumb pain;    TECHNIQUE:  PA, lateral, and oblique views of the right hand were performed.    COMPARISON:  None    FINDINGS:  No acute fracture or dislocation.  The joint spaces appear to be well maintained.                                            Scribe Attestation:   Scribe #1: I performed the above scribed service and the documentation accurately describes the services I performed. I attest to the accuracy of the note.                            I, Dr. Suzie Fuentes, personally performed the services described in this documentation. This document was produced by a scribe under my direction and in my presence. All medical record entries made by the scribe were at my direction and in my presence.  I have reviewed the chart and agree that the record reflects my personal performance and is accurate and complete. Suzie Fuentes DO.     08/18/2024 12:06 PM      Clinical Impression:  Final diagnoses:  [S60.111A] Subungual hematoma of right thumb, initial encounter  [M79.644] Pain of right thumb (Primary)          ED Disposition Condition    Discharge Stable          ED Prescriptions       Medication Sig Dispense Start Date End Date Auth. Provider    cephALEXin (KEFLEX) 500 MG capsule  (Status: Discontinued) Take 1 capsule (500 mg total) by mouth 4 (four) times daily.  for 5 days 20 capsule 8/18/2024 8/18/2024 Suzie Fuentes DO    oxyCODONE-acetaminophen (PERCOCET) 5-325 mg per tablet Take 1 tablet by mouth every 6 (six) hours as needed for Pain (As needed for severe 10/10 pain). 8 each 8/18/2024 -- Suzie Fuentes DO    acetaminophen (TYLENOL) 500 MG tablet Take 1 tablet (500 mg total) by mouth every 6 (six) hours as needed for Pain (As needed for pain and fever). 30 tablet 8/18/2024 -- Suzie Fuentes DO    mupirocin (BACTROBAN) 2 % ointment Apply topically 3 (three) times daily. for 10 days 30 g 8/18/2024 8/28/2024 Suzie Fuentes DO    cephALEXin (KEFLEX) 500 MG capsule Take 1 capsule (500 mg total) by mouth 4 (four) times daily. for 7 days 28 capsule 8/18/2024 8/25/2024 Suzie Fuentes DO          Follow-up Information       Follow up With Specialties Details Why Contact Info    Veronika Rainey MD Family Medicine, Wound Care Schedule an appointment as soon as possible for a visit in 1 day  [address]  [place] [address]  [phone]      [facility] - Emergency Dept Emergency Medicine Go to  Please go to [facility] emergency department if symptoms worsen [address]  [facility] [place] [id number] [phone]             Suzie Fuentes DO  08/18/24 1207       Suzie Fuentes DO  08/18/24 1207       Suzie Fuentes DO  08/18/24 0866

## 2024-08-18 NOTE — Clinical Note
"Nasra Marks (Kelly) was seen and treated in our emergency department on 8/18/2024.  She may return to work on 08/20/2024.       If you have any questions or concerns, please don't hesitate to call.      Suzie Fuentes, DO"

## 2024-08-21 ENCOUNTER — HOSPITAL ENCOUNTER (OUTPATIENT)
Dept: CARDIOLOGY | Facility: HOSPITAL | Age: 39
Discharge: HOME | End: 2024-08-21
Attending: INTERNAL MEDICINE
Payer: MEDICARE

## 2024-08-21 VITALS
HEIGHT: 69 IN | WEIGHT: 187 LBS | SYSTOLIC BLOOD PRESSURE: 114 MMHG | BODY MASS INDEX: 27.7 KG/M2 | HEART RATE: 71 BPM | DIASTOLIC BLOOD PRESSURE: 81 MMHG

## 2024-08-21 DIAGNOSIS — I42.8 NONISCHEMIC CARDIOMYOPATHY: Chronic | ICD-10-CM

## 2024-08-21 DIAGNOSIS — R53.83 FATIGUE, UNSPECIFIED TYPE: ICD-10-CM

## 2024-08-21 DIAGNOSIS — R06.02 SOB (SHORTNESS OF BREATH): ICD-10-CM

## 2024-08-21 DIAGNOSIS — I50.42 CHRONIC COMBINED SYSTOLIC AND DIASTOLIC CONGESTIVE HEART FAILURE: ICD-10-CM

## 2024-08-21 LAB
CV STRESS BASE HR: 71 BPM
DIASTOLIC BLOOD PRESSURE: 81 MMHG
OHS CV CPX 1 MINUTE RECOVERY HEART RATE: 125 BPM
OHS CV CPX 85 PERCENT MAX PREDICTED HEART RATE MALE: 155
OHS CV CPX ANAEROBIC THRESHOLD DIASTOLIC BLOOD PRESSURE: 77 MMHG
OHS CV CPX ANAEROBIC THRESHOLD HEART RATE: 118
OHS CV CPX ANAEROBIC THRESHOLD RATE PRESSURE PRODUCT: NORMAL
OHS CV CPX ANAEROBIC THRESHOLD SYSTOLIC BLOOD PRESSURE: 139
OHS CV CPX DATA GRADE - AT: 10.4
OHS CV CPX DATA GRADE - PEAK: 14.9
OHS CV CPX DATA O2 SAT - PEAK: 98
OHS CV CPX DATA O2 SAT - REST: 98
OHS CV CPX DATA SPEED - AT: 3
OHS CV CPX DATA SPEED - PEAK: 3.9
OHS CV CPX DATA TIME - AT: 5.02
OHS CV CPX DATA TIME - PEAK: 7.08
OHS CV CPX DATA VE/VCO2 - AT: 41
OHS CV CPX DATA VE/VCO2 - PEAK: 42
OHS CV CPX DATA VE/VO2 - AT: 37
OHS CV CPX DATA VE/VO2 - PEAK: 49
OHS CV CPX DATA VO2 - AT: 15.1
OHS CV CPX DATA VO2 - PEAK: 19.8
OHS CV CPX DATA VO2 - REST: 3.4
OHS CV CPX FEV1/FVC: 0.79
OHS CV CPX FORCED EXPIRATORY VOLUME: 2.12
OHS CV CPX FORCED VITAL CAPACITY (FVC): 2.69
OHS CV CPX HIGHEST VO: 35.2
OHS CV CPX MAX PREDICTED HEART RATE: 182
OHS CV CPX MAXIMAL VOLUNTARY VENTILATION (MVV) PREDICTED: 84.8
OHS CV CPX MAXIMAL VOLUNTARY VENTILATION (MVV): 70
OHS CV CPX MAXIUMUM EXERCISE VENTILATION (VE MAX): 87.2
OHS CV CPX PATIENT AGE: 38
OHS CV CPX PATIENT HEIGHT IN: 69
OHS CV CPX PATIENT IS FEMALE AGE 11-19: 0
OHS CV CPX PATIENT IS FEMALE AGE GREATER THAN 19: 1
OHS CV CPX PATIENT IS FEMALE AGE LESS THAN 11: 0
OHS CV CPX PATIENT IS FEMALE: 1
OHS CV CPX PATIENT IS MALE AGE 11-25: 0
OHS CV CPX PATIENT IS MALE AGE GREATER THAN 25: 0
OHS CV CPX PATIENT IS MALE AGE LESS THAN 11: 0
OHS CV CPX PATIENT IS MALE GREATER THAN 18: 0
OHS CV CPX PATIENT IS MALE LESS THAN OR EQUAL TO 18: 0
OHS CV CPX PATIENT IS MALE: 0
OHS CV CPX PATIENT WEIGHT RETURNED IN OZ: 2992
OHS CV CPX PEAK DIASTOLIC BLOOD PRESSURE: 78 MMHG
OHS CV CPX PEAK HEAR RATE: 157 BPM
OHS CV CPX PEAK RATE PRESSURE PRODUCT: NORMAL
OHS CV CPX PEAK SYSTOLIC BLOOD PRESSURE: 137 MMHG
OHS CV CPX PERCENT BODY FAT: 28.7
OHS CV CPX PERCENT MAX PREDICTED HEART RATE ACHIEVED: 91
OHS CV CPX PREDICTED VO2: 35.2 ML/KG/MIN
OHS CV CPX RATE PRESSURE PRODUCT PRESENTING: 8094
OHS CV CPX REST PET CO2: 27
OHS CV CPX VE/VCO2 SLOPE: 36.2
STRESS ECHO POST EXERCISE DUR MIN: 7 MINUTES
STRESS ECHO POST EXERCISE DUR SEC: 5 SECONDS
SYSTOLIC BLOOD PRESSURE: 114 MMHG

## 2024-08-21 PROCEDURE — 94621 CARDIOPULM EXERCISE TESTING: CPT | Mod: 26,,, | Performed by: INTERNAL MEDICINE

## 2024-08-21 PROCEDURE — 94621 CARDIOPULM EXERCISE TESTING: CPT

## 2024-08-24 ENCOUNTER — HOSPITAL ENCOUNTER (EMERGENCY)
Facility: HOSPITAL | Age: 39
Discharge: HOME | End: 2024-08-24
Attending: EMERGENCY MEDICINE
Payer: MEDICARE

## 2024-08-24 VITALS
HEIGHT: 69 IN | RESPIRATION RATE: 18 BRPM | WEIGHT: 189 LBS | HEART RATE: 72 BPM | TEMPERATURE: 98 F | BODY MASS INDEX: 27.99 KG/M2 | OXYGEN SATURATION: 98 % | DIASTOLIC BLOOD PRESSURE: 61 MMHG | SYSTOLIC BLOOD PRESSURE: 109 MMHG

## 2024-08-24 DIAGNOSIS — R07.9 CHEST PAIN: ICD-10-CM

## 2024-08-24 DIAGNOSIS — R06.02 SHORTNESS OF BREATH: Primary | ICD-10-CM

## 2024-08-24 DIAGNOSIS — S60.10XA SUBUNGUAL HEMATOMA OF DIGIT OF HAND, INITIAL ENCOUNTER: ICD-10-CM

## 2024-08-24 DIAGNOSIS — R06.01 ORTHOPNEA: ICD-10-CM

## 2024-08-24 LAB
ALBUMIN SERPL-MCNC: 3.3 G/DL (ref 3.3–5.5)
ALP SERPL-CCNC: 49 U/L (ref 42–141)
B-HCG UR QL: NEGATIVE
BILIRUB SERPL-MCNC: 0.5 MG/DL (ref 0.2–1.6)
BILIRUBIN, POC UA: NEGATIVE
BLOOD, POC UA: ABNORMAL
BUN SERPL-MCNC: 8 MG/DL (ref 7–22)
CALCIUM SERPL-MCNC: 9.4 MG/DL (ref 8–10.3)
CHLORIDE SERPL-SCNC: 107 MMOL/L (ref 98–108)
CLARITY, UA POC: CLEAR
COLOR, UA POC: YELLOW
CREAT SERPL-MCNC: 0.7 MG/DL (ref 0.6–1.2)
CTP QC/QA: YES
GLUCOSE SERPL-MCNC: 104 MG/DL (ref 73–118)
GLUCOSE, POC UA: NEGATIVE
HCT, POC: NORMAL
HGB, POC: NORMAL (ref 14–18)
KETONES, POC UA: NEGATIVE
LEUKOCYTE EST, POC UA: NEGATIVE
MCH, POC: NORMAL
MCHC, POC: NORMAL
MCV, POC: NORMAL
MPV, POC: NORMAL
NITRITE, POC UA: NEGATIVE
PH UR STRIP: 7 [PH]
POC ALT (SGPT): 12 U/L (ref 10–47)
POC AST (SGOT): 18 U/L (ref 11–38)
POC B-TYPE NATRIURETIC PEPTIDE: 180 PG/ML (ref 0–100)
POC CARDIAC TROPONIN I: 0 NG/ML (ref 0–0.08)
POC CARDIAC TROPONIN I: 0 NG/ML (ref 0–0.08)
POC PLATELET COUNT: NORMAL
POC TCO2: 28 MMOL/L (ref 18–33)
POTASSIUM BLD-SCNC: 3.2 MMOL/L (ref 3.6–5.1)
PROTEIN, POC UA: NEGATIVE
PROTEIN, POC: 6.7 G/DL (ref 6.4–8.1)
RBC, POC: NORMAL
RDW, POC: NORMAL
SAMPLE: NORMAL
SAMPLE: NORMAL
SODIUM BLD-SCNC: 139 MMOL/L (ref 128–145)
SPECIFIC GRAVITY, POC UA: >=1.03
UROBILINOGEN, POC UA: 0.2 E.U./DL
WBC, POC: NORMAL

## 2024-08-24 PROCEDURE — 99284 EMERGENCY DEPT VISIT MOD MDM: CPT | Mod: 25,ER

## 2024-08-24 PROCEDURE — 93010 ELECTROCARDIOGRAM REPORT: CPT | Mod: ,,, | Performed by: INTERNAL MEDICINE

## 2024-08-24 PROCEDURE — 80053 COMPREHEN METABOLIC PANEL: CPT | Mod: ER

## 2024-08-24 PROCEDURE — 81025 URINE PREGNANCY TEST: CPT | Mod: ER

## 2024-08-24 PROCEDURE — 83880 ASSAY OF NATRIURETIC PEPTIDE: CPT | Mod: ER

## 2024-08-24 PROCEDURE — 96374 THER/PROPH/DIAG INJ IV PUSH: CPT | Mod: ER

## 2024-08-24 PROCEDURE — 63600175 PHARM REV CODE 636 W HCPCS: Mod: ER | Performed by: EMERGENCY MEDICINE

## 2024-08-24 PROCEDURE — 96375 TX/PRO/DX INJ NEW DRUG ADDON: CPT | Mod: ER

## 2024-08-24 PROCEDURE — 85025 COMPLETE CBC W/AUTO DIFF WBC: CPT | Mod: ER

## 2024-08-24 PROCEDURE — 93005 ELECTROCARDIOGRAM TRACING: CPT | Mod: ER

## 2024-08-24 PROCEDURE — 81025 URINE PREGNANCY TEST: CPT | Mod: ER | Performed by: EMERGENCY MEDICINE

## 2024-08-24 PROCEDURE — 84484 ASSAY OF TROPONIN QUANT: CPT | Mod: ER

## 2024-08-24 RX ORDER — FUROSEMIDE 10 MG/ML
40 INJECTION INTRAMUSCULAR; INTRAVENOUS
Status: COMPLETED | OUTPATIENT
Start: 2024-08-24 | End: 2024-08-24

## 2024-08-24 RX ORDER — PROCHLORPERAZINE EDISYLATE 5 MG/ML
5 INJECTION INTRAMUSCULAR; INTRAVENOUS
Status: COMPLETED | OUTPATIENT
Start: 2024-08-24 | End: 2024-08-24

## 2024-08-24 RX ADMIN — FUROSEMIDE 40 MG: 10 INJECTION, SOLUTION INTRAVENOUS at 03:08

## 2024-08-24 RX ADMIN — PROCHLORPERAZINE EDISYLATE 5 MG: 5 INJECTION INTRAMUSCULAR; INTRAVENOUS at 01:08

## 2024-08-24 NOTE — ED PROVIDER NOTES
Encounter Date: 8/24/2024    SCRIBE #1 NOTE: I, Ana Lee, am scribing for, and in the presence of,  Aylin Quinn MD. I have scribed the following portions of the note - Other sections scribed: HPI, ROS, PE, MDM.       History     Chief Complaint   Patient presents with    Hand Pain     Right thumb pain. +subungual hematoma. Pt seen here on 8/18 for same.      Nasra Marks is a 39 y.o. female, with PMHx of CHF and essential HTN, who presents to the ED complaining of right thumb pain x 6 days with associated subungual hematoma. Patient reports she slammed her right thumb in a door and was seen at this facility ([facility]) on 8/18 for treatment. Patient had I&D and discharged home with Tylenol, Bactroban, Keflex, and Percocet Rx. She still has pain and the hematoma re-accumulated.    Patient also reports having nausea, intermittent SOB, headache, and mild chest tightness x few days. Patient reports she has been sleeping propped up on pillows for the last 2 nights. Patient reports compliance with Lasix and is even taking an extra dose without relief.Patient denies any associated lower extremity swelling or syncope.       The history is provided by the patient. No  was used.     Review of patient's allergies indicates:   Allergen Reactions    Aldactone [spironolactone] Swelling     Lips swelled    Hydralazine analogues Other (See Comments)     headaches    Isosorbide Other (See Comments)     headaches     Past Medical History:   Diagnosis Date    CHF (congestive heart failure)     Chronic combined systolic and diastolic congestive heart failure 5/24/2019    Essential hypertension 11/19/2012    Hypertension     dx at 16y.o.    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 disease, with heart failure 12/28/2021    ICD (implantable cardioverter-defibrillator), single, in situ 2/17/2020    Nonischemic cardiomyopathy 5/24/2019    Pacemaker 12/18/2017     Past  Surgical History:   Procedure Laterality Date    CARDIAC DEFIBRILLATOR PLACEMENT  2017     SECTION      x 1    INDUCED       RIGHT HEART CATHETERIZATION Right 2022    Procedure: INSERTION, CATHETER, RIGHT HEART;  Surgeon: Timothy Prajapati Jr., MD;  Location: [facility] CATH LAB;  Service: Cardiology;  Laterality: Right;    TUBAL LIGATION       Family History   Problem Relation Name Age of Onset    Hypertension Mother      Cancer Maternal Grandmother          breast x 2    Cancer Paternal Grandmother          breast    No Known Problems Sister      No Known Problems Brother      No Known Problems Son      No Known Problems Brother      Hypertension Other      Diabetes Other      Breast cancer Other      Cancer Paternal Aunt          breast    Cancer Paternal Uncle       Social History     Tobacco Use    Smoking status: Never    Smokeless tobacco: Never   Substance Use Topics    Alcohol use: Yes     Comment: occasionally    Drug use: Not Currently     Types: Marijuana     Comment: in past very little marijuana     Review of Systems   Constitutional:  Negative for activity change, appetite change, chills and fever.   HENT:  Negative for congestion, rhinorrhea, sneezing and sore throat.    Respiratory:  Positive for chest tightness and shortness of breath. Negative for cough, choking and wheezing.    Cardiovascular:  Negative for chest pain, palpitations and leg swelling.   Gastrointestinal:  Positive for nausea. Negative for abdominal pain, diarrhea and vomiting.   Musculoskeletal:  Positive for arthralgias (R thumb).   Skin:  Positive for color change and wound (hematoma R thumb/nailbed).   Neurological:  Positive for headaches. Negative for dizziness, syncope and light-headedness.   All other systems reviewed and are negative.      Physical Exam     Initial Vitals [24 1213]   BP Pulse Resp Temp SpO2   136/85 69 18 97.8 °F (36.6 °C) 99 %      MAP       --         Physical Exam    Nursing  note and vitals reviewed.  Constitutional: She appears well-developed and well-nourished. No distress.   HENT:   Head: Normocephalic and atraumatic.   Eyes: Conjunctivae are normal.   Neck:   Normal range of motion.  Cardiovascular:  Normal rate, regular rhythm and normal heart sounds.           No murmur heard.  Pulmonary/Chest: Breath sounds normal. No respiratory distress. She has no wheezes. She has no rhonchi. She has no rales.   Abdominal: Bowel sounds are normal. She exhibits no distension.   Musculoskeletal:         General: No tenderness or edema. Normal range of motion.      Cervical back: Normal range of motion.      Right lower leg: No edema.      Left lower leg: No edema.     Neurological: She is alert and oriented to person, place, and time.   Skin: Skin is warm and dry.   Healing subungual hematoma on right thumb that has been trephinated. No signs of infection.    Psychiatric: She has a normal mood and affect. Her behavior is normal.         ED Course   Procedures  Labs Reviewed   POCT URINALYSIS W/O SCOPE - Abnormal       Result Value    Glucose, UA Negative      Bilirubin, UA Negative      Ketones, UA Negative      Spec Grav UA >=1.030 (*)     Blood, UA 1+ (*)     PH, UA 7.0      Protein, UA Negative      Urobilinogen, UA 0.2      Nitrite, UA Negative      Leukocytes, UA Negative      Color, UA POC Yellow      Clarity, UA, POC Clear     POCT CMP - Abnormal    Albumin, POC 3.3      Alkaline Phosphatase, POC 49      ALT (SGPT), POC 12      AST (SGOT), POC 18      POC BUN 8      Calcium, POC 9.4      POC Chloride 107      POC Creatinine 0.7      POC Glucose 104      POC Potassium 3.2 (*)     POC Sodium 139      Bilirubin, POC 0.5      POC TCO2 28      Protein, POC 6.7     POCT B-TYPE NATRIURETIC PEPTIDE (BNP) - Abnormal    POC B-Type Natriuretic Peptide 180 (*)    TROPONIN ISTAT    POC Cardiac Troponin I 0.00      Sample unknown     TROPONIN ISTAT    POC Cardiac Troponin I 0.00      Sample unknown      POCT URINE PREGNANCY    POC Preg Test, Ur Negative       Acceptable Yes     POCT CBC    Hematocrit        Hemoglobin        RBC        WBC        MCV        MCH, POC        MCHC        RDW-CV        Platelet Count, POC        MPV       POCT URINALYSIS W/O SCOPE   POCT CMP   POCT TROPONIN   POCT B-TYPE NATRIURETIC PEPTIDE (BNP)   POCT TROPONIN     EKG Readings: (Independently Interpreted)   Initial Reading: No STEMI. Rhythm: Normal Sinus Rhythm. Heart Rate: 69. Ectopy: No Ectopy. Conduction: Normal. ST Segments: Normal ST Segments. T Waves Flipped: III, V2, V3, V4, V5 and V6. Clinical Impression: Normal Sinus Rhythm Other Impression: independently interpreted by me       Imaging Results              X-Ray Chest PA And Lateral (Final result)  Result time 08/24/24 15:22:00      Final result by Vitaly Portillo MD (08/24/24 15:22:00)                   Impression:      Cardiac device and stable mild cardiomegaly without radiographic acute intrathoracic process seen.      Electronically signed by: Vitaly Portillo MD  Date:    08/24/2024  Time:    15:22               Narrative:    EXAMINATION:  XR CHEST PA AND LATERAL    CLINICAL HISTORY:  Chest Pain;    TECHNIQUE:  PA and lateral views of the chest were performed.    COMPARISON:  Chest radiograph 05/24/2024, CTA chest 11/25/2022    FINDINGS:  Left chest single lead cardiac device stable.  Cardiac silhouette remains enlarged similar to prior without evidence of failure.  Pulmonary vasculature and hilar contours are within normal limits.  Mediastinal structures are midline and mediastinal contours are within normal limits.    Lungs are well expanded without consolidation, pleural effusion or pneumothorax.    Osseous structures appear stable without acute findings.                                       Medications   prochlorperazine injection Soln 5 mg (5 mg Intravenous Given 8/24/24 1311)   furosemide injection 40 mg (40 mg Intravenous Given 8/24/24 1500)      Medical Decision Making  Nasra Marks is a 39 y.o. female, with PMHx of CHF and essential HTN, presents to the ED complaining of right thumb/ pain x 6 days with associated subungual hematoma to area. Patient also reports having nausea, intermittent SOB, headache, and mild chest tightness x few days.    On exam, there is a healing subungual hematoma over right thumb that has been trephinated. No signs of infection. No lower extremity edema, bilaterally. Normal rate and rhythm. Clear breath sounds with no crackles.     In shared decision making with the patient I will order labs, imaging, and EKG. Work up with mildly elevated BNP, normal troponin x 2, no pulmonary edema on CXR. No arrhythmia; no acute ischemic changes on EKG. I considered but excluded acute MI, arrhythmia, pulmonary edema, pneumonia, and CHF exacerbation in my differential diagnosis. She was given a dose of IV lasix. I feel she is safe for discharge.     Amount and/or Complexity of Data Reviewed  Labs: ordered. Decision-making details documented in ED Course.  Radiology: ordered. Decision-making details documented in ED Course.  ECG/medicine tests: ordered and independent interpretation performed. Decision-making details documented in ED Course.    Risk  Prescription drug management.            Scribe Attestation:   Scribe #1: I performed the above scribed service and the documentation accurately describes the services I performed. I attest to the accuracy of the note.                             I, Dr. Aylin Quinn, personally performed the services described in this documentation.   All medical record entries made by the scribe were at my direction and in my presence.   I have reviewed the chart and agree that the record is accurate and complete.   Aylin Quinn MD.  1:31 PM 08/24/2024     Clinical Impression:  Final diagnoses:  [R07.9] Chest pain  [R06.02] Shortness of breath (Primary)  [R06.01] Orthopnea  [S60.10XA] Subungual hematoma of digit  of hand, initial encounter          ED Disposition Condition    Discharge Stable          ED Prescriptions    None       Follow-up Information       Follow up With Specialties Details Why Contact Info    Veronika Rainey MD Family Medicine, Wound Care  If symptoms worsen [address] [facility]  [place] [id number] [phone]               Aylin Quinn MD  08/24/24 1626       Aylin Quinn MD  08/24/24 1693

## 2024-08-25 LAB
OHS QRS DURATION: 100 MS
OHS QTC CALCULATION: 454 MS

## 2024-08-27 ENCOUNTER — DOCUMENTATION ONLY (OUTPATIENT)
Dept: CARDIOLOGY | Facility: HOSPITAL | Age: 39
End: 2024-08-27
Payer: MEDICARE

## 2024-08-27 ENCOUNTER — HOSPITAL ENCOUNTER (INPATIENT)
Facility: HOSPITAL | Age: 39
LOS: 1 days | Discharge: HOME | DRG: 880 | End: 2024-08-29
Attending: EMERGENCY MEDICINE | Admitting: HOSPITALIST
Payer: MEDICARE

## 2024-08-27 ENCOUNTER — HOSPITAL ENCOUNTER (OUTPATIENT)
Facility: HOSPITAL | Age: 39
Discharge: STILL ADMITTED OR ADMINISTRATIVE | End: 2024-08-27
Attending: INTERNAL MEDICINE | Admitting: INTERNAL MEDICINE
Payer: MEDICARE

## 2024-08-27 VITALS
WEIGHT: 189 LBS | SYSTOLIC BLOOD PRESSURE: 127 MMHG | OXYGEN SATURATION: 100 % | RESPIRATION RATE: 14 BRPM | BODY MASS INDEX: 27.99 KG/M2 | DIASTOLIC BLOOD PRESSURE: 76 MMHG | HEIGHT: 69 IN | TEMPERATURE: 98 F | HEART RATE: 72 BPM

## 2024-08-27 DIAGNOSIS — I50.9 HEART FAILURE: ICD-10-CM

## 2024-08-27 DIAGNOSIS — R29.818 ACUTE FOCAL NEUROLOGICAL DEFICIT: ICD-10-CM

## 2024-08-27 DIAGNOSIS — I50.9 CONGESTIVE HEART FAILURE, UNSPECIFIED HF CHRONICITY, UNSPECIFIED HEART FAILURE TYPE: ICD-10-CM

## 2024-08-27 DIAGNOSIS — R53.1 PROGRESSIVE FOCAL MOTOR WEAKNESS: ICD-10-CM

## 2024-08-27 DIAGNOSIS — R07.9 CHEST PAIN: ICD-10-CM

## 2024-08-27 DIAGNOSIS — F44.9 CONVERSION DISORDER: ICD-10-CM

## 2024-08-27 DIAGNOSIS — R26.81 UNSTEADY GAIT: ICD-10-CM

## 2024-08-27 DIAGNOSIS — Z91.89 STROKE RISK: Primary | ICD-10-CM

## 2024-08-27 DIAGNOSIS — I49.9 DYSRHYTHMIA: ICD-10-CM

## 2024-08-27 DIAGNOSIS — R51.9 NONINTRACTABLE HEADACHE, UNSPECIFIED CHRONICITY PATTERN, UNSPECIFIED HEADACHE TYPE: ICD-10-CM

## 2024-08-27 DIAGNOSIS — Z91.89 AT RISK FOR STROKE: ICD-10-CM

## 2024-08-27 PROBLEM — R29.90 NEUROLOGICAL COMPLAINT: Status: ACTIVE | Noted: 2024-08-27

## 2024-08-27 PROBLEM — Z86.73 HISTORY OF CVA (CEREBROVASCULAR ACCIDENT): Chronic | Status: ACTIVE | Noted: 2023-02-08

## 2024-08-27 PROBLEM — R47.9 DIFFICULTY WITH SPEECH: Status: ACTIVE | Noted: 2024-08-27

## 2024-08-27 PROBLEM — I50.42 CHRONIC COMBINED SYSTOLIC AND DIASTOLIC CONGESTIVE HEART FAILURE: Chronic | Status: ACTIVE | Noted: 2019-05-24

## 2024-08-27 LAB
ALBUMIN SERPL BCP-MCNC: 3.8 G/DL (ref 3.5–5.2)
ALLENS TEST: ABNORMAL
ALLENS TEST: ABNORMAL
ALLENS TEST: NORMAL
ALP SERPL-CCNC: 62 U/L (ref 55–135)
ALT SERPL W/O P-5'-P-CCNC: 13 U/L (ref 10–44)
AMPHET+METHAMPHET UR QL: NEGATIVE
ANION GAP SERPL CALC-SCNC: 10 MMOL/L (ref 8–16)
AST SERPL-CCNC: 23 U/L (ref 10–40)
B-HCG UR QL: NEGATIVE
BARBITURATES UR QL SCN>200 NG/ML: NEGATIVE
BASOPHILS # BLD AUTO: 0.03 K/UL (ref 0–0.2)
BASOPHILS NFR BLD: 0.3 % (ref 0–1.9)
BENZODIAZ UR QL SCN>200 NG/ML: NEGATIVE
BILIRUB SERPL-MCNC: 0.6 MG/DL (ref 0.1–1)
BUN SERPL-MCNC: 10 MG/DL (ref 6–20)
BZE UR QL SCN: NEGATIVE
CALCIUM SERPL-MCNC: 8.7 MG/DL (ref 8.7–10.5)
CANNABINOIDS UR QL SCN: NEGATIVE
CHLORIDE SERPL-SCNC: 107 MMOL/L (ref 95–110)
CHOLEST SERPL-MCNC: 129 MG/DL (ref 120–199)
CHOLEST/HDLC SERPL: 3.5 {RATIO} (ref 2–5)
CO2 SERPL-SCNC: 18 MMOL/L (ref 23–29)
CREAT SERPL-MCNC: 0.8 MG/DL (ref 0.5–1.4)
CREAT SERPL-MCNC: 0.8 MG/DL (ref 0.5–1.4)
CREAT UR-MCNC: 49 MG/DL (ref 15–325)
CTP QC/QA: YES
DIFFERENTIAL METHOD BLD: ABNORMAL
EOSINOPHIL # BLD AUTO: 0.1 K/UL (ref 0–0.5)
EOSINOPHIL NFR BLD: 0.9 % (ref 0–8)
ERYTHROCYTE [DISTWIDTH] IN BLOOD BY AUTOMATED COUNT: 13.2 % (ref 11.5–14.5)
EST. GFR  (NO RACE VARIABLE): >60 ML/MIN/1.73 M^2
ESTIMATED AVG GLUCOSE: 105 MG/DL (ref 68–131)
ETHANOL UR-MCNC: <10 MG/DL
FOLATE SERPL-MCNC: 14.3 NG/ML (ref 4–24)
GLUCOSE SERPL-MCNC: 88 MG/DL (ref 70–110)
HBA1C MFR BLD: 5.3 % (ref 4–5.6)
HCO3 UR-SCNC: 21.3 MMOL/L (ref 24–28)
HCT VFR BLD AUTO: 35.8 % (ref 37–48.5)
HDLC SERPL-MCNC: 37 MG/DL (ref 40–75)
HDLC SERPL: 28.7 % (ref 20–50)
HGB BLD-MCNC: 11.8 G/DL (ref 12–16)
IMM GRANULOCYTES # BLD AUTO: 0.02 K/UL (ref 0–0.04)
IMM GRANULOCYTES NFR BLD AUTO: 0.2 % (ref 0–0.5)
INR PPP: 1 (ref 0.8–1.2)
LDH SERPL L TO P-CCNC: 0.7 MMOL/L (ref 0.5–2.2)
LDH SERPL L TO P-CCNC: 3.22 MMOL/L (ref 0.5–2.2)
LDLC SERPL CALC-MCNC: 78 MG/DL (ref 63–159)
LYMPHOCYTES # BLD AUTO: 2.5 K/UL (ref 1–4.8)
LYMPHOCYTES NFR BLD: 27.4 % (ref 18–48)
MCH RBC QN AUTO: 30.3 PG (ref 27–31)
MCHC RBC AUTO-ENTMCNC: 33 G/DL (ref 32–36)
MCV RBC AUTO: 92 FL (ref 82–98)
METHADONE UR QL SCN>300 NG/ML: NEGATIVE
MONOCYTES # BLD AUTO: 0.8 K/UL (ref 0.3–1)
MONOCYTES NFR BLD: 9.2 % (ref 4–15)
NEUTROPHILS # BLD AUTO: 5.7 K/UL (ref 1.8–7.7)
NEUTROPHILS NFR BLD: 62 % (ref 38–73)
NONHDLC SERPL-MCNC: 92 MG/DL
NRBC BLD-RTO: 0 /100 WBC
OHS QRS DURATION: 100 MS
OHS QTC CALCULATION: 455 MS
OPIATES UR QL SCN: NEGATIVE
PCO2 BLDA: 32.8 MMHG (ref 35–45)
PCP UR QL SCN>25 NG/ML: NEGATIVE
PH SMN: 7.42 [PH] (ref 7.35–7.45)
PLATELET # BLD AUTO: 341 K/UL (ref 150–450)
PMV BLD AUTO: 10.8 FL (ref 9.2–12.9)
PO2 BLDA: 172 MMHG (ref 40–60)
POC BE: -3 MMOL/L
POC PTINR: 1.3 (ref 0.9–1.2)
POC PTWBT: 15.1 SEC (ref 9.7–14.3)
POC SATURATED O2: 100 % (ref 95–100)
POC TCO2: 22 MMOL/L (ref 24–29)
POCT GLUCOSE: 99 MG/DL (ref 70–110)
POTASSIUM SERPL-SCNC: 3.7 MMOL/L (ref 3.5–5.1)
PROT SERPL-MCNC: 7.9 G/DL (ref 6–8.4)
PROTHROMBIN TIME: 11.4 SEC (ref 9–12.5)
PROVIDER CREDENTIALS: ABNORMAL
PROVIDER NOTIFIED: ABNORMAL
RBC # BLD AUTO: 3.9 M/UL (ref 4–5.4)
SAMPLE: ABNORMAL
SAMPLE: NORMAL
SAMPLE: NORMAL
SITE: ABNORMAL
SITE: ABNORMAL
SITE: NORMAL
SODIUM SERPL-SCNC: 135 MMOL/L (ref 136–145)
TIME NOTIFIED: 1329
TOXICOLOGY INFORMATION: NORMAL
TRIGL SERPL-MCNC: 70 MG/DL (ref 30–150)
TSH SERPL DL<=0.005 MIU/L-ACNC: 3.13 UIU/ML (ref 0.4–4)
VERBAL RESULT READBACK PERFORMED: YES
VIT B12 SERPL-MCNC: 565 PG/ML (ref 210–950)
WBC # BLD AUTO: 9.14 K/UL (ref 3.9–12.7)

## 2024-08-27 PROCEDURE — 25000003 PHARM REV CODE 250

## 2024-08-27 PROCEDURE — G0378 HOSPITAL OBSERVATION PER HR: HCPCS

## 2024-08-27 PROCEDURE — 00JU3ZZ INSPECTION OF SPINAL CANAL, PERCUTANEOUS APPROACH: ICD-10-PCS | Performed by: EMERGENCY MEDICINE

## 2024-08-27 PROCEDURE — 93005 ELECTROCARDIOGRAM TRACING: CPT

## 2024-08-27 PROCEDURE — 99900035 HC TECH TIME PER 15 MIN (STAT)

## 2024-08-27 PROCEDURE — 81025 URINE PREGNANCY TEST: CPT | Performed by: INTERNAL MEDICINE

## 2024-08-27 PROCEDURE — 85610 PROTHROMBIN TIME: CPT

## 2024-08-27 PROCEDURE — 99285 EMERGENCY DEPT VISIT HI MDM: CPT | Mod: 25

## 2024-08-27 PROCEDURE — 85610 PROTHROMBIN TIME: CPT | Performed by: EMERGENCY MEDICINE

## 2024-08-27 PROCEDURE — 93010 ELECTROCARDIOGRAM REPORT: CPT | Mod: ,,, | Performed by: INTERNAL MEDICINE

## 2024-08-27 PROCEDURE — 96361 HYDRATE IV INFUSION ADD-ON: CPT

## 2024-08-27 PROCEDURE — 85025 COMPLETE CBC W/AUTO DIFF WBC: CPT | Performed by: EMERGENCY MEDICINE

## 2024-08-27 PROCEDURE — 94761 N-INVAS EAR/PLS OXIMETRY MLT: CPT | Mod: XB

## 2024-08-27 PROCEDURE — 83735 ASSAY OF MAGNESIUM: CPT | Performed by: EMERGENCY MEDICINE

## 2024-08-27 PROCEDURE — C1751 CATH, INF, PER/CENT/MIDLINE: HCPCS | Performed by: INTERNAL MEDICINE

## 2024-08-27 PROCEDURE — 93451 RIGHT HEART CATH: CPT | Performed by: INTERNAL MEDICINE

## 2024-08-27 PROCEDURE — 93451 RIGHT HEART CATH: CPT | Mod: 26,,, | Performed by: INTERNAL MEDICINE

## 2024-08-27 PROCEDURE — 25000003 PHARM REV CODE 250: Performed by: STUDENT IN AN ORGANIZED HEALTH CARE EDUCATION/TRAINING PROGRAM

## 2024-08-27 PROCEDURE — 84443 ASSAY THYROID STIM HORMONE: CPT | Performed by: EMERGENCY MEDICINE

## 2024-08-27 PROCEDURE — 82565 ASSAY OF CREATININE: CPT

## 2024-08-27 PROCEDURE — 80061 LIPID PANEL: CPT | Performed by: EMERGENCY MEDICINE

## 2024-08-27 PROCEDURE — 25000003 PHARM REV CODE 250: Performed by: EMERGENCY MEDICINE

## 2024-08-27 PROCEDURE — 80053 COMPREHEN METABOLIC PANEL: CPT | Performed by: EMERGENCY MEDICINE

## 2024-08-27 PROCEDURE — 82803 BLOOD GASES ANY COMBINATION: CPT

## 2024-08-27 PROCEDURE — 96360 HYDRATION IV INFUSION INIT: CPT

## 2024-08-27 PROCEDURE — 80307 DRUG TEST PRSMV CHEM ANLYZR: CPT

## 2024-08-27 PROCEDURE — 83036 HEMOGLOBIN GLYCOSYLATED A1C: CPT | Performed by: EMERGENCY MEDICINE

## 2024-08-27 PROCEDURE — C1894 INTRO/SHEATH, NON-LASER: HCPCS | Performed by: INTERNAL MEDICINE

## 2024-08-27 PROCEDURE — 82746 ASSAY OF FOLIC ACID SERUM: CPT | Performed by: STUDENT IN AN ORGANIZED HEALTH CARE EDUCATION/TRAINING PROGRAM

## 2024-08-27 PROCEDURE — 99223 1ST HOSP IP/OBS HIGH 75: CPT | Mod: FS,,, | Performed by: PSYCHIATRY & NEUROLOGY

## 2024-08-27 PROCEDURE — 82607 VITAMIN B-12: CPT | Performed by: STUDENT IN AN ORGANIZED HEALTH CARE EDUCATION/TRAINING PROGRAM

## 2024-08-27 PROCEDURE — 83605 ASSAY OF LACTIC ACID: CPT

## 2024-08-27 PROCEDURE — 25500020 PHARM REV CODE 255: Performed by: INTERNAL MEDICINE

## 2024-08-27 PROCEDURE — 25000003 PHARM REV CODE 250: Performed by: INTERNAL MEDICINE

## 2024-08-27 RX ORDER — LIDOCAINE HYDROCHLORIDE 20 MG/ML
INJECTION, SOLUTION INFILTRATION; PERINEURAL
Status: DISCONTINUED | OUTPATIENT
Start: 2024-08-27 | End: 2024-08-27 | Stop reason: HOSPADM

## 2024-08-27 RX ORDER — AMLODIPINE BESYLATE 5 MG/1
5 TABLET ORAL DAILY
Status: DISCONTINUED | OUTPATIENT
Start: 2024-08-28 | End: 2024-08-29 | Stop reason: HOSPADM

## 2024-08-27 RX ORDER — ENOXAPARIN SODIUM 100 MG/ML
40 INJECTION SUBCUTANEOUS EVERY 24 HOURS
Status: DISCONTINUED | OUTPATIENT
Start: 2024-08-28 | End: 2024-08-29 | Stop reason: HOSPADM

## 2024-08-27 RX ORDER — ACETAMINOPHEN 325 MG/1
650 TABLET ORAL
Status: COMPLETED | OUTPATIENT
Start: 2024-08-27 | End: 2024-08-27

## 2024-08-27 RX ORDER — POLYETHYLENE GLYCOL 3350 17 G/17G
17 POWDER, FOR SOLUTION ORAL DAILY PRN
Status: DISCONTINUED | OUTPATIENT
Start: 2024-08-27 | End: 2024-08-29 | Stop reason: HOSPADM

## 2024-08-27 RX ORDER — LIDOCAINE HYDROCHLORIDE 10 MG/ML
10 INJECTION, SOLUTION EPIDURAL; INFILTRATION; INTRACAUDAL; PERINEURAL
Status: COMPLETED | OUTPATIENT
Start: 2024-08-27 | End: 2024-08-27

## 2024-08-27 RX ORDER — IBUPROFEN 200 MG
24 TABLET ORAL
Status: DISCONTINUED | OUTPATIENT
Start: 2024-08-27 | End: 2024-08-29 | Stop reason: HOSPADM

## 2024-08-27 RX ORDER — ALUMINUM HYDROXIDE, MAGNESIUM HYDROXIDE, AND SIMETHICONE 1200; 120; 1200 MG/30ML; MG/30ML; MG/30ML
30 SUSPENSION ORAL 4 TIMES DAILY PRN
Status: DISCONTINUED | OUTPATIENT
Start: 2024-08-27 | End: 2024-08-29 | Stop reason: HOSPADM

## 2024-08-27 RX ORDER — LORAZEPAM 2 MG/ML
2 INJECTION INTRAMUSCULAR ONCE AS NEEDED
Status: DISCONTINUED | OUTPATIENT
Start: 2024-08-27 | End: 2024-08-27

## 2024-08-27 RX ORDER — EPLERENONE 25 MG/1
25 TABLET, FILM COATED ORAL DAILY
Status: DISCONTINUED | OUTPATIENT
Start: 2024-08-28 | End: 2024-08-29 | Stop reason: HOSPADM

## 2024-08-27 RX ORDER — NALOXONE HCL 0.4 MG/ML
0.02 VIAL (ML) INJECTION
Status: DISCONTINUED | OUTPATIENT
Start: 2024-08-27 | End: 2024-08-29 | Stop reason: HOSPADM

## 2024-08-27 RX ORDER — SIMETHICONE 80 MG
1 TABLET,CHEWABLE ORAL 4 TIMES DAILY PRN
Status: DISCONTINUED | OUTPATIENT
Start: 2024-08-27 | End: 2024-08-29 | Stop reason: HOSPADM

## 2024-08-27 RX ORDER — FUROSEMIDE 40 MG/1
40 TABLET ORAL DAILY
Status: DISCONTINUED | OUTPATIENT
Start: 2024-08-28 | End: 2024-08-29 | Stop reason: HOSPADM

## 2024-08-27 RX ORDER — ATORVASTATIN CALCIUM 40 MG/1
40 TABLET, FILM COATED ORAL NIGHTLY
Status: DISCONTINUED | OUTPATIENT
Start: 2024-08-27 | End: 2024-08-29 | Stop reason: HOSPADM

## 2024-08-27 RX ORDER — METOPROLOL SUCCINATE 100 MG/1
200 TABLET, EXTENDED RELEASE ORAL DAILY
Status: DISCONTINUED | OUTPATIENT
Start: 2024-08-28 | End: 2024-08-29 | Stop reason: HOSPADM

## 2024-08-27 RX ORDER — GLUCAGON 1 MG
1 KIT INJECTION
Status: DISCONTINUED | OUTPATIENT
Start: 2024-08-27 | End: 2024-08-29 | Stop reason: HOSPADM

## 2024-08-27 RX ORDER — ACETAMINOPHEN 325 MG/1
650 TABLET ORAL EVERY 4 HOURS PRN
Status: DISCONTINUED | OUTPATIENT
Start: 2024-08-27 | End: 2024-08-29 | Stop reason: HOSPADM

## 2024-08-27 RX ORDER — IBUPROFEN 400 MG/1
400 TABLET ORAL ONCE AS NEEDED
Status: COMPLETED | OUTPATIENT
Start: 2024-08-27 | End: 2024-08-27

## 2024-08-27 RX ORDER — IBUPROFEN 200 MG
16 TABLET ORAL
Status: DISCONTINUED | OUTPATIENT
Start: 2024-08-27 | End: 2024-08-29 | Stop reason: HOSPADM

## 2024-08-27 RX ORDER — TALC
6 POWDER (GRAM) TOPICAL NIGHTLY PRN
Status: DISCONTINUED | OUTPATIENT
Start: 2024-08-27 | End: 2024-08-29 | Stop reason: HOSPADM

## 2024-08-27 RX ORDER — ASPIRIN 81 MG/1
81 TABLET ORAL DAILY
Status: DISCONTINUED | OUTPATIENT
Start: 2024-08-28 | End: 2024-08-29 | Stop reason: HOSPADM

## 2024-08-27 RX ORDER — SODIUM CHLORIDE 0.9 % (FLUSH) 0.9 %
1-10 SYRINGE (ML) INJECTION EVERY 12 HOURS PRN
Status: DISCONTINUED | OUTPATIENT
Start: 2024-08-27 | End: 2024-08-29 | Stop reason: HOSPADM

## 2024-08-27 RX ORDER — POTASSIUM CHLORIDE 20 MEQ/1
40 TABLET, EXTENDED RELEASE ORAL 2 TIMES DAILY
Status: DISCONTINUED | OUTPATIENT
Start: 2024-08-27 | End: 2024-08-29 | Stop reason: HOSPADM

## 2024-08-27 RX ORDER — ACETAMINOPHEN 325 MG/1
650 TABLET ORAL EVERY 8 HOURS PRN
Status: DISCONTINUED | OUTPATIENT
Start: 2024-08-27 | End: 2024-08-29 | Stop reason: HOSPADM

## 2024-08-27 RX ORDER — ONDANSETRON 8 MG/1
8 TABLET, ORALLY DISINTEGRATING ORAL EVERY 8 HOURS PRN
Status: DISCONTINUED | OUTPATIENT
Start: 2024-08-27 | End: 2024-08-29 | Stop reason: HOSPADM

## 2024-08-27 RX ADMIN — IBUPROFEN 400 MG: 400 TABLET ORAL at 07:08

## 2024-08-27 RX ADMIN — ACETAMINOPHEN 650 MG: 325 TABLET ORAL at 02:08

## 2024-08-27 RX ADMIN — SODIUM CHLORIDE 250 ML: 9 INJECTION, SOLUTION INTRAVENOUS at 03:08

## 2024-08-27 RX ADMIN — IOHEXOL 100 ML: 350 INJECTION, SOLUTION INTRAVENOUS at 12:08

## 2024-08-27 RX ADMIN — SACUBITRIL AND VALSARTAN 1 TABLET: 97; 103 TABLET, FILM COATED ORAL at 09:08

## 2024-08-27 RX ADMIN — ATORVASTATIN CALCIUM 40 MG: 40 TABLET, FILM COATED ORAL at 09:08

## 2024-08-27 RX ADMIN — LIDOCAINE HYDROCHLORIDE 100 MG: 10 SOLUTION INTRAVENOUS at 06:08

## 2024-08-27 NOTE — H&P
[facility]  Interventional Cardiology  H&P    Patient Name: Nasra Marks  MRN: [id number]  Admission Date: (Not on file)  Code Status: Prior   Attending Provider: Lior Rueda, *   Primary Care Physician: Veronika Rainey MD  Principal Problem:<principal problem not specified>    Patient information was obtained from ER records.     Subjective:     Chief Complaint:  dyspnea     HPI: patient with HFrEF, LVEF 25%, here for RHC    Past Medical History:   Diagnosis Date    CHF (congestive heart failure)     Chronic combined systolic and diastolic congestive heart failure 2019    Essential hypertension 2012    Hypertension     dx at 16y.o.    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 disease, with heart failure 2021    ICD (implantable cardioverter-defibrillator), single, in situ 2020    Nonischemic cardiomyopathy 2019    Pacemaker 2017       Past Surgical History:   Procedure Laterality Date    CARDIAC DEFIBRILLATOR PLACEMENT  2017     SECTION      x 1    INDUCED       RIGHT HEART CATHETERIZATION Right 2022    Procedure: INSERTION, CATHETER, RIGHT HEART;  Surgeon: Timothy Prajapati Jr., MD;  Location: [facility] CATH LAB;  Service: Cardiology;  Laterality: Right;    TUBAL LIGATION         Review of patient's allergies indicates:   Allergen Reactions    Aldactone [spironolactone] Swelling     Lips swelled    Hydralazine analogues Other (See Comments)     headaches    Isosorbide Other (See Comments)     headaches       No medications prior to admission.     Family History       Problem Relation (Age of Onset)    Breast cancer Other    Cancer Maternal Grandmother, Paternal Grandmother, Paternal Aunt, Paternal Uncle    Diabetes Other    Hypertension Mother, Other    No Known Problems Sister, Brother, Son, Brother          Tobacco Use    Smoking status: Never    Smokeless tobacco: Never   Substance  "and Sexual Activity    Alcohol use: Yes     Comment: occasionally    Drug use: Not Currently     Types: Marijuana     Comment: in past very little marijuana    Sexual activity: Yes     Partners: Male     Birth control/protection: None     ROS  Objective:     Vital Signs (Most Recent):    Vital Signs (24h Range):  BP: ()/()   Arterial Line BP: ()/()         There is no height or weight on file to calculate BMI.            No intake or output data in the 24 hours ending 08/27/24 0847    Lines/Drains/Airways       None                   Physical Exam    Significant Labs: CBC No results for input(s): "WBC", "HGB", "HCT", "PLT" in the last 48 hours.    Significant Imaging: Echocardiogram: 2D echo with color flow doppler: Echo results to be reported separately.   Assessment and Plan:     There are no hospital problems to display for this patient.      Patient agrees.    VTE Risk Mitigation (From admission, onward)      None            Lior Whaley MD  Interventional Cardiology   [facility]      "

## 2024-08-27 NOTE — ASSESSMENT & PLAN NOTE
Presents to the ED after the new onset of stuttering, delayed speech, and somewhat wandering strength and sensory abnormalities after RHC procedure today. Initial stroke workup with MRI and CTA showed no evidence of LVO, acute stroke, or other acute abnormalities, however known remote R cerebellar infarct. She is HD stable, and labs overall at baseline. Somewhat confusing picture, as CVA ruled out, and low suspicion for seizure like activity. May be stroke recrudescence symptoms? TSH WNL, and UDS negative; will obtain B12 and folate. ED plan to obtain LP, however low suspicion for acute infectious or autoimmune disorder given history and timing. Will continue secondary prevention of CVA as below, and monitor on telemetry. Consult Neurology for further evaluation and recommendations.

## 2024-08-27 NOTE — HPI
"Nasra Marks is a 39 y.o. female with combined CHF (EF 30-35%), HTN, HLD, history of CVA who presents with multiple neurologic complaints following RHC today.     She reports that she awoke this morning feeling at her baseline, which includes no focal deficits and completely independent.  She went to her RHC and felt well until she developed a "tightness" in her chest, which she was told was due to the cath. She then felt like her head was shaking, and felt diffuse numbness across her body.  She describes generalized weakness along with this, more so on the left side.  She also developed a new stutter when speaking.  Given this constellation of symptoms, she was transferred to the ED for code stroke.  Overall workup was negative.  While in the ED, she complained of wandering symptoms, including right-sided weakness.  LP was attempted however unsuccessful.  Hospital medicine therefore consulted for further evaluation.  "

## 2024-08-27 NOTE — ASSESSMENT & PLAN NOTE
Chronic, controlled. Latest blood pressure and vitals reviewed-     Temp:  [98.1 °F (36.7 °C)-98.6 °F (37 °C)]   Pulse:  [64-79]   Resp:  [14-20]   BP: (111-155)/(70-94)   SpO2:  [98 %-100 %] .   Home meds for hypertension were reviewed and noted below.   Hypertension Medications               amLODIPine (NORVASC) 5 MG tablet Take 5 mg by mouth.    eplerenone (INSPRA) 25 MG Tab Take 1 tablet (25 mg total) by mouth once daily.    furosemide (LASIX) 40 MG tablet Take 1 tablet (40 mg total) by mouth once daily.    metoprolol succinate (TOPROL-XL) 200 MG 24 hr tablet Take 1 tablet (200 mg total) by mouth once daily.    sacubitriL-valsartan (ENTRESTO)  mg per tablet Take 1 tablet by mouth 2 (two) times daily.            While in the hospital, will manage blood pressure as follows; since CVA ruled out, will continue home antihypertensive regimen    Will utilize p.r.n. blood pressure medication only if patient's blood pressure greater than 220/110 and she develops symptoms such as worsening chest pain or shortness of breath.

## 2024-08-27 NOTE — SUBJECTIVE & OBJECTIVE
Past Medical History:   Diagnosis Date    CHF (congestive heart failure)     Chronic combined systolic and diastolic congestive heart failure 2019    Essential hypertension 2012    Hypertension     dx at 16y.o.    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 disease, with heart failure 2021    ICD (implantable cardioverter-defibrillator), single, in situ 2020    Nonischemic cardiomyopathy 2019    Pacemaker 2017     Past Surgical History:   Procedure Laterality Date    CARDIAC DEFIBRILLATOR PLACEMENT  2017     SECTION      x 1    INDUCED       RIGHT HEART CATHETERIZATION Right 2022    Procedure: INSERTION, CATHETER, RIGHT HEART;  Surgeon: Timothy Prajapati Jr., MD;  Location: [facility] CATH LAB;  Service: Cardiology;  Laterality: Right;    TUBAL LIGATION       Social History     Tobacco Use    Smoking status: Never    Smokeless tobacco: Never   Substance Use Topics    Alcohol use: Not Currently     Comment: occasionally    Drug use: Not Currently     Types: Marijuana     Comment: in past very little marijuana     Review of patient's allergies indicates:   Allergen Reactions    Aldactone [spironolactone] Swelling     Lips swelled    Hydralazine analogues Other (See Comments)     headaches    Isosorbide Other (See Comments)     headaches       Medications: I have reviewed the current medication administration record.    (Not in a hospital admission)      Review of Systems   Constitutional:  Negative for fatigue.   HENT:  Negative for drooling and trouble swallowing.    Eyes:  Negative for visual disturbance.   Respiratory:  Negative for choking.    Cardiovascular:  Negative for palpitations.   Gastrointestinal:  Negative for nausea and vomiting.   Musculoskeletal:  Negative for neck stiffness.   Skin:  Negative for color change.   Neurological:  Positive for tremors, speech difficulty, weakness and headaches.    Psychiatric/Behavioral:  Negative for confusion.    All other systems reviewed and are negative.    Objective:     Vital Signs (Most Recent):  Temp: 98.6 °F (37 °C) (08/27/24 1305)  Pulse: 64 (08/27/24 1645)  Resp: 18 (08/27/24 1645)  BP: 127/81 (08/27/24 1645)  SpO2: 98 % (08/27/24 1645)    Vital Signs Range (Last 24H):  Temp:  [98.1 °F (36.7 °C)-98.6 °F (37 °C)]   Pulse:  [64-79]   Resp:  [14-20]   BP: (111-155)/(70-94)   SpO2:  [98 %-100 %]        Physical Exam  Vitals and nursing note reviewed.   Constitutional:       General: She is not in acute distress.  HENT:      Head: Normocephalic.      Nose: Nose normal.   Eyes:      Extraocular Movements: Extraocular movements intact.      Conjunctiva/sclera: Conjunctivae normal.   Cardiovascular:      Rate and Rhythm: Normal rate.   Pulmonary:      Effort: Pulmonary effort is normal. No respiratory distress.   Skin:     General: Skin is warm.   Neurological:      Mental Status: She is alert.      Comments: Tremulousness noted on exam fluctuating between affecting RUE and generalized/all extremities. Exam appears limited by effort. Stuttering speech noted but no appreciable dysarthria or expressive/receptive aphasia.  See below for neuro exam   Psychiatric:         Mood and Affect: Affect is tearful.         Behavior: Behavior normal.          Neurological Exam:   LOC: alert  Attention Span: Good   Language: No aphasia  Articulation: No dysarthria  Orientation: Person, Place, Time   Visual Fields: Full  EOM (CN III, IV, VI): Full/intact  Facial Sensation (CN V): Normal  Facial Movement (CN VII): Symmetric facial expression    Motor:   --LUE Normal 5/5  --LLE Normal 5/5  --RUE Normal 5/5  --RLE Normal 5/5  Sensation: Intact to light touch      Laboratory:  CMP:   Recent Labs   Lab 08/27/24  1312   CALCIUM 8.7   ALBUMIN 3.8   PROT 7.9   *   K 3.7   CO2 18*      BUN 10   CREATININE 0.8   ALKPHOS 62   ALT 13   AST 23   BILITOT 0.6     CBC:   Recent Labs   Lab  "08/27/24  1312   WBC 9.14   RBC 3.90*   HGB 11.8*   HCT 35.8*      MCV 92   MCH 30.3   MCHC 33.0     Lipid Panel:   Recent Labs   Lab 08/27/24  1312   CHOL 129   LDLCALC 78.0   HDL 37*   TRIG 70     Coagulation:   Recent Labs   Lab 08/27/24  1312   INR 1.0     Hgb A1C: No results for input(s): "HGBA1C" in the last 168 hours.  TSH:   Recent Labs   Lab 08/27/24  1312   TSH 3.133       Diagnostic Results:      Brain/Vessel Imaging:  MRI Ischemic Protocol 8/27/2024  Impression:    Limited sequences obtained per acute intervention protocol.  No evidence of acute infarct or hemorrhage.  Remote right cerebellar infarct.  MRA of the intracranial vasculature appears within normal limits.  No evidence of high-grade stenosis or intracranial large vessel occlusion.     CTA stroke multiphase 8/27/2024  Impression:  Moderate-sized right cerebellar infarct, unchanged prior.  No new major vascular distribution infarct or acute intracranial hemorrhage.  CT arteriogram appears within normal limits.  No evidence of high-grade stenosis or intracranial large vessel occlusion.  Further assessment as warranted clinically    "

## 2024-08-27 NOTE — ED PROVIDER NOTES
Patient received in sign-out from Dr. Barbosa, pending results of MRI brain for evidence of stroke rule out.  Shortly after the MRI results were obtained and found to be negative for acute attributable lesion the patient reports now some generalized left lower extremity weakness as opposed to right.  She reports ongoing stuttering of unknown etiology that has been present since awakening this morning.  I spoke with vascular neurology a PP who does not think other acute intervention by their team are currently warranted.  She does recommend admission for further workup related to migratory weakness with Hospital Medicine and General Neurology.  Case accepted by [facility] Medicine who was requesting lumbar puncture.  Attempt was made to obtain CSF by myself but I was unable to obtain at 2 levels.  Patient tolerated procedure well and without complication.  Serial lactate measurement return to normal limits.  Plan to receive further CSF studies inpatient.  Patient and  in agreement with plan of care.    [facility]  ([facility])  Lumbar Puncture  Procedure Note    SUMMARY     Date of Procedure: 8/27/2024    Procedure: LUmbar puncture    Provider: Nathaniel Stokes MD    Assisting Provider: RN    Indications: Diagnostic    Pre-Operative Diagnosis: Atypical Migratory Weakness    Post-Operative Diagnosis: Atypical Migratory Weakness     Anesthesia: Local with Lidocaine w/o epi    Technical Procedures Used: landmark guided LP    Description of the Findings of the Procedure:    Consent: Informed consent was obtained. Risks of the procedure were discussed including: infection, bleeding, pain and headache.    The patient was positioned under sterile conditions. Betadine solution and sterile drapes were utilized. A spinal needle was inserted at the L3 - L4 and then L4 - L5 interspace.     Pt tolerated procedure well with no immediate reported complications. Unable to obtain fluid. Discussed with hospital admission who will  consult neuro or IR while inpatient.      Plan:    Bed rest for 1 hours.    Nathaniel Stokes MD  08/27/24 0547       Nathaniel Stokes MD  09/05/24 0802       Nathaniel Stokes MD  09/09/24 8737

## 2024-08-27 NOTE — CONSULTS
[facility] - Emergency Dept  Vascular Neurology  [facility]  Consult Note    Inpatient consult to Vascular (Stroke) Neurology  Consult performed by: Ivory Pham PA-C  Consult ordered by: Dieter Barbosa MD  Reason for consult: Stroke code in cath lab for tremors/stuttering speech        Assessment/Plan:     Patient is a 39 y.o. year old female with:    Difficulty with speech  Nasra Marks is a 39 y.o. female with PMH of HTN, combined systolic and diastolic CHF, s/p ICD, hx of R cerebellar infarct that was here for elective RHC procedure and as procedure was ending patient c/o R sided HA with progressive speech difficulty and tremulousness. LKN prior to procedure, approx 1.5 hrs prior. Patient tearful on evaluation but denies any current HA, SOB, CP, vision loss, or prior similar episodes. No focal neuro deficits appreciated on exam. Noted to have tremulousness that fluctuates between involving only RUE to all extremities, stuttering speech but is able to eventually speak simple words that are clearly understandable, BLE weakness although limited by effort. NIHSS 6.     CTH/CTA stroke multiphase unremarkable for acute intracranial findings or LVO.   Not candidate for acute interventions, no TNK due to recent RHC and no LVO for thrombectomy.       Recommendations:  --Etiology of clinical presentation unclear at this time, however acute stroke definitively ruled out given MRI brain imaging is negative for evidence of acute infarct.  --Ongoing workup and management per ED team for alternative causes of clinical presentation  --BP goal normotensive  --OK to continue home ASA and statin for ongoing stroke prevention given hx of cerebellar stroke  --No further stroke workup indicated at this time, VN will sign off  --Please contact stroke team with any questions/concerns        STROKE DOCUMENTATION     Acute Stroke Times   Last Known Normal Date: 08/27/24  Last Known Normal Time: 1040  Symptom  Onset Date: 08/27/24  Unknown Symptom Onset Time: Unknown Time  Stroke Team Called Date: 08/27/24  Stroke Team Called Time: 1224  Stroke Team Arrival Date: 08/27/24  Stroke Team Arrival Time: 1232  CT Interpretation Time: 1255  Thrombolytic Therapy Recommended: No  CTA Interpretation Time: 1300  Thrombectomy Recommended: No    NIH Scale:  1a. Level of Consciousness: 0-->Alert, keenly responsive  1b. LOC Questions: 0-->Answers both questions correctly  1c. LOC Commands: 0-->Performs both tasks correctly  2. Best Gaze: 0-->Normal  3. Visual: 0-->No visual loss  4. Facial Palsy: 0-->Normal symmetrical movements  5a. Motor Arm, Left: 0-->No drift, limb holds 90 (or 45) degrees for full 10 secs  5b. Motor Arm, Right: 0-->No drift, limb holds 90 (or 45) degrees for full 10 secs  6a. Motor Leg, Left: 3-->No effort against gravity, leg falls to bed immediately  6b. Motor Leg, Right: 3-->No effort against gravity, leg falls to bed immediately  7. Limb Ataxia: 0-->Absent  8. Sensory: 0-->Normal, no sensory loss  9. Best Language: 0-->No aphasia, normal (stuttering speech)  10. Dysarthria: 0-->Normal  11. Extinction and Inattention (formerly Neglect): 0-->No abnormality  Total (NIH Stroke Scale): 6    Modified Upshur Score: 0  Hardy Coma Scale:    ABCD2 Score:    [id number] Score:   HAS -BLED Score:   ICH Score:   Hunt & Khalil Classification:       Thrombolysis Candidate? No, Strong suspicion for stroke mimic or alternative diagnosis     Delays to Thrombolysis?  Not Applicable    Interventional Revascularization Candidate?   Is the patient eligible for mechanical endovascular reperfusion (AFSANEH)?  No; No large vessel occlusion identified on imaging     Delays to Thrombectomy? Not Applicable    Hemorrhagic change of an Ischemic Stroke: Does this patient have an ischemic stroke with hemorrhagic changes? No     Subjective:     History of Present Illness:  Nasra Marks is a 39 y.o. female with PMH of HTN, combined systolic  and diastolic CHF, s/p ICD, hx of R cerebellar infarct that was here for elective RHC procedure and as procedure was ending patient c/o R sided HA with progressive speech difficulty and tremulousness. LKN prior to procedure, approx 1.5 hrs prior. Patient tearful on evaluation but denies any current HA, SOB, CP, vision loss, or prior similar episodes. No focal neuro deficits appreciated on exam. Noted to have tremulousness that fluctuates between involving only RUE to all extremities, stuttering speech but is able to eventually speak simple words that are clearly understandable, BLE weakness although limited by effort. NIHSS 7. CTH/CTA stroke multiphase unremarkable for acute intracranial findings or LVO. Not candidate for acute interventions, no TNK due to recent RHC and no LVO for thrombectomy. MRI brain ordered for further stroke eval.          Past Medical History:   Diagnosis Date    CHF (congestive heart failure)     Chronic combined systolic and diastolic congestive heart failure 2019    Essential hypertension 2012    Hypertension     dx at 16y.o.    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 disease, with heart failure 2021    ICD (implantable cardioverter-defibrillator), single, in situ 2020    Nonischemic cardiomyopathy 2019    Pacemaker 2017     Past Surgical History:   Procedure Laterality Date    CARDIAC DEFIBRILLATOR PLACEMENT  2017     SECTION      x 1    INDUCED       RIGHT HEART CATHETERIZATION Right 2022    Procedure: INSERTION, CATHETER, RIGHT HEART;  Surgeon: Timothy Prajapati Jr., MD;  Location: [facility] CATH LAB;  Service: Cardiology;  Laterality: Right;    TUBAL LIGATION       Social History     Tobacco Use    Smoking status: Never    Smokeless tobacco: Never   Substance Use Topics    Alcohol use: Not Currently     Comment: occasionally    Drug use: Not Currently     Types: Marijuana     Comment: in  past very little marijuana     Review of patient's allergies indicates:   Allergen Reactions    Aldactone [spironolactone] Swelling     Lips swelled    Hydralazine analogues Other (See Comments)     headaches    Isosorbide Other (See Comments)     headaches       Medications: I have reviewed the current medication administration record.    (Not in a hospital admission)      Review of Systems   Constitutional:  Negative for fatigue.   HENT:  Negative for drooling and trouble swallowing.    Eyes:  Negative for visual disturbance.   Respiratory:  Negative for choking.    Cardiovascular:  Negative for palpitations.   Gastrointestinal:  Negative for nausea and vomiting.   Musculoskeletal:  Negative for neck stiffness.   Skin:  Negative for color change.   Neurological:  Positive for tremors, speech difficulty, weakness and headaches.   Psychiatric/Behavioral:  Negative for confusion.    All other systems reviewed and are negative.    Objective:     Vital Signs (Most Recent):  Temp: 98.6 °F (37 °C) (08/27/24 1305)  Pulse: 64 (08/27/24 1645)  Resp: 18 (08/27/24 1645)  BP: 127/81 (08/27/24 1645)  SpO2: 98 % (08/27/24 1645)    Vital Signs Range (Last 24H):  Temp:  [98.1 °F (36.7 °C)-98.6 °F (37 °C)]   Pulse:  [64-79]   Resp:  [14-20]   BP: (111-155)/(70-94)   SpO2:  [98 %-100 %]        Physical Exam  Vitals and nursing note reviewed.   Constitutional:       General: She is not in acute distress.  HENT:      Head: Normocephalic.      Nose: Nose normal.   Eyes:      Extraocular Movements: Extraocular movements intact.      Conjunctiva/sclera: Conjunctivae normal.   Cardiovascular:      Rate and Rhythm: Normal rate.   Pulmonary:      Effort: Pulmonary effort is normal. No respiratory distress.   Skin:     General: Skin is warm.   Neurological:      Mental Status: She is alert.      Comments: Tremulousness noted on exam fluctuating between affecting RUE and generalized/all extremities. Exam appears limited by effort. Stuttering  "speech noted but no appreciable dysarthria or expressive/receptive aphasia.  See below for neuro exam   Psychiatric:         Mood and Affect: Affect is tearful.         Behavior: Behavior normal.          Neurological Exam:   LOC: alert  Attention Span: Good   Language: No aphasia  Articulation: No dysarthria  Orientation: Person, Place, Time   Visual Fields: Full  EOM (CN III, IV, VI): Full/intact  Facial Sensation (CN V): Normal  Facial Movement (CN VII): Symmetric facial expression    Motor:   --LUE Normal 5/5  --LLE Normal 5/5  --RUE Normal 5/5  --RLE Normal 5/5  Sensation: Intact to light touch      Laboratory:  CMP:   Recent Labs   Lab 08/27/24  1312   CALCIUM 8.7   ALBUMIN 3.8   PROT 7.9   *   K 3.7   CO2 18*      BUN 10   CREATININE 0.8   ALKPHOS 62   ALT 13   AST 23   BILITOT 0.6     CBC:   Recent Labs   Lab 08/27/24  1312   WBC 9.14   RBC 3.90*   HGB 11.8*   HCT 35.8*      MCV 92   MCH 30.3   MCHC 33.0     Lipid Panel:   Recent Labs   Lab 08/27/24  1312   CHOL 129   LDLCALC 78.0   HDL 37*   TRIG 70     Coagulation:   Recent Labs   Lab 08/27/24  1312   INR 1.0     Hgb A1C: No results for input(s): "HGBA1C" in the last 168 hours.  TSH:   Recent Labs   Lab 08/27/24  1312   TSH 3.133       Diagnostic Results:      Brain/Vessel Imaging:  MRI Ischemic Protocol 8/27/2024  Impression:    Limited sequences obtained per acute intervention protocol.  No evidence of acute infarct or hemorrhage.  Remote right cerebellar infarct.  MRA of the intracranial vasculature appears within normal limits.  No evidence of high-grade stenosis or intracranial large vessel occlusion.     CTA stroke multiphase 8/27/2024  Impression:  Moderate-sized right cerebellar infarct, unchanged prior.  No new major vascular distribution infarct or acute intracranial hemorrhage.  CT arteriogram appears within normal limits.  No evidence of high-grade stenosis or intracranial large vessel occlusion.  Further assessment as " warranted clinically      Ivory Pham PA-C  [facility]  Department of Vascular Neurology   [facility] Dept

## 2024-08-27 NOTE — Clinical Note
The PA catheter was repositioned to the main pulmonary artery. Hemodynamics were performed. O2 saturation was measured at 69%. co--5.73        ci---2.84

## 2024-08-27 NOTE — Clinical Note
The PA catheter was repositioned to the main pulmonary artery. Hemodynamics were performed. thermal dilution     CO 5.17    CI  2.56

## 2024-08-27 NOTE — DISCHARGE SUMMARY
[facility] - Cath Lab  Discharge Note  Short Stay    Procedure(s) (LRB):  INSERTION, CATHETER, RIGHT HEART (Right)      OUTCOME:    Right IJ vein access was established without complications, after local anesthesia and under ultrasound guidance. RHC was performed without technical difficulties. However, at the end of the procedure, patient developed headache, stuttering and shakiness of her body. She has a history of ischemic stroke. Due to concerns for recurrent stroke vs seizures, we called the stroke call. She was taken to ER to have evaluation and treatment.     I called  to update him about patient condition and answered all his questions. He is planning to go to ER to see his wife.    DISPOSITION:  to the ER    FINAL DIAGNOSIS:   Chronic systolic HF.   <principal problem not specified>    FOLLOWUP: In clinic    DISCHARGE INSTRUCTIONS:  No discharge procedures on file.     TIME SPENT ON DISCHARGE: 20 minutes

## 2024-08-27 NOTE — ASSESSMENT & PLAN NOTE
Nasra Marks is a 39 y.o. female with PMH of HTN, combined systolic and diastolic CHF, s/p ICD, hx of R cerebellar infarct that was here for elective RHC procedure and as procedure was ending patient c/o R sided HA with progressive speech difficulty and tremulousness. LKN prior to procedure, approx 1.5 hrs prior. Patient tearful on evaluation but denies any current HA, SOB, CP, vision loss, or prior similar episodes. No focal neuro deficits appreciated on exam. Noted to have tremulousness that fluctuates between involving only RUE to all extremities, stuttering speech but is able to eventually speak simple words that are clearly understandable, BLE weakness although limited by effort. NIHSS 6.     CTH/CTA stroke multiphase unremarkable for acute intracranial findings or LVO.   Not candidate for acute interventions, no TNK due to recent RHC and no LVO for thrombectomy.       Recommendations:  --Etiology of clinical presentation unclear at this time, however acute stroke definitively ruled out given MRI brain imaging is negative for evidence of acute infarct.  --Ongoing workup and management per ED team for alternative causes of clinical presentation  --BP goal normotensive  --OK to continue home ASA and statin for ongoing stroke prevention given hx of cerebellar stroke  --No further stroke workup indicated at this time, VN will sign off  --Please contact stroke team with any questions/concerns

## 2024-08-27 NOTE — CODE/ RAPID DOCUMENTATION
RAPID RESPONSE RESPIRATORY THERAPY NOTE             Code Status: Prior   : 1985  Bed: ED :   MRN: [id number]  Time page Received: 1223   Time Rapid Response RT at Bedside: 1227  Time Rapid Response RT left Bedside: 1235    SITUATION    Evaluated patient for: Neuro    BACKGROUND    Why is the patient in the hospital?: <principal problem not specified>    Patient has a past medical history of CHF (congestive heart failure), Chronic combined systolic and diastolic congestive heart failure, Essential hypertension, Hypertension, 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 disease, with heart failure, ICD (implantable cardioverter-defibrillator), single, in situ, Nonischemic cardiomyopathy, and Pacemaker.    24 Hours Vitals Range:  Temp:  [98.1 °F (36.7 °C)-98.6 °F (37 °C)]   Pulse:  [67-79]   Resp:  [14-20]   BP: (111-155)/(70-94)   SpO2:  [99 %-100 %]     Labs:    Recent Labs     24  0904 24  1312    135*   K 3.7 3.7    107   CO2 23 18*   BUN 10 10   CREATININE 0.8 0.8   GLU 89 88        Recent Labs     24  1332   PH 7.421   PCO2 32.8*   PO2 172*   HCO3 21.3*   POCSATURATED 100   BE -3*       ASSESSMENT/INTERVENTIONS  Upon arrival to cath lab, pt was already en route to ER by RRN. Upon reaching patient, she was awake and alert wearing 4 lpm nasal cannula with 100% O2 sats and no signs or symptoms of respiratory distress. No resp interventions indicated at this time.     Last Vitals: Temp: 98.6 °F (37 °C) ( 1305)  Pulse: 71 ( 1545)  Resp: 18 ( 1545)  BP: 111/70 ( 1545)  SpO2: 99 % ( 1545)  Level of Consciousness: Level of Consciousness (AVPU): alert  Respiratory Effort:    Expansion/Accessory Muscle Usage:    All Lung Field Breath Sounds:    O2 Device/Concentration: 4 lpm nasal cannula  NIPPV: No Surgical airway: No Vent: No  ETCO2 monitored:    Ambu at bedside:      Active Orders   Respiratory Care    Oxygen  Continuous     Frequency: Continuous     Number of Occurrences: Until Specified     Order Comments: FiO2 30%, Stroke       Order Questions:      Device type: Low flow      Device: Nasal Cannula (1- 5 Liters)      LPM: 3      Titrate O2 per Oxygen Titration Protocol: Yes      Notify MD of: Inability to achieve desired SpO2    POCT Venous Blood Gas     Frequency: Once     Number of Occurrences: 1 Occurrences     Order Comments: This test should be used for VBGs.  If using this order for other tests (K, creatinine, HCT, PT/INR, lactate etc)  ONLY do so in the case of an emergency or rapid response.Notify Physician if: see parameters below.         Order Questions:      Component: Lactate    Pulse Oximetry Continuous     Frequency: Continuous     Number of Occurrences: Until Specified       RECOMMENDATIONS  ?  We recommend: RRT Recs: Continue POC per primary team.    ESCALATION    Care transferred to ER staff. ER MD at bedside.     FOLLOW-UP    Disposition: Tx to ER bed 19.    Please call back the Rapid Response RT, Christine Gaston, RRT at x [phone] for any questions or concerns.

## 2024-08-27 NOTE — PROGRESS NOTES
Received a call/message from Stewart in the MRI Department in relation to this patient needing to be scheduled for a MRI and has a [employer] ICD.  Patient's Device is MRI compatible/conditional.  To meet protocol the Pacemaker/ICD must have been implanted no less than 6 weeks prior to scheduled date of Scan.  ICD/PPM and leads must be from same .  MRI informed ordering MD must input a Cardiac Device Check in clinic and hospital order, patient must have an xray within 6 months or less prior to MRI reprogramming.  Chest xray to be reviewed by Radiologist.         Device rep agreed to be available for the MRI    Please call [phone] for reprogramming parameters    [employer]onic ICD: [id number]  RV: [id number]

## 2024-08-27 NOTE — Clinical Note
Pt. Was very upset and at the end of the case. Stroke call was enacted because PT. Was not following commands.

## 2024-08-27 NOTE — ASSESSMENT & PLAN NOTE
Patient with known combined CHF with last EF 30-35% on TTE this month, and Grade II diastolic dysfunction. Currently without evidence of volume overload on exam, and not in acute exacerbation. BNP around baseline. Will continue home GDMT with BB, ASA, statin, Entresto, and diuretics. Fluid-restricted diet ordered. Monitor volume status.

## 2024-08-27 NOTE — NURSING
Pt prepped in room 7C. Pt is AAOx4 and pleasant. Pt's vs wnl and pt is free from s/s of pain/distress. Pt is independent and follows commands appropriately. Preop questions completed. Safety measures in place. Pt is waiting to sign consents

## 2024-08-27 NOTE — ED PROVIDER NOTES
Encounter Date: 2024         History     Chief Complaint   Patient presents with    Rapid Respone     Arrives via EMS for stroke eval post cath lab      39-year-old female presenting for cath lab for possible stroke.  Patient was rapid response down to the emergency department.  NIH stroke scale was performed in the ED. history of posterior inferior cerebral artery stroke in .  Patient not able to answer questions at this time.  Stroke code activated.  In emergency department, patient unable to answer questions.  Patient unable to speak.          Review of patient's allergies indicates:   Allergen Reactions    Aldactone [spironolactone] Swelling     Lips swelled    Hydralazine analogues Other (See Comments)     headaches    Isosorbide Other (See Comments)     headaches     Past Medical History:   Diagnosis Date    CHF (congestive heart failure)     Chronic combined systolic and diastolic congestive heart failure 2019    Essential hypertension 2012    Hypertension     dx at 16y.o.    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 disease, with heart failure 2021    ICD (implantable cardioverter-defibrillator), single, in situ 2020    Nonischemic cardiomyopathy 2019    Pacemaker 2017     Past Surgical History:   Procedure Laterality Date    CARDIAC DEFIBRILLATOR PLACEMENT  2017     SECTION      x 1    INDUCED       RIGHT HEART CATHETERIZATION Right 2022    Procedure: INSERTION, CATHETER, RIGHT HEART;  Surgeon: Timothy Prajapati Jr., MD;  Location: [facility] CATH LAB;  Service: Cardiology;  Laterality: Right;    RIGHT HEART CATHETERIZATION Right 2024    Procedure: INSERTION, CATHETER, RIGHT HEART;  Surgeon: Lior Rudea MD;  Location: [facility] CATH LAB;  Service: Cardiology;  Laterality: Right;    TUBAL LIGATION       Family History   Problem Relation Name Age of Onset    Hypertension Mother      Cancer  Maternal Grandmother          breast x 2    Cancer Paternal Grandmother          breast    No Known Problems Sister      No Known Problems Brother      No Known Problems Son      No Known Problems Brother      Hypertension Other      Diabetes Other      Breast cancer Other      Cancer Paternal Aunt          breast    Cancer Paternal Uncle       Social History     Tobacco Use    Smoking status: Never    Smokeless tobacco: Never   Substance Use Topics    Alcohol use: Not Currently     Comment: occasionally    Drug use: Not Currently     Types: Marijuana     Comment: in past very little marijuana     Review of Systems    Physical Exam     Initial Vitals   BP Pulse Resp Temp SpO2   08/27/24 1305 08/27/24 1315 08/27/24 1305 08/27/24 1305 08/27/24 1305   (!) 150/80 79 18 98.6 °F (37 °C) 100 %      MAP       --                Physical Exam    Nursing note and vitals reviewed.  Constitutional: She appears well-developed and well-nourished.   This represents the 2nd physical exam after patient received head imaging.  Initial physical exam NIH stroke scale was positive for aphasia, weakness x4   HENT:   Head: Normocephalic and atraumatic.   Neck: Neck supple. No thyromegaly present. No tracheal deviation present. No JVD present.   Cardiovascular:  Normal rate, regular rhythm, normal heart sounds and intact distal pulses.     Exam reveals no gallop and no friction rub.       No murmur heard.  Pulmonary/Chest: Breath sounds normal. No stridor. No respiratory distress. She has no wheezes. She has no rhonchi. She has no rales. She exhibits no tenderness.   Abdominal: Abdomen is soft. She exhibits no distension and no mass. There is no abdominal tenderness. There is no rebound and no guarding.   Musculoskeletal:         General: No tenderness or edema. Normal range of motion.      Cervical back: Neck supple.     Lymphadenopathy:     She has no cervical adenopathy.   Neurological: She is alert and oriented to person, place, and  time. GCS score is 15. GCS eye subscore is 4. GCS verbal subscore is 5. GCS motor subscore is 6.   Skin: Skin is warm and dry. Capillary refill takes less than 2 seconds.   Psychiatric: She has a normal mood and affect. Her behavior is normal. Judgment and thought content normal.         ED Course   Procedures  Labs Reviewed   CBC W/ AUTO DIFFERENTIAL - Abnormal       Result Value    WBC 9.14      RBC 3.90 (*)     Hemoglobin 11.8 (*)     Hematocrit 35.8 (*)     MCV 92      MCH 30.3      MCHC 33.0      RDW 13.2      Platelets 341      MPV 10.8      Immature Granulocytes 0.2      Gran # (ANC) 5.7      Immature Grans (Abs) 0.02      Lymph # 2.5      Mono # 0.8      Eos # 0.1      Baso # 0.03      nRBC 0      Gran % 62.0      Lymph % 27.4      Mono % 9.2      Eosinophil % 0.9      Basophil % 0.3      Differential Method Automated     COMPREHENSIVE METABOLIC PANEL - Abnormal    Sodium 135 (*)     Potassium 3.7      Chloride 107      CO2 18 (*)     Glucose 88      BUN 10      Creatinine 0.8      Calcium 8.7      Total Protein 7.9      Albumin 3.8      Total Bilirubin 0.6      Alkaline Phosphatase 62      AST 23      ALT 13      eGFR >60.0      Anion Gap 10     LIPID PANEL - Abnormal    Cholesterol 129      Triglycerides 70      HDL 37 (*)     LDL Cholesterol 78.0      HDL/Cholesterol Ratio 28.7      Total Cholesterol/HDL Ratio 3.5      Non-HDL Cholesterol 92     ISTAT PROCEDURE - Abnormal    POC PTWBT 15.1 (*)     POC PTINR 1.3 (*)     Sample unknown     ISTAT LACTATE - Abnormal    POC Lactate 3.22 (*)     Verbal Result Readback Performed Yes      Provider Credentials: MD      Provider Notified: MELCHOR      Time Notifed: 1329      Sample VENOUS      Site Other      Allens Test N/A     ISTAT PROCEDURE - Abnormal    POC PH 7.421      POC PCO2 32.8 (*)     POC PO2 172 (*)     POC HCO3 21.3 (*)     POC BE -3 (*)     POC SATURATED O2 100      POC TCO2 22 (*)     Sample VENOUS      Site Other      Allens Test N/A     PROTIME-INR     Prothrombin Time 11.4      INR 1.0     TSH    TSH 3.133     TOXICOLOGY SCREEN, URINE, RANDOM (COMPLIANCE)    Alcohol, Urine <10      Benzodiazepines Negative      Methadone metabolites Negative      Cocaine (Metab.) Negative      Opiate Scrn, Ur Negative      Barbiturate Screen, Ur Negative      Amphetamine Screen, Ur Negative      THC Negative      Phencyclidine Negative      Creatinine, Urine 49.0      Toxicology Information SEE COMMENT      Narrative:     Specimen Source->Urine   VITAMIN B12   FOLATE   HEMOGLOBIN A1C   VITAMIN B12    Vitamin B-12 565     FOLATE    Folate 14.3     HEMOGLOBIN A1C    Hemoglobin A1C 5.3      Estimated Avg Glucose 105      Narrative:     add on [facility] to [phone] per Dandre Gould MD  08/27/2024    17:42    ISTAT CREATININE    POC Creatinine 0.8      Sample unknown     ISTAT LACTATE    POC Lactate 0.70      Sample VENOUS      Site Other      Allens Test N/A          ECG Results              ECG 12 lead (Final result)        Collection Time Result Time QRS Duration OHS QTC Calculation    08/27/24 13:19:07 08/27/24 16:32:37 100 455                     Final result by Interface, Lab In [facility] (08/27/24 16:32:46)                   Narrative:    Test Reason : R29.818,    Vent. Rate : 075 BPM     Atrial Rate : 075 BPM     P-R Int : 154 ms          QRS Dur : 100 ms      QT Int : 408 ms       P-R-T Axes : 051 071 051 degrees     QTc Int : 455 ms    Normal sinus rhythm  Minor non-specific STabn  Otherwise  Normal ECG  When compared with ECG of 24-AUG-2024 12:54,  Nonspecific T wave abnormality no longer evident in Inferior leads  Nonspecific T wave abnormality no longer evident in Anterior-lateral leads  Confirmed by Efren GUZMÁN MD ([id number]) on 8/27/2024 4:32:36 PM    Referred By: System System           Confirmed By:Efren GUZMÁN MD                                  Imaging Results              MRI Brain Ischemic Inter Pro Incl MRA W/O Con (Final result)  Result time 08/27/24 16:13:36       Final result by Nicola Gillespie MD (08/27/24 16:13:36)                   Impression:      Limited sequences obtained per acute intervention protocol.    No evidence of acute infarct or hemorrhage.    Remote right cerebellar infarct.    MRA of the intracranial vasculature appears within normal limits.  No evidence of high-grade stenosis or intracranial large vessel occlusion.      Electronically signed by: Nicola Gillespie MD  Date:    08/27/2024  Time:    16:13               Narrative:    EXAMINATION:  MRI BRAIN ISCHEMIC INTERVENTIONAL PROTOCOL INCL MRA W/O CONTRAST    CLINICAL HISTORY:  Possible stroke;    TECHNIQUE:  Multiplanar multisequence MR imaging of the brain was performed without intravenous contrast.  Limited sequences obtained per acute intervention protocol.  These include axial diffusion, axial heme, axial FLAIR.    Obtained as a separate acquisition from the MRI brain, 3D time-of-flight noncontrast MR angiogram of the intracranial vasculature with multiple MIP reconstructions.    COMPARISON:  CT 08/27/2024    FINDINGS:  Brain:    Ventricles are stable in size, without evidence of hydrocephalus.    No abnormal restricted diffusion to suggest the presence of an acute infarct on today's examination.  Remote right cerebellar infarct, as before.    No evidence of recent or remote hemorrhage.    No significant intracranial mass effect or midline shift.    No extra-axial blood or fluid collections.    MRA:    The visualized internal carotid arteries are normal in course and caliber.  The visualized vertebrobasilar system is unremarkable.  The proximal ACAs, MCAs and PCAs appear within normal limits.  No high-grade stenosis, major branch occlusion, or intracranial aneurysm identified.                                       CTA STROKE MULTI-PHASE (Final result)  Result time 08/27/24 13:14:51      Final result by Nicola Gillespie MD (08/27/24 13:14:51)                   Impression:      Moderate-sized  right cerebellar infarct, unchanged prior.    No new major vascular distribution infarct or acute intracranial hemorrhage.    CT arteriogram appears within normal limits.  No evidence of high-grade stenosis or intracranial large vessel occlusion.    Further assessment as warranted clinically.      Electronically signed by: Nicola Gillespie MD  Date:    08/27/2024  Time:    13:14               Narrative:    EXAMINATION:  CTA STROKE MULTI-PHASE    CLINICAL HISTORY:  Neuro deficit, acute, stroke suspected;    TECHNIQUE:  Axial CT images obtained throughout the region of the head before the administration of intravenous contrast.    CT angiogram was performed through the cervical and intracranial vasculature during the IV bolus administration of 100mL of Omnipaque 350.  Two additional phases through the intracranial vasculature via multiphase technique.    Multiplanar MPR and MIP reformats were performed.    CT source data was analyzed using artificial intelligence software for detection of a large vessel occlusions (LVO) in order to enable computer assisted triage notification and aid clinical stroke decision making.    COMPARISON:  CT head 04/25/2024, CTA 01/23/2024    FINDINGS:  The ventricles are normal in size without evidence of hydrocephalus.    Moderate-sized remote right cerebellar infarct, unchanged from prior.  No new major vascular distribution infarct elsewhere.  No acute parenchymal hemorrhage.  No new intracranial mass effect or midline shift the    No extra-axial blood or fluid collections.    The cranium appears intact. Mastoid air cells and paranasal sinuses are essentially clear.    Pacing device over left chest wall.      CTA:    The aortic arch demonstrates no significant stenosis at the major branch vessel origins.    The right common carotid artery is normal in caliber.  No significant stenosis at the carotid bifurcation.The right internal carotid artery is normal in caliber.    The left common  carotid artery is normal in caliber. No significant stenosis at the carotid bifurcation.The left internal carotid artery is normal in caliber.    The right vertebral artery is normal in caliber.    The left vertebral artery is normal in caliber.    Basilar artery is within normal limits without focal abnormality.    The proximal anterior, middle, and posterior cerebral arteries are within normal limits.  No evidence of significant stenosis, focal occlusion, or intracranial aneurysm.                                       Medications   acetaminophen tablet 650 mg (650 mg Oral Given 8/27/24 1401)   sodium chloride 0.9% bolus 250 mL 250 mL (0 mLs Intravenous Stopped 8/27/24 1815)   LIDOcaine (PF) 10 mg/ml (1%) injection 100 mg (100 mg Infiltration Given by Provider 8/27/24 1806)   ibuprofen tablet 400 mg (400 mg Oral Given 8/27/24 1935)     Medical Decision Making  Patient is a 39-year-old female past medical history of CHF with ICD and PICA stroke in 2021 presents for stroke-like symptoms.  Differential diagnosis in this patient includes stroke subdural hematoma AV malformation aneurysmal bleed sepsis seizure and meningitis.    Life-threatening diagnosis sees considered in this patient include all diagnoses mentioned above.    Meningitis is unlikely in a patient was afebrile not tachycardic without neck tenderness.  Clinically significant Subdural hematoma unlikely inpatient with normal head CT.    Aneurysmal bleed unlikely in a patient with a normal head CTA    AV malformation will be excluded on brain MRI.  No evidence of AV malformation on CTA.     Amount and/or Complexity of Data Reviewed  Independent Historian: EMS     Details: Rapid response team who presented the patient to the [facility] states that patient was in the cath lab when she started to have any focal neurologic deficit rapid response was called.  Vital signs were stable EN route.  Additionally we considered hypoglycemia as a cause however patient was  euglycemic.  External Data Reviewed: notes.     Details:   Neurology note from 11/30/2022 demonstrates remote right PICA stroke that occurred after a 05/20/2021.  Additionally that note, patient has a history of ingestion of substances.  U tox was performed.    Cardiology note from 2020 reviewed defibrillator was placed in 2017.  Labs: ordered.     Details: Mild hemoglobin decreased appears to be at the patient's baseline no acute intervention.  CMP grossly normal.  Lipid panel notable for decreased HDL.  Follow up as outpatient.  Patient has mildly elevated BNP this is elevated 1.5 times previous reading.  Patient has a elevated lactate however is afebrile not tachycardic not tachypneic this could be secondary to lab error.  Radiology: ordered.     Details: Head CTA independently interpreted with no acute findings.  No subdural hematoma.  No large vessel occlusion that would explain her bilateral neurologic symptoms.  Discussion of management or test interpretation with external provider(s): I discussed this patient with vascular neurology who recommend additional MRI to rule out hyperacute stroke.  Patient reassessed prior to MRI with neurologic status improving.  Patient able to move all 4 extremities spontaneously, answer questions and use electronic devices appropriately.  Patient able to converse appropriately prior to MRI and shift change.    Risk  OTC drugs.  Prescription drug management.  Drug therapy requiring intensive monitoring for toxicity.  Decision regarding hospitalization.  Risk Details: Patient is high-risk given the potential for a 2nd stroke after a previous stroke years ago.  Patient's care was complicated by the fact that she had a pacemaker in 2017 making MRI difficult.  At time of handoff patient was pending MRI.                  Attending Attestation:   Physician Attestation Statement for Resident:  As the supervising MD   Physician Attestation Statement: I have personally seen and examined  this patient.   I agree with the above history.  -:   As the supervising MD I agree with the above PE.     As the supervising MD I agree with the above treatment, course, plan, and disposition.                Attending ED Notes:   STAFF ATTENDING PHYSICIAN NOTE:  I have individually/jointly evaluated Nasra Marks and discussed their ED management with the resident physician. I have also reviewed their notes, assessments, and procedures documented.  I was present during all critical portions of any procedure(s) performed on Nasra Marks.   ____________________  Janes Barbosa MD, [facility]  Emergency Medicine Staff      ED Course as of 09/04/24 1155   Tue Aug 27, 2024   1324 Pacemaker information     LEAD SPRINT QUATTRO SECURE : Serial No. [id number]  ICD VISIA AF MRI S VR [id number]: Serial No. [id number]      [KW]   1326 Patient reassessed in room.  Patient neuro exam is improving.  Patient is sitting at bedside with on her cell phone. [KW]      ED Course User Index  [KW] Darryl Sandoval MD                           Clinical Impression:  Final diagnoses:  [R29.818] Acute focal neurological deficit  [Z91.89] Stroke risk (Primary)  [Z91.89] At risk for stroke  [I50.9] Congestive heart failure, unspecified HF chronicity, unspecified heart failure type  [R53.1] Progressive focal motor weakness          ED Disposition Condition    Observation                 Darryl Sandoval MD  Resident  08/27/24 [id number]       Dieter Barbosa MD  09/04/24 1154

## 2024-08-27 NOTE — ED TRIAGE NOTES
Patient identifiers for Nasra Marks 39 y.o. female checked and correct.  Chief Complaint   Patient presents with    Rapid Respone     Arrives via EMS for stroke eval post cath lab      Past Medical History:   Diagnosis Date    CHF (congestive heart failure)     Chronic combined systolic and diastolic congestive heart failure 5/24/2019    Essential hypertension 11/19/2012    Hypertension     dx at 16y.o.    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 disease, with heart failure 12/28/2021    ICD (implantable cardioverter-defibrillator), single, in situ 2/17/2020    Nonischemic cardiomyopathy 5/24/2019    Pacemaker 12/18/2017     Allergies reported:   Review of patient's allergies indicates:   Allergen Reactions    Aldactone [spironolactone] Swelling     Lips swelled    Hydralazine analogues Other (See Comments)     headaches    Isosorbide Other (See Comments)     headaches         LOC: Patient is awake, alert, and aware of environment with an appropriate affect. Patient is oriented x 4   APPEARANCE: Patient resting comfortably and in no acute distress. Patient is clean and well groomed, patient's clothing is properly fastened.  SKIN: The skin is warm and dry. Patient has normal skin turgor and moist mucus membranes.   MUSKULOSKELETAL: Patient is moving all extremities well, no obvious deformities noted. Pulses intact.   RESPIRATORY: Airway is open and patent. Respirations are spontaneous and non-labored with normal effort and rate.  CARDIAC: Patient has a normal rate and rhythm. Normal sinus on cardiac monitor. No peripheral edema noted.   ABDOMEN: No distention noted. Soft and non-tender upon palpation.  NEUROLOGICAL:  PERRL. Facial expression is symmetrical. Hand grasps are equal bilaterally. Bilateral leg numbness/weakness. Expressive aphasia

## 2024-08-27 NOTE — CODE/ RAPID DOCUMENTATION
RAPID RESPONSE NURSE NOTE        Admit Date: 2024  LOS: 0  Code Status: Prior   Date of Consult: 2024  : 1985  Age: 39 y.o.  Weight:   Wt Readings from Last 1 Encounters:   24 85.7 kg (189 lb)     Sex: female  Race: Black or    Bed: ED :   MRN: [id number]  Time Rapid Response Team page Received: 1224  Time Rapid Response Team at Bedside: 1225  Time Rapid Response Team left Bedside: 1235  Was the patient discharged from an ICU this admission? No  Was the patient discharged from a PACU within last 24 hours? No   Did the patient receive conscious sedation/general anesthesia in last 24 hours? No  Was the patient in the ED within the past 24 hours? No  Was the patient on NIPPV within the past 24 hours? No   Did this progress into an ARC or CPA: No  Attending Physician: SHAMEKA  Primary Service: NA    SITUATION    Notified by overhead page.  Reason for alert: Stroke code called to cath lab  Called to evaluate the patient for Neuro    BACKGROUND     Why is the patient in the hospital?: Patient her as an outpatient for right heart cath    Patient has a past medical history of CHF (congestive heart failure), Chronic combined systolic and diastolic congestive heart failure, Essential hypertension, Hypertension, 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 disease, with heart failure, ICD (implantable cardioverter-defibrillator), single, in situ, Nonischemic cardiomyopathy, and Pacemaker.    ASSESSMENT/INTERVENTIONS    What did you find: Upon arrival, patient is awake and alert. Shivering. She is alert and oriented x3, but responses are delayed and stuttered. She was immediately brought to the emergency room for evaluation.     RECOMMENDATIONS    We recommend: ED transfer and evaluation.    PROVIDER ESCALATION    Orders received and case discussed with NA.    FOLLOW UP    Call the Rapid Response Nurse, Hedy Viera RN at [phone] for additional  questions or concerns.

## 2024-08-27 NOTE — Clinical Note
The site was marked. The right neck was prepped. The site was prepped with ChloraPrep. The patient was draped.

## 2024-08-27 NOTE — ASSESSMENT & PLAN NOTE
Remote history of R cerebellar CVA, without significant residual focal deficits. Unclear if contributing to current symptoms. Continue secondary prevention with ASA, statin, and control of HTN. Monitoring on telemetry.

## 2024-08-27 NOTE — HPI
Nasra Marks is a 39 y.o. female with PMH of HTN, combined systolic and diastolic CHF, s/p ICD, hx of R cerebellar infarct that was here for elective RHC procedure and as procedure was ending patient c/o R sided HA with progressive speech difficulty and tremulousness. LKN prior to procedure, approx 1.5 hrs prior. Patient tearful on evaluation but denies any current HA, SOB, CP, vision loss, or prior similar episodes. No focal neuro deficits appreciated on exam. Noted to have tremulousness that fluctuates between involving only RUE to all extremities, stuttering speech but is able to eventually speak simple words that are clearly understandable, BLE weakness although limited by effort. NIHSS 7. CTH/CTA stroke multiphase unremarkable for acute intracranial findings or LVO. Not candidate for acute interventions, no TNK due to recent RHC and no LVO for thrombectomy. MRI brain ordered for further stroke eval.

## 2024-08-28 ENCOUNTER — DOCUMENTATION ONLY (OUTPATIENT)
Dept: NEUROLOGY | Facility: CLINIC | Age: 39
End: 2024-08-28
Payer: MEDICARE

## 2024-08-28 PROBLEM — R29.818 ACUTE FOCAL NEUROLOGICAL DEFICIT: Status: ACTIVE | Noted: 2024-08-27

## 2024-08-28 PROBLEM — R52 PAIN: Status: ACTIVE | Noted: 2024-08-28

## 2024-08-28 PROBLEM — E87.1 HYPONATREMIA: Status: ACTIVE | Noted: 2024-08-28

## 2024-08-28 PROBLEM — F44.9 CONVERSION DISORDER: Status: ACTIVE | Noted: 2024-08-28

## 2024-08-28 LAB
ANION GAP SERPL CALC-SCNC: 8 MMOL/L (ref 8–16)
BASOPHILS # BLD AUTO: 0.04 K/UL (ref 0–0.2)
BASOPHILS NFR BLD: 0.5 % (ref 0–1.9)
BUN SERPL-MCNC: 8 MG/DL (ref 6–20)
CALCIUM SERPL-MCNC: 9.2 MG/DL (ref 8.7–10.5)
CHLORIDE SERPL-SCNC: 106 MMOL/L (ref 95–110)
CO2 SERPL-SCNC: 21 MMOL/L (ref 23–29)
CREAT SERPL-MCNC: 0.7 MG/DL (ref 0.5–1.4)
DIFFERENTIAL METHOD BLD: ABNORMAL
EOSINOPHIL # BLD AUTO: 0.1 K/UL (ref 0–0.5)
EOSINOPHIL NFR BLD: 0.7 % (ref 0–8)
ERYTHROCYTE [DISTWIDTH] IN BLOOD BY AUTOMATED COUNT: 12.9 % (ref 11.5–14.5)
EST. GFR  (NO RACE VARIABLE): >60 ML/MIN/1.73 M^2
GLUCOSE SERPL-MCNC: 96 MG/DL (ref 70–110)
HCT VFR BLD AUTO: 35.9 % (ref 37–48.5)
HGB BLD-MCNC: 11.8 G/DL (ref 12–16)
IMM GRANULOCYTES # BLD AUTO: 0.02 K/UL (ref 0–0.04)
IMM GRANULOCYTES NFR BLD AUTO: 0.3 % (ref 0–0.5)
LYMPHOCYTES # BLD AUTO: 1.4 K/UL (ref 1–4.8)
LYMPHOCYTES NFR BLD: 17.9 % (ref 18–48)
MAGNESIUM SERPL-MCNC: 1.9 MG/DL (ref 1.6–2.6)
MAGNESIUM SERPL-MCNC: 2 MG/DL (ref 1.6–2.6)
MCH RBC QN AUTO: 29.4 PG (ref 27–31)
MCHC RBC AUTO-ENTMCNC: 32.9 G/DL (ref 32–36)
MCV RBC AUTO: 89 FL (ref 82–98)
MONOCYTES # BLD AUTO: 0.7 K/UL (ref 0.3–1)
MONOCYTES NFR BLD: 9.5 % (ref 4–15)
NEUTROPHILS # BLD AUTO: 5.4 K/UL (ref 1.8–7.7)
NEUTROPHILS NFR BLD: 71.1 % (ref 38–73)
NRBC BLD-RTO: 0 /100 WBC
OHS QRS DURATION: 102 MS
OHS QRS DURATION: 96 MS
OHS QTC CALCULATION: 441 MS
OHS QTC CALCULATION: 474 MS
PLATELET # BLD AUTO: 328 K/UL (ref 150–450)
PMV BLD AUTO: 10.2 FL (ref 9.2–12.9)
POTASSIUM SERPL-SCNC: 3.4 MMOL/L (ref 3.5–5.1)
RBC # BLD AUTO: 4.02 M/UL (ref 4–5.4)
SODIUM SERPL-SCNC: 135 MMOL/L (ref 136–145)
WBC # BLD AUTO: 7.61 K/UL (ref 3.9–12.7)

## 2024-08-28 PROCEDURE — 95718 EEG PHYS/QHP 2-12 HR W/VEEG: CPT | Mod: ,,, | Performed by: PSYCHIATRY & NEUROLOGY

## 2024-08-28 PROCEDURE — 36415 COLL VENOUS BLD VENIPUNCTURE: CPT

## 2024-08-28 PROCEDURE — 95700 EEG CONT REC W/VID EEG TECH: CPT

## 2024-08-28 PROCEDURE — 83735 ASSAY OF MAGNESIUM: CPT

## 2024-08-28 PROCEDURE — 25000003 PHARM REV CODE 250: Performed by: STUDENT IN AN ORGANIZED HEALTH CARE EDUCATION/TRAINING PROGRAM

## 2024-08-28 PROCEDURE — 85025 COMPLETE CBC W/AUTO DIFF WBC: CPT

## 2024-08-28 PROCEDURE — 63600175 PHARM REV CODE 636 W HCPCS: Performed by: STUDENT IN AN ORGANIZED HEALTH CARE EDUCATION/TRAINING PROGRAM

## 2024-08-28 PROCEDURE — 11000001 HC ACUTE MED/SURG PRIVATE ROOM

## 2024-08-28 PROCEDURE — 80048 BASIC METABOLIC PNL TOTAL CA: CPT

## 2024-08-28 PROCEDURE — 99223 1ST HOSP IP/OBS HIGH 75: CPT | Mod: ,,, | Performed by: PSYCHIATRY & NEUROLOGY

## 2024-08-28 PROCEDURE — 95711 VEEG 2-12 HR UNMONITORED: CPT

## 2024-08-28 RX ORDER — LORAZEPAM 2 MG/ML
2 INJECTION INTRAMUSCULAR ONCE
Status: DISCONTINUED | OUTPATIENT
Start: 2024-08-28 | End: 2024-08-28

## 2024-08-28 RX ORDER — IBUPROFEN 400 MG/1
400 TABLET ORAL EVERY 6 HOURS PRN
Status: DISCONTINUED | OUTPATIENT
Start: 2024-08-28 | End: 2024-08-29 | Stop reason: HOSPADM

## 2024-08-28 RX ADMIN — POTASSIUM CHLORIDE 40 MEQ: 1500 TABLET, EXTENDED RELEASE ORAL at 09:08

## 2024-08-28 RX ADMIN — SACUBITRIL AND VALSARTAN 1 TABLET: 97; 103 TABLET, FILM COATED ORAL at 09:08

## 2024-08-28 RX ADMIN — ENOXAPARIN SODIUM 40 MG: 40 INJECTION SUBCUTANEOUS at 05:08

## 2024-08-28 RX ADMIN — ASPIRIN 81 MG: 81 TABLET, COATED ORAL at 09:08

## 2024-08-28 RX ADMIN — ATORVASTATIN CALCIUM 40 MG: 40 TABLET, FILM COATED ORAL at 09:08

## 2024-08-28 RX ADMIN — METOPROLOL SUCCINATE 200 MG: 100 TABLET, EXTENDED RELEASE ORAL at 09:08

## 2024-08-28 RX ADMIN — FUROSEMIDE 40 MG: 40 TABLET ORAL at 09:08

## 2024-08-28 RX ADMIN — ACETAMINOPHEN 650 MG: 325 TABLET ORAL at 12:08

## 2024-08-28 RX ADMIN — EPLERENONE 25 MG: 25 TABLET, FILM COATED ORAL at 09:08

## 2024-08-28 RX ADMIN — ACETAMINOPHEN 650 MG: 325 TABLET ORAL at 05:08

## 2024-08-28 RX ADMIN — AMLODIPINE BESYLATE 5 MG: 5 TABLET ORAL at 09:08

## 2024-08-28 NOTE — SUBJECTIVE & OBJECTIVE
Interval History:  No reports of any chest pain.  Afebrile.  Patient is still having some stuttering like episodes today.  Patient was able to tolerate p.o. breakfast this morning per the aunt at the bedside.  Neurology feels it could be functional, open to cap EEG.     Review of Systems  Objective:     Vital Signs (Most Recent):  Temp: 99.4 °F (37.4 °C) (08/28/24 1132)  Pulse: 86 (08/28/24 1139)  Resp: 18 (08/28/24 1132)  BP: 117/69 (08/28/24 1132)  SpO2: 98 % (08/28/24 1132) Vital Signs (24h Range):  Temp:  [97.8 °F (36.6 °C)-99.4 °F (37.4 °C)] 99.4 °F (37.4 °C)  Pulse:  [64-96] 86  Resp:  [16-20] 18  SpO2:  [95 %-100 %] 98 %  BP: (105-167)/() 117/69     Weight: 88.1 kg (194 lb 3.6 oz)  Body mass index is 28.68 kg/m².  No intake or output data in the 24 hours ending 08/28/24 1512      Physical Exam      Clear lungs bilaterally, unlabored breathing, on room air, no cyanosis   Heart sounds indicate a regular rate and rhythm   Awake alert, no acute distress   No facial droop, no slurred speech   Has some generalized weakness, slightly more on the left than the right,  No obvious upper nor lower extremity edema   Patient having stuttered pattern of speech were she initially stutter is in the in his eventually able to smoothly verbalize the word.  Has intermittent tic like twitching of her neck from side-to-side

## 2024-08-28 NOTE — PLAN OF CARE
[facility] - Neurosurgery (Hospital)  Initial Discharge Assessment       Primary Care Provider: Veronika Rainey MD    Admission Diagnosis: At risk for stroke [Z91.89]  Acute focal neurological deficit [R29.818]  Progressive focal motor weakness [R53.1]  Chest pain [R07.9]  Stroke risk [Z91.89]  Congestive heart failure, unspecified HF chronicity, unspecified heart failure type [I50.9]    Admission Date: 8/27/2024  Expected Discharge Date:     Transition of Care Barriers: None    Payor: MEDICARE / Plan: MEDICARE PART A & B / Product Type: Government /     Extended Emergency Contact Information  Primary Emergency Contact: Jacob Marks   [facility]  Home Phone: [phone]  Mobile Phone: [phone]  Relation: Spouse  Secondary Emergency Contact: Ignacia Marks  Mobile Phone: [phone]  Relation: Mother  Preferred language: English   needed? No    Discharge Plan A: Home with family  Discharge Plan B: Home Health      [facility] [place] - [address]  [address]  [place] [address]  Phone: [phone] Fax: [phone]      Initial Assessment (most recent)       Adult Discharge Assessment - 08/28/24 1141          Discharge Assessment    Assessment Type Discharge Planning Assessment     Confirmed/corrected address, phone number and insurance Yes     Confirmed Demographics Correct on Facesheet     Source of Information patient;family   Jacob Marks    Does patient/caregiver understand observation status Yes     Communicated MELA with patient/caregiver Date not available/Unable to determine     Reason For Admission neurological complaints     People in Home spouse     Do you expect to return to your current living situation? Yes     Do you have help at home or someone to help you manage your care at home? Yes     Who are your caregiver(s) and their phone number(s)? spouse     Prior to hospitilization cognitive status: Alert/Oriented     Current cognitive status:  Alert/Oriented     Walking or Climbing Stairs Difficulty no     Home Layout Able to live on 1st floor     Equipment Currently Used at Home none     Readmission within 30 days? No     Patient currently being followed by outpatient case management? No     Do you currently have service(s) that help you manage your care at home? No     Do you take prescription medications? Yes     Do you have prescription coverage? Yes     Coverage Medicare     Do you have any problems affording any of your prescribed medications? No     Is the patient taking medications as prescribed? yes     Who is going to help you get home at discharge? spouse     How do you get to doctors appointments? car, drives self;family or friend will provide     Are you on dialysis? No     Do you take coumadin? No     Discharge Plan A Home with family     Discharge Plan B Home Health     DME Needed Upon Discharge  --   TBD    Discharge Plan discussed with: Patient     Transition of Care Barriers None        Physical Activity    On average, how many days per week do you engage in moderate to strenuous exercise (like a brisk walk)? 0 days     On average, how many minutes do you engage in exercise at this level? 0 min        Financial Resource Strain    How hard is it for you to pay for the very basics like food, housing, medical care, and heating? Not very hard        Housing Stability    In the last 12 months, was there a time when you were not able to pay the mortgage or rent on time? No     At any time in the past 12 months, were you homeless or living in a shelter (including now)? No        Transportation Needs    Has the lack of transportation kept you from medical appointments, meetings, work or from getting things needed for daily living? No        Food Insecurity    Within the past 12 months, you worried that your food would run out before you got the money to buy more. Never true     Within the past 12 months, the food you bought just didn't last and  you didn't have money to get more. Never true        Stress    Do you feel stress - tense, restless, nervous, or anxious, or unable to sleep at night because your mind is troubled all the time - these days? To some extent        Social Isolation    How often do you feel lonely or isolated from those around you?  Rarely        Alcohol Use    Q1: How often do you have a drink containing alcohol? Never     Q2: How many drinks containing alcohol do you have on a typical day when you are drinking? Patient does not drink     Q3: How often do you have six or more drinks on one occasion? Never        Utilities    In the past 12 months has the electric, gas, oil, or water company threatened to shut off services in your home? No        Health Literacy    How often do you need to have someone help you when you read instructions, pamphlets, or other written material from your doctor or pharmacy? Rarely                   Spoke to patient and spouse, Jimmy Marks  at bedside.  Pt lives at home with . Prior to hospitalization she used no DME and independent with ADL's . Post hospital  stay her  will be pt support person and provide ransportation at d/c. There have been no hospitalizations within the last 30 days per pt and chart review. Verified pt PCP and preferred pharmacy. Pt is not on Coumadin and is not receiving dialysis. All questions answered regarding case management/ discharge planning , pt and  verbalized understanding.     Discharge Plan A and Plan B have been determined by review of patient's clinical status, future medical and therapeutic needs, and coverage/benefits for post-acute care in coordination with multidisciplinary team members.              MD was at bedside with patient. CM will return at a later time to complete DPA.     Patient is currently in Obs status and CM message Dr. Farrell for an update on discharge. Ann with UR added to secure chat.     ]Ann will UR will review to  determine if patient can be fliped to inpatient.     Dr. Farrell asked by Ann if order for inpatient can be placed. Awaiting MD to response.

## 2024-08-28 NOTE — HOSPITAL COURSE
Stroke workup was unremarkable for any CVA on the MRI per stroke team.  EEG was negative for any seizures, overall unremarkable.  Findings were discussed with patient and  that no organic etiology behind the patient's acute onset of symptoms was found, more suggestive of a functional neurological disorder.  Informed them of outpatient follow up, especially with psychiatry for further management.  We will be discharging the patient on a new prescription of Prozac.  Okay to be discharged home with follow up with Cardiology as well from Cardiology Service perspective.  Patient has met maximum benefit from hospitalization is clinically stable for discharge.  PT feels comfortable with the patient being discharged home. CM/PT affirmed patient had walker at home.    Patient having some intermittent stuttering.      Clear lungs bilaterally, unlabored breathing, on room air, no cyanosis   Heart sounds indicate a regular rate and rhythm   Awake alert, no acute distress   No facial droop, no slurred speech   No obvious upper nor lower extremity edema   Maintaining eye contact   5/5 fist  on the right, 4/5 fist  on the left   At least 4/5 hip flexion on the right, 3/5 hip flexion on the left      Acute cerebral venous sinus thrombosis Seizure-like activity

## 2024-08-28 NOTE — SUBJECTIVE & OBJECTIVE
Past Medical History:   Diagnosis Date    CHF (congestive heart failure)     Chronic combined systolic and diastolic congestive heart failure 2019    Essential hypertension 2012    Hypertension     dx at 16y.o.    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 disease, with heart failure 2021    ICD (implantable cardioverter-defibrillator), single, in situ 2020    Nonischemic cardiomyopathy 2019    Pacemaker 2017       Past Surgical History:   Procedure Laterality Date    CARDIAC DEFIBRILLATOR PLACEMENT  2017     SECTION      x 1    INDUCED       RIGHT HEART CATHETERIZATION Right 2022    Procedure: INSERTION, CATHETER, RIGHT HEART;  Surgeon: Timothy Prajapati Jr., MD;  Location: [facility] CATH LAB;  Service: Cardiology;  Laterality: Right;    TUBAL LIGATION         Review of patient's allergies indicates:   Allergen Reactions    Aldactone [spironolactone] Swelling     Lips swelled    Hydralazine analogues Other (See Comments)     headaches    Isosorbide Other (See Comments)     headaches       Current Facility-Administered Medications on File Prior to Encounter   Medication    [COMPLETED] iohexoL (OMNIPAQUE 350) injection 100 mL    [DISCONTINUED] LIDOcaine HCL 20 mg/ml (2%) injection     Current Outpatient Medications on File Prior to Encounter   Medication Sig    acetaminophen (TYLENOL) 500 MG tablet Take 1 tablet (500 mg total) by mouth every 6 (six) hours as needed for Pain (As needed for pain and fever).    amLODIPine (NORVASC) 5 MG tablet Take 5 mg by mouth.    aspirin (ECOTRIN) 81 MG EC tablet Take 1 tablet (81 mg total) by mouth once daily.    atorvastatin (LIPITOR) 40 MG tablet Take 1 tablet (40 mg total) by mouth every evening.    diclofenac sodium (VOLTAREN ARTHRITIS PAIN) 1 % Gel Apply to area of pain 3 times a day.    eplerenone (INSPRA) 25 MG Tab Take 1 tablet (25 mg total) by mouth once daily.    FARXIGA 10 mg  tablet Take 1 tablet by mouth once daily    furosemide (LASIX) 40 MG tablet Take 1 tablet (40 mg total) by mouth once daily.    metoprolol succinate (TOPROL-XL) 200 MG 24 hr tablet Take 1 tablet (200 mg total) by mouth once daily.    mupirocin (BACTROBAN) 2 % ointment Apply topically 3 (three) times daily. for 10 days    oxyCODONE-acetaminophen (PERCOCET) 5-325 mg per tablet Take 1 tablet by mouth every 6 (six) hours as needed for Pain (As needed for severe 10/10 pain).    potassium chloride SA (K-DUR,KLOR-CON) 20 MEQ tablet Take 2 tablets (40 mEq total) by mouth 2 (two) times daily.    sacubitriL-valsartan (ENTRESTO)  mg per tablet Take 1 tablet by mouth 2 (two) times daily.     Family History       Problem Relation (Age of Onset)    Breast cancer Other    Cancer Maternal Grandmother, Paternal Grandmother, Paternal Aunt, Paternal Uncle    Diabetes Other    Hypertension Mother, Other    No Known Problems Sister, Brother, Son, Brother          Tobacco Use    Smoking status: Never    Smokeless tobacco: Never   Substance and Sexual Activity    Alcohol use: Not Currently     Comment: occasionally    Drug use: Not Currently     Types: Marijuana     Comment: in past very little marijuana    Sexual activity: Yes     Partners: Male     Birth control/protection: None     Review of Systems   All other systems reviewed and are negative.    Objective:     Vital Signs (Most Recent):  Temp: 98.5 °F (36.9 °C) (08/27/24 2012)  Pulse: 65 (08/27/24 1932)  Resp: 18 (08/27/24 1745)  BP: 135/77 (08/27/24 1932)  SpO2: 96 % (08/27/24 1932) Vital Signs (24h Range):  Temp:  [98.1 °F (36.7 °C)-98.6 °F (37 °C)] 98.5 °F (36.9 °C)  Pulse:  [64-79] 65  Resp:  [14-20] 18  SpO2:  [95 %-100 %] 96 %  BP: (111-155)/(70-94) 135/77        There is no height or weight on file to calculate BMI.     Physical Exam  Vitals and nursing note reviewed.   Constitutional:       General: She is not in acute distress.     Appearance: She is well-developed.  She is not diaphoretic.   HENT:      Head: Normocephalic and atraumatic.   Eyes:      General: No scleral icterus.     Conjunctiva/sclera: Conjunctivae normal.   Neck:      Vascular: No JVD.   Cardiovascular:      Rate and Rhythm: Normal rate and regular rhythm.   Pulmonary:      Effort: Pulmonary effort is normal. No respiratory distress.   Musculoskeletal:      Right lower leg: No edema.      Left lower leg: No edema.   Skin:     Coloration: Skin is not jaundiced or pale.   Neurological:      Mental Status: She is alert and oriented to person, place, and time.      Motor: No abnormal muscle tone.      Comments: Stuttering speech (not baseline per patient and ), generalized weakness with possible left > right-sided weakness, no tremor, no nystagmus, no facial asymmetry   Psychiatric:         Mood and Affect: Mood normal.         Behavior: Behavior normal.                Significant Labs: All pertinent labs within the past 24 hours have been reviewed.  CBC:   Recent Labs   Lab 08/27/24  0904 08/27/24  1312   WBC 7.58 9.14   HGB 11.1* 11.8*   HCT 35.6* 35.8*    341     CMP:   Recent Labs   Lab 08/27/24  0904 08/27/24  1312    135*   K 3.7 3.7    107   CO2 23 18*   GLU 89 88   BUN 10 10   CREATININE 0.8 0.8   CALCIUM 8.8 8.7   PROT  --  7.9   ALBUMIN  --  3.8   BILITOT  --  0.6   ALKPHOS  --  62   AST  --  23   ALT  --  13   ANIONGAP 6* 10       Significant Imaging: I have reviewed all pertinent imaging results/findings within the past 24 hours.

## 2024-08-28 NOTE — PROCEDURES
DATE: 8/28/24    EEG NUMBER:  FH     REFERRING PHYSICIAN:  Dr. Farrell      This EEG was performed to assess for evidence of underlying epilepsy.     ELECTROENCEPHALOGRAM REPORT     METHODOLOGY:  Electroencephalographic (EEG) recording is with electrodes placed according to the International 10-20 placement system.  Thirty two (32) channels of digital signal are simultaneously recorded from the scalp and may include EKG, EMG, and/or eye monitors.   Recording band pass was 0.1 to 512 hz.  Digital video recording of the patient is simultaneously recorded with the EEG.  The staff report clinical symptoms and may press an event button when the patient has symptoms of clinical interest to the treating physicians.  EEG and video recording is stored and archived in digital format.  The entire recording is visually reviewed, and the times identified by computer analysis as being spikes or seizures are reviewed again.  Activation procedures which include photic stimulation, hyperventilation and instructing patients to perform simple task are done in selected patients.   Compresses spectral analysis (CSA) is also performed on the activity recorded from each individual channel.  This is displayed as a power display of frequencies from 0 to 30 Hz over time.   The CSA analysis is done and displayed continuously.  This is reviewed for asymmetries in power between homologous areas of the scalp and for presence of changes in power which can be seen when seizures occur.  Sections of suspected abnormalities on the CSA is then compared with the original EEG recording.                OneTag software was also utilized in the review of this study.  This software suite analyzes the EEG recording in multiple domains.  Coherence and rhythmicity is computed to identify EEG sections which may contain organized seizures.  Each channel undergoes analysis to detect presence of spike and sharp waves which have special and morphological  characteristic of epileptic activity.  The routine EEG recording is converted from spacial into frequency domain.  This is then displayed comparing homologous areas to identify areas of significant asymmetry.  Algorithm to identify non-cortically generated artifact is used to separate eye movement, EMG and other artifact from the EEG.    Recording times   Start on August 28, 2024 at hour 11 minute 40 seconds 3   End on August 28, 2024 at hour 14 minute 31 seconds 54  The total time of EEG recording for the study was 2 hours and 35 minutes    EEG FINDINGS:  The recording was obtained with a number of standard bipolar and referential montages during wakefulness, drowsiness and sleep.  In the alert state, the posterior background rhythm was a symmetric, well-modulated 9 to 10 Hz alpha rhythm, which reacted symmetrically to eye opening. During drowsiness, the background rhythm waxed and waned and there were periods of slowing.  During stage II sleep, symmetric V waves and sleep spindles were noted.  There were no focal abnormalities.  There were no interictal epileptiform abnormalities and no clinical or electrographic seizures were recorded.  1 episode of irregular head jerking with decreased responsiveness is noted without EEG correlate     The EKG channel revealed a sinus rhythm.  Frequent PVCs were noted     IMPRESSION:  This is a normal EEG during wakefulness, drowsiness and sleep.  One nonepileptic event was recorded     CLINICAL CORRELATION:  The patient is a 39 -year-old female who is being evaluated for hyper motor episodes. The patient is currently not maintained on any anti-seizure medications.  This is a normal EEG during wakefulness, drowsiness and sleep.  There is no evidence for neither cortical dysfunction nor an epileptic process on this recording.  One nonepileptic event was recorded. No seizures were recorded during this study.

## 2024-08-28 NOTE — PROGRESS NOTES
EEG Disconnect  PM Check Electrodes had to be fixed.No    Skin Integrity: Normal   EXT EEG performed; no signs of skin breakdown seen during hookup or disconnect  Alina Eli   08/28/2024 2:46 PM

## 2024-08-28 NOTE — ED NOTES
Telemetry Verification   Patient placed on Telemetry Box  Verified with War Room  Box # [id number]   Monitor Tech WARROOM   Rate 65   Rhythm NS

## 2024-08-28 NOTE — NURSING
Patient transferred to NPU room 924 at 09:30 pm.    Upon transfer to floor, patient greeted and assessed. Vitals taken. Neuro and NIHSS assessment completed. Team made aware of patient's transfer to floor. Patient Orders Reviewed. Emergency Equipment present in room. Fall precautions initiated. Patient acclimated to room. 4 Eyes skin assessment performed. See below.     Nurses Note -- 4 Eyes      Skin assessed during: Admit      [x] No Altered Skin Integrity Present    [x]Prevention Measures Documented      [] Yes- Altered Skin Integrity Present or Discovered   [] LDA Added if Not in Epic (Describe Wound)   [] New Altered Skin Integrity was Present on Admit and Documented in LDA   [] Wound Image Taken    Wound Care Consulted? No    Attending Nurse:  Darline Elise RN/Staff Member:  DIGNA Jama

## 2024-08-28 NOTE — PROGRESS NOTES
"[facility] - Neurosurgery ([facility])  [facility] Medicine  Progress Note    Patient Name: Nasra Marks  MRN: [id number]  Patient Class: OP- Observation   Admission Date: 8/27/2024  Length of Stay: 0 days  Attending Physician: Les Farrell MD  Primary Care Provider: Veronika Rainey MD        Subjective:     Principal Problem:Acute focal neurological deficit        HPI:  Nasra Marks is a 39 y.o. female with combined CHF (EF 30-35%), HTN, HLD, history of CVA who presents with multiple neurologic complaints following RHC today.     She reports that she awoke this morning feeling at her baseline, which includes no focal deficits and completely independent.  She went to her RHC and felt well until she developed a "tightness" in her chest, which she was told was due to the cath. She then felt like her head was shaking, and felt diffuse numbness across her body.  She describes generalized weakness along with this, more so on the left side.  She also developed a new stutter when speaking.  Given this constellation of symptoms, she was transferred to the ED for code stroke.  Overall workup was negative.  While in the ED, she complained of wandering symptoms, including right-sided weakness.  LP was attempted however unsuccessful.  Hospital medicine therefore consulted for further evaluation.    Overview/Hospital Course:  Stroke workup was unremarkable for any CVA on the MRI per stroke team.    Interval History:  No reports of any chest pain.  Afebrile.  Patient is still having some stuttering like episodes today.  Patient was able to tolerate p.o. breakfast this morning per the aunt at the bedside.  Neurology feels it could be functional, open to cap EEG.     Review of Systems  Objective:     Vital Signs (Most Recent):  Temp: 99.4 °F (37.4 °C) (08/28/24 1132)  Pulse: 86 (08/28/24 1139)  Resp: 18 (08/28/24 1132)  BP: 117/69 (08/28/24 1132)  SpO2: 98 % (08/28/24 1132) Vital Signs (24h Range):  Temp:  [97.8 °F (36.6 " °C)-99.4 °F (37.4 °C)] 99.4 °F (37.4 °C)  Pulse:  [64-96] 86  Resp:  [16-20] 18  SpO2:  [95 %-100 %] 98 %  BP: (105-167)/() 117/69     Weight: 88.1 kg (194 lb 3.6 oz)  Body mass index is 28.68 kg/m².  No intake or output data in the 24 hours ending 08/28/24 1512      Physical Exam      Clear lungs bilaterally, unlabored breathing, on room air, no cyanosis   Heart sounds indicate a regular rate and rhythm   Awake alert, no acute distress   No facial droop, no slurred speech   Has some generalized weakness, slightly more on the left than the right,  No obvious upper nor lower extremity edema   Patient having stuttered pattern of speech were she initially stutter is in the in his eventually able to smoothly verbalize the word.  Has intermittent tic like twitching of her neck from side-to-side    Assessment/Plan:      * Acute focal neurological deficit  new onset of stuttering, delayed speech, and somewhat wandering strength and sensory abnormalities after RHC procedure today.  CVA negative on MRI and vascular neurology team   known remote R cerebellar infarct. S  May be stroke recrudescence symptoms? TSH WNL, and UDS negative;  stable B12 and folate.   Neurology following; EEG cap    History of CVA (cerebrovascular accident)  Remote history of R cerebellar CVA, without significant residual focal deficits. Unclear if contributing to current symptoms. Continue secondary prevention with ASA, statin, and control of HTN. Monitoring on telemetry.       Chronic combined systolic and diastolic congestive heart failure  Patient with known combined CHF with last EF 30-35% on TTE this month, and Grade II diastolic dysfunction. Currently without evidence of volume overload on exam, and not in acute exacerbation. BNP around baseline. Will continue home GDMT with BB, ASA, statin, Entresto, and diuretics. Fluid-restricted diet ordered. Monitor volume status.     Essential hypertension  Chronic, controlled. Latest blood pressure  and vitals reviewed-     Temp:  [98.1 °F (36.7 °C)-98.6 °F (37 °C)]   Pulse:  [64-79]   Resp:  [14-20]   BP: (111-155)/(70-94)   SpO2:  [98 %-100 %] .   Home meds for hypertension were reviewed and noted below.   Hypertension Medications               amLODIPine (NORVASC) 5 MG tablet Take 5 mg by mouth.    eplerenone (INSPRA) 25 MG Tab Take 1 tablet (25 mg total) by mouth once daily.    furosemide (LASIX) 40 MG tablet Take 1 tablet (40 mg total) by mouth once daily.    metoprolol succinate (TOPROL-XL) 200 MG 24 hr tablet Take 1 tablet (200 mg total) by mouth once daily.    sacubitriL-valsartan (ENTRESTO)  mg per tablet Take 1 tablet by mouth 2 (two) times daily.            While in the hospital, will manage blood pressure as follows; since CVA ruled out, will continue home antihypertensive regimen    Will utilize p.r.n. blood pressure medication only if patient's blood pressure greater than 220/110 and she develops symptoms such as worsening chest pain or shortness of breath.      VTE Risk Mitigation (From admission, onward)           Ordered     enoxaparin injection 40 mg  Every 24 hours         08/27/24 1907     IP VTE HIGH RISK PATIENT  Once         08/27/24 1907     Place sequential compression device  Until discontinued         08/27/24 1907                    Discharge Planning   MELA:      Code Status: Full Code   Is the patient medically ready for discharge?:     Reason for patient still in hospital (select all that apply): Patient trending condition, Treatment, and Consult recommendations  Discharge Plan A: Home with family                  Les Farrell MD  Department of Hospital Medicine   [facility] - Neurosurgery ([facility])

## 2024-08-28 NOTE — ASSESSMENT & PLAN NOTE
new onset of stuttering, delayed speech, and somewhat wandering strength and sensory abnormalities after RHC procedure today.  CVA negative on MRI and vascular neurology team   known remote R cerebellar infarct. S  May be stroke recrudescence symptoms? TSH WNL, and UDS negative;  stable B12 and folate.   Neurology following; EEG cap

## 2024-08-28 NOTE — PLAN OF CARE
Problem: Adult Inpatient Plan of Care  Goal: Absence of Hospital-Acquired Illness or Injury  Reactivated  Intervention: Prevent Skin Injury  Flowsheets (Taken 8/28/2024 0709)  Device Skin Pressure Protection:   adhesive use limited   tubing/devices free from skin contact  Intervention: Prevent Infection  Flowsheets (Taken 8/28/2024 0709)  Infection Prevention:   environmental surveillance performed   equipment surfaces disinfected   hand hygiene promoted   personal protective equipment utilized   rest/sleep promoted   single patient room provided     Problem: Skin Injury Risk Increased  Goal: Skin Health and Integrity  Reactivated  Intervention: Optimize Skin Protection  Flowsheets (Taken 8/28/2024 0709)  Pressure Reduction Techniques: frequent weight shift encouraged

## 2024-08-28 NOTE — H&P
"[facility] Medicine  History & Physical    Patient Name: Nasra Marks  MRN: [id number]  Patient Class: OP- Observation  Admission Date: 8/27/2024  Attending Physician: Les Farrell MD   Primary Care Provider: Veronika Rainey MD         Patient information was obtained from patient and past medical records.     Subjective:     Principal Problem:Neurological complaint    Chief Complaint:   Chief Complaint   Patient presents with    Rapid Respone     Arrives via EMS for stroke eval post cath lab         HPI: Nasra Marks is a 39 y.o. female with combined CHF (EF 30-35%), HTN, HLD, history of CVA who presents with multiple neurologic complaints following RHC today.     She reports that she awoke this morning feeling at her baseline, which includes no focal deficits and completely independent.  She went to her RHC and felt well until she developed a "tightness" in her chest, which she was told was due to the cath. She then felt like her head was shaking, and felt diffuse numbness across her body.  She describes generalized weakness along with this, more so on the left side.  She also developed a new stutter when speaking.  Given this constellation of symptoms, she was transferred to the ED for code stroke.  Overall workup was negative.  While in the ED, she complained of wandering symptoms, including right-sided weakness.  LP was attempted however unsuccessful.  Hospital medicine therefore consulted for further evaluation.    Past Medical History:   Diagnosis Date    CHF (congestive heart failure)     Chronic combined systolic and diastolic congestive heart failure 5/24/2019    Essential hypertension 11/19/2012    Hypertension     dx at 16y.o.    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 disease, with heart failure 12/28/2021    ICD (implantable cardioverter-defibrillator), single, in situ 2/17/2020    Nonischemic cardiomyopathy 5/24/2019 "    Pacemaker 2017       Past Surgical History:   Procedure Laterality Date    CARDIAC DEFIBRILLATOR PLACEMENT  2017     SECTION      x 1    INDUCED       RIGHT HEART CATHETERIZATION Right 2022    Procedure: INSERTION, CATHETER, RIGHT HEART;  Surgeon: Timothy Prajapati Jr., MD;  Location: [facility] CATH LAB;  Service: Cardiology;  Laterality: Right;    TUBAL LIGATION         Review of patient's allergies indicates:   Allergen Reactions    Aldactone [spironolactone] Swelling     Lips swelled    Hydralazine analogues Other (See Comments)     headaches    Isosorbide Other (See Comments)     headaches       Current Facility-Administered Medications on File Prior to Encounter   Medication    [COMPLETED] iohexoL (OMNIPAQUE 350) injection 100 mL    [DISCONTINUED] LIDOcaine HCL 20 mg/ml (2%) injection     Current Outpatient Medications on File Prior to Encounter   Medication Sig    acetaminophen (TYLENOL) 500 MG tablet Take 1 tablet (500 mg total) by mouth every 6 (six) hours as needed for Pain (As needed for pain and fever).    amLODIPine (NORVASC) 5 MG tablet Take 5 mg by mouth.    aspirin (ECOTRIN) 81 MG EC tablet Take 1 tablet (81 mg total) by mouth once daily.    atorvastatin (LIPITOR) 40 MG tablet Take 1 tablet (40 mg total) by mouth every evening.    diclofenac sodium (VOLTAREN ARTHRITIS PAIN) 1 % Gel Apply to area of pain 3 times a day.    eplerenone (INSPRA) 25 MG Tab Take 1 tablet (25 mg total) by mouth once daily.    FARXIGA 10 mg tablet Take 1 tablet by mouth once daily    furosemide (LASIX) 40 MG tablet Take 1 tablet (40 mg total) by mouth once daily.    metoprolol succinate (TOPROL-XL) 200 MG 24 hr tablet Take 1 tablet (200 mg total) by mouth once daily.    mupirocin (BACTROBAN) 2 % ointment Apply topically 3 (three) times daily. for 10 days    oxyCODONE-acetaminophen (PERCOCET) 5-325 mg per tablet Take 1 tablet by mouth every 6 (six) hours as needed for Pain (As needed for severe  10/10 pain).    potassium chloride SA (K-DUR,KLOR-CON) 20 MEQ tablet Take 2 tablets (40 mEq total) by mouth 2 (two) times daily.    sacubitriL-valsartan (ENTRESTO)  mg per tablet Take 1 tablet by mouth 2 (two) times daily.     Family History       Problem Relation (Age of Onset)    Breast cancer Other    Cancer Maternal Grandmother, Paternal Grandmother, Paternal Aunt, Paternal Uncle    Diabetes Other    Hypertension Mother, Other    No Known Problems Sister, Brother, Son, Brother          Tobacco Use    Smoking status: Never    Smokeless tobacco: Never   Substance and Sexual Activity    Alcohol use: Not Currently     Comment: occasionally    Drug use: Not Currently     Types: Marijuana     Comment: in past very little marijuana    Sexual activity: Yes     Partners: Male     Birth control/protection: None     Review of Systems   All other systems reviewed and are negative.    Objective:     Vital Signs (Most Recent):  Temp: 98.5 °F (36.9 °C) (08/27/24 2012)  Pulse: 65 (08/27/24 1932)  Resp: 18 (08/27/24 1745)  BP: 135/77 (08/27/24 1932)  SpO2: 96 % (08/27/24 1932) Vital Signs (24h Range):  Temp:  [98.1 °F (36.7 °C)-98.6 °F (37 °C)] 98.5 °F (36.9 °C)  Pulse:  [64-79] 65  Resp:  [14-20] 18  SpO2:  [95 %-100 %] 96 %  BP: (111-155)/(70-94) 135/77        There is no height or weight on file to calculate BMI.     Physical Exam  Vitals and nursing note reviewed.   Constitutional:       General: She is not in acute distress.     Appearance: She is well-developed. She is not diaphoretic.   HENT:      Head: Normocephalic and atraumatic.   Eyes:      General: No scleral icterus.     Conjunctiva/sclera: Conjunctivae normal.   Neck:      Vascular: No JVD.   Cardiovascular:      Rate and Rhythm: Normal rate and regular rhythm.   Pulmonary:      Effort: Pulmonary effort is normal. No respiratory distress.   Musculoskeletal:      Right lower leg: No edema.      Left lower leg: No edema.   Skin:     Coloration: Skin is not  jaundiced or pale.   Neurological:      Mental Status: She is alert and oriented to person, place, and time.      Motor: No abnormal muscle tone.      Comments: Stuttering speech (not baseline per patient and ), generalized weakness with possible left > right-sided weakness, no tremor, no nystagmus, no facial asymmetry   Psychiatric:         Mood and Affect: Mood normal.         Behavior: Behavior normal.                Significant Labs: All pertinent labs within the past 24 hours have been reviewed.  CBC:   Recent Labs   Lab 08/27/24  0904 08/27/24  1312   WBC 7.58 9.14   HGB 11.1* 11.8*   HCT 35.6* 35.8*    341     CMP:   Recent Labs   Lab 08/27/24  0904 08/27/24  1312    135*   K 3.7 3.7    107   CO2 23 18*   GLU 89 88   BUN 10 10   CREATININE 0.8 0.8   CALCIUM 8.8 8.7   PROT  --  7.9   ALBUMIN  --  3.8   BILITOT  --  0.6   ALKPHOS  --  62   AST  --  23   ALT  --  13   ANIONGAP 6* 10       Significant Imaging: I have reviewed all pertinent imaging results/findings within the past 24 hours.  Assessment/Plan:     * Multiple neurological complaints  Presents to the ED after the new onset of stuttering, delayed speech, and somewhat wandering strength and sensory abnormalities after RHC procedure today. Initial stroke workup with MRI and CTA showed no evidence of LVO, acute stroke, or other acute abnormalities, however known remote R cerebellar infarct. She is HD stable, and labs overall at baseline. Somewhat confusing picture, as CVA ruled out, and low suspicion for seizure like activity. May be stroke recrudescence symptoms? TSH WNL, and UDS negative; will obtain B12 and folate. ED plan to obtain LP, however low suspicion for acute infectious or autoimmune disorder given history and timing. Will continue secondary prevention of CVA as below, and monitor on telemetry. Consult Neurology for further evaluation and recommendations.      History of CVA (cerebrovascular accident)  Remote history  of R cerebellar CVA, without significant residual focal deficits. Unclear if contributing to current symptoms. Continue secondary prevention with ASA, statin, and control of HTN. Monitoring on telemetry.       Chronic combined systolic and diastolic congestive heart failure  Patient with known combined CHF with last EF 30-35% on TTE this month, and Grade II diastolic dysfunction. Currently without evidence of volume overload on exam, and not in acute exacerbation. BNP around baseline. Will continue home GDMT with BB, ASA, statin, Entresto, and diuretics. Fluid-restricted diet ordered. Monitor volume status.     Essential hypertension  Chronic, controlled. Latest blood pressure and vitals reviewed-     Temp:  [98.1 °F (36.7 °C)-98.6 °F (37 °C)]   Pulse:  [64-79]   Resp:  [14-20]   BP: (111-155)/(70-94)   SpO2:  [98 %-100 %] .   Home meds for hypertension were reviewed and noted below.   Hypertension Medications               amLODIPine (NORVASC) 5 MG tablet Take 5 mg by mouth.    eplerenone (INSPRA) 25 MG Tab Take 1 tablet (25 mg total) by mouth once daily.    furosemide (LASIX) 40 MG tablet Take 1 tablet (40 mg total) by mouth once daily.    metoprolol succinate (TOPROL-XL) 200 MG 24 hr tablet Take 1 tablet (200 mg total) by mouth once daily.    sacubitriL-valsartan (ENTRESTO)  mg per tablet Take 1 tablet by mouth 2 (two) times daily.            While in the hospital, will manage blood pressure as follows; since CVA ruled out, will continue home antihypertensive regimen    Will utilize p.r.n. blood pressure medication only if patient's blood pressure greater than 220/110 and she develops symptoms such as worsening chest pain or shortness of breath.      VTE Risk Mitigation (From admission, onward)           Ordered     enoxaparin injection 40 mg  Every 24 hours         08/27/24 1907     IP VTE HIGH RISK PATIENT  Once         08/27/24 1907     Place sequential compression device  Until discontinued          08/27/24 1907                       On 08/27/2024, patient should be placed in hospital observation services under my care.    Advance Care Planning   I spent less than 16 minutes discussing advance care planning.   Patient is FULL CODE on discussion with her.  Patient's surrogate decision maker is her .            Dandre Gould MD  Department of Hospital Medicine  [facility] - Emergency Dept

## 2024-08-28 NOTE — HPI
39 y.o. female with HTN, HFrEF (LVEF 25%) 2/2 non-ischemic cardiomyopathy s/p ICD (placed 2017), R PICA territory infarct (2022) without residual deficits presented 8/27/24 for elective RHC. Following RHC procedure, she was complaining of R sided squeezing headache, speech difficulty and tremulousness that fluctuated between RUE to all extremities. Rapid response was called. NIHSS 7, Vascular Neurology evaluated. CTH/CTA MP unremarkable for acute findings or LVO. MRI/MRA brain with no acute pathology, just remote R cerebellar infarct. Vascular Neurology recommended continue home ASA 81 mg and statin for stroke prevention and signed off.   Neurology consulted 8/28 for numerous neurologic complaints. Pt unable to move left side with sensory deficits. Has stuttering speech.

## 2024-08-28 NOTE — SUBJECTIVE & OBJECTIVE
Past Medical History:   Diagnosis Date    CHF (congestive heart failure)     Chronic combined systolic and diastolic congestive heart failure 2019    Essential hypertension 2012    Hypertension     dx at 16y.o.    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 disease, with heart failure 2021    ICD (implantable cardioverter-defibrillator), single, in situ 2020    Nonischemic cardiomyopathy 2019    Pacemaker 2017     Past Surgical History:   Procedure Laterality Date    CARDIAC DEFIBRILLATOR PLACEMENT  2017     SECTION      x 1    INDUCED       RIGHT HEART CATHETERIZATION Right 2022    Procedure: INSERTION, CATHETER, RIGHT HEART;  Surgeon: Timothy Prajapati Jr., MD;  Location: [facility] CATH LAB;  Service: Cardiology;  Laterality: Right;    RIGHT HEART CATHETERIZATION Right 2024    Procedure: INSERTION, CATHETER, RIGHT HEART;  Surgeon: Lior Rueda MD;  Location: [facility] CATH LAB;  Service: Cardiology;  Laterality: Right;    TUBAL LIGATION       Review of patient's allergies indicates:   Allergen Reactions    Aldactone [spironolactone] Swelling     Lips swelled    Hydralazine analogues Other (See Comments)     headaches    Isosorbide Other (See Comments)     headaches     No current facility-administered medications on file prior to encounter.     Current Outpatient Medications on File Prior to Encounter   Medication Sig    acetaminophen (TYLENOL) 500 MG tablet Take 1 tablet (500 mg total) by mouth every 6 (six) hours as needed for Pain (As needed for pain and fever).    amLODIPine (NORVASC) 5 MG tablet Take 5 mg by mouth.    aspirin (ECOTRIN) 81 MG EC tablet Take 1 tablet (81 mg total) by mouth once daily.    atorvastatin (LIPITOR) 40 MG tablet Take 1 tablet (40 mg total) by mouth every evening.    diclofenac sodium (VOLTAREN ARTHRITIS PAIN) 1 % Gel Apply to area of pain 3 times a day.    eplerenone (INSPRA) 25 MG  Tab Take 1 tablet (25 mg total) by mouth once daily.    FARXIGA 10 mg tablet Take 1 tablet by mouth once daily    furosemide (LASIX) 40 MG tablet Take 1 tablet (40 mg total) by mouth once daily.    metoprolol succinate (TOPROL-XL) 200 MG 24 hr tablet Take 1 tablet (200 mg total) by mouth once daily.    mupirocin (BACTROBAN) 2 % ointment Apply topically 3 (three) times daily. for 10 days    oxyCODONE-acetaminophen (PERCOCET) 5-325 mg per tablet Take 1 tablet by mouth every 6 (six) hours as needed for Pain (As needed for severe 10/10 pain).    potassium chloride SA (K-DUR,KLOR-CON) 20 MEQ tablet Take 2 tablets (40 mEq total) by mouth 2 (two) times daily.    sacubitriL-valsartan (ENTRESTO)  mg per tablet Take 1 tablet by mouth 2 (two) times daily.     Family History       Problem Relation (Age of Onset)    Breast cancer Other    Cancer Maternal Grandmother, Paternal Grandmother, Paternal Aunt, Paternal Uncle    Diabetes Other    Hypertension Mother, Other    No Known Problems Sister, Brother, Son, Brother          Tobacco Use    Smoking status: Never    Smokeless tobacco: Never   Substance and Sexual Activity    Alcohol use: Not Currently     Comment: occasionally    Drug use: Not Currently     Types: Marijuana     Comment: in past very little marijuana    Sexual activity: Yes     Partners: Male     Birth control/protection: None     Review of Systems   Constitutional:  Positive for activity change and fatigue.   HENT:  Positive for voice change. Negative for trouble swallowing.    Eyes:  Negative for visual disturbance.   Respiratory:  Negative for shortness of breath.    Cardiovascular:  Negative for chest pain and leg swelling.   Gastrointestinal:  Negative for nausea and vomiting.   Musculoskeletal:  Positive for gait problem. Negative for neck stiffness.   Neurological:  Positive for tremors, speech difficulty, weakness and headaches. Negative for syncope.   Psychiatric/Behavioral:  Positive for dysphoric  mood. Negative for agitation.      Objective:     Vital Signs (Most Recent):  Temp: 99.4 °F (37.4 °C) (08/28/24 1132)  Pulse: 73 (08/28/24 1513)  Resp: 18 (08/28/24 1132)  BP: 117/69 (08/28/24 1132)  SpO2: 98 % (08/28/24 1132) Vital Signs (24h Range):  Temp:  [97.8 °F (36.6 °C)-99.4 °F (37.4 °C)] 99.4 °F (37.4 °C)  Pulse:  [64-96] 73  Resp:  [16-20] 18  SpO2:  [95 %-100 %] 98 %  BP: (105-167)/() 117/69     Weight: 88.1 kg (194 lb 3.6 oz)  Body mass index is 28.68 kg/m².     Physical Exam  Eyes:      Extraocular Movements: EOM normal.      Pupils: Pupils are equal, round, and reactive to light.   Neurological:      Deep Tendon Reflexes:      Reflex Scores:       Bicep reflexes are 2+ on the right side and 2+ on the left side.       Brachioradialis reflexes are 2+ on the right side and 2+ on the left side.       Patellar reflexes are 2+ on the right side and 2+ on the left side.         NEUROLOGICAL EXAMINATION:     MENTAL STATUS   Level of consciousness: alert  Normal comprehension.        Oriented to self  Able to follow simple commands   Stuttering speech that is distractible with breathing techniques      CRANIAL NERVES     CN III, IV, VI   Pupils are equal, round, and reactive to light.  Extraocular motions are normal.   Nystagmus: none   Ophthalmoparesis: none    CN VIII   Hearing: intact    CN XII   CN XII normal.        R face diminished light touch     MOTOR EXAM   Muscle bulk: normal       Giveway weakness on L arm  +Tucker maneuver   When left arm is released above her head, she is able to move away from her face      REFLEXES     Reflexes   Right brachioradialis: 2+  Left brachioradialis: 2+  Right biceps: 2+  Left biceps: 2+  Right patellar: 2+  Left patellar: 2+    SENSORY EXAM        Midline split with light touch and vibration      GAIT AND COORDINATION     Gait  Gait: (deferred)    Tremor   Resting tremor: present    Significant Labs: All pertinent lab results from the past 24 hours have been  "reviewed.    EEG (8/28/24)  Clinical event captured at 14:00, pt shaking head side to side in "no no"  Stuttering speech, decreased responsiveness  Prelim per Dr. Contreras with out focal cortical irritability or electrographic seizures     Significant Imaging: I have reviewed all pertinent imaging results/findings within the past 24 hours.  "

## 2024-08-28 NOTE — ASSESSMENT & PLAN NOTE
39 y.o. female with HTN, HFrEF 2/2 non-ischemic cardiomyopathy s/p ICD, R PICA territory infarct (2022) without residual deficits presented 8/27/24 for elective RHC. Following RHC procedure, she was complaining of R sided headache, speech difficulty and tremulousness. Rapid response was called. NIHSS 7, Vascular Neurology evaluated. Imaging with CTH/CTA MP/MRI/MRA brain without acute pathology. Neurology consulted 8/28 for numerous neurologic complaints including new stuttering speech, abnormal head movements and tremulousness. EEG 8/28 captured clinical events without electrographic correlate c/w PNEE.     Recommendations:  --ok to unhook EEG  No ativan or anti seizure medications warranted  --consider having Cardiology discuss RHC findings as underlying cardiac disease may be potential stressor for PNEE  --outpatient neuropsych assessment   Psych talk therapy

## 2024-08-28 NOTE — CONSULTS
Peter Beyer - Neurosurgery ([facility])  Neurology  Consult Note    Patient Name: Nasra Marks  MRN: [id number]  Admission Date: 8/27/2024  Hospital Length of Stay: 0 days  Code Status: Full Code   Attending Provider: Les Farrell MD   Consulting Provider: Elodia Chris PA-C  Primary Care Physician: Veronika Rainey MD  Principal Problem:Acute focal neurological deficit    Inpatient consult to Neurology  Consult performed by: Elodia Chris PA-C  Consult ordered by: Dandre Gould MD         Subjective:     Chief Complaint:  neuro complaints      HPI:   39 y.o. female with HTN, HFrEF (LVEF 25%) 2/2 non-ischemic cardiomyopathy s/p ICD (placed 2017), R PICA territory infarct (2022) without residual deficits presented 8/27/24 for elective RHC. Following RHC procedure, she was complaining of R sided squeezing headache, speech difficulty and tremulousness that fluctuated between RUE to all extremities. Rapid response was called. NIHSS 7, Vascular Neurology evaluated. CTH/CTA MP unremarkable for acute findings or LVO. MRI/MRA brain with no acute pathology, just remote R cerebellar infarct. Vascular Neurology recommended continue home ASA 81 mg and statin for stroke prevention and signed off.   Neurology consulted 8/28 for numerous neurologic complaints. Pt unable to move left side with sensory deficits. Has stuttering speech.     Past Medical History:   Diagnosis Date    CHF (congestive heart failure)     Chronic combined systolic and diastolic congestive heart failure 5/24/2019    Essential hypertension 11/19/2012    Hypertension     dx at 16y.o.    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 disease, with heart failure 12/28/2021    ICD (implantable cardioverter-defibrillator), single, in situ 2/17/2020    Nonischemic cardiomyopathy 5/24/2019    Pacemaker 12/18/2017     Past Surgical History:   Procedure Laterality Date    CARDIAC DEFIBRILLATOR PLACEMENT  12/2017      SECTION      x 1    INDUCED       RIGHT HEART CATHETERIZATION Right 2022    Procedure: INSERTION, CATHETER, RIGHT HEART;  Surgeon: Timothy Prajapati Jr., MD;  Location: [facility] CATH LAB;  Service: Cardiology;  Laterality: Right;    RIGHT HEART CATHETERIZATION Right 2024    Procedure: INSERTION, CATHETER, RIGHT HEART;  Surgeon: Lior Rueda MD;  Location: [facility] CATH LAB;  Service: Cardiology;  Laterality: Right;    TUBAL LIGATION       Review of patient's allergies indicates:   Allergen Reactions    Aldactone [spironolactone] Swelling     Lips swelled    Hydralazine analogues Other (See Comments)     headaches    Isosorbide Other (See Comments)     headaches     No current facility-administered medications on file prior to encounter.     Current Outpatient Medications on File Prior to Encounter   Medication Sig    acetaminophen (TYLENOL) 500 MG tablet Take 1 tablet (500 mg total) by mouth every 6 (six) hours as needed for Pain (As needed for pain and fever).    amLODIPine (NORVASC) 5 MG tablet Take 5 mg by mouth.    aspirin (ECOTRIN) 81 MG EC tablet Take 1 tablet (81 mg total) by mouth once daily.    atorvastatin (LIPITOR) 40 MG tablet Take 1 tablet (40 mg total) by mouth every evening.    diclofenac sodium (VOLTAREN ARTHRITIS PAIN) 1 % Gel Apply to area of pain 3 times a day.    eplerenone (INSPRA) 25 MG Tab Take 1 tablet (25 mg total) by mouth once daily.    FARXIGA 10 mg tablet Take 1 tablet by mouth once daily    furosemide (LASIX) 40 MG tablet Take 1 tablet (40 mg total) by mouth once daily.    metoprolol succinate (TOPROL-XL) 200 MG 24 hr tablet Take 1 tablet (200 mg total) by mouth once daily.    mupirocin (BACTROBAN) 2 % ointment Apply topically 3 (three) times daily. for 10 days    oxyCODONE-acetaminophen (PERCOCET) 5-325 mg per tablet Take 1 tablet by mouth every 6 (six) hours as needed for Pain (As needed for severe 10/10 pain).    potassium chloride SA  (K-DUR,KLOR-CON) 20 MEQ tablet Take 2 tablets (40 mEq total) by mouth 2 (two) times daily.    sacubitriL-valsartan (ENTRESTO)  mg per tablet Take 1 tablet by mouth 2 (two) times daily.     Family History       Problem Relation (Age of Onset)    Breast cancer Other    Cancer Maternal Grandmother, Paternal Grandmother, Paternal Aunt, Paternal Uncle    Diabetes Other    Hypertension Mother, Other    No Known Problems Sister, Brother, Son, Brother          Tobacco Use    Smoking status: Never    Smokeless tobacco: Never   Substance and Sexual Activity    Alcohol use: Not Currently     Comment: occasionally    Drug use: Not Currently     Types: Marijuana     Comment: in past very little marijuana    Sexual activity: Yes     Partners: Male     Birth control/protection: None     Review of Systems   Constitutional:  Positive for activity change and fatigue.   HENT:  Positive for voice change. Negative for trouble swallowing.    Eyes:  Negative for visual disturbance.   Respiratory:  Negative for shortness of breath.    Cardiovascular:  Negative for chest pain and leg swelling.   Gastrointestinal:  Negative for nausea and vomiting.   Musculoskeletal:  Positive for gait problem. Negative for neck stiffness.   Neurological:  Positive for tremors, speech difficulty, weakness and headaches. Negative for syncope.   Psychiatric/Behavioral:  Positive for dysphoric mood. Negative for agitation.      Objective:     Vital Signs (Most Recent):  Temp: 99.4 °F (37.4 °C) (08/28/24 1132)  Pulse: 73 (08/28/24 1513)  Resp: 18 (08/28/24 1132)  BP: 117/69 (08/28/24 1132)  SpO2: 98 % (08/28/24 1132) Vital Signs (24h Range):  Temp:  [97.8 °F (36.6 °C)-99.4 °F (37.4 °C)] 99.4 °F (37.4 °C)  Pulse:  [64-96] 73  Resp:  [16-20] 18  SpO2:  [95 %-100 %] 98 %  BP: (105-167)/() 117/69     Weight: 88.1 kg (194 lb 3.6 oz)  Body mass index is 28.68 kg/m².     Physical Exam  Eyes:      Extraocular Movements: EOM normal.      Pupils: Pupils are  "equal, round, and reactive to light.   Neurological:      Deep Tendon Reflexes:      Reflex Scores:       Bicep reflexes are 2+ on the right side and 2+ on the left side.       Brachioradialis reflexes are 2+ on the right side and 2+ on the left side.       Patellar reflexes are 2+ on the right side and 2+ on the left side.         NEUROLOGICAL EXAMINATION:     MENTAL STATUS   Level of consciousness: alert  Normal comprehension.        Oriented to self  Able to follow simple commands   Stuttering speech that is distractible with breathing techniques      CRANIAL NERVES     CN III, IV, VI   Pupils are equal, round, and reactive to light.  Extraocular motions are normal.   Nystagmus: none   Ophthalmoparesis: none    CN VIII   Hearing: intact    CN XII   CN XII normal.        R face diminished light touch     MOTOR EXAM   Muscle bulk: normal       Giveway weakness on L arm  +Tucker maneuver   When left arm is released above her head, she is able to move away from her face      REFLEXES     Reflexes   Right brachioradialis: 2+  Left brachioradialis: 2+  Right biceps: 2+  Left biceps: 2+  Right patellar: 2+  Left patellar: 2+    SENSORY EXAM        Midline split with light touch and vibration      GAIT AND COORDINATION     Gait  Gait: (deferred)    Tremor   Resting tremor: present    Significant Labs: All pertinent lab results from the past 24 hours have been reviewed.    EEG (8/28/24)  Clinical event captured at 14:00, pt shaking head side to side in "no no"  Stuttering speech, decreased responsiveness  Prelim per Dr. Contreras with out focal cortical irritability or electrographic seizures     Significant Imaging: I have reviewed all pertinent imaging results/findings within the past 24 hours.  Assessment and Plan:     Conversion disorder  39 y.o. female with HTN, HFrEF 2/2 non-ischemic cardiomyopathy s/p ICD, R PICA territory infarct (2022) without residual deficits presented 8/27/24 for elective RHC. Following RHC " procedure, she was complaining of R sided headache, speech difficulty and tremulousness. Rapid response was called. NIHSS 7, Vascular Neurology evaluated. Imaging with CTH/CTA MP/MRI/MRA brain without acute pathology. Neurology consulted 8/28 for numerous neurologic complaints including new stuttering speech, abnormal head movements and tremulousness. EEG 8/28 captured clinical events without electrographic correlate c/w PNEE.     Recommendations:  --ok to unhook EEG  No ativan or anti seizure medications warranted  --consider having Cardiology discuss RHC findings as underlying cardiac disease may be potential stressor for PNEE  --outpatient neuropsych assessment     VTE Risk Mitigation (From admission, onward)           Ordered     enoxaparin injection 40 mg  Every 24 hours         08/27/24 1907     IP VTE HIGH RISK PATIENT  Once         08/27/24 1907     Place sequential compression device  Until discontinued         08/27/24 1907                  Thank you for your consult. I will sign off. Please contact us if you have any additional questions.    Elodia Chris PA-C  General Neurology Consult

## 2024-08-28 NOTE — PLAN OF CARE
Inpatient Upgrade Note    Nasra Marks has warranted treatment spanning two or more midnights of hospital level care for the management of  Acute focal neurological deficit . She continues to require daily labs, further testing/imaging, monitoring of vital signs, medication adjustments, and further evaluation by consultants. Her condition is also complicated by the following comorbidities: Hypertension and Heart failure.

## 2024-08-28 NOTE — CARE UPDATE
RAPID RESPONSE NURSE FOLLOW-UP NOTE       Followed up with patient for a rapid response.  No acute issues at this time. Vital signs stable, though patient is having frequent PVCs/ectopy on continuous cardiac monitoring. No further episodes of altered mental status. Reviewed plan of care with bedside RNDarline .   Rapid response team will sign off at this time.  Please call Rapid Response ANJELICA SAWYER RN with any questions or concerns at [phone].

## 2024-08-28 NOTE — CARE UPDATE
I have reviewed the chart of Nasra Marks who is hospitalized for the following:    Active Hospital Problems    Diagnosis    *Acute focal neurological deficit    Hyponatremia     Monitor with CMP  Replace as needed      Conversion disorder    Pain     Tooth pain  Motrin ordered      Hypokalemia     K 3.4  Replace  Monitor with daily cmp      History of CVA (cerebrovascular accident)    ICD (implantable cardioverter-defibrillator), single, in situ    Chronic combined systolic and diastolic congestive heart failure    Essential hypertension        Macy Don NP  Unit Based [facility]

## 2024-08-29 ENCOUNTER — EPISODE CHANGES (OUTPATIENT)
Dept: CARDIOLOGY | Facility: CLINIC | Age: 39
End: 2024-08-29

## 2024-08-29 VITALS
HEIGHT: 69 IN | BODY MASS INDEX: 28.77 KG/M2 | DIASTOLIC BLOOD PRESSURE: 65 MMHG | OXYGEN SATURATION: 100 % | RESPIRATION RATE: 18 BRPM | HEART RATE: 65 BPM | SYSTOLIC BLOOD PRESSURE: 108 MMHG | WEIGHT: 194.25 LBS | TEMPERATURE: 98 F

## 2024-08-29 LAB
ANION GAP SERPL CALC-SCNC: 6 MMOL/L (ref 8–16)
BUN SERPL-MCNC: 11 MG/DL (ref 6–20)
CALCIUM SERPL-MCNC: 9.3 MG/DL (ref 8.7–10.5)
CHLORIDE SERPL-SCNC: 107 MMOL/L (ref 95–110)
CO2 SERPL-SCNC: 23 MMOL/L (ref 23–29)
CREAT SERPL-MCNC: 0.8 MG/DL (ref 0.5–1.4)
EST. GFR  (NO RACE VARIABLE): >60 ML/MIN/1.73 M^2
GLUCOSE SERPL-MCNC: 109 MG/DL (ref 70–110)
POTASSIUM SERPL-SCNC: 4.1 MMOL/L (ref 3.5–5.1)
SODIUM SERPL-SCNC: 136 MMOL/L (ref 136–145)

## 2024-08-29 PROCEDURE — 97530 THERAPEUTIC ACTIVITIES: CPT

## 2024-08-29 PROCEDURE — 97112 NEUROMUSCULAR REEDUCATION: CPT

## 2024-08-29 PROCEDURE — 97116 GAIT TRAINING THERAPY: CPT

## 2024-08-29 PROCEDURE — 80048 BASIC METABOLIC PNL TOTAL CA: CPT | Performed by: HOSPITALIST

## 2024-08-29 PROCEDURE — 25000003 PHARM REV CODE 250: Performed by: STUDENT IN AN ORGANIZED HEALTH CARE EDUCATION/TRAINING PROGRAM

## 2024-08-29 PROCEDURE — 97166 OT EVAL MOD COMPLEX 45 MIN: CPT

## 2024-08-29 PROCEDURE — 36415 COLL VENOUS BLD VENIPUNCTURE: CPT | Performed by: HOSPITALIST

## 2024-08-29 PROCEDURE — 97162 PT EVAL MOD COMPLEX 30 MIN: CPT

## 2024-08-29 RX ORDER — FLUOXETINE 10 MG/1
10 CAPSULE ORAL DAILY
Qty: 30 CAPSULE | Refills: 0 | Status: SHIPPED | OUTPATIENT
Start: 2024-08-29 | End: 2024-09-28

## 2024-08-29 RX ADMIN — FUROSEMIDE 40 MG: 40 TABLET ORAL at 08:08

## 2024-08-29 RX ADMIN — ASPIRIN 81 MG: 81 TABLET, COATED ORAL at 08:08

## 2024-08-29 RX ADMIN — METOPROLOL SUCCINATE 200 MG: 100 TABLET, EXTENDED RELEASE ORAL at 08:08

## 2024-08-29 RX ADMIN — EPLERENONE 25 MG: 25 TABLET, FILM COATED ORAL at 08:08

## 2024-08-29 RX ADMIN — POTASSIUM CHLORIDE 40 MEQ: 1500 TABLET, EXTENDED RELEASE ORAL at 08:08

## 2024-08-29 RX ADMIN — AMLODIPINE BESYLATE 5 MG: 5 TABLET ORAL at 08:08

## 2024-08-29 RX ADMIN — SACUBITRIL AND VALSARTAN 1 TABLET: 97; 103 TABLET, FILM COATED ORAL at 08:08

## 2024-08-29 NOTE — PLAN OF CARE
Spoke to representative Long at [facility] to schedule patient follow-up appointment.  Patient follow-up appointment was scheduled for 9/12/24 at 10:40 am.  Patient was also placed on waiting list.      Brayan MASSEY, RSW  Case Management  [phone]

## 2024-08-29 NOTE — DISCHARGE INSTRUCTIONS
[facility] Mental Health Centers    [facility]  (aka [facility], aka [facility])  Serves [facility], and [facility] residents.  Serves uninsured patients & those with Medicaid.  Main location: [address] [place], [place] [address] [phone]  Walk-in's available during regular business hours.  24/7 Crisis Line: [phone]    [facility]  (aka [facility])  Serves [facility].  Serves uninsured patients, those with Medicaid and some private plans.  Walk-in's available during regular business hours.  Primary care services available as well.  [facility]: [address] [place] [address];   [phone]  [place]: [address] [place] [address];   [phone]  24/7 Crisis Line: [phone]    [facility]  [facility], [address]  [address]  Mon, Wed, Fri- 1-4:30pm  [phone], [phone]    [facility]  Serves uninsured patients & those with Medicaid, call for more info.  Primary care, pediatrics, HIV treatment, and dentistry services available as well.   Three locations.  [phone]    [facility] Corporate [facility]  Serves patients with Medicaid, Medicare, and private insurance  [address] [place] [address] [phone]    [facility]  Serves uninsured on a sliding scale, as well as Medicaid, Medicare, and private plans.  Eight locations around the [facility] area.  [phone]    [facility]  Serves uninsured patients & those with Medicaid, private insurances.  Primary care available as well.   [phone]  [address] [place] [address]    [facility]  Serves veterans who were honorably discharged.  [address] [place], [place] [address] [phone]   24/7  Veterans Crisis Line: [phone] (Press 1)    If you have private insurance and need to find a specialist, please contact your insurance network to request a list of providers covered by your benefits.    MEDICAID THERAPY RESOURCES:    1) [facility]   [phone] [facility] residents    [phone]  [facility] residents.    After hours crisis line: [phone]     2) [facility] [phone]       3) Marta Cutler, [facility]   [address]   [address]   [place] [address]   [phone]     4) Georgina Woods, [facility]     [address]   [address]   [place] [address] [phone]           REFERRAL RECOMMENDATIONS FOR SUBSTANCE ABUSE & MENTAL HEALTH      IN CASE OF SUICIDAL THINKING, call the National Suicide Hotline Number: 988    988 Suicide & Crisis Lifeline: 988 , 0-680-371-NBTP (9226)  https://988Caption Data.org       SUBSTANCE ABUSE:     [facility] (formerly known as the ABU)  [x] [phone], Option 2  [x] [address] 4th Floor, [place] [address]  [x] [email or web address]/services/[email or web address]-[email or web address]  [x] The [facility] delivers comprehensive and collaborative treatment for alcohol and substance use disorders.  Excellent program for working professionals or anyone else seeking recovery.  [x] Requires insurance approval prior to starting program, call number above for more information.  [x] Intensive Outpatient Rehabilitation Program - M-F 9am-3pm - daily groups with psychologists and social workers, sessions with MDs 3x per week   [x] Ambulatory detox and dual diagnosis available      SUBOXONE:     NOTE: some Suboxone clinics require their clients to participate in a structured program (such as an IOP) in order to be prescribed Suboxone.  Some clinics have a long waiting list.  Most of these clinics do not accept walk-in clients, so call first to to learn  what must be done to get started on Suboxone.    [facility] - [phone] (can do Sublocade)  [address], [place] [address]    [facility]  [address] [place], [place]  [phone]    [facility] - [phone] (can do Sublocade)  [address] [place] [address]    [facility]  [address] [place]  [phone]     [facility] (very short waiting list, may accept some walk-in's but call first if possible)  [address], [place] [address]  [phone]; [phone]    [facility]   [address] [place]    [phone]    [facility] (can usually be seen within a week but is cash only for appointment)  [address] [place], [place]    [facility] ([facility])  [address] [place] [address]  [phone]    [facility] ([facility])  [address] [place]  [phone]    [facility] ([facility])  [address], [place] [address]  [phone]    [place], [place]:    [facility] - [address] [place] [address] - Tel: [phone]    Kashif Mckoy - [address] [place] [address] - Tel: [phone]    Tu Browne - [address] [place] [address] - Tel: [phone]    Ryaray Rivera - [address] [place] [address] - Tel: [phone]    Mick Morrissey - [address] [place] [address] - Tel: [phone]    [place], [place]:     Dr. Maricarmen Elena and Dr. Bruno Titus - [address], [place], [place] - Tel: [phone]    Dr. Elen Chatman - [address] [place], [place] - Tel: [phone]    Dr. Oscar Bryan - Tel: [phone]    Dr. [facility] - [phone]      METHADONE:     [facility] (the only methadone clinic in the [facility], has two locations)  [x] [place] - [address] [place] [address], [phone]  [x] [facility] [place] [address], [phone]    12 STEP PROGRAMS (and similar):      Alcoholics Anonymous (local)  [x] [phone]  [x] www.[place].org for schedules for in-person and online meetings  [x] There are AA meetings throughout the day all over town  [x] AA costs nothing to attend; they pass a basket for donations but this is not required    Narcotics Anonymous  [x] [phone]  [x] www.noana.org  [x] There are NA meetings throughout the day all over town  [x] NA costs nothing to attend; they pass a basket for donations but this is not required    Alcoholics Anonymous Online Intergroup (national)  [x] www.aa-intergroup.org  [x] Good resource for large, nation-wide meetings  [x] Can also attend smaller, local meetings in other cities  [x] Countless meetings all day and all night  [x] AA costs nothing to attend; they pass a basket for donations but this is not required    [facility] - 24/7 zoom meetings for women and coed - sign on anytime, anywhere!  https://DigitalAdvisor/93-9-wtfgpneg/    Online Intergroup of AA - 121 Open AA [place] Meeting - 24/7 zoom meetings  https://aa-intergroup.org/meetings/    LOOKING FOR AN ALTERNATIVE TO 12 STEP PROGRAMS - check out:  SMART Recovery: https://www.smartrecovery.org/about-us  [facility]: https://recoverydharma.org      DETOX UNITS (USUALLY 5-7 DAYS):     [facility]: [address], [place]  [phone], call first to ensure bed availability    [facility]: [address] [place]  [phone], Option 1, call first to ensure bed availability    [facility]: [address], [place]  [phone] (intake by appointment only)    [facility]: [address], [place]  [phone]      INTENSIVE OUTPATIENT PROGRAMS:     [facility] RECOV[facility]GRAM (formerly known as the ABU)  [x] [phone], Option 2  [x] [address] 4th Floor, [place] [address]  [x] [email or web address]://www.[email or web address]/services/[email or web address]-recovery-program  [x] The [facility] delivers comprehensive  and collaborative treatment for alcohol and substance use disorders.  Excellent program for working professionals or anyone else seeking recovery.  [x] Requires insurance approval prior to starting program, call number above for more information.  [x] Intensive Outpatient Rehabilitation Program - M-F 9am-3pm - daily groups with psychologists and social workers, sessions with MDs 3x per week   [x] Ambulatory detox and dual diagnosis available    [facility] Intensive Outpatient Program  [x] [phone]  [x] [facility] (the clinic not on [facility] main [facility])  [x] Call number above for more info and to check insurance requirements    [facility]  [address] [place], [place] [address] [phone]    [facility]:  [address]?, [place], [place] [address]?, [phone]  [address] [place], [place] [address]?, [phone]    RESIDENTIAL REHABS (USUALLY 28 DAYS):     [facility]: [address], [phone]    [facility]: [address] [facility]  [phone] (intake by appointment only)    [facility] (men only) [address] [facility], [phone]    [facility] (Female only) [address] [facility], [phone]    [facility]: [address], [facility], men's program [phone], women's program [phone]    [facility]: [address], [facility], [phone]    [facility]: [address], [place], [place], [phone]    [facility]: Men only, [phone], [address] [place]    [facility]: [address], [place], [place], [phone]    [facility]: [address] [place] [address],  [phone]  New Location: [address] [place] [address] [address], [place] [address], [phone]    [facility]:   ?[address]?[place], [place] [address]?[phone]    Mary: [address], [place] [address], [phone]    Emanuel  [place]: [place], [phone]     [facility]: [place], [place], [phone]    [facility] [address]: [place], [place], [phone]    [facility]: [place], [place], [phone]    [place]: [address] [place], [phone]    [facility]: [address], [place] [address], [phone]    COMMUNITY ADDICTION CLINICS:     [facility]: [address] [place], [phone] -or- [address] [place] [address]    [facility] [place]: [address], [place]  [address]     [facility]: Clinics [phone]; Crisis [phone]    [facility]: [address] [place] [address]    [facility]: [address] [place] [address]    [facility]: [address] [place] [address],    [facility]: 2nd Floor [address] [place] [address]    [facility]: [address] [place], [place] [address] [facility], [address] [place] [address]    [facility] Center: [address] [place] [place] [address], [place] [phone]  (serves youth 16-23 years old)    [facility]: [facility]/ [place] [phone]    [facility]: [address] [facility], [place] [phone]    [facility]: [address] [facility] [phone]    [facility]: [facility]6 [facility], [facility], [phone]     [facility]: [address] [place] [address], [place], [phone], [phone]    RESOURCES IN OTHER [facility]:     [facility]: [address], [place], [phone], [phone]    [facility]: [address] [facility], [address], [phone]    [facility], [address] [place] [address]  "[phone]    [facility]: [address] [facility] [place], [phone]    [facility], [address] [facility], [phone] ([facility])    [facility], [address] [facility], [phone] ([facility])    [facility] Behavioral Health, [address] [facility], [address], [place], [phone] ([place], [place], and [facility])    [place] [facility], [address], [place], [phone] ([facility])    [facility] Hotline [phone], [phone]    [facility], [address] [facility], [facility]al Health [facility] Center, [address] [facility], [address], [facility] Health [place], [address] [facility], [facility]al Health [place], [address] [facility] [address] [facility] Behavioral [facility], [address], [facility] Human Services, [address] [facility], [address] [phone]    [facility] Human Services, [address] [facility] [phone]    [facility], [address] [facility], [phone]    [facility] for Addiction Recovery, [address] [place] [address], [phone]      Romansh SPEAKING (en español):     Información de la reunión de Alcohólicos Anónimos  [facility], 10:00 am  Habla español  Esta reunión está abierta y cualquiera puede asistir.    Persian speaking Alcoholics anonymous meetings:  El "[place]agan [place] AA Skype" es un magan on line de Alcohólicos Anónimos en español. El magan es omar, gratuito y virtual a través de Skype Audio. El magan funciona mediante nidhi llamada grupal de voz, por lo que no se utiliza la videollamada, ni se pueden mario las imágenes o rostros de los participantes. Hace luís años y medio abrimos el primer Magan de AA por [place] en [place]anne, jenny actualmente " asisten personas desde [place], [place], [place], [place], [place], [place], [place], [place], [place], [place], [place] y [place], entre otros.    El tatum es muy útil para los alcohólicos que residen en lugares donde no se celebran reuniones de AA, o residen en lugares donde las reuniones de AA son un número limitado de días a la semana, o para aquellos compañeros que se hayan de viaje o que, por cualquier motivo, se hayan convalecientes y no pueden desplazarse. Todos los días nos reuniones a las 21:00 (hora española)    Podéis obtener más información sobre el tatum y tawanda sesiones en la página web [email or web address]      MENTAL HEALTH:     [facility] - Outpatient Clinic  [phone]    [facility] - General Psychiatry Intensive Outpatient Program  [facility] (formerly known as the BMU)  [phone], option 3    [facility] - Dual Diagnosis Intensive Outpatient Program  [facility] (formerly known as the ABU)  [phone], option 2      COMMUNITY MENTAL HEALTH CENTERS:     [facility]  (aka [facility], [facility])  Serves [facility].  Serves uninsured patients & those with Medicaid.  Main location: [address] [place] [address] [phone]  Walk-in's available during regular business hours.  24/7 Crisis Line: [phone]    [facility]  (aka [facility], [facility])  Serves [facility].  Serves uninsured patients, those with Medicaid and some private plans.  Walk-in's available during regular business hours.  Primary care services available as well.  [facility]: [address] [place] [address];  [phone]  [place]: [address] [place] [address];  [phone]  24/7 Crisis Line:  [phone]    [facility]  Serves uninsured patients & those with Medicaid, call for more info.  Primary care, pediatrics, HIV treatment, and dentistry services available as well.  Three locations.  [phone]    [facility] [place]?Corporate Office  Serves patients with Medicaid, Medicare, and private insurance  [address]  [place],?[place] [address]  [phone]    [facility]  Serves uninsured on a sliding scale, as well as Medicaid, Medicare, and private plans.  Eight locations around the [facility].  [phone]    [facility]  Serves uninsured patients & those with Medicaid, private insurances.  Primary care available as well.  [phone]  [address] [place] [address]    [facility] Outpatient Psychiatry  Serves veterans who were honorably discharged.  [address] [place] [address]  [phone]  24/7 Veterans Crisis Line: [phone] (Press 1)    If you have private insurance and need to find a specialist, please contact your insurance network to request a list of providers covered by your benefits.      MENTAL HEALTH/ADDICTIVE DISORDERS:     AA ([phone]), NA ([phone])   National Suicide Prevention Lifeline- Call 1-921.603.8219 Available 24 hours everyday  [facility] [phone]; Crisis Line [phone] - Call for options A-F:  Intensive Outpatient Treatment/ Day programs   [facility], please contact   [facility], please contact [phone] or [phone] to speak with an admissions counselor.  [facility] (Methadone Maintenance)   [address] [place] [address], [phone]  [address] [place] [address] [phone]  [facility], [address] [place] [address], [phone]  [facility] [place], [place] [phone]  [facility] [place] please  contact [phone]  [facility], [address], 4th [place] [address] Phone: [phone]   [facility]  [place] Office: [address] [place] [address], [phone]  [place] Office: [address] [facility], [address], [place] [address], [phone]  [place] Office: [address] [place] [address] [phone]    Outpatient Substance Abuse Treatment   [facility] (Methadone Maintenance)   [address] [place] [address], [phone]  [address], [place] [address] [phone]  [facility], [address] [place] [address], [phone]  [facility]cer  [place] Office: [address] [place] [address], [phone]  [place] Office: [address] [facility], [address], [place] [address], [phone]  [place] Office: [address] [place] [address] [phone]  [facility], [address] [place] [address] [phone]   [facility], [address] [facility], [address], [phone]  [facility], [address] [place], [place] [phone]    Residential Substance Abuse Treatment   [facility] [address] [facility], [phone]  [facility], [address] [facility], [phone]  [facility] (men only) [address] [place] [address], [phone]  [facility] at the [facility] (women only) [address] [place] [address] [phone]  [facility], [address] [facility] [place] [place] [address] [phone]  [facility] (women only), intakes at [address] [facility], [phone]  [facility] (7-day program, $100, [address] [place], [phone], [phone], [phone])  [place] [facility] (Men only, 069-1900), [address], [place] (Vets*/Non-Vets)  [facility] (Men only, $400/month program fee) [address] [facility] [place], [phone]  [facility] (Women over age 39 only),  [address] [facility], [phone]    Out of Area:    [place], [address] [facility] [address], [place], [place] ([phone])  [facility] Recovery Program (men only), [address] [place] [address], [phone]  [facility], [address], [place] ([phone])  [place], [address] [place], [place] ([phone])  [facility], [address] [facility], [phone]  [facility] (Women only, has to have mental illness, can be homeless or substance abuser), [phone]        DOMESTIC VIOLENCE RESOURCES:     Advocacy  [facility] (NOFJC)  [address] [address]    [facility] ? [phone]  Services provided: emergency shelter, individual advocacy, information and referrals, group support, children's program, medical advocacy, forensic medical exams, primary care, legal assistance, counseling, safety planning, and caregiver support    [facility] HEALING AND EMPOWERMENT [place]  Confidential location  [facility] ? [phone]  Services provided: short term emergency shelter, all services provided are free of charge    [facility] CENTERS FOR COMMUNITY ADVOCACY  Multiple locations in [facility], [facility], [place], and [facility] ([place], [place], and [facility] ? [phone]  Services provided: emergency shelter, individual advocacy, information and referrals, group support, children's program, medical advocacy, legal assistance in obtaining restraining orders, counseling, safety planning, and caregiver support    [facility]   Emergency Shelter   [phone]  Emergency Services ,Legal and Financial Assistance Services ,Housing Services ,Support Services     [place] [facility] Children's halfway   [phone]  Emergency Services ,Counseling Services , Housing Services ,Support Services ,Children's Services     WOMEN WITH A VISION  [address] [place] [address]  WWAV ? [phone]  Services provided: advocacy, health education and supportive services, specializing in free healing services for marginalized groups, including LGBTQ individuals and sex workers    SEXUAL TRAUMA AWARENESS AND RESPONSE (STAR)  [address] [place] [address]    STAR ? [phone]  Services provided: individual advocacy, information and referrals, group support, medical advocacy, legal assistance, counseling, and safety planning for survivors of sexual assault    [facility] ([facility] [place])  [address] [place] [address]  [facility] [place] [facility] ? [phone]  Services provided: free forensic medical exams for sexual assault and domestic violence, which can be performed up to 5 days after an incident. It is not necessary to make a police report to receive a forensic medical exam    Legal  [facility]  [address] [place] [address]  [facility] ? [phone]  Services provided: free emergency legal representation for survivors of doemstic violence residing in [facility]. Legal services may include temporary restraining orders, temporary child support, custody, and use of property    [facility]  [address] [facility] [address] [address]  SLLS ? [phone]  Services provided: free legal representation for survivors of domestic violence residing in [facility]. Legal services may include temporary child support, custody, and divorce      HOTLINES:     [facility] DOMESTIC VIOLENCE HOTLINE  [phone]    Services provided: free and confidential hotline for victims and survivors of domestic violence. All calls will be routed to a domestic violence service provided in the victim or survivor's area    NATIONAL HUMAN TRAFFICKING HOTLINE  [phone]    Services provided: national anti-trafficking hotline serving victims and survivors of human trafficking. Provides information about  local resources, and access to safe space to report tips, seek services, and ask for help    VIA LINK  211 or (537) [phone]    Service provided: counselors can provide crisis counseling. Counselors can also provide information and referrals to programs which can help with needs such as food, shelter, medical care, financial assistance, mental health services, substance abuse treatment, senior services, childcare, and more      HOMELESS SHELTERS:      Homeless shelters  [facility]  Emergency shelter for individuals and families  [address]  [phone]  [facility]  Emergency shelter for men only  Meals daily 6am, 2pm, & 6pm  Clothing, case management M-F by appointment (ID/job/housing/legal assistance), mail  [address] [facility]  [phone]  [facility]  Emergency shelter for men  [address] [facility]  [phone]  Emergency shelter for women  [address] [facility]  [phone]  Breakfast & lunch daily, dinner M-F  Case management, job counseling services   [facility]  Emergency shelter for teens and young adults up to 20yo  [address] [facility]  [phone]  [facility]  Emergency shelter for women over 19yo and their kids  [address] [place] [address]  [phone]  [facility]  Day program, meals M-F 1PM (arrive early)  Showers, laundry, hygiene kits, showers, phones, , notary services, case management, ID assistance  [address] [facility]  [phone] M-F 8am-2:30pm  [facility]  Day program  MTWF 7:30am-3:30pm,  8:30am-3:30pm  Crisis intervention, employment assistance, food/clothing, hygiene kits, bus tokens, mail  [address] [facility] [address]  [phone]  [facility]  Mobile outreach for homeless persons in [facility]  [phone]  [facility]  Primary healthcare, case management, dental services, TB placement  Call ahead  [address] [facility] [address]  [phone]  [facility]  Connects  homeless people with their loved ones in other [facility] by providing transportation costs   [phone]      [place] RESOURCES:     [place] Mobile Mental Health Crisis Response Team:    Region 12 ([place], [place], [place], and [facility]) ([facility])  [phone]      Outpatient Mental Health & Addiction Clinic Resources for both [facility]:    [facility] Mental [facility] Resources  Website: www.[facility].org  Main Number: [phone]    [facility] ([facility])  [address] ([address] [facility]) [place] [address]  [phone]    [facility] ([facility])  [address] ([address] [facility]) [place] [address]  [phone]    [facility]  [address] ([address]) [place] [address]  [phone]    [facility]  [address] ([address] [facility] [place] [address]  [phone]      Addiction Treatment Resources for both [facility]:    [facility] (Detox, Residential, PHP, IOP, and Aftercare Programs)  [address] [place] [address]  [phone]    [facility] (Residential, IOP, Transitional Living, and Aftercare Programs)  [address] [place] [address]  [phone]    [facility] (Inpatient, Residential, Detox, IOP, Outpatient, and Aftercare Programs)  [address] [place] [address]  [phone] or toll free at [phone]      Outpatient Mental Health Psychotherapy Resources for both [facility]:    Jaye Alonzo, LCSW  [address]  [place] [address] [phone]  Specialties: Depression, Anxiety, and Life Transitions    Crissy Gomez, PhD  [address]  [place] [address] [phone]  Specialties: Testing and  Evaluation, Education and Learning Disabilities, and ADHD    Analisa Robins, [facility] [address] [place] [address]  [phone]  Specialties: Obsessive-Compulsive (OCD), Depression, and Relationship Issues    Cathryn Castorena LPC [address] [facility] [address] [place] [address]  [phone]  Specialties: Trauma & PTSD, Mood Disorders, and Anxiety    Cathryn Tinajero, PhD, [facility]  [facility] [address] [place] [address]  [phone]  Specialties: Family Conflict, Child, and Relationship Issues    Kaylin Matos LPC [facility] [place] [address] [phone]  Specialties: Anxiety, Depression, and Anger Management        IN CASE OF SUICIDAL THINKING, call the National Suicide Hotline Number: 988    988 Suicide & Crisis Lifeline: 988 , 4-410-111-TALK (8255)  Provides 24/7, free and confidential support for people in distress, prevention and crisis resources for you or your loved ones, and best practices for professionals.    Call, text or chat.  https://988Hot Hotelsline.org

## 2024-08-29 NOTE — PT/OT/SLP EVAL
Occupational Therapy   Evaluation & Treatment    Name: Nasra Marks  MRN: [id number]  Admitting Diagnosis: Acute focal neurological deficit  Recent Surgery: * No surgery found *      Recommendations:     Discharge Recommendations: Low Intensity Therapy  Discharge Equipment Recommendations:  bath bench, walker, rolling  Barriers to discharge:  None    Assessment:     Nasra Marks is a 39 y.o. female with a medical diagnosis of Acute focal neurological deficit.  She presents with general unsteadiness, especially with standing marches to simulate bathtub transfers. Patient educated on use of RW and tub transfer bench to promote safety at home due to upcoming hospital discharge. Performance deficits affecting function: impaired functional mobility, impaired self care skills, impaired sensation, impaired endurance, impaired balance, weakness, decreased safety awareness, decreased coordination, decreased upper extremity function, decreased lower extremity function.   Patient continues to demonstrate the need for low intensity therapy on a scheduled basis exhibited by decreased independence with self-care and functional mobility     Rehab Prognosis: Good; patient would benefit from acute skilled OT services to address these deficits and reach maximum level of function.       Plan:     Patient to be seen 4 x/week to address the above listed problems via self-care/home management, therapeutic activities, therapeutic exercises, neuromuscular re-education  Plan of Care Expires: 09/29/24  Plan of Care Reviewed with: patient, spouse, family    Subjective     Chief Complaint: reports major complaints are L leg functioning and speech stutter  Patient/Family Comments/goals: get better    Occupational Profile:  Living Environment: lives with their spouse and 16yo son in a single story home with no steps to enter. Bathroom set up: bathtub shower combo  with  no AD  Previous level of function: Patient reports they were  independent with ADLs and IADLs prior to hospitalization  Roles and Routines: not working; active   Equipment Used at Home: none  Assistance upon Discharge: reports good family support but no one is staying with her 24/7    Pain/Comfort:  Pain Rating 1: 0/10  Pain Rating Post-Intervention 1: 0/10    Patients cultural, spiritual, Mosque conflicts given the current situation: no    Objective:     Communicated with: nursing prior to session.  Patient found sitting edge of bed with  (no active lines) upon OT entry to room.    General Precautions: Standard, fall  Orthopedic Precautions: N/A  Braces: N/A  Respiratory Status: Room air    Occupational Performance:    Functional Mobility/Transfers:  Patient completed Sit <> Stand Transfer with contact guard assistance  with  no assistive device x1 rep  Patient completed Sit <> Stand Transfer with contact guard assistance  with RW x1 rep; cueing/ education provided for hand placement  Functional Mobility: patient ambulated around room/hallway (~25 ft) with CGA and RW    Activities of Daily Living:  Lower Body Dressing: minimum assistance to don/doff socks; educated on use of figure 4 positioning to promote independence     Cognitive/Visual Perceptual:  Cognitive/Psychosocial Skills:     -       Oriented to: Person, Place, Time, and Situation   -       Follows Commands/attention:Follows one-step commands  -       Communication: stutter  -       Memory: No Deficits noted  -       Safety awareness/insight to disability: intact   -       Mood/Affect/Coping skills/emotional control: Appropriate to situation  Visual/Perceptual:      -Intact visual scanning, convergence/diverence, and visual field    Physical Exam:  Sensation:    -       Impaired  reports diminished sensation in L UE   Dominant hand:    -       R  Upper Extremity Range of Motion:     -       Right Upper Extremity: WFL  -       Left Upper Extremity: WFL  Upper Extremity Strength:    -       Right Upper  Extremity: WFL  -       Left Upper Extremity: WFL   Strength:    -       Right Upper Extremity: WFL  -       Left Upper Extremity: WFL  Fine Motor Coordination:    -       Intact    AMPAC 6 Click ADL:  AMPAC Total Score: 20    Treatment & Education:  Patient educated on:   -purpose of OT and OT POC  -facilitation and education on proper body mechanics, energy conservation, and safety  -importance of early mobility and out of bed activities with staff assist  -overall benefits of therapy     All questions answered within OT scope and to patient's satisfaction    Patient left sitting edge of bed with all lines intact, call button in reach, and family present    GOALS:   Multidisciplinary Problems       Occupational Therapy Goals          Problem: Occupational Therapy    Goal Priority Disciplines Outcome Interventions   Occupational Therapy Goal     OT, PT/OT Progressing    Description: Goals to be met by: 9/29/24     Patient will increase functional independence with ADLs by performing:    LE Dressing with Supervision.  Grooming while standing at sink with Supervision.  Toileting from toilet with Supervision for hygiene and clothing management.   Supine to sit with Supervision.  Step transfer with Supervision  Toilet transfer to toilet with Supervision.    DME justification:   Patient demonstrates a mobility limitation that significantly impairs their ability to participate in one or more mobility related activities of daily living. Patient's mobility limitation cannot be sufficiently resolved with the use of a cane, but can be sufficiently resolved with the use of a rolling walker.The use of a rolling walker will considerably improve their ability to participate in MRADLs. Patient will use the walker on a regular basis at home.    Tub transfer bench - Patient demonstrates a mobility limitation that impairs their ability to participate in bathing within shower tub combination safely. Patient's limitation cannot be  sufficiently resolved without the use of a tub transfer bench. The use of the tub transfer bench will considerably improve their ability to safely participate in bathing / shower transfers. Patient will use the tub transfer bench on a regular basis at home.                         History:     Past Medical History:   Diagnosis Date    CHF (congestive heart failure)     Chronic combined systolic and diastolic congestive heart failure 2019    Essential hypertension 2012    Hypertension     dx at 16y.o.    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 disease, with heart failure 2021    ICD (implantable cardioverter-defibrillator), single, in situ 2020    Nonischemic cardiomyopathy 2019    Pacemaker 2017         Past Surgical History:   Procedure Laterality Date    CARDIAC DEFIBRILLATOR PLACEMENT  2017     SECTION      x 1    INDUCED       RIGHT HEART CATHETERIZATION Right 2022    Procedure: INSERTION, CATHETER, RIGHT HEART;  Surgeon: Timothy Prajapati Jr., MD;  Location: [facility] CATH LAB;  Service: Cardiology;  Laterality: Right;    RIGHT HEART CATHETERIZATION Right 2024    Procedure: INSERTION, CATHETER, RIGHT HEART;  Surgeon: Lior Rueda MD;  Location: [facility] CATH LAB;  Service: Cardiology;  Laterality: Right;    TUBAL LIGATION         Time Tracking:     OT Date of Treatment: 24  OT Start Time: 1316  OT Stop Time: 1337  OT Total Time (min): 21 min    Billable Minutes:Evaluation 10  Therapeutic Activity 11    2024

## 2024-08-29 NOTE — PLAN OF CARE
PT Eval complete, appropriate goals created    Problem: Physical Therapy  Goal: Physical Therapy Goal  Description: Goals to be completed by: 9/29/24    Pt will have sufficient dynamic balance to sit EOB while performing ADLs/therex w/ independence  Pt will be able to stand up from EOB w/ modified independence using LRAD  Pt will ambulate 350 feet w/ independence using LRAD  Pt will be independent w/ HEP therex on BLE w/ good form and ROM     DME Justifications (see above for complete DME recommendations)    Rolling Walker- Patient demonstrates a mobility limitation that significantly impairs their ability to participate in one or more mobility related activities of daily living. Patient's mobility limitation cannot be sufficiently resolved with the use of a cane, but can be sufficiently resolved with the use of a rolling walker.The use of a rolling walker will considerably improve their ability to participate in MRADLs. Patient will use the walker on a regular basis at home.    Outcome: Progressing

## 2024-08-29 NOTE — CONSULTS
"8/29/2024 10:35 AM   Nasra Marks   1985   [id number]      PSYCHIATRY CONSULTATION BRIEF PLAN OF CARE NOTE     BRIEF HPI  Nasra Marks is a 39 y.o. female with no reported past psychiatric history of presented to [facility] for multiple neurologic complaints following RHC on 8/27.    Psychiatry consulted for "functional stuttering/aphasia/twitching".     Per neurology's note, patient "was complaining of R sided squeezing headache, speech difficulty and tremulousness that fluctuated between RUE to all extremities. Rapid response was called. NIHSS 7, Vascular Neurology evaluated. CTH/CTA MP unremarkable for acute findings or LVO. MRI/MRA brain with no acute pathology, just remote R cerebellar infarct. Vascular Neurology recommended continue home ASA 81 mg and statin for stroke prevention and signed off." "EEG 8/28 captured clinical events without electrographic correlate c/w PNEE."; did not recommend any ativan or anti seizure medications. Recommended outpatient neuropsych assessment.     Spoke with primary and neurology team. Patient to be discharged today. Agree with neurology's plan for outpatient neuropsych assessment. Per chart, no symptoms of anxiety or depression endorsed during this hospitalization. Spoke with primary team about there not being a specific medication to treat FND, but that an SSRI could be beneficial if pt were to endorse anxiety/depression. Emphasized the importance of consistent outpatient follow up. Also recommended addressing other symptoms such as pain if present.       RECOMMENDATIONS:     PSYCHIATRIC MEDICATIONS  Scheduled- Would defer initiation to outpatient provider    LEGAL  Based on chart review and speaking with the team, pt currently does not meet criteria for involuntary inpatient psychiatric admission.      DISPOSITION- Once medically cleared;   Once medically cleared, pt may be discharged home with next of kin with outpt psychaitric follow up/ rehab. CM please " provide resources. Have placed MHC and therapy resources in pt's discharge summary. Recommend pt be informed to return to ED for any worsening of psychiatric symptoms or any SI/HI/AVH.    OTHER  Consulting clinician was informed of the encounter and plan of care note     -Please contact ON CALL psychiatry service for any acute issues that may arise.    Beth Sierra MD  Department of Psychiatry   [facility]  8/29/2024 10:35 AM

## 2024-08-29 NOTE — PLAN OF CARE
Problem: Occupational Therapy  Goal: Occupational Therapy Goal  Description: Goals to be met by: 9/29/24     Patient will increase functional independence with ADLs by performing:    LE Dressing with Supervision.  Grooming while standing at sink with Supervision.  Toileting from toilet with Supervision for hygiene and clothing management.   Supine to sit with Supervision.  Step transfer with Supervision  Toilet transfer to toilet with Supervision.    DME justification:   Patient demonstrates a mobility limitation that significantly impairs their ability to participate in one or more mobility related activities of daily living. Patient's mobility limitation cannot be sufficiently resolved with the use of a cane, but can be sufficiently resolved with the use of a rolling walker.The use of a rolling walker will considerably improve their ability to participate in MRADLs. Patient will use the walker on a regular basis at home.    Tub transfer bench - Patient demonstrates a mobility limitation that impairs their ability to participate in bathing within shower tub combination safely. Patient's limitation cannot be sufficiently resolved without the use of a tub transfer bench. The use of the tub transfer bench will considerably improve their ability to safely participate in bathing / shower transfers. Patient will use the tub transfer bench on a regular basis at home.    Outcome: Progressing     Patient tolerated OT eval. Goals and POC established. See note for further details.

## 2024-08-29 NOTE — DISCHARGE SUMMARY
"Peter Beyer - Neurosurgery ([facility])  [facility] Medicine  Discharge Summary      Patient Name: Nasra Marks  MRN: [id number]  [facility]: [id number]  Patient Class: IP- Inpatient  Admission Date: 8/27/2024  Hospital Length of Stay: 1 days  Discharge Date and Time: 8/29/2024  2:29 PM  Attending Physician: Constance att. providers found   Discharging Provider: Les Farrell MD  Primary Care Provider: Veronika Rainey MD  [facility] Medicine Team: [facility] Les Farrell MD  Primary Care Team: [facility]    HPI:   Nasra Marks is a 39 y.o. female with combined CHF (EF 30-35%), HTN, HLD, history of CVA who presents with multiple neurologic complaints following RHC today.     She reports that she awoke this morning feeling at her baseline, which includes no focal deficits and completely independent.  She went to her RHC and felt well until she developed a "tightness" in her chest, which she was told was due to the cath. She then felt like her head was shaking, and felt diffuse numbness across her body.  She describes generalized weakness along with this, more so on the left side.  She also developed a new stutter when speaking.  Given this constellation of symptoms, she was transferred to the ED for code stroke.  Overall workup was negative.  While in the ED, she complained of wandering symptoms, including right-sided weakness.  LP was attempted however unsuccessful.  Hospital medicine therefore consulted for further evaluation.    * No surgery found *      Hospital Course:   Stroke workup was unremarkable for any CVA on the MRI per stroke team.  EEG was negative for any seizures, overall unremarkable.  Findings were discussed with patient and  that no organic etiology behind the patient's acute onset of symptoms was found, more suggestive of a functional neurological disorder.  Informed them of outpatient follow up, especially with psychiatry for further management.  We will be discharging the patient on a " new prescription of Prozac.  Okay to be discharged home with follow up with Cardiology as well from Cardiology Service perspective.  Patient has met maximum benefit from hospitalization is clinically stable for discharge.  PT feels comfortable with the patient being discharged home. CM/PT affirmed patient had walker at home.    Patient having some intermittent stuttering.      Clear lungs bilaterally, unlabored breathing, on room air, no cyanosis   Heart sounds indicate a regular rate and rhythm   Awake alert, no acute distress   No facial droop, no slurred speech   No obvious upper nor lower extremity edema   Maintaining eye contact   5/5 fist  on the right, 4/5 fist  on the left   At least 4/5 hip flexion on the right, 3/5 hip flexion on the left      Pt tolerated po    Goals of Care Treatment Preferences:  Code Status: Full Code      SDOH Screening:  The patient was screened for utility difficulties, food insecurity, transport difficulties, housing insecurity, and interpersonal safety and there were no concerns identified this admission.     Consults:   Consults (From admission, onward)          Status Ordering Provider     Inpatient consult to Psychiatry  Once        Provider:  (Not yet assigned)    Completed ADALGISA LEARY     Inpatient consult to Neurology  Once        Provider:  (Not yet assigned)    Completed JACQUI LOVE     Inpatient consult to Vascular (Stroke) Neurology  Once        Provider:  (Not yet assigned)    Completed ALICE KELLER            No new Assessment & Plan notes have been filed under this hospital service since the last note was generated.  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] 08/28/2024 No    Pain [R52] 08/28/2024 Yes    Hypokalemia [E87.6] 08/14/2023 Yes    History  "of CVA (cerebrovascular accident) [Z86.73] 02/08/2023 Not Applicable     Chronic    ICD (implantable cardioverter-defibrillator), single, in situ [Z95.810] 02/17/2020 Yes    Chronic combined systolic and diastolic congestive heart failure [I50.42] 05/24/2019 Yes     Chronic    Essential hypertension [I10] 11/19/2012 Yes     Chronic      Problems Resolved During this Admission:       Discharged Condition: stable    Disposition: Home or Self Care    Follow Up:   Follow-up Information       Veronika Rainey MD Follow up on 9/12/2024.    Specialties: Family Medicine, Wound Care  Why: Follow-up appointment at 10:40 am.  Patient was placed on waiting list.  Contact information:  [address] [place]  [place] [address] [phone]                           Patient Instructions:      WALKER FOR HOME USE     Order Specific Question Answer Comments   Type of Walker: Adult (5'4"-6'6")    With wheels? Yes    Height: 5' 9" (1.753 m)    Weight: 88.1 kg (194 lb 3.6 oz)    Length of need (1-99 months): 1    Does patient have medical equipment at home? none    Please check all that apply: Patient's condition impairs ambulation.      Ambulatory referral/consult to Adult Neuropsychology   Standing Status: Future   Referral Priority: Urgent Referral Type: Psychiatric   Referral Reason: Specialty Services Required   Number of Visits Requested: 1     Ambulatory referral/consult to Psychiatry   Standing Status: Future   Referral Priority: Urgent Referral Type: Psychiatric   Referral Reason: Specialty Services Required   Requested Specialty: Psychiatry   Number of Visits Requested: 1     Ambulatory referral/consult to Neurology   Standing Status: Future   Referral Priority: Routine Referral Type: Consultation   Referral Reason: Specialty Services Required   Requested Specialty: Neurology   Number of Visits Requested: 1       Significant Diagnostic Studies: N/A    Pending Diagnostic Studies:       None           Medications:  Reconciled Home " Medications:      Medication List        START taking these medications      FLUoxetine 10 MG capsule  Take 1 capsule (10 mg total) by mouth once daily.            CONTINUE taking these medications      acetaminophen 500 MG tablet  Commonly known as: TYLENOL  Take 1 tablet (500 mg total) by mouth every 6 (six) hours as needed for Pain (As needed for pain and fever).     amLODIPine 5 MG tablet  Commonly known as: NORVASC  Take 5 mg by mouth.     aspirin 81 MG EC tablet  Commonly known as: ECOTRIN  Take 1 tablet (81 mg total) by mouth once daily.     atorvastatin 40 MG tablet  Commonly known as: LIPITOR  Take 1 tablet (40 mg total) by mouth every evening.     diclofenac sodium 1 % Gel  Commonly known as: VOLTAREN ARTHRITIS PAIN  Apply to area of pain 3 times a day.     ENTRESTO  mg per tablet  Generic drug: sacubitriL-valsartan  Take 1 tablet by mouth 2 (two) times daily.     eplerenone 25 MG Tab  Commonly known as: INSPRA  Take 1 tablet (25 mg total) by mouth once daily.     FARXIGA 10 mg tablet  Generic drug: dapagliflozin propanediol  Take 1 tablet by mouth once daily     furosemide 40 MG tablet  Commonly known as: LASIX  Take 1 tablet (40 mg total) by mouth once daily.     metoprolol succinate 200 MG 24 hr tablet  Commonly known as: TOPROL-XL  Take 1 tablet (200 mg total) by mouth once daily.     oxyCODONE-acetaminophen 5-325 mg per tablet  Commonly known as: PERCOCET  Take 1 tablet by mouth every 6 (six) hours as needed for Pain (As needed for severe 10/10 pain).     potassium chloride SA 20 MEQ tablet  Commonly known as: K-DUR,KLOR-CON  Take 2 tablets (40 mEq total) by mouth 2 (two) times daily.            ASK your doctor about these medications      mupirocin 2 % ointment  Commonly known as: BACTROBAN  Apply topically 3 (three) times daily. for 10 days  Ask about: Should I take this medication?              Indwelling Lines/Drains at time of discharge:   Lines/Drains/Airways       None                    Time spent on the discharge of patient: 30 minutes         Les Farrell MD  Department of Hospital Medicine  [facility] - Neurosurgery ([facility])

## 2024-08-29 NOTE — PT/OT/SLP EVAL
Physical Therapy Evaluation and Treatment    Patient Name: Nasra Marks   MRN: [id number]  Recent Surgery: * No surgery found *      Recommendations:     Discharge Recommendations: Low Intensity Therapy    Discharge Equipment Recommendations: walker, rolling   Barriers to discharge: Increased level of assist  Nursing Mobilization: pt is safe to ambulate with CGA rolling walker    Assessment:     Nasra Marks is a 39 y.o. female admitted with a medical diagnosis of Acute focal neurological deficit. She presents with the following impairments/functional limitations: impaired sensation, impaired self care skills, impaired functional mobility, gait instability, impaired balance, decreased safety awareness. Pt requires inc time, cueing, and encouragement to achieve full muscle activation in LLE but does have 5/5 strength once muscle engaged. Ambulates w/ excessive LLE knee extension using circumduction to clear L foot but gait deviations improve as she ambulates further and with distraction. Symptoms appear functional with fluctuating presentation but pt eager to improve w/ therapy. She did experience seizure-like full body tremors w/ head/neck lateral twitch when sitting EOB at end of session, but was able to follow commands to tap foot and assist w/ sit to supine transfer while this seizure-like activity was going on. Patient currently demonstrates a need for low intensity therapy on a scheduled basis secondary to a decline in functional status due to illness.     Rehab Prognosis: Good; patient would benefit from acute skilled PT services to address these deficits and reach maximum level of function.  Recent Surgery: * No surgery found *      Plan:     During this hospitalization, patient to be seen 4 x/week to address the identified rehab impairments via gait training, therapeutic activities, therapeutic exercises, neuromuscular re-education and progress toward the following goals:    Plan of Care Expires:  09/29/24    Subjective     Chief Complaint: difficulty w/ stuttering speech and BLE weakness  Patient/Family Comments/Goals: reports L side feels heavy and numb/tingling the more she uses it but resolves afterwards  Pain/Comfort:  Pain Rating 1: 0/10  Pain Rating Post-Intervention 1: 0/10    Patients cultural, spiritual, Congregation conflicts given the current situation: no    Social History:  Living Environment: Patient  lives w/ spouse and 16yo son  in a single story home, number of outside stairs: 0, tub-shower combo.  Prior Level of Function: Prior to admission, patient was independent with ADLs, driving, not working, and enjoys shopping .  Equipment Used at Home: none    DME owned (not currently used): none  Assistance Upon Discharge: family    Objective:     Communicated with RN prior to session. Patient found HOB elevated with telemetry upon PT entry to room.    General Precautions: Standard, fall   Orthopedic Precautions:N/A   Braces: N/A  Respiratory Status: Room air    Exams:  Cognitive Exam: Patient is oriented to Person, Place, Time, Situation, follows commands 100% of the time  RLE ROM: WFL  RLE Strength: WFL, grossly 5/5  LLE ROM: WFL  LLE Strength: WFL, grossly 5/5 when provided max tactile cueing, encouragement, and inc time  Sensation: -       Impaired  light/touch to L side; reports dec sensation and that she begins to experience numbness, tingling, and heaviness in L side the more she utilizes it but it returns to baseline once rested    Functional Mobility:  Bed Mobility:  Supine to Sit: independence  Sit to Supine: maximal assistance 2/2 pt having seizure-like activity so therapist provided assistance for return to bed in order to ensure pt safety and prevent any potential LOB   Transfers:   Sit to Stand: contact guard assistance with rolling walker with cues for hand placement  Gait:   Patient ambulated 50 with rolling walker and contact guard assistance.   Patient ambulated 150 with hand-held  assist and minimum assistance.   Patient demonstrates decreased step length, wide base of support, decreased weight shift, decreased foot clearance, ambulates outside NELY of RW, flexed posture, decreased arian, decreased arm swing, and circumduction of LLE w/ pt maintaining knee extension throughout entirety of gait cycle unless given extensive cueing. All lines remained intact throughout ambulation trial, gait belt utilized.  Balance:   Static Sitting: independence at EOB  Dynamic Sitting: supervision at EOB  Static Standing: contact guard assistance with no AD  Dynamic Standing: minimal assistance with no AD    Therapeutic Activities and Exercises:  Patient educated on role of acute care PT and PT POC, safety while in hospital including calling nurse for mobility, and call light usage  Patient educated about importance of OOB mobility  Dynamic standing balance while searching for objects on photo wall and then while kicking ball back and forth w/ Hong for dynamic balance while weight shifting into SLS to kick ball, x5 reps ea LE  Educated on relaxation techniques to assist w/ decreasing anxiety as pt's symptoms appear to worsen when experiencing heightened emotions       AM-PAC 6 CLICK MOBILITY  Turning over in bed (including adjusting bedclothes, sheets and blankets)?: 4  Sitting down on and standing up from a chair with arms (e.g., wheelchair, bedside commode, etc.): 3  Moving from lying on back to sitting on the side of the bed?: 3  Moving to and from a bed to a chair (including a wheelchair)?: 3  Need to walk in hospital room?: 3  Climbing 3-5 steps with a railing?: 2  Basic Mobility Total Score: 18     Patient left HOB elevated with all lines intact, call button in reach, RN notified, and MD notified of pt performance, PT assessment, and pt's request for speech assessment .    GOALS:   Multidisciplinary Problems       Physical Therapy Goals          Problem: Physical Therapy    Goal Priority Disciplines  Outcome Goal Variances Interventions   Physical Therapy Goal     PT, PT/OT Progressing     Description: Goals to be completed by: 24    Pt will have sufficient dynamic balance to sit EOB while performing ADLs/therex w/ independence  Pt will be able to stand up from EOB w/ modified independence using LRAD  Pt will ambulate 350 feet w/ independence using LRAD  Pt will be independent w/ HEP therex on BLE w/ good form and ROM     DME Justifications (see above for complete DME recommendations)    Rolling Walker- Patient demonstrates a mobility limitation that significantly impairs their ability to participate in one or more mobility related activities of daily living. Patient's mobility limitation cannot be sufficiently resolved with the use of a cane, but can be sufficiently resolved with the use of a rolling walker.The use of a rolling walker will considerably improve their ability to participate in MRADLs. Patient will use the walker on a regular basis at home.                         History:     Past Medical History:   Diagnosis Date    CHF (congestive heart failure)     Chronic combined systolic and diastolic congestive heart failure 2019    Essential hypertension 2012    Hypertension     dx at 16y.o.    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 disease, with heart failure 2021    ICD (implantable cardioverter-defibrillator), single, in situ 2020    Nonischemic cardiomyopathy 2019    Pacemaker 2017       Past Surgical History:   Procedure Laterality Date    CARDIAC DEFIBRILLATOR PLACEMENT  2017     SECTION      x 1    INDUCED       RIGHT HEART CATHETERIZATION Right 2022    Procedure: INSERTION, CATHETER, RIGHT HEART;  Surgeon: Timothy Prajapati Jr., MD;  Location[facility] CATH LAB;  Service: Cardiology;  Laterality: Right;    RIGHT HEART CATHETERIZATION Right 2024    Procedure: INSERTION, CATHETER, RIGHT  HEART;  Surgeon: Lior Rueda MD;  Location: [facility] CATH LAB;  Service: Cardiology;  Laterality: Right;    TUBAL LIGATION         Time Tracking:     PT Received On: 08/29/24  PT Start Time: 0847  PT Stop Time: 0923  PT Total Time (min): 36 min     Billable Minutes: Evaluation 6, Gait Training 15, and Neuromuscular Re-education 15    08/29/2024

## 2024-08-29 NOTE — PLAN OF CARE
Peter Beyer - Neurosurgery (Hospital)  Discharge Final Note    Primary Care Provider: Veronika Rainey MD    Expected Discharge Date: 8/29/2024    Final Discharge Note (most recent)       Final Note - 08/29/24 1106          Final Note    Assessment Type Final Discharge Note     Anticipated Discharge Disposition Home or Self Care     What phone number can be called within the next 1-3 days to see how you are doing after discharge? [phone] (P)      Hospital Resources/Appts/Education Provided Provided patient/caregiver with written discharge plan information;Provided education on problems/symptoms using teachback;Appointments scheduled and added to AVS (P)                      Important Message from Medicare             Contact Info       Veronika Rainey MD   Specialty: Family Medicine, Wound Care   Relationship: PCP - General    [address] [place] [address]   Phone: [phone]       Next Steps: Follow up on 9/12/2024    Instructions: Follow-up appointment at 10:40 am.  Patient was placed on waiting list.          Patient will be discharging home with family. Her  will be provided transportation home. Follow up information added to AVS.   PT rec'd a RW and patient and  reports they have one a home.

## 2024-08-29 NOTE — PLAN OF CARE
Problem: Adult Inpatient Plan of Care  Goal: Plan of Care Review  8/29/2024 1418 by Linda Rodríguez LPN  Outcome: Met  8/29/2024 1418 by Linda Rodríguez LPN  Outcome: Progressing  Goal: Patient-Specific Goal (Individualized)  8/29/2024 1418 by Linda Rodríguez LPN  Outcome: Met  8/29/2024 1418 by Linda Rodríguez LPN  Outcome: Progressing  Goal: Absence of Hospital-Acquired Illness or Injury  8/29/2024 1418 by Linda Rodríguez LPN  Outcome: Met  8/29/2024 1418 by Linda Rodríguez LPN  Outcome: Progressing  Goal: Optimal Comfort and Wellbeing  8/29/2024 1418 by Linda Rodríguez LPN  Outcome: Met  8/29/2024 1418 by Linda Rodríguez LPN  Outcome: Progressing  Goal: Readiness for Transition of Care  8/29/2024 1418 by Linda Rodríguez LPN  Outcome: Met  8/29/2024 1418 by Linda Rodríguez LPN  Outcome: Progressing

## 2024-09-12 ENCOUNTER — OFFICE VISIT (OUTPATIENT)
Dept: FAMILY MEDICINE | Facility: CLINIC | Age: 39
End: 2024-09-12
Payer: MEDICARE

## 2024-09-12 VITALS
SYSTOLIC BLOOD PRESSURE: 120 MMHG | BODY MASS INDEX: 27.88 KG/M2 | DIASTOLIC BLOOD PRESSURE: 70 MMHG | HEART RATE: 67 BPM | TEMPERATURE: 98 F | HEIGHT: 69 IN | WEIGHT: 188.25 LBS | OXYGEN SATURATION: 98 %

## 2024-09-12 DIAGNOSIS — I10 ESSENTIAL HYPERTENSION: Chronic | ICD-10-CM

## 2024-09-12 DIAGNOSIS — G47.00 INSOMNIA, UNSPECIFIED TYPE: Primary | ICD-10-CM

## 2024-09-12 DIAGNOSIS — R29.818 ACUTE FOCAL NEUROLOGICAL DEFICIT: ICD-10-CM

## 2024-09-12 DIAGNOSIS — I50.42 CHRONIC COMBINED SYSTOLIC AND DIASTOLIC CONGESTIVE HEART FAILURE: Chronic | ICD-10-CM

## 2024-09-12 DIAGNOSIS — R06.83 SNORES: ICD-10-CM

## 2024-09-12 DIAGNOSIS — E66.3 OVERWEIGHT (BMI 25.0-29.9): ICD-10-CM

## 2024-09-12 PROCEDURE — 99215 OFFICE O/P EST HI 40 MIN: CPT | Mod: PBBFAC,PO | Performed by: FAMILY MEDICINE

## 2024-09-12 PROCEDURE — 99999 PR PBB SHADOW E&M-EST. PATIENT-LVL V: CPT | Mod: PBBFAC,,, | Performed by: FAMILY MEDICINE

## 2024-09-12 NOTE — PROGRESS NOTES
Routine Office Visit     Patient Name: Nasra Marks    : 1985  MRN: [id number]    Subjective     History of Present Illness    CHIEF COMPLAINT:  Nasra presents today for recent hospital follow-up.    RECENT CARDIAC PROCEDURE AND NEUROLOGICAL CONCERNS:  She recently underwent a cardiac procedure at the hospital. Post-procedure, there was concern for a possible stroke, which was subsequently ruled out. MRI showed no infarct, hemorrhage, or bleed. CT head was negative for new findings, though she has a history of prior infarct. She expresses confusion about the event and states she was given antidepressant medication during her hospital stay, which she believes was for psychological reasons. She denies receiving any medications during the procedure other than lidocaine in the neck for pain. She has follow-up visits scheduled with neurology, psychiatry, and cardiology.    LABORATORY RESULTS:  Recent labs showed initial electrolyte abnormalities, specifically low potassium and sodium levels, which have since improved. She has mild chronic anemia. Thyroid function tests were normal. A1C results indicate no evidence of diabetes.    SLEEP ISSUES:  She reports difficulty sleeping and persistent fatigue, feeling tired even after sleeping. Melatonin has been ineffective, and she denies taking any sleep medication. Her  has recently noticed that she has begun snoring, which is new for her. She expresses concern that her fatigue may be related to her congestive heart failure. She has not had a sleep study performed previously.    EXERCISE AND FATIGUE:  She is currently not exercising regularly due to constant fatigue. She expresses interest in trying yoga, considering finding a studio or using YouTube for guidance. She reports feeling constantly fatigued and tired throughout the day, even after sleeping, and is concerned this may be related to her congestive heart failure.      ROS:  General: -fever, -chills,  "+fatigue, -weight gain, -weight loss  Eyes: -vision changes, -redness, -discharge  ENT: -ear pain, -nasal congestion, -sore throat  Cardiovascular: -chest pain, -palpitations, -lower extremity edema  Respiratory: -cough, -shortness of breath  Gastrointestinal: -abdominal pain, -nausea, -vomiting, -diarrhea, -constipation, -blood in stool  Genitourinary: -dysuria, -hematuria, -frequency  Musculoskeletal: -joint pain, -muscle pain  Skin: -rash, -lesion  Neurological: -headache, -dizziness, -numbness, -tingling  Psychiatric: -anxiety, -depression, +sleep difficulty           Objective     /70   Pulse 67   Temp 98.1 °F (36.7 °C)   Ht 5' 9" (1.753 m)   Wt 85.4 kg (188 lb 4.4 oz)   LMP 09/06/2024 (Approximate)   SpO2 98%   BMI 27.80 kg/m²   Physical Exam  Constitutional:       Appearance: She is well-developed.   HENT:      Head: Normocephalic and atraumatic.   Eyes:      Conjunctiva/sclera: Conjunctivae normal.      Pupils: Pupils are equal, round, and reactive to light.   Cardiovascular:      Rate and Rhythm: Normal rate and regular rhythm.      Heart sounds: Normal heart sounds. No murmur heard.     No friction rub. No gallop.   Pulmonary:      Effort: No respiratory distress.      Breath sounds: Normal breath sounds.   Abdominal:      General: Bowel sounds are normal. There is no distension.      Palpations: Abdomen is soft.      Tenderness: There is no abdominal tenderness.   Musculoskeletal:         General: Normal range of motion.      Cervical back: Normal range of motion and neck supple.   Lymphadenopathy:      Cervical: No cervical adenopathy.   Skin:     General: Skin is warm.   Neurological:      Mental Status: She is alert and oriented to person, place, and time.           Assessment     Assessment & Plan    Reviewed recent hospital records, including MRI and CT of the head, showing no new infarct, bleed, or hemorrhage  Noted initial electrolyte abnormalities (low potassium and sodium) that have " since improved  Assessed recent labs results, including normal thyroid function, bleeding time, and A1c  Considered possibility of psychological factors contributing to patient's recent symptoms, potentially related to procedure-induced anxiety  Evaluated patient's reported fatigue, considering both congestive heart failure and potential sleep issues as contributing factors    STRESS AND ANXIETY:  - Explained that symptoms mimicking a stroke can sometimes be caused by elevated stress or anxiety, especially following medical procedures.  - Discussed the importance of exercise in releasing natural endorphins and dopamine, which can help manage stress.  - Provided information on the benefits of yoga for stretching and overall well-being.  - Nasra to start considering and adding regular exercise to daily routine.  - Recommend trying a meditation perlita before bed to improve sleep.  - Nasra to begin with yoga, potentially using online resources or finding a local studio.  - Recommend alternating between yoga and cardio exercises.    SLEEP ISSUES:  - Consider taking melatonin as needed for sleep, if approved by other specialists.  - Sleep study ordered.  - Referred to sleep medicine for evaluation and potential sleep study.    FOLLOW UP:  - Follow up in about 6 months, after seeing other specialists and after the holidays.  - Next routine labs scheduled for August next year, unless changes necessitate earlier testing.         Problem List Items Addressed This Visit          Neuro    Acute focal neurological deficit  Has f/u with neurology        Cardiac/Vascular    Chronic combined systolic and diastolic congestive heart failure (Chronic)    Essential hypertension (Chronic)  The current medical regimen is effective;  continue present plan and medications.   Continue f/u with cardiology      Other Visit Diagnoses       Insomnia, unspecified type    -  Primary    Relevant Orders    Ambulatory referral/consult to Sleep  Disorders    Snores        Relevant Orders    Ambulatory referral/consult to Sleep Disorders    Overweight (BMI 25.0-29.9)                  This note was generated with the assistance of ambient listening technology. Verbal consent was obtained by the patient and accompanying visitor(s) for the recording of patient appointment to facilitate this note. I attest to having reviewed and edited the generated note for accuracy, though some syntax or spelling errors may persist. Please contact the author of this note for any clarification.      No follow-ups on file.

## 2024-09-30 ENCOUNTER — OFFICE VISIT (OUTPATIENT)
Dept: PSYCHIATRY | Facility: CLINIC | Age: 39
End: 2024-09-30
Payer: MEDICARE

## 2024-09-30 VITALS
DIASTOLIC BLOOD PRESSURE: 73 MMHG | SYSTOLIC BLOOD PRESSURE: 108 MMHG | BODY MASS INDEX: 28 KG/M2 | HEART RATE: 66 BPM | WEIGHT: 189.63 LBS | OXYGEN SATURATION: 98 %

## 2024-09-30 DIAGNOSIS — G47.00 INSOMNIA, UNSPECIFIED TYPE: ICD-10-CM

## 2024-09-30 DIAGNOSIS — F33.2 SEVERE EPISODE OF RECURRENT MAJOR DEPRESSIVE DISORDER, WITHOUT PSYCHOTIC FEATURES: Primary | ICD-10-CM

## 2024-09-30 DIAGNOSIS — F41.1 GAD (GENERALIZED ANXIETY DISORDER): ICD-10-CM

## 2024-09-30 PROCEDURE — G2211 COMPLEX E/M VISIT ADD ON: HCPCS | Mod: S$PBB,,,

## 2024-09-30 PROCEDURE — 99215 OFFICE O/P EST HI 40 MIN: CPT | Mod: S$PBB,,,

## 2024-09-30 PROCEDURE — 99213 OFFICE O/P EST LOW 20 MIN: CPT | Mod: PBBFAC

## 2024-09-30 PROCEDURE — 99999 PR PBB SHADOW E&M-EST. PATIENT-LVL III: CPT | Mod: PBBFAC,,,

## 2024-09-30 RX ORDER — FLUOXETINE HYDROCHLORIDE 20 MG/1
20 CAPSULE ORAL DAILY
Qty: 30 CAPSULE | Refills: 1 | Status: SHIPPED | OUTPATIENT
Start: 2024-09-30 | End: 2024-11-29
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
## 2024-10-03 ENCOUNTER — OFFICE VISIT (OUTPATIENT)
Dept: NEUROLOGY | Facility: CLINIC | Age: 39
End: 2024-10-03
Payer: MEDICARE

## 2024-10-03 VITALS
WEIGHT: 191.56 LBS | TEMPERATURE: 98 F | DIASTOLIC BLOOD PRESSURE: 79 MMHG | SYSTOLIC BLOOD PRESSURE: 112 MMHG | RESPIRATION RATE: 18 BRPM | BODY MASS INDEX: 28.37 KG/M2 | HEIGHT: 69 IN | HEART RATE: 74 BPM | OXYGEN SATURATION: 100 %

## 2024-10-03 DIAGNOSIS — R51.9 NONINTRACTABLE HEADACHE, UNSPECIFIED CHRONICITY PATTERN, UNSPECIFIED HEADACHE TYPE: ICD-10-CM

## 2024-10-03 DIAGNOSIS — R20.0 LEG NUMBNESS: ICD-10-CM

## 2024-10-03 DIAGNOSIS — Z95.810 ICD (IMPLANTABLE CARDIOVERTER-DEFIBRILLATOR), SINGLE, IN SITU: ICD-10-CM

## 2024-10-03 DIAGNOSIS — F44.9 CONVERSION DISORDER: ICD-10-CM

## 2024-10-03 DIAGNOSIS — Z86.73 HX OF ISCHEMIC RIGHT PCA STROKE: Primary | ICD-10-CM

## 2024-10-03 DIAGNOSIS — G47.00 INSOMNIA, UNSPECIFIED TYPE: ICD-10-CM

## 2024-10-03 DIAGNOSIS — I50.42 CHRONIC COMBINED SYSTOLIC AND DIASTOLIC CONGESTIVE HEART FAILURE: Chronic | ICD-10-CM

## 2024-10-03 DIAGNOSIS — R51.9 ACUTE NONINTRACTABLE HEADACHE, UNSPECIFIED HEADACHE TYPE: ICD-10-CM

## 2024-10-03 PROCEDURE — 99999 PR PBB SHADOW E&M-EST. PATIENT-LVL IV: CPT | Mod: PBBFAC,,, | Performed by: STUDENT IN AN ORGANIZED HEALTH CARE EDUCATION/TRAINING PROGRAM

## 2024-10-03 PROCEDURE — 99214 OFFICE O/P EST MOD 30 MIN: CPT | Mod: PBBFAC | Performed by: STUDENT IN AN ORGANIZED HEALTH CARE EDUCATION/TRAINING PROGRAM

## 2024-10-03 NOTE — PROGRESS NOTES
Chief Complaint and Duration     Recent hospitalization 8/2024    History of Present Illness     Nasra Marks is a 39 y.o. female with a history of multiple medical diagnoses as listed below that presents for re-evaluation.    Hx of CHF, had a stent replacement.  End of August 2024. Has a defibrillator.    Mixed features of initial-consonant stuttering, head nodding, hand tremors.   39 y.o. female with HTN, HFrEF (LVEF 25%) 2/2 non-ischemic cardiomyopathy s/p ICD (placed 2017), R PICA territory infarct (2022) without residual deficits presented 8/27/24 for elective RHC. Following RHC procedure, she was complaining of R sided squeezing headache, speech difficulty and tremulousness that fluctuated between RUE to all extremities. Rapid response was called. NIHSS 7, Vascular Neurology evaluated. CTH/CTA MP unremarkable for acute findings or LVO. MRI/MRA brain with no acute pathology, just remote R cerebellar infarct. Vascular Neurology recommended continue home ASA 81 mg and statin for stroke prevention and signed off.     Neurology recommended continue home ASA 81 mg and statin for stroke prevention and signed off.   Neurology consulted 8/28 for numerous neurologic complaints. Pt unable to move left side with sensory deficits. Has stuttering speech.     Interim:  Here for follow up.  Endorses neuropathy type symptoms in her bilateral feet.  Continue on aspirin statin.    Review of patient's allergies indicates:   Allergen Reactions    Aldactone [spironolactone] Swelling     Lips swelled    Hydralazine analogues Other (See Comments)     headaches    Isosorbide Other (See Comments)     headaches     Current Outpatient Medications   Medication Sig Dispense Refill    acetaminophen (TYLENOL) 500 MG tablet Take 1 tablet (500 mg total) by mouth every 6 (six) hours as needed for Pain (As needed for pain and fever). 30 tablet 0    amLODIPine (NORVASC) 5 MG tablet Take 5 mg by mouth.      eplerenone (INSPRA) 25 MG Tab  Take 1 tablet (25 mg total) by mouth once daily. 90 tablet 3    FARXIGA 10 mg tablet Take 1 tablet by mouth once daily 30 tablet 6    FLUoxetine 20 MG capsule Take 1 capsule (20 mg total) by mouth once daily. 30 capsule 1    furosemide (LASIX) 40 MG tablet Take 1 tablet (40 mg total) by mouth once daily. 90 tablet 2    metoprolol succinate (TOPROL-XL) 200 MG 24 hr tablet Take 1 tablet (200 mg total) by mouth once daily. 90 tablet 3    potassium chloride SA (K-DUR,KLOR-CON) 20 MEQ tablet Take 2 tablets (40 mEq total) by mouth 2 (two) times daily. 120 tablet 5    sacubitriL-valsartan (ENTRESTO)  mg per tablet Take 1 tablet by mouth 2 (two) times daily. 180 tablet 3    aspirin (ECOTRIN) 81 MG EC tablet Take 1 tablet (81 mg total) by mouth once daily. 30 tablet 0    atorvastatin (LIPITOR) 40 MG tablet Take 1 tablet (40 mg total) by mouth every evening. 90 tablet 3    diclofenac sodium (VOLTAREN ARTHRITIS PAIN) 1 % Gel Apply to area of pain 3 times a day. (Patient not taking: Reported on 10/3/2024) 50 g 0    oxyCODONE-acetaminophen (PERCOCET) 5-325 mg per tablet Take 1 tablet by mouth every 6 (six) hours as needed for Pain (As needed for severe 10/10 pain). (Patient not taking: Reported on 10/3/2024) 8 each 0     No current facility-administered medications for this visit.       Medical History     Past Medical History:   Diagnosis Date    CHF (congestive heart failure)     Chronic combined systolic and diastolic congestive heart failure 2019    Essential hypertension 2012    Hypertension     dx at 16y.o.    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 disease, with heart failure 2021    ICD (implantable cardioverter-defibrillator), single, in situ 2020    Nonischemic cardiomyopathy 2019    Pacemaker 2017     Past Surgical History:   Procedure Laterality Date    CARDIAC DEFIBRILLATOR PLACEMENT  2017     SECTION      x 1    INDUCED        RIGHT HEART CATHETERIZATION Right 2022    Procedure: INSERTION, CATHETER, RIGHT HEART;  Surgeon: Timothy Prajapati Jr., MD;  Location: [facility] CATH LAB;  Service: Cardiology;  Laterality: Right;    RIGHT HEART CATHETERIZATION Right 2024    Procedure: INSERTION, CATHETER, RIGHT HEART;  Surgeon: Lior Rueda MD;  Location: [facility] CATH LAB;  Service: Cardiology;  Laterality: Right;    TUBAL LIGATION       Family History   Problem Relation Name Age of Onset    Hypertension Mother      Cancer Maternal Grandmother          breast x 2    Cancer Paternal Grandmother          breast    No Known Problems Sister      No Known Problems Brother      No Known Problems Son      No Known Problems Brother      Hypertension Other      Diabetes Other      Breast cancer Other      Cancer Paternal Aunt          breast    Cancer Paternal Uncle       Social History     Socioeconomic History    Marital status:    Occupational History     Employer: [employer]   Tobacco Use    Smoking status: Never    Smokeless tobacco: Never   Substance and Sexual Activity    Alcohol use: Not Currently     Comment: occasionally    Drug use: Not Currently     Types: Marijuana     Comment: in past very little marijuana    Sexual activity: Yes     Partners: Male     Birth control/protection: None     Social Drivers of Health     Financial Resource Strain: Low Risk  (2024)    Overall Financial Resource Strain (CARDIA)     Difficulty of Paying Living Expenses: Not very hard   Food Insecurity: No Food Insecurity (2024)    Hunger Vital Sign     Worried About Running Out of Food in the Last Year: Never true     Ran Out of Food in the Last Year: Never true   Transportation Needs: No Transportation Needs (2024)    TRANSPORTATION NEEDS     Transportation : No   Physical Activity: Inactive (2024)    Exercise Vital Sign     Days of Exercise per Week: 0 days     Minutes of Exercise per Session: 0 min   Stress:  Stress Concern Present (8/28/2024)    [employer] [place] [employer] - Occupational Stress Questionnaire     Feeling of Stress : To some extent   Housing Stability: Low Risk  (8/28/2024)    Housing Stability Vital Sign     Unable to Pay for Housing in the Last Year: No     Homeless in the Last Year: No       Exam     Vitals:    10/03/24 0705   BP: 112/79   Pulse: 74   Resp: 18   Temp: 98.1 °F (36.7 °C)      Physical Exam:  General: Not in acute distress. Not ill-appearing.   HENT: Normocephalic and atraumatic. Moist mucous membranes.  Eyes: Conjunctivae normal.   Pulmonary: Pulmonary effort is normal.   Abdominal: Abdomen is soft and flat.   Skin: Skin is warm and dry. No rashes.   Psychiatric: Mood normal.        Neurologic Exam   Mental status: oriented to person, place, and time  Attention: Normal. Concentration: normal.  Speech: speech is normal.  Cranial Nerves: PERRL, EOMI intact, V1-V3 Facial sensation intact. Symmetric facies. Hearing grossly intact. Palate and uvula midline, symmetric. No tongue deviation. Trapezius strength intact.     Motor exam: bulk and tone normal. Strength 5/5 in bilateral upper extremities: deltoids, biceps, triceps, wrist flexion/extension, finger abduction/adduction. Strength 5/5 in bilateral lower extremities: hip flexion/extension, thigh adduction/abduction, knee flexion/extension, dorsiflexion/plantarflexion, foot eversion/inversion.    Reflexes: 2+ in bilateral upper extremities: biceps and brachiaradialis, 2+ in bilateral lower extremities: patellar and achilles  Plantar reflex: normal  Rashid's/Clonus: negative    Sensory exam: light touch intact    Gait exam: normal  Romberg: negative  Coordination: normal    Tremor: none  Cogwheel rigidity: none    Labs and Imaging     Labs: reviewed  No results found for this or any previous visit (from the past 24 hours).    Thyroid normal  HgA1C%:  5.3  LDL:  78  Vit B12: 565    Imaging:   I have personally reviewed the images  performed.   MRI brain 2024 - hx No evidence of acute infarct or hemorrhage. Remote right cerebellar infarct.    CTA of the head  Impression:  Moderate-sized right cerebellar infarct, unchanged prior.  No new major vascular distribution infarct or acute intracranial hemorrhage.   CT arteriogram appears within normal limits.  No evidence of high-grade stenosis or intracranial large vessel occlusion.     Other procedures: reviewed  EEG 8/29 no seizures    Assessment and Plan     Problem List Items Addressed This Visit          Neuro    Nonintractable headache       Psychiatric    Conversion disorder       Cardiac/Vascular    Chronic combined systolic and diastolic congestive heart failure (Chronic)    ICD (implantable cardioverter-defibrillator), single, in situ     Other Visit Diagnoses       Hx of ischemic right PCA stroke    -  Primary    Insomnia, unspecified type        Leg numbness        Relevant Orders    Immunofixation Electrophoresis    Protein Electrophoresis, Serum    Vitamin B12    Folate    EMG W/ ULTRASOUND AND NERVE CONDUCTION TEST 2 Extremities          This is a patient 39-year-old female with a history of right PCA stroke, history of heart failure.  Does have an ICD.  Patient also with insomnia.  Had transient changes unawareness and nonspecific symptoms during hospitalization for the stent.  EEG was unremarkable.      Patient also endorsing bilateral leg numbness, workup for neuropathy and get nerve conduction study/EMG.    Appreciate opportunity to care for this patient.    Follow-up:  On EMG    Time spent on this encounter: 40 minutes. This includes face to face time and non-face to face time preparing to see the patient (eg, review of tests), obtaining and/or reviewing separately obtained history, documenting clinical information in the electronic or other health record, independently interpreting results and communicating results to the patient/family/caregiver, or care coordinator.     This  note was created by combination of typed  and M-Modal dictation. Transcription and phonetic errors may be present.  If there are any questions, please contact me.

## 2024-10-07 ENCOUNTER — LAB VISIT (OUTPATIENT)
Dept: LAB | Facility: HOSPITAL | Age: 39
End: 2024-10-07
Attending: STUDENT IN AN ORGANIZED HEALTH CARE EDUCATION/TRAINING PROGRAM
Payer: MEDICARE

## 2024-10-07 DIAGNOSIS — R20.0 LEG NUMBNESS: ICD-10-CM

## 2024-10-07 LAB
FOLATE SERPL-MCNC: 7.3 NG/ML (ref 4–24)
VIT B12 SERPL-MCNC: 495 PG/ML (ref 210–950)

## 2024-10-07 PROCEDURE — 86334 IMMUNOFIX E-PHORESIS SERUM: CPT | Mod: 26,,, | Performed by: PATHOLOGY

## 2024-10-07 PROCEDURE — 86334 IMMUNOFIX E-PHORESIS SERUM: CPT | Performed by: STUDENT IN AN ORGANIZED HEALTH CARE EDUCATION/TRAINING PROGRAM

## 2024-10-07 PROCEDURE — 36415 COLL VENOUS BLD VENIPUNCTURE: CPT | Mod: PO | Performed by: STUDENT IN AN ORGANIZED HEALTH CARE EDUCATION/TRAINING PROGRAM

## 2024-10-07 PROCEDURE — 84165 PROTEIN E-PHORESIS SERUM: CPT | Performed by: STUDENT IN AN ORGANIZED HEALTH CARE EDUCATION/TRAINING PROGRAM

## 2024-10-07 PROCEDURE — 84165 PROTEIN E-PHORESIS SERUM: CPT | Mod: 26,,, | Performed by: PATHOLOGY

## 2024-10-07 PROCEDURE — 82746 ASSAY OF FOLIC ACID SERUM: CPT | Performed by: STUDENT IN AN ORGANIZED HEALTH CARE EDUCATION/TRAINING PROGRAM

## 2024-10-07 PROCEDURE — 82607 VITAMIN B-12: CPT | Performed by: STUDENT IN AN ORGANIZED HEALTH CARE EDUCATION/TRAINING PROGRAM

## 2024-10-08 LAB
ALBUMIN SERPL ELPH-MCNC: 3.84 G/DL (ref 3.35–5.55)
ALPHA1 GLOB SERPL ELPH-MCNC: 0.25 G/DL (ref 0.17–0.41)
ALPHA2 GLOB SERPL ELPH-MCNC: 0.57 G/DL (ref 0.43–0.99)
B-GLOBULIN SERPL ELPH-MCNC: 0.8 G/DL (ref 0.5–1.1)
GAMMA GLOB SERPL ELPH-MCNC: 1.54 G/DL (ref 0.67–1.58)
INTERPRETATION SERPL IFE-IMP: NORMAL
PROT SERPL-MCNC: 7 G/DL (ref 6–8.4)

## 2024-10-09 LAB
PATHOLOGIST INTERPRETATION IFE: NORMAL
PATHOLOGIST INTERPRETATION SPE: NORMAL

## 2024-10-30 ENCOUNTER — OFFICE VISIT (OUTPATIENT)
Dept: CARDIOLOGY | Facility: CLINIC | Age: 39
End: 2024-10-30
Payer: MEDICARE

## 2024-10-30 VITALS
SYSTOLIC BLOOD PRESSURE: 136 MMHG | RESPIRATION RATE: 18 BRPM | DIASTOLIC BLOOD PRESSURE: 80 MMHG | WEIGHT: 191.81 LBS | BODY MASS INDEX: 28.41 KG/M2 | OXYGEN SATURATION: 99 % | HEART RATE: 71 BPM | HEIGHT: 69 IN

## 2024-10-30 DIAGNOSIS — I10 ESSENTIAL HYPERTENSION: ICD-10-CM

## 2024-10-30 DIAGNOSIS — E78.5 DYSLIPIDEMIA: ICD-10-CM

## 2024-10-30 DIAGNOSIS — I50.42 CHRONIC COMBINED SYSTOLIC AND DIASTOLIC CONGESTIVE HEART FAILURE: ICD-10-CM

## 2024-10-30 DIAGNOSIS — I42.8 NONISCHEMIC CARDIOMYOPATHY: Primary | ICD-10-CM

## 2024-10-30 DIAGNOSIS — Z86.73 HISTORY OF CVA (CEREBROVASCULAR ACCIDENT): ICD-10-CM

## 2024-10-30 DIAGNOSIS — Z95.810 ICD (IMPLANTABLE CARDIOVERTER-DEFIBRILLATOR), SINGLE, IN SITU: ICD-10-CM

## 2024-10-30 DIAGNOSIS — R06.09 DOE (DYSPNEA ON EXERTION): ICD-10-CM

## 2024-10-30 PROCEDURE — 99999 PR PBB SHADOW E&M-EST. PATIENT-LVL IV: CPT | Mod: PBBFAC,,, | Performed by: INTERNAL MEDICINE

## 2024-10-30 PROCEDURE — 99214 OFFICE O/P EST MOD 30 MIN: CPT | Mod: S$PBB,,, | Performed by: INTERNAL MEDICINE

## 2024-10-30 PROCEDURE — G2211 COMPLEX E/M VISIT ADD ON: HCPCS | Mod: S$PBB,,, | Performed by: INTERNAL MEDICINE

## 2024-10-30 PROCEDURE — 99214 OFFICE O/P EST MOD 30 MIN: CPT | Mod: PBBFAC,PO | Performed by: INTERNAL MEDICINE

## 2024-10-31 ENCOUNTER — PATIENT MESSAGE (OUTPATIENT)
Dept: PSYCHIATRY | Facility: CLINIC | Age: 39
End: 2024-10-31
Payer: MEDICARE

## 2024-11-04 ENCOUNTER — HOSPITAL ENCOUNTER (EMERGENCY)
Facility: HOSPITAL | Age: 39
Discharge: HOME | End: 2024-11-04
Attending: EMERGENCY MEDICINE
Payer: MEDICARE

## 2024-11-04 VITALS
WEIGHT: 188 LBS | RESPIRATION RATE: 20 BRPM | TEMPERATURE: 99 F | SYSTOLIC BLOOD PRESSURE: 150 MMHG | OXYGEN SATURATION: 98 % | DIASTOLIC BLOOD PRESSURE: 92 MMHG | HEIGHT: 69 IN | HEART RATE: 92 BPM | BODY MASS INDEX: 27.85 KG/M2

## 2024-11-04 DIAGNOSIS — R06.02 SOB (SHORTNESS OF BREATH): ICD-10-CM

## 2024-11-04 DIAGNOSIS — M79.604 PAIN OF RIGHT LOWER EXTREMITY: Primary | ICD-10-CM

## 2024-11-04 DIAGNOSIS — M79.606 LEG PAIN: ICD-10-CM

## 2024-11-04 LAB
ALBUMIN SERPL-MCNC: 3.8 G/DL (ref 3.3–5.5)
ALP SERPL-CCNC: 42 U/L (ref 42–141)
B-HCG UR QL: NEGATIVE
BILIRUB SERPL-MCNC: 0.5 MG/DL (ref 0.2–1.6)
BUN SERPL-MCNC: 14 MG/DL (ref 7–22)
CALCIUM SERPL-MCNC: 9.4 MG/DL (ref 8–10.3)
CHLORIDE SERPL-SCNC: 111 MMOL/L (ref 98–108)
CREAT SERPL-MCNC: 0.6 MG/DL (ref 0.6–1.2)
CTP QC/QA: YES
GLUCOSE SERPL-MCNC: 92 MG/DL (ref 73–118)
HCT, POC: NORMAL
HGB, POC: NORMAL (ref 14–18)
MCH, POC: NORMAL
MCHC, POC: NORMAL
MCV, POC: NORMAL
MPV, POC: NORMAL
OHS QRS DURATION: 98 MS
OHS QTC CALCULATION: 444 MS
POC ALT (SGPT): 16 U/L (ref 10–47)
POC AST (SGOT): 29 U/L (ref 11–38)
POC B-TYPE NATRIURETIC PEPTIDE: 113 PG/ML (ref 0–100)
POC CARDIAC TROPONIN I: 0 NG/ML (ref 0–0.08)
POC PLATELET COUNT: NORMAL
POC TCO2: 27 MMOL/L (ref 18–33)
POTASSIUM BLD-SCNC: 4 MMOL/L (ref 3.6–5.1)
PROTEIN, POC: 7.4 G/DL (ref 6.4–8.1)
RBC, POC: NORMAL
RDW, POC: NORMAL
SAMPLE: NORMAL
SODIUM BLD-SCNC: 142 MMOL/L (ref 128–145)
WBC, POC: NORMAL

## 2024-11-04 PROCEDURE — 81025 URINE PREGNANCY TEST: CPT | Mod: ER

## 2024-11-04 PROCEDURE — 96374 THER/PROPH/DIAG INJ IV PUSH: CPT | Mod: ER

## 2024-11-04 PROCEDURE — 63600175 PHARM REV CODE 636 W HCPCS: Mod: ER | Performed by: EMERGENCY MEDICINE

## 2024-11-04 PROCEDURE — 85025 COMPLETE CBC W/AUTO DIFF WBC: CPT | Mod: ER

## 2024-11-04 PROCEDURE — 80053 COMPREHEN METABOLIC PANEL: CPT | Mod: ER

## 2024-11-04 PROCEDURE — 99285 EMERGENCY DEPT VISIT HI MDM: CPT | Mod: 25,ER

## 2024-11-04 PROCEDURE — 93010 ELECTROCARDIOGRAM REPORT: CPT | Mod: ,,, | Performed by: INTERNAL MEDICINE

## 2024-11-04 PROCEDURE — 84484 ASSAY OF TROPONIN QUANT: CPT | Mod: ER

## 2024-11-04 PROCEDURE — 83880 ASSAY OF NATRIURETIC PEPTIDE: CPT | Mod: ER

## 2024-11-04 PROCEDURE — 93005 ELECTROCARDIOGRAM TRACING: CPT | Mod: ER

## 2024-11-04 PROCEDURE — 81025 URINE PREGNANCY TEST: CPT | Mod: ER | Performed by: EMERGENCY MEDICINE

## 2024-11-04 RX ORDER — LIDOCAINE 50 MG/G
1 PATCH TOPICAL DAILY
Qty: 5 PATCH | Refills: 0 | Status: SHIPPED | OUTPATIENT
Start: 2024-11-04

## 2024-11-04 RX ORDER — ACETAMINOPHEN 500 MG
500 TABLET ORAL EVERY 6 HOURS PRN
Qty: 13 TABLET | Refills: 0 | Status: SHIPPED | OUTPATIENT
Start: 2024-11-04

## 2024-11-04 RX ORDER — FUROSEMIDE 10 MG/ML
20 INJECTION INTRAMUSCULAR; INTRAVENOUS
Status: COMPLETED | OUTPATIENT
Start: 2024-11-04 | End: 2024-11-04

## 2024-11-04 RX ORDER — ALBUTEROL SULFATE 90 UG/1
1-2 INHALANT RESPIRATORY (INHALATION) EVERY 6 HOURS PRN
Qty: 18 G | Refills: 0 | Status: SHIPPED | OUTPATIENT
Start: 2024-11-04 | End: 2025-11-04

## 2024-11-04 RX ADMIN — FUROSEMIDE 20 MG: 10 INJECTION, SOLUTION INTRAMUSCULAR; INTRAVENOUS at 09:11

## 2024-11-04 NOTE — ED PROVIDER NOTES
"Encounter Date: 11/4/2024    SCRIBE #1 NOTE: I, Ana Lee, am scribing for, and in the presence of,  Jimmy Marks MD. I have scribed the following portions of the note - Other sections scribed: HPI, ROS.       History     Chief Complaint   Patient presents with    Chest Pain     A 40 y/o female presents to the ER c/o CP since last night. Pt has Hx of CHF with a defibrillator. +Swelling to lower extremities.       Nasra Marks is a 39 y.o. female, with PMHx of HTN, HFrEF (LVEF 20-25%) non-ischemic cardiomyopathy s/p ICD (placed 2017), and hypertensive cardiovascular-renal disease who presents to the ED complaining of intermittent left upper chest chest congestion for 3 days. Patient reports pain over area where her ICD is located. She also reports increased swelling to her lower extremities (R>L) x 3 days. She reports feeling like it hurts to move her right lower leg. Patient reports hx of fluid retention in her legs but states she has not had any problems "in a while".  She denies a fever, rhinorrhea, nasal congestion, bowel/urinary concerns, or other associated symptoms.      Per chart review, last Echo on 08/08/24 showed:   ·  Left Ventricle: The left ventricle is moderately dilated. Normal wall thickness. Moderate global hypokinesis present. There is moderately reduced systolic function with a visually estimated ejection fraction of 30 - 35%. Grade II diastolic dysfunction. Normal left ventricular filling pressure.  ·  Right Ventricle: Normal right ventricular cavity size. Wall thickness is normal. Right ventricle wall motion  is normal. Systolic function is normal.  ·  Left Atrium: Normal left atrial size.  ·  Right Atrium: Normal right atrial size.  ·  Aortic Valve: The aortic valve is a trileaflet valve. There is mild aortic valve sclerosis. There is mild annular calcification present.  ·  Mitral Valve: There is mild bileaflet sclerosis. There is mild regurgitation.  ·  Aorta: Aortic root is " normal in size measuring 2.81 cm. Ascending aorta is normal measuring 2.96 cm.  ·  Pulmonary Artery: The estimated pulmonary artery systolic pressure is 19 mmHg.  ·  IVC/SVC: Normal venous pressure at 3 mmHg.      Per chart review, 08/09/24 Stress Test Findings:  Resting spirometry reveals an FVC = 2.69L which is 69% of predicted, an FEV1 of 2.12L, which is 65% of predicted and an FEV1/FVC ratio of 79%. The MVV = 85 L/min, which is 74% of predicted.     The respiratory exchange ratio (RER) was 1.18, suggesting an adequate effort.     The breathing reserve is calculated at -3%, which is reduced. This may be due to a falsely reduced MVV. Oxygen saturation with exercise remained normal.     The peak VO2 was 19.8 ml/kg/min which is 56% of predicted equating to a functional capacity of 5.66 METS indicating moderate to severe functional impairment.     The anaerobic threshold (AT), which occurred at a heart rate of 118bpm, was 15.1 ml/kg/min, which is 43% of the predicted VO2 and is borderline reduced.     The peak VO2 Lean was 27.99 ml/kg of lean body weight/min indicating a good prognosis in heart failure.     The VE/VCO2 [place] was 36.2. The Resting PetCO2 was 27.0.       Moderate to severe functional impairment associated with a reduced breathing reserve, normal oxygen stauration, an adequate effort, and a borderline reduced AT. These findings are indicative of functional impairment secondary to circulatory insufficiency, deconditioning.             The history is provided by the patient and medical records. No  was used.     Review of patient's allergies indicates:   Allergen Reactions    Aldactone [spironolactone] Swelling     Lips swelled    Hydralazine analogues Other (See Comments)     headaches    Isosorbide Other (See Comments)     headaches     Past Medical History:   Diagnosis Date    CHF (congestive heart failure)     Chronic combined systolic and diastolic congestive heart failure  2019    Essential hypertension 2012    Hypertension     dx at 16y.o.    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 disease, with heart failure 2021    ICD (implantable cardioverter-defibrillator), single, in situ 2020    Nonischemic cardiomyopathy 2019    Pacemaker 2017     Past Surgical History:   Procedure Laterality Date    CARDIAC DEFIBRILLATOR PLACEMENT  2017     SECTION      x 1    INDUCED       RIGHT HEART CATHETERIZATION Right 2022    Procedure: INSERTION, CATHETER, RIGHT HEART;  Surgeon: Timothy Prajapati Jr., MD;  Location: [facility] CATH LAB;  Service: Cardiology;  Laterality: Right;    RIGHT HEART CATHETERIZATION Right 2024    Procedure: INSERTION, CATHETER, RIGHT HEART;  Surgeon: Lior Rueda MD;  Location: [facility] CATH LAB;  Service: Cardiology;  Laterality: Right;    TUBAL LIGATION       Family History   Problem Relation Name Age of Onset    Hypertension Mother      Cancer Maternal Grandmother          breast x 2    Cancer Paternal Grandmother          breast    No Known Problems Sister      No Known Problems Brother      No Known Problems Son      No Known Problems Brother      Hypertension Other      Diabetes Other      Breast cancer Other      Cancer Paternal Aunt          breast    Cancer Paternal Uncle       Social History     Tobacco Use    Smoking status: Never    Smokeless tobacco: Never   Substance Use Topics    Alcohol use: Not Currently     Comment: occasionally    Drug use: Not Currently     Types: Marijuana     Comment: in past very little marijuana     Review of Systems   Constitutional:  Negative for chills and fever.   HENT:  Negative for congestion, rhinorrhea and sore throat.    Eyes:  Negative for pain and visual disturbance.   Respiratory:  Negative for shortness of breath.         Chest congestion   Cardiovascular:  Positive for chest pain (L upper chest) and leg swelling.    Gastrointestinal:  Negative for diarrhea and nausea.   Endocrine: Negative for polydipsia and polyuria.   Genitourinary:  Negative for dysuria and flank pain.   Musculoskeletal:  Positive for myalgias. Negative for back pain, neck pain and neck stiffness.   Skin:  Negative for rash.   Allergic/Immunologic: Negative for immunocompromised state.   Neurological:  Negative for dizziness and weakness.   Hematological:  Does not bruise/bleed easily.   Psychiatric/Behavioral:  Negative for agitation and behavioral problems.    All other systems reviewed and are negative.      Physical Exam     Initial Vitals [11/04/24 0810]   BP Pulse Resp Temp SpO2   (!) 141/91 84 16 98.6 °F (37 °C) 100 %      MAP       --         Physical Exam    Nursing note and vitals reviewed.  Constitutional: She appears well-developed and well-nourished.   HENT:   Head: Normocephalic and atraumatic. Mouth/Throat: Oropharynx is clear and moist and mucous membranes are normal.   Eyes: Conjunctivae and EOM are normal. Pupils are equal, round, and reactive to light. Right conjunctiva is not injected. Left conjunctiva is not injected. No scleral icterus.   Neck: Neck supple.   Normal range of motion.   Full passive range of motion without pain.     Cardiovascular:  Normal rate, regular rhythm, S1 normal, S2 normal, normal heart sounds and normal pulses.     Exam reveals no gallop and no friction rub.       No murmur heard.  Pulses:       Radial pulses are 2+ on the right side and 2+ on the left side.   Pulmonary/Chest: Effort normal and breath sounds normal. No respiratory distress. She exhibits tenderness.   Abdominal: Abdomen is soft. She exhibits no distension. There is no abdominal tenderness.   Musculoskeletal:         General: No edema. Normal range of motion.      Cervical back: Full passive range of motion without pain, normal range of motion and neck supple.      Comments: Good active ROM of all extremities.   Right leg mild swelling. Diffuse  tenderness  No rashes or lesions.      Neurological: No cranial nerve deficit. Gait normal.   A&Ox4, normal speech.   Skin: Skin is warm. No ecchymosis and no rash noted.   Psychiatric: She has a normal mood and affect. Thought content normal.         ED Course   Procedures  Labs Reviewed   POCT B-TYPE NATRIURETIC PEPTIDE (BNP) - Abnormal       Result Value    POC B-Type Natriuretic Peptide 113 (*)    POCT CMP - Abnormal    Albumin, POC 3.8      Alkaline Phosphatase, POC 42      ALT (SGPT), POC 16      AST (SGOT), POC 29      POC BUN 14      Calcium, POC 9.4      POC Chloride 111 (*)     POC Creatinine 0.6      POC Glucose 92      POC Potassium 4.0      POC Sodium 142      Bilirubin, POC 0.5      POC TCO2 27      Protein, POC 7.4     TROPONIN ISTAT    POC Cardiac Troponin I 0.00      Sample unknown     POCT URINE PREGNANCY    POC Preg Test, Ur Negative       Acceptable Yes     POCT CBC    Hematocrit        Hemoglobin        RBC        WBC        MCV        MCH, POC        MCHC        RDW-CV        Platelet Count, POC        MPV       POCT CMP   POCT TROPONIN   POCT B-TYPE NATRIURETIC PEPTIDE (BNP)     EKG Readings: (Independently Interpreted)   EKG done at 8:10 a.m. showed normal sinus rhythm sinus arrhythmia with occasional PVCs with a rate 72.  Biphasic T-waves laterally.  Flat T-waves inferiorly.  Normal axis.  QRS is 90 QTC is 444.     ECG Results              EKG 12-lead (Final result)        Collection Time Result Time QRS Duration OHS QTC Calculation    11/04/24 08:10:10 11/04/24 09:02:41 98 444                     Final result by Interface, Lab In [facility] (11/04/24 09:02:47)                   Narrative:    Test Reason : R06.02,    Vent. Rate : 072 BPM     Atrial Rate : 072 BPM     P-R Int : 152 ms          QRS Dur : 098 ms      QT Int : 406 ms       P-R-T Axes : 040 056 026 degrees     QTc Int : 444 ms    Sinus rhythm with sinus arrhythmia with occasional Premature  ventricular  complexes  Nonspecific T wave abnormality  Abnormal ECG  When compared with ECG of 27-AUG-2024 22:02,  Nonspecific T wave abnormality, worse in Inferior leads  Nonspecific T wave abnormality now evident in Lateral leads  Confirmed by Kashif Peguero MD ([id number]) on 11/4/2024 9:02:37 AM    Referred By: JUSTEN   SELF           Confirmed By:Kashif Peguero MD                                  Imaging Results              X-Ray Chest AP Portable (Final result)  Result time 11/04/24 10:09:36      Final result by Jarvis Loco MD (11/04/24 10:09:36)                   Impression:      No convincing evidence of acute detrimental change relative to prior radiograph 08/24/2024, 14:11 hours.      Electronically signed by: Jarvis Loco  Date:    11/04/2024  Time:    10:09               Narrative:    EXAMINATION:  XR CHEST AP PORTABLE    CLINICAL HISTORY:  CHF;    TECHNIQUE:  Single frontal view of the chest was performed.    COMPARISON:  Chest radiograph performed 08/24/2024, 14:11 hours.    FINDINGS:  Monitoring leads over the chest.  Left subclavian approach AICD.    Grossly unchanged cardiac contours.  Lungs appear essentially clear.  No definite pneumothorax or large volume pleural effusion.    No acute findings in the visualized abdomen.  Osseous and soft tissue structures appear without definite acute change.                                       US Lower Extremity Veins Right (Final result)  Result time 11/04/24 10:01:19      Final result by Nathen Batista MD (11/04/24 10:01:19)                   Impression:      No evidence of deep venous thrombosis in the right lower extremity.      Electronically signed by: Nathen Batista MD  Date:    11/04/2024  Time:    10:01               Narrative:    EXAMINATION:  US LOWER EXTREMITY VEINS RIGHT    CLINICAL HISTORY:  Pain in leg, unspecified    TECHNIQUE:  Duplex and color flow Doppler evaluation and graded compression of the right lower extremity veins was  performed.    COMPARISON:  None    FINDINGS:  Right thigh veins: The common femoral, femoral, popliteal, upper greater saphenous, and deep femoral veins are patent and free of thrombus. The veins are normally compressible and have normal phasic flow and augmentation response.    Right calf veins: The visualized calf veins are patent.    Contralateral CFV: The contralateral (left) common femoral vein is patent and free of thrombus.    Miscellaneous: None                                       Medications   furosemide injection 20 mg (20 mg Intravenous Given 11/4/24 0903)     Medical Decision Making  Differential diagnosis include but are not limited to:  CHF, fluid retention, ACS, arrhythmia, dissection, pneumonia, pneumothorax, DVT, peripheral edema    31-year-old female presenting secondary chest congestion for 3 days along with lower extremity swelling.  Workup reassuring and ultrasound negative.  No signs ACS.  No major pneumonia or fluid overload.  IV Lasix.  Resting comfortably in bed.  Discharged with outpatient follow-up. I discussed with the patient/family the diagnosis, treatment plan, indications for return to the emergency department, and for expected follow-up. The patient/family verbalized an understanding. The patient/family is asked if there are any questions or concerns. We discuss the case, until all issues are addressed to the patient/family's satisfaction. Patient/family understands and is agreeable to the plan.   Jimmy Marks    DISCLAIMER: This note was prepared with [employer] voice recognition transcription software. Garbled syntax, mangled pronouns, and other bizarre constructions may be attributed to that software system.        Amount and/or Complexity of Data Reviewed  Labs: ordered. Decision-making details documented in ED Course.  Radiology: ordered. Decision-making details documented in ED Course.  ECG/medicine tests: ordered and independent interpretation performed. Decision-making details  documented in ED Course.    Risk  OTC drugs.  Prescription drug management.            Scribe Attestation:   Scribe #1: I performed the above scribed service and the documentation accurately describes the services I performed. I attest to the accuracy of the note.                             I, jimmy marks, personally performed the services described in this documentation. All medical record entries made by the scribe were at my direction and in my presence. I have reviewed the chart and agree that the record reflects my personal performance and is accurate and complete.      DISCLAIMER: This note was prepared with [employer] voice recognition transcription software. Garbled syntax, mangled pronouns, and other bizarre constructions may be attributed to that software system.    Clinical Impression:  Final diagnoses:  [R06.02] SOB (shortness of breath)  [M79.606] Leg pain  [M79.604] Pain of right lower extremity (Primary)          ED Disposition Condition    Discharge Stable          ED Prescriptions       Medication Sig Dispense Start Date End Date Auth. Provider    acetaminophen (TYLENOL) 500 MG tablet Take 1 tablet (500 mg total) by mouth every 6 (six) hours as needed. 13 tablet 11/4/2024 -- Jimmy Marks MD    LIDOcaine (LIDODERM) 5 % Place 1 patch onto the skin once daily. Remove & Discard patch within 12 hours or as directed by MD 5 patch 11/4/2024 -- Jimmy Marks MD    albuterol (PROVENTIL/VENTOLIN HFA) 90 mcg/actuation inhaler Inhale 1-2 puffs into the lungs every 6 (six) hours as needed. Rescue 18 g 11/4/2024 11/4/2025 Jimmy Marks MD          Follow-up Information       Follow up With Specialties Details Why Contact Info    Veronika Rainye MD Family Medicine, Wound Care Schedule an appointment as soon as possible for a visit in 2 days  [address]  [place] [id number] [phone]               Jimmy Marks MD  11/04/24 [id number]

## 2024-11-04 NOTE — Clinical Note
"Nsara Marks (Kelly) was seen and treated in our emergency department on 11/4/2024.  She may return to work on 11/06/2024.       If you have any questions or concerns, please don't hesitate to call.      Jimmy Marks MD"

## 2024-11-04 NOTE — DISCHARGE INSTRUCTIONS

## 2024-11-11 ENCOUNTER — HOSPITAL ENCOUNTER (EMERGENCY)
Facility: HOSPITAL | Age: 39
Discharge: HOME | End: 2024-11-11
Attending: EMERGENCY MEDICINE
Payer: MEDICARE

## 2024-11-11 VITALS
BODY MASS INDEX: 27.85 KG/M2 | TEMPERATURE: 99 F | OXYGEN SATURATION: 98 % | WEIGHT: 188 LBS | HEART RATE: 60 BPM | HEIGHT: 69 IN | DIASTOLIC BLOOD PRESSURE: 69 MMHG | SYSTOLIC BLOOD PRESSURE: 136 MMHG | RESPIRATION RATE: 20 BRPM

## 2024-11-11 DIAGNOSIS — R09.82 POSTNASAL DRIP: Primary | ICD-10-CM

## 2024-11-11 DIAGNOSIS — R05.8 POST-VIRAL COUGH SYNDROME: ICD-10-CM

## 2024-11-11 LAB
ALBUMIN SERPL-MCNC: 4 G/DL (ref 3.3–5.5)
ALP SERPL-CCNC: 63 U/L (ref 42–141)
BILIRUB SERPL-MCNC: 0.8 MG/DL (ref 0.2–1.6)
BUN SERPL-MCNC: 13 MG/DL (ref 7–22)
CALCIUM SERPL-MCNC: 9.8 MG/DL (ref 8–10.3)
CHLORIDE SERPL-SCNC: 110 MMOL/L (ref 98–108)
CREAT SERPL-MCNC: 1.1 MG/DL (ref 0.6–1.2)
CTP QC/QA: YES
GLUCOSE SERPL-MCNC: 84 MG/DL (ref 73–118)
HCT, POC: NORMAL
HGB, POC: NORMAL (ref 14–18)
INFLUENZA A ANTIGEN, POC: NEGATIVE
INFLUENZA B ANTIGEN, POC: NEGATIVE
MCH, POC: NORMAL
MCHC, POC: NORMAL
MCV, POC: NORMAL
MPV, POC: NORMAL
POC ALT (SGPT): 13 U/L (ref 10–47)
POC AST (SGOT): 25 U/L (ref 11–38)
POC B-TYPE NATRIURETIC PEPTIDE: 287 PG/ML (ref 0–100)
POC CARDIAC TROPONIN I: 0 NG/ML (ref 0–0.08)
POC PLATELET COUNT: NORMAL
POC RAPID STREP A: NEGATIVE
POC TCO2: 28 MMOL/L (ref 18–33)
POTASSIUM BLD-SCNC: 4.5 MMOL/L (ref 3.6–5.1)
PROTEIN, POC: 8.2 G/DL (ref 6.4–8.1)
RBC, POC: NORMAL
RDW, POC: NORMAL
SAMPLE: NORMAL
SARS-COV-2 RDRP RESP QL NAA+PROBE: NEGATIVE
SODIUM BLD-SCNC: 135 MMOL/L (ref 128–145)
WBC, POC: NORMAL

## 2024-11-11 PROCEDURE — 63600175 PHARM REV CODE 636 W HCPCS: Mod: ER | Performed by: EMERGENCY MEDICINE

## 2024-11-11 PROCEDURE — 85025 COMPLETE CBC W/AUTO DIFF WBC: CPT | Mod: ER

## 2024-11-11 PROCEDURE — 94640 AIRWAY INHALATION TREATMENT: CPT | Mod: ER

## 2024-11-11 PROCEDURE — 80053 COMPREHEN METABOLIC PANEL: CPT | Mod: ER

## 2024-11-11 PROCEDURE — 25000242 PHARM REV CODE 250 ALT 637 W/ HCPCS: Mod: ER | Performed by: EMERGENCY MEDICINE

## 2024-11-11 PROCEDURE — 84484 ASSAY OF TROPONIN QUANT: CPT | Mod: ER

## 2024-11-11 PROCEDURE — 96372 THER/PROPH/DIAG INJ SC/IM: CPT | Performed by: EMERGENCY MEDICINE

## 2024-11-11 PROCEDURE — 87880 STREP A ASSAY W/OPTIC: CPT | Mod: ER

## 2024-11-11 PROCEDURE — 87804 INFLUENZA ASSAY W/OPTIC: CPT | Mod: ER

## 2024-11-11 PROCEDURE — 87635 SARS-COV-2 COVID-19 AMP PRB: CPT | Mod: ER | Performed by: EMERGENCY MEDICINE

## 2024-11-11 PROCEDURE — 83880 ASSAY OF NATRIURETIC PEPTIDE: CPT | Mod: ER

## 2024-11-11 PROCEDURE — 94761 N-INVAS EAR/PLS OXIMETRY MLT: CPT | Mod: ER

## 2024-11-11 PROCEDURE — 99284 EMERGENCY DEPT VISIT MOD MDM: CPT | Mod: 25,ER

## 2024-11-11 RX ORDER — FLUTICASONE PROPIONATE 50 MCG
1 SPRAY, SUSPENSION (ML) NASAL 2 TIMES DAILY
Qty: 15 G | Refills: 0 | Status: SHIPPED | OUTPATIENT
Start: 2024-11-11 | End: 2024-11-18

## 2024-11-11 RX ORDER — OXYMETAZOLINE HCL 0.05 %
1 SPRAY, NON-AEROSOL (ML) NASAL 2 TIMES DAILY
Qty: 15 ML | Refills: 0 | Status: SHIPPED | OUTPATIENT
Start: 2024-11-11 | End: 2024-11-13

## 2024-11-11 RX ORDER — IPRATROPIUM BROMIDE AND ALBUTEROL SULFATE 2.5; .5 MG/3ML; MG/3ML
3 SOLUTION RESPIRATORY (INHALATION)
Status: COMPLETED | OUTPATIENT
Start: 2024-11-11 | End: 2024-11-11

## 2024-11-11 RX ORDER — DEXAMETHASONE SODIUM PHOSPHATE 4 MG/ML
8 INJECTION, SOLUTION INTRA-ARTICULAR; INTRALESIONAL; INTRAMUSCULAR; INTRAVENOUS; SOFT TISSUE
Status: COMPLETED | OUTPATIENT
Start: 2024-11-11 | End: 2024-11-11

## 2024-11-11 RX ADMIN — DEXAMETHASONE SODIUM PHOSPHATE 8 MG: 4 INJECTION INTRA-ARTICULAR; INTRALESIONAL; INTRAMUSCULAR; INTRAVENOUS; SOFT TISSUE at 03:11

## 2024-11-11 RX ADMIN — IPRATROPIUM BROMIDE AND ALBUTEROL SULFATE 3 ML: .5; 3 SOLUTION RESPIRATORY (INHALATION) at 03:11

## 2024-11-11 NOTE — ED PROVIDER NOTES
Encounter Date: 11/11/2024    SCRIBE #1 NOTE: I, Ana Lee, am scribing for, and in the presence of,  Bairon Carrizales MD. I have scribed the following portions of the note - Other sections scribed: HPI, ROS, PE.       History     Chief Complaint   Patient presents with    Cough     Dry cough with back and chest discomfort since Friday      This is a 39 y.o. female with a PMHx of  HTN, HFrEF (LVEF 20-25%) non-ischemic cardiomyopathy s/p ICD (placed 2017), and hypertensive cardiovascular-renal disease who presents to the Emergency Department complaining of dry cough x 3 days. Patient reports associated sore throat, hoarse voice, low-grade fever (t-max 100F), back pain 2/2 cough, and chest discomfort. Reports deep inspiration exacerbates cough. She has been using albuterol inhaler prescribed during her previous ED visit with no significant relief. Denies any recent steroid use. No known sick contacts. Denies dysuria, hematuria, or other associated symptoms.    Per chart review, patient was seen at this facility on 11/4/24 c/o intermittent left upper chest, chest congestion x 3 days and increased swelling to bilateral lower extremities (R>L). Workup was reassuring and ultrasound negative. No signs ACS.  No major pneumonia or fluid overload. Improvement after IV Lasix.     The history is provided by the patient and medical records. No  was used.     Review of patient's allergies indicates:   Allergen Reactions    Aldactone [spironolactone] Swelling     Lips swelled    Hydralazine analogues Other (See Comments)     headaches    Isosorbide Other (See Comments)     headaches     Past Medical History:   Diagnosis Date    CHF (congestive heart failure)     Chronic combined systolic and diastolic congestive heart failure 5/24/2019    Essential hypertension 11/19/2012    Hypertension     dx at 16y.o.    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 disease, with  heart failure 2021    ICD (implantable cardioverter-defibrillator), single, in situ 2020    Nonischemic cardiomyopathy 2019    Pacemaker 2017     Past Surgical History:   Procedure Laterality Date    CARDIAC DEFIBRILLATOR PLACEMENT  2017     SECTION      x 1    INDUCED       RIGHT HEART CATHETERIZATION Right 2022    Procedure: INSERTION, CATHETER, RIGHT HEART;  Surgeon: Timothy Prajapati Jr., MD;  Location: [facility] CATH LAB;  Service: Cardiology;  Laterality: Right;    RIGHT HEART CATHETERIZATION Right 2024    Procedure: INSERTION, CATHETER, RIGHT HEART;  Surgeon: Lior Rueda MD;  Location: [facility] CATH LAB;  Service: Cardiology;  Laterality: Right;    TUBAL LIGATION       Family History   Problem Relation Name Age of Onset    Hypertension Mother      Cancer Maternal Grandmother          breast x 2    Cancer Paternal Grandmother          breast    No Known Problems Sister      No Known Problems Brother      No Known Problems Son      No Known Problems Brother      Hypertension Other      Diabetes Other      Breast cancer Other      Cancer Paternal Aunt          breast    Cancer Paternal Uncle       Social History     Tobacco Use    Smoking status: Never    Smokeless tobacco: Never   Substance Use Topics    Alcohol use: Not Currently     Comment: occasionally    Drug use: Not Currently     Types: Marijuana     Comment: in past very little marijuana     Review of Systems   Constitutional:  Positive for fever (low-grade, t-max 100F).   HENT:  Positive for sore throat and voice change (hoarse). Negative for facial swelling.    Eyes:  Negative for pain.   Respiratory:  Positive for cough and chest tightness (discomfort). Negative for choking and shortness of breath.    Gastrointestinal:  Negative for abdominal pain, diarrhea, nausea and vomiting.   Genitourinary:  Negative for dysuria and frequency.   Musculoskeletal:  Positive for back pain (2/2 cough).   Skin:   Negative for rash.   Neurological:  Negative for weakness and numbness.   Psychiatric/Behavioral:  Negative for self-injury.        Physical Exam     Initial Vitals [11/11/24 1459]   BP Pulse Resp Temp SpO2   136/69 61 20 98.6 °F (37 °C) 99 %      MAP       --         Physical Exam    Nursing note and vitals reviewed.  Constitutional: She is not diaphoretic. No distress.   HENT:   Head: Normocephalic and atraumatic. Mouth/Throat: Posterior oropharyngeal erythema present. No oropharyngeal exudate or posterior oropharyngeal edema.   Boggy nasal turbinates.    Eyes: Conjunctivae and EOM are normal. No scleral icterus.   Neck: Neck supple. No tracheal deviation present.   Normal range of motion.  Cardiovascular:  Normal rate, regular rhythm and intact distal pulses.           Pulmonary/Chest: No stridor. No respiratory distress. She has wheezes (scant).   Speaking in full sentences   Abdominal: Abdomen is soft. She exhibits no distension. There is no abdominal tenderness.   Musculoskeletal:         General: No tenderness or edema.      Cervical back: Normal range of motion and neck supple.      Right upper leg: No edema.      Left upper leg: No edema.      Right lower leg: No edema.      Left lower leg: No edema.     Neurological: She is alert. She has normal strength. No cranial nerve deficit or sensory deficit.   Skin: Skin is warm and dry.   Psychiatric: She has a normal mood and affect.         ED Course   Procedures  Labs Reviewed   POCT CMP - Abnormal       Result Value    Albumin, POC 4.0      Alkaline Phosphatase, POC 63      ALT (SGPT), POC 13      AST (SGOT), POC 25      POC BUN 13      Calcium, POC 9.8      POC Chloride 110 (*)     POC Creatinine 1.1      POC Glucose 84      POC Potassium 4.5      POC Sodium 135      Bilirubin, POC 0.8      POC TCO2 28      Protein, POC 8.2 (*)    POCT B-TYPE NATRIURETIC PEPTIDE (BNP) - Abnormal    POC B-Type Natriuretic Peptide 287 (*)    TROPONIN ISTAT    POC Cardiac  Troponin I 0.00      Sample unknown     POCT CBC    Hematocrit        Hemoglobin        RBC        WBC        MCV        MCH, POC        MCHC        RDW-CV        Platelet Count, POC        MPV       SARS-COV-2 RDRP GENE    POC Rapid COVID Negative       Acceptable Yes      Narrative:     This test utilizes isothermal nucleic acid amplification technology to detect the SARS-CoV-2 RdRp nucleic acid segment. The analytical sensitivity (limit of detection) is 500 copies/swab.     A POSITIVE result is indicative of the presence of SARS-CoV-2 RNA; clinical correlation with patient history and other diagnostic information is necessary to determine patient infection status.    A NEGATIVE result means that SARS-CoV-2 nucleic acids are not present above the limit of detection. A NEGATIVE result should be treated as presumptive. It does not rule out the possibility of COVID-19 and should not be the sole basis for treatment decisions. If COVID-19 is strongly suspected based on clinical and exposure history, re-testing using an alternate molecular assay should be considered.     Commercial kits are provided by [employer].       POCT CMP   POCT TROPONIN   POCT B-TYPE NATRIURETIC PEPTIDE (BNP)   POCT STREP A, RAPID    POC Rapid Strep A negative     POCT RAPID INFLUENZA A/B    Influenza B Ag negative      Inflenza A Ag negative            Imaging Results              X-Ray Chest AP Portable (Final result)  Result time 11/11/24 15:52:44      Final result by Marcus Mondragon MD (11/11/24 15:52:44)                   Impression:      No interval change      Electronically signed by: Marcus Mondragon MD  Date:    11/11/2024  Time:    15:52               Narrative:    EXAMINATION:  XR CHEST AP PORTABLE    CLINICAL HISTORY:  cough;    TECHNIQUE:  Single views of the chest were performed.    COMPARISON:  Chest radiograph dated November 4, 2024    FINDINGS:  Single lead left-sided AICD device is unchanged in position.  The  trachea and cardiomediastinal silhouette remains stable.  There is no evidence of pleural effusions, pneumothoraces or consolidations.  Lungs are clear.  Osseous structures demonstrate no evidence for acute fractures or dislocations.                                       Medications   dexAMETHasone injection 8 mg (8 mg Intramuscular Given 11/11/24 1543)   albuterol-ipratropium 2.5 mg-0.5 mg/3 mL nebulizer solution 3 mL (3 mLs Nebulization Given 11/11/24 1522)     Medical Decision Making  Differential diagnosis include but are not limited to: Viral Syndrome, URI, COVID-19, Influenza, Pneumonia, ACS, CHF.    Patient is afebrile and not toxic appearing at time history and physical.  She isn't hypoxic, tachypneic or in respiratory distress.  She complains mostly of viral symptoms but does report chest pain coughing.  Given patient's cardiac history labs obtained to further evaluate.  COVID, flu are negative.  Rapid strep is negative.  CBC without significant leukocytosis or anemia.  Chemistry without renal failure or significant electrolyte abnormality.  BNP is mildly elevated consistent with patient has history of CHF however she is not hypoxic, tachypneic or in respiratory distress.  She does not appear to have impending respiratory failure.  Troponin is within normal ranges.  Patient given steroids, albuterol nebulization.  On reassessment she is resting comfortably in stretcher.  Symptoms appear more viral in etiology.  Has low clinical suspicion of the patient's symptoms being related to significant CHF exacerbation or ACS.  Shared decision-making performed with patient she expresses comfort with discharge on trial of management with supportive care, close follow-up with primary physician.  Referred to cardiologist as well. counseled on supportive care, appropriate medication usage, concerning symptoms for which to return to ER and the importance of follow up. Understanding and agreement with treatment plan was  expressed.       Amount and/or Complexity of Data Reviewed  External Data Reviewed: labs, radiology and notes.     Details: See HPI.  Labs: ordered. Decision-making details documented in ED Course.  Radiology: ordered. Decision-making details documented in ED Course.    Risk  OTC drugs.  Prescription drug management.            Scribe Attestation:   Scribe #1: I performed the above scribed service and the documentation accurately describes the services I performed. I attest to the accuracy of the note.                             I, Bairon Carrizales , personally performed the services described in this documentation. All medical record entries made by the scribe were at my direction and in my presence. I have reviewed the chart and agree that the record reflects my personal performance and is accurate and complete.      DISCLAIMER: This note was prepared with [employer] or [employer] voice recognition transcription software. Garbled syntax, mangled pronouns, and other bizarre constructions may be attributed to that software system.    Clinical Impression:  Final diagnoses:  [R09.82] Postnasal drip (Primary)  [R05.8] Post-viral cough syndrome          ED Disposition Condition    Discharge Stable          ED Prescriptions       Medication Sig Dispense Start Date End Date Auth. Provider    oxymetazoline (AFRIN) 0.05 % nasal spray () 1 spray by Nasal route 2 (two) times daily. for 2 days 15 mL 2024 Bairon Carrizales MD    fluticasone propionate (FLONASE) 50 mcg/actuation nasal spray () 1 spray (50 mcg total) by Each Nostril route 2 (two) times daily. for 7 days 15 g 2024 Bairon Carrizales MD          Follow-up Information       Follow up With Specialties Details Why Contact Info    Veronika Rainey MD Family Medicine, Wound Care Schedule an appointment as soon as possible for a visit   [address] [facility] [address]  [phone]               Bairon Carrizales MD  24 0031

## 2024-11-11 NOTE — DISCHARGE INSTRUCTIONS
Emergency department testing is within acceptable ranges.  Your symptoms appear to be related to nasal congestion, postnasal drip, postviral cough.  Use Afrin for 2 days to decrease nasal secretions.  Do not use Afrin any longer than 2 days as it may cause persistent and worsening symptoms.  Use Flonase concurrently to decrease inflammation in the nasal passages.  Schedule close follow-up with your primary physician to monitor and further evaluate your symptoms.  Return to the emergency department for any new, worsening or significantly concerning symptoms.    Thank you for coming to our Emergency Department today. It is important to remember that some problems are difficult to diagnose and may not be found during your first visit. Be sure to follow up with your primary care doctor and review any labs/imaging that was performed with them. If you do not have a primary care doctor, you may contact the one listed on your discharge paperwork or you may also call the [facility] Appointment Desk at [phone] to schedule an appointment with one.     All medications may potentially have side effects and it is impossible to predict which medications may give you side effects. If you feel that you are having a negative effect of any medication you should immediately stop taking them and seek medical attention.    Return to the ER with any questions/concerns, new/concerning symptoms, worsening or failure to improve. Do not drive or make any important decisions for 24 hours if you have received any pain medications, sedatives or mood altering drugs during your ER visit.

## 2024-11-12 LAB
OHS QRS DURATION: 94 MS
OHS QTC CALCULATION: 437 MS

## 2024-11-26 ENCOUNTER — TELEPHONE (OUTPATIENT)
Dept: SLEEP MEDICINE | Facility: CLINIC | Age: 39
End: 2024-11-26
Payer: MEDICARE

## 2024-11-26 DIAGNOSIS — I50.42 CHRONIC COMBINED SYSTOLIC AND DIASTOLIC CONGESTIVE HEART FAILURE: ICD-10-CM

## 2024-11-26 NOTE — TELEPHONE ENCOUNTER
Spoke with pt to reschedule appointment with Kristin Koch. This provider does not see insomnia pts. RADHA Cline will reschedule the appointment to provider Isabella Sommer on 12/17/2024 at 3:30pm virtual.

## 2024-12-06 ENCOUNTER — HOSPITAL ENCOUNTER (EMERGENCY)
Facility: HOSPITAL | Age: 39
Discharge: HOME | End: 2024-12-06
Attending: EMERGENCY MEDICINE
Payer: MEDICARE

## 2024-12-06 VITALS
TEMPERATURE: 99 F | OXYGEN SATURATION: 100 % | SYSTOLIC BLOOD PRESSURE: 134 MMHG | HEART RATE: 61 BPM | DIASTOLIC BLOOD PRESSURE: 64 MMHG | RESPIRATION RATE: 18 BRPM | BODY MASS INDEX: 27.4 KG/M2 | HEIGHT: 69 IN | WEIGHT: 185 LBS

## 2024-12-06 DIAGNOSIS — M79.604 PAIN OF RIGHT LOWER EXTREMITY: Primary | ICD-10-CM

## 2024-12-06 PROCEDURE — 63600175 PHARM REV CODE 636 W HCPCS: Mod: ER | Performed by: PHYSICIAN ASSISTANT

## 2024-12-06 PROCEDURE — 96372 THER/PROPH/DIAG INJ SC/IM: CPT | Performed by: PHYSICIAN ASSISTANT

## 2024-12-06 PROCEDURE — 25000003 PHARM REV CODE 250: Mod: ER | Performed by: PHYSICIAN ASSISTANT

## 2024-12-06 PROCEDURE — 99284 EMERGENCY DEPT VISIT MOD MDM: CPT | Mod: 25,ER

## 2024-12-06 RX ORDER — DEXAMETHASONE SODIUM PHOSPHATE 4 MG/ML
8 INJECTION, SOLUTION INTRA-ARTICULAR; INTRALESIONAL; INTRAMUSCULAR; INTRAVENOUS; SOFT TISSUE
Status: COMPLETED | OUTPATIENT
Start: 2024-12-06 | End: 2024-12-06

## 2024-12-06 RX ORDER — OXYCODONE AND ACETAMINOPHEN 5; 325 MG/1; MG/1
1 TABLET ORAL
Status: COMPLETED | OUTPATIENT
Start: 2024-12-06 | End: 2024-12-06

## 2024-12-06 RX ORDER — OXYCODONE AND ACETAMINOPHEN 5; 325 MG/1; MG/1
1 TABLET ORAL EVERY 6 HOURS PRN
Qty: 12 TABLET | Refills: 0 | Status: SHIPPED | OUTPATIENT
Start: 2024-12-06

## 2024-12-06 RX ADMIN — DEXAMETHASONE SODIUM PHOSPHATE 8 MG: 4 INJECTION INTRA-ARTICULAR; INTRALESIONAL; INTRAMUSCULAR; INTRAVENOUS; SOFT TISSUE at 03:12

## 2024-12-06 RX ADMIN — OXYCODONE HYDROCHLORIDE AND ACETAMINOPHEN 1 TABLET: 5; 325 TABLET ORAL at 03:12

## 2024-12-06 NOTE — ED PROVIDER NOTES
Encounter Date: 12/6/2024       History     Chief Complaint   Patient presents with    Leg Pain     Right leg pain and edema pt came here 11/1 for same. Pt states unchanged since then     Patient is a 39-year-old female with history of CHF, ICD, hypertension who presents to the emergency department with right lower leg pain.  Patient reports 2 months ago she had an episode that left her in the ICU not able to talk or walk.  Reports after a full workup, she was told it was something psychological.  Reports she has been seeing a neurologist since.  Reports since that episode she has had intermittent pains in the right leg.  Describes the pain is a sharp pain that radiates behind the knee into the calf.  Reports she was seen in the emergency department at the beginning of November and had a full workup.  Reports she had no blood clot at that time.  Reports at times this knee is very swollen.  Denies any known injury.  Reports she is also having pain in the right lower back that radiates into the buttocks.  Denies any saddle anesthesia or bowel or bladder incontinence or retention.  Denies redness or warmth of the extremity.  Reports she is scheduled on December 12th to have a nerve study with a neurologist.    The history is provided by the patient.     Review of patient's allergies indicates:   Allergen Reactions    Aldactone [spironolactone] Swelling     Lips swelled    Hydralazine analogues Other (See Comments)     headaches    Isosorbide Other (See Comments)     headaches     Past Medical History:   Diagnosis Date    CHF (congestive heart failure)     Chronic combined systolic and diastolic congestive heart failure 5/24/2019    Essential hypertension 11/19/2012    Hypertension     dx at 16y.o.    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 disease, with heart failure 12/28/2021    ICD (implantable cardioverter-defibrillator), single, in situ 2/17/2020    Nonischemic  cardiomyopathy 2019    Pacemaker 2017     Past Surgical History:   Procedure Laterality Date    CARDIAC DEFIBRILLATOR PLACEMENT  2017     SECTION      x 1    INDUCED       RIGHT HEART CATHETERIZATION Right 2022    Procedure: INSERTION, CATHETER, RIGHT HEART;  Surgeon: Timothy Prajapati Jr., MD;  Location: [facility] CATH LAB;  Service: Cardiology;  Laterality: Right;    RIGHT HEART CATHETERIZATION Right 2024    Procedure: INSERTION, CATHETER, RIGHT HEART;  Surgeon: Lior Rueda MD;  Location: [facility] CATH LAB;  Service: Cardiology;  Laterality: Right;    TUBAL LIGATION       Family History   Problem Relation Name Age of Onset    Hypertension Mother      Cancer Maternal Grandmother          breast x 2    Cancer Paternal Grandmother          breast    No Known Problems Sister      No Known Problems Brother      No Known Problems Son      No Known Problems Brother      Hypertension Other      Diabetes Other      Breast cancer Other      Cancer Paternal Aunt          breast    Cancer Paternal Uncle       Social History     Tobacco Use    Smoking status: Never    Smokeless tobacco: Never   Substance Use Topics    Alcohol use: Not Currently     Comment: occasionally    Drug use: Not Currently     Types: Marijuana     Comment: in past very little marijuana     Review of Systems   Constitutional:  Negative for activity change, appetite change, chills, fatigue and fever.   HENT:  Negative for congestion, ear discharge, ear pain, postnasal drip, rhinorrhea, sore throat and trouble swallowing.    Respiratory:  Negative for cough and shortness of breath.    Cardiovascular:  Negative for chest pain.   Gastrointestinal:  Negative for abdominal pain, blood in stool, constipation, diarrhea, nausea and vomiting.   Genitourinary:  Negative for dysuria, flank pain and hematuria.   Musculoskeletal:  Negative for back pain, neck pain and neck stiffness.        Right leg pain   Skin:  Negative  for rash and wound.   Neurological:  Negative for dizziness, light-headedness and headaches.       Physical Exam     Initial Vitals [12/06/24 1500]   BP Pulse Resp Temp SpO2   134/64 61 18 98.5 °F (36.9 °C) 100 %      MAP       --         Physical Exam    Nursing note and vitals reviewed.  Constitutional: She appears well-developed and well-nourished. She is not diaphoretic.  Non-toxic appearance. No distress.   HENT:   Head: Normocephalic.   Right Ear: Hearing and external ear normal.   Left Ear: Hearing and external ear normal.   Nose: Nose normal. Mouth/Throat: Oropharynx is clear and moist. No oropharyngeal exudate.   Eyes: Conjunctivae are normal. Pupils are equal, round, and reactive to light.   Neck:   Normal range of motion.  Cardiovascular:  Normal rate and regular rhythm.           Pulmonary/Chest: Breath sounds normal.     Abdominal: Abdomen is soft. Bowel sounds are normal. There is no abdominal tenderness.   Musculoskeletal:      Cervical back: Normal range of motion.     Neurological: She is alert and oriented to person, place, and time.   Skin: Skin is warm and dry. Capillary refill takes less than 2 seconds.   Psychiatric: She has a normal mood and affect.         ED Course   Procedures  Labs Reviewed - No data to display       Imaging Results    None          Medications   dexAMETHasone injection 8 mg (8 mg Intramuscular Given 12/6/24 7452)   oxyCODONE-acetaminophen 5-325 mg per tablet 1 tablet (1 tablet Oral Given 12/6/24 7553)     Medical Decision Making  Urgent evaluation of a 39-year-old female who presents to the emergency department with right leg pain.  Patient is afebrile, nontoxic-appearing, and hemodynamically stable.  No lower extremity edema.  No erythema or warmth.  Neurovascularly intact.  No palpable cord.  Normal range of motion.    Early November, patient had full workups showing a negative DVT study.  With patient's pain being chronic for over 2 months, doubt DVT.  No concern for  infectious process.  No lower extremity edema to suggest acute fluid overload.    Patient is scheduled for a nerve study on December 12th.  Offered full workup with ultrasound.  She declines.  Reports she only needs something for the pain.  Will give a dose of steroids here with a short course of pain medication.  She is advised to follow up with neurologist at scheduled appointment on December 12th.  Discussed case with supervising physician who agrees with plan.    Risk  Prescription drug management.                                      Clinical Impression:  Final diagnoses:  [M79.604] Pain of right lower extremity (Primary)          ED Disposition Condition    Discharge Stable          ED Prescriptions    None       Follow-up Information       Follow up With Specialties Details Why Contact Info    Neurology    at your scheduled appt on 12/12             VelardeMicki Murguia PA-C  12/06/24 [id number]

## 2024-12-06 NOTE — DISCHARGE INSTRUCTIONS
As we discussed, it is important that you keep your appointment with a neurologist on December 12th.  I a.m. sending you home with a short course of pain medication.  Ice and elevate.  Return to the emergency department with any worsening symptoms or concerns.  I hope you feel better soon.  Happy holidays.

## 2024-12-10 RX ORDER — POTASSIUM CHLORIDE 20 MEQ/1
40 TABLET, EXTENDED RELEASE ORAL 2 TIMES DAILY
Qty: 120 TABLET | Refills: 5 | Status: SHIPPED | OUTPATIENT
Start: 2024-12-10

## 2024-12-12 ENCOUNTER — PROCEDURE VISIT (OUTPATIENT)
Dept: NEUROLOGY | Facility: CLINIC | Age: 39
End: 2024-12-12
Payer: MEDICARE

## 2024-12-12 DIAGNOSIS — M48.07 SPINAL STENOSIS, LUMBOSACRAL REGION: Primary | ICD-10-CM

## 2024-12-12 DIAGNOSIS — R20.0 LEG NUMBNESS: ICD-10-CM

## 2024-12-12 PROCEDURE — 95911 NRV CNDJ TEST 9-10 STUDIES: CPT | Mod: PBBFAC | Performed by: STUDENT IN AN ORGANIZED HEALTH CARE EDUCATION/TRAINING PROGRAM

## 2024-12-12 PROCEDURE — 95886 MUSC TEST DONE W/N TEST COMP: CPT | Mod: PBBFAC | Performed by: STUDENT IN AN ORGANIZED HEALTH CARE EDUCATION/TRAINING PROGRAM

## 2024-12-12 NOTE — PROCEDURES
Chief Complaint and Duration     Recent hospitalization 8/2024, fu leg numbness , here for ncs/emg    History of Present Illness     Nasra Marks is a 39 y.o. female with a history of multiple medical diagnoses as listed below that presents for re-evaluation.    Hx of CHF, had a stent replacement.  End of August 2024. Has a defibrillator.    Mixed features of initial-consonant stuttering, head nodding, hand tremors.   39 y.o. female with HTN, HFrEF (LVEF 25%) 2/2 non-ischemic cardiomyopathy s/p ICD (placed 2017), R PICA territory infarct (2022) without residual deficits presented 8/27/24 for elective RHC. Following RHC procedure, she was complaining of R sided squeezing headache, speech difficulty and tremulousness that fluctuated between RUE to all extremities. Rapid response was called. NIHSS 7, Vascular Neurology evaluated. CTH/CTA MP unremarkable for acute findings or LVO. MRI/MRA brain with no acute pathology, just remote R cerebellar infarct. Vascular Neurology recommended continue home ASA 81 mg and statin for stroke prevention and signed off.     Neurology recommended continue home ASA 81 mg and statin for stroke prevention and signed off.   Neurology consulted 8/28 for numerous neurologic complaints. Pt unable to move left side with sensory deficits. Has stuttering speech.     Interim:  Here for follow up.  Endorses neuropathy type symptoms in her bilateral feet.  Continue on aspirin statin.    12/12/24 - here for ncs/emg.     Review of patient's allergies indicates:   Allergen Reactions    Aldactone [spironolactone] Swelling     Lips swelled    Hydralazine analogues Other (See Comments)     headaches    Isosorbide Other (See Comments)     headaches     Current Outpatient Medications   Medication Sig Dispense Refill    acetaminophen (TYLENOL) 500 MG tablet Take 1 tablet (500 mg total) by mouth every 6 (six) hours as needed for Pain (As needed for pain and fever). 30 tablet 0    acetaminophen  (TYLENOL) 500 MG tablet Take 1 tablet (500 mg total) by mouth every 6 (six) hours as needed. 13 tablet 0    albuterol (PROVENTIL/VENTOLIN HFA) 90 mcg/actuation inhaler Inhale 1-2 puffs into the lungs every 6 (six) hours as needed. Rescue 18 g 0    amLODIPine (NORVASC) 5 MG tablet Take 5 mg by mouth.      aspirin (ECOTRIN) 81 MG EC tablet Take 1 tablet (81 mg total) by mouth once daily. 30 tablet 0    atorvastatin (LIPITOR) 40 MG tablet Take 1 tablet (40 mg total) by mouth every evening. 90 tablet 3    eplerenone (INSPRA) 25 MG Tab Take 1 tablet (25 mg total) by mouth once daily. 90 tablet 3    FARXIGA 10 mg tablet Take 1 tablet by mouth once daily 30 tablet 6    FLUoxetine 20 MG capsule Take 1 capsule (20 mg total) by mouth once daily. 30 capsule 1    furosemide (LASIX) 40 MG tablet Take 1 tablet (40 mg total) by mouth once daily. 90 tablet 2    LIDOcaine (LIDODERM) 5 % Place 1 patch onto the skin once daily. Remove & Discard patch within 12 hours or as directed by MD 5 patch 0    metoprolol succinate (TOPROL-XL) 200 MG 24 hr tablet Take 1 tablet (200 mg total) by mouth once daily. 90 tablet 3    oxyCODONE-acetaminophen (PERCOCET) 5-325 mg per tablet Take 1 tablet by mouth every 6 (six) hours as needed for Pain. 12 tablet 0    potassium chloride SA (K-DUR,KLOR-CON) 20 MEQ tablet Take 2 tablets (40 mEq total) by mouth 2 (two) times daily. 120 tablet 5    sacubitriL-valsartan (ENTRESTO)  mg per tablet Take 1 tablet by mouth 2 (two) times daily. 180 tablet 3     No current facility-administered medications for this visit.       Medical History     Past Medical History:   Diagnosis Date    CHF (congestive heart failure)     Chronic combined systolic and diastolic congestive heart failure 5/24/2019    Essential hypertension 11/19/2012    Hypertension     dx at 16y.o.    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 disease, with heart failure 12/28/2021    ICD (implantable  cardioverter-defibrillator), single, in situ 2020    Nonischemic cardiomyopathy 2019    Pacemaker 2017     Past Surgical History:   Procedure Laterality Date    CARDIAC DEFIBRILLATOR PLACEMENT  2017     SECTION      x 1    INDUCED       RIGHT HEART CATHETERIZATION Right 2022    Procedure: INSERTION, CATHETER, RIGHT HEART;  Surgeon: Timothy Prajapati Jr., MD;  Location: [facility] CATH LAB;  Service: Cardiology;  Laterality: Right;    RIGHT HEART CATHETERIZATION Right 2024    Procedure: INSERTION, CATHETER, RIGHT HEART;  Surgeon: Lior Rueda MD;  Location: [facility] CATH LAB;  Service: Cardiology;  Laterality: Right;    TUBAL LIGATION       Family History   Problem Relation Name Age of Onset    Hypertension Mother      Cancer Maternal Grandmother          breast x 2    Cancer Paternal Grandmother          breast    No Known Problems Sister      No Known Problems Brother      No Known Problems Son      No Known Problems Brother      Hypertension Other      Diabetes Other      Breast cancer Other      Cancer Paternal Aunt          breast    Cancer Paternal Uncle       Social History     Socioeconomic History    Marital status:    Occupational History     Employer: [employer]   Tobacco Use    Smoking status: Never    Smokeless tobacco: Never   Substance and Sexual Activity    Alcohol use: Not Currently     Comment: occasionally    Drug use: Not Currently     Types: Marijuana     Comment: in past very little marijuana    Sexual activity: Yes     Partners: Male     Birth control/protection: None     Social Drivers of Health     Financial Resource Strain: Low Risk  (2024)    Overall Financial Resource Strain (CARDIA)     Difficulty of Paying Living Expenses: Not very hard   Food Insecurity: No Food Insecurity (2024)    Hunger Vital Sign     Worried About Running Out of Food in the Last Year: Never true     Ran Out of Food in the Last Year: Never true    Transportation Needs: No Transportation Needs (8/28/2024)    TRANSPORTATION NEEDS     Transportation : No   Physical Activity: Inactive (8/28/2024)    Exercise Vital Sign     Days of Exercise per Week: 0 days     Minutes of Exercise per Session: 0 min   Stress: Stress Concern Present (8/28/2024)    [employer] - Occupational Stress Questionnaire     Feeling of Stress : To some extent   Housing Stability: Low Risk  (8/28/2024)    Housing Stability Vital Sign     Unable to Pay for Housing in the Last Year: No     Homeless in the Last Year: No       Exam     There were no vitals filed for this visit.     Physical Exam:  General: Not in acute distress. Not ill-appearing.   HENT: Normocephalic and atraumatic. Moist mucous membranes.  Eyes: Conjunctivae normal.   Pulmonary: Pulmonary effort is normal.   Abdominal: Abdomen is soft and flat.   Skin: Skin is warm and dry. No rashes.   Psychiatric: Mood normal.        Neurologic Exam   Mental status: oriented to person, place, and time  Attention: Normal. Concentration: normal.  Speech: speech is normal.  Cranial Nerves: PERRL, EOMI intact, V1-V3 Facial sensation intact. Symmetric facies. Hearing grossly intact. Palate and uvula midline, symmetric. No tongue deviation. Trapezius strength intact.     Motor exam: bulk and tone normal. Strength 5/5 in bilateral upper extremities: deltoids, biceps, triceps, wrist flexion/extension, finger abduction/adduction. Strength 5/5 in bilateral lower extremities: hip flexion/extension, thigh adduction/abduction, knee flexion/extension, dorsiflexion/plantarflexion, foot eversion/inversion.    Reflexes: 2+ in bilateral upper extremities: biceps and brachiaradialis, 2+ in bilateral lower extremities: patellar and achilles  Plantar reflex: normal  Rashid's/Clonus: negative    Sensory exam: light touch intact    Gait exam: normal  Romberg: negative  Coordination: normal    Tremor: none  Cogwheel rigidity:  none    Labs and Imaging     Labs: reviewed  No results found for this or any previous visit (from the past 24 hours).    Thyroid normal  HgA1C%:  5.3  LDL:  78  Vit B12: 565    Imaging:   I have personally reviewed the images performed.   MRI brain 2024 - hx No evidence of acute infarct or hemorrhage. Remote right cerebellar infarct.    CTA of the head  Impression:  Moderate-sized right cerebellar infarct, unchanged prior.  No new major vascular distribution infarct or acute intracranial hemorrhage.   CT arteriogram appears within normal limits.  No evidence of high-grade stenosis or intracranial large vessel occlusion.     Other procedures: reviewed  EEG 8/29 no seizures    Assessment and Plan     Problem List Items Addressed This Visit    None  Visit Diagnoses       Spinal stenosis, lumbosacral region    -  Primary    Relevant Orders    MRI Lumbar Spine Without Contrast    Leg numbness              This is a patient 39-year-old female with a history of right PCA stroke, history of heart failure.  Does have an ICD.  Patient also with insomnia.  Had transient changes unawareness and nonspecific symptoms during hospitalization for the stent.  EEG was unremarkable.      Patient also endorsing bilateral leg numbness, workup for neuropathy and get nerve conduction study/EMG.    Patient during that hospital stay had R heart cath, episodes of the confusion led to attempts 2x for csf from lspine however unable to obtain. Since then, radiating pain from back w weakness subjective and numbness. Concern for l4-5, also seen w denervation chronic l4-l5 on emg today.    Will get mri lumbar spine for further characterization.    Appreciate opportunity to care for this patient.    Follow-up:  after mri lumbar spine    This note was created by combination of typed  and M-Modal dictation. Transcription and phonetic errors may be present.  If there are any questions, please contact me.

## 2024-12-28 ENCOUNTER — HOSPITAL ENCOUNTER (EMERGENCY)
Facility: HOSPITAL | Age: 39
Discharge: HOME | End: 2024-12-28
Attending: EMERGENCY MEDICINE
Payer: MEDICARE

## 2024-12-28 VITALS
DIASTOLIC BLOOD PRESSURE: 82 MMHG | BODY MASS INDEX: 27.7 KG/M2 | WEIGHT: 187 LBS | HEART RATE: 84 BPM | RESPIRATION RATE: 15 BRPM | TEMPERATURE: 99 F | SYSTOLIC BLOOD PRESSURE: 151 MMHG | HEIGHT: 69 IN | OXYGEN SATURATION: 97 %

## 2024-12-28 DIAGNOSIS — I50.9 CONGESTIVE HEART FAILURE, UNSPECIFIED HF CHRONICITY, UNSPECIFIED HEART FAILURE TYPE: ICD-10-CM

## 2024-12-28 DIAGNOSIS — B34.9 VIRAL SYNDROME: Primary | ICD-10-CM

## 2024-12-28 DIAGNOSIS — R07.9 CHEST PAIN: ICD-10-CM

## 2024-12-28 LAB
ALBUMIN SERPL BCP-MCNC: 4.1 G/DL (ref 3.5–5.2)
ALP SERPL-CCNC: 72 U/L (ref 40–150)
ALT SERPL W/O P-5'-P-CCNC: 20 U/L (ref 10–44)
ANION GAP SERPL CALC-SCNC: 11 MMOL/L (ref 8–16)
AST SERPL-CCNC: 21 U/L (ref 10–40)
B-HCG UR QL: NEGATIVE
BASOPHILS # BLD AUTO: 0.04 K/UL (ref 0–0.2)
BASOPHILS NFR BLD: 0.6 % (ref 0–1.9)
BILIRUB SERPL-MCNC: 0.4 MG/DL (ref 0.1–1)
BILIRUB UR QL STRIP: NEGATIVE
BNP SERPL-MCNC: 449 PG/ML (ref 0–99)
BUN SERPL-MCNC: 11 MG/DL (ref 6–20)
CALCIUM SERPL-MCNC: 9.6 MG/DL (ref 8.7–10.5)
CHLORIDE SERPL-SCNC: 103 MMOL/L (ref 95–110)
CLARITY UR: CLEAR
CO2 SERPL-SCNC: 26 MMOL/L (ref 23–29)
COLOR UR: YELLOW
CREAT SERPL-MCNC: 0.9 MG/DL (ref 0.5–1.4)
CTP QC/QA: YES
DIFFERENTIAL METHOD BLD: NORMAL
EOSINOPHIL # BLD AUTO: 0.1 K/UL (ref 0–0.5)
EOSINOPHIL NFR BLD: 1.8 % (ref 0–8)
ERYTHROCYTE [DISTWIDTH] IN BLOOD BY AUTOMATED COUNT: 13 % (ref 11.5–14.5)
EST. GFR  (NO RACE VARIABLE): >60 ML/MIN/1.73 M^2
GLUCOSE SERPL-MCNC: 81 MG/DL (ref 70–110)
GLUCOSE UR QL STRIP: NEGATIVE
HCT VFR BLD AUTO: 37.8 % (ref 37–48.5)
HGB BLD-MCNC: 12.3 G/DL (ref 12–16)
HGB UR QL STRIP: ABNORMAL
IMM GRANULOCYTES # BLD AUTO: 0.01 K/UL (ref 0–0.04)
IMM GRANULOCYTES NFR BLD AUTO: 0.1 % (ref 0–0.5)
KETONES UR QL STRIP: NEGATIVE
LEUKOCYTE ESTERASE UR QL STRIP: NEGATIVE
LYMPHOCYTES # BLD AUTO: 2 K/UL (ref 1–4.8)
LYMPHOCYTES NFR BLD: 28.4 % (ref 18–48)
MCH RBC QN AUTO: 29 PG (ref 27–31)
MCHC RBC AUTO-ENTMCNC: 32.5 G/DL (ref 32–36)
MCV RBC AUTO: 89 FL (ref 82–98)
MICROSCOPIC COMMENT: ABNORMAL
MOLECULAR STREP A: NEGATIVE
MONOCYTES # BLD AUTO: 0.8 K/UL (ref 0.3–1)
MONOCYTES NFR BLD: 11.5 % (ref 4–15)
NEUTROPHILS # BLD AUTO: 4.1 K/UL (ref 1.8–7.7)
NEUTROPHILS NFR BLD: 57.6 % (ref 38–73)
NITRITE UR QL STRIP: NEGATIVE
NRBC BLD-RTO: 0 /100 WBC
PH UR STRIP: 6 [PH] (ref 5–8)
PLATELET # BLD AUTO: 365 K/UL (ref 150–450)
PMV BLD AUTO: 9.8 FL (ref 9.2–12.9)
POC MOLECULAR INFLUENZA A AGN: NEGATIVE
POC MOLECULAR INFLUENZA B AGN: NEGATIVE
POTASSIUM SERPL-SCNC: 3 MMOL/L (ref 3.5–5.1)
PROT SERPL-MCNC: 8.4 G/DL (ref 6–8.4)
PROT UR QL STRIP: ABNORMAL
RBC # BLD AUTO: 4.24 M/UL (ref 4–5.4)
RBC #/AREA URNS HPF: 23 /HPF (ref 0–4)
SODIUM SERPL-SCNC: 140 MMOL/L (ref 136–145)
SP GR UR STRIP: 1.02 (ref 1–1.03)
SQUAMOUS #/AREA URNS HPF: 2 /HPF
TROPONIN I SERPL DL<=0.01 NG/ML-MCNC: 0.02 NG/ML (ref 0–0.03)
URN SPEC COLLECT METH UR: ABNORMAL
UROBILINOGEN UR STRIP-ACNC: NEGATIVE EU/DL
WBC # BLD AUTO: 7.04 K/UL (ref 3.9–12.7)

## 2024-12-28 PROCEDURE — 83880 ASSAY OF NATRIURETIC PEPTIDE: CPT

## 2024-12-28 PROCEDURE — 80053 COMPREHEN METABOLIC PANEL: CPT

## 2024-12-28 PROCEDURE — 99285 EMERGENCY DEPT VISIT HI MDM: CPT | Mod: 25

## 2024-12-28 PROCEDURE — 84484 ASSAY OF TROPONIN QUANT: CPT

## 2024-12-28 PROCEDURE — 85025 COMPLETE CBC W/AUTO DIFF WBC: CPT

## 2024-12-28 PROCEDURE — 81000 URINALYSIS NONAUTO W/SCOPE: CPT

## 2024-12-28 PROCEDURE — 93010 ELECTROCARDIOGRAM REPORT: CPT | Mod: ,,, | Performed by: INTERNAL MEDICINE

## 2024-12-28 PROCEDURE — 93005 ELECTROCARDIOGRAM TRACING: CPT

## 2024-12-28 PROCEDURE — 81025 URINE PREGNANCY TEST: CPT

## 2024-12-28 PROCEDURE — 87502 INFLUENZA DNA AMP PROBE: CPT

## 2024-12-28 PROCEDURE — 25000003 PHARM REV CODE 250

## 2024-12-28 PROCEDURE — 87651 STREP A DNA AMP PROBE: CPT

## 2024-12-28 RX ORDER — CETIRIZINE HYDROCHLORIDE 10 MG/1
10 TABLET ORAL DAILY
Qty: 30 TABLET | Refills: 0 | Status: SHIPPED | OUTPATIENT
Start: 2024-12-28 | End: 2025-01-27

## 2024-12-28 RX ORDER — DOCUSATE SODIUM 100 MG/1
100 CAPSULE, LIQUID FILLED ORAL 2 TIMES DAILY
Qty: 20 CAPSULE | Refills: 0 | Status: SHIPPED | OUTPATIENT
Start: 2024-12-28

## 2024-12-28 RX ORDER — GUAIFENESIN AND DEXTROMETHORPHAN HYDROBROMIDE 10; 100 MG/5ML; MG/5ML
5 SYRUP ORAL EVERY 6 HOURS
Qty: 473 ML | Refills: 0 | Status: SHIPPED | OUTPATIENT
Start: 2024-12-28 | End: 2025-01-02

## 2024-12-28 RX ADMIN — POTASSIUM BICARBONATE 40 MEQ: 391 TABLET, EFFERVESCENT ORAL at 09:12

## 2024-12-29 NOTE — ED PROVIDER NOTES
Encounter Date: 12/28/2024    SCRIBE #1 NOTE: I, Imelda Segura, am scribing for, and in the presence of,  Vikas Pimentel PA-C. I have scribed the following portions of the note - Other sections scribed: HPI/ROS.       History     Chief Complaint   Patient presents with    Flank Pain    Nasal Congestion    Generalized Body Aches     Pt to ED from home with c/o body aches, congestion x 2 days accompanied by right sided flank pain which presented last night. Pt denies urinary concerns, n/v/d.     Patient is a 39 y.o. female, with a PMHx of CHF (EF: 20-25% (8/2024), ICD, who presents to the ED with right flank pain onset last night. Patient reports intermittent sharp, stabbing flank pain. Pt also reports intermittent chest tightness (feels like someone grabbing chest) lasting a few seconds, shortness of breath, myalgia, rhinorrhea, mild cough, congestion and frontal headache. Pt is followed by 2 cardiologists. Attempted treatment with Theraflu and [place] to no avail. No other exacerbating or alleviating factors. Denies leg swelling, or other associated symptoms.       The history is provided by the patient and medical records.     Review of patient's allergies indicates:   Allergen Reactions    Aldactone [spironolactone] Swelling     Lips swelled    Hydralazine analogues Other (See Comments)     headaches    Isosorbide Other (See Comments)     headaches     Past Medical History:   Diagnosis Date    CHF (congestive heart failure)     Chronic combined systolic and diastolic congestive heart failure 5/24/2019    Essential hypertension 11/19/2012    Hypertension     dx at 16y.o.    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 disease, with heart failure 12/28/2021    ICD (implantable cardioverter-defibrillator), single, in situ 2/17/2020    Nonischemic cardiomyopathy 5/24/2019    Pacemaker 12/18/2017     Past Surgical History:   Procedure Laterality Date    CARDIAC  DEFIBRILLATOR PLACEMENT  2017     SECTION      x 1    INDUCED       RIGHT HEART CATHETERIZATION Right 2022    Procedure: INSERTION, CATHETER, RIGHT HEART;  Surgeon: Timothy Prajapati Jr., MD;  Location: [facility] CATH LAB;  Service: Cardiology;  Laterality: Right;    RIGHT HEART CATHETERIZATION Right 2024    Procedure: INSERTION, CATHETER, RIGHT HEART;  Surgeon: Lior Rueda MD;  Location: [facility] CATH LAB;  Service: Cardiology;  Laterality: Right;    TUBAL LIGATION       Family History   Problem Relation Name Age of Onset    Hypertension Mother      Cancer Maternal Grandmother          breast x 2    Cancer Paternal Grandmother          breast    No Known Problems Sister      No Known Problems Brother      No Known Problems Son      No Known Problems Brother      Hypertension Other      Diabetes Other      Breast cancer Other      Cancer Paternal Aunt          breast    Cancer Paternal Uncle       Social History     Tobacco Use    Smoking status: Never    Smokeless tobacco: Never   Substance Use Topics    Alcohol use: Not Currently     Comment: occasionally    Drug use: Not Currently     Types: Marijuana     Comment: in past very little marijuana     Review of Systems   Constitutional:  Negative for chills, diaphoresis, fatigue and fever.   HENT:  Positive for congestion and rhinorrhea. Negative for sore throat.    Eyes:  Negative for redness and visual disturbance.   Respiratory:  Positive for cough (mild), chest tightness (intermittent. currently not present.) and shortness of breath.    Cardiovascular:  Negative for chest pain, palpitations and leg swelling.   Gastrointestinal:  Negative for abdominal pain, diarrhea, nausea and vomiting.   Genitourinary:  Positive for flank pain (right). Negative for decreased urine volume, difficulty urinating, dysuria and frequency.   Musculoskeletal:  Positive for myalgias. Negative for arthralgias, back pain, neck pain and neck stiffness.    Skin:  Negative for color change, pallor and rash.   Neurological:  Positive for headaches (frontal). Negative for dizziness, weakness and light-headedness.   Hematological:  Does not bruise/bleed easily.       Physical Exam     Initial Vitals [12/28/24 1608]   BP Pulse Resp Temp SpO2   (!) 144/78 92 15 98.5 °F (36.9 °C) 98 %      MAP       --         Physical Exam    Nursing note and vitals reviewed.  Constitutional: She appears well-developed and well-nourished. She is not diaphoretic. She does not appear ill. No distress.   HENT:   Head: Normocephalic and atraumatic.   Right Ear: Tympanic membrane, external ear and ear canal normal.   Left Ear: Tympanic membrane, external ear and ear canal normal.   Nose: Mucosal edema present. No rhinorrhea. Right sinus exhibits frontal sinus tenderness. Right sinus exhibits no maxillary sinus tenderness. Left sinus exhibits frontal sinus tenderness. Left sinus exhibits no maxillary sinus tenderness. Mouth/Throat: Uvula is midline and mucous membranes are normal. No trismus in the jaw. No uvula swelling. No oropharyngeal exudate, posterior oropharyngeal edema, posterior oropharyngeal erythema or tonsillar abscesses.   Postnasal drip noted.  Uvula midline without any erythema or swelling.  No submandibular or sublingual swelling or erythema.  No trismus.  Normal jaw occlusion.  No evidence of tonsillar abscess.  No muffled voice.    Patient is tolerating secretions without difficulty.   Patient is speaking in full sentences on exam without difficulty.  There is no postauricular swelling, or overlying erythema or tenderness to palpation over mastoids bilaterally.   Eyes: Conjunctivae and EOM are normal. Pupils are equal, round, and reactive to light. Right eye exhibits no discharge. Left eye exhibits no discharge.   Neck: Neck supple. No tracheal tenderness present. JVD present.   Normal range of motion.   Full passive range of motion without pain.     Cardiovascular:  Normal  rate, regular rhythm, normal heart sounds and normal pulses.     Exam reveals no distant heart sounds and no friction rub.       Pulmonary/Chest: Effort normal and breath sounds normal. No respiratory distress.   Abdominal: Abdomen is soft and flat. Bowel sounds are normal. She exhibits no distension, no pulsatile midline mass and no mass. There is no splenomegaly or hepatomegaly. There is abdominal tenderness in the suprapubic area.   No right CVA tenderness.  No left CVA tenderness. There is no rebound, no guarding, no tenderness at McBurney's point and negative Ubrger's sign. negative Rovsing's sign  Musculoskeletal:         General: Normal range of motion.      Cervical back: Normal, full passive range of motion without pain, normal range of motion and neck supple. No edema, erythema or rigidity. No spinous process tenderness or muscular tenderness. Normal range of motion.      Thoracic back: Normal.      Lumbar back: Normal.      Right lower leg: Normal. No swelling. No edema.      Left lower leg: Normal. No swelling. No edema.     Lymphadenopathy:        Head (right side): No submental, no submandibular, no tonsillar, no preauricular, no posterior auricular and no occipital adenopathy present.        Head (left side): No submental, no submandibular, no tonsillar, no preauricular, no posterior auricular and no occipital adenopathy present.     She has no cervical adenopathy.   Neurological: She is alert and oriented to person, place, and time. She has normal strength. No cranial nerve deficit or sensory deficit.   Skin: Skin is warm and dry. Capillary refill takes less than 2 seconds. No bruising, no ecchymosis and no rash noted. No erythema. No pallor.   Psychiatric: She has a normal mood and affect. Her speech is normal and behavior is normal. Thought content normal.         ED Course   Procedures  Labs Reviewed   URINALYSIS, REFLEX TO URINE CULTURE - Abnormal       Result Value    Specimen UA Urine, Clean  Catch      Color, UA Yellow      Appearance, UA Clear      pH, UA 6.0      Specific Gravity, UA 1.025      Protein, UA Trace (*)     Glucose, UA Negative      Ketones, UA Negative      Bilirubin (UA) Negative      Occult Blood UA 2+ (*)     Nitrite, UA Negative      Urobilinogen, UA Negative      Leukocytes, UA Negative      Narrative:     Specimen Source->Urine   URINALYSIS MICROSCOPIC - Abnormal    RBC, UA 23 (*)     Squam Epithel, UA 2      Microscopic Comment SEE COMMENT      Narrative:     Specimen Source->Urine   COMPREHENSIVE METABOLIC PANEL - Abnormal    Sodium 140      Potassium 3.0 (*)     Chloride 103      CO2 26      Glucose 81      BUN 11      Creatinine 0.9      Calcium 9.6      Total Protein 8.4      Albumin 4.1      Total Bilirubin 0.4      Alkaline Phosphatase 72      AST 21      ALT 20      eGFR >60      Anion Gap 11     B-TYPE NATRIURETIC PEPTIDE - Abnormal     (*)    CBC W/ AUTO DIFFERENTIAL    WBC 7.04      RBC 4.24      Hemoglobin 12.3      Hematocrit 37.8      MCV 89      MCH 29.0      MCHC 32.5      RDW 13.0      Platelets 365      MPV 9.8      Immature Granulocytes 0.1      Gran # (ANC) 4.1      Immature Grans (Abs) 0.01      Lymph # 2.0      Mono # 0.8      Eos # 0.1      Baso # 0.04      nRBC 0      Gran % 57.6      Lymph % 28.4      Mono % 11.5      Eosinophil % 1.8      Basophil % 0.6      Differential Method Automated     TROPONIN I    Troponin I 0.022     POCT STREP A MOLECULAR    Molecular Strep A, POC Negative       Acceptable Yes     POCT INFLUENZA A/B MOLECULAR    POC Molecular Influenza A Ag Negative      POC Molecular Influenza B Ag Negative       Acceptable Yes     POCT URINE PREGNANCY    POC Preg Test, Ur Negative       Acceptable Yes            Imaging Results              X-Ray Chest AP Portable (Final result)  Result time 12/28/24 20:20:53      Final result by Kasandra Erazo MD (12/28/24 20:20:53)                    Impression:      No acute cardiopulmonary process identified.      Electronically signed by: Kasandra Erazo MD  Date:    12/28/2024  Time:    20:20               Narrative:    EXAMINATION:  XR CHEST AP PORTABLE    CLINICAL HISTORY:  Chest pain, unspecified    TECHNIQUE:  Single frontal view of the chest was performed.    COMPARISON:  11/11/2024.    FINDINGS:  Cardiac silhouette is stable in size.  Left-sided pacer device is seen.  Lungs are symmetrically expanded.  No evidence of focal consolidative process, pneumothorax, or significant pleural effusion.  No acute osseous abnormality identified.                                       CT Renal Stone Study ABD Pelvis WO (Final result)  Result time 12/28/24 18:13:15      Final result by Kasandra Erazo MD (12/28/24 18:13:15)                   Impression:      1. No acute intra-abdominal abnormalities identified.  No evidence of renal stones or obstructive uropathy.  2. Small gallstone.  3. Small pedunculated uterine fibroid.  4. Additional findings as detailed above.      Electronically signed by: Kasandra Erazo MD  Date:    12/28/2024  Time:    18:13               Narrative:    EXAMINATION:  CT RENAL STONE STUDY ABD PELVIS WO    CLINICAL HISTORY:  Flank pain, kidney stone suspected;    TECHNIQUE:  Low dose axial images, sagittal and coronal reformations were obtained from the lung bases to the pubic symphysis.  Contrast was not administered.    COMPARISON:  CT abdomen and pelvis from October 2020.    FINDINGS:  Heart is upper limits of normal in size.  Cardiac pacer wires partially visualized.  The lung bases are clear.    No significant hepatic abnormality seen on this noncontrast exam.  There is no intra-or extrahepatic biliary ductal dilatation.  Single small calcified gallstone is visualized in the gallbladder.  The stomach, pancreas, spleen, and adrenal glands are unremarkable.    Kidneys show no evidence of stones or hydronephrosis.  Few small bilateral renal  cysts are seen.  Ureters are unremarkable along their courses with no obvious ureteral stones seen.  Urinary bladder is nondistended.  Uterus is retroverted with pedunculated fibroid seen involving the fundal region.  No significant adnexal abnormalities are appreciated.    Appendix is visualized and is unremarkable.  The visualized loops of small and large bowel show no evidence of obstruction or inflammation.  Moderate volume retained stool is seen within the colon.  No free air or free fluid.    Aorta tapers normally.    No acute osseous abnormality identified. Small fat containing umbilical hernia is noted.                                       Medications   potassium bicarbonate disintegrating tablet 40 mEq (40 mEq Oral Given 12/28/24 2102)     Medical Decision Making  Patient is a 39 y.o. female, with a PMHx of CHF (EF: 20-25% (8/2024), ICD, who presents to the ED with right flank pain onset last night. Patient reports intermittent sharp, stabbing flank pain. Pt also reports intermittent chest tightness (feels like someone grabbing chest) lasting a few seconds, shortness of breath, myalgia, rhinorrhea, mild cough, congestion and frontal headache. Pt is followed by 2 cardiologists. Attempted treatment with Theraflu and [place] to no avail. No other exacerbating or alleviating factors. Denies leg swelling, or other associated symptoms.     Patient's chart and medical history reviewed.  Patient's vitals reviewed.  They are afebrile, no respiratory distress, nontoxic-appearing in the ED.  Differential diagnosis is considered viral URI, pneumonia, CHF exacerbation, MI, pyelonephritis, nephrolithiasis, ureterolithiasis, UTI.  Patient symptoms and exam appear to be consistent with URI symptoms.  Patient's lungs are clear to auscultation in all fields bilaterally.  There was mild JVD but no peripheral edema otherwise. There was mild suprapubic tenderness.  No right upper quadrant tenderness and negative Burger's  sign.  That has no CVA tenderness bilaterally.  Due to right-sided flank pain radiating down to the groin with a blood cells noted on urinalysis CT renal stone ordered for the evaluation with no evidence of obstructive uropathy or kidney stones.  Does note fibroid of the uterus and small gallstone.  Gallbladder did not appear distended with no right upper quadrant pain or tenderness do not suspect gallstone etiology at this time although discussed gallstone with patient was in if developing symptoms advise return to the emergency department for re-evaluation.  Patient was urinalysis without leukocytes or nitrites and therefore unlikely urinary tract infection and or pyelonephritis especially without CVA tenderness.  During patient's physical exam and evaluation she remained asymptomatic with her chest pain.  No STEMI on EKG.  Mild elevation in her BNP from what appears to be her baseline around the 300s in correlated with physical exam does not appear excessively fluid overloaded.  Do not suspect CHF exacerbation.    More per ED course.  Patient remained hemodynamically stable afebrile and in no acute distress.  Patient was evaluated by and case discussed with my attending Dr. Allison who agrees with plan of discharge at this time. Using shared decision-making with the patient the patient was not want to have Lasix at this time and would like to be discharged home.  Plan to discharge home with supportive medications with the patient's viral like syndrome, however advised if any of her symptoms persist or worsen where she was any new or worrisome complaints to please return to the emergency department immediately.  Patient agrees with the plan does not have any questions at this time.  At the end of our visit patient advises of 1 more complaint of constipation she has been dealing with for the past several years stating that she thinks that her pain may be related to her constipation.  States she was not take stool  softeners or laxatives and has been evaluated extensively by her primary care provider and other specialists for her constipation in his supposed to take stool softeners but it was not.  Plan to discharge home with stool softeners advised follow up with her primary care provider regarding her chronic constipation and/or Gastroenterology.    At this time I'll discharge home to follow up with primary care physician in the next 1-2 days for further evaluation.  If the symptom/symptoms continue or return the pt will need to see ED for further evaluation.  The patient/family is comfortable with this plan and comfortable going home at this time. After taking into careful account the historical factors and physical exam findings of the patient's presentation today, in conjunction with the empirical and objective data obtained on ED workup, no acute emergent medical condition has been identified. The patient appears to be low risk for an emergent medical condition and I feel it is safe and appropriate at this time for the patient to be discharged to follow-up as detailed in their discharge instructions for reevaluation and possible continued outpatient workup and management. I have discussed the specifics of the workup with the patient/family and they have verbalized understanding of the details of the workup, the diagnosis, the treatment plan, and the need for outpatient follow-up.  Although the patient has no emergent etiology today this does not preclude the development of an emergent condition so in addition, I have advised the patient/family that they can return to the ED and/or activate EMS at any time with worsening of their symptoms, change of their symptoms, or with any other medical complaint.  The patient remained comfortable and stable during their visit in the ED.  Discharge and follow-up instructions discussed with the patient/family who expressed understanding and willingness to comply with my  recommendations. I discussed with the patient/family the diagnosis, treatment plan, indications for return to the emergency department, and for expected follow-up. I again reiterated to please follow up with their primary doctor in 1-2 days and return to the ED in any new, worsening, or continued symptoms. The patient/family verbalized an understanding. The patient/family is asked if there are any questions or concerns. We discuss the case, until all issues are addressed to the patient/family's satisfaction. Patient/family understands and is agreeable to the plan.   VIKAS OSCAR PA-C    DISCLAIMER: This note was prepared with [employer] voice recognition transcription software. Garbled syntax, mangled pronouns, and other bizarre constructions may be attributed to that software system.          Amount and/or Complexity of Data Reviewed  Labs: ordered. Decision-making details documented in ED Course.  Radiology: ordered.    Risk  OTC drugs.  Prescription drug management.            Scribe Attestation:   Scribe #1: I performed the above scribed service and the documentation accurately describes the services I performed. I attest to the accuracy of the note.        ED Course as of 12/28/24 2256   Sat Dec 28, 2024   1821 SpO2: 98 % [AF]   1821 Resp: 15 [AF]   1821 Pulse: 92 [AF]   1821 Temp: 98.5 °F (36.9 °C) [AF]   1821 BP(!): 144/78 [AF]   1821 POC Molecular Influenza A Ag: Negative [AF]   1821 POC Molecular Influenza B Ag: Negative [AF]   1821 hCG Qualitative, Urine: Negative [AF]   1821 Blood, UA(!): 2+  With flank pain evaluated further with ct renal for except of uropathy.  CT results with no evidence of kidney stones. [AF]   1821 Molecular Strep A, POC: Negative [AF]   2001 CBC auto differential  No acute abnormalities on patient's CBC [AF]      ED Course User Index  [AF] Vikas Oscar PA-C I, Alain David Flexer, PA-C, personally performed the services described in this documentation. All  medical record entries made by the scribe were at my direction and in my presence. I have reviewed the chart and agree that the record reflects my personal performance and is accurate and complete.      DISCLAIMER: This note was prepared with [employer] voice recognition transcription software. Garbled syntax, mangled pronouns, and other bizarre constructions may be attributed to that software system.      Clinical Impression:  Final diagnoses:  [R07.9] Chest pain  [B34.9] Viral syndrome (Primary)  [I50.9] Congestive heart failure, unspecified HF chronicity, unspecified heart failure type          ED Disposition Condition    Discharge Stable          ED Prescriptions       Medication Sig Dispense Start Date End Date Auth. Provider    dextromethorphan-guaiFENesin  mg/5 ml (ROBITUSSIN-DM)  mg/5 mL liquid Take 5 mLs by mouth every 6 (six) hours. for 10 days 473 mL 12/28/2024 1/7/2025 Vikas Pimentel PA-C    cetirizine (ZYRTEC) 10 MG tablet Take 1 tablet (10 mg total) by mouth once daily. 30 tablet 12/28/2024 1/27/2025 Vikas Pimentel PA-C    docusate sodium (COLACE) 100 MG capsule Take 1 capsule (100 mg total) by mouth 2 (two) times daily. 20 capsule 12/28/2024 -- Vikas Pimentel PA-C          Follow-up Information       Follow up With Specialties Details Why Contact Info    Veronika Rainey MD Family Medicine, Wound Care Schedule an appointment as soon as possible for a visit in 1 day for follow up [address] [facility]  [place] [address]  [phone]      [facility] [place] - Emergency Dept Emergency Medicine Go to  If symptoms worsen [address] [facility] [place] [address] [phone]             Vikas Pimentel PA-C  12/28/24 4133

## 2024-12-29 NOTE — ED NOTES
"Assumed pt care.  Pt with a history of CHF w/ EF 20-25% presents w/ c/o non-radiating right flank pain with radiation to abd, constant since last pm. Denies dysuria, frequency or urgency.  Also c/o intermittent, non-radiating "tightness" to chest w/ sob c 2 days. Denies N/V.  Pt also c/o body aches, runny nose, nasal congestion, HA and non-prod cough. Denies fever.  Pt is AAOx3, resp even and unlabored, skin warm and dry. NAD noted.   "

## 2024-12-29 NOTE — DISCHARGE INSTRUCTIONS
Thank you for coming to our Emergency Department today. It is important to remember that some problems or medical conditions are difficult to diagnose and may not be found or addressed during your Emergency Department visit.  These conditions often start with non-specific symptoms and can only be diagnosed on follow up visits with your primary care physician or specialist when the symptoms continue or change. Please remember that all medical conditions can change, and we cannot predict how you will be feeling tomorrow or the next day. Return to the ER with any questions/concerns, new/concerning symptoms including fever, chest pain, shortness of breath, loss of consciousness, dizziness, weakness, worsening symptoms, failure to improve, or any other concerns. Also, please follow up with your Primary Care Physician and/or Pediatrician in the next 1-2 days to review your ED visit in entirety and for re-evaluation.   Be sure to follow up with your primary care doctor and review all labs/imaging/tests that were performed during your ER visit with them. It is very common for us to identify non-emergent incidental findings which must be followed up with your primary care physician.  Some labs/imaging/tests may be outside of the normal range, and require non-emergent follow-up and/or further investigation/treatment/procedures/testing to help diagnose/exclude/prevent complications or other potentially serious medical conditions. Some abnormalities may not have been discussed or addressed during your ER visit. Some lab results may not return during your ER visit but can be accessible by downloading the free [facility] perlita or by visiting [email or web address] . It is important for you to review all labs/imaging/tests which are outside of the normal range with your physician.  An ER visit does not replace a primary care visit, and many screening tests or follow-up tests cannot be ordered by an ER doctor or performed by  the ER. Some tests may even require pre-approval.  If you do not have a primary care doctor, you may contact the one listed on your discharge paperwork or you may also call the [facility] Appointment Desk at [phone] , or [address] [place] [address] at  [phone] to schedule an appointment, or establish care with a primary care doctor or even a specialist and to obtain information about local resources. It is important to your health that you have a primary care doctor.  Please take all medications as directed. We have done our best to select a medication for you that will treat your condition however, all medications may potentially have side-effects and it is impossible to predict which medications may give you side-effects or what those side-effects (if any) those medications may give you.  If you feel that you are having a negative effect or side-effect of any medication you should stop taking those medications immediately and seek medical attention. If you feel that you are having a life-threatening reaction call 911.  Do not drive, swim, climb to height, take a bath, operate heavy machinery, drink alcohol or take potentially sedating medications, sign any legal documents or make any important decisions for 24 hours if you have received any pain medications, sedatives or mood altering drugs during your ER visit or within 24 hours of taking them if they have been prescribed to you.   You can find additional resources for Dentists, hearing aids, durable medical equipment, low cost pharmacies and other resources at [email or web address]  Patient agrees with this plan. Discussed with her strict return precautions, they verbalized understanding. Patient is stable for discharge.   § Please take all medication as prescribed.

## 2025-01-01 LAB
OHS QRS DURATION: 100 MS
OHS QTC CALCULATION: 449 MS

## 2025-01-02 ENCOUNTER — OFFICE VISIT (OUTPATIENT)
Dept: TRANSPLANT | Facility: CLINIC | Age: 40
End: 2025-01-02
Attending: INTERNAL MEDICINE
Payer: MEDICARE

## 2025-01-02 VITALS
HEIGHT: 69 IN | SYSTOLIC BLOOD PRESSURE: 135 MMHG | BODY MASS INDEX: 28.57 KG/M2 | HEART RATE: 89 BPM | OXYGEN SATURATION: 98 % | WEIGHT: 192.88 LBS | DIASTOLIC BLOOD PRESSURE: 89 MMHG

## 2025-01-02 DIAGNOSIS — I50.22 CHRONIC SYSTOLIC CONGESTIVE HEART FAILURE: ICD-10-CM

## 2025-01-02 PROCEDURE — 99999 PR PBB SHADOW E&M-EST. PATIENT-LVL V: CPT | Mod: PBBFAC,,, | Performed by: INTERNAL MEDICINE

## 2025-01-02 PROCEDURE — 99214 OFFICE O/P EST MOD 30 MIN: CPT | Mod: S$PBB,,, | Performed by: INTERNAL MEDICINE

## 2025-01-02 PROCEDURE — 99215 OFFICE O/P EST HI 40 MIN: CPT | Mod: PBBFAC | Performed by: INTERNAL MEDICINE

## 2025-01-02 RX ORDER — ISOSORBIDE DINITRATE AND HYDRALAZINE HYDROCHLORIDE 37.5; 2 MG/1; MG/1
1 TABLET ORAL 3 TIMES DAILY
Qty: 90 TABLET | Refills: 11 | Status: SHIPPED | OUTPATIENT
Start: 2025-01-02 | End: 2026-01-02

## 2025-01-02 NOTE — PROGRESS NOTES
Subjective:     Patient ID:  Nasra Marks is a 39 y.o. female who presents for follow-up of Congestive Heart Failure and Follow-up    HPI:   38 yo BF diagnosis CHF due to non-ischemic cardiomyopathy in 2017 with angiogram at that time without evidence of coronary artery disease.  She developed hypertension at the age of 16 but did not take her medications regularly.   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 She was referred by Dr. Kashif Peguero.      22: Stopped Carvedilol and started Metoprolol succinate 100mg daily for fatigue     At visit 22  she had made good progress with much improvement in functional capacity and shortness of breath but still with fatigue.  At that visit plan was:  To see if I can help fatigue change the beta blocker metoprolol to bisoprolol 10 mg once a day    Reduce fluid intake to 1.5 to 2 quarts a day    Start taking half a tablet of hydralazine (50 mg tab) and half a tablet of isosorbide dinitrate (20 mg tab) both are taken 3 times a day and in 2 weeks change to full tablet of each    Call in 6-8 weeks as we can increase this medication combination further if needed for symptoms    At visit 2023 I learned that she had gone to ED last week with atypical CP and negative evaluation.  No CAD at cath .  She sees Dr. Peguero.  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  Had HA, nausea when went to ED.  Dr. Peguero ordering tests.    At visit 23  I added amlodipine 5 mg daily  For blood pressure and subsequent potassium for hypokalemia.    At visit 2024  with biggest complaint being profound fatigue.  She had an echocardiogram today which I read as an ejection fraction of 20-25%.  I also reviewed the echocardiogram from 2024 and describe that ejection fraction as 20-25% as well.  Therefore I read the ejection fraction lower than that reported on both of  "these studies.    She returns 1/2/25 for f/u visit.    She still reports profound fatigue.  Stable ELENA.  Post RHC complained of atypical symptoms of neuro event and subsequent evaluation negative for stroke.  She was asked to start prozac but elected to not take this treatment.  She had no recurrent symptoms.  She denies symptoms of depression.    ICD followed by Dr. Peguero--no shocks    Review of Systems   Constitutional: Positive for malaise/fatigue.   Cardiovascular:  Positive for dyspnea on exertion. Negative for chest pain, irregular heartbeat, leg swelling, near-syncope, orthopnea, palpitations, paroxysmal nocturnal dyspnea and syncope.        Objective:   Physical Exam  Constitutional:       General: She is not in acute distress.     Appearance: She is well-developed. She is not ill-appearing, toxic-appearing or diaphoretic.      Comments: /89 (BP Location: Left arm, Patient Position: Sitting)   Pulse 89   Ht 5' 9" (1.753 m)   Wt 87.5 kg (192 lb 14.4 oz)   LMP 11/16/2024   SpO2 98%   BMI 28.49 kg/m²   Last 4 visits weight 190, 183, 184 and 178 lb  Friendly black female in no acute distress       HENT:      Head: Normocephalic and atraumatic.   Eyes:      General: No scleral icterus.        Right eye: No discharge.         Left eye: No discharge.      Conjunctiva/sclera: Conjunctivae normal.   Neck:      Thyroid: No thyromegaly.      Vascular: No JVD.      Trachea: No tracheal deviation.   Cardiovascular:      Rate and Rhythm: Normal rate and regular rhythm.      Heart sounds: S1 normal and S2 normal. No murmur heard.     No gallop. No S4 sounds.   Pulmonary:      Effort: Pulmonary effort is normal.      Breath sounds: Normal breath sounds.   Abdominal:      General: Bowel sounds are normal. There is no distension.      Palpations: Abdomen is soft. There is no hepatomegaly (10 cm) or mass.      Tenderness: There is no abdominal tenderness. There is no guarding or rebound.   Musculoskeletal:        "  General: No swelling or tenderness.      Right lower leg: No edema.      Left lower leg: No edema.   Skin:     General: Skin is warm and dry.   Neurological:      General: No focal deficit present.      Mental Status: She is alert and oriented to person, place, and time. Mental status is at baseline.   Psychiatric:         Mood and Affect: Mood normal.         Behavior: Behavior normal.         Thought Content: Thought content normal.         Judgment: Judgment normal.          Labs reviewed as follows were obtain with KATARINA Monique at [facility]:  Component Ref Range & Units 5 d ago  (12/28/24) 4 mo ago  (8/27/24) 4 mo ago  (8/9/24) 7 mo ago  (5/24/24) 8 mo ago  (4/25/24) 11 mo ago  (1/22/24) 1 yr ago  (8/14/23)   BNP 0 - 99 pg/mL 449 High  357 High   High   High   High  CM 88 CM 90 CM     Lab Results   Component Value Date     (H) 12/28/2024     12/28/2024    K 3.0 (L) 12/28/2024    MG 2.0 08/28/2024     12/28/2024    CO2 26 12/28/2024    PHOS 2.9 04/25/2024    BUN 11 12/28/2024    CREATININE 0.9 12/28/2024    GLU 81 12/28/2024    HGBA1C 5.3 08/27/2024    AST 21 12/28/2024    ALT 20 12/28/2024    ALBUMIN 4.1 12/28/2024    PROT 8.4 12/28/2024    BILITOT 0.4 12/28/2024    WBC 7.04 12/28/2024    HGB 12.3 12/28/2024    HCT 37.8 12/28/2024     12/28/2024    INR 1.0 08/27/2024    INR 1.3 (H) 08/27/2024     07/24/2021    TSH 3.133 08/27/2024    CHOL 129 08/27/2024    HDL 37 (L) 08/27/2024    LDLCALC 78.0 08/27/2024    TRIG 70 08/27/2024 12/28/2024 CXR portable no acute change    8/27/2024 RHC    RA 10  PA 31/15 (20)  PCWP 15    CO 5.7 l/min  CI 2.8 l/min/m2    TD cardiac output 5.2 l/min cardiac index 2.6 l/min/m2.    Condition: no inotropes or pressors.    8/21/2024 CPX  Resting spirometry reveals an FVC = 2.69L which is 69% of predicted, an FEV1 of 2.12L, which is 65% of predicted and an FEV1/FVC ratio of 79%. The MVV = 85 L/min, which is 74% of predicted.     The  respiratory exchange ratio (RER) was 1.18, suggesting an adequate effort.     The breathing reserve is calculated at -3%, which is reduced. This may be due to a falsely reduced MVV. Oxygen saturation with exercise remained normal.     The peak VO2 was 19.8 ml/kg/min which is 56% of predicted equating to a functional capacity of 5.66 METS indicating moderate to severe functional impairment.     The anaerobic threshold (AT), which occurred at a heart rate of 118bpm, was 15.1 ml/kg/min, which is 43% of the predicted VO2 and is borderline reduced.     The peak VO2 Lean was 27.99 ml/kg of lean body weight/min indicating a good prognosis in heart failure.     The VE/VCO2 [place] was 36.2. The Resting PetCO2 was 27.0.       8/9/2024 ECHO-- I reviewed these images directly at today's visit estimate ejection fraction 20 25%    Left Ventricle: The left ventricle is moderately dilated. Normal wall thickness. Moderate global hypokinesis present. There is moderately reduced systolic function with a visually estimated ejection fraction of 30 - 35%. Grade II diastolic dysfunction. Normal left ventricular filling pressure.    Right Ventricle: Normal right ventricular cavity size. Wall thickness is normal. Right ventricle wall motion  is normal. Systolic function is normal.    Left Atrium: Normal left atrial size.    Right Atrium: Normal right atrial size.    Aortic Valve: The aortic valve is a trileaflet valve. There is mild aortic valve sclerosis. There is mild annular calcification present.    Mitral Valve: There is mild bileaflet sclerosis. There is mild regurgitation.    Aorta: Aortic root is normal in size measuring 2.81 cm. Ascending aorta is normal measuring 2.96 cm.    Pulmonary Artery: The estimated pulmonary artery systolic pressure is 19 mmHg.    IVC/SVC: Normal venous pressure at 3 mmHg.    May 2024 EKG I could not get the images to pull up but did read the report EKG  did not demonstrate any conduction  disturbance      2/27/2024 ECHO-- I reviewed these images directly at today's visit and estimate ejection fraction 20-25%    Left Ventricle: The left ventricle is moderately dilated. Normal wall thickness. Moderate global hypokinesis present. There is moderately reduced systolic function with a visually estimated ejection fraction of 30 - 35%. Grade I diastolic dysfunction.    Right Ventricle: Normal right ventricular cavity size. Systolic function is normal.    Pulmonary Artery: The estimated pulmonary artery systolic pressure is 16 mmHg.    1/17/2023 CXR read as mild cardiomegaly and possible mild perihilar interstitial edema.  I reviewed images and lungs are clear; also BNP only 75 down from 600's in March 2022 1/31/2022 CPX  Metabolic Findings Resting spirometry reveals an FVC = 2.86L which is 70.1% of predicted, an FEV1 of 2.30L, which is 68.27% of predicted and an FEV1/FVC ratio of 80.42%. The MVV = 92 L/min, which is 78.32% of predicted.     The respiratory exchange ratio (RER) was 1.32, suggesting an excellent effort.     The breathing reserve is calculated at 5.54%, which is reduced. Oxygen saturation with exercise remained normal.     The peak VO2 was 20.5 ml/kg/min which is 58.24% of predicted equating to a functional capacity of 5.86 METS indicating moderate functional impairment.     The anaerobic threshold (AT), which occurred at a heart rate of 109bpm, was 14.1 ml/kg/min, which is 40.06% of the predicted VO2 and is borderline reduced.     The peak VO2 Lean was 26.25 ml/kg of lean body weight/min indicating a good prognosis in heart failure.     The VE/VCO2 [place] was 30.0. The Resting PetCO2 was 29.0.     Moderate functional impairment associated with a reduced breathing reserve, normal oxygen stauration, an excellent effort, and a borderline reduced AT. These findings are indicative of functional impairment secondary to circulatory insufficiency, ventilatory impairment.     12/28/2021 6 minute walk  test 459.4 m without desaturation    10/06/2021 echocardiogram  The left ventricle is severely enlarged with eccentric hypertrophy and severely decreased systolic function.  The estimated ejection fraction is 15%.  Grade III left ventricular diastolic dysfunction.  Normal right ventricular size with normal right ventricular systolic function.  Moderate left atrial enlargement.  Mild mitral regurgitation.  Normal central venous pressure (3 mmHg).  The estimated PA systolic pressure is 15 mmHg.      12/31/2021 EKG normal sinus rhythm nonspecific ST-T abnormality    Cath 4/18/17:  Angiographic Results       Patient has a right dominant coronary artery.      - Left Main Coronary Artery:             The LM is normal. There is DANNY 3 flow.     - Left Anterior Descending Artery:             The LAD is normal. There is DANNY 3 flow.     - Left Circumflex Artery:             The LCX is normal. There is DANNY 3 flow.     - Right Coronary Artery:             The RCA is normal. There is DANNY 3 flow.     - Left Renal Artery:             The ostial left renal is normal.     - Right Renal Artery:             The ostial right renal is normal.     - Common Femoral Artery:             The right CFA is normal.    Assessment:   No diagnosis found.  Plan:   The decline in functional status on CPX correlates with rising BNP and worsening symptoms.  At this time no high risk features for CPX except for elevated VE/VCO2 slope of 36.    At this time we discussed trying to add BIdil (prior intol with HA's) and she is willing.     If intolerant would target BP < 130/80 by increasing amlodipine.    RTC 6 months

## 2025-01-02 NOTE — LETTER
January 2, 2025        Kashif Peguero  [address] [facility] [address]  [address]  [place] [address]  Phone: [phone]  Fax: [phone]             [facility]  [address]  [place] [address]  Phone: [phone]   Patient: Nasra Marks   MR Number: [id number]   YOB: 1985   Date of Visit: 1/2/2025       Dear Dr. Kashif Peguero    Thank you for referring Nasra Marks to me for evaluation. Attached you will find relevant portions of my assessment and plan of care.    If you have questions, please do not hesitate to call me. I look forward to following Nasra Marks along with you.    Sincerely,    Timothy Prajapati Jr, MD    Enclosure    If you would like to receive this communication electronically, please contact [email or web address] or [phone] to request [employer] access.    [employer] is a tool which provides read-only access to select patient information with whom you have a relationship. Its easy to use and provides real time access to review your patients record including encounter summaries, notes, results, and demographic information.    If you feel you have received this communication in error or would no longer like to receive these types of communications, please e-mail [email or web address]

## 2025-01-02 NOTE — PATIENT INSTRUCTIONS
Do not go into saunas with your heart.  I would recommend that you not use a hot tub either.  If you elect to use hot tub be sure someone is with you to get you out in case you lose consciousness.  Again, I recommend that you not use a hot tub as safest for you to not do so.    If you do not tolerate the bidil then call and I will increase the amlodipine as I want to keep your BP below 130/80    Bidil improves survival as well as functional status in -American patients with your type of heart failure so important to at least try this once more.  The headaches usually go away after a week.

## 2025-01-06 ENCOUNTER — OFFICE VISIT (OUTPATIENT)
Dept: NEUROLOGY | Facility: CLINIC | Age: 40
End: 2025-01-06
Payer: MEDICARE

## 2025-01-06 DIAGNOSIS — M54.16 LUMBAR RADICULOPATHY: ICD-10-CM

## 2025-01-06 DIAGNOSIS — R20.0 LEG NUMBNESS: ICD-10-CM

## 2025-01-06 DIAGNOSIS — M54.16 LUMBAR RADICULOPATHY, CHRONIC: Primary | ICD-10-CM

## 2025-01-06 DIAGNOSIS — Z86.73 HX OF ISCHEMIC RIGHT PCA STROKE: ICD-10-CM

## 2025-01-06 NOTE — PROGRESS NOTES
The patient location is: [place]  The chief complaint leading to consultation is: chronic lower back pain    Visit type: audiovisual    Each patient to whom he or she provides medical services by telemedicine is:  (1) informed of the relationship between the physician and patient and the respective role of any other health care provider with respect to management of the patient; and (2) notified that he or she may decline to receive medical services by telemedicine and may withdraw from such care at any time.    Chief Complaint and Duration     Recent hospitalization 8/2024, fu leg numbness , had ncs/emg.     History of Present Illness     Nasra Marks is a 39 y.o. female with a history of multiple medical diagnoses as listed below that presents for re-evaluation.    Hx of CHF, had a stent replacement.  End of August 2024. Has a defibrillator.    Mixed features of initial-consonant stuttering, head nodding, hand tremors.   39 y.o. female with HTN, HFrEF (LVEF 25%) 2/2 non-ischemic cardiomyopathy s/p ICD (placed 2017), R PICA territory infarct (2022) without residual deficits presented 8/27/24 for elective RHC. Following RHC procedure, she was complaining of R sided squeezing headache, speech difficulty and tremulousness that fluctuated between RUE to all extremities. Rapid response was called. NIHSS 7, Vascular Neurology evaluated. CTH/CTA MP unremarkable for acute findings or LVO. MRI/MRA brain with no acute pathology, just remote R cerebellar infarct. Vascular Neurology recommended continue home ASA 81 mg and statin for stroke prevention and signed off.     Neurology recommended continue home ASA 81 mg and statin for stroke prevention and signed off.   Neurology consulted 8/28 for numerous neurologic complaints. Pt unable to move left side with sensory deficits. Has stuttering speech.     Interim:  Here for follow up.  Endorses neuropathy type symptoms in her bilateral feet.  Continue on aspirin  statin.    12/12/24 - here for ncs/emg.     1/6/25 - leg numbness, can radiate down. When sitting down.     Review of patient's allergies indicates:   Allergen Reactions    Aldactone [spironolactone] Swelling     Lips swelled    Hydralazine analogues Other (See Comments)     headaches    Isosorbide Other (See Comments)     headaches     Current Outpatient Medications   Medication Sig Dispense Refill    acetaminophen (TYLENOL) 500 MG tablet Take 1 tablet (500 mg total) by mouth every 6 (six) hours as needed. 13 tablet 0    albuterol (PROVENTIL/VENTOLIN HFA) 90 mcg/actuation inhaler Inhale 1-2 puffs into the lungs every 6 (six) hours as needed. Rescue 18 g 0    amLODIPine (NORVASC) 5 MG tablet Take 5 mg by mouth.      aspirin (ECOTRIN) 81 MG EC tablet Take 1 tablet (81 mg total) by mouth once daily. 30 tablet 0    atorvastatin (LIPITOR) 40 MG tablet Take 1 tablet (40 mg total) by mouth every evening. 90 tablet 3    cetirizine (ZYRTEC) 10 MG tablet Take 1 tablet (10 mg total) by mouth once daily. 30 tablet 0    docusate sodium (COLACE) 100 MG capsule Take 1 capsule (100 mg total) by mouth 2 (two) times daily. 20 capsule 0    eplerenone (INSPRA) 25 MG Tab Take 1 tablet (25 mg total) by mouth once daily. 90 tablet 3    FARXIGA 10 mg tablet Take 1 tablet by mouth once daily 30 tablet 6    FLUoxetine 20 MG capsule Take 1 capsule (20 mg total) by mouth once daily. 30 capsule 1    furosemide (LASIX) 40 MG tablet Take 1 tablet (40 mg total) by mouth once daily. 90 tablet 2    isosorbide-hydrALAZINE 20-37.5 mg (BIDIL) 20-37.5 mg Tab Take 1 tablet by mouth 3 (three) times daily. 90 tablet 11    LIDOcaine (LIDODERM) 5 % Place 1 patch onto the skin once daily. Remove & Discard patch within 12 hours or as directed by MD 5 patch 0    metoprolol succinate (TOPROL-XL) 200 MG 24 hr tablet Take 1 tablet (200 mg total) by mouth once daily. 90 tablet 3    oxyCODONE-acetaminophen (PERCOCET) 5-325 mg per tablet Take 1 tablet by mouth every  6 (six) hours as needed for Pain. 12 tablet 0    potassium chloride SA (K-DUR,KLOR-CON) 20 MEQ tablet Take 2 tablets (40 mEq total) by mouth 2 (two) times daily. 120 tablet 5    sacubitriL-valsartan (ENTRESTO)  mg per tablet Take 1 tablet by mouth 2 (two) times daily. 180 tablet 3     No current facility-administered medications for this visit.       Medical History     Past Medical History:   Diagnosis Date    CHF (congestive heart failure)     Chronic combined systolic and diastolic congestive heart failure 2019    Essential hypertension 2012    Hypertension     dx at 16y.o.    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 disease, with heart failure 2021    ICD (implantable cardioverter-defibrillator), single, in situ 2020    Nonischemic cardiomyopathy 2019    Pacemaker 2017     Past Surgical History:   Procedure Laterality Date    CARDIAC DEFIBRILLATOR PLACEMENT  2017     SECTION      x 1    INDUCED       RIGHT HEART CATHETERIZATION Right 2022    Procedure: INSERTION, CATHETER, RIGHT HEART;  Surgeon: Timothy Prajapati Jr., MD;  Location: [facility] CATH LAB;  Service: Cardiology;  Laterality: Right;    RIGHT HEART CATHETERIZATION Right 2024    Procedure: INSERTION, CATHETER, RIGHT HEART;  Surgeon: Lior Rueda MD;  Location: [facility] CATH LAB;  Service: Cardiology;  Laterality: Right;    TUBAL LIGATION       Family History   Problem Relation Name Age of Onset    Hypertension Mother      Cancer Maternal Grandmother          breast x 2    Cancer Paternal Grandmother          breast    No Known Problems Sister      No Known Problems Brother      No Known Problems Son      No Known Problems Brother      Hypertension Other      Diabetes Other      Breast cancer Other      Cancer Paternal Aunt          breast    Cancer Paternal Uncle       Social History     Socioeconomic History    Marital status:     Occupational History     Employer: [employer]   Tobacco Use    Smoking status: Never    Smokeless tobacco: Never   Substance and Sexual Activity    Alcohol use: Not Currently     Comment: occasionally    Drug use: Not Currently     Types: Marijuana     Comment: in past very little marijuana    Sexual activity: Yes     Partners: Male     Birth control/protection: None     Social Drivers of Health     Financial Resource Strain: Patient Declined (1/6/2025)    Overall Financial Resource Strain (CARDIA)     Difficulty of Paying Living Expenses: Patient declined   Food Insecurity: Patient Declined (1/6/2025)    Hunger Vital Sign     Worried About Running Out of Food in the Last Year: Patient declined     Ran Out of Food in the Last Year: Patient declined   Transportation Needs: No Transportation Needs (8/28/2024)    TRANSPORTATION NEEDS     Transportation : No   Physical Activity: Inactive (1/6/2025)    Exercise Vital Sign     Days of Exercise per Week: 0 days     Minutes of Exercise per Session: 10 min   Stress: Patient Declined (1/6/2025)    Citizen of [place] [employer] - Occupational Stress Questionnaire     Feeling of Stress : Patient declined   Housing Stability: Unknown (1/6/2025)    Housing Stability Vital Sign     Unable to Pay for Housing in the Last Year: Patient declined     Homeless in the Last Year: No       Exam     There were no vitals filed for this visit.     Physical Exam:  General: Not in acute distress. Not ill-appearing.   HENT: Normocephalic and atraumatic. Moist mucous membranes.  Eyes: Conjunctivae normal.   Pulmonary: Pulmonary effort is normal.   Abdominal: Abdomen is soft and flat.   Skin: Skin is warm and dry. No rashes.   Psychiatric: Mood normal.        Neurologic Exam   Mental status: oriented to person, place, and time  Attention: Normal. Concentration: normal.  Speech: speech is normal.  Cranial Nerves: PERRL, EOMI intact, V1-V3 Facial sensation intact. Symmetric facies.  Hearing grossly intact. Palate and uvula midline, symmetric. No tongue deviation. Trapezius strength intact.     Motor exam: bulk and tone normal. Strength 5/5 in bilateral upper extremities: deltoids, biceps, triceps, wrist flexion/extension, finger abduction/adduction. Strength 5/5 in bilateral lower extremities: hip flexion/extension, thigh adduction/abduction, knee flexion/extension, dorsiflexion/plantarflexion, foot eversion/inversion.    Reflexes: 2+ in bilateral upper extremities: biceps and brachiaradialis, 2+ in bilateral lower extremities: patellar and achilles  Plantar reflex: normal  Rashid's/Clonus: negative    Sensory exam: light touch intact    Gait exam: normal  Romberg: negative  Coordination: normal    Tremor: none  Cogwheel rigidity: none    Labs and Imaging     Labs: reviewed  No results found for this or any previous visit (from the past 24 hours).    Thyroid normal  HgA1C%:  5.3  LDL:  78  Vit B12: 565    Imaging:   I have personally reviewed the images performed.   MRI brain 2024 - hx No evidence of acute infarct or hemorrhage. Remote right cerebellar infarct.    CTA of the head  Impression:  Moderate-sized right cerebellar infarct, unchanged prior.  No new major vascular distribution infarct or acute intracranial hemorrhage.   CT arteriogram appears within normal limits.  No evidence of high-grade stenosis or intracranial large vessel occlusion.     Other procedures: reviewed  EEG 8/29 no seizures    Assessment and Plan     Problem List Items Addressed This Visit          Neuro    Hx of ischemic right PCA stroke    Lumbar radiculopathy, chronic - Primary    Relevant Orders    Ambulatory Referral/Consult to Physical Therapy    Ambulatory referral/consult to Pain Clinic    Leg numbness     Other Visit Diagnoses       Lumbar radiculopathy              This is a patient 39-year-old female with a history of right PCA stroke, history of heart failure.  Does have an ICD.  Patient also with  insomnia.  Had transient changes unawareness and nonspecific symptoms during hospitalization for the stent.  EEG was unremarkable.      Patient also endorsing bilateral leg numbness, workup for neuropathy and get nerve conduction study/EMG.    Patient during that hospital stay had R heart cath, episodes of the confusion led to attempts 2x for csf from [facility]spine however unable to obtain. Since then, radiating pain from back w weakness subjective and numbness. Concern for l4-5, also seen w denervation chronic l4-l5 on emg today. Mri lumbar spine shows lumbar radiculopathy l4-l5. Will send for PT, pain management. Appreciate opportunity.     Appreciate opportunity to care for this patient.    Follow-up: referral PT, pain management for Chronic denervation L4-L5 for chronic lower back pain w radicular symptoms    This note was created by combination of typed  and M-Modal dictation. Transcription and phonetic errors may be present.  If there are any questions, please contact me.

## 2025-01-07 ENCOUNTER — OFFICE VISIT (OUTPATIENT)
Dept: PAIN MEDICINE | Facility: CLINIC | Age: 40
End: 2025-01-07
Payer: MEDICARE

## 2025-01-07 ENCOUNTER — RESEARCH ENCOUNTER (OUTPATIENT)
Dept: CARDIOLOGY | Facility: CLINIC | Age: 40
End: 2025-01-07
Payer: MEDICARE

## 2025-01-07 VITALS
DIASTOLIC BLOOD PRESSURE: 64 MMHG | RESPIRATION RATE: 18 BRPM | SYSTOLIC BLOOD PRESSURE: 127 MMHG | HEART RATE: 62 BPM | OXYGEN SATURATION: 99 %

## 2025-01-07 DIAGNOSIS — M51.362 DEGENERATION OF INTERVERTEBRAL DISC OF LUMBAR REGION WITH DISCOGENIC BACK PAIN AND LOWER EXTREMITY PAIN: Primary | ICD-10-CM

## 2025-01-07 DIAGNOSIS — M54.16 LUMBAR RADICULOPATHY, CHRONIC: ICD-10-CM

## 2025-01-07 DIAGNOSIS — M54.16 LUMBAR RADICULOPATHY: ICD-10-CM

## 2025-01-07 PROCEDURE — 99999 PR PBB SHADOW E&M-EST. PATIENT-LVL V: CPT | Mod: PBBFAC,,, | Performed by: PAIN MEDICINE

## 2025-01-07 PROCEDURE — 99204 OFFICE O/P NEW MOD 45 MIN: CPT | Mod: S$PBB,,, | Performed by: PAIN MEDICINE

## 2025-01-07 PROCEDURE — 99215 OFFICE O/P EST HI 40 MIN: CPT | Mod: PBBFAC,PN | Performed by: PAIN MEDICINE

## 2025-01-07 RX ORDER — GABAPENTIN 300 MG/1
600 CAPSULE ORAL 3 TIMES DAILY
Qty: 180 CAPSULE | Refills: 5 | Status: SHIPPED | OUTPATIENT
Start: 2025-01-07 | End: 2025-07-06

## 2025-01-07 NOTE — PROGRESS NOTES
Sponsor: [employer]     Study Title/IRB Number: The EMPOWER Trial: Assessment of the Carillon Mitral Contour System in Treating Heart Failure with Functional Mitral Regurgitation. IRB: 2018.171     Principle Investigator: Dr. Jarvis Del Cid    This is a pre-screening note for Mrs. Nasra Marks for the EMPOWER trial. I discussed her care with Dr. Prajapati, who felt she would be a good candidate for the trial. I then reviewed the patient's chart with Dr. Del Cid, who also feels she would be appropriate for the trial.    Inclusion criteria     Mitral valve regurgitation of at least 1+: 1+/mild  NYHA II-IV: Patient is NYHA III/IV  LVEF <= 50%: Patient's LVEF = 30-35%  LViDd >= 6cm and LViDs <= 7.5cm: LViDd = 6.69 cm and LViDs = 5.31 cm  BNP >= 300 pg/mL, or NT-proBNP >= 1200 pg/mL, or CHF related hospitalization in the past year: BNP = 449 pg/mL    Given that Mrs. Marks meets study inclusion criteria and no exclusion criteria, we will contact the patient to proceed with enrollment.

## 2025-01-07 NOTE — PROGRESS NOTES
Subjective:     Patient ID: Nasra Marks is a 39 y.o. female    Chief Complaint: Low-back Pain (Right sided sharp shooting pain radiating down into right leg )      Referred by: Daphney Pablo MD      HPI:    Initial Encounter (1/7/25):  Nasra Marks is a 39 y.o. female who presents today with midline/bilateral low back pain.  This pain has been present for roughly 3 months.  Pain is worse on the right side.  No specific inciting events or injuries noted.  Pain we will radiate to the right lower extremity all the way to her foot.  Associated paresthesias.  She denies any associated weakness or bowel bladder dysfunction.  Pain is constant and worse with activity.   This pain is described in detail below.    Physical Therapy:  No.    Non-pharmacologic Treatment:  Rest helps         TENS?  No    Pain Medications:         Currently taking:  Tylenol    Has tried in the past:      Has not tried:  Opioids, NSAIDs (CHF), Muscle relaxants, TCAs, SNRIs, anticonvulsants, topical creams    Blood thinners:  None    Interventional Therapies:  None    Relevant Surgeries:  None    Affecting sleep?  Yes    Affecting daily activities? yes    Depressive symptoms? No          SI/HI? No    Work status: Disabled    Pain Scores:    Best:       6/10  Worst:     9/10  Usually:   8/10  Today:    7/10    Pain Disability Index  Family/Home Responsibilities:: 8  Recreation:: 8  Social Activity:: 8  Occupation:: 8  Sexual Behavior:: 8  Self Care:: 8  Life-Support Activities:: 8  Pain Disability Index (PDI): 56    Review of Systems   Constitutional:  Negative for activity change, appetite change, chills, fatigue, fever and unexpected weight change.   HENT:  Negative for hearing loss.    Eyes:  Negative for visual disturbance.   Respiratory:  Negative for chest tightness and shortness of breath.    Cardiovascular:  Negative for chest pain.   Gastrointestinal:  Negative for abdominal pain, constipation, diarrhea, nausea and vomiting.    Genitourinary:  Negative for difficulty urinating.   Musculoskeletal:  Positive for back pain, gait problem and myalgias. Negative for neck pain.   Skin:  Negative for rash.   Neurological:  Positive for numbness. Negative for dizziness, weakness, light-headedness and headaches.   Psychiatric/Behavioral:  Positive for sleep disturbance. Negative for hallucinations and suicidal ideas. The patient is not nervous/anxious.        Past Medical History:   Diagnosis Date    CHF (congestive heart failure)     Chronic combined systolic and diastolic congestive heart failure 2019    Essential hypertension 2012    Hypertension     dx at 16y.o.    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 disease, with heart failure 2021    ICD (implantable cardioverter-defibrillator), single, in situ 2020    Nonischemic cardiomyopathy 2019    Pacemaker 2017       Past Surgical History:   Procedure Laterality Date    CARDIAC DEFIBRILLATOR PLACEMENT  2017     SECTION      x 1    INDUCED       RIGHT HEART CATHETERIZATION Right 2022    Procedure: INSERTION, CATHETER, RIGHT HEART;  Surgeon: Timothy Prajapati Jr., MD;  Location: [facility] CATH LAB;  Service: Cardiology;  Laterality: Right;    RIGHT HEART CATHETERIZATION Right 2024    Procedure: INSERTION, CATHETER, RIGHT HEART;  Surgeon: Lior Rueda MD;  Location: [facility] CATH LAB;  Service: Cardiology;  Laterality: Right;    TUBAL LIGATION         Social History     Socioeconomic History    Marital status:    Occupational History     Employer: [employer]   Tobacco Use    Smoking status: Never    Smokeless tobacco: Never   Substance and Sexual Activity    Alcohol use: Not Currently     Comment: occasionally    Drug use: Not Currently     Types: Marijuana     Comment: in past very little marijuana    Sexual activity: Yes     Partners: Male     Birth control/protection: None     Social  Drivers of Health     Financial Resource Strain: Patient Declined (1/6/2025)    Overall Financial Resource Strain (CARDIA)     Difficulty of Paying Living Expenses: Patient declined   Food Insecurity: Patient Declined (1/6/2025)    Hunger Vital Sign     Worried About Running Out of Food in the Last Year: Patient declined     Ran Out of Food in the Last Year: Patient declined   Transportation Needs: No Transportation Needs (8/28/2024)    TRANSPORTATION NEEDS     Transportation : No   Physical Activity: Inactive (1/6/2025)    Exercise Vital Sign     Days of Exercise per Week: 0 days     Minutes of Exercise per Session: 10 min   Stress: Patient Declined (1/6/2025)    [employer] - Occupational Stress Questionnaire     Feeling of Stress : Patient declined   Housing Stability: Unknown (1/6/2025)    Housing Stability Vital Sign     Unable to Pay for Housing in the Last Year: Patient declined     Homeless in the Last Year: No       Review of patient's allergies indicates:   Allergen Reactions    Aldactone [spironolactone] Swelling     Lips swelled    Hydralazine analogues Other (See Comments)     headaches    Isosorbide Other (See Comments)     headaches       Current Outpatient Medications on File Prior to Visit   Medication Sig Dispense Refill    acetaminophen (TYLENOL) 500 MG tablet Take 1 tablet (500 mg total) by mouth every 6 (six) hours as needed. 13 tablet 0    albuterol (PROVENTIL/VENTOLIN HFA) 90 mcg/actuation inhaler Inhale 1-2 puffs into the lungs every 6 (six) hours as needed. Rescue 18 g 0    amLODIPine (NORVASC) 5 MG tablet Take 5 mg by mouth.      cetirizine (ZYRTEC) 10 MG tablet Take 1 tablet (10 mg total) by mouth once daily. 30 tablet 0    docusate sodium (COLACE) 100 MG capsule Take 1 capsule (100 mg total) by mouth 2 (two) times daily. 20 capsule 0    eplerenone (INSPRA) 25 MG Tab Take 1 tablet (25 mg total) by mouth once daily. 90 tablet 3    FARXIGA 10 mg tablet Take 1  tablet by mouth once daily 30 tablet 6    furosemide (LASIX) 40 MG tablet Take 1 tablet (40 mg total) by mouth once daily. 90 tablet 2    isosorbide-hydrALAZINE 20-37.5 mg (BIDIL) 20-37.5 mg Tab Take 1 tablet by mouth 3 (three) times daily. 90 tablet 11    LIDOcaine (LIDODERM) 5 % Place 1 patch onto the skin once daily. Remove & Discard patch within 12 hours or as directed by MD 5 patch 0    metoprolol succinate (TOPROL-XL) 200 MG 24 hr tablet Take 1 tablet (200 mg total) by mouth once daily. 90 tablet 3    potassium chloride SA (K-DUR,KLOR-CON) 20 MEQ tablet Take 2 tablets (40 mEq total) by mouth 2 (two) times daily. 120 tablet 5    sacubitriL-valsartan (ENTRESTO)  mg per tablet Take 1 tablet by mouth 2 (two) times daily. 180 tablet 3    aspirin (ECOTRIN) 81 MG EC tablet Take 1 tablet (81 mg total) by mouth once daily. 30 tablet 0    atorvastatin (LIPITOR) 40 MG tablet Take 1 tablet (40 mg total) by mouth every evening. 90 tablet 3    FLUoxetine 20 MG capsule Take 1 capsule (20 mg total) by mouth once daily. 30 capsule 1     No current facility-administered medications on file prior to visit.       Objective:      /64 (BP Location: Left arm, Patient Position: Sitting)   Pulse 62   Resp 18   SpO2 99%     Exam:  GEN:  Well developed, well nourished.  No acute distress.  Normal pain behavior.  HEENT:  No trauma.  Mucous membranes moist.  Nares patent bilaterally.  PSYCH: Normal affect. Thought content appropriate.  CHEST:  Breathing symmetric.  No audible wheezing.  ABD: Soft, non-distended.  SKIN:  Warm, pink, dry.  No rash on exposed areas.    EXT:  No cyanosis, clubbing, or edema.  No color change or changes in nail or hair growth.  NEURO/MUSCULOSKELETAL:  Fully alert, oriented, and appropriate. Speech normal arian. No cranial nerve deficits.   Gait:  Normal.  No trendelenburg sign bilaterally.   Motor Strength:  5/5 motor strength throughout lower extremities.   Sensory:  No sensory deficit in the  lower extremities.   Reflexes:  2+ and symmetric throughout.  Downgoing Babinski's bilaterally.  No clonus or spasticity.  L-Spine:  Limited ROM with pain on flexion more than extension.  Negative pain with axial/facet loading bilaterally.  Positive SLR bilaterally.    Positive TTP over midline lower lumbar spine and bilateral lower lumbar paraspinals      Imaging:      Narrative & Impression    EXAMINATION:  MRI LUMBAR SPINE WITHOUT CONTRAST     CLINICAL HISTORY:  Spinal stenosis, lumbar. Pt reports lower back pain with pain radiating down the right leg to the knee. Denies trauma.  Pt has a [employer] defibrillator that was placed into MRI mode prior to scan. Returned to normal operation post scan.     TECHNIQUE:  Multiplanar, multisequence MR images were acquired from the thoracolumbar junction to the sacrum without the administration of contrast.     COMPARISON:  Radiographs of the lumbar spine, 02/04/2016.     FINDINGS:  NOMENCLATURE: Five lumbar type vertebral bodies.     CORD/CAUDA EQUINA: Conus has normal size and signal and ends at a normal level of L1.     ALIGNMENT: Normal.     BONES: Vertebral body heights are maintained.  No aggressive bone marrow signal.     PARASPINAL AREA: Normal.     OTHER: Multiple bilateral renal cysts.  No follow-up is recommended for incidental simple renal cysts as they are likely benign.     LUMBAR DISC LEVELS:     T12-L1: No disc herniation or significant posterior osteophytic ridging.  No significant spinal canal or foraminal stenosis.     L1-L2: No disc herniation or significant posterior osteophytic ridging.  Mild right facet hypertrophy.  No significant spinal canal or foraminal stenosis.     L2-L3: No disc herniation or significant posterior osteophytic ridging.  Minimal bilateral facet hypertrophy.  No significant spinal canal or foraminal stenosis.     L3-L4: Minimal disc bulge.  Mild bilateral facet hypertrophy.  Ligamentum flavum thickening.  No significant spinal  canal or foraminal stenosis.     L4-L5: Mild disc bulge with tiny central protrusion with high-intensity zone in the protrusion.  Loss of normal T2 signal in the disc.  Mild left and minimal right facet hypertrophy.  Ligamentum flavum thickening.  Mild narrowing of the bilateral lateral recesses.  No significant spinal canal stenosis.  Mild left foraminal stenosis.     L5-S1: Mild disc bulge with tiny central protrusion.  High intensity zone in the protrusion.  Loss of the normal T2 signal in the disc.  Mild bilateral facet hypertrophy.  No significant spinal canal stenosis.  Minimal right foraminal stenosis.     Impression:     1. Lower lumbar spondylosis without significant spinal canal stenosis.  2. Mild foraminal stenosis on the left at L4-5.        Electronically signed by:Rayray Payne MD  Date:                                            12/30/2024  Time:                                           15:47       Assessment:       Encounter Diagnoses   Name Primary?    Lumbar radiculopathy, chronic     Degeneration of intervertebral disc of lumbar region with discogenic back pain and lower extremity pain Yes    Lumbar radiculopathy          Plan:       Nasra was seen today for low-back pain.    Diagnoses and all orders for this visit:    Degeneration of intervertebral disc of lumbar region with discogenic back pain and lower extremity pain  -     gabapentin (NEURONTIN) 300 MG capsule; Take 2 capsules (600 mg total) by mouth 3 (three) times daily.  -     Ambulatory Referral/Consult to Physical Therapy; Future    Lumbar radiculopathy, chronic  -     Ambulatory referral/consult to Pain Clinic  -     gabapentin (NEURONTIN) 300 MG capsule; Take 2 capsules (600 mg total) by mouth 3 (three) times daily.  -     Ambulatory Referral/Consult to Physical Therapy; Future    Lumbar radiculopathy  -     gabapentin (NEURONTIN) 300 MG capsule; Take 2 capsules (600 mg total) by mouth 3 (three) times daily.  -     Ambulatory  Referral/Consult to Physical Therapy; Future        Nasra Viviana Marks is a 39 y.o. female with midline/bilateral low back pain right worse than left.  Associated right lower extremity pain suspicious for radiculopathy/radiculitis.  Does have degenerative disc disease at the L4-5 and L5-S1 levels.  Posterior annular fissures at both of these levels.  Likely indicate some degree of axial discogenic pain, but also likely some related radicular symptoms on the right side.    Pertinent imaging studies reviewed by me. Imaging results were discussed with patient.  Start gabapentin 300 mg q.h.s..  Gradually increase to 600 mg t.i.d. as tolerated/needed.  Patient was given instructions on how to do this.  Refer to PT for ROM, strengthening, stretching and HEP.  Return to clinic in 8 weeks or sooner if needed.  At that time we will discuss efficacy of physical therapy/home exercise program and gabapentin.  May consider epidural steroid injection versus local anesthetic diskogram/via disc.            This note was created by combination of typed  and M-Modal dictation. Transcription and phonetic errors may be present.  If there are any questions, please contact me.

## 2025-01-24 ENCOUNTER — HOSPITAL ENCOUNTER (EMERGENCY)
Facility: HOSPITAL | Age: 40
Discharge: HOME | End: 2025-01-24
Attending: EMERGENCY MEDICINE
Payer: MEDICARE

## 2025-01-24 VITALS
HEIGHT: 71 IN | OXYGEN SATURATION: 96 % | HEART RATE: 96 BPM | DIASTOLIC BLOOD PRESSURE: 77 MMHG | BODY MASS INDEX: 25.9 KG/M2 | TEMPERATURE: 99 F | SYSTOLIC BLOOD PRESSURE: 121 MMHG | WEIGHT: 185 LBS | RESPIRATION RATE: 20 BRPM

## 2025-01-24 DIAGNOSIS — R06.02 SHORTNESS OF BREATH: ICD-10-CM

## 2025-01-24 DIAGNOSIS — J10.1 INFLUENZA A: Primary | ICD-10-CM

## 2025-01-24 LAB
ALBUMIN SERPL-MCNC: 3.6 G/DL (ref 3.3–5.5)
ALP SERPL-CCNC: 51 U/L (ref 42–141)
B-HCG UR QL: NEGATIVE
BILIRUB SERPL-MCNC: 0.5 MG/DL (ref 0.2–1.6)
BILIRUBIN, POC UA: NEGATIVE
BLOOD, POC UA: ABNORMAL
BUN SERPL-MCNC: 9 MG/DL (ref 7–22)
CALCIUM SERPL-MCNC: 9.1 MG/DL (ref 8–10.3)
CHLORIDE SERPL-SCNC: 110 MMOL/L (ref 98–108)
CLARITY, UA: CLEAR
COLOR, UA: YELLOW
CREAT SERPL-MCNC: 0.7 MG/DL (ref 0.6–1.2)
CTP QC/QA: YES
CTP QC/QA: YES
GLUCOSE SERPL-MCNC: 101 MG/DL (ref 73–118)
GLUCOSE, POC UA: NEGATIVE
HCT, POC: NORMAL
HGB, POC: NORMAL (ref 14–18)
INFLUENZA A ANTIGEN, POC: POSITIVE
INFLUENZA B ANTIGEN, POC: NEGATIVE
KETONES, POC UA: ABNORMAL
LEUKOCYTE EST, POC UA: ABNORMAL
MCH, POC: NORMAL
MCHC, POC: NORMAL
MCV, POC: NORMAL
MPV, POC: NORMAL
NITRITE, POC UA: NEGATIVE
PH UR STRIP: 8.5 [PH] (ref 5–8)
POC ALT (SGPT): 25 U/L (ref 10–47)
POC AST (SGOT): 38 U/L (ref 11–38)
POC B-TYPE NATRIURETIC PEPTIDE: 965 PG/ML (ref 0–100)
POC CARDIAC TROPONIN I: 0 NG/ML (ref 0–0.08)
POC PLATELET COUNT: NORMAL
POC RAPID STREP A: NEGATIVE
POC TCO2: 27 MMOL/L (ref 18–33)
POTASSIUM BLD-SCNC: 4.7 MMOL/L (ref 3.6–5.1)
PROTEIN, POC UA: ABNORMAL
PROTEIN, POC: 7.5 G/DL (ref 6.4–8.1)
RBC, POC: NORMAL
RDW, POC: NORMAL
SAMPLE: NORMAL
SARS-COV-2 RDRP RESP QL NAA+PROBE: NEGATIVE
SODIUM BLD-SCNC: 139 MMOL/L (ref 128–145)
SPECIFIC GRAVITY, POC UA: 1.02 (ref 1–1.03)
UROBILINOGEN, POC UA: 1 E.U./DL
WBC, POC: NORMAL

## 2025-01-24 PROCEDURE — 84484 ASSAY OF TROPONIN QUANT: CPT | Mod: ER

## 2025-01-24 PROCEDURE — 87635 SARS-COV-2 COVID-19 AMP PRB: CPT | Mod: ER

## 2025-01-24 PROCEDURE — 93005 ELECTROCARDIOGRAM TRACING: CPT | Mod: ER

## 2025-01-24 PROCEDURE — 81025 URINE PREGNANCY TEST: CPT | Mod: ER | Performed by: EMERGENCY MEDICINE

## 2025-01-24 PROCEDURE — 99284 EMERGENCY DEPT VISIT MOD MDM: CPT | Mod: 25,ER

## 2025-01-24 PROCEDURE — 81025 URINE PREGNANCY TEST: CPT | Mod: ER

## 2025-01-24 PROCEDURE — 80053 COMPREHEN METABOLIC PANEL: CPT | Mod: ER

## 2025-01-24 PROCEDURE — 87880 STREP A ASSAY W/OPTIC: CPT | Mod: ER

## 2025-01-24 PROCEDURE — 93010 ELECTROCARDIOGRAM REPORT: CPT | Mod: ,,, | Performed by: INTERNAL MEDICINE

## 2025-01-24 PROCEDURE — 87804 INFLUENZA ASSAY W/OPTIC: CPT | Mod: ER

## 2025-01-24 PROCEDURE — 85025 COMPLETE CBC W/AUTO DIFF WBC: CPT | Mod: ER

## 2025-01-24 PROCEDURE — 25000003 PHARM REV CODE 250: Mod: ER

## 2025-01-24 PROCEDURE — 83880 ASSAY OF NATRIURETIC PEPTIDE: CPT | Mod: ER

## 2025-01-24 RX ORDER — CETIRIZINE HYDROCHLORIDE 10 MG/1
10 TABLET ORAL DAILY
Qty: 30 TABLET | Refills: 0 | Status: SHIPPED | OUTPATIENT
Start: 2025-01-24 | End: 2026-01-24

## 2025-01-24 RX ORDER — ACETAMINOPHEN 500 MG
500 TABLET ORAL EVERY 4 HOURS PRN
Qty: 30 TABLET | Refills: 0 | Status: SHIPPED | OUTPATIENT
Start: 2025-01-24

## 2025-01-24 RX ORDER — ACETAMINOPHEN 500 MG
1000 TABLET ORAL
Status: COMPLETED | OUTPATIENT
Start: 2025-01-24 | End: 2025-01-24

## 2025-01-24 RX ORDER — PHENOL 1.4 %
AEROSOL, SPRAY (ML) MUCOUS MEMBRANE
Qty: 177 ML | Refills: 0 | Status: SHIPPED | OUTPATIENT
Start: 2025-01-24 | End: 2025-01-30

## 2025-01-24 RX ORDER — IBUPROFEN 600 MG/1
600 TABLET ORAL
Status: COMPLETED | OUTPATIENT
Start: 2025-01-24 | End: 2025-01-24

## 2025-01-24 RX ORDER — BENZONATATE 100 MG/1
100 CAPSULE ORAL 3 TIMES DAILY PRN
Qty: 20 CAPSULE | Refills: 0 | Status: SHIPPED | OUTPATIENT
Start: 2025-01-24 | End: 2025-01-30

## 2025-01-24 RX ORDER — FLUTICASONE PROPIONATE 50 MCG
1 SPRAY, SUSPENSION (ML) NASAL 2 TIMES DAILY PRN
Qty: 15 G | Refills: 0 | Status: SHIPPED | OUTPATIENT
Start: 2025-01-24

## 2025-01-24 RX ADMIN — ACETAMINOPHEN 1000 MG: 500 TABLET, FILM COATED ORAL at 05:01

## 2025-01-24 RX ADMIN — IBUPROFEN 600 MG: 600 TABLET ORAL at 07:01

## 2025-01-25 NOTE — ED PROVIDER NOTES
Encounter Date: 2025       History     Chief Complaint   Patient presents with    Cough     Productive yellow cough, body aches, fever, sore throat, n/v/d x 2 days      39-year-old female with CHF (ejection fraction of 30-35% on 24), hypertension, ICD, nonischemic cardiomyopathy, pacemaker, hypertensive cardiovascular renal disease presents to ED for emergent evaluation of productive yellow cough, generalized myalgias, fever, sore throat, rhinorrhea, nasal congestion that started 3 days ago.  She reports chest pain only with coughing.  She also reports shortness of breath with exertion.  She has been attempting TheraFlu with no relief.  She denies any sick contacts. her cardiologist is Dr. Peguero.  She denies any fever, chills, hemoptysis, abdominal pain, nausea, vomiting, diarrhea, dysuria, hematuria.  No other symptoms reported.    The history is provided by the patient. No  was used.     Review of patient's allergies indicates:   Allergen Reactions    Aldactone [spironolactone] Swelling     Lips swelled    Hydralazine analogues Other (See Comments)     headaches    Isosorbide Other (See Comments)     headaches     Past Medical History:   Diagnosis Date    CHF (congestive heart failure)     Chronic combined systolic and diastolic congestive heart failure 2019    Essential hypertension 2012    Hypertension     dx at 16y.o.    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 disease, with heart failure 2021    ICD (implantable cardioverter-defibrillator), single, in situ 2020    Nonischemic cardiomyopathy 2019    Pacemaker 2017     Past Surgical History:   Procedure Laterality Date    CARDIAC DEFIBRILLATOR PLACEMENT  2017     SECTION      x 1    INDUCED       RIGHT HEART CATHETERIZATION Right 2022    Procedure: INSERTION, CATHETER, RIGHT HEART;  Surgeon: Timothy Prajapati Jr., MD;  Location:  [facility] CATH LAB;  Service: Cardiology;  Laterality: Right;    RIGHT HEART CATHETERIZATION Right 8/27/2024    Procedure: INSERTION, CATHETER, RIGHT HEART;  Surgeon: Lior Rueda MD;  Location: [facility] CATH LAB;  Service: Cardiology;  Laterality: Right;    TUBAL LIGATION       Family History   Problem Relation Name Age of Onset    Hypertension Mother      Cancer Maternal Grandmother          breast x 2    Cancer Paternal Grandmother          breast    No Known Problems Sister      No Known Problems Brother      No Known Problems Son      No Known Problems Brother      Hypertension Other      Diabetes Other      Breast cancer Other      Cancer Paternal Aunt          breast    Cancer Paternal Uncle       Social History     Tobacco Use    Smoking status: Never    Smokeless tobacco: Never   Substance Use Topics    Alcohol use: Not Currently     Comment: occasionally    Drug use: Not Currently     Types: Marijuana     Comment: in past very little marijuana     Review of Systems   Constitutional:  Negative for chills and fever.   HENT:  Positive for congestion, rhinorrhea and sore throat. Negative for ear pain.    Eyes:  Negative for redness.   Respiratory:  Positive for cough and shortness of breath.    Cardiovascular:  Negative for chest pain.   Gastrointestinal:  Negative for abdominal pain, diarrhea, nausea and vomiting.   Genitourinary:  Negative for decreased urine volume, difficulty urinating, dysuria, frequency, hematuria and urgency.   Musculoskeletal:  Positive for myalgias. Negative for back pain and neck pain.   Skin:  Negative for rash.   Neurological:  Negative for headaches.   Psychiatric/Behavioral:  Negative for confusion.        Physical Exam     Initial Vitals [01/24/25 1648]   BP Pulse Resp Temp SpO2   (!) 140/99 (!) 120 18 (!) 101.7 °F (38.7 °C) 100 %      MAP       --         Physical Exam    Nursing note and vitals reviewed.  Constitutional: She appears well-developed and well-nourished.   Non-toxic appearance. She does not appear ill.   HENT:   Head: Normocephalic and atraumatic.   Right Ear: Hearing, tympanic membrane, external ear and ear canal normal. Tympanic membrane is not perforated, not erythematous and not bulging.   Left Ear: Hearing, tympanic membrane, external ear and ear canal normal. Tympanic membrane is not perforated, not erythematous and not bulging.   Nose: Nose normal. Mouth/Throat: Uvula is midline, oropharynx is clear and moist and mucous membranes are normal.   Eyes: Conjunctivae and EOM are normal.   Neck: Neck supple.   Normal range of motion.   Full passive range of motion without pain.     Cardiovascular:  Normal rate and regular rhythm.           Pulses:       Radial pulses are 2+ on the right side and 2+ on the left side.        Dorsalis pedis pulses are 2+ on the right side and 2+ on the left side.   Pulmonary/Chest: Effort normal and breath sounds normal. No accessory muscle usage. No respiratory distress. She has no decreased breath sounds.   Abdominal: Abdomen is soft. Bowel sounds are normal. She exhibits no distension. There is no abdominal tenderness. There is no rebound and no guarding.   Musculoskeletal:         General: Normal range of motion.      Cervical back: Full passive range of motion without pain, normal range of motion and neck supple. No rigidity.      Comments: No lower extremity pitting edema.      Neurological: She is alert. No cranial nerve deficit.   Neuro intact.  Strength and sensation intact bilateral upper and lower extremities.   Skin: Skin is warm and dry.   Psychiatric: She has a normal mood and affect.         ED Course   Procedures  Labs Reviewed   POCT RAPID INFLUENZA A/B - Abnormal       Result Value    Influenza B Ag negative      Inflenza A Ag positive (*)    POCT URINALYSIS W/O SCOPE - Abnormal    Glucose, UA Negative      Bilirubin, UA Negative      Ketones, UA Trace (*)     Spec Grav UA 1.020      Blood, UA 2+ (*)     PH, UA 8.5       Protein, UA 2+ (*)     Urobilinogen, UA 1.0      Nitrite, UA Negative      Leukocytes, UA Trace (*)     Color, UA POC Yellow      Clarity, UA, POC Clear     POCT CMP - Abnormal    Albumin, POC 3.6      Alkaline Phosphatase, POC 51      ALT (SGPT), POC 25      AST (SGOT), POC 38      POC BUN 9      Calcium, POC 9.1      POC Chloride 110 (*)     POC Creatinine 0.7      POC Glucose 101      POC Potassium 4.7      POC Sodium 139      Bilirubin, POC 0.5      POC TCO2 27      Protein, POC 7.5     POCT B-TYPE NATRIURETIC PEPTIDE (BNP) - Abnormal    POC B-Type Natriuretic Peptide 965 (*)    TROPONIN ISTAT    POC Cardiac Troponin I 0.00      Sample unknown     POCT URINE PREGNANCY    POC Preg Test, Ur Negative       Acceptable Yes     SARS-COV-2 RDRP GENE    POC Rapid COVID Negative       Acceptable Yes      Narrative:     This test utilizes isothermal nucleic acid amplification technology to detect the SARS-CoV-2 RdRp nucleic acid segment. The analytical sensitivity (limit of detection) is 500 copies/swab.     A POSITIVE result is indicative of the presence of SARS-CoV-2 RNA; clinical correlation with patient history and other diagnostic information is necessary to determine patient infection status.    A NEGATIVE result means that SARS-CoV-2 nucleic acids are not present above the limit of detection. A NEGATIVE result should be treated as presumptive. It does not rule out the possibility of COVID-19 and should not be the sole basis for treatment decisions. If COVID-19 is strongly suspected based on clinical and exposure history, re-testing using an alternate molecular assay should be considered.     Commercial kits are provided by [employer].       POCT CBC    Hematocrit        Hemoglobin        RBC        WBC        MCV        MCH, POC        MCHC        RDW-CV        Platelet Count, POC        MPV       POCT STREP A MOLECULAR   POCT INFLUENZA A/B MOLECULAR   POCT STREP A, RAPID     POC Rapid Strep A negative     POCT CMP   POCT TROPONIN   POCT B-TYPE NATRIURETIC PEPTIDE (BNP)   POCT URINALYSIS W/O SCOPE     EKG Readings: (Independently Interpreted)   EKG reveals sinus tachycardia with fusion complexes.  Heart rate 111.  WA interval 142.  .  Normal axis. Nonspecific ST and T wave abnormalities.  Ventricular rate increased by 31 beats per minute compared to EKG on 12/28/2024 EKG signed by Dr. Fuentes.       Imaging Results              X-Ray Chest AP Portable (Final result)  Result time 01/24/25 17:53:09      Final result by Kasandra Erazo MD (01/24/25 17:53:09)                   Impression:      No acute cardiopulmonary process identified.      Electronically signed by: Kasandra Erazo MD  Date:    01/24/2025  Time:    17:53               Narrative:    EXAMINATION:  XR CHEST AP PORTABLE    CLINICAL HISTORY:  Shortness of breath    TECHNIQUE:  Single frontal view of the chest was performed.    COMPARISON:  December 2024.    FINDINGS:  Cardiac silhouette is enlarged but stable in size.  Left-sided pacer device is seen.  Lungs are symmetrically expanded.  No evidence of new focal consolidative process, pneumothorax, or significant pleural effusion.  No acute osseous abnormality identified.                                       Medications   acetaminophen tablet 1,000 mg (1,000 mg Oral Given 1/24/25 1713)   ibuprofen tablet 600 mg (600 mg Oral Given 1/24/25 1919)     Medical Decision Making  This is a 39-year-old female with CHF (ejection fraction of 30-35% on 8/8/24), hypertension, ICD, nonischemic cardiomyopathy, pacemaker, hypertensive cardiovascular renal disease presents to ED for emergent evaluation of productive yellow cough, generalized myalgias, fever, sore throat, rhinorrhea, nasal congestion that started 3 days ago.  She reports chest pain only with coughing.  She also reports shortness of breath with exertion.  She has been attempting TheraFlu with no relief.  She denies any  sick contacts. her cardiologist is Dr. Peguero.  She denies any fever, chills, hemoptysis, abdominal pain, nausea, vomiting, diarrhea, dysuria, hematuria.  No other symptoms reported.    On physical exam, patient is well-appearing and in no acute distress.  Nontoxic appearing.  Lungs are clear to auscultation bilaterally.  Abdomen is soft and nontender.  No guarding, rigidity, rebound.  2+ radial pulses bilaterally.  Posterior oropharynx is not erythematous.  No edema or exudate.  Uvula midline.  Bilateral tympanic membrane is normal.  No erythema, bulging, or perforations.  Neuro intact.  Strength and sensation intact bilateral upper and lower extremities. No edema noted to bilateral lower extremities. Patient does not look fluid overloaded.  EKG reveals sinus tachycardia with fusion complexes.  Heart rate 111.  LA interval 142.  .  Normal axis. Nonspecific ST and T wave abnormalities.  Ventricular rate increased by 31 beats per minute compared to EKG on 12/28/2024 EKG signed by Dr. Fuentes.  Patient febrile at 101.7 upon triage.  Tylenol ordered.  CBC without evidence of any leukocytosis.  Hemoglobin 11.5, hematocrit 34.4.  CMP revealed chloride 110.  Otherwise no other electrolyte abnormality.  Normal renal function.  Troponin unremarkable.  Doubt MI.  .  UA with trace leukocytes, trace ketones, 2+ blood.  COVID negative.  Strep negative.  Flu A positive.  UPT negative.  Chest x-ray revealed no acute cardiopulmonary processes.  Upon reassessment, patient does report relief to her symptoms.    Case discussed with and care coordinated in conjunction with my attending, Dr. Warner. She advised giving the patient ibuprofen. She does not believe patient needs to be admitted at this time. Pedialyte given. will reassess.     Upon reassessment, patient reports relief to her symptoms. Repeat temperature is 98.7°, pulse is 96.  Will discharge patient on Tylenol, Tessalon Perles, Zyrtec, Flonase, Chloraseptic  throat spray, Cepacol lozenges.  Urged prompt follow-up with PCP for further evaluation.    Strict return precautions given. I discussed with the patient/family the diagnosis, treatment plan, indications for return to the emergency department, and for expected follow-up. The patient/family verbalized an understanding. The patient/family is asked if there are any questions or concerns. We discuss the case, until all issues are addressed to the patient/family's satisfaction. Patient/family understands and is agreeable to the plan. Patient is stable and ready for discharge.      Amount and/or Complexity of Data Reviewed  Labs: ordered.  Radiology: ordered.    Risk  OTC drugs.  Prescription drug management.               ED Course as of 01/24/25 2046 Fri Jan 24, 2025   1902 I, Ab Warner, have reviewed the case with my ZOILA and agree with the history, review of systems, physical exam, assessment and plan of care as documented by my advance practice clinician.  ROS as above and as addressed on ZOILA documentation.   All other systems reviewed and are negative.    39-year-old female with a past medical history of nonischemic cardiomyopathy presents with myalgias and decreased p.o. intake.  Exam shows tachycardia and mild ill but nontoxic appearing.  No respiratory distress.    Labs and imaging studies reviewed and independently Interpreted:  UA with indication of mild dehydration.  Influenza a abnormal as positive.  Normal creatinine function and WBC.  Elevated BNP.  Chest x-ray with no acute disease.    The results and physical exam findings were reviewed with the patient. Pt agrees with assessment, disposition and treatment plan and has no further questions or complaints at this time. [NO]      ED Course User Index  [NO] Ab Warner MD                           Clinical Impression:  Final diagnoses:  [R06.02] Shortness of breath  [J10.1] Influenza A (Primary)          ED Disposition Condition     Discharge Stable          ED Prescriptions       Medication Sig Dispense Start Date End Date Auth. Provider    acetaminophen (TYLENOL) 500 MG tablet Take 1 tablet (500 mg total) by mouth every 4 (four) hours as needed for Pain or Temperature greater than (100.5 or greater). 30 tablet 1/24/2025 -- Deondre Lund PA-C    benzonatate (TESSALON) 100 MG capsule Take 1 capsule (100 mg total) by mouth 3 (three) times daily as needed for Cough. 20 capsule 1/24/2025 2/3/2025 Deondre Lund PA-C    cetirizine (ZYRTEC) 10 MG tablet Take 1 tablet (10 mg total) by mouth once daily. 30 tablet 1/24/2025 1/24/2026 Deondre Lund PA-C    fluticasone propionate (FLONASE) 50 mcg/actuation nasal spray 1 spray (50 mcg total) by Each Nostril route 2 (two) times daily as needed for Rhinitis. 15 g 1/24/2025 -- Deondre Lund PA-C    phenoL (CHLORASEPTIC THROAT SPRAY) 1.4 % SprA by Mucous Membrane route every 2 (two) hours. 177 mL 1/24/2025 -- Deondre Lund PA-C    benzocaine-menthoL 15-3.6 mg Lozg 1 lozenge by Mucous Membrane route every 2 (two) hours as needed (sore throat). 16 lozenge 1/24/2025 -- Deondre Lund PA-C          Follow-up Information       Follow up With Specialties Details Why Contact Info    Veronika Rainey MD Family Medicine, Wound Care In 2 days for further evaluation [address] [facility] [address] [phone]      [facility] ED Emergency Medicine In 2 days If symptoms worsen [address] [facility] [id number] [phone]             Deondre Lund PA-C  01/24/25 2046

## 2025-01-25 NOTE — DISCHARGE INSTRUCTIONS

## 2025-01-26 LAB
OHS QRS DURATION: 92 MS
OHS QTC CALCULATION: 470 MS

## 2025-01-30 ENCOUNTER — OFFICE VISIT (OUTPATIENT)
Dept: CARDIOLOGY | Facility: CLINIC | Age: 40
End: 2025-01-30
Payer: MEDICARE

## 2025-01-30 VITALS
WEIGHT: 182.13 LBS | HEART RATE: 91 BPM | OXYGEN SATURATION: 99 % | HEIGHT: 71 IN | SYSTOLIC BLOOD PRESSURE: 108 MMHG | DIASTOLIC BLOOD PRESSURE: 72 MMHG | RESPIRATION RATE: 18 BRPM | BODY MASS INDEX: 25.5 KG/M2

## 2025-01-30 DIAGNOSIS — I50.42 CHRONIC COMBINED SYSTOLIC AND DIASTOLIC CONGESTIVE HEART FAILURE: ICD-10-CM

## 2025-01-30 PROCEDURE — G2211 COMPLEX E/M VISIT ADD ON: HCPCS | Mod: S$PBB,,, | Performed by: INTERNAL MEDICINE

## 2025-01-30 PROCEDURE — 99999 PR PBB SHADOW E&M-EST. PATIENT-LVL IV: CPT | Mod: PBBFAC,,, | Performed by: INTERNAL MEDICINE

## 2025-01-30 PROCEDURE — 99214 OFFICE O/P EST MOD 30 MIN: CPT | Mod: PBBFAC,PO | Performed by: INTERNAL MEDICINE

## 2025-01-30 PROCEDURE — 99214 OFFICE O/P EST MOD 30 MIN: CPT | Mod: S$PBB,,, | Performed by: INTERNAL MEDICINE

## 2025-01-30 RX ORDER — ISOSORBIDE MONONITRATE 60 MG/1
60 TABLET, EXTENDED RELEASE ORAL DAILY
Qty: 90 TABLET | Refills: 3 | Status: SHIPPED | OUTPATIENT
Start: 2025-01-30

## 2025-01-30 RX ORDER — SACUBITRIL AND VALSARTAN 97; 103 MG/1; MG/1
1 TABLET, FILM COATED ORAL 2 TIMES DAILY
Qty: 180 TABLET | Refills: 3 | Status: SHIPPED | OUTPATIENT
Start: 2025-01-30

## 2025-01-30 RX ORDER — HYDRALAZINE HYDROCHLORIDE 50 MG/1
50 TABLET, FILM COATED ORAL 2 TIMES DAILY
Qty: 180 TABLET | Refills: 3 | Status: SHIPPED | OUTPATIENT
Start: 2025-01-30 | End: 2026-01-30

## 2025-01-30 NOTE — PROGRESS NOTES
CARDIOVASCULAR PROGRESS NOTE    REASON FOR CONSULT:   Nasra Marks is a 39 y.o. female who presents for follow up of MINDY, MDT ICD.    PCP: Redd  Gyn: Labadie, Juan  CHF: Victorina  HISTORY OF PRESENT ILLNESS:   COVID+ 21    The patient returns for follow up.  She was recently diagnosed with the flu and appears to be getting over this.  She still has dyspnea without angina.  She denies palpitations, syncope, or defibrillator discharges.  There has been no PND, orthopnea, melena, hematuria, or claudication symptoms.  She denies any lower extremity edema.      She is having some issues paying for her Entresto and I have sent a new prescription for this to the [facility] pharmacy to see if patient assistance is available.  She is also having issues with the cost of the by deal and I plan to send the component prescriptions to the [facility] [place] [place] pharmacy as well.    CARDIOVASCULAR HISTORY:   NICM (cath 2017 with normal cors)  ICD (Donta 2017, MDT single chamber)    PAST MEDICAL HISTORY:     Past Medical History:   Diagnosis Date    CHF (congestive heart failure)     Chronic combined systolic and diastolic congestive heart failure 2019    Essential hypertension 2012    Hypertension     dx at 16y.o.    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 disease, with heart failure 2021    ICD (implantable cardioverter-defibrillator), single, in situ 2020    Nonischemic cardiomyopathy 2019    Pacemaker 2017       PAST SURGICAL HISTORY:     Past Surgical History:   Procedure Laterality Date    CARDIAC DEFIBRILLATOR PLACEMENT  2017     SECTION      x 1    INDUCED       RIGHT HEART CATHETERIZATION Right 2022    Procedure: INSERTION, CATHETER, RIGHT HEART;  Surgeon: Timothy Prajapati Jr., MD;  Location: [facility] CATH LAB;  Service: Cardiology;  Laterality: Right;    RIGHT HEART CATHETERIZATION Right 2024    Procedure:  INSERTION, CATHETER, RIGHT HEART;  Surgeon: Lior Rueda MD;  Location: [facility] CATH LAB;  Service: Cardiology;  Laterality: Right;    TUBAL LIGATION         ALLERGIES AND MEDICATION:     Review of patient's allergies indicates:   Allergen Reactions    Aldactone [spironolactone] Swelling     Lips swelled    Hydralazine analogues Other (See Comments)     headaches    Isosorbide Other (See Comments)     headaches        Medication List            Accurate as of January 30, 2025  1:04 PM. If you have any questions, ask your nurse or doctor.                CONTINUE taking these medications      * acetaminophen 500 MG tablet  Commonly known as: TYLENOL  Take 1 tablet (500 mg total) by mouth every 6 (six) hours as needed.     * acetaminophen 500 MG tablet  Commonly known as: TYLENOL  Take 1 tablet (500 mg total) by mouth every 4 (four) hours as needed for Pain or Temperature greater than (100.5 or greater).     albuterol 90 mcg/actuation inhaler  Commonly known as: PROVENTIL/VENTOLIN HFA  Inhale 1-2 puffs into the lungs every 6 (six) hours as needed. Rescue     amLODIPine 5 MG tablet  Commonly known as: NORVASC     aspirin 81 MG EC tablet  Commonly known as: ECOTRIN  Take 1 tablet (81 mg total) by mouth once daily.     atorvastatin 40 MG tablet  Commonly known as: LIPITOR  Take 1 tablet (40 mg total) by mouth every evening.     benzocaine-menthoL 15-3.6 mg Lozg  1 lozenge by Mucous Membrane route every 2 (two) hours as needed (sore throat).     benzonatate 100 MG capsule  Commonly known as: TESSALON  Take 1 capsule (100 mg total) by mouth 3 (three) times daily as needed for Cough.     * cetirizine 10 MG tablet  Commonly known as: ZYRTEC  Take 1 tablet (10 mg total) by mouth once daily.     * cetirizine 10 MG tablet  Commonly known as: ZYRTEC  Take 1 tablet (10 mg total) by mouth once daily.     CHLORASEPTIC THROAT SPRAY 1.4 % Spra  Generic drug: phenoL  by Mucous Membrane route every 2 (two) hours.     docusate  sodium 100 MG capsule  Commonly known as: COLACE  Take 1 capsule (100 mg total) by mouth 2 (two) times daily.     ENTRESTO  mg per tablet  Generic drug: sacubitriL-valsartan  Take 1 tablet by mouth 2 (two) times daily.     eplerenone 25 MG Tab  Commonly known as: INSPRA  Take 1 tablet (25 mg total) by mouth once daily.     FARXIGA 10 mg tablet  Generic drug: dapagliflozin propanediol  Take 1 tablet by mouth once daily     FLUoxetine 20 MG capsule  Take 1 capsule (20 mg total) by mouth once daily.     fluticasone propionate 50 mcg/actuation nasal spray  Commonly known as: FLONASE  1 spray (50 mcg total) by Each Nostril route 2 (two) times daily as needed for Rhinitis.     furosemide 40 MG tablet  Commonly known as: LASIX  Take 1 tablet (40 mg total) by mouth once daily.     gabapentin 300 MG capsule  Commonly known as: NEURONTIN  Take 2 capsules (600 mg total) by mouth 3 (three) times daily.     isosorbide-hydrALAZINE 20-37.5 mg 20-37.5 mg Tab  Commonly known as: BIDIL  Take 1 tablet by mouth 3 (three) times daily.     LIDOcaine 5 %  Commonly known as: LIDODERM  Place 1 patch onto the skin once daily. Remove & Discard patch within 12 hours or as directed by MD     metoprolol succinate 200 MG 24 hr tablet  Commonly known as: TOPROL-XL  Take 1 tablet (200 mg total) by mouth once daily.     potassium chloride SA 20 MEQ tablet  Commonly known as: K-DUR,KLOR-CON  Take 2 tablets (40 mEq total) by mouth 2 (two) times daily.           * This list has 4 medication(s) that are the same as other medications prescribed for you. Read the directions carefully, and ask your doctor or other care provider to review them with you.                  SOCIAL HISTORY:     Social History     Socioeconomic History    Marital status:    Occupational History     Employer: [employer]   Tobacco Use    Smoking status: Never    Smokeless tobacco: Never   Substance and Sexual Activity    Alcohol use: Not Currently     Comment:  occasionally    Drug use: Not Currently     Types: Marijuana     Comment: in past very little marijuana    Sexual activity: Yes     Partners: Male     Birth control/protection: None     Social Drivers of Health     Financial Resource Strain: Patient Declined (1/6/2025)    Overall Financial Resource Strain (CARDIA)     Difficulty of Paying Living Expenses: Patient declined   Food Insecurity: Patient Declined (1/6/2025)    Hunger Vital Sign     Worried About Running Out of Food in the Last Year: Patient declined     Ran Out of Food in the Last Year: Patient declined   Transportation Needs: No Transportation Needs (8/28/2024)    TRANSPORTATION NEEDS     Transportation : No   Physical Activity: Inactive (1/6/2025)    Exercise Vital Sign     Days of Exercise per Week: 0 days     Minutes of Exercise per Session: 10 min   Stress: Patient Declined (1/6/2025)    [employer] - Occupational Stress Questionnaire     Feeling of Stress : Patient declined   Housing Stability: Unknown (1/6/2025)    Housing Stability Vital Sign     Unable to Pay for Housing in the Last Year: Patient declined     Homeless in the Last Year: No       FAMILY HISTORY:     Family History   Problem Relation Name Age of Onset    Hypertension Mother      Cancer Maternal Grandmother          breast x 2    Cancer Paternal Grandmother          breast    No Known Problems Sister      No Known Problems Brother      No Known Problems Son      No Known Problems Brother      Hypertension Other      Diabetes Other      Breast cancer Other      Cancer Paternal Aunt          breast    Cancer Paternal Uncle         REVIEW OF SYSTEMS:   Review of Systems   Constitutional:  Negative for chills, diaphoresis, fever and malaise/fatigue.   HENT:  Negative for nosebleeds.    Eyes:  Negative for blurred vision, double vision and photophobia.   Respiratory:  Positive for shortness of breath. Negative for hemoptysis and wheezing.    Cardiovascular:   "Negative for chest pain, palpitations, orthopnea, claudication, leg swelling and PND.   Gastrointestinal:  Negative for abdominal pain, blood in stool, heartburn, melena, nausea and vomiting.   Genitourinary:  Negative for flank pain and hematuria.   Musculoskeletal:  Negative for falls, myalgias and neck pain.   Skin:  Negative for rash.   Neurological:  Negative for dizziness, seizures, loss of consciousness, weakness and headaches.   Endo/Heme/Allergies:  Negative for polydipsia. Does not bruise/bleed easily.   Psychiatric/Behavioral:  Negative for depression and memory loss. The patient is not nervous/anxious.        PHYSICAL EXAM:     Vitals:    01/30/25 1303   BP: 108/72   Pulse: 91   Resp: 18      Body mass index is 25.4 kg/m².  Weight: 82.6 kg (182 lb 1.6 oz)   Height: 5' 11" (180.3 cm)     Physical Exam  Vitals reviewed.   Constitutional:       General: She is not in acute distress.     Appearance: Normal appearance. She is well-developed. She is not ill-appearing, toxic-appearing or diaphoretic.   HENT:      Head: Normocephalic and atraumatic.   Eyes:      General: No scleral icterus.     Extraocular Movements: Extraocular movements intact.      Conjunctiva/sclera: Conjunctivae normal.      Pupils: Pupils are equal, round, and reactive to light.   Neck:      Thyroid: No thyromegaly.      Vascular: Normal carotid pulses. No carotid bruit or JVD.      Trachea: Trachea normal. No tracheal deviation.   Cardiovascular:      Rate and Rhythm: Normal rate and regular rhythm.      Pulses:           Carotid pulses are 2+ on the right side and 2+ on the left side.     Heart sounds: S1 normal and S2 normal. No murmur heard.     No friction rub. No gallop.      Comments: L infraclavicular ICD site well healed  Pulmonary:      Effort: Pulmonary effort is normal. No respiratory distress.      Breath sounds: Normal breath sounds. No stridor. No wheezing, rhonchi or rales.   Chest:      Chest wall: No tenderness. "   Abdominal:      General: There is no distension.      Palpations: Abdomen is soft.   Musculoskeletal:         General: No swelling or tenderness. Normal range of motion.      Cervical back: Normal range of motion and neck supple. No edema or rigidity.      Right lower leg: No edema.      Left lower leg: No edema.   Feet:      Right foot:      Skin integrity: No ulcer.      Left foot:      Skin integrity: No ulcer.   Skin:     General: Skin is warm and dry.      Coloration: Skin is not jaundiced.   Neurological:      General: No focal deficit present.      Mental Status: She is alert and oriented to person, place, and time.      Cranial Nerves: No cranial nerve deficit.   Psychiatric:         Mood and Affect: Mood normal.         Speech: Speech normal.         Behavior: Behavior normal. Behavior is cooperative.         DATA:   EKG: (personally reviewed tracing(s))  1/24/25 , PVCs, NSSTTW changes    Laboratory:  CBC:  Recent Labs   Lab 08/27/24  1312 08/28/24  1019 12/28/24  1948   WBC 9.14 7.61 7.04   Hemoglobin 11.8 L 11.8 L 12.3   Hematocrit 35.8 L 35.9 L 37.8   Platelets 341 328 365       CHEMISTRIES:  Recent Labs   Lab 02/25/22  1035 08/26/22  2043 04/25/24 2014 05/24/24  1526 08/27/24  1312 08/28/24  1019 08/29/24  0939 12/28/24  1948   Glucose 78   < >  --    < > 88 96 109 81   Sodium 139   < >  --    < > 135 L 135 L 136 140   Potassium 3.5   < >  --    < > 3.7 3.4 L 4.1 3.0 L   BUN 10   < >  --    < > 10 8 11 11   Creatinine 0.8   < >  --    < > 0.8 0.7 0.8 0.9   eGFR if African American >60.0  --   --   --   --   --   --   --    eGFR if non  >60.0  --   --   --   --   --   --   --    Calcium 9.1   < >  --    < > 8.7 9.2 9.3 9.6   Magnesium  --    < > 1.9  --  1.9 2.0  --   --     < > = values in this interval not displayed.       CARDIAC BIOMARKERS:  Recent Labs   Lab 05/24/24  1526 05/24/24  1830 12/28/24  1948   Troponin I <0.006 <0.006 0.022       COAGS:  Recent Labs   Lab  10/23/23  2218 08/27/24  1249 08/27/24  1312   INR 1.1 1.3 H 1.0       LIPIDS/LFTS:  Recent Labs   Lab 07/11/23  0836 01/22/24  2320 02/16/24  0946 04/25/24  1818 05/24/24  1526 08/27/24  1312 12/28/24  1948   Cholesterol 134  --  91 L  --   --  129  --    Triglycerides 39  --  23 L  --   --  70  --    HDL 40  --  46  --   --  37 L  --    LDL Cholesterol 86.2  --  40.4 L  --   --  78.0  --    Non-HDL Cholesterol 94  --  45  --   --  92  --    AST 20   < > 19   < > 15 23 21   ALT 15   < > 14   < > 10 13 20    < > = values in this interval not displayed.     Lab Results   Component Value Date    TSH 3.133 08/27/2024      (1/24/25)    Cardiovascular Testing:  RHC 8/27/24    The estimated blood loss was none.    The filling pressures on the right and left were mildly elevated.    RA 10  PA 31/15 (20)  PCWP 15    CO 5.7 l/min  CI 2.8 l/min/m2    TD cardiac output 5.2 l/min cardiac index 2.6 l/min/m2.    Condition: no inotropes or pressors.    Echo 8/9/24    Left Ventricle: The left ventricle is moderately dilated. Normal wall thickness. Moderate global hypokinesis present. There is moderately reduced systolic function with a visually estimated ejection fraction of 30 - 35%. Grade II diastolic dysfunction. Normal left ventricular filling pressure.    Right Ventricle: Normal right ventricular cavity size. Wall thickness is normal. Right ventricle wall motion  is normal. Systolic function is normal.    Left Atrium: Normal left atrial size.    Right Atrium: Normal right atrial size.    Aortic Valve: The aortic valve is a trileaflet valve. There is mild aortic valve sclerosis. There is mild annular calcification present.    Mitral Valve: There is mild bileaflet sclerosis. There is mild regurgitation.    Aorta: Aortic root is normal in size measuring 2.81 cm. Ascending aorta is normal measuring 2.96 cm.    Pulmonary Artery: The estimated pulmonary artery systolic pressure is 19 mmHg.    IVC/SVC: Normal venous pressure  at 3 mmHg.    Carotid US 1/20/23  There is 0-19% right Internal Carotid Stenosis.  There is 0-19% left Internal Carotid Stenosis.    Cath 4/18/17  B. Summary/Post-Operative Diagnosis    Normal coronary arteries.    Systolic dysfunction.    Mildly elevated left Filling Pressures.    Normal Pulmonary Hypertension.  D. Hemodynamic Results  Ejection Fraction: 20%  Global LV Function: severely depressed  PW:  (4)  PA: 24  CO_THERM: 4.4  RV: 25  RA:  (5)  LVEDP: 17   E. Angiographic Results       Patient has a right dominant coronary artery.      - Left Main Coronary Artery:             The LM is normal. There is DANNY 3 flow.     - Left Anterior Descending Artery:             The LAD is normal. There is DANNY 3 flow.     - Left Circumflex Artery:             The LCX is normal. There is DANNY 3 flow.     - Right Coronary Artery:             The RCA is normal. There is DANNY 3 flow.     - Left Renal Artery:             The ostial left renal is normal.     - Right Renal Artery:             The ostial right renal is normal.     - Common Femoral Artery:             The right CFA is normal.      ASSESSMENT:   # NICM, appears euvolemic.  Following with Dr. Prajapati.  RHC 8/2024 as above with ?conversion d/o thereafter (CVA type sxs).  # MDT ICD, functioning normally  # hx NSVT, last in 8/12/21  # HTN, controlled  # HLP on atorva 40mg  # remote CVA (PICA territory on CT head 11/25/22).  Carotid US 1/20/23 neg.    PLAN:   Cont med rx  Cont ASA 81mg qd  Cont GDMT (entresto/farxiga/eplerenone/toprol).  New rx sent to [facility] pharmacy for cost assistance.  RTC 3 months with MDT ICD check (Apr 2025)    The above documents medical care services that are part of ongoing care related to this patient's serious/complex condition (Code ) (NICM, HTN, HLP).      Kashif Peguero MD, FACC

## 2025-02-11 ENCOUNTER — TELEPHONE (OUTPATIENT)
Dept: CARDIOLOGY | Facility: HOSPITAL | Age: 40
End: 2025-02-11
Payer: MEDICARE

## 2025-02-11 ENCOUNTER — PATIENT MESSAGE (OUTPATIENT)
Dept: CARDIOLOGY | Facility: HOSPITAL | Age: 40
End: 2025-02-11
Payer: MEDICARE

## 2025-02-11 NOTE — TELEPHONE ENCOUNTER
Called and s/w pt regarding disconnected home monitor. Pt states she has the monitor and will reconnect it.

## 2025-02-14 ENCOUNTER — RESEARCH ENCOUNTER (OUTPATIENT)
Dept: RESEARCH | Facility: HOSPITAL | Age: 40
End: 2025-02-14
Payer: MEDICARE

## 2025-02-14 NOTE — PROGRESS NOTES
Sponsor: [employer]     Study Title/IRB Number: The EMPOWER Trial: Assessment of the Carillon Mitral Contour System in Treating Heart Failure with Functional Mitral Regurgitation. IRB: 2018.171     Principle Investigator: Dr. Jarvis Del Cid    I called Mrs. Marks to introduce the EMPOWER clinical trial to her. We had a brief conversation about the details of the trial and she expressed some interest. She wanted to discuss the trial with her  and asked for me to call her back next Thursday to follow up.

## 2025-02-16 ENCOUNTER — HOSPITAL ENCOUNTER (EMERGENCY)
Facility: HOSPITAL | Age: 40
Discharge: HOME | End: 2025-02-16
Attending: EMERGENCY MEDICINE
Payer: MEDICARE

## 2025-02-16 VITALS
SYSTOLIC BLOOD PRESSURE: 126 MMHG | TEMPERATURE: 99 F | HEIGHT: 69 IN | WEIGHT: 185 LBS | DIASTOLIC BLOOD PRESSURE: 78 MMHG | RESPIRATION RATE: 16 BRPM | OXYGEN SATURATION: 100 % | HEART RATE: 74 BPM | BODY MASS INDEX: 27.4 KG/M2

## 2025-02-16 DIAGNOSIS — R79.89 ELEVATED BRAIN NATRIURETIC PEPTIDE (BNP) LEVEL: Primary | ICD-10-CM

## 2025-02-16 DIAGNOSIS — R06.02 SOB (SHORTNESS OF BREATH): ICD-10-CM

## 2025-02-16 DIAGNOSIS — J06.9 VIRAL URI: ICD-10-CM

## 2025-02-16 DIAGNOSIS — R05.9 COUGH: ICD-10-CM

## 2025-02-16 LAB
ALBUMIN SERPL BCP-MCNC: 3.9 G/DL (ref 3.5–5.2)
ALP SERPL-CCNC: 67 U/L (ref 40–150)
ALT SERPL W/O P-5'-P-CCNC: 33 U/L (ref 10–44)
ANION GAP SERPL CALC-SCNC: 11 MMOL/L (ref 8–16)
AST SERPL-CCNC: 29 U/L (ref 10–40)
BACTERIA #/AREA URNS HPF: ABNORMAL /HPF
BASOPHILS # BLD AUTO: 0.04 K/UL (ref 0–0.2)
BASOPHILS NFR BLD: 0.5 % (ref 0–1.9)
BILIRUB SERPL-MCNC: 0.7 MG/DL (ref 0.1–1)
BILIRUB UR QL STRIP: NEGATIVE
BNP SERPL-MCNC: 1285 PG/ML (ref 0–99)
BUN SERPL-MCNC: 8 MG/DL (ref 6–20)
CALCIUM SERPL-MCNC: 9 MG/DL (ref 8.7–10.5)
CHLORIDE SERPL-SCNC: 111 MMOL/L (ref 95–110)
CLARITY UR: ABNORMAL
CO2 SERPL-SCNC: 18 MMOL/L (ref 23–29)
COLOR UR: YELLOW
CREAT SERPL-MCNC: 0.9 MG/DL (ref 0.5–1.4)
CTP QC/QA: YES
CTP QC/QA: YES
DIFFERENTIAL METHOD BLD: ABNORMAL
EOSINOPHIL # BLD AUTO: 0.1 K/UL (ref 0–0.5)
EOSINOPHIL NFR BLD: 1.1 % (ref 0–8)
ERYTHROCYTE [DISTWIDTH] IN BLOOD BY AUTOMATED COUNT: 14.3 % (ref 11.5–14.5)
EST. GFR  (NO RACE VARIABLE): >60 ML/MIN/1.73 M^2
GLUCOSE SERPL-MCNC: 83 MG/DL (ref 70–110)
GLUCOSE UR QL STRIP: NEGATIVE
GRAN CASTS #/AREA URNS LPF: 1 /LPF
HCT VFR BLD AUTO: 36.7 % (ref 37–48.5)
HGB BLD-MCNC: 11.7 G/DL (ref 12–16)
HGB UR QL STRIP: ABNORMAL
HYALINE CASTS #/AREA URNS LPF: 15 /LPF
IMM GRANULOCYTES # BLD AUTO: 0.02 K/UL (ref 0–0.04)
IMM GRANULOCYTES NFR BLD AUTO: 0.2 % (ref 0–0.5)
KETONES UR QL STRIP: NEGATIVE
LEUKOCYTE ESTERASE UR QL STRIP: ABNORMAL
LYMPHOCYTES # BLD AUTO: 1.4 K/UL (ref 1–4.8)
LYMPHOCYTES NFR BLD: 15.9 % (ref 18–48)
MCH RBC QN AUTO: 27.7 PG (ref 27–31)
MCHC RBC AUTO-ENTMCNC: 31.9 G/DL (ref 32–36)
MCV RBC AUTO: 87 FL (ref 82–98)
MICROSCOPIC COMMENT: ABNORMAL
MONOCYTES # BLD AUTO: 0.8 K/UL (ref 0.3–1)
MONOCYTES NFR BLD: 8.9 % (ref 4–15)
NEUTROPHILS # BLD AUTO: 6.3 K/UL (ref 1.8–7.7)
NEUTROPHILS NFR BLD: 73.4 % (ref 38–73)
NITRITE UR QL STRIP: NEGATIVE
NRBC BLD-RTO: 0 /100 WBC
PH UR STRIP: 7 [PH] (ref 5–8)
PLATELET # BLD AUTO: 359 K/UL (ref 150–450)
PMV BLD AUTO: 10.6 FL (ref 9.2–12.9)
POC MOLECULAR INFLUENZA A AGN: NEGATIVE
POC MOLECULAR INFLUENZA B AGN: NEGATIVE
POTASSIUM SERPL-SCNC: 3.6 MMOL/L (ref 3.5–5.1)
PROT SERPL-MCNC: 7.9 G/DL (ref 6–8.4)
PROT UR QL STRIP: ABNORMAL
RBC # BLD AUTO: 4.23 M/UL (ref 4–5.4)
RBC #/AREA URNS HPF: 13 /HPF (ref 0–4)
SARS-COV-2 RDRP RESP QL NAA+PROBE: NEGATIVE
SODIUM SERPL-SCNC: 140 MMOL/L (ref 136–145)
SP GR UR STRIP: 1.02 (ref 1–1.03)
SQUAMOUS #/AREA URNS HPF: 5 /HPF
TROPONIN I SERPL DL<=0.01 NG/ML-MCNC: 0.01 NG/ML (ref 0–0.03)
TROPONIN I SERPL DL<=0.01 NG/ML-MCNC: 0.01 NG/ML (ref 0–0.03)
URN SPEC COLLECT METH UR: ABNORMAL
UROBILINOGEN UR STRIP-ACNC: ABNORMAL EU/DL
WBC # BLD AUTO: 8.55 K/UL (ref 3.9–12.7)
WBC #/AREA URNS HPF: 1 /HPF (ref 0–5)

## 2025-02-16 PROCEDURE — 85025 COMPLETE CBC W/AUTO DIFF WBC: CPT

## 2025-02-16 PROCEDURE — 83880 ASSAY OF NATRIURETIC PEPTIDE: CPT

## 2025-02-16 PROCEDURE — 25000003 PHARM REV CODE 250

## 2025-02-16 PROCEDURE — 99285 EMERGENCY DEPT VISIT HI MDM: CPT | Mod: 25

## 2025-02-16 PROCEDURE — 84484 ASSAY OF TROPONIN QUANT: CPT | Mod: 91

## 2025-02-16 PROCEDURE — 80053 COMPREHEN METABOLIC PANEL: CPT

## 2025-02-16 PROCEDURE — 87635 SARS-COV-2 COVID-19 AMP PRB: CPT

## 2025-02-16 PROCEDURE — 93005 ELECTROCARDIOGRAM TRACING: CPT

## 2025-02-16 PROCEDURE — 81000 URINALYSIS NONAUTO W/SCOPE: CPT

## 2025-02-16 PROCEDURE — 96374 THER/PROPH/DIAG INJ IV PUSH: CPT

## 2025-02-16 PROCEDURE — 63600175 PHARM REV CODE 636 W HCPCS

## 2025-02-16 PROCEDURE — 87502 INFLUENZA DNA AMP PROBE: CPT

## 2025-02-16 RX ORDER — CETIRIZINE HYDROCHLORIDE 10 MG/1
10 TABLET ORAL DAILY
Qty: 30 TABLET | Refills: 0 | Status: SHIPPED | OUTPATIENT
Start: 2025-02-16 | End: 2026-02-16

## 2025-02-16 RX ORDER — FUROSEMIDE 10 MG/ML
40 INJECTION INTRAMUSCULAR; INTRAVENOUS
Status: COMPLETED | OUTPATIENT
Start: 2025-02-16 | End: 2025-02-16

## 2025-02-16 RX ORDER — ACETAMINOPHEN 500 MG
500 TABLET ORAL EVERY 6 HOURS PRN
Qty: 30 TABLET | Refills: 0 | Status: SHIPPED | OUTPATIENT
Start: 2025-02-16

## 2025-02-16 RX ORDER — ACETAMINOPHEN 500 MG
1000 TABLET ORAL
Status: COMPLETED | OUTPATIENT
Start: 2025-02-16 | End: 2025-02-16

## 2025-02-16 RX ADMIN — ACETAMINOPHEN 1000 MG: 500 TABLET ORAL at 01:02

## 2025-02-16 RX ADMIN — FUROSEMIDE 40 MG: 10 INJECTION, SOLUTION INTRAVENOUS at 01:02

## 2025-02-16 NOTE — DISCHARGE INSTRUCTIONS
Thank you for coming to our Emergency Department today. It is important to remember that some problems or medical conditions are difficult to diagnose and may not be found or addressed during your Emergency Department visit.  These conditions often start with non-specific symptoms and can only be diagnosed on follow up visits with your primary care physician or specialist when the symptoms continue or change. Please remember that all medical conditions can change, and we cannot predict how you will be feeling tomorrow or the next day. Return to the ER with any questions/concerns, new/concerning symptoms, worsening or failure to improve.       Be sure to follow up with your primary care doctor and review all labs/imaging/tests that were performed during your ER visit with them. It is very common for us to identify non-emergent incidental findings which must be followed up with your primary care physician.  Some labs/imaging/tests may be outside of the normal range, and require non-emergent follow-up and/or further investigation/treatment/procedures/testing to help diagnose/exclude/prevent complications or other potentially serious medical conditions. Some abnormalities may not have been discussed or addressed during your ER visit.     An ER visit does not replace a primary care visit, and many screening tests or follow-up tests cannot be ordered by an ER doctor or performed by the ER. Some tests may even require pre-approval.    If you do not have a primary care doctor, you may contact the one listed on your discharge paperwork or you may also call the [facility] Appointment Desk at [phone] , or [address] [place] [address] at  [phone] to schedule an appointment, or establish care with a primary care doctor or even a specialist and to obtain information about local resources. It is important to your health that you have a primary care doctor.    Please take all medications as directed. We have done our best to select  a medication for you that will treat your condition however, all medications may potentially have side-effects and it is impossible to predict which medications may give you side-effects or what those side-effects (if any) those medications may give you.  If you feel that you are having a negative effect or side-effect of any medication you should stop taking those medications immediately and seek medical attention. If you feel that you are having a life-threatening reaction call 911.        Do not drive, swim, climb to height, take a bath, operate heavy machinery, drink alcohol or take potentially sedating medications, sign any legal documents or make any important decisions for 24 hours if you have received any pain medications, sedatives or mood altering drugs during your ER visit or within 24 hours of taking them if they have been prescribed to you.     You can find additional resources for Dentists, hearing aids, durable medical equipment, low cost pharmacies and other resources at https://[employer].org

## 2025-02-16 NOTE — PROVIDER PROGRESS NOTES - EMERGENCY DEPT.
Encounter Date: 2/16/2025    ED Physician Progress Notes        Physician Note:   I assumed care from Rhona Kolb PA-C for this patient.  Upon evaluation patient resting calmly.  Patient without complaints at this time.  Repeat troponin within normal limits. I discussed with patient's symptoms most likely due to a viral URI.  Patient with no complaints at this time, and states she is ready to go home.  Lungs are clear to auscultation.  Normal heart rate and rhythm.  No abdominal tenderness present.  Recommend patient follow up with PCP in 2 days.  Discussed symptomatic care for viral URI.  Return precautions given for new or worsening symptoms.  Patient is in agreeance to this plan, expresses understanding return precautions and follow up plan.  I thoroughly answered all patient's questions and concerns.  Vitals are reassuring.  Patient is stable for discharge at this time.

## 2025-02-16 NOTE — ED PROVIDER NOTES
"Encounter Date: 2/16/2025    SCRIBE #1 NOTE: IDelia, am scribing for, and in the presence of,  Rhona Kolb PA-C. I have scribed the following portions of the note - Other sections scribed: HPI, ROS, PE.       History     Chief Complaint   Patient presents with    Multiple complaints     Pt reports headache, right ear pain, left jaw pain, nausea, fever, sob that started today about 1 hour pta.  Denies body aches, chills, sweating.     Nasra Marks is a 39 y.o. female, with a PMHx of HTN, CHF (30-35% per last TTE,)  ICD in place, hypertensive cardiovascular-renal disease, nonischemic cardiomyopathy, who presents to the ED with multiple complaints. Patient reports she had the flu a few weeks ago and felt better up until three days ago when she felt a numbness in her left leg that resolved. She reports a headache, associated bilateral ear pain, facial and jaw pain, L>R that feels similar to sinus pain. Reports she hasn't seen a dentist in a while. Notes it's usually her abdomen that has fluid overload, feels "full" now, also nauseated and has been retching and spitting up clear mucus. She also reports dyspnea with exertion. Patient notes diet change, making efforts to lower sodium intake, limit soda consumption, and denies eating any high-salt meals recently.  Denies missing any dosages of her Lasix 40 mg or other daily medications. No other exacerbating or alleviating factors.  Denies chest pain, chest tightness, LE swelling, cough, fever, chills, changes in speech, hearing, vision, urinary symptoms, constipation, diarrhea, melena, hematochezia, urinary symptoms, unexpected weight gain, decreased appetite, or other associated symptoms.     The history is provided by the patient. No  was used.     Review of patient's allergies indicates:   Allergen Reactions    Aldactone [spironolactone] Swelling     Lips swelled    Hydralazine analogues Other (See Comments)     headaches    " Isosorbide Other (See Comments)     headaches     Past Medical History:   Diagnosis Date    CHF (congestive heart failure)     Chronic combined systolic and diastolic congestive heart failure 2019    Essential hypertension 2012    Hypertension     dx at 16y.o.    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 disease, with heart failure 2021    ICD (implantable cardioverter-defibrillator), single, in situ 2020    Nonischemic cardiomyopathy 2019    Pacemaker 2017     Past Surgical History:   Procedure Laterality Date    CARDIAC DEFIBRILLATOR PLACEMENT  2017     SECTION      x 1    INDUCED       RIGHT HEART CATHETERIZATION Right 2022    Procedure: INSERTION, CATHETER, RIGHT HEART;  Surgeon: Timothy Prajapati Jr., MD;  Location: [facility] CATH LAB;  Service: Cardiology;  Laterality: Right;    RIGHT HEART CATHETERIZATION Right 2024    Procedure: INSERTION, CATHETER, RIGHT HEART;  Surgeon: Lior Rueda MD;  Location: [facility] CATH LAB;  Service: Cardiology;  Laterality: Right;    TUBAL LIGATION       Family History   Problem Relation Name Age of Onset    Hypertension Mother      Cancer Maternal Grandmother          breast x 2    Cancer Paternal Grandmother          breast    No Known Problems Sister      No Known Problems Brother      No Known Problems Son      No Known Problems Brother      Hypertension Other      Diabetes Other      Breast cancer Other      Cancer Paternal Aunt          breast    Cancer Paternal Uncle       Social History[1]  Review of Systems   Constitutional:  Negative for appetite change, chills, fever and unexpected weight change.   HENT:  Positive for ear pain (bilateral). Negative for congestion, hearing loss and sore throat.         (+) facial pain R > L, jaw pain   Eyes:  Negative for visual disturbance.   Respiratory:  Positive for shortness of breath (with exertion). Negative for cough and  "chest tightness.    Cardiovascular:  Negative for chest pain and leg swelling.   Gastrointestinal:  Positive for nausea and vomiting (retching, clear mucus). Negative for abdominal pain, blood in stool, constipation and diarrhea.        (+) fluid retention, abdominal "fullness"      Genitourinary:  Negative for decreased urine volume, difficulty urinating, dysuria, enuresis, frequency, urgency and vaginal discharge.   Skin:  Negative for rash.   Neurological:  Positive for numbness (Left leg (resolved)) and headaches. Negative for speech difficulty.   Psychiatric/Behavioral:  Negative for decreased concentration.        Physical Exam     Initial Vitals [02/16/25 1102]   BP Pulse Resp Temp SpO2   113/79 79 16 98.7 °F (37.1 °C) 98 %      MAP       --         Physical Exam    Nursing note and vitals reviewed.  Constitutional: She appears well-developed and well-nourished. She is not diaphoretic. No distress.   HENT:   Head: Normocephalic and atraumatic.   Right Ear: External ear normal.   Left Ear: External ear normal.   Right ear has serous effusion. Normal dentition.  Posterior oropharynx clear and moist without erythema, edema, or exudates.    Eyes: Conjunctivae and EOM are normal.   Neck: No tracheal deviation present. No JVD present.   Normal range of motion.  Cardiovascular:            Regular rate and rhythm, no murmurs. Heart sounds normal.  No lower extremity swelling.    Pulmonary/Chest: No stridor. No respiratory distress.   Breath sounds normal.    Abdominal:   No abdominal tenderness.    Musculoskeletal:      Cervical back: Normal range of motion.     Neurological: She is alert and oriented to person, place, and time.   Skin: No rash noted. No erythema.   Psychiatric: She has a normal mood and affect.         ED Course   Procedures  Labs Reviewed   CBC W/ AUTO DIFFERENTIAL - Abnormal       Result Value    WBC 8.55      RBC 4.23      Hemoglobin 11.7 (*)     Hematocrit 36.7 (*)     MCV 87      MCH 27.7      " MCHC 31.9 (*)     RDW 14.3      Platelets 359      MPV 10.6      Immature Granulocytes 0.2      Gran # (ANC) 6.3      Immature Grans (Abs) 0.02      Lymph # 1.4      Mono # 0.8      Eos # 0.1      Baso # 0.04      nRBC 0      Gran % 73.4 (*)     Lymph % 15.9 (*)     Mono % 8.9      Eosinophil % 1.1      Basophil % 0.5      Differential Method Automated     COMPREHENSIVE METABOLIC PANEL - Abnormal    Sodium 140      Potassium 3.6      Chloride 111 (*)     CO2 18 (*)     Glucose 83      BUN 8      Creatinine 0.9      Calcium 9.0      Total Protein 7.9      Albumin 3.9      Total Bilirubin 0.7      Alkaline Phosphatase 67      AST 29      ALT 33      eGFR >60      Anion Gap 11     B-TYPE NATRIURETIC PEPTIDE - Abnormal    BNP 1,285 (*)    URINALYSIS, REFLEX TO URINE CULTURE - Abnormal    Specimen UA Urine, Clean Catch      Color, UA Yellow      Appearance, UA Hazy (*)     pH, UA 7.0      Specific Gravity, UA 1.020      Protein, UA 1+ (*)     Glucose, UA Negative      Ketones, UA Negative      Bilirubin (UA) Negative      Occult Blood UA 2+ (*)     Nitrite, UA Negative      Urobilinogen, UA 2.0-3.0 (*)     Leukocytes, UA Trace (*)     Narrative:     Specimen Source->Urine   URINALYSIS MICROSCOPIC - Abnormal    RBC, UA 13 (*)     WBC, UA 1      Bacteria Moderate (*)     Squam Epithel, UA 5      Hyaline Casts, UA 15 (*)     Granular Casts, UA 1 (*)     Microscopic Comment SEE COMMENT      Narrative:     Specimen Source->Urine   TROPONIN I    Troponin I 0.009     TROPONIN I   SARS-COV-2 RDRP GENE    POC Rapid COVID Negative       Acceptable Yes     POCT INFLUENZA A/B MOLECULAR    POC Molecular Influenza A Ag Negative      POC Molecular Influenza B Ag Negative       Acceptable Yes     POCT URINE PREGNANCY          Imaging Results              X-Ray Chest AP Portable (Final result)  Result time 02/16/25 11:44:30      Final result by Cale Grewal DO (02/16/25 11:44:30)                    "Impression:      No acute cardiopulmonary process.      Electronically signed by: Cale Grewal DO  Date:    02/16/2025  Time:    11:44               Narrative:    EXAMINATION:  XR CHEST AP PORTABLE    CLINICAL HISTORY:  Cough, unspecified    TECHNIQUE:  Single frontal view of the chest was performed.    COMPARISON:  01/24/2025    FINDINGS:  No infiltrates or pleural effusions are identified.  The heart is prominent in size.  A single lead pacing device is present..                                       Medications   furosemide injection 40 mg (40 mg Intravenous Given 2/16/25 1347)   acetaminophen tablet 1,000 mg (1,000 mg Oral Given 2/16/25 1346)     Medical Decision Making  Nasra Marks is a 39 y.o. female, with a PMHx of HTN, CHF (30-35% per last TTE,)  ICD in place, hypertensive cardiovascular-renal disease, nonischemic cardiomyopathy, who presents to the ED with multiple complaints. Patient reports she had the flu a few weeks ago and felt better up until three days ago when she felt a numbness in her left leg that resolved. She reports a headache, associated bilateral ear pain, facial and jaw pain, L>R that feels similar to sinus pain. Reports she hasn't seen a dentist in a while. Notes it's usually her abdomen that has fluid overload, feels "full" now, also nauseated and has been retching and spitting up clear mucus. She also reports dyspnea with exertion. Patient notes diet change, making efforts to lower sodium intake, limit soda consumption, and denies eating any high-salt meals recently.  Denies missing any dosages of her Lasix 40 mg or other daily medications. No other exacerbating or alleviating factors.  Denies chest pain, chest tightness, LE swelling, cough, fever, chills, changes in speech, hearing, vision, urinary symptoms, constipation, diarrhea, melena, hematochezia, urinary symptoms, unexpected weight gain, decreased appetite, or other associated symptoms.     Differentials include but are " not limited to URI, ACS, CHF exacerbation, pulmonary edema, pneumonia    Patient's BNP elevated at 1,285. First initial troponin unremarkable.  UA noninfectious appearing, influenza negative.  CBC and CMP unremarkable.  Chest x-ray without pulmonary edema, but with chronic cardiomegaly.  Second troponin pending, patient signed out to Maddy Mueller PA-C.  Patient stable at time of discharge, most recent vital signs reviewed.    Amount and/or Complexity of Data Reviewed  External Data Reviewed: notes.  Labs: ordered. Decision-making details documented in ED Course.  Radiology: ordered. Decision-making details documented in ED Course.  ECG/medicine tests: ordered and independent interpretation performed. Decision-making details documented in ED Course.    Risk  OTC drugs.  Prescription drug management.            Scribe Attestation:   Scribe #1: I performed the above scribed service and the documentation accurately describes the services I performed. I attest to the accuracy of the note.                               Clinical Impression:  Final diagnoses:  [R06.02] SOB (shortness of breath)  [R05.9] Cough  [R79.89] Elevated brain natriuretic peptide (BNP) level (Primary)              I, Rhona Kolb PA-C, personally performed the services described in this documentation. All medical record entries made by the scribe were at my direction and in my presence. I have reviewed the chart and agree that the record reflects my personal performance and is accurate and complete.      DISCLAIMER: This note was prepared with [employer] voice recognition transcription software. Garbled syntax, mangled pronouns, and other bizarre constructions may be attributed to that software system.         [1]   Social History  Tobacco Use    Smoking status: Never    Smokeless tobacco: Never   Substance Use Topics    Alcohol use: Not Currently     Comment: occasionally    Drug use: Not Currently     Types: Marijuana     Comment: in past very little  marijuana        Rhona Kolb PA-C  02/16/25 1454

## 2025-02-18 LAB
OHS QRS DURATION: 98 MS
OHS QTC CALCULATION: 490 MS

## 2025-02-21 DIAGNOSIS — Z00.00 ENCOUNTER FOR MEDICARE ANNUAL WELLNESS EXAM: ICD-10-CM

## 2025-02-23 ENCOUNTER — HOSPITAL ENCOUNTER (OUTPATIENT)
Dept: CARDIOLOGY | Facility: HOSPITAL | Age: 40
Discharge: HOME | End: 2025-02-23
Attending: INTERNAL MEDICINE
Payer: MEDICARE

## 2025-02-23 ENCOUNTER — CLINICAL SUPPORT (OUTPATIENT)
Dept: CARDIOLOGY | Facility: HOSPITAL | Age: 40
End: 2025-02-23
Payer: MEDICARE

## 2025-02-23 DIAGNOSIS — Z95.810 PRESENCE OF AUTOMATIC (IMPLANTABLE) CARDIAC DEFIBRILLATOR: ICD-10-CM

## 2025-02-23 PROCEDURE — 93295 DEV INTERROG REMOTE 1/2/MLT: CPT | Mod: ,,, | Performed by: INTERNAL MEDICINE

## 2025-02-23 PROCEDURE — 93296 REM INTERROG EVL PM/IDS: CPT | Performed by: INTERNAL MEDICINE

## 2025-03-05 ENCOUNTER — OFFICE VISIT (OUTPATIENT)
Dept: PAIN MEDICINE | Facility: CLINIC | Age: 40
End: 2025-03-05
Payer: MEDICARE

## 2025-03-05 ENCOUNTER — APPOINTMENT (OUTPATIENT)
Dept: RADIOLOGY | Facility: HOSPITAL | Age: 40
End: 2025-03-05
Payer: MEDICARE

## 2025-03-05 VITALS — SYSTOLIC BLOOD PRESSURE: 117 MMHG | DIASTOLIC BLOOD PRESSURE: 82 MMHG | OXYGEN SATURATION: 98 % | HEART RATE: 80 BPM

## 2025-03-05 DIAGNOSIS — M54.16 LUMBAR RADICULOPATHY: ICD-10-CM

## 2025-03-05 DIAGNOSIS — M25.561 ACUTE PAIN OF RIGHT KNEE: ICD-10-CM

## 2025-03-05 DIAGNOSIS — M54.16 LUMBAR RADICULOPATHY, CHRONIC: ICD-10-CM

## 2025-03-05 DIAGNOSIS — M51.362 DEGENERATION OF INTERVERTEBRAL DISC OF LUMBAR REGION WITH DISCOGENIC BACK PAIN AND LOWER EXTREMITY PAIN: Primary | ICD-10-CM

## 2025-03-05 PROCEDURE — 99214 OFFICE O/P EST MOD 30 MIN: CPT | Mod: S$PBB,,,

## 2025-03-05 PROCEDURE — 99999 PR PBB SHADOW E&M-EST. PATIENT-LVL IV: CPT | Mod: PBBFAC,,,

## 2025-03-05 PROCEDURE — 73564 X-RAY EXAM KNEE 4 OR MORE: CPT | Mod: TC,FY,PN,RT

## 2025-03-05 PROCEDURE — 73564 X-RAY EXAM KNEE 4 OR MORE: CPT | Mod: 26,RT,, | Performed by: RADIOLOGY

## 2025-03-05 PROCEDURE — 99214 OFFICE O/P EST MOD 30 MIN: CPT | Mod: PBBFAC,PN

## 2025-03-05 NOTE — PROGRESS NOTES
Subjective:     Patient ID: Nasra Marks is a 39 y.o. female    Chief Complaint: Follow-up      Referred by: No ref. provider found      HPI:    Interval History PA (03/05/2025):  Patient returns to clinic for follow up of midline/bilateral lower back pain.  Patient has recently started PT although hoping to switch facilities.  She is scheduled to begin physical therapy at [facility] in the next few weeks.  Denies significant changes in the quality or location of her pain since previous visit.  Continues to note pain midline lower lumbar spine into bilateral lower lumbar paraspinal region.  Pain will radiate into right lower extremity all way down to foot with associated paresthesia at times.  Denies weakness, bowel bladder dysfunction.  Patient would like to proceed with the additional PT prior to consideration of interventional procedures.  Also concern today is acute right knee pain.  Notes intermittent right knee pain over the past few months.  Pain will flare-up intermittently without inciting event.  Also noting swelling over her knee with pain.  States she does not feel the knee and lower back pain are connected.  When her knee pain presents it is worsened with walking weight-bearing activity.  States she went to the ED for this and there was no identifiable pathology.    Initial Encounter (1/7/25):  Nasra Marks is a 39 y.o. female who presents today with midline/bilateral low back pain.  This pain has been present for roughly 3 months.  Pain is worse on the right side.  No specific inciting events or injuries noted.  Pain we will radiate to the right lower extremity all the way to her foot.  Associated paresthesias.  She denies any associated weakness or bowel bladder dysfunction.  Pain is constant and worse with activity.   This pain is described in detail below.    Physical Therapy:  Yes, currently.    Non-pharmacologic Treatment:  Rest helps         TENS?  No    Pain Medications:          Currently taking:  Tylenol, gabapentin    Has tried in the past:      Has not tried:  Opioids, NSAIDs (CHF), Muscle relaxants, TCAs, SNRIs, anticonvulsants, topical creams    Blood thinners:  None    Interventional Therapies:  None    Relevant Surgeries:  None    Affecting sleep?  Yes    Affecting daily activities? yes    Depressive symptoms? No          SI/HI? No    Work status: Disabled    Pain Scores:    Best:       5/10  Worst:     9/10  Usually:   8/10  Today:    9/10    Pain Disability Index  Family/Home Responsibilities:: 8  Recreation:: 9  Social Activity:: 5  Occupation:: 0  Sexual Behavior:: 4  Self Care:: 6  Life-Support Activities:: 7  Pain Disability Index (PDI): 39    Review of Systems   Constitutional:  Negative for activity change, appetite change, chills, fatigue, fever and unexpected weight change.   HENT:  Negative for hearing loss.    Eyes:  Negative for visual disturbance.   Respiratory:  Negative for chest tightness and shortness of breath.    Cardiovascular:  Negative for chest pain.   Gastrointestinal:  Negative for abdominal pain, constipation, diarrhea, nausea and vomiting.   Genitourinary:  Negative for difficulty urinating.   Musculoskeletal:  Positive for back pain, gait problem and myalgias. Negative for neck pain.   Skin:  Negative for rash.   Neurological:  Positive for numbness. Negative for dizziness, weakness, light-headedness and headaches.   Psychiatric/Behavioral:  Positive for sleep disturbance. Negative for hallucinations and suicidal ideas. The patient is not nervous/anxious.        Past Medical History:   Diagnosis Date    CHF (congestive heart failure)     Chronic combined systolic and diastolic congestive heart failure 5/24/2019    Essential hypertension 11/19/2012    Hypertension     dx at 16y.o.    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 disease, with heart failure 12/28/2021    ICD (implantable cardioverter-defibrillator),  single, in situ 2020    Nonischemic cardiomyopathy 2019    Pacemaker 2017       Past Surgical History:   Procedure Laterality Date    CARDIAC DEFIBRILLATOR PLACEMENT  2017     SECTION      x 1    INDUCED       RIGHT HEART CATHETERIZATION Right 2022    Procedure: INSERTION, CATHETER, RIGHT HEART;  Surgeon: Timothy Prajapati Jr., MD;  Location: [facility] CATH LAB;  Service: Cardiology;  Laterality: Right;    RIGHT HEART CATHETERIZATION Right 2024    Procedure: INSERTION, CATHETER, RIGHT HEART;  Surgeon: Lior Rueda MD;  Location: [facility] CATH LAB;  Service: Cardiology;  Laterality: Right;    TUBAL LIGATION         Social History     Socioeconomic History    Marital status:    Occupational History     Employer: [employer]   Tobacco Use    Smoking status: Never    Smokeless tobacco: Never   Substance and Sexual Activity    Alcohol use: Not Currently     Comment: occasionally    Drug use: Not Currently     Types: Marijuana     Comment: in past very little marijuana    Sexual activity: Yes     Partners: Male     Birth control/protection: None     Social Drivers of Health     Financial Resource Strain: Patient Declined (2025)    Overall Financial Resource Strain (CARDIA)     Difficulty of Paying Living Expenses: Patient declined   Food Insecurity: Patient Declined (2025)    Hunger Vital Sign     Worried About Running Out of Food in the Last Year: Patient declined     Ran Out of Food in the Last Year: Patient declined   Transportation Needs: No Transportation Needs (2024)    TRANSPORTATION NEEDS     Transportation : No   Physical Activity: Inactive (2025)    Exercise Vital Sign     Days of Exercise per Week: 0 days     Minutes of Exercise per Session: 10 min   Stress: Patient Declined (2025)    [employer] - Occupational Stress Questionnaire     Feeling of Stress : Patient declined   Housing Stability: Unknown  (1/6/2025)    Housing Stability Vital Sign     Unable to Pay for Housing in the Last Year: Patient declined     Homeless in the Last Year: No       Review of patient's allergies indicates:   Allergen Reactions    Aldactone [spironolactone] Swelling     Lips swelled    Hydralazine analogues Other (See Comments)     headaches    Isosorbide Other (See Comments)     headaches       Current Outpatient Medications on File Prior to Visit   Medication Sig Dispense Refill    acetaminophen (TYLENOL) 500 MG tablet Take 1 tablet (500 mg total) by mouth every 6 (six) hours as needed. 13 tablet 0    acetaminophen (TYLENOL) 500 MG tablet Take 1 tablet (500 mg total) by mouth every 4 (four) hours as needed for Pain or Temperature greater than (100.5 or greater). 30 tablet 0    acetaminophen (TYLENOL) 500 MG tablet Take 1 tablet (500 mg total) by mouth every 6 (six) hours as needed for Pain. 30 tablet 0    albuterol (PROVENTIL/VENTOLIN HFA) 90 mcg/actuation inhaler Inhale 1-2 puffs into the lungs every 6 (six) hours as needed. Rescue 18 g 0    amLODIPine (NORVASC) 5 MG tablet Take 5 mg by mouth.      benzocaine-menthoL 15-3.6 mg Lozg 1 lozenge by Mucous Membrane route every 2 (two) hours as needed (sore throat). 16 lozenge 0    cetirizine (ZYRTEC) 10 MG tablet Take 1 tablet (10 mg total) by mouth once daily. 30 tablet 0    cetirizine (ZYRTEC) 10 MG tablet Take 1 tablet (10 mg total) by mouth once daily. 30 tablet 0    docusate sodium (COLACE) 100 MG capsule Take 1 capsule (100 mg total) by mouth 2 (two) times daily. 20 capsule 0    eplerenone (INSPRA) 25 MG Tab Take 1 tablet (25 mg total) by mouth once daily. 90 tablet 3    FARXIGA 10 mg tablet Take 1 tablet by mouth once daily 30 tablet 6    fluticasone propionate (FLONASE) 50 mcg/actuation nasal spray 1 spray (50 mcg total) by Each Nostril route 2 (two) times daily as needed for Rhinitis. 15 g 0    gabapentin (NEURONTIN) 300 MG capsule Take 2 capsules (600 mg total) by mouth 3  (three) times daily. 180 capsule 5    hydrALAZINE (APRESOLINE) 50 MG tablet Take 1 tablet (50 mg total) by mouth 2 (two) times a day. 180 tablet 3    isosorbide mononitrate (IMDUR) 60 MG 24 hr tablet Take 1 tablet (60 mg total) by mouth once daily. 90 tablet 3    metoprolol succinate (TOPROL-XL) 200 MG 24 hr tablet Take 1 tablet (200 mg total) by mouth once daily. 90 tablet 3    potassium chloride SA (K-DUR,KLOR-CON) 20 MEQ tablet Take 2 tablets (40 mEq total) by mouth 2 (two) times daily. 120 tablet 5    sacubitriL-valsartan (ENTRESTO)  mg per tablet Take 1 tablet by mouth 2 (two) times daily. 180 tablet 3    aspirin (ECOTRIN) 81 MG EC tablet Take 1 tablet (81 mg total) by mouth once daily. 30 tablet 0    atorvastatin (LIPITOR) 40 MG tablet Take 1 tablet (40 mg total) by mouth every evening. 90 tablet 3    FLUoxetine 20 MG capsule Take 1 capsule (20 mg total) by mouth once daily. 30 capsule 1    furosemide (LASIX) 40 MG tablet Take 1 tablet (40 mg total) by mouth once daily. 90 tablet 2     No current facility-administered medications on file prior to visit.       Objective:      There were no vitals taken for this visit.    Exam:  GEN:  Well developed, well nourished.  No acute distress.  Normal pain behavior.  HEENT:  No trauma.  Mucous membranes moist.  Nares patent bilaterally.  PSYCH: Normal affect. Thought content appropriate.  CHEST:  Breathing symmetric.  No audible wheezing.  ABD: Soft, non-distended.  SKIN:  Warm, pink, dry.  No rash on exposed areas.    EXT:  No cyanosis, clubbing, or edema.  No color change or changes in nail or hair growth.  NEURO/MUSCULOSKELETAL:  Fully alert, oriented, and appropriate. Speech normal arian. No cranial nerve deficits.   Gait:  Normal.  No trendelenburg sign bilaterally.     Right knee:  No swelling  TTP over lateral joint line  Full ROM without pain    Imaging:      Narrative & Impression    EXAMINATION:  MRI LUMBAR SPINE WITHOUT CONTRAST     CLINICAL  HISTORY:  Spinal stenosis, lumbar. Pt reports lower back pain with pain radiating down the right leg to the knee. Denies trauma.  Pt has a [employer] defibrillator that was placed into MRI mode prior to scan. Returned to normal operation post scan.     TECHNIQUE:  Multiplanar, multisequence MR images were acquired from the thoracolumbar junction to the sacrum without the administration of contrast.     COMPARISON:  Radiographs of the lumbar spine, 02/04/2016.     FINDINGS:  NOMENCLATURE: Five lumbar type vertebral bodies.     CORD/CAUDA EQUINA: Conus has normal size and signal and ends at a normal level of L1.     ALIGNMENT: Normal.     BONES: Vertebral body heights are maintained.  No aggressive bone marrow signal.     PARASPINAL AREA: Normal.     OTHER: Multiple bilateral renal cysts.  No follow-up is recommended for incidental simple renal cysts as they are likely benign.     LUMBAR DISC LEVELS:     T12-L1: No disc herniation or significant posterior osteophytic ridging.  No significant spinal canal or foraminal stenosis.     L1-L2: No disc herniation or significant posterior osteophytic ridging.  Mild right facet hypertrophy.  No significant spinal canal or foraminal stenosis.     L2-L3: No disc herniation or significant posterior osteophytic ridging.  Minimal bilateral facet hypertrophy.  No significant spinal canal or foraminal stenosis.     L3-L4: Minimal disc bulge.  Mild bilateral facet hypertrophy.  Ligamentum flavum thickening.  No significant spinal canal or foraminal stenosis.     L4-L5: Mild disc bulge with tiny central protrusion with high-intensity zone in the protrusion.  Loss of normal T2 signal in the disc.  Mild left and minimal right facet hypertrophy.  Ligamentum flavum thickening.  Mild narrowing of the bilateral lateral recesses.  No significant spinal canal stenosis.  Mild left foraminal stenosis.     L5-S1: Mild disc bulge with tiny central protrusion.  High intensity zone in the protrusion.   Loss of the normal T2 signal in the disc.  Mild bilateral facet hypertrophy.  No significant spinal canal stenosis.  Minimal right foraminal stenosis.     Impression:     1. Lower lumbar spondylosis without significant spinal canal stenosis.  2. Mild foraminal stenosis on the left at L4-5.        Electronically signed by:Rayray Payne MD  Date:                                            12/30/2024  Time:                                           15:47       Assessment:       Encounter Diagnoses   Name Primary?    Degeneration of intervertebral disc of lumbar region with discogenic back pain and lower extremity pain Yes    Lumbar radiculopathy, chronic     Lumbar radiculopathy     Acute pain of right knee          Plan:       Nasra was seen today for follow-up.    Diagnoses and all orders for this visit:    Degeneration of intervertebral disc of lumbar region with discogenic back pain and lower extremity pain    Lumbar radiculopathy, chronic    Lumbar radiculopathy    Acute pain of right knee  -     X-ray Knee Ortho Right with Flexion (XPD); Future  -     Ambulatory referral/consult to Orthopedics; Future        Nasra Marks is a 39 y.o. female with midline/bilateral low back pain right worse than left.  Associated right lower extremity pain suspicious for radiculopathy/radiculitis.  Does have degenerative disc disease at the L4-5 and L5-S1 levels.  Posterior annular fissures at both of these levels.  Likely indicate some degree of axial discogenic pain, but also likely some related radicular symptoms on the right side.    Pertinent imaging studies reviewed by me. Imaging results were discussed with patient.  Proceed with physical therapy for lumbar ROM, strengthening, stretching home exercise program.  Stressed the importance of maintaining regular home exercise program and being mindful of how they use their back throughout the day.  Patient expressed understanding and agreement.  Continue gabapentin.   Patient currently taking 600 mg b.i.d. with benefit, denies adverse effects.  Patient unclear if this has been beneficial.  May consider decreasing/discontinuing at follow up if ineffective.  Right knee x-ray.  Referred to orthopedics for further evaluation.  Return to clinic in 8 weeks or sooner if needed.  At that time we will discuss efficacy of physical therapy/home exercise program.  May consider epidural steroid injection versus local anesthetic discogram/via disc.      Willian Calderon PA-C  [facility]  Interventional Pain Management   03/05/2025    This note was created by combination of typed  and M-Modal dictation.  Transcription and phonetic errors may be present.  If there are any questions, please contact me.

## 2025-03-13 LAB
OHS CV AF BURDEN PERCENT: < 1
OHS CV DC REMOTE DEVICE TYPE: NORMAL
OHS CV ICD SHOCK: NO
OHS CV RV PACING PERCENT: 0.01 %

## 2025-04-11 ENCOUNTER — HOSPITAL ENCOUNTER (OUTPATIENT)
Facility: HOSPITAL | Age: 40
Discharge: HOME | End: 2025-04-16
Attending: STUDENT IN AN ORGANIZED HEALTH CARE EDUCATION/TRAINING PROGRAM | Admitting: STUDENT IN AN ORGANIZED HEALTH CARE EDUCATION/TRAINING PROGRAM
Payer: MEDICAID

## 2025-04-11 DIAGNOSIS — I50.9 HEART FAILURE: ICD-10-CM

## 2025-04-11 DIAGNOSIS — R07.9 CHEST PAIN, UNSPECIFIED TYPE: ICD-10-CM

## 2025-04-11 DIAGNOSIS — K59.00 CONSTIPATION: ICD-10-CM

## 2025-04-11 DIAGNOSIS — R11.2 NAUSEA AND VOMITING, UNSPECIFIED VOMITING TYPE: ICD-10-CM

## 2025-04-11 DIAGNOSIS — R55 SYNCOPE, UNSPECIFIED SYNCOPE TYPE: ICD-10-CM

## 2025-04-11 DIAGNOSIS — I50.9 CONGESTIVE HEART FAILURE: ICD-10-CM

## 2025-04-11 DIAGNOSIS — G44.209 TENSION-TYPE HEADACHE, NOT INTRACTABLE, UNSPECIFIED CHRONICITY PATTERN: ICD-10-CM

## 2025-04-11 DIAGNOSIS — I50.9 ACUTE DECOMPENSATED HEART FAILURE: Primary | ICD-10-CM

## 2025-04-11 DIAGNOSIS — R07.9 CHEST PAIN: ICD-10-CM

## 2025-04-11 LAB
ALBUMIN SERPL-MCNC: 3.5 G/DL (ref 3.3–5.5)
ALLENS TEST: ABNORMAL
ALP SERPL-CCNC: 58 U/L (ref 42–141)
B-HCG UR QL: NEGATIVE
BILIRUB SERPL-MCNC: 0.6 MG/DL (ref 0.2–1.6)
BILIRUBIN, POC UA: ABNORMAL
BLOOD, POC UA: ABNORMAL
BUN SERPL-MCNC: 18 MG/DL (ref 7–22)
CALCIUM SERPL-MCNC: 8.8 MG/DL (ref 8–10.3)
CHLORIDE SERPL-SCNC: 112 MMOL/L (ref 98–108)
CLARITY, UA: CLEAR
COLOR, UA: YELLOW
CREAT SERPL-MCNC: 1.4 MG/DL (ref 0.6–1.2)
CTP QC/QA: YES
GLUCOSE SERPL-MCNC: 95 MG/DL (ref 73–118)
GLUCOSE, POC UA: NEGATIVE
HCO3 UR-SCNC: 22.2 MMOL/L (ref 24–28)
HCT, POC: NORMAL
HGB, POC: NORMAL (ref 14–18)
INFLUENZA A ANTIGEN, POC: NEGATIVE
INFLUENZA B ANTIGEN, POC: NEGATIVE
KETONES, POC UA: NEGATIVE
LDH SERPL L TO P-CCNC: 0.74 MMOL/L (ref 0.5–2.2)
LEUKOCYTE EST, POC UA: NEGATIVE
MCH, POC: NORMAL
MCHC, POC: NORMAL
MCV, POC: NORMAL
MPV, POC: NORMAL
NITRITE, POC UA: NEGATIVE
PCO2 BLDA: 38.8 MMHG (ref 35–45)
PH SMN: 7.37 [PH] (ref 7.35–7.45)
PH UR STRIP: 5 [PH] (ref 5–8)
PO2 BLDA: 21 MMHG (ref 40–60)
POC ALT (SGPT): 76 U/L (ref 10–47)
POC AST (SGOT): 81 U/L (ref 11–38)
POC B-TYPE NATRIURETIC PEPTIDE: 2020 PG/ML (ref 0–100)
POC BE: -3 MMOL/L
POC CARDIAC TROPONIN I: 0.01 NG/ML (ref 0–0.08)
POC PLATELET COUNT: NORMAL
POC SATURATED O2: 32 % (ref 95–100)
POC TCO2: 23 MMOL/L (ref 24–29)
POC TCO2: 25 MMOL/L (ref 18–33)
POTASSIUM BLD-SCNC: 4.5 MMOL/L (ref 3.6–5.1)
PROTEIN, POC UA: ABNORMAL
PROTEIN, POC: 6.6 G/DL (ref 6.4–8.1)
RBC, POC: NORMAL
RDW, POC: NORMAL
SAMPLE: ABNORMAL
SAMPLE: NORMAL
SITE: ABNORMAL
SODIUM BLD-SCNC: 140 MMOL/L (ref 128–145)
SPECIFIC GRAVITY, POC UA: >=1.03 (ref 1–1.03)
UROBILINOGEN, POC UA: 0.2 E.U./DL
WBC, POC: NORMAL

## 2025-04-11 PROCEDURE — 82803 BLOOD GASES ANY COMBINATION: CPT | Mod: ER

## 2025-04-11 PROCEDURE — 99285 EMERGENCY DEPT VISIT HI MDM: CPT | Mod: 25,ER

## 2025-04-11 PROCEDURE — 93005 ELECTROCARDIOGRAM TRACING: CPT | Mod: ER

## 2025-04-11 PROCEDURE — 63600175 PHARM REV CODE 636 W HCPCS: Mod: ER | Performed by: STUDENT IN AN ORGANIZED HEALTH CARE EDUCATION/TRAINING PROGRAM

## 2025-04-11 PROCEDURE — 93010 ELECTROCARDIOGRAM REPORT: CPT | Mod: ,,, | Performed by: INTERNAL MEDICINE

## 2025-04-11 PROCEDURE — 80053 COMPREHEN METABOLIC PANEL: CPT | Mod: ER

## 2025-04-11 PROCEDURE — 84484 ASSAY OF TROPONIN QUANT: CPT | Mod: ER

## 2025-04-11 PROCEDURE — 96375 TX/PRO/DX INJ NEW DRUG ADDON: CPT | Mod: ER

## 2025-04-11 PROCEDURE — 81025 URINE PREGNANCY TEST: CPT | Mod: ER | Performed by: STUDENT IN AN ORGANIZED HEALTH CARE EDUCATION/TRAINING PROGRAM

## 2025-04-11 PROCEDURE — 85025 COMPLETE CBC W/AUTO DIFF WBC: CPT | Mod: ER

## 2025-04-11 PROCEDURE — 81025 URINE PREGNANCY TEST: CPT | Mod: ER

## 2025-04-11 RX ORDER — FUROSEMIDE 10 MG/ML
100 INJECTION INTRAMUSCULAR; INTRAVENOUS
Status: DISCONTINUED | OUTPATIENT
Start: 2025-04-11 | End: 2025-04-11

## 2025-04-11 RX ORDER — FUROSEMIDE 10 MG/ML
60 INJECTION INTRAMUSCULAR; INTRAVENOUS
Status: COMPLETED | OUTPATIENT
Start: 2025-04-11 | End: 2025-04-11

## 2025-04-11 RX ORDER — ONDANSETRON HYDROCHLORIDE 2 MG/ML
8 INJECTION, SOLUTION INTRAVENOUS
Status: COMPLETED | OUTPATIENT
Start: 2025-04-11 | End: 2025-04-11

## 2025-04-11 RX ADMIN — ONDANSETRON 8 MG: 2 INJECTION INTRAMUSCULAR; INTRAVENOUS at 10:04

## 2025-04-11 RX ADMIN — FUROSEMIDE 60 MG: 10 INJECTION, SOLUTION INTRAMUSCULAR; INTRAVENOUS at 10:04

## 2025-04-12 LAB
ABSOLUTE EOSINOPHIL (OHS): 0.07 K/UL
ABSOLUTE MONOCYTE (OHS): 0.57 K/UL (ref 0.3–1)
ABSOLUTE NEUTROPHIL COUNT (OHS): 6.41 K/UL (ref 1.8–7.7)
ALBUMIN SERPL BCP-MCNC: 3.7 G/DL (ref 3.5–5.2)
ALP SERPL-CCNC: 61 UNIT/L (ref 40–150)
ALT SERPL W/O P-5'-P-CCNC: 83 UNIT/L (ref 10–44)
ANION GAP (OHS): 9 MMOL/L (ref 8–16)
ASCENDING AORTA: 2.8 CM
AST SERPL-CCNC: 61 UNIT/L (ref 11–45)
AV INDEX (PROSTH): 0.8
AV MEAN GRADIENT: 4 MMHG
AV PEAK GRADIENT: 6 MMHG
AV VALVE AREA BY VELOCITY RATIO: 2.1 CM²
AV VALVE AREA: 2.5 CM²
AV VELOCITY RATIO: 0.67
BASOPHILS # BLD AUTO: 0.04 K/UL
BASOPHILS NFR BLD AUTO: 0.4 %
BILIRUB SERPL-MCNC: 0.7 MG/DL (ref 0.1–1)
BNP SERPL-MCNC: 2875 PG/ML (ref 0–99)
BSA FOR ECHO PROCEDURE: 2 M2
BUN SERPL-MCNC: 18 MG/DL (ref 6–20)
CALCIUM SERPL-MCNC: 8.7 MG/DL (ref 8.7–10.5)
CHLORIDE SERPL-SCNC: 108 MMOL/L (ref 95–110)
CHOLEST SERPL-MCNC: 122 MG/DL (ref 120–199)
CHOLEST/HDLC SERPL: 3.4 {RATIO} (ref 2–5)
CO2 SERPL-SCNC: 21 MMOL/L (ref 23–29)
CREAT SERPL-MCNC: 0.8 MG/DL (ref 0.5–1.4)
CV ECHO LV RWT: 0.25 CM
DOP CALC AO PEAK VEL: 1.2 M/S
DOP CALC AO VTI: 15.9 CM
DOP CALC LVOT AREA: 3.1 CM2
DOP CALC LVOT DIAMETER: 2 CM
DOP CALC LVOT PEAK VEL: 0.8 M/S
DOP CALC LVOT STROKE VOLUME: 39.9 CM3
DOP CALC MV VTI: 23.8 CM
DOP CALCLVOT PEAK VEL VTI: 12.7 CM
E WAVE DECELERATION TIME: 127 MSEC
E/A RATIO: 3.58
E/E' RATIO: 14 M/S
EAG (OHS): 103 MG/DL (ref 68–131)
ECHO LV POSTERIOR WALL: 0.9 CM (ref 0.6–1.1)
ERYTHROCYTE [DISTWIDTH] IN BLOOD BY AUTOMATED COUNT: 15.5 % (ref 11.5–14.5)
FRACTIONAL SHORTENING: 13.9 % (ref 28–44)
GFR SERPLBLD CREATININE-BSD FMLA CKD-EPI: >60 ML/MIN/1.73/M2
GLUCOSE SERPL-MCNC: 86 MG/DL (ref 70–110)
HBA1C MFR BLD: 5.2 % (ref 4–5.6)
HCT VFR BLD AUTO: 35.6 % (ref 37–48.5)
HDLC SERPL-MCNC: 36 MG/DL (ref 40–75)
HDLC SERPL: 29.5 % (ref 20–50)
HGB BLD-MCNC: 11.4 GM/DL (ref 12–16)
HOLD SPECIMEN: NORMAL
IMM GRANULOCYTES # BLD AUTO: 0.04 K/UL (ref 0–0.04)
IMM GRANULOCYTES NFR BLD AUTO: 0.4 % (ref 0–0.5)
INTERVENTRICULAR SEPTUM: 0.9 CM (ref 0.6–1.1)
IVC DIAMETER: 1.93 CM
IVRT: 66 MSEC
LA MAJOR: 6.3 CM
LA MINOR: 6.2 CM
LA WIDTH: 4.4 CM
LDLC SERPL CALC-MCNC: 75.2 MG/DL (ref 63–159)
LEFT ATRIUM SIZE: 5.2 CM
LEFT ATRIUM VOLUME INDEX: 61 ML/M2
LEFT ATRIUM VOLUME: 122 CM3
LEFT INTERNAL DIMENSION IN SYSTOLE: 6.2 CM (ref 2.1–4)
LEFT VENTRICLE DIASTOLIC VOLUME INDEX: 136.36 ML/M2
LEFT VENTRICLE DIASTOLIC VOLUME: 270 ML
LEFT VENTRICLE MASS INDEX: 149.8 G/M2
LEFT VENTRICLE SYSTOLIC VOLUME INDEX: 98 ML/M2
LEFT VENTRICLE SYSTOLIC VOLUME: 194 ML
LEFT VENTRICULAR INTERNAL DIMENSION IN DIASTOLE: 7.2 CM (ref 3.5–6)
LEFT VENTRICULAR MASS: 296.6 G
LV LATERAL E/E' RATIO: 9.3 M/S
LV SEPTAL E/E' RATIO: 27.8 M/S
LVED V (TEICH): 270.38 ML
LVES V (TEICH): 194.13 ML
LVOT MG: 1.4 MMHG
LVOT MV: 0.53 CM/S
LYMPHOCYTES # BLD AUTO: 1.82 K/UL (ref 1–4.8)
MAGNESIUM SERPL-MCNC: 1.8 MG/DL (ref 1.6–2.6)
MCH RBC QN AUTO: 27.8 PG (ref 27–31)
MCHC RBC AUTO-ENTMCNC: 32 G/DL (ref 32–36)
MCV RBC AUTO: 87 FL (ref 82–98)
MV MEAN GRADIENT: 3 MMHG
MV PEAK A VEL: 0.31 M/S
MV PEAK E VEL: 1.11 M/S
MV PEAK GRADIENT: 6 MMHG
MV STENOSIS PRESSURE HALF TIME: 36.87 MS
MV VALVE AREA BY CONTINUITY EQUATION: 1.68 CM2
MV VALVE AREA P 1/2 METHOD: 5.97 CM2
NONHDLC SERPL-MCNC: 86 MG/DL
NUCLEATED RBC (/100WBC) (OHS): 0 /100 WBC
OHS CV RV/LV RATIO: 0.56 CM
PHOSPHATE SERPL-MCNC: 3.7 MG/DL (ref 2.7–4.5)
PISA TR MAX VEL: 2.1 M/S
PLATELET # BLD AUTO: 323 K/UL (ref 150–450)
PMV BLD AUTO: 10.9 FL (ref 9.2–12.9)
POTASSIUM SERPL-SCNC: 3.3 MMOL/L (ref 3.5–5.1)
POTASSIUM SERPL-SCNC: 3.8 MMOL/L (ref 3.5–5.1)
PROT SERPL-MCNC: 7.6 GM/DL (ref 6–8.4)
PV PEAK GRADIENT: 2 MMHG
PV PEAK VELOCITY: 0.76 M/S
RA MAJOR: 5.35 CM
RA PRESSURE ESTIMATED: 3 MMHG
RA WIDTH: 3.4 CM
RBC # BLD AUTO: 4.1 M/UL (ref 4–5.4)
RELATIVE EOSINOPHIL (OHS): 0.8 %
RELATIVE LYMPHOCYTE (OHS): 20.3 % (ref 18–48)
RELATIVE MONOCYTE (OHS): 6.4 % (ref 4–15)
RELATIVE NEUTROPHIL (OHS): 71.7 % (ref 38–73)
RIGHT VENTRICLE DIASTOLIC BASEL DIMENSION: 4 CM
RIGHT VENTRICULAR END-DIASTOLIC DIMENSION: 4.01 CM
RV TB RVSP: 5 MMHG
RV TISSUE DOPPLER FREE WALL SYSTOLIC VELOCITY 1 (APICAL 4 CHAMBER VIEW): 13.58 CM/S
SINUS: 2.99 CM
SODIUM SERPL-SCNC: 138 MMOL/L (ref 136–145)
STJ: 2.15 CM
TDI LATERAL: 0.12 M/S
TDI SEPTAL: 0.04 M/S
TDI: 0.08 M/S
TR MAX PG: 18 MMHG
TRICUSPID ANNULAR PLANE SYSTOLIC EXCURSION: 2.4 CM
TRIGL SERPL-MCNC: 54 MG/DL (ref 30–150)
TSH SERPL-ACNC: 3.14 UIU/ML (ref 0.4–4)
TV REST PULMONARY ARTERY PRESSURE: 21 MMHG
WBC # BLD AUTO: 8.95 K/UL (ref 3.9–12.7)
Z-SCORE OF LEFT VENTRICULAR DIMENSION IN END DIASTOLE: 2.31
Z-SCORE OF LEFT VENTRICULAR DIMENSION IN END SYSTOLE: 4.49

## 2025-04-12 PROCEDURE — 63600175 PHARM REV CODE 636 W HCPCS: Performed by: NURSE PRACTITIONER

## 2025-04-12 PROCEDURE — 83880 ASSAY OF NATRIURETIC PEPTIDE: CPT | Performed by: STUDENT IN AN ORGANIZED HEALTH CARE EDUCATION/TRAINING PROGRAM

## 2025-04-12 PROCEDURE — 96375 TX/PRO/DX INJ NEW DRUG ADDON: CPT

## 2025-04-12 PROCEDURE — 25000003 PHARM REV CODE 250: Performed by: STUDENT IN AN ORGANIZED HEALTH CARE EDUCATION/TRAINING PROGRAM

## 2025-04-12 PROCEDURE — 63600175 PHARM REV CODE 636 W HCPCS: Mod: JZ,TB | Performed by: INTERNAL MEDICINE

## 2025-04-12 PROCEDURE — 82374 ASSAY BLOOD CARBON DIOXIDE: CPT | Performed by: STUDENT IN AN ORGANIZED HEALTH CARE EDUCATION/TRAINING PROGRAM

## 2025-04-12 PROCEDURE — 85025 COMPLETE CBC W/AUTO DIFF WBC: CPT | Performed by: STUDENT IN AN ORGANIZED HEALTH CARE EDUCATION/TRAINING PROGRAM

## 2025-04-12 PROCEDURE — 83735 ASSAY OF MAGNESIUM: CPT | Performed by: STUDENT IN AN ORGANIZED HEALTH CARE EDUCATION/TRAINING PROGRAM

## 2025-04-12 PROCEDURE — 84100 ASSAY OF PHOSPHORUS: CPT | Performed by: STUDENT IN AN ORGANIZED HEALTH CARE EDUCATION/TRAINING PROGRAM

## 2025-04-12 PROCEDURE — 96376 TX/PRO/DX INJ SAME DRUG ADON: CPT

## 2025-04-12 PROCEDURE — 83036 HEMOGLOBIN GLYCOSYLATED A1C: CPT | Performed by: STUDENT IN AN ORGANIZED HEALTH CARE EDUCATION/TRAINING PROGRAM

## 2025-04-12 PROCEDURE — G0378 HOSPITAL OBSERVATION PER HR: HCPCS | Mod: ER

## 2025-04-12 PROCEDURE — 63600175 PHARM REV CODE 636 W HCPCS: Performed by: STUDENT IN AN ORGANIZED HEALTH CARE EDUCATION/TRAINING PROGRAM

## 2025-04-12 PROCEDURE — G0378 HOSPITAL OBSERVATION PER HR: HCPCS

## 2025-04-12 PROCEDURE — 25000003 PHARM REV CODE 250: Performed by: NURSE PRACTITIONER

## 2025-04-12 PROCEDURE — 84443 ASSAY THYROID STIM HORMONE: CPT | Performed by: STUDENT IN AN ORGANIZED HEALTH CARE EDUCATION/TRAINING PROGRAM

## 2025-04-12 PROCEDURE — 97116 GAIT TRAINING THERAPY: CPT

## 2025-04-12 PROCEDURE — 80061 LIPID PANEL: CPT | Performed by: STUDENT IN AN ORGANIZED HEALTH CARE EDUCATION/TRAINING PROGRAM

## 2025-04-12 PROCEDURE — 36415 COLL VENOUS BLD VENIPUNCTURE: CPT | Performed by: NURSE PRACTITIONER

## 2025-04-12 PROCEDURE — 84132 ASSAY OF SERUM POTASSIUM: CPT | Performed by: NURSE PRACTITIONER

## 2025-04-12 PROCEDURE — 96372 THER/PROPH/DIAG INJ SC/IM: CPT | Performed by: STUDENT IN AN ORGANIZED HEALTH CARE EDUCATION/TRAINING PROGRAM

## 2025-04-12 PROCEDURE — 97161 PT EVAL LOW COMPLEX 20 MIN: CPT

## 2025-04-12 PROCEDURE — 25000003 PHARM REV CODE 250: Performed by: INTERNAL MEDICINE

## 2025-04-12 PROCEDURE — 99214 OFFICE O/P EST MOD 30 MIN: CPT | Mod: 25,,, | Performed by: INTERNAL MEDICINE

## 2025-04-12 PROCEDURE — 36415 COLL VENOUS BLD VENIPUNCTURE: CPT | Performed by: STUDENT IN AN ORGANIZED HEALTH CARE EDUCATION/TRAINING PROGRAM

## 2025-04-12 RX ORDER — HEPARIN SODIUM 5000 [USP'U]/ML
5000 INJECTION, SOLUTION INTRAVENOUS; SUBCUTANEOUS EVERY 8 HOURS
Status: DISCONTINUED | OUTPATIENT
Start: 2025-04-12 | End: 2025-04-16 | Stop reason: HOSPADM

## 2025-04-12 RX ORDER — KETOROLAC TROMETHAMINE 30 MG/ML
15 INJECTION, SOLUTION INTRAMUSCULAR; INTRAVENOUS ONCE
Status: COMPLETED | OUTPATIENT
Start: 2025-04-12 | End: 2025-04-12

## 2025-04-12 RX ORDER — DIPHENHYDRAMINE HYDROCHLORIDE 50 MG/ML
25 INJECTION, SOLUTION INTRAMUSCULAR; INTRAVENOUS ONCE
Status: COMPLETED | OUTPATIENT
Start: 2025-04-12 | End: 2025-04-12

## 2025-04-12 RX ORDER — DAPAGLIFLOZIN 10 MG/1
10 TABLET, FILM COATED ORAL DAILY
Status: DISCONTINUED | OUTPATIENT
Start: 2025-04-12 | End: 2025-04-16 | Stop reason: HOSPADM

## 2025-04-12 RX ORDER — ONDANSETRON 8 MG/1
8 TABLET, ORALLY DISINTEGRATING ORAL EVERY 8 HOURS PRN
Status: DISCONTINUED | OUTPATIENT
Start: 2025-04-12 | End: 2025-04-16 | Stop reason: HOSPADM

## 2025-04-12 RX ORDER — ONDANSETRON HYDROCHLORIDE 2 MG/ML
4 INJECTION, SOLUTION INTRAVENOUS EVERY 12 HOURS PRN
Status: DISCONTINUED | OUTPATIENT
Start: 2025-04-12 | End: 2025-04-16 | Stop reason: HOSPADM

## 2025-04-12 RX ORDER — POTASSIUM CHLORIDE 20 MEQ/1
40 TABLET, EXTENDED RELEASE ORAL ONCE
Status: COMPLETED | OUTPATIENT
Start: 2025-04-12 | End: 2025-04-12

## 2025-04-12 RX ORDER — SODIUM CHLORIDE 0.9 % (FLUSH) 0.9 %
10 SYRINGE (ML) INJECTION
Status: DISCONTINUED | OUTPATIENT
Start: 2025-04-12 | End: 2025-04-16 | Stop reason: HOSPADM

## 2025-04-12 RX ORDER — TALC
6 POWDER (GRAM) TOPICAL NIGHTLY PRN
Status: DISCONTINUED | OUTPATIENT
Start: 2025-04-12 | End: 2025-04-16 | Stop reason: HOSPADM

## 2025-04-12 RX ORDER — ASPIRIN 81 MG/1
81 TABLET ORAL DAILY
Status: DISCONTINUED | OUTPATIENT
Start: 2025-04-12 | End: 2025-04-16 | Stop reason: HOSPADM

## 2025-04-12 RX ORDER — FUROSEMIDE 10 MG/ML
80 INJECTION INTRAMUSCULAR; INTRAVENOUS EVERY 12 HOURS
Status: DISCONTINUED | OUTPATIENT
Start: 2025-04-12 | End: 2025-04-13

## 2025-04-12 RX ORDER — ACETAMINOPHEN 325 MG/1
650 TABLET ORAL EVERY 8 HOURS PRN
Status: DISCONTINUED | OUTPATIENT
Start: 2025-04-12 | End: 2025-04-16 | Stop reason: HOSPADM

## 2025-04-12 RX ORDER — ATORVASTATIN CALCIUM 40 MG/1
40 TABLET, FILM COATED ORAL NIGHTLY
Status: DISCONTINUED | OUTPATIENT
Start: 2025-04-12 | End: 2025-04-16 | Stop reason: HOSPADM

## 2025-04-12 RX ORDER — METOPROLOL SUCCINATE 50 MG/1
200 TABLET, EXTENDED RELEASE ORAL DAILY
Status: DISCONTINUED | OUTPATIENT
Start: 2025-04-12 | End: 2025-04-16 | Stop reason: HOSPADM

## 2025-04-12 RX ORDER — FLUTICASONE PROPIONATE 50 MCG
1 SPRAY, SUSPENSION (ML) NASAL 2 TIMES DAILY PRN
Status: DISCONTINUED | OUTPATIENT
Start: 2025-04-12 | End: 2025-04-16 | Stop reason: HOSPADM

## 2025-04-12 RX ORDER — POLYETHYLENE GLYCOL 3350 17 G/17G
17 POWDER, FOR SOLUTION ORAL DAILY
Status: DISCONTINUED | OUTPATIENT
Start: 2025-04-12 | End: 2025-04-16 | Stop reason: HOSPADM

## 2025-04-12 RX ORDER — PROCHLORPERAZINE EDISYLATE 5 MG/ML
5 INJECTION INTRAMUSCULAR; INTRAVENOUS ONCE
Status: COMPLETED | OUTPATIENT
Start: 2025-04-12 | End: 2025-04-12

## 2025-04-12 RX ORDER — AMOXICILLIN 250 MG
1 CAPSULE ORAL 2 TIMES DAILY
Status: DISCONTINUED | OUTPATIENT
Start: 2025-04-12 | End: 2025-04-16

## 2025-04-12 RX ORDER — GABAPENTIN 100 MG/1
100 CAPSULE ORAL 3 TIMES DAILY
Status: DISCONTINUED | OUTPATIENT
Start: 2025-04-12 | End: 2025-04-16 | Stop reason: HOSPADM

## 2025-04-12 RX ADMIN — POLYETHYLENE GLYCOL 3350 17 G: 17 POWDER, FOR SOLUTION ORAL at 09:04

## 2025-04-12 RX ADMIN — SACUBITRIL AND VALSARTAN 2 TABLET: 24; 26 TABLET, FILM COATED ORAL at 08:04

## 2025-04-12 RX ADMIN — PROCHLORPERAZINE EDISYLATE 5 MG: 5 INJECTION INTRAMUSCULAR; INTRAVENOUS at 02:04

## 2025-04-12 RX ADMIN — ATORVASTATIN CALCIUM 40 MG: 40 TABLET, FILM COATED ORAL at 03:04

## 2025-04-12 RX ADMIN — GABAPENTIN 100 MG: 100 CAPSULE ORAL at 08:04

## 2025-04-12 RX ADMIN — ATORVASTATIN CALCIUM 40 MG: 40 TABLET, FILM COATED ORAL at 08:04

## 2025-04-12 RX ADMIN — METOPROLOL SUCCINATE 200 MG: 50 TABLET, EXTENDED RELEASE ORAL at 09:04

## 2025-04-12 RX ADMIN — HEPARIN SODIUM 5000 UNITS: 5000 INJECTION INTRAVENOUS; SUBCUTANEOUS at 05:04

## 2025-04-12 RX ADMIN — FUROSEMIDE 80 MG: 10 INJECTION, SOLUTION INTRAVENOUS at 08:04

## 2025-04-12 RX ADMIN — POTASSIUM CHLORIDE 40 MEQ: 1500 TABLET, EXTENDED RELEASE ORAL at 10:04

## 2025-04-12 RX ADMIN — DIPHENHYDRAMINE HYDROCHLORIDE 25 MG: 50 INJECTION INTRAMUSCULAR; INTRAVENOUS at 11:04

## 2025-04-12 RX ADMIN — FUROSEMIDE 80 MG: 10 INJECTION, SOLUTION INTRAVENOUS at 03:04

## 2025-04-12 RX ADMIN — SENNOSIDES AND DOCUSATE SODIUM 1 TABLET: 50; 8.6 TABLET ORAL at 08:04

## 2025-04-12 RX ADMIN — DAPAGLIFLOZIN 10 MG: 10 TABLET, FILM COATED ORAL at 09:04

## 2025-04-12 RX ADMIN — SENNOSIDES AND DOCUSATE SODIUM 1 TABLET: 50; 8.6 TABLET ORAL at 09:04

## 2025-04-12 RX ADMIN — DIPHENHYDRAMINE HYDROCHLORIDE 25 MG: 50 INJECTION INTRAMUSCULAR; INTRAVENOUS at 02:04

## 2025-04-12 RX ADMIN — HEPARIN SODIUM 5000 UNITS: 5000 INJECTION INTRAVENOUS; SUBCUTANEOUS at 10:04

## 2025-04-12 RX ADMIN — PROCHLORPERAZINE EDISYLATE 5 MG: 5 INJECTION INTRAMUSCULAR; INTRAVENOUS at 11:04

## 2025-04-12 RX ADMIN — HEPARIN SODIUM 5000 UNITS: 5000 INJECTION INTRAVENOUS; SUBCUTANEOUS at 01:04

## 2025-04-12 RX ADMIN — ASPIRIN 81 MG: 81 TABLET, COATED ORAL at 09:04

## 2025-04-12 RX ADMIN — KETOROLAC TROMETHAMINE 15 MG: 30 INJECTION, SOLUTION INTRAMUSCULAR at 11:04

## 2025-04-12 RX ADMIN — KETOROLAC TROMETHAMINE 15 MG: 30 INJECTION, SOLUTION INTRAMUSCULAR; INTRAVENOUS at 02:04

## 2025-04-12 RX ADMIN — ACETAMINOPHEN 650 MG: 325 TABLET ORAL at 10:04

## 2025-04-12 RX ADMIN — SACUBITRIL AND VALSARTAN 1 TABLET: 24; 26 TABLET, FILM COATED ORAL at 09:04

## 2025-04-12 NOTE — H&P
[facility]  History & Physical    Patient Name: Nasra Marks  MRN: [id number]  Patient Class: OP- Observation  Admission Date: 4/11/2025  Attending Physician: Mitali Kieta, Jaxson   Primary Care Provider: Veronika Rainey MD         Patient information was obtained from patient, past medical records, and ER records.     Subjective:     Principal Problem:<principal problem not specified>    Chief Complaint:   Chief Complaint   Patient presents with    Chest Pain     Pt reports chest pain, tightness, numbness on the left hand, left sided facial pain, vomiting started today, also c/o headache, cough, abdominal pain x 2 days.         HPI: 39-year-old  female with medical history significant for combined systolic and diastolic heart failure, nonischemic cardiomyopathy status post ICD placement, hypertension and chronic kidney disease who presented to the emergency department on account of worsening shortness a breath and fatigue of 4 days' duration, nausea and vomiting x2 days duration.  She voiced baseline dyspnea, which has worsened in the last 4 days, she can only walk about 50 ft before getting short of breath, this has been associated with profound fatigue, nausea and vomiting, 3 bouts of vomiting today, no hematemesis.  Dyspnea associated with 4 pillow orthopnea, and paroxysmal nocturnal dyspnea but no pedal swelling, although noticed abdominal swelling.  She denied any chest pain but voiced cough productive of clear sputum, no hemoptysis.  She denied fever, chills or rigors however complained of left ear fullness, left-sided headache, headache said to be dull in nature, nonradiating, 7/10 in severity, she noticed some tingling sensation in her left upper extremity prior to presentation as well, but no weakness, no visual changes noticed.  She is also constipated and has tried magnesium citrate and Dulcolax with no relief.  She denied any dysuria, no frequency, no  "urgency, no straining at micturition or hematuria.  She has not had any diarrhea, but has been constipated detailed above.  She voiced compliance with the medication regimen, denied eating high salt diet, although still add salt to food when cooking.  She denied cigarette smoking, does not drink alcohol as well.  Outpatient she follows up with the General Cardiology and advanced heart failure/ transplant cardiology.  Lab obtained in the emergency room showed urinalysis without evidence of infection, BNP elevated at , serum creatinine elevated to 1.4 from baseline of 0.9  Chest x-ray showed "cardiomegaly and findings suggestive of pulmonary edema/CHF although correlation for infectious process advised".   Most recent 2D echo of 2024 showed "EF of 30-35% with grade 2 diastolic dysfunction.  Right heart catheterization of 2024, mildly elevated filling pressures on the right and left , with RA 10  PA 31/15 (20)  PCWP 15, CO 5.7 l/min  CI 2.8 l/min/m2, TD cardiac output 5.2 l/min cardiac index 2.6 l/min/m2".      Past Medical History:   Diagnosis Date    CHF (congestive heart failure)     Chronic combined systolic and diastolic congestive heart failure 2019    Essential hypertension 2012    Hypertension     dx at 16y.o.    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 disease, with heart failure 2021    ICD (implantable cardioverter-defibrillator), single, in situ 2020    Nonischemic cardiomyopathy 2019    Pacemaker 2017       Past Surgical History:   Procedure Laterality Date    CARDIAC DEFIBRILLATOR PLACEMENT  2017     SECTION      x 1    INDUCED       RIGHT HEART CATHETERIZATION Right 2022    Procedure: INSERTION, CATHETER, RIGHT HEART;  Surgeon: Timothy Prajapati Jr., MD;  Location: [facility] CATH LAB;  Service: Cardiology;  Laterality: Right;    RIGHT HEART CATHETERIZATION Right 2024    Procedure: " INSERTION, CATHETER, RIGHT HEART;  Surgeon: Lior Rueda MD;  Location: [facility] CATH LAB;  Service: Cardiology;  Laterality: Right;    TUBAL LIGATION         Review of patient's allergies indicates:   Allergen Reactions    Aldactone [spironolactone] Swelling     Lips swelled    Hydralazine analogues Other (See Comments)     headaches    Isosorbide Other (See Comments)     headaches       No current facility-administered medications on file prior to encounter.     Current Outpatient Medications on File Prior to Encounter   Medication Sig    acetaminophen (TYLENOL) 500 MG tablet Take 1 tablet (500 mg total) by mouth every 6 (six) hours as needed.    acetaminophen (TYLENOL) 500 MG tablet Take 1 tablet (500 mg total) by mouth every 4 (four) hours as needed for Pain or Temperature greater than (100.5 or greater).    acetaminophen (TYLENOL) 500 MG tablet Take 1 tablet (500 mg total) by mouth every 6 (six) hours as needed for Pain.    albuterol (PROVENTIL/VENTOLIN HFA) 90 mcg/actuation inhaler Inhale 1-2 puffs into the lungs every 6 (six) hours as needed. Rescue    amLODIPine (NORVASC) 5 MG tablet Take 5 mg by mouth.    aspirin (ECOTRIN) 81 MG EC tablet Take 1 tablet (81 mg total) by mouth once daily.    atorvastatin (LIPITOR) 40 MG tablet Take 1 tablet (40 mg total) by mouth every evening.    benzocaine-menthoL 15-3.6 mg Lozg 1 lozenge by Mucous Membrane route every 2 (two) hours as needed (sore throat).    cetirizine (ZYRTEC) 10 MG tablet Take 1 tablet (10 mg total) by mouth once daily.    cetirizine (ZYRTEC) 10 MG tablet Take 1 tablet (10 mg total) by mouth once daily.    docusate sodium (COLACE) 100 MG capsule Take 1 capsule (100 mg total) by mouth 2 (two) times daily.    eplerenone (INSPRA) 25 MG Tab Take 1 tablet (25 mg total) by mouth once daily.    FARXIGA 10 mg tablet Take 1 tablet by mouth once daily    FLUoxetine 20 MG capsule Take 1 capsule (20 mg total) by mouth once daily.    fluticasone propionate  (FLONASE) 50 mcg/actuation nasal spray 1 spray (50 mcg total) by Each Nostril route 2 (two) times daily as needed for Rhinitis.    furosemide (LASIX) 40 MG tablet Take 1 tablet (40 mg total) by mouth once daily.    gabapentin (NEURONTIN) 300 MG capsule Take 2 capsules (600 mg total) by mouth 3 (three) times daily.    hydrALAZINE (APRESOLINE) 50 MG tablet Take 1 tablet (50 mg total) by mouth 2 (two) times a day.    isosorbide mononitrate (IMDUR) 60 MG 24 hr tablet Take 1 tablet (60 mg total) by mouth once daily.    metoprolol succinate (TOPROL-XL) 200 MG 24 hr tablet Take 1 tablet (200 mg total) by mouth once daily.    potassium chloride SA (K-DUR,KLOR-CON) 20 MEQ tablet Take 2 tablets (40 mEq total) by mouth 2 (two) times daily.    sacubitriL-valsartan (ENTRESTO)  mg per tablet Take 1 tablet by mouth 2 (two) times daily.     Family History       Problem Relation (Age of Onset)    Breast cancer Other    Cancer Maternal Grandmother, Paternal Grandmother, Paternal Aunt, Paternal Uncle    Diabetes Other    Hypertension Mother, Other    No Known Problems Sister, Brother, Son, Brother          Tobacco Use    Smoking status: Never    Smokeless tobacco: Never   Substance and Sexual Activity    Alcohol use: Not Currently     Comment: occasionally    Drug use: Not Currently     Types: Marijuana     Comment: in past very little marijuana    Sexual activity: Yes     Partners: Male     Birth control/protection: None     Review of Systems   Constitutional:  Positive for appetite change and fatigue. Negative for chills and fever.   HENT:  Negative for congestion, ear discharge, ear pain and sore throat.    Eyes:  Negative for photophobia, redness and visual disturbance.   Respiratory:  Positive for cough and shortness of breath. Negative for apnea, chest tightness and wheezing.    Cardiovascular:  Negative for chest pain, palpitations and leg swelling.   Gastrointestinal:  Positive for abdominal pain, constipation, nausea  and vomiting. Negative for abdominal distention, blood in stool and diarrhea.   Endocrine: Negative for cold intolerance and heat intolerance.   Genitourinary:  Negative for difficulty urinating, dysuria, flank pain, frequency and hematuria.   Musculoskeletal:  Negative for back pain, joint swelling and neck stiffness.   Skin:  Negative for color change, pallor, rash and wound.   Neurological:  Negative for dizziness, tremors, seizures, syncope, weakness, light-headedness and headaches.   Psychiatric/Behavioral:  Negative for behavioral problems, confusion, hallucinations and suicidal ideas. The patient is not hyperactive.      Objective:     Vital Signs (Most Recent):  Temp: 98.5 °F (36.9 °C) (04/12/25 0203)  Pulse: 76 (04/12/25 0203)  Resp: (!) 23 (04/12/25 0100)  BP: 109/74 (04/12/25 0203)  SpO2: 99 % (04/12/25 0203) Vital Signs (24h Range):  Temp:  [97.7 °F (36.5 °C)-98.5 °F (36.9 °C)] 98.5 °F (36.9 °C)  Pulse:  [76-98] 76  Resp:  [14-23] 23  SpO2:  [95 %-100 %] 99 %  BP: ()/(67-95) 109/74     Weight: 82 kg (180 lb 12.4 oz)  Body mass index is 26.7 kg/m².     Physical Exam  Vitals and nursing note reviewed.   Constitutional:       General: She is not in acute distress.     Appearance: Normal appearance. She is not ill-appearing, toxic-appearing or diaphoretic.   HENT:      Head: Normocephalic and atraumatic.      Nose: Nose normal. No congestion or rhinorrhea.      Mouth/Throat:      Mouth: Mucous membranes are moist.   Eyes:      General: No scleral icterus.     Extraocular Movements: Extraocular movements intact.      Conjunctiva/sclera: Conjunctivae normal.      Pupils: Pupils are equal, round, and reactive to light.   Cardiovascular:      Rate and Rhythm: Normal rate and regular rhythm.      Pulses: Normal pulses.      Heart sounds:      No friction rub. Gallop (S3, S1 and S2 gallop) present.   Pulmonary:      Effort: Pulmonary effort is normal. No respiratory distress.      Breath sounds: No stridor.  Rales (bibasal) present. No rhonchi.   Chest:      Chest wall: No tenderness.   Abdominal:      General: Abdomen is flat. Bowel sounds are normal. There is no distension.      Palpations: Abdomen is soft.      Tenderness: There is no abdominal tenderness. There is no right CVA tenderness, left CVA tenderness, guarding or rebound.   Musculoskeletal:         General: No swelling.      Cervical back: Normal range of motion. No rigidity.      Right lower leg: No edema.      Left lower leg: No edema.   Skin:     General: Skin is warm and dry.      Coloration: Skin is not jaundiced.      Findings: No bruising.   Neurological:      General: No focal deficit present.      Mental Status: She is alert and oriented to person, place, and time. Mental status is at baseline.      Cranial Nerves: No cranial nerve deficit.      Sensory: No sensory deficit.   Psychiatric:         Mood and Affect: Mood normal.         Behavior: Behavior normal.         Thought Content: Thought content normal.         Judgment: Judgment normal.              CRANIAL NERVES     CN III, IV, VI   Pupils are equal, round, and reactive to light.       Significant Labs: All pertinent labs within the past 24 hours have been reviewed.    ABGs:   Recent Labs   Lab 04/11/25  2123   PH 7.365   PCO2 38.8   HCO3 22.2*   POCSATURATED 32   BE -3*   PO2 21*     Urine Studies:   Recent Labs   Lab 04/11/25  2155   COLORU Yellow   SPECGRAV >=1.030*   NITRITE Negative   UROBILINOGEN 0.2   LEUKOCYTESUR Negative       Significant Imaging: I have reviewed all pertinent imaging results/findings within the past 24 hours.  Imaging Results              X-Ray Chest PA And Lateral (Final result)  Result time 04/11/25 23:36:35      Final result by Jennifer Engle MD (04/11/25 23:36:35)                   Impression:      Cardiomegaly and findings suggestive of pulmonary edema/CHF although correlation for infectious process advised.      Electronically signed by: Jennifer Engle  MD  Date:    04/11/2025  Time:    23:36               Narrative:    EXAMINATION:  XR CHEST PA AND LATERAL    CLINICAL HISTORY:  Chest pain, unspecified    TECHNIQUE:  PA and lateral views of the chest were performed.    COMPARISON:  02/16/2025    FINDINGS:  Cardiac monitoring leads overlie the chest.  There is unchanged enlargement of the cardiomediastinal silhouette.  There is a stably positioned left-sided cardiac pacing device in place.  The lungs are symmetrically expanded with diffuse increased interstitial and parenchymal attenuation can be seen with pulmonary edema/CHF.  No substantial volume of pleural fluid or pneumothorax.  Osseous structures intact.                                       X-Ray Abdomen Flat And Erect (Final result)  Result time 04/12/25 00:38:35      Final result by Ana Chase MD (04/12/25 00:38:35)                   Impression:      Nonspecific bowel gas pattern.      Electronically signed by: Ana Chase  Date:    04/12/2025  Time:    00:38               Narrative:    EXAMINATION:  ABDOMEN FLAT AND ERECT    CLINICAL HISTORY:  Constipation, unspecified    TECHNIQUE:  Abdomen flat and erect radiographs were submitted.    COMPARISON:  None.    FINDINGS:  Abdomen flat and erect radiographs demonstrate a nonspecific bowel gas pattern.  There are no dilated loops of small bowel or air-fluid levels detected.  There is no free air detected under the diaphragm.  Moderate colonic stool is present.                                       CT Head Without Contrast (Final result)  Result time 04/11/25 23:19:56      Final result by Jennifer Engle MD (04/11/25 23:19:56)                   Impression:      1. No CT evidence of acute intracranial abnormality. If the patient's headaches are sufficiently clinically suspicious, or associated with signs of elevated ICP, focal neurologic deficits, nausea, or vomiting, further evaluation with MRI can be performed if there are no clinical  contraindications.  2. Findings in keeping with remote right cerebellar infarct, unchanged from prior exam.      Electronically signed by: Jennifer Engle MD  Date:    04/11/2025  Time:    23:19               Narrative:    EXAMINATION:  CT HEAD WITHOUT CONTRAST    CLINICAL HISTORY:  Headache, new or worsening, neuro deficit (Age 19-49y);    TECHNIQUE:  Low dose axial images were obtained through the head.  Coronal and sagittal reformations were also performed. Contrast was not administered.    COMPARISON:  MRI and CTA 08/27/2024    FINDINGS:  There is no acute intracranial hemorrhage, hydrocephalus, midline shift or mass effect. There is remote right cerebellar infarct, unchanged from prior exam.  Remaining gray-white matter differentiation appears maintained.  The basal cisterns are patent. The visualized paranasal sinuses and mastoid air cells are clear of acute process.  The visualized bones of the calvarium demonstrate no acute osseous abnormality.                                      Assessment/Plan:     Assessment & Plan  Essential hypertension  Patient's blood pressure range in the last 24 hours was: BP  Min: 93/67  Max: 125/95.The patient's inpatient anti-hypertensive regimen is listed below:  Current Antihypertensives  metoprolol succinate (TOPROL-XL) 24 hr tablet 200 mg, Daily, Oral  furosemide injection 80 mg, Every 12 hours, Intravenous  Entresto.    Plan  - BP is controlled, no changes needed to their regimen  - lifestyle modification  - cardiac diet  Acute on chronic combined systolic and diastolic congestive heart failure  Patient has Combined Systolic and Diastolic heart failure that is Acute on chronic. On presentation their CHF was decompensated. Evidence of decompensated CHF on presentation includes: elevated JVD, crackles on lung auscultation, weight gain, orthopnea, paroxysmal nocturnal dyspnea (PND), and dyspnea on exertion (ELENA). The etiology of their decompensation is likely dietary indiscretion  and increased fluid intake. Most recent BNP and echo results are listed below.  BNP 2020  Latest ECHO  Results for orders placed during the hospital encounter of 08/09/24    Echo    Interpretation Summary    Left Ventricle: The left ventricle is moderately dilated. Normal wall thickness. Moderate global hypokinesis present. There is moderately reduced systolic function with a visually estimated ejection fraction of 30 - 35%. Grade II diastolic dysfunction. Normal left ventricular filling pressure.    Right Ventricle: Normal right ventricular cavity size. Wall thickness is normal. Right ventricle wall motion  is normal. Systolic function is normal.    Left Atrium: Normal left atrial size.    Right Atrium: Normal right atrial size.    Aortic Valve: The aortic valve is a trileaflet valve. There is mild aortic valve sclerosis. There is mild annular calcification present.    Mitral Valve: There is mild bileaflet sclerosis. There is mild regurgitation.    Aorta: Aortic root is normal in size measuring 2.81 cm. Ascending aorta is normal measuring 2.96 cm.    Pulmonary Artery: The estimated pulmonary artery systolic pressure is 19 mmHg.    IVC/SVC: Normal venous pressure at 3 mmHg.    Current Heart Failure Medications  metoprolol succinate (TOPROL-XL) 24 hr tablet 200 mg, Daily, Oral  sacubitriL-valsartan 24-26 mg per tablet 1 tablet, 2 times daily, Oral  dapagliflozin propanediol (Farxiga) tablet 10 mg, Daily, Oral  furosemide injection 80 mg, Every 12 hours, Intravenous    Plan  - Monitor strict I&Os and daily weights.    - Place on telemetry  - Low sodium diet  - Place on fluid restriction of 1.5 L.   - Cardiology has been consulted  - The patient's volume status is improving but not at their baseline as indicated by elevated JVD, crackles on lung auscultation, weight gain, orthopnea, paroxysmal nocturnal dyspnea (PND), and dyspnea on exertion (ELENA)  - continue lifestyle modification  -will hold off on restarting  epilerenone due to low blood pressure, will restart when blood pressure can tolerate  -okay to adjust Entresto to home dose as tolerated      Nonischemic cardiomyopathy  History of nonischemic cardiomyopathy  Unsure of cause, previous 2D echo showed EF of 30-35%  Continue management per heart failure  Continue lifestyle modification  Low-salt diet, low-cholesterol diet.    ICD (implantable cardioverter-defibrillator), single, in situ  Stable, no complaint  Will ensure electrolytes corrected  Labs obtained were point of care, will repeat at this time    Hx of ischemic right PCA stroke  Will continue risk reduction  No current deficits reported  Continue statin.    VTE Risk Mitigation (From admission, onward)           Ordered     heparin (porcine) injection 5,000 Units  Every 8 hours         04/12/25 0321     IP VTE HIGH RISK PATIENT  Once         04/12/25 0321     Place sequential compression device  Until discontinued         04/12/25 0321                       On 04/12/2025, patient should be placed in hospital observation services under my care.             Noel Mcdowell MD  Department of Hospital Medicine  [facility] [place] [facility]

## 2025-04-12 NOTE — PT/OT/SLP EVAL
Physical Therapy Evaluation and Discharge Note    Patient Name:  Nasra Marks   MRN:  [id number]    Recommendations:     Discharge Recommendations: No Therapy Indicated  Discharge Equipment Recommendations: none   Barriers to discharge: None    Assessment:     Nasra Marks is a 39 y.o. female admitted with a medical diagnosis of <principal problem not specified>. At this time, patient is functioning at their prior level of function and does not require further acute PT services. Patient was independent with bed mobility, transfers, and gait. Patient was able to perform 5x STS in 12.49 seconds and ambulated 500 ft with supervision secondary to current medical condition but no LOB observed. She reported 8/10 perceived difficulty due to feeling chest tightness and a little SOB.  VS assessed piror to activity were /88 mmHg. Post-activity VS were /78 mmHg and HR 91 bpm. Patient was educated on importance of regular mobility to prevent deconditioning and was advised to ambulate in the hallway with nurse's permission or supervision as appropriate. Based on current presentation and functional mobility, pt does not require skilled PT services at this time. Please reconsult if her clinical status changes.     Recent Surgery: * No surgery found *      Plan:     During this hospitalization, patient does not require further acute PT services.  Please re-consult if situation changes.      Subjective     Chief Complaint: chest tightness  Patient/Family Comments/goals: patient reports that she still gets chest tightness and L UE tingling symptoms.  Pain/Comfort:  Pain Rating 1: 0/10 (does not have any pain but feels sojme chest tightness)  Pain Addressed 1: Nurse notified    Patients cultural, spiritual, Pentec[employer]alism conflicts given the current situation: no    Living Environment:  Patient  lives w/ spouse and 16yo son  in a single story home, number of outside stairs: 0, tub-shower combo.   Prior to admission,  patients level of function was independent.  Equipment used at home: none.  DME owned (not currently used): none.  Upon discharge, patient will have assistance from spouse and son.    Objective:     Communicated with nurse prior to session.  Patient found supine with blood pressure cuff, peripheral IV upon PT entry to room.    General Precautions: Standard,      Orthopedic Precautions:N/A   Braces: N/A  Respiratory Status: Room air    Exams:  Cognitive Exam:  Patient is oriented to Person, Place, Time, and Situation  Postural Exam:  Patient presented with the following abnormalities:    -       No postural abnormalities identified  Sensation:    -       Intact  Skin Integrity/Edema:      -       Skin integrity: Visible skin intact  RUE ROM: WNL  RUE Strength: WNL  LUE ROM: WNL  LUE Strength: WNL  RLE ROM: WNL  RLE Strength: WNL  LLE ROM: WNL  LLE Strength: WNL    Functional Mobility:  Bed Mobility:     Rolling Left:  independence  Rolling Right: independence  Supine to Sit: independence  Sit to Supine: independence  Transfers:     Sit to Stand:  independence with no AD  Gait: Patient ambulated 500 ft on level surface /c no AD (supervision  only due to her current medical condition. Patient did not demonstrate any LOB. However, she did report 8/10 difficulty during gait secondary to feeling chest tightness and a little SOB.  Balance: Static Sitting: independence at EOB; Dynamic Sitting: indepndent at EOB;Static Standing: independent; Dynamic Standing: supervision /c gait belt due to her current medical condition.     AM-PAC 6 CLICK MOBILITY  Total Score:23       Treatment and Education:  Patient was independent with bed mobility, transfers, and gait. Patient was able to perform 5x STS in 12.49 seconds and ambulated 500 ft with supervision secondary to current medical condition but no LOB observed. She reported 8/10 perceived difficulty due to feeling chest tightness and a little SOB.  VS assessed piror to activity  were /88 mmHg. Post-activity VS were /78 mmHg and HR 91 bpm.    Patient was educated on importance of regular mobility to prevent deconditioning and was advised to ambulate in the hallway with nurse's permission or supervision as appropriate.     AM-PAC 6 CLICK MOBILITY  Total Score:23     Patient left supine with all lines intact, call button in reach, and nurse notified.    GOALS:   Multidisciplinary Problems       Physical Therapy Goals       Not on file              Multidisciplinary Problems (Resolved)          Problem: Physical Therapy    Goal Priority Disciplines Outcome Interventions   Physical Therapy Goal   (Resolved)     PT, PT/OT Met                        DME Justifications:  No DME recommended requiring DME justifications    History:     Past Medical History:   Diagnosis Date    CHF (congestive heart failure)     Chronic combined systolic and diastolic congestive heart failure 2019    Essential hypertension 2012    Hypertension     dx at 16y.o.    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 disease, with heart failure 2021    ICD (implantable cardioverter-defibrillator), single, in situ 2020    Nonischemic cardiomyopathy 2019    Pacemaker 2017       Past Surgical History:   Procedure Laterality Date    CARDIAC DEFIBRILLATOR PLACEMENT  2017     SECTION      x 1    INDUCED       RIGHT HEART CATHETERIZATION Right 2022    Procedure: INSERTION, CATHETER, RIGHT HEART;  Surgeon: Timothy Prajapati Jr., MD;  Location: [facility] CATH LAB;  Service: Cardiology;  Laterality: Right;    RIGHT HEART CATHETERIZATION Right 2024    Procedure: INSERTION, CATHETER, RIGHT HEART;  Surgeon: Lior Rueda MD;  Location: [facility] CATH LAB;  Service: Cardiology;  Laterality: Right;    TUBAL LIGATION         Time Tracking:     PT Received On: 25  PT Start Time: 845     PT Stop Time: 908  PT Total Time  (min): 23 min     Billable Minutes: Evaluation 8 minutes and Gait Training 15 minutes      04/12/2025

## 2025-04-12 NOTE — NURSING
[facility], [facility] [place]  Nurses Note -- 4 Eyes      4/12/2025       Skin assessed on: Q Shift      [x] No Pressure Injuries Present    [x]Prevention Measures Documented    [] Yes LDA  for Pressure Injury Previously documented     [] Yes New Pressure Injury Discovered   [] LDA for New Pressure Injury Added      Attending RN:  Keyla Varma LPN     Second RN:  DIGNA Barksdale

## 2025-04-12 NOTE — NURSING
Received pt from ED to room via stretcher. Pt transported by [facility]. Transferred pt to bed. Evaluated patient condition. Admit assessment initiated.  Saline lock to RAC flushed, intact. No c/o pain. NAD noted. Informed Dr. Mcodwell of the patient's admission.  [facility], [facility] [place]  Nurses Note -- 4 Eyes      4/12/2025       Skin assessed on: Admit      [x] No Pressure Injuries Present    [x]Prevention Measures Documented    [] Yes LDA  for Pressure Injury Previously documented     [] Yes New Pressure Injury Discovered   [] LDA for New Pressure Injury Added      Attending RN:  Apolonia Hewitt, RN     Second RN:  Grace Umana,RN

## 2025-04-12 NOTE — PLAN OF CARE
Problem: Adult Inpatient Plan of Care  Goal: Plan of Care Review  Outcome: Progressing  Goal: Patient-Specific Goal (Individualized)  Outcome: Progressing  Goal: Absence of Hospital-Acquired Illness or Injury  Outcome: Progressing  Goal: Optimal Comfort and Wellbeing  Outcome: Progressing  Goal: Readiness for Transition of Care  Outcome: Progressing

## 2025-04-12 NOTE — CARE UPDATE
Admitted for congestive heart failure         Patient seen and examined  Reviewed H&P, labs, vital signs  Continue current medicine regimen  Repeat echo:    Left Ventricle: The left ventricle is severely dilated. There is severely reduced systolic function with a visually estimated ejection fraction of 10 -15%. Grade III diastolic dysfunction.    Right Ventricle: The right ventricle has mild enlargement. Systolic function is mildly reduced.    Left Atrium: Moderately dilated    Mitral Valve: There is mild to moderate regurgitation.    Tricuspid Valve: There is mild regurgitation.    Pulmonary Artery: The estimated pulmonary artery systolic pressure is 21 mmHg.    IVC/SVC: Normal venous pressure at 3 mmHg.    Cards recs: Diuresis and afterload reduction as tolerated. Titrate up entresto - intolerant to aldactone. Continue GDMT. Will f/u PRN

## 2025-04-12 NOTE — PLAN OF CARE
Case Management Assessment     PCP: Dr. Rainey   Pharmacy: [facility]     Patient Arrived From: Home  Existing Help at Home: Spouse Jacob [phone]    Barriers to Discharge: None    Discharge Plan:    A. Home   B. Home    Pt independent with ADLs; No HME; family to provide transportation upon discharge.    04/12/25 1143   Discharge Assessment   Assessment Type Discharge Planning Brief Assessment   Confirmed/corrected address, phone number and insurance Yes   Confirmed Demographics Correct on Facesheet   Source of Information patient   Does patient/caregiver understand observation status Yes   Reason For Admission SOB   People in Home spouse   Facility Arrived From: Home   Do you expect to return to your current living situation? Yes   Do you have help at home or someone to help you manage your care at home? Yes   Who are your caregiver(s) and their phone number(s)? Jacob Marks [phone]   Prior to hospitilization cognitive status: Alert/Oriented   Current cognitive status: Alert/Oriented   Walking or Climbing Stairs Difficulty no   Dressing/Bathing Difficulty no   Equipment Currently Used at Home none   Readmission within 30 days? No   Patient currently being followed by outpatient case management? No   Do you currently have service(s) that help you manage your care at home? No   Do you take prescription medications? Yes   Do you have prescription coverage? Yes   Do you have any problems affording any of your prescribed medications? No   Is the patient taking medications as prescribed? yes   Who is going to help you get home at discharge? Family   How do you get to doctors appointments? car, drives self   Are you on dialysis? No   Do you take coumadin? No   Discharge Plan A Home   Discharge Plan B Home   DME Needed Upon Discharge  other (see comments)  (TBD)   Discharge Plan discussed with: Patient   Transition of Care Barriers None

## 2025-04-12 NOTE — ASSESSMENT & PLAN NOTE
Patient's blood pressure range in the last 24 hours was: BP  Min: 93/67  Max: 125/95.The patient's inpatient anti-hypertensive regimen is listed below:  Current Antihypertensives  metoprolol succinate (TOPROL-XL) 24 hr tablet 200 mg, Daily, Oral  furosemide injection 80 mg, Every 12 hours, Intravenous  Entresto.    Plan  - BP is controlled, no changes needed to their regimen  - lifestyle modification  - cardiac diet

## 2025-04-12 NOTE — ASSESSMENT & PLAN NOTE
Stable, no complaint  Will ensure electrolytes corrected  Labs obtained were point of care, will repeat at this time

## 2025-04-12 NOTE — SUBJECTIVE & OBJECTIVE
Past Medical History:   Diagnosis Date    CHF (congestive heart failure)     Chronic combined systolic and diastolic congestive heart failure 2019    Essential hypertension 2012    Hypertension     dx at 16y.o.    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 disease, with heart failure 2021    ICD (implantable cardioverter-defibrillator), single, in situ 2020    Nonischemic cardiomyopathy 2019    Pacemaker 2017       Past Surgical History:   Procedure Laterality Date    CARDIAC DEFIBRILLATOR PLACEMENT  2017     SECTION      x 1    INDUCED       RIGHT HEART CATHETERIZATION Right 2022    Procedure: INSERTION, CATHETER, RIGHT HEART;  Surgeon: Timothy Prajapati Jr., MD;  Location: [facility] CATH LAB;  Service: Cardiology;  Laterality: Right;    RIGHT HEART CATHETERIZATION Right 2024    Procedure: INSERTION, CATHETER, RIGHT HEART;  Surgeon: Lior Rueda MD;  Location: [facility] CATH LAB;  Service: Cardiology;  Laterality: Right;    TUBAL LIGATION         Review of patient's allergies indicates:   Allergen Reactions    Aldactone [spironolactone] Swelling     Lips swelled    Hydralazine analogues Other (See Comments)     headaches    Isosorbide Other (See Comments)     headaches       No current facility-administered medications on file prior to encounter.     Current Outpatient Medications on File Prior to Encounter   Medication Sig    acetaminophen (TYLENOL) 500 MG tablet Take 1 tablet (500 mg total) by mouth every 6 (six) hours as needed.    acetaminophen (TYLENOL) 500 MG tablet Take 1 tablet (500 mg total) by mouth every 4 (four) hours as needed for Pain or Temperature greater than (100.5 or greater).    acetaminophen (TYLENOL) 500 MG tablet Take 1 tablet (500 mg total) by mouth every 6 (six) hours as needed for Pain.    albuterol (PROVENTIL/VENTOLIN HFA) 90 mcg/actuation inhaler Inhale 1-2 puffs into the lungs every  6 (six) hours as needed. Rescue    amLODIPine (NORVASC) 5 MG tablet Take 5 mg by mouth.    aspirin (ECOTRIN) 81 MG EC tablet Take 1 tablet (81 mg total) by mouth once daily.    atorvastatin (LIPITOR) 40 MG tablet Take 1 tablet (40 mg total) by mouth every evening.    benzocaine-menthoL 15-3.6 mg Lozg 1 lozenge by Mucous Membrane route every 2 (two) hours as needed (sore throat).    cetirizine (ZYRTEC) 10 MG tablet Take 1 tablet (10 mg total) by mouth once daily.    cetirizine (ZYRTEC) 10 MG tablet Take 1 tablet (10 mg total) by mouth once daily.    docusate sodium (COLACE) 100 MG capsule Take 1 capsule (100 mg total) by mouth 2 (two) times daily.    eplerenone (INSPRA) 25 MG Tab Take 1 tablet (25 mg total) by mouth once daily.    FARXIGA 10 mg tablet Take 1 tablet by mouth once daily    FLUoxetine 20 MG capsule Take 1 capsule (20 mg total) by mouth once daily.    fluticasone propionate (FLONASE) 50 mcg/actuation nasal spray 1 spray (50 mcg total) by Each Nostril route 2 (two) times daily as needed for Rhinitis.    furosemide (LASIX) 40 MG tablet Take 1 tablet (40 mg total) by mouth once daily.    gabapentin (NEURONTIN) 300 MG capsule Take 2 capsules (600 mg total) by mouth 3 (three) times daily.    hydrALAZINE (APRESOLINE) 50 MG tablet Take 1 tablet (50 mg total) by mouth 2 (two) times a day.    isosorbide mononitrate (IMDUR) 60 MG 24 hr tablet Take 1 tablet (60 mg total) by mouth once daily.    metoprolol succinate (TOPROL-XL) 200 MG 24 hr tablet Take 1 tablet (200 mg total) by mouth once daily.    potassium chloride SA (K-DUR,KLOR-CON) 20 MEQ tablet Take 2 tablets (40 mEq total) by mouth 2 (two) times daily.    sacubitriL-valsartan (ENTRESTO)  mg per tablet Take 1 tablet by mouth 2 (two) times daily.     Family History       Problem Relation (Age of Onset)    Breast cancer Other    Cancer Maternal Grandmother, Paternal Grandmother, Paternal Aunt, Paternal Uncle    Diabetes Other    Hypertension Mother, Other     No Known Problems Sister, Brother, Son, Brother          Tobacco Use    Smoking status: Never    Smokeless tobacco: Never   Substance and Sexual Activity    Alcohol use: Not Currently     Comment: occasionally    Drug use: Not Currently     Types: Marijuana     Comment: in past very little marijuana    Sexual activity: Yes     Partners: Male     Birth control/protection: None     Review of Systems   Constitutional:  Positive for appetite change and fatigue. Negative for chills and fever.   HENT:  Negative for congestion, ear discharge, ear pain and sore throat.    Eyes:  Negative for photophobia, redness and visual disturbance.   Respiratory:  Positive for cough and shortness of breath. Negative for apnea, chest tightness and wheezing.    Cardiovascular:  Negative for chest pain, palpitations and leg swelling.   Gastrointestinal:  Positive for abdominal pain, constipation, nausea and vomiting. Negative for abdominal distention, blood in stool and diarrhea.   Endocrine: Negative for cold intolerance and heat intolerance.   Genitourinary:  Negative for difficulty urinating, dysuria, flank pain, frequency and hematuria.   Musculoskeletal:  Negative for back pain, joint swelling and neck stiffness.   Skin:  Negative for color change, pallor, rash and wound.   Neurological:  Negative for dizziness, tremors, seizures, syncope, weakness, light-headedness and headaches.   Psychiatric/Behavioral:  Negative for behavioral problems, confusion, hallucinations and suicidal ideas. The patient is not hyperactive.      Objective:     Vital Signs (Most Recent):  Temp: 98.5 °F (36.9 °C) (04/12/25 0203)  Pulse: 76 (04/12/25 0203)  Resp: (!) 23 (04/12/25 0100)  BP: 109/74 (04/12/25 0203)  SpO2: 99 % (04/12/25 0203) Vital Signs (24h Range):  Temp:  [97.7 °F (36.5 °C)-98.5 °F (36.9 °C)] 98.5 °F (36.9 °C)  Pulse:  [76-98] 76  Resp:  [14-23] 23  SpO2:  [95 %-100 %] 99 %  BP: ()/(67-95) 109/74     Weight: 82 kg (180 lb 12.4  oz)  Body mass index is 26.7 kg/m².     Physical Exam  Vitals and nursing note reviewed.   Constitutional:       General: She is not in acute distress.     Appearance: Normal appearance. She is not ill-appearing, toxic-appearing or diaphoretic.   HENT:      Head: Normocephalic and atraumatic.      Nose: Nose normal. No congestion or rhinorrhea.      Mouth/Throat:      Mouth: Mucous membranes are moist.   Eyes:      General: No scleral icterus.     Extraocular Movements: Extraocular movements intact.      Conjunctiva/sclera: Conjunctivae normal.      Pupils: Pupils are equal, round, and reactive to light.   Cardiovascular:      Rate and Rhythm: Normal rate and regular rhythm.      Pulses: Normal pulses.      Heart sounds:      No friction rub. Gallop (S3, S1 and S2 gallop) present.   Pulmonary:      Effort: Pulmonary effort is normal. No respiratory distress.      Breath sounds: No stridor. Rales (bibasal) present. No rhonchi.   Chest:      Chest wall: No tenderness.   Abdominal:      General: Abdomen is flat. Bowel sounds are normal. There is no distension.      Palpations: Abdomen is soft.      Tenderness: There is no abdominal tenderness. There is no right CVA tenderness, left CVA tenderness, guarding or rebound.   Musculoskeletal:         General: No swelling.      Cervical back: Normal range of motion. No rigidity.      Right lower leg: No edema.      Left lower leg: No edema.   Skin:     General: Skin is warm and dry.      Coloration: Skin is not jaundiced.      Findings: No bruising.   Neurological:      General: No focal deficit present.      Mental Status: She is alert and oriented to person, place, and time. Mental status is at baseline.      Cranial Nerves: No cranial nerve deficit.      Sensory: No sensory deficit.   Psychiatric:         Mood and Affect: Mood normal.         Behavior: Behavior normal.         Thought Content: Thought content normal.         Judgment: Judgment normal.              CRANIAL  NERVES     CN III, IV, VI   Pupils are equal, round, and reactive to light.       Significant Labs: All pertinent labs within the past 24 hours have been reviewed.    ABGs:   Recent Labs   Lab 04/11/25 2123   PH 7.365   PCO2 38.8   HCO3 22.2*   POCSATURATED 32   BE -3*   PO2 21*     Urine Studies:   Recent Labs   Lab 04/11/25 2155   COLORU Yellow   SPECGRAV >=1.030*   NITRITE Negative   UROBILINOGEN 0.2   LEUKOCYTESUR Negative       Significant Imaging: I have reviewed all pertinent imaging results/findings within the past 24 hours.  Imaging Results              X-Ray Chest PA And Lateral (Final result)  Result time 04/11/25 23:36:35      Final result by Jennifer Engle MD (04/11/25 23:36:35)                   Impression:      Cardiomegaly and findings suggestive of pulmonary edema/CHF although correlation for infectious process advised.      Electronically signed by: Jennifer Engle MD  Date:    04/11/2025  Time:    23:36               Narrative:    EXAMINATION:  XR CHEST PA AND LATERAL    CLINICAL HISTORY:  Chest pain, unspecified    TECHNIQUE:  PA and lateral views of the chest were performed.    COMPARISON:  02/16/2025    FINDINGS:  Cardiac monitoring leads overlie the chest.  There is unchanged enlargement of the cardiomediastinal silhouette.  There is a stably positioned left-sided cardiac pacing device in place.  The lungs are symmetrically expanded with diffuse increased interstitial and parenchymal attenuation can be seen with pulmonary edema/CHF.  No substantial volume of pleural fluid or pneumothorax.  Osseous structures intact.                                       X-Ray Abdomen Flat And Erect (Final result)  Result time 04/12/25 00:38:35      Final result by Ana Chase MD (04/12/25 00:38:35)                   Impression:      Nonspecific bowel gas pattern.      Electronically signed by: Ana Chase  Date:    04/12/2025  Time:    00:38               Narrative:    EXAMINATION:  ABDOMEN FLAT  AND ERECT    CLINICAL HISTORY:  Constipation, unspecified    TECHNIQUE:  Abdomen flat and erect radiographs were submitted.    COMPARISON:  None.    FINDINGS:  Abdomen flat and erect radiographs demonstrate a nonspecific bowel gas pattern.  There are no dilated loops of small bowel or air-fluid levels detected.  There is no free air detected under the diaphragm.  Moderate colonic stool is present.                                       CT Head Without Contrast (Final result)  Result time 04/11/25 23:19:56      Final result by Jennifer Engle MD (04/11/25 23:19:56)                   Impression:      1. No CT evidence of acute intracranial abnormality. If the patient's headaches are sufficiently clinically suspicious, or associated with signs of elevated ICP, focal neurologic deficits, nausea, or vomiting, further evaluation with MRI can be performed if there are no clinical contraindications.  2. Findings in keeping with remote right cerebellar infarct, unchanged from prior exam.      Electronically signed by: Jennifer Engle MD  Date:    04/11/2025  Time:    23:19               Narrative:    EXAMINATION:  CT HEAD WITHOUT CONTRAST    CLINICAL HISTORY:  Headache, new or worsening, neuro deficit (Age 19-49y);    TECHNIQUE:  Low dose axial images were obtained through the head.  Coronal and sagittal reformations were also performed. Contrast was not administered.    COMPARISON:  MRI and CTA 08/27/2024    FINDINGS:  There is no acute intracranial hemorrhage, hydrocephalus, midline shift or mass effect. There is remote right cerebellar infarct, unchanged from prior exam.  Remaining gray-white matter differentiation appears maintained.  The basal cisterns are patent. The visualized paranasal sinuses and mastoid air cells are clear of acute process.  The visualized bones of the calvarium demonstrate no acute osseous abnormality.

## 2025-04-12 NOTE — ASSESSMENT & PLAN NOTE
Diuresis and afterload reduction as tolerated. Titrate up entresto - intolerant to aldactone. Continue GDMT. Will f/u PRN  Recent Labs     04/12/25  0539   BNP 2,875*     Latest ECHO  Results for orders placed during the hospital encounter of 04/11/25    Echo Saline Bubble? No; Ultrasound enhancing contrast? No    Interpretation Summary    Left Ventricle: The left ventricle is severely dilated. There is severely reduced systolic function with a visually estimated ejection fraction of 10 -15%. Grade III diastolic dysfunction.    Right Ventricle: The right ventricle has mild enlargement. Systolic function is mildly reduced.    Left Atrium: Moderately dilated    Mitral Valve: There is mild to moderate regurgitation.    Tricuspid Valve: There is mild regurgitation.    Pulmonary Artery: The estimated pulmonary artery systolic pressure is 21 mmHg.    IVC/SVC: Normal venous pressure at 3 mmHg.    Current Heart Failure Medications  metoprolol succinate (TOPROL-XL) 24 hr tablet 200 mg, Daily, Oral  dapagliflozin propanediol (Farxiga) tablet 10 mg, Daily, Oral  furosemide injection 80 mg, Every 12 hours, Intravenous  sacubitriL-valsartan 24-26 mg per tablet 2 tablet, 2 times daily, Oral

## 2025-04-12 NOTE — SUBJECTIVE & OBJECTIVE
Past Medical History:   Diagnosis Date    CHF (congestive heart failure)     Chronic combined systolic and diastolic congestive heart failure 2019    Essential hypertension 2012    Hypertension     dx at 16y.o.    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 disease, with heart failure 2021    ICD (implantable cardioverter-defibrillator), single, in situ 2020    Nonischemic cardiomyopathy 2019    Pacemaker 2017       Past Surgical History:   Procedure Laterality Date    CARDIAC DEFIBRILLATOR PLACEMENT  2017     SECTION      x 1    INDUCED       RIGHT HEART CATHETERIZATION Right 2022    Procedure: INSERTION, CATHETER, RIGHT HEART;  Surgeon: Timothy Prajapati Jr., MD;  Location: [facility] CATH LAB;  Service: Cardiology;  Laterality: Right;    RIGHT HEART CATHETERIZATION Right 2024    Procedure: INSERTION, CATHETER, RIGHT HEART;  Surgeon: Lior Rueda MD;  Location: [facility] CATH LAB;  Service: Cardiology;  Laterality: Right;    TUBAL LIGATION         Review of patient's allergies indicates:   Allergen Reactions    Aldactone [spironolactone] Swelling     Lips swelled    Hydralazine analogues Other (See Comments)     headaches    Isosorbide Other (See Comments)     headaches       No current facility-administered medications on file prior to encounter.     Current Outpatient Medications on File Prior to Encounter   Medication Sig    acetaminophen (TYLENOL) 500 MG tablet Take 1 tablet (500 mg total) by mouth every 6 (six) hours as needed.    acetaminophen (TYLENOL) 500 MG tablet Take 1 tablet (500 mg total) by mouth every 4 (four) hours as needed for Pain or Temperature greater than (100.5 or greater).    acetaminophen (TYLENOL) 500 MG tablet Take 1 tablet (500 mg total) by mouth every 6 (six) hours as needed for Pain.    albuterol (PROVENTIL/VENTOLIN HFA) 90 mcg/actuation inhaler Inhale 1-2 puffs into the lungs every  6 (six) hours as needed. Rescue    amLODIPine (NORVASC) 5 MG tablet Take 5 mg by mouth.    aspirin (ECOTRIN) 81 MG EC tablet Take 1 tablet (81 mg total) by mouth once daily.    atorvastatin (LIPITOR) 40 MG tablet Take 1 tablet (40 mg total) by mouth every evening.    benzocaine-menthoL 15-3.6 mg Lozg 1 lozenge by Mucous Membrane route every 2 (two) hours as needed (sore throat).    cetirizine (ZYRTEC) 10 MG tablet Take 1 tablet (10 mg total) by mouth once daily.    cetirizine (ZYRTEC) 10 MG tablet Take 1 tablet (10 mg total) by mouth once daily.    docusate sodium (COLACE) 100 MG capsule Take 1 capsule (100 mg total) by mouth 2 (two) times daily.    eplerenone (INSPRA) 25 MG Tab Take 1 tablet (25 mg total) by mouth once daily.    FARXIGA 10 mg tablet Take 1 tablet by mouth once daily    FLUoxetine 20 MG capsule Take 1 capsule (20 mg total) by mouth once daily.    fluticasone propionate (FLONASE) 50 mcg/actuation nasal spray 1 spray (50 mcg total) by Each Nostril route 2 (two) times daily as needed for Rhinitis.    furosemide (LASIX) 40 MG tablet Take 1 tablet (40 mg total) by mouth once daily.    gabapentin (NEURONTIN) 300 MG capsule Take 2 capsules (600 mg total) by mouth 3 (three) times daily.    hydrALAZINE (APRESOLINE) 50 MG tablet Take 1 tablet (50 mg total) by mouth 2 (two) times a day.    isosorbide mononitrate (IMDUR) 60 MG 24 hr tablet Take 1 tablet (60 mg total) by mouth once daily.    metoprolol succinate (TOPROL-XL) 200 MG 24 hr tablet Take 1 tablet (200 mg total) by mouth once daily.    potassium chloride SA (K-DUR,KLOR-CON) 20 MEQ tablet Take 2 tablets (40 mEq total) by mouth 2 (two) times daily.    sacubitriL-valsartan (ENTRESTO)  mg per tablet Take 1 tablet by mouth 2 (two) times daily.     Family History       Problem Relation (Age of Onset)    Breast cancer Other    Cancer Maternal Grandmother, Paternal Grandmother, Paternal Aunt, Paternal Uncle    Diabetes Other    Hypertension Mother, Other     No Known Problems Sister, Brother, Son, Brother          Tobacco Use    Smoking status: Never    Smokeless tobacco: Never   Substance and Sexual Activity    Alcohol use: Not Currently     Comment: occasionally    Drug use: Not Currently     Types: Marijuana     Comment: in past very little marijuana    Sexual activity: Yes     Partners: Male     Birth control/protection: None     Review of Systems   Constitutional: Negative for decreased appetite.   HENT:  Negative for ear discharge.    Eyes:  Negative for blurred vision.   Respiratory:  Negative for hemoptysis.    Endocrine: Negative for polyphagia.   Hematologic/Lymphatic: Negative for adenopathy.   Skin:  Negative for color change and nail changes.   Musculoskeletal:  Negative for joint swelling.   Genitourinary:  Negative for bladder incontinence.   Neurological:  Negative for aphonia and brief paralysis.   Psychiatric/Behavioral:  Negative for hallucinations.    Allergic/Immunologic: Negative for hives.     Objective:     Vital Signs (Most Recent):  Temp: 98.2 °F (36.8 °C) (04/12/25 0716)  Pulse: 85 (04/12/25 0807)  Resp: 18 (04/12/25 0716)  BP: 110/77 (04/12/25 0716)  SpO2: 99 % (04/12/25 0716) Vital Signs (24h Range):  Temp:  [97.7 °F (36.5 °C)-98.5 °F (36.9 °C)] 98.2 °F (36.8 °C)  Pulse:  [76-98] 85  Resp:  [14-23] 18  SpO2:  [95 %-100 %] 99 %  BP: ()/(67-95) 110/77     Weight: 82 kg (180 lb 12.4 oz)  Body mass index is 26.7 kg/m².    SpO2: 99 %         Intake/Output Summary (Last 24 hours) at 4/12/2025 1036  Last data filed at 4/12/2025 0918  Gross per 24 hour   Intake 120 ml   Output 1850 ml   Net -1730 ml       Lines/Drains/Airways       Peripheral Intravenous Line  Duration                  Peripheral IV - Single Lumen 04/11/25 2140 20 G Right Antecubital <1 day                     Physical Exam     Significant Labs: All pertinent lab results from the last 24 hours have been reviewed.    Significant Imaging: Echocardiogram: 2D echo with color  flow doppler:   Results for orders placed or performed during the hospital encounter of 11/20/17   2D echo with color flow doppler   Result Value Ref Range    EF + QEF 15 (A) 55 - 65    Diastolic Dysfunction Yes (A)     Est. PA Systolic Pressure 21.66     Narrative    Date of Procedure: 11/20/2017        TEST DESCRIPTION       Aorta: The aortic root is normal in size, measuring 2.4 cm at sinotubular junction and 2.7 cm at Sinuses of Valsalva. The proximal ascending aorta is normal in size, measuring 2.4 cm across.     Left Atrium: The left atrial volume index is mildly enlarged, measuring 40.28 cc/m2.     Left Ventricle: The left ventricle is moderately enlarged, with an end-diastolic diameter of 6.1 cm, and an end-systolic diameter of 5.2 cm. Wall thickness is mildly increased, with the septum measuring 1.2 cm and the posterior wall measuring 1.4 cm   across. Relative wall thickness was increased at 0.46, and the LV mass index was increased at 218.7 g/m2 consistent with concentric left ventricular hypertrophy. There are no regional wall motion abnormalities. Left ventricular systolic function appears   severely depressed. Visually estimated ejection fraction is 15-20%. The LV Doppler derived stroke volume equals 74.0 ccs.     Diastolic indices: E wave velocity 1.2 m/s, E/A ratio 2.0,  msec., E/e' ratio(avg) 21. There is diastolic dysfunction secondary to restrictive abnormality.     Right Atrium: The right atrium is normal in size, measuring 4.5 cm in length and 3.8 cm in width in the apical view.     Right Ventricle: The right ventricle is normal in size measuring 2.9 cm at the base in the apical right ventricle-focused view. Global right ventricular systolic function appears normal. Tricuspid annular plane systolic excursion (TAPSE) is 1.9 cm.   Tissue Doppler-derived tricuspid annular peak systolic velocity (S prime) is 13.6 cm/s. The estimated PA systolic pressure is 22 mmHg.     Aortic Valve:  The peak  gradient obtained across the aortic valve is 9 mmHg, with a mean gradient of 5 mmHg. Using a left ventricular outflow tract diameter of 2.4 cm, a left ventricular outflow tract velocity time integral of 16 cm, and a peak   instantaneous transvalvular velocity time integral of 27 cm, the calculated aortic valve area is 2.75 cm2.     IVC: IVC is normal in size and collapses > 50% with a sniff, suggesting normal right atrial pressure of 3 mmHg.     Intracavitary: There is no evidence of pericardial effusion, intracavity mass, thrombi, or vegetation.         CONCLUSIONS     1 - Severely depressed left ventricular systolic function (EF 15-20%).     2 - Restrictive LV filling pattern, indicating markedly elevated LAP (grade 3 diastolic dysfunction).     3 - Concentric hypertrophy.     4 - Moderate left ventricular enlargement.     5 - Mild left atrial enlargement.             This document has been electronically    SIGNED BY: Shaquille Dia MD On: 11/20/2017 12:37

## 2025-04-12 NOTE — CONSULTS
[facility] Surg  Cardiology  Consult Note    Patient Name: Nasra Marks  MRN: [id number]  Admission Date: 4/11/2025  Hospital Length of Stay: 0 days  Code Status: Full Code   Attending Provider: Cristhian Varner MD   Consulting Provider: Shaquille Dia MD  Primary Care Physician: Veronika Rainey MD  Principal Problem:<principal problem not specified>    Patient information was obtained from patient and ER records.     Inpatient consult to Cardiology  Consult performed by: Shaquille Dia MD  Consult ordered by: lAex Alfaro NP        Subjective:     Chief Complaint:  CHF, NICM            HPI: 39-year-old  female with medical history significant for combined systolic and diastolic heart failure, nonischemic cardiomyopathy status post ICD placement, hypertension and chronic kidney disease who presented to the emergency department on account of worsening shortness a breath and fatigue of 4 days' duration, nausea and vomiting x2 days duration.  She voiced baseline dyspnea, which has worsened in the last 4 days, she can only walk about 50 ft before getting short of breath, this has been associated with profound fatigue, nausea and vomiting, 3 bouts of vomiting today, no hematemesis.  Dyspnea associated with 4 pillow orthopnea, and paroxysmal nocturnal dyspnea but no pedal swelling, although noticed abdominal swelling.  She denied any chest pain but voiced cough productive of clear sputum, no hemoptysis.  She denied fever, chills or rigors however complained of left ear fullness, left-sided headache, headache said to be dull in nature, nonradiating, 7/10 in severity, she noticed some tingling sensation in her left upper extremity prior to presentation as well, but no weakness, no visual changes noticed.  She is also constipated and has tried magnesium citrate and Dulcolax with no relief.  She denied any dysuria, no frequency, no urgency, no straining at micturition or hematuria.  She  "has not had any diarrhea, but has been constipated detailed above.  She voiced compliance with the medication regimen, denied eating high salt diet, although still add salt to food when cooking.  She denied cigarette smoking, does not drink alcohol as well.  Outpatient she follows up with the General Cardiology and advanced heart failure/ transplant cardiology.  Lab obtained in the emergency room showed urinalysis without evidence of infection, BNP elevated at 2020, serum creatinine elevated to 1.4 from baseline of 0.9  Chest x-ray showed "cardiomegaly and findings suggestive of pulmonary edema/CHF although correlation for infectious process advised".   Most recent 2D echo of 08/09/2024 showed "EF of 30-35% with grade 2 diastolic dysfunction.  Right heart catheterization of 08/27/2024, mildly elevated filling pressures on the right and left , with RA 10  PA 31/15 (20)  PCWP 15, CO 5.7 l/min  CI 2.8 l/min/m2, TD cardiac output 5.2 l/min cardiac index 2.6 l/min/m2".       Denies CP, less SOB  BNP 2875  EKG NSR NSSTT changes    Echo 4/12/25    Left Ventricle: The left ventricle is severely dilated. There is severely reduced systolic function with a visually estimated ejection fraction of 10 -15%. Grade III diastolic dysfunction.    Right Ventricle: The right ventricle has mild enlargement. Systolic function is mildly reduced.    Left Atrium: Moderately dilated    Mitral Valve: There is mild to moderate regurgitation.    Tricuspid Valve: There is mild regurgitation.    Pulmonary Artery: The estimated pulmonary artery systolic pressure is 21 mmHg.    IVC/SVC: Normal venous pressure at 3 mmHg.     Followed by Dr Peguero  # NICM, appears euvolemic.  Following with Dr. Prajapati.  RHC 8/2024 as above with ?conversion d/o thereafter (CVA type sxs).  # MDT ICD, functioning normally  # hx NSVT, last in 8/12/21  # HTN, controlled  # HLP on atorva 40mg  # remote CVA (PICA territory on CT head 11/25/22).  Carotid US 1/20/23 " neg.       (1/24/25)     Cardiovascular Testing:  RHC 8/27/24    The estimated blood loss was none.    The filling pressures on the right and left were mildly elevated.    RA 10  PA 31/15 (20)  PCWP 15    CO 5.7 l/min  CI 2.8 l/min/m2    TD cardiac output 5.2 l/min cardiac index 2.6 l/min/m2.    Condition: no inotropes or pressors.     Echo 8/9/24    Left Ventricle: The left ventricle is moderately dilated. Normal wall thickness. Moderate global hypokinesis present. There is moderately reduced systolic function with a visually estimated ejection fraction of 30 - 35%. Grade II diastolic dysfunction. Normal left ventricular filling pressure.    Right Ventricle: Normal right ventricular cavity size. Wall thickness is normal. Right ventricle wall motion  is normal. Systolic function is normal.    Left Atrium: Normal left atrial size.    Right Atrium: Normal right atrial size.    Aortic Valve: The aortic valve is a trileaflet valve. There is mild aortic valve sclerosis. There is mild annular calcification present.    Mitral Valve: There is mild bileaflet sclerosis. There is mild regurgitation.    Aorta: Aortic root is normal in size measuring 2.81 cm. Ascending aorta is normal measuring 2.96 cm.    Pulmonary Artery: The estimated pulmonary artery systolic pressure is 19 mmHg.    IVC/SVC: Normal venous pressure at 3 mmHg.     Carotid US 1/20/23  There is 0-19% right Internal Carotid Stenosis.  There is 0-19% left Internal Carotid Stenosis.     Cath 4/18/17  B. Summary/Post-Operative Diagnosis    Normal coronary arteries.    Systolic dysfunction.    Mildly elevated left Filling Pressures.    Normal Pulmonary Hypertension.  D. Hemodynamic Results  Ejection Fraction: 20%  Global LV Function: severely depressed  PW:  (4)  PA: 24  CO_THERM: 4.4  RV: 25  RA:  (5)  LVEDP: 17   E. Angiographic Results       Patient has a right dominant coronary artery.      - Left Main Coronary Artery:             The LM is normal. There  is DANNY 3 flow.     - Left Anterior Descending Artery:             The LAD is normal. There is DANNY 3 flow.     - Left Circumflex Artery:             The LCX is normal. There is DANNY 3 flow.     - Right Coronary Artery:             The RCA is normal. There is DANNY 3 flow.     - Left Renal Artery:             The ostial left renal is normal.     - Right Renal Artery:             The ostial right renal is normal.     - Common Femoral Artery:             The right CFA is normal.      Past Medical History:   Diagnosis Date    CHF (congestive heart failure)     Chronic combined systolic and diastolic congestive heart failure 2019    Essential hypertension 2012    Hypertension     dx at 16y.o.    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 disease, with heart failure 2021    ICD (implantable cardioverter-defibrillator), single, in situ 2020    Nonischemic cardiomyopathy 2019    Pacemaker 2017       Past Surgical History:   Procedure Laterality Date    CARDIAC DEFIBRILLATOR PLACEMENT  2017     SECTION      x 1    INDUCED       RIGHT HEART CATHETERIZATION Right 2022    Procedure: INSERTION, CATHETER, RIGHT HEART;  Surgeon: Timothy Prajapati Jr., MD;  Location: [facility] CATH LAB;  Service: Cardiology;  Laterality: Right;    RIGHT HEART CATHETERIZATION Right 2024    Procedure: INSERTION, CATHETER, RIGHT HEART;  Surgeon: Lior Rueda MD;  Location: [facility] CATH LAB;  Service: Cardiology;  Laterality: Right;    TUBAL LIGATION         Review of patient's allergies indicates:   Allergen Reactions    Aldactone [spironolactone] Swelling     Lips swelled    Hydralazine analogues Other (See Comments)     headaches    Isosorbide Other (See Comments)     headaches       No current facility-administered medications on file prior to encounter.     Current Outpatient Medications on File Prior to Encounter   Medication Sig     acetaminophen (TYLENOL) 500 MG tablet Take 1 tablet (500 mg total) by mouth every 6 (six) hours as needed.    acetaminophen (TYLENOL) 500 MG tablet Take 1 tablet (500 mg total) by mouth every 4 (four) hours as needed for Pain or Temperature greater than (100.5 or greater).    acetaminophen (TYLENOL) 500 MG tablet Take 1 tablet (500 mg total) by mouth every 6 (six) hours as needed for Pain.    albuterol (PROVENTIL/VENTOLIN HFA) 90 mcg/actuation inhaler Inhale 1-2 puffs into the lungs every 6 (six) hours as needed. Rescue    amLODIPine (NORVASC) 5 MG tablet Take 5 mg by mouth.    aspirin (ECOTRIN) 81 MG EC tablet Take 1 tablet (81 mg total) by mouth once daily.    atorvastatin (LIPITOR) 40 MG tablet Take 1 tablet (40 mg total) by mouth every evening.    benzocaine-menthoL 15-3.6 mg Lozg 1 lozenge by Mucous Membrane route every 2 (two) hours as needed (sore throat).    cetirizine (ZYRTEC) 10 MG tablet Take 1 tablet (10 mg total) by mouth once daily.    cetirizine (ZYRTEC) 10 MG tablet Take 1 tablet (10 mg total) by mouth once daily.    docusate sodium (COLACE) 100 MG capsule Take 1 capsule (100 mg total) by mouth 2 (two) times daily.    eplerenone (INSPRA) 25 MG Tab Take 1 tablet (25 mg total) by mouth once daily.    FARXIGA 10 mg tablet Take 1 tablet by mouth once daily    FLUoxetine 20 MG capsule Take 1 capsule (20 mg total) by mouth once daily.    fluticasone propionate (FLONASE) 50 mcg/actuation nasal spray 1 spray (50 mcg total) by Each Nostril route 2 (two) times daily as needed for Rhinitis.    furosemide (LASIX) 40 MG tablet Take 1 tablet (40 mg total) by mouth once daily.    gabapentin (NEURONTIN) 300 MG capsule Take 2 capsules (600 mg total) by mouth 3 (three) times daily.    hydrALAZINE (APRESOLINE) 50 MG tablet Take 1 tablet (50 mg total) by mouth 2 (two) times a day.    isosorbide mononitrate (IMDUR) 60 MG 24 hr tablet Take 1 tablet (60 mg total) by mouth once daily.    metoprolol succinate (TOPROL-XL) 200  MG 24 hr tablet Take 1 tablet (200 mg total) by mouth once daily.    potassium chloride SA (K-DUR,KLOR-CON) 20 MEQ tablet Take 2 tablets (40 mEq total) by mouth 2 (two) times daily.    sacubitriL-valsartan (ENTRESTO)  mg per tablet Take 1 tablet by mouth 2 (two) times daily.     Family History       Problem Relation (Age of Onset)    Breast cancer Other    Cancer Maternal Grandmother, Paternal Grandmother, Paternal Aunt, Paternal Uncle    Diabetes Other    Hypertension Mother, Other    No Known Problems Sister, Brother, Son, Brother          Tobacco Use    Smoking status: Never    Smokeless tobacco: Never   Substance and Sexual Activity    Alcohol use: Not Currently     Comment: occasionally    Drug use: Not Currently     Types: Marijuana     Comment: in past very little marijuana    Sexual activity: Yes     Partners: Male     Birth control/protection: None     Review of Systems   Constitutional: Negative for decreased appetite.   HENT:  Negative for ear discharge.    Eyes:  Negative for blurred vision.   Respiratory:  Negative for hemoptysis.    Endocrine: Negative for polyphagia.   Hematologic/Lymphatic: Negative for adenopathy.   Skin:  Negative for color change and nail changes.   Musculoskeletal:  Negative for joint swelling.   Genitourinary:  Negative for bladder incontinence.   Neurological:  Negative for aphonia and brief paralysis.   Psychiatric/Behavioral:  Negative for hallucinations.    Allergic/Immunologic: Negative for hives.     Objective:     Vital Signs (Most Recent):  Temp: 98.2 °F (36.8 °C) (04/12/25 0716)  Pulse: 85 (04/12/25 0807)  Resp: 18 (04/12/25 0716)  BP: 110/77 (04/12/25 0716)  SpO2: 99 % (04/12/25 0716) Vital Signs (24h Range):  Temp:  [97.7 °F (36.5 °C)-98.5 °F (36.9 °C)] 98.2 °F (36.8 °C)  Pulse:  [76-98] 85  Resp:  [14-23] 18  SpO2:  [95 %-100 %] 99 %  BP: ()/(67-95) 110/77     Weight: 82 kg (180 lb 12.4 oz)  Body mass index is 26.7 kg/m².    SpO2: 99 %          Intake/Output Summary (Last 24 hours) at 4/12/2025 1036  Last data filed at 4/12/2025 0918  Gross per 24 hour   Intake 120 ml   Output 1850 ml   Net -1730 ml       Lines/Drains/Airways       Peripheral Intravenous Line  Duration                  Peripheral IV - Single Lumen 04/11/25 2140 20 G Right Antecubital <1 day                     Physical Exam     Significant Labs: All pertinent lab results from the last 24 hours have been reviewed.    Significant Imaging: Echocardiogram: 2D echo with color flow doppler:   Results for orders placed or performed during the hospital encounter of 11/20/17   2D echo with color flow doppler   Result Value Ref Range    EF + QEF 15 (A) 55 - 65    Diastolic Dysfunction Yes (A)     Est. PA Systolic Pressure 21.66     Narrative    Date of Procedure: 11/20/2017        TEST DESCRIPTION       Aorta: The aortic root is normal in size, measuring 2.4 cm at sinotubular junction and 2.7 cm at Sinuses of Valsalva. The proximal ascending aorta is normal in size, measuring 2.4 cm across.     Left Atrium: The left atrial volume index is mildly enlarged, measuring 40.28 cc/m2.     Left Ventricle: The left ventricle is moderately enlarged, with an end-diastolic diameter of 6.1 cm, and an end-systolic diameter of 5.2 cm. Wall thickness is mildly increased, with the septum measuring 1.2 cm and the posterior wall measuring 1.4 cm   across. Relative wall thickness was increased at 0.46, and the LV mass index was increased at 218.7 g/m2 consistent with concentric left ventricular hypertrophy. There are no regional wall motion abnormalities. Left ventricular systolic function appears   severely depressed. Visually estimated ejection fraction is 15-20%. The LV Doppler derived stroke volume equals 74.0 ccs.     Diastolic indices: E wave velocity 1.2 m/s, E/A ratio 2.0,  msec., E/e' ratio(avg) 21. There is diastolic dysfunction secondary to restrictive abnormality.     Right Atrium: The right  atrium is normal in size, measuring 4.5 cm in length and 3.8 cm in width in the apical view.     Right Ventricle: The right ventricle is normal in size measuring 2.9 cm at the base in the apical right ventricle-focused view. Global right ventricular systolic function appears normal. Tricuspid annular plane systolic excursion (TAPSE) is 1.9 cm.   Tissue Doppler-derived tricuspid annular peak systolic velocity (S prime) is 13.6 cm/s. The estimated PA systolic pressure is 22 mmHg.     Aortic Valve:  The peak gradient obtained across the aortic valve is 9 mmHg, with a mean gradient of 5 mmHg. Using a left ventricular outflow tract diameter of 2.4 cm, a left ventricular outflow tract velocity time integral of 16 cm, and a peak   instantaneous transvalvular velocity time integral of 27 cm, the calculated aortic valve area is 2.75 cm2.     IVC: IVC is normal in size and collapses > 50% with a sniff, suggesting normal right atrial pressure of 3 mmHg.     Intracavitary: There is no evidence of pericardial effusion, intracavity mass, thrombi, or vegetation.         CONCLUSIONS     1 - Severely depressed left ventricular systolic function (EF 15-20%).     2 - Restrictive LV filling pattern, indicating markedly elevated LAP (grade 3 diastolic dysfunction).     3 - Concentric hypertrophy.     4 - Moderate left ventricular enlargement.     5 - Mild left atrial enlargement.             This document has been electronically    SIGNED BY: Shaquille Dia MD On: 11/20/2017 12:37     Assessment and Plan:     ICD (implantable cardioverter-defibrillator), single, in situ  Normal device function at last check    Nonischemic cardiomyopathy  Severe. EF 15%    Acute on chronic combined systolic and diastolic congestive heart failure  Diuresis and afterload reduction as tolerated. Titrate up entresto - intolerant to aldactone. Continue GDMT. Will f/u PRN  Recent Labs     04/12/25  0539   BNP 2,875*     Latest ECHO  Results for orders placed  during the hospital encounter of 04/11/25    Echo Saline Bubble? No; Ultrasound enhancing contrast? No    Interpretation Summary    Left Ventricle: The left ventricle is severely dilated. There is severely reduced systolic function with a visually estimated ejection fraction of 10 -15%. Grade III diastolic dysfunction.    Right Ventricle: The right ventricle has mild enlargement. Systolic function is mildly reduced.    Left Atrium: Moderately dilated    Mitral Valve: There is mild to moderate regurgitation.    Tricuspid Valve: There is mild regurgitation.    Pulmonary Artery: The estimated pulmonary artery systolic pressure is 21 mmHg.    IVC/SVC: Normal venous pressure at 3 mmHg.    Current Heart Failure Medications  metoprolol succinate (TOPROL-XL) 24 hr tablet 200 mg, Daily, Oral  dapagliflozin propanediol (Farxiga) tablet 10 mg, Daily, Oral  furosemide injection 80 mg, Every 12 hours, Intravenous  sacubitriL-valsartan 24-26 mg per tablet 2 tablet, 2 times daily, Oral            VTE Risk Mitigation (From admission, onward)           Ordered     heparin (porcine) injection 5,000 Units  Every 8 hours         04/12/25 0321     IP VTE HIGH RISK PATIENT  Once         04/12/25 0321     Place sequential compression device  Until discontinued         04/12/25 0321                    Thank you for your consult. I will follow-up with patient. Please contact us if you have any additional questions.    Shaquille Dia MD  Cardiology   [facility]

## 2025-04-12 NOTE — ASSESSMENT & PLAN NOTE
History of nonischemic cardiomyopathy  Unsure of cause, previous 2D echo showed EF of 30-35%  Continue management per heart failure  Continue lifestyle modification  Low-salt diet, low-cholesterol diet.

## 2025-04-12 NOTE — HPI
"     HPI: 39-year-old  female with medical history significant for combined systolic and diastolic heart failure, nonischemic cardiomyopathy status post ICD placement, hypertension and chronic kidney disease who presented to the emergency department on account of worsening shortness a breath and fatigue of 4 days' duration, nausea and vomiting x2 days duration.  She voiced baseline dyspnea, which has worsened in the last 4 days, she can only walk about 50 ft before getting short of breath, this has been associated with profound fatigue, nausea and vomiting, 3 bouts of vomiting today, no hematemesis.  Dyspnea associated with 4 pillow orthopnea, and paroxysmal nocturnal dyspnea but no pedal swelling, although noticed abdominal swelling.  She denied any chest pain but voiced cough productive of clear sputum, no hemoptysis.  She denied fever, chills or rigors however complained of left ear fullness, left-sided headache, headache said to be dull in nature, nonradiating, 7/10 in severity, she noticed some tingling sensation in her left upper extremity prior to presentation as well, but no weakness, no visual changes noticed.  She is also constipated and has tried magnesium citrate and Dulcolax with no relief.  She denied any dysuria, no frequency, no urgency, no straining at micturition or hematuria.  She has not had any diarrhea, but has been constipated detailed above.  She voiced compliance with the medication regimen, denied eating high salt diet, although still add salt to food when cooking.  She denied cigarette smoking, does not drink alcohol as well.  Outpatient she follows up with the General Cardiology and advanced heart failure/ transplant cardiology.  Lab obtained in the emergency room showed urinalysis without evidence of infection, BNP elevated at 2020, serum creatinine elevated to 1.4 from baseline of 0.9  Chest x-ray showed "cardiomegaly and findings suggestive of pulmonary edema/CHF " "although correlation for infectious process advised".   Most recent 2D echo of 08/09/2024 showed "EF of 30-35% with grade 2 diastolic dysfunction.  Right heart catheterization of 08/27/2024, mildly elevated filling pressures on the right and left , with RA 10  PA 31/15 (20)  PCWP 15, CO 5.7 l/min  CI 2.8 l/min/m2, TD cardiac output 5.2 l/min cardiac index 2.6 l/min/m2".       Denies CP, less SOB  BNP 2875  EKG NSR NSSTT changes    Echo 4/12/25    Left Ventricle: The left ventricle is severely dilated. There is severely reduced systolic function with a visually estimated ejection fraction of 10 -15%. Grade III diastolic dysfunction.    Right Ventricle: The right ventricle has mild enlargement. Systolic function is mildly reduced.    Left Atrium: Moderately dilated    Mitral Valve: There is mild to moderate regurgitation.    Tricuspid Valve: There is mild regurgitation.    Pulmonary Artery: The estimated pulmonary artery systolic pressure is 21 mmHg.    IVC/SVC: Normal venous pressure at 3 mmHg.     Followed by Dr Peguero  # NICM, appears euvolemic.  Following with Dr. Prajapati.  RHC 8/2024 as above with ?conversion d/o thereafter (CVA type sxs).  # MDT ICD, functioning normally  # hx NSVT, last in 8/12/21  # HTN, controlled  # HLP on atorva 40mg  # remote CVA (PICA territory on CT head 11/25/22).  Carotid US 1/20/23 neg.       (1/24/25)     Cardiovascular Testing:  RHC 8/27/24    The estimated blood loss was none.    The filling pressures on the right and left were mildly elevated.    RA 10  PA 31/15 (20)  PCWP 15    CO 5.7 l/min  CI 2.8 l/min/m2    TD cardiac output 5.2 l/min cardiac index 2.6 l/min/m2.    Condition: no inotropes or pressors.     Echo 8/9/24    Left Ventricle: The left ventricle is moderately dilated. Normal wall thickness. Moderate global hypokinesis present. There is moderately reduced systolic function with a visually estimated ejection fraction of 30 - 35%. Grade II diastolic dysfunction. " Normal left ventricular filling pressure.    Right Ventricle: Normal right ventricular cavity size. Wall thickness is normal. Right ventricle wall motion  is normal. Systolic function is normal.    Left Atrium: Normal left atrial size.    Right Atrium: Normal right atrial size.    Aortic Valve: The aortic valve is a trileaflet valve. There is mild aortic valve sclerosis. There is mild annular calcification present.    Mitral Valve: There is mild bileaflet sclerosis. There is mild regurgitation.    Aorta: Aortic root is normal in size measuring 2.81 cm. Ascending aorta is normal measuring 2.96 cm.    Pulmonary Artery: The estimated pulmonary artery systolic pressure is 19 mmHg.    IVC/SVC: Normal venous pressure at 3 mmHg.     Carotid US 1/20/23  There is 0-19% right Internal Carotid Stenosis.  There is 0-19% left Internal Carotid Stenosis.     Cath 4/18/17  B. Summary/Post-Operative Diagnosis    Normal coronary arteries.    Systolic dysfunction.    Mildly elevated left Filling Pressures.    Normal Pulmonary Hypertension.  D. Hemodynamic Results  Ejection Fraction: 20%  Global LV Function: severely depressed  PW:  (4)  PA: 24  CO_THERM: 4.4  RV: 25  RA:  (5)  LVEDP: 17   E. Angiographic Results       Patient has a right dominant coronary artery.      - Left Main Coronary Artery:             The LM is normal. There is DANNY 3 flow.     - Left Anterior Descending Artery:             The LAD is normal. There is DANNY 3 flow.     - Left Circumflex Artery:             The LCX is normal. There is DANNY 3 flow.     - Right Coronary Artery:             The RCA is normal. There is DANNY 3 flow.     - Left Renal Artery:             The ostial left renal is normal.     - Right Renal Artery:             The ostial right renal is normal.     - Common Femoral Artery:             The right CFA is normal.

## 2025-04-12 NOTE — HPI
"39-year-old  female with medical history significant for combined systolic and diastolic heart failure, nonischemic cardiomyopathy status post ICD placement, hypertension and chronic kidney disease who presented to the emergency department on account of worsening shortness a breath and fatigue of 4 days' duration, nausea and vomiting x2 days duration.  She voiced baseline dyspnea, which has worsened in the last 4 days, she can only walk about 50 ft before getting short of breath, this has been associated with profound fatigue, nausea and vomiting, 3 bouts of vomiting today, no hematemesis.  Dyspnea associated with 4 pillow orthopnea, and paroxysmal nocturnal dyspnea but no pedal swelling, although noticed abdominal swelling.  She denied any chest pain but voiced cough productive of clear sputum, no hemoptysis.  She denied fever, chills or rigors however complained of left ear fullness, left-sided headache, headache said to be dull in nature, nonradiating, 7/10 in severity, she noticed some tingling sensation in her left upper extremity prior to presentation as well, but no weakness, no visual changes noticed.  She is also constipated and has tried magnesium citrate and Dulcolax with no relief.  She denied any dysuria, no frequency, no urgency, no straining at micturition or hematuria.  She has not had any diarrhea, but has been constipated detailed above.  She voiced compliance with the medication regimen, denied eating high salt diet, although still add salt to food when cooking.  She denied cigarette smoking, does not drink alcohol as well.  Outpatient she follows up with the General Cardiology and advanced heart failure/ transplant cardiology.  Lab obtained in the emergency room showed urinalysis without evidence of infection, BNP elevated at 2020, serum creatinine elevated to 1.4 from baseline of 0.9  Chest x-ray showed "cardiomegaly and findings suggestive of pulmonary edema/CHF although " "correlation for infectious process advised".   Most recent 2D echo of 08/09/2024 showed "EF of 30-35% with grade 2 diastolic dysfunction.  Right heart catheterization of 08/27/2024, mildly elevated filling pressures on the right and left , with RA 10  PA 31/15 (20)  PCWP 15, CO 5.7 l/min  CI 2.8 l/min/m2, TD cardiac output 5.2 l/min cardiac index 2.6 l/min/m2".    "

## 2025-04-12 NOTE — HOSPITAL COURSE
Admitted for CHF. Consulted cards recs: Acute on chronic combined systolic and diastolic congestive heart failure. Diuresis and afterload reduction as tolerated. Titrate up entresto - intolerant to aldactone. Continue GDMT. Will f/u PRN. Patient c/o headache. Given prn meds. CT head: 1. No CT evidence of acute intracranial abnormality. If the patient's headaches are sufficiently clinically suspicious, or associated with signs of elevated ICP, focal neurologic deficits, nausea, or vomiting, further evaluation with MRI can be performed if there are no clinical contraindications.2. Findings in keeping with remote right cerebellar infarct, unchanged from prior exam. Patient now stays headache resolved. Patient BP was  96/65 and nurse gave meds. Ordered dose of midodrine 10 mg once. Improved /75   home and has been informed to follow up with her PCP within the next 7-10 days to discuss her observation stay and to follow-up with Neurology.  Patient has been educated to return to the ED if She experiences any further chest pain, lightheadness, weakness, or discomfort.

## 2025-04-12 NOTE — ASSESSMENT & PLAN NOTE
Patient has Combined Systolic and Diastolic heart failure that is Acute on chronic. On presentation their CHF was decompensated. Evidence of decompensated CHF on presentation includes: elevated JVD, crackles on lung auscultation, weight gain, orthopnea, paroxysmal nocturnal dyspnea (PND), and dyspnea on exertion (ELENA). The etiology of their decompensation is likely dietary indiscretion and increased fluid intake. Most recent BNP and echo results are listed below.  BNP 2020  Latest ECHO  Results for orders placed during the hospital encounter of 08/09/24    Echo    Interpretation Summary    Left Ventricle: The left ventricle is moderately dilated. Normal wall thickness. Moderate global hypokinesis present. There is moderately reduced systolic function with a visually estimated ejection fraction of 30 - 35%. Grade II diastolic dysfunction. Normal left ventricular filling pressure.    Right Ventricle: Normal right ventricular cavity size. Wall thickness is normal. Right ventricle wall motion  is normal. Systolic function is normal.    Left Atrium: Normal left atrial size.    Right Atrium: Normal right atrial size.    Aortic Valve: The aortic valve is a trileaflet valve. There is mild aortic valve sclerosis. There is mild annular calcification present.    Mitral Valve: There is mild bileaflet sclerosis. There is mild regurgitation.    Aorta: Aortic root is normal in size measuring 2.81 cm. Ascending aorta is normal measuring 2.96 cm.    Pulmonary Artery: The estimated pulmonary artery systolic pressure is 19 mmHg.    IVC/SVC: Normal venous pressure at 3 mmHg.    Current Heart Failure Medications  metoprolol succinate (TOPROL-XL) 24 hr tablet 200 mg, Daily, Oral  sacubitriL-valsartan 24-26 mg per tablet 1 tablet, 2 times daily, Oral  dapagliflozin propanediol (Farxiga) tablet 10 mg, Daily, Oral  furosemide injection 80 mg, Every 12 hours, Intravenous    Plan  - Monitor strict I&Os and daily weights.    - Place on  telemetry  - Low sodium diet  - Place on fluid restriction of 1.5 L.   - Cardiology has been consulted  - The patient's volume status is improving but not at their baseline as indicated by elevated JVD, crackles on lung auscultation, weight gain, orthopnea, paroxysmal nocturnal dyspnea (PND), and dyspnea on exertion (ELENA)  - continue lifestyle modification  -will hold off on restarting epilerenone due to low blood pressure, will restart when blood pressure can tolerate  -okay to adjust Entresto to home dose as tolerated

## 2025-04-12 NOTE — PLAN OF CARE
Patient was independent with bed mobility, transfers, and gait. Patient was able to perform 5x STS in 12.49 seconds and ambulated 500 ft with supervision secondary to current medical condition but no LOB observed. She reported 8/10 perceived difficulty due to feeling chest tightness and a little SOB.  VS assessed piror to activity were /88 mmHg. Post-activity VS were /78 mmHg and HR 91 bpm. Patient was educated on importance of regular mobility to prevent deconditioning and was advised to ambulate in the hallway with nurse's permission or supervision as appropriate. Based on current presentation and functional mobility, pt does not require skilled PT services at this time. Please reconsult if her clinical status changes.

## 2025-04-12 NOTE — ED PROVIDER NOTES
Encounter Date: 4/11/2025       History     Chief Complaint   Patient presents with    Chest Pain     Pt reports chest pain, tightness, numbness on the left hand, left sided facial pain, vomiting started today, also c/o headache, cough, abdominal pain x 2 days.      39 y.o. female who has a past medical history of CHF (EF: 20-25% (8/2024) s/p ICD, Hypertension and CKD who presents to the emergency department due to abdominal pain, nausea, vomiting, chest pain/tightness and left-sided upper extremity tingling.  Patient reports abdominal pain has been ongoing for the past 2 days initially assumed it was secondary to constipation.  She reports attempting to take a stool softener without any improvement.  She reports today she had 2 episodes of nonbloody nonbilious emesis and subsequently began having tingling sensation to her left face and left arm.  She reports symptoms have since resolved but continues to have chest tightness and feeling short of breath.  She states when she attempts to lay down she feels like she is drowning.  She reports usually having fluid building up in her abdomen whenever she has CHF exacerbation.  Denies any lower extremity edema.  She reports since vomiting she has been having headache and cough.  Cough is mainly nonproductive.  Denies any sick contacts.  Reports being compliant with all of her medications.    The history is provided by the patient.     Review of patient's allergies indicates:   Allergen Reactions    Aldactone [spironolactone] Swelling     Lips swelled    Hydralazine analogues Other (See Comments)     headaches    Isosorbide Other (See Comments)     headaches     Past Medical History:   Diagnosis Date    CHF (congestive heart failure)     Chronic combined systolic and diastolic congestive heart failure 5/24/2019    Essential hypertension 11/19/2012    Hypertension     dx at 16y.o.    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 disease,  with heart failure 2021    ICD (implantable cardioverter-defibrillator), single, in situ 2020    Nonischemic cardiomyopathy 2019    Pacemaker 2017     Past Surgical History:   Procedure Laterality Date    CARDIAC DEFIBRILLATOR PLACEMENT  2017     SECTION      x 1    INDUCED       RIGHT HEART CATHETERIZATION Right 2022    Procedure: INSERTION, CATHETER, RIGHT HEART;  Surgeon: Timothy Prajapati Jr., MD;  Location: [facility] CATH LAB;  Service: Cardiology;  Laterality: Right;    RIGHT HEART CATHETERIZATION Right 2024    Procedure: INSERTION, CATHETER, RIGHT HEART;  Surgeon: Lior Rueda MD;  Location: [facility] CATH LAB;  Service: Cardiology;  Laterality: Right;    TUBAL LIGATION       Family History   Problem Relation Name Age of Onset    Hypertension Mother      Cancer Maternal Grandmother          breast x 2    Cancer Paternal Grandmother          breast    No Known Problems Sister      No Known Problems Brother      No Known Problems Son      No Known Problems Brother      Hypertension Other      Diabetes Other      Breast cancer Other      Cancer Paternal Aunt          breast    Cancer Paternal Uncle       Social History[1]  Review of Systems   Constitutional:  Negative for chills and fever.   HENT:  Negative for congestion and rhinorrhea.    Eyes:  Negative for pain.   Respiratory:  Positive for cough and chest tightness. Negative for shortness of breath.    Cardiovascular:  Positive for chest pain. Negative for leg swelling.   Gastrointestinal:  Positive for abdominal pain, nausea and vomiting.   Genitourinary:  Negative for dysuria and hematuria.   Musculoskeletal:  Negative for joint swelling and neck pain.   Skin:  Negative for rash.   Neurological:  Negative for weakness and headaches.       Physical Exam     Initial Vitals [25 2101]   BP Pulse Resp Temp SpO2   122/89 92 20 97.7 °F (36.5 °C) 100 %      MAP       --         Physical Exam    Nursing  note and vitals reviewed.  Constitutional: She is not diaphoretic. No distress.   HENT:   Head: Normocephalic and atraumatic.   Eyes: Conjunctivae and EOM are normal. Pupils are equal, round, and reactive to light.   Neck: JVD present.   Normal range of motion.  Pulmonary/Chest: Breath sounds normal. No respiratory distress.   Abdominal: Abdomen is soft. Bowel sounds are normal. She exhibits no distension. There is no abdominal tenderness.   Musculoskeletal:         General: No tenderness. Normal range of motion.      Cervical back: Normal range of motion.     Neurological: She is alert.   Moves all extremities and carries on conversation. CN- II: PERRL; III/IV/VI: EOMI w/out evidence of nystagmus; V: no deficits appreciated to light touch bilateral face; VII: no facial weakness, no facial asymmetry. Eyebrow raise symmetric. Smile symmetric; IX/X: palate midline, and raises symmetrically; XI: shoulder shrug 5/5 bilaterally; XII: tongue is midline w/out asymmetry. Strength 5/5 to bilateral upper and lower extremities, sensation intact to light touch.    Skin: Skin is warm. Capillary refill takes less than 2 seconds.         ED Course   Procedures  Labs Reviewed   ISTAT PROCEDURE - Abnormal       Result Value    POC PH 7.365      POC PCO2 38.8      POC PO2 21 (*)     POC HCO3 22.2 (*)     POC BE -3 (*)     POC SATURATED O2 32      POC Lactate 0.74      POC TCO2 23 (*)     Sample VENOUS      Site Other      Allens Test N/A     POCT CMP - Abnormal    Albumin, POC 3.5      Alkaline Phosphatase, POC 58      ALT (SGPT), POC 76 (*)     AST (SGOT), POC 81 (*)     POC BUN 18      Calcium, POC 8.8      POC Chloride 112 (*)     POC Creatinine 1.4 (*)     POC Glucose 95      POC Potassium 4.5      POC Sodium 140      Bilirubin, POC 0.6      POC TCO2 25      Protein, POC 6.6     POCT B-TYPE NATRIURETIC PEPTIDE (BNP) - Abnormal    POC B-Type Natriuretic Peptide 2020 (*)    POCT URINALYSIS W/O SCOPE - Abnormal    Glucose, UA  Negative      Bilirubin, UA 1+ (*)     Ketones, UA Negative      Spec Grav UA >=1.030 (*)     Blood, UA 3+ (*)     PH, UA 5.0      Protein, UA 2+ (*)     Urobilinogen, UA 0.2      Nitrite, UA Negative      Leukocytes, UA Negative      Color, UA POC Yellow      Clarity, UA, POC Clear     TROPONIN ISTAT    POC Cardiac Troponin I 0.01      Sample unknown     POCT CBC    Hematocrit        Hemoglobin        RBC        WBC        MCV        MCH, POC        MCHC        RDW-CV        Platelet Count, POC        MPV       POCT URINE PREGNANCY    POC Preg Test, Ur Negative       Acceptable Yes     SARS-COV-2 RDRP GENE   POCT INFLUENZA A/B MOLECULAR   POCT CMP   POCT B-TYPE NATRIURETIC PEPTIDE (BNP)   POCT TROPONIN   POCT RAPID INFLUENZA A/B    Influenza B Ag negative      Inflenza A Ag negative     POCT URINALYSIS W/O SCOPE          Imaging Results              X-Ray Chest PA And Lateral (Final result)  Result time 04/11/25 23:36:35      Final result by Jennifre Engle MD (04/11/25 23:36:35)                   Impression:      Cardiomegaly and findings suggestive of pulmonary edema/CHF although correlation for infectious process advised.      Electronically signed by: Jennifer Engle MD  Date:    04/11/2025  Time:    23:36               Narrative:    EXAMINATION:  XR CHEST PA AND LATERAL    CLINICAL HISTORY:  Chest pain, unspecified    TECHNIQUE:  PA and lateral views of the chest were performed.    COMPARISON:  02/16/2025    FINDINGS:  Cardiac monitoring leads overlie the chest.  There is unchanged enlargement of the cardiomediastinal silhouette.  There is a stably positioned left-sided cardiac pacing device in place.  The lungs are symmetrically expanded with diffuse increased interstitial and parenchymal attenuation can be seen with pulmonary edema/CHF.  No substantial volume of pleural fluid or pneumothorax.  Osseous structures intact.                                       X-Ray Abdomen Flat And Erect (Final  result)  Result time 04/12/25 00:38:35      Final result by Ana Chase MD (04/12/25 00:38:35)                   Impression:      Nonspecific bowel gas pattern.      Electronically signed by: Ana Chase  Date:    04/12/2025  Time:    00:38               Narrative:    EXAMINATION:  ABDOMEN FLAT AND ERECT    CLINICAL HISTORY:  Constipation, unspecified    TECHNIQUE:  Abdomen flat and erect radiographs were submitted.    COMPARISON:  None.    FINDINGS:  Abdomen flat and erect radiographs demonstrate a nonspecific bowel gas pattern.  There are no dilated loops of small bowel or air-fluid levels detected.  There is no free air detected under the diaphragm.  Moderate colonic stool is present.                                       CT Head Without Contrast (Final result)  Result time 04/11/25 23:19:56      Final result by Jennifer Engle MD (04/11/25 23:19:56)                   Impression:      1. No CT evidence of acute intracranial abnormality. If the patient's headaches are sufficiently clinically suspicious, or associated with signs of elevated ICP, focal neurologic deficits, nausea, or vomiting, further evaluation with MRI can be performed if there are no clinical contraindications.  2. Findings in keeping with remote right cerebellar infarct, unchanged from prior exam.      Electronically signed by: Jennifer Engle MD  Date:    04/11/2025  Time:    23:19               Narrative:    EXAMINATION:  CT HEAD WITHOUT CONTRAST    CLINICAL HISTORY:  Headache, new or worsening, neuro deficit (Age 19-49y);    TECHNIQUE:  Low dose axial images were obtained through the head.  Coronal and sagittal reformations were also performed. Contrast was not administered.    COMPARISON:  MRI and CTA 08/27/2024    FINDINGS:  There is no acute intracranial hemorrhage, hydrocephalus, midline shift or mass effect. There is remote right cerebellar infarct, unchanged from prior exam.  Remaining gray-white matter differentiation  appears maintained.  The basal cisterns are patent. The visualized paranasal sinuses and mastoid air cells are clear of acute process.  The visualized bones of the calvarium demonstrate no acute osseous abnormality.                                       Medications   furosemide injection 60 mg (60 mg Intravenous Given 4/11/25 2239)   ondansetron injection 8 mg (8 mg Intravenous Given 4/11/25 2211)     Medical Decision Making:   Initial Assessment:   39 y.o. female who has a past medical history of CHF (EF: 20-25% (8/2024) s/p ICD, Hypertension and CKD who presents to the emergency department due to abdominal pain, nausea, vomiting, chest pain/tightness and left-sided upper extremity tingling.  Patient in no acute distress.  Abdominal exam benign.  Positive JVD.  No focal neurological deficits on exam.  Any EKG with no STEMI. This patient presents with signs and symptoms consistent with an acute exacerbation of chronic CHF. Differential diagnosis includes alternate cardiopulmonary causes such as ischemia, PE, pneumothorax, and pneumonia, as well as other causes of dyspnea such as asthma/RAD, COPD, flash pulmonary edema, dysrhythmia but these are less likely. Patient is generally hemodynamically stable.    Plan: labs, EKG, CXR, troponin, intravenous diuresis, and electrolyte repletion. Will require admission for IV diuretics and medical optimization.    Differential Diagnosis:   Differential Diagnosis includes, but is not limited to:  ACS/MI, PE, aortic dissection, pneumothorax, cardiac tamponade, pericarditis/myocarditis, pneumonia, infection/abscess, lung mass, trauma/fracture, costochondritis/pleurisy, MSK pain/contusion, GERD, biliary disease, pancreatitis, anemia   Clinical Tests:   Lab Tests: Ordered and Reviewed  Radiological Study: Ordered and Reviewed  Medical Tests: Ordered and Reviewed             ED Course as of 04/12/25 0100 Fri Apr 11, 2025 2057 Independent Interpretation of EKG:  Rhythm: Sinus    Rate: 92  QTC: 464  No STEMI  [AS]   2128 ISTAT PROCEDURE(!) [AS]   2133 ISTAT Cardiac Troponin I: 0.01 [AS]   2137 POCT CMP(!)  no acute sodium or potassium abnormalities.  Mild CHRISTEL with a creatinine of 1.4 from a baseline of 0.7.  Mild transaminitis which I suspect is secondary to congestive hepatopathy. [AS]   2138 CBC with hemoglobin of 10.8, no acute anemia or platelet dysfunction. [AS]   2139 POCT B-type natriuretic peptide (BNP)(!)  BNP of 2000 likely cause of patient's abdominal pain nausea and vomiting from gut edema from decompensated heart failure. [AS]   2340 CT Head Without Contrast [AS]   2342 X-Ray Chest PA And Lateral  Chest x-ray with findings that are consistent with decompensated heart failure likely contributing to patient's orthopnea and cough.  Proceed with admission for further diuresis. [AS]   2358 After review of the patient's physical exam, ED testing, and history/symptoms, the patient requires additional care in the hospital for the treatment of decompensated heart failure . Discussed patients case with Dr. Mcdowell who will accept the patient and any pending labs/imaging/interventions.   I discussed the objective findings with the patient including laboratory studies, diagnostic imaging, and any consultant involvement. The patient/ family was educated on their clinical presentation and all questions were answered. They acknowledged and verbally agreed to the treatment plan. The patient will be admitted to the hospital for further management.    [AS]      ED Course User Index  [AS] Shasta Allison MD          Medical Decision Making  Amount and/or Complexity of Data Reviewed  Labs: ordered. Decision-making details documented in ED Course.  Radiology: ordered. Decision-making details documented in ED Course.    Risk  Prescription drug management.         Critical Care Procedure Note  Authorized and Performed by: Shasta Allison MD  Total critical care time: 65 minutes  Due to a high  probability of clinically significant, life threatening deterioration, the patient required my highest level of preparedness to intervene emergently and I personally spent this critical care time directly and personally managing the patient. This critical care time included obtaining a history; examining the patient; pulse oximetry; ordering and review of studies; arranging urgent treatment with development of a management plan; evaluation of patient's response to treatment; frequent reassessment; and, discussions with other providers.  This critical care time was performed to assess and manage the high probability of imminent, life-threatening deterioration that could result in multi-organ failure. It was exclusive of separately billable procedures and treating other patients and teaching time.  Please see MDM section and the rest of the note for further information on patient assessment and treatment.   Clinical Impression:   Final diagnoses:  [R07.9] Chest pain  [K59.00] Constipation  [I50.9] Acute decompensated heart failure (Primary)  [R11.2] Nausea and vomiting, unspecified vomiting type  [R07.9] Chest pain, unspecified type          ED Disposition Condition    Observation Stable               DISCLAIMER: This note was prepared with [employer] voice recognition transcription software. Garbled syntax, mangled pronouns, and other bizarre constructions may be attributed to that software system.         [1]   Social History  Tobacco Use    Smoking status: Never    Smokeless tobacco: Never   Substance Use Topics    Alcohol use: Not Currently     Comment: occasionally    Drug use: Not Currently     Types: Marijuana     Comment: in past very little marijuana        Shasta Allison MD  04/12/25 0109

## 2025-04-12 NOTE — NURSING
PT worked evaluated patient. Patient tolerated well. Administered morning meds to patient. Patient tolerated well. Patient complained of 3/10 headache. Gave prn tylenol. When reassessed, patient stated pain was 7/10. Notified MD of patient's headache getting worse. Gave 1 time dose of benadryl, prochlorperazine, and toradol and patient rated headache 10/10. When reassessed, patient stated pain was 7/10. Patient AAOx4. Patient not showing signs of distress. Bed in lowest position, wheels locked, call bell in reach, side rails up x2.

## 2025-04-13 PROBLEM — I95.9 HYPOTENSIVE EPISODE: Status: ACTIVE | Noted: 2025-04-13

## 2025-04-13 LAB
ANION GAP (OHS): 11 MMOL/L (ref 8–16)
BUN SERPL-MCNC: 20 MG/DL (ref 6–20)
CALCIUM SERPL-MCNC: 8.7 MG/DL (ref 8.7–10.5)
CHLORIDE SERPL-SCNC: 108 MMOL/L (ref 95–110)
CO2 SERPL-SCNC: 22 MMOL/L (ref 23–29)
CREAT SERPL-MCNC: 1 MG/DL (ref 0.5–1.4)
GFR SERPLBLD CREATININE-BSD FMLA CKD-EPI: >60 ML/MIN/1.73/M2
GLUCOSE SERPL-MCNC: 86 MG/DL (ref 70–110)
HOLD SPECIMEN: NORMAL
OHS QRS DURATION: 102 MS
OHS QTC CALCULATION: 464 MS
POTASSIUM SERPL-SCNC: 3.4 MMOL/L (ref 3.5–5.1)
POTASSIUM SERPL-SCNC: 4.4 MMOL/L (ref 3.5–5.1)
SODIUM SERPL-SCNC: 141 MMOL/L (ref 136–145)

## 2025-04-13 PROCEDURE — G0378 HOSPITAL OBSERVATION PER HR: HCPCS

## 2025-04-13 PROCEDURE — 25000003 PHARM REV CODE 250: Performed by: NURSE PRACTITIONER

## 2025-04-13 PROCEDURE — 36415 COLL VENOUS BLD VENIPUNCTURE: CPT | Performed by: NURSE PRACTITIONER

## 2025-04-13 PROCEDURE — 63600175 PHARM REV CODE 636 W HCPCS: Performed by: STUDENT IN AN ORGANIZED HEALTH CARE EDUCATION/TRAINING PROGRAM

## 2025-04-13 PROCEDURE — 36415 COLL VENOUS BLD VENIPUNCTURE: CPT | Performed by: HOSPITALIST

## 2025-04-13 PROCEDURE — 80048 BASIC METABOLIC PNL TOTAL CA: CPT | Performed by: STUDENT IN AN ORGANIZED HEALTH CARE EDUCATION/TRAINING PROGRAM

## 2025-04-13 PROCEDURE — 94761 N-INVAS EAR/PLS OXIMETRY MLT: CPT

## 2025-04-13 PROCEDURE — 84132 ASSAY OF SERUM POTASSIUM: CPT | Performed by: NURSE PRACTITIONER

## 2025-04-13 PROCEDURE — 36415 COLL VENOUS BLD VENIPUNCTURE: CPT | Performed by: STUDENT IN AN ORGANIZED HEALTH CARE EDUCATION/TRAINING PROGRAM

## 2025-04-13 PROCEDURE — 96376 TX/PRO/DX INJ SAME DRUG ADON: CPT

## 2025-04-13 PROCEDURE — 25000003 PHARM REV CODE 250: Performed by: INTERNAL MEDICINE

## 2025-04-13 PROCEDURE — 96372 THER/PROPH/DIAG INJ SC/IM: CPT | Performed by: STUDENT IN AN ORGANIZED HEALTH CARE EDUCATION/TRAINING PROGRAM

## 2025-04-13 PROCEDURE — 63600175 PHARM REV CODE 636 W HCPCS: Performed by: NURSE PRACTITIONER

## 2025-04-13 PROCEDURE — 25000003 PHARM REV CODE 250: Performed by: STUDENT IN AN ORGANIZED HEALTH CARE EDUCATION/TRAINING PROGRAM

## 2025-04-13 RX ORDER — MIDODRINE HYDROCHLORIDE 5 MG/1
10 TABLET ORAL ONCE
Status: COMPLETED | OUTPATIENT
Start: 2025-04-13 | End: 2025-04-13

## 2025-04-13 RX ORDER — FUROSEMIDE 10 MG/ML
80 INJECTION INTRAMUSCULAR; INTRAVENOUS 2 TIMES DAILY WITH MEALS
Status: DISCONTINUED | OUTPATIENT
Start: 2025-04-14 | End: 2025-04-16 | Stop reason: HOSPADM

## 2025-04-13 RX ORDER — POTASSIUM CHLORIDE 20 MEQ/1
40 TABLET, EXTENDED RELEASE ORAL ONCE
Status: COMPLETED | OUTPATIENT
Start: 2025-04-13 | End: 2025-04-13

## 2025-04-13 RX ORDER — POTASSIUM CHLORIDE 20 MEQ/1
40 TABLET, EXTENDED RELEASE ORAL ONCE
Status: DISCONTINUED | OUTPATIENT
Start: 2025-04-13 | End: 2025-04-13

## 2025-04-13 RX ADMIN — DAPAGLIFLOZIN 10 MG: 10 TABLET, FILM COATED ORAL at 08:04

## 2025-04-13 RX ADMIN — ATORVASTATIN CALCIUM 40 MG: 40 TABLET, FILM COATED ORAL at 09:04

## 2025-04-13 RX ADMIN — GABAPENTIN 100 MG: 100 CAPSULE ORAL at 02:04

## 2025-04-13 RX ADMIN — SACUBITRIL AND VALSARTAN 2 TABLET: 24; 26 TABLET, FILM COATED ORAL at 08:04

## 2025-04-13 RX ADMIN — ASPIRIN 81 MG: 81 TABLET, COATED ORAL at 08:04

## 2025-04-13 RX ADMIN — GABAPENTIN 100 MG: 100 CAPSULE ORAL at 08:04

## 2025-04-13 RX ADMIN — SENNOSIDES AND DOCUSATE SODIUM 1 TABLET: 50; 8.6 TABLET ORAL at 08:04

## 2025-04-13 RX ADMIN — ACETAMINOPHEN 650 MG: 325 TABLET ORAL at 09:04

## 2025-04-13 RX ADMIN — POTASSIUM CHLORIDE 40 MEQ: 1500 TABLET, EXTENDED RELEASE ORAL at 08:04

## 2025-04-13 RX ADMIN — POLYETHYLENE GLYCOL 3350 17 G: 17 POWDER, FOR SOLUTION ORAL at 08:04

## 2025-04-13 RX ADMIN — MIDODRINE HYDROCHLORIDE 10 MG: 5 TABLET ORAL at 09:04

## 2025-04-13 RX ADMIN — FUROSEMIDE 80 MG: 10 INJECTION, SOLUTION INTRAVENOUS at 04:04

## 2025-04-13 RX ADMIN — GABAPENTIN 100 MG: 100 CAPSULE ORAL at 09:04

## 2025-04-13 RX ADMIN — ONDANSETRON 4 MG: 2 INJECTION INTRAMUSCULAR; INTRAVENOUS at 04:04

## 2025-04-13 RX ADMIN — METOPROLOL SUCCINATE 200 MG: 50 TABLET, EXTENDED RELEASE ORAL at 08:04

## 2025-04-13 RX ADMIN — HEPARIN SODIUM 5000 UNITS: 5000 INJECTION INTRAVENOUS; SUBCUTANEOUS at 02:04

## 2025-04-13 RX ADMIN — HEPARIN SODIUM 5000 UNITS: 5000 INJECTION INTRAVENOUS; SUBCUTANEOUS at 09:04

## 2025-04-13 RX ADMIN — HEPARIN SODIUM 5000 UNITS: 5000 INJECTION INTRAVENOUS; SUBCUTANEOUS at 05:04

## 2025-04-13 RX ADMIN — SENNOSIDES AND DOCUSATE SODIUM 1 TABLET: 50; 8.6 TABLET ORAL at 09:04

## 2025-04-13 NOTE — ASSESSMENT & PLAN NOTE
Patient give toradol, benadryl, and compazine for headache  CT head: 1. No CT evidence of acute intracranial abnormality. If the patient's headaches are sufficiently clinically suspicious, or associated with signs of elevated ICP, focal neurologic deficits, nausea, or vomiting, further evaluation with MRI can be performed if there are no clinical contraindications.2. Findings in keeping with remote right cerebellar infarct, unchanged from prior exam.  Patient now states headache resolved

## 2025-04-13 NOTE — CARE UPDATE
Pt reports headache 8/10  Reportedly had nausea and vomiting earlier.  Pt has AICD/PPM precluding brain MRI  Will order repeat CT head

## 2025-04-13 NOTE — NURSING
Pt continued to c/o H.A even after all the meds were given and Virtual doctor was contacted and new order was given .  Pt was given combination of Toradol 15mg; prochlorapazine 5mg and benadryl 25mg Ivp. Pt noted sleeping approximately 30min. Later ; will continue to monitor.

## 2025-04-13 NOTE — NURSING
Administered morning meds to patient. Patient tolerated well. Per Angelina, NP hold lasix due to BP 96/65. Per Angelina, NP gave 10mg of midodrine. Rechecked patient's BP and it was 109/76. Patient went to CT for CT of head without contrast. Patient complained of nausea. Gave PRN zofran. When reassessed, patient stated moderate relief was obtained. Patient complained of shortness of breath, notified NP. /79, O2 sat 99% on room air, per Angelina, NP, give 40mg of lasix. Rechecked BP and it was 108/76. Patient AAOx4. Patient did not complain of any pain throughout shift. Patient not showing signs of distress. Bed in lowest position, wheels locked, call bell in reach, side rails up x2.

## 2025-04-13 NOTE — SUBJECTIVE & OBJECTIVE
Interval History: patient seen and examined. Patient states she feel lightheaded. Patient BP was 96/65 and nurse gave patient BP meds. Ordered dose of midodrine to improve BP currently 109/75. Will continue to monitor. Patient states headache has now resolved. This requires for patient to stay inpatient.       Review of Systems   Constitutional:  Positive for appetite change and fatigue. Negative for chills and fever.   HENT:  Negative for congestion, ear discharge, ear pain and sore throat.    Eyes:  Negative for photophobia, redness and visual disturbance.   Respiratory:  Positive for cough and shortness of breath. Negative for apnea, chest tightness and wheezing.    Cardiovascular:  Negative for chest pain, palpitations and leg swelling.   Gastrointestinal:  Positive for abdominal pain, constipation, nausea and vomiting. Negative for abdominal distention, blood in stool and diarrhea.   Endocrine: Negative for cold intolerance and heat intolerance.   Genitourinary:  Negative for difficulty urinating, dysuria, flank pain, frequency and hematuria.   Musculoskeletal:  Negative for back pain, joint swelling and neck stiffness.   Skin:  Negative for color change, pallor, rash and wound.   Neurological:  Negative for dizziness, tremors, seizures, syncope, weakness, light-headedness and headaches.   Psychiatric/Behavioral:  Negative for behavioral problems, confusion, hallucinations and suicidal ideas. The patient is not hyperactive.      Objective:     Vital Signs (Most Recent):  Temp: 98.4 °F (36.9 °C) (04/13/25 0725)  Pulse: 79 (04/13/25 0950)  Resp: 18 (04/13/25 0725)  BP: 109/76 (04/13/25 0950)  SpO2: 98 % (04/13/25 0950) Vital Signs (24h Range):  Temp:  [98 °F (36.7 °C)-98.4 °F (36.9 °C)] 98.4 °F (36.9 °C)  Pulse:  [72-92] 79  Resp:  [18] 18  SpO2:  [92 %-99 %] 98 %  BP: ()/(65-80) 109/76     Weight: 82 kg (180 lb 12.4 oz)  Body mass index is 26.7 kg/m².    Intake/Output Summary (Last 24 hours) at 4/13/2025  1006  Last data filed at 4/12/2025 1754  Gross per 24 hour   Intake 120 ml   Output --   Net 120 ml         Physical Exam  Vitals and nursing note reviewed.   Constitutional:       General: She is not in acute distress.     Appearance: Normal appearance. She is not ill-appearing, toxic-appearing or diaphoretic.   HENT:      Head: Normocephalic and atraumatic.      Nose: Nose normal. No congestion or rhinorrhea.      Mouth/Throat:      Mouth: Mucous membranes are moist.   Eyes:      General: No scleral icterus.     Extraocular Movements: Extraocular movements intact.      Conjunctiva/sclera: Conjunctivae normal.      Pupils: Pupils are equal, round, and reactive to light.   Cardiovascular:      Rate and Rhythm: Normal rate and regular rhythm.      Pulses: Normal pulses.      Heart sounds:      No friction rub. Gallop (S3, S1 and S2 gallop) present.   Pulmonary:      Effort: Pulmonary effort is normal. No respiratory distress.      Breath sounds: No stridor. Rales (bibasal) present. No rhonchi.   Chest:      Chest wall: No tenderness.   Abdominal:      General: Abdomen is flat. Bowel sounds are normal. There is no distension.      Palpations: Abdomen is soft.      Tenderness: There is no abdominal tenderness. There is no right CVA tenderness, left CVA tenderness, guarding or rebound.   Musculoskeletal:         General: No swelling.      Cervical back: Normal range of motion. No rigidity.      Right lower leg: No edema.      Left lower leg: No edema.   Skin:     General: Skin is warm and dry.      Coloration: Skin is not jaundiced.      Findings: No bruising.   Neurological:      General: No focal deficit present.      Mental Status: She is alert and oriented to person, place, and time. Mental status is at baseline.      Cranial Nerves: No cranial nerve deficit.      Sensory: No sensory deficit.   Psychiatric:         Mood and Affect: Mood normal.         Behavior: Behavior normal.         Thought Content: Thought  content normal.         Judgment: Judgment normal.               Significant Labs: All pertinent labs within the past 24 hours have been reviewed.    Significant Imaging: I have reviewed all pertinent imaging results/findings within the past 24 hours.

## 2025-04-13 NOTE — PROGRESS NOTES
[facility]  Progress Note    Patient Name: Nasra Marks  MRN: [id number]  Patient Class: OP- Observation   Admission Date: 4/11/2025  Length of Stay: 0 days  Attending Physician: Cristhian Varner MD  Primary Care Provider: Veronika Rainey MD        Subjective     Principal Problem:<principal problem not specified>        HPI:  39-year-old  female with medical history significant for combined systolic and diastolic heart failure, nonischemic cardiomyopathy status post ICD placement, hypertension and chronic kidney disease who presented to the emergency department on account of worsening shortness a breath and fatigue of 4 days' duration, nausea and vomiting x2 days duration.  She voiced baseline dyspnea, which has worsened in the last 4 days, she can only walk about 50 ft before getting short of breath, this has been associated with profound fatigue, nausea and vomiting, 3 bouts of vomiting today, no hematemesis.  Dyspnea associated with 4 pillow orthopnea, and paroxysmal nocturnal dyspnea but no pedal swelling, although noticed abdominal swelling.  She denied any chest pain but voiced cough productive of clear sputum, no hemoptysis.  She denied fever, chills or rigors however complained of left ear fullness, left-sided headache, headache said to be dull in nature, nonradiating, 7/10 in severity, she noticed some tingling sensation in her left upper extremity prior to presentation as well, but no weakness, no visual changes noticed.  She is also constipated and has tried magnesium citrate and Dulcolax with no relief.  She denied any dysuria, no frequency, no urgency, no straining at micturition or hematuria.  She has not had any diarrhea, but has been constipated detailed above.  She voiced compliance with the medication regimen, denied eating high salt diet, although still add salt to food when cooking.  She denied cigarette smoking, does not drink alcohol as well.   "Outpatient she follows up with the General Cardiology and advanced heart failure/ transplant cardiology.  Lab obtained in the emergency room showed urinalysis without evidence of infection, BNP elevated at 2020, serum creatinine elevated to 1.4 from baseline of 0.9  Chest x-ray showed "cardiomegaly and findings suggestive of pulmonary edema/CHF although correlation for infectious process advised".   Most recent 2D echo of 08/09/2024 showed "EF of 30-35% with grade 2 diastolic dysfunction.  Right heart catheterization of 08/27/2024, mildly elevated filling pressures on the right and left , with RA 10  PA 31/15 (20)  PCWP 15, CO 5.7 l/min  CI 2.8 l/min/m2, TD cardiac output 5.2 l/min cardiac index 2.6 l/min/m2".      Overview/Hospital Course:  Admitted for CHF. Consulted cards recs: Acute on chronic combined systolic and diastolic congestive heart failure. Diuresis and afterload reduction as tolerated. Titrate up entresto - intolerant to aldactone. Continue GDMT. Will f/u PRN. Patient c/o headache. Given prn meds. CT head: 1. No CT evidence of acute intracranial abnormality. If the patient's headaches are sufficiently clinically suspicious, or associated with signs of elevated ICP, focal neurologic deficits, nausea, or vomiting, further evaluation with MRI can be performed if there are no clinical contraindications.2. Findings in keeping with remote right cerebellar infarct, unchanged from prior exam. Patient now stays headache resolved. Patient BP was  96/65 and nurse gave meds. Ordered dose of midodrine 10 mg once. Improved /75    Interval History: patient seen and examined. Patient states she feel lightheaded. Patient BP was 96/65 and nurse gave patient BP meds. Ordered dose of midodrine to improve BP currently 109/75. Will continue to monitor. Patient states headache has now resolved. This requires for patient to stay inpatient.       Review of Systems   Constitutional:  Positive for appetite change and " fatigue. Negative for chills and fever.   HENT:  Negative for congestion, ear discharge, ear pain and sore throat.    Eyes:  Negative for photophobia, redness and visual disturbance.   Respiratory:  Positive for cough and shortness of breath. Negative for apnea, chest tightness and wheezing.    Cardiovascular:  Negative for chest pain, palpitations and leg swelling.   Gastrointestinal:  Positive for abdominal pain, constipation, nausea and vomiting. Negative for abdominal distention, blood in stool and diarrhea.   Endocrine: Negative for cold intolerance and heat intolerance.   Genitourinary:  Negative for difficulty urinating, dysuria, flank pain, frequency and hematuria.   Musculoskeletal:  Negative for back pain, joint swelling and neck stiffness.   Skin:  Negative for color change, pallor, rash and wound.   Neurological:  Negative for dizziness, tremors, seizures, syncope, weakness, light-headedness and headaches.   Psychiatric/Behavioral:  Negative for behavioral problems, confusion, hallucinations and suicidal ideas. The patient is not hyperactive.      Objective:     Vital Signs (Most Recent):  Temp: 98.4 °F (36.9 °C) (04/13/25 0725)  Pulse: 79 (04/13/25 0950)  Resp: 18 (04/13/25 0725)  BP: 109/76 (04/13/25 0950)  SpO2: 98 % (04/13/25 0950) Vital Signs (24h Range):  Temp:  [98 °F (36.7 °C)-98.4 °F (36.9 °C)] 98.4 °F (36.9 °C)  Pulse:  [72-92] 79  Resp:  [18] 18  SpO2:  [92 %-99 %] 98 %  BP: ()/(65-80) 109/76     Weight: 82 kg (180 lb 12.4 oz)  Body mass index is 26.7 kg/m².    Intake/Output Summary (Last 24 hours) at 4/13/2025 1006  Last data filed at 4/12/2025 1754  Gross per 24 hour   Intake 120 ml   Output --   Net 120 ml         Physical Exam  Vitals and nursing note reviewed.   Constitutional:       General: She is not in acute distress.     Appearance: Normal appearance. She is not ill-appearing, toxic-appearing or diaphoretic.   HENT:      Head: Normocephalic and atraumatic.      Nose: Nose  normal. No congestion or rhinorrhea.      Mouth/Throat:      Mouth: Mucous membranes are moist.   Eyes:      General: No scleral icterus.     Extraocular Movements: Extraocular movements intact.      Conjunctiva/sclera: Conjunctivae normal.      Pupils: Pupils are equal, round, and reactive to light.   Cardiovascular:      Rate and Rhythm: Normal rate and regular rhythm.      Pulses: Normal pulses.      Heart sounds:      No friction rub. Gallop (S3, S1 and S2 gallop) present.   Pulmonary:      Effort: Pulmonary effort is normal. No respiratory distress.      Breath sounds: No stridor. Rales (bibasal) present. No rhonchi.   Chest:      Chest wall: No tenderness.   Abdominal:      General: Abdomen is flat. Bowel sounds are normal. There is no distension.      Palpations: Abdomen is soft.      Tenderness: There is no abdominal tenderness. There is no right CVA tenderness, left CVA tenderness, guarding or rebound.   Musculoskeletal:         General: No swelling.      Cervical back: Normal range of motion. No rigidity.      Right lower leg: No edema.      Left lower leg: No edema.   Skin:     General: Skin is warm and dry.      Coloration: Skin is not jaundiced.      Findings: No bruising.   Neurological:      General: No focal deficit present.      Mental Status: She is alert and oriented to person, place, and time. Mental status is at baseline.      Cranial Nerves: No cranial nerve deficit.      Sensory: No sensory deficit.   Psychiatric:         Mood and Affect: Mood normal.         Behavior: Behavior normal.         Thought Content: Thought content normal.         Judgment: Judgment normal.               Significant Labs: All pertinent labs within the past 24 hours have been reviewed.    Significant Imaging: I have reviewed all pertinent imaging results/findings within the past 24 hours.      Assessment & Plan  Essential hypertension  Patient's blood pressure range in the last 24 hours was: BP  Min: 96/65  Max:  118/80.The patient's inpatient anti-hypertensive regimen is listed below:  Current Antihypertensives  metoprolol succinate (TOPROL-XL) 24 hr tablet 200 mg, Daily, Oral  furosemide injection 80 mg, Every 12 hours, Intravenous  Entresto.    Plan  - BP is controlled, no changes needed to their regimen added parameter to BP meds   - lifestyle modification  - cardiac diet  Acute on chronic combined systolic and diastolic congestive heart failure  Patient has Combined Systolic and Diastolic heart failure that is Acute on chronic. On presentation their CHF was decompensated. Evidence of decompensated CHF on presentation includes: elevated JVD, crackles on lung auscultation, weight gain, orthopnea, paroxysmal nocturnal dyspnea (PND), and dyspnea on exertion (ELENA). The etiology of their decompensation is likely dietary indiscretion and increased fluid intake. Most recent BNP and echo results are listed below.  BNP 2020  Latest ECHO  Results for orders placed during the hospital encounter of 08/09/24    Echo    Interpretation Summary    Left Ventricle: The left ventricle is moderately dilated. Normal wall thickness. Moderate global hypokinesis present. There is moderately reduced systolic function with a visually estimated ejection fraction of 30 - 35%. Grade II diastolic dysfunction. Normal left ventricular filling pressure.    Right Ventricle: Normal right ventricular cavity size. Wall thickness is normal. Right ventricle wall motion  is normal. Systolic function is normal.    Left Atrium: Normal left atrial size.    Right Atrium: Normal right atrial size.    Aortic Valve: The aortic valve is a trileaflet valve. There is mild aortic valve sclerosis. There is mild annular calcification present.    Mitral Valve: There is mild bileaflet sclerosis. There is mild regurgitation.    Aorta: Aortic root is normal in size measuring 2.81 cm. Ascending aorta is normal measuring 2.96 cm.    Pulmonary Artery: The estimated pulmonary  artery systolic pressure is 19 mmHg.    IVC/SVC: Normal venous pressure at 3 mmHg.    Current Heart Failure Medications  metoprolol succinate (TOPROL-XL) 24 hr tablet 200 mg, Daily, Oral  dapagliflozin propanediol (Farxiga) tablet 10 mg, Daily, Oral  furosemide injection 80 mg, Every 12 hours, Intravenous  sacubitriL-valsartan 24-26 mg per tablet 2 tablet, 2 times daily, Oral    Plan  - Monitor strict I&Os and daily weights.    - Place on telemetry  - Low sodium diet  - Place on fluid restriction of 1.5 L.   - Cardiology has been consulted  - The patient's volume status is improving but not at their baseline as indicated by elevated JVD, crackles on lung auscultation, weight gain, orthopnea, paroxysmal nocturnal dyspnea (PND), and dyspnea on exertion (ELENA)  - continue lifestyle modification  -cards recs: Diuresis and afterload reduction as tolerated. Titrate up entresto - intolerant to aldactone. Continue GDMT. Will f/u PRN     Nonischemic cardiomyopathy  Severe. EF 15%     ICD (implantable cardioverter-defibrillator), single, in situ  Normal device function at last check        Hx of ischemic right PCA stroke  Continue statin.    Headache  Patient give toradol, benadryl, and compazine for headache  CT head: 1. No CT evidence of acute intracranial abnormality. If the patient's headaches are sufficiently clinically suspicious, or associated with signs of elevated ICP, focal neurologic deficits, nausea, or vomiting, further evaluation with MRI can be performed if there are no clinical contraindications.2. Findings in keeping with remote right cerebellar infarct, unchanged from prior exam.  Patient now states headache resolved    Hypotensive episode  Patient BP was  96/65 and nurse gave meds. Ordered dose of midodrine 10 mg once. Improved /75. Patient states she still feel lightheaded.   Monitor BP.   VTE Risk Mitigation (From admission, onward)           Ordered     heparin (porcine) injection 5,000 Units  Every  8 hours         04/12/25 0321     IP VTE HIGH RISK PATIENT  Once         04/12/25 0321     Place sequential compression device  Until discontinued         04/12/25 0321                    Discharge Planning   MELA:      Code Status: Full Code   Medical Readiness for Discharge Date:   Discharge Plan A: Home                        Alex Alfaro NP  Department of Hospital Medicine   [facility]

## 2025-04-13 NOTE — NURSING
[facility] [place]  Nurses Note -- 4 Eyes      4/13/2025       Skin assessed on: Q Shift      [x] No Pressure Injuries Present    [x]Prevention Measures Documented    [] Yes LDA  for Pressure Injury Previously documented     [] Yes New Pressure Injury Discovered   [] LDA for New Pressure Injury Added      Attending RN:  Keyla Varma LPN     Second RN:  DIGNA Jarquin

## 2025-04-13 NOTE — PLAN OF CARE
Coping; pain management and monitor pt's telemetry rhythm  Problem: Adult Inpatient Plan of Care  Goal: Plan of Care Review  Outcome: Progressing  Goal: Patient-Specific Goal (Individualized)  Outcome: Progressing

## 2025-04-13 NOTE — ASSESSMENT & PLAN NOTE
Patient BP was  96/65 and nurse gave meds. Ordered dose of midodrine 10 mg once. Improved /75. Patient states she still feel lightheaded.   Monitor BP.

## 2025-04-13 NOTE — ASSESSMENT & PLAN NOTE
Patient has Combined Systolic and Diastolic heart failure that is Acute on chronic. On presentation their CHF was decompensated. Evidence of decompensated CHF on presentation includes: elevated JVD, crackles on lung auscultation, weight gain, orthopnea, paroxysmal nocturnal dyspnea (PND), and dyspnea on exertion (ELENA). The etiology of their decompensation is likely dietary indiscretion and increased fluid intake. Most recent BNP and echo results are listed below.  BNP 2020  Latest ECHO  Results for orders placed during the hospital encounter of 08/09/24    Echo    Interpretation Summary    Left Ventricle: The left ventricle is moderately dilated. Normal wall thickness. Moderate global hypokinesis present. There is moderately reduced systolic function with a visually estimated ejection fraction of 30 - 35%. Grade II diastolic dysfunction. Normal left ventricular filling pressure.    Right Ventricle: Normal right ventricular cavity size. Wall thickness is normal. Right ventricle wall motion  is normal. Systolic function is normal.    Left Atrium: Normal left atrial size.    Right Atrium: Normal right atrial size.    Aortic Valve: The aortic valve is a trileaflet valve. There is mild aortic valve sclerosis. There is mild annular calcification present.    Mitral Valve: There is mild bileaflet sclerosis. There is mild regurgitation.    Aorta: Aortic root is normal in size measuring 2.81 cm. Ascending aorta is normal measuring 2.96 cm.    Pulmonary Artery: The estimated pulmonary artery systolic pressure is 19 mmHg.    IVC/SVC: Normal venous pressure at 3 mmHg.    Current Heart Failure Medications  metoprolol succinate (TOPROL-XL) 24 hr tablet 200 mg, Daily, Oral  dapagliflozin propanediol (Farxiga) tablet 10 mg, Daily, Oral  furosemide injection 80 mg, Every 12 hours, Intravenous  sacubitriL-valsartan 24-26 mg per tablet 2 tablet, 2 times daily, Oral    Plan  - Monitor strict I&Os and daily weights.    - Place on  telemetry  - Low sodium diet  - Place on fluid restriction of 1.5 L.   - Cardiology has been consulted  - The patient's volume status is improving but not at their baseline as indicated by elevated JVD, crackles on lung auscultation, weight gain, orthopnea, paroxysmal nocturnal dyspnea (PND), and dyspnea on exertion (ELENA)  - continue lifestyle modification  -cards recs: Diuresis and afterload reduction as tolerated. Titrate up entresto - intolerant to aldactone. Continue GDMT. Will f/u PRN

## 2025-04-13 NOTE — ASSESSMENT & PLAN NOTE
Patient's blood pressure range in the last 24 hours was: BP  Min: 96/65  Max: 118/80.The patient's inpatient anti-hypertensive regimen is listed below:  Current Antihypertensives  metoprolol succinate (TOPROL-XL) 24 hr tablet 200 mg, Daily, Oral  furosemide injection 80 mg, Every 12 hours, Intravenous  Entresto.    Plan  - BP is controlled, no changes needed to their regimen added parameter to BP meds   - lifestyle modification  - cardiac diet

## 2025-04-13 NOTE — NURSING
Report received from the off - going nurse. Pt noted with RR even and regular  with O2 at  room air .  Pt admits to pacemaker with defribrillator.  Pulse regular with occassional irregular heart beat.  Color oral mucosa pink.  Pt stated that she has an H.A. that she scales 8/10 being the worst and stated the meds that she was given earlier had helped her tremendously. Pt agreed to take all her scheduled meds because she said that she felt it was a pressure h.a because she felt like fluid was in her ears.  Skin warn and dry . Pt noted alert and oriented x 4. Abdomen soft round and nontender . Pt moves all extremities well . Urinary elimination via BRP . Call light within reach and pt is instructed how to use the call light.  Bed in the lowest position with SR's elevated x 2.  Pt encouraged to call for staff before getting up OOB.  Bed alarm on.  Saline lock intact with no evidence of redness or infiltration noted in the rt forearm position. Will continue to monitor.

## 2025-04-14 ENCOUNTER — DOCUMENTATION ONLY (OUTPATIENT)
Facility: CLINIC | Age: 40
End: 2025-04-14
Payer: MEDICAID

## 2025-04-14 DIAGNOSIS — R06.02 SOB (SHORTNESS OF BREATH): Primary | ICD-10-CM

## 2025-04-14 LAB
ANION GAP (OHS): 10 MMOL/L (ref 8–16)
BUN SERPL-MCNC: 22 MG/DL (ref 6–20)
CALCIUM SERPL-MCNC: 8.9 MG/DL (ref 8.7–10.5)
CHLORIDE SERPL-SCNC: 108 MMOL/L (ref 95–110)
CO2 SERPL-SCNC: 18 MMOL/L (ref 23–29)
CREAT SERPL-MCNC: 0.8 MG/DL (ref 0.5–1.4)
ERYTHROCYTE [DISTWIDTH] IN BLOOD BY AUTOMATED COUNT: 15.4 % (ref 11.5–14.5)
GFR SERPLBLD CREATININE-BSD FMLA CKD-EPI: >60 ML/MIN/1.73/M2
GLUCOSE SERPL-MCNC: 77 MG/DL (ref 70–110)
HCT VFR BLD AUTO: 37.6 % (ref 37–48.5)
HGB BLD-MCNC: 11.9 GM/DL (ref 12–16)
MCH RBC QN AUTO: 27.5 PG (ref 27–31)
MCHC RBC AUTO-ENTMCNC: 31.6 G/DL (ref 32–36)
MCV RBC AUTO: 87 FL (ref 82–98)
PLATELET # BLD AUTO: 289 K/UL (ref 150–450)
PMV BLD AUTO: 11.5 FL (ref 9.2–12.9)
POTASSIUM SERPL-SCNC: 3.8 MMOL/L (ref 3.5–5.1)
RBC # BLD AUTO: 4.33 M/UL (ref 4–5.4)
SODIUM SERPL-SCNC: 136 MMOL/L (ref 136–145)
WBC # BLD AUTO: 8.93 K/UL (ref 3.9–12.7)

## 2025-04-14 PROCEDURE — 25000003 PHARM REV CODE 250: Performed by: INTERNAL MEDICINE

## 2025-04-14 PROCEDURE — 63600175 PHARM REV CODE 636 W HCPCS: Performed by: NURSE PRACTITIONER

## 2025-04-14 PROCEDURE — 99214 OFFICE O/P EST MOD 30 MIN: CPT | Mod: ,,, | Performed by: INTERNAL MEDICINE

## 2025-04-14 PROCEDURE — 36415 COLL VENOUS BLD VENIPUNCTURE: CPT | Performed by: STUDENT IN AN ORGANIZED HEALTH CARE EDUCATION/TRAINING PROGRAM

## 2025-04-14 PROCEDURE — 96365 THER/PROPH/DIAG IV INF INIT: CPT

## 2025-04-14 PROCEDURE — 25000003 PHARM REV CODE 250: Performed by: STUDENT IN AN ORGANIZED HEALTH CARE EDUCATION/TRAINING PROGRAM

## 2025-04-14 PROCEDURE — G0378 HOSPITAL OBSERVATION PER HR: HCPCS

## 2025-04-14 PROCEDURE — 80048 BASIC METABOLIC PNL TOTAL CA: CPT | Performed by: STUDENT IN AN ORGANIZED HEALTH CARE EDUCATION/TRAINING PROGRAM

## 2025-04-14 PROCEDURE — 96376 TX/PRO/DX INJ SAME DRUG ADON: CPT

## 2025-04-14 PROCEDURE — 85027 COMPLETE CBC AUTOMATED: CPT | Performed by: NURSE PRACTITIONER

## 2025-04-14 PROCEDURE — 96372 THER/PROPH/DIAG INJ SC/IM: CPT | Performed by: STUDENT IN AN ORGANIZED HEALTH CARE EDUCATION/TRAINING PROGRAM

## 2025-04-14 PROCEDURE — 63600175 PHARM REV CODE 636 W HCPCS: Performed by: STUDENT IN AN ORGANIZED HEALTH CARE EDUCATION/TRAINING PROGRAM

## 2025-04-14 PROCEDURE — 25000003 PHARM REV CODE 250: Performed by: NURSE PRACTITIONER

## 2025-04-14 RX ORDER — DIPHENHYDRAMINE HYDROCHLORIDE 50 MG/ML
25 INJECTION, SOLUTION INTRAMUSCULAR; INTRAVENOUS EVERY 8 HOURS PRN
Status: DISCONTINUED | OUTPATIENT
Start: 2025-04-14 | End: 2025-04-16 | Stop reason: HOSPADM

## 2025-04-14 RX ORDER — PROCHLORPERAZINE EDISYLATE 5 MG/ML
5 INJECTION INTRAMUSCULAR; INTRAVENOUS ONCE
Status: DISCONTINUED | OUTPATIENT
Start: 2025-04-14 | End: 2025-04-14

## 2025-04-14 RX ORDER — PROCHLORPERAZINE EDISYLATE 5 MG/ML
10 INJECTION INTRAMUSCULAR; INTRAVENOUS EVERY 8 HOURS PRN
Status: DISCONTINUED | OUTPATIENT
Start: 2025-04-14 | End: 2025-04-16 | Stop reason: HOSPADM

## 2025-04-14 RX ORDER — DIPHENHYDRAMINE HYDROCHLORIDE 50 MG/ML
25 INJECTION, SOLUTION INTRAMUSCULAR; INTRAVENOUS ONCE
Status: DISCONTINUED | OUTPATIENT
Start: 2025-04-14 | End: 2025-04-14

## 2025-04-14 RX ORDER — OXYCODONE AND ACETAMINOPHEN 5; 325 MG/1; MG/1
1 TABLET ORAL ONCE
Refills: 0 | Status: COMPLETED | OUTPATIENT
Start: 2025-04-14 | End: 2025-04-14

## 2025-04-14 RX ADMIN — GABAPENTIN 100 MG: 100 CAPSULE ORAL at 09:04

## 2025-04-14 RX ADMIN — LACTULOSE 20 G: 20 SOLUTION ORAL at 12:04

## 2025-04-14 RX ADMIN — ATORVASTATIN CALCIUM 40 MG: 40 TABLET, FILM COATED ORAL at 09:04

## 2025-04-14 RX ADMIN — GABAPENTIN 100 MG: 100 CAPSULE ORAL at 04:04

## 2025-04-14 RX ADMIN — ONDANSETRON 4 MG: 2 INJECTION INTRAMUSCULAR; INTRAVENOUS at 05:04

## 2025-04-14 RX ADMIN — SENNOSIDES AND DOCUSATE SODIUM 1 TABLET: 50; 8.6 TABLET ORAL at 09:04

## 2025-04-14 RX ADMIN — SACUBITRIL AND VALSARTAN 2 TABLET: 24; 26 TABLET, FILM COATED ORAL at 09:04

## 2025-04-14 RX ADMIN — DIPHENHYDRAMINE HYDROCHLORIDE 25 MG: 50 INJECTION INTRAMUSCULAR; INTRAVENOUS at 06:04

## 2025-04-14 RX ADMIN — FUROSEMIDE 80 MG: 10 INJECTION, SOLUTION INTRAVENOUS at 05:04

## 2025-04-14 RX ADMIN — HEPARIN SODIUM 5000 UNITS: 5000 INJECTION INTRAVENOUS; SUBCUTANEOUS at 06:04

## 2025-04-14 RX ADMIN — DAPAGLIFLOZIN 10 MG: 10 TABLET, FILM COATED ORAL at 09:04

## 2025-04-14 RX ADMIN — PROCHLORPERAZINE EDISYLATE 10 MG: 5 INJECTION INTRAMUSCULAR; INTRAVENOUS at 06:04

## 2025-04-14 RX ADMIN — OXYCODONE HYDROCHLORIDE AND ACETAMINOPHEN 1 TABLET: 5; 325 TABLET ORAL at 12:04

## 2025-04-14 RX ADMIN — POLYETHYLENE GLYCOL 3350 17 G: 17 POWDER, FOR SOLUTION ORAL at 09:04

## 2025-04-14 RX ADMIN — ASPIRIN 81 MG: 81 TABLET, COATED ORAL at 09:04

## 2025-04-14 RX ADMIN — HEPARIN SODIUM 5000 UNITS: 5000 INJECTION INTRAVENOUS; SUBCUTANEOUS at 04:04

## 2025-04-14 RX ADMIN — HEPARIN SODIUM 5000 UNITS: 5000 INJECTION INTRAVENOUS; SUBCUTANEOUS at 09:04

## 2025-04-14 RX ADMIN — VALPROATE SODIUM 500 MG: 100 INJECTION, SOLUTION INTRAVENOUS at 06:04

## 2025-04-14 NOTE — PROGRESS NOTES
[facility] - [place] - Med Surg  Adult Nutrition  Progress Note    SUMMARY       Recommendations    1. Continue on heart healthy diet    2. Monitor labs and weights    3. Collaboration by nutrition professional with other providers    Goals: Patient to consume >75% of EEN prior to RD follow up  Nutrition Goal Status: new  Communication of RD Recs: other (comment)    Nutrition Discharge Planning    Nutrition Discharge Planning: Therapeutic diet (comments)  Therapeutic diet (comments): Heart healthy    Assessment and Plan    Nutrition Problem  Excessive fluid intake    Related to (etiology):   Food/nutrition knowledge deficit     Signs and Symptoms (as evidenced by):   CHF    Interventions (treatment strategy):  Decreased sodium and fat modified diet   Collaboration by nutrition professional with other providers    Nutrition Diagnosis Status:   New      Malnutrition Assessment                                       Reason for Assessment    Reason For Assessment: RD follow-up    Diagnosis: cardiac disease  Patient Active Problem List   Diagnosis    Essential hypertension    Chest pain    Sciatica of left side    Acute on chronic combined systolic and diastolic congestive heart failure    Nonischemic cardiomyopathy    ICD (implantable cardioverter-defibrillator), single, in situ    Shortness of breath    Pleurisy    Congestive heart failure    Troponin level elevated    Pneumonia due to COVID-19 virus    History of COVID-19    Syncope    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 disease, with heart failure    Hx of ischemic right PCA stroke    Headache    Cerebral infarction due to unspecified occlusion or stenosis of right posterior cerebral artery    Hypokalemia    Acute focal neurological deficit    Hyponatremia    Conversion disorder    Pain    Lumbar radiculopathy, chronic    Leg numbness    Degeneration of intervertebral disc of lumbar region with discogenic back pain and lower  "extremity pain    Lumbar radiculopathy    Hypotensive episode     Past Medical History:   Diagnosis Date    CHF (congestive heart failure)     Chronic combined systolic and diastolic congestive heart failure 5/24/2019    Essential hypertension 11/19/2012    Hypertension     dx at 16y.o.    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 disease, with heart failure 12/28/2021    ICD (implantable cardioverter-defibrillator), single, in situ 2/17/2020    Nonischemic cardiomyopathy 5/24/2019    Pacemaker 12/18/2017       General Information Comments:   4/14/25: Patient is on a heart healthy, 1500ml fluid restriction oral diet with varying po intake between 25-75%.  No tolerance issues at this time, prior nausea and vomiting on admit.  Labs reivewed.  NKFA. LBM: 4/10/25.  RD to continue to monitor po intake and tolerance.    Nutrition/Diet History    Spiritual, Cultural Beliefs, Gnosticist Practices, Values that Affect Care: no  Food Allergies: NKFA    Nutrition Related Social Determinants of Health: SDOH: Adequate food in home environment and None Identified    Anthropometrics    Height: 5' 9" (175.3 cm)  Height (inches): 69 in  Height Method: Stated  Weight: 82 kg (180 lb 12.4 oz)  Weight (lb): 180.78 lb  Weight Method: Bed Scale  Ideal Body Weight (IBW), Female: 145 lb  % Ideal Body Weight, Female (lb): 124.68 %  BMI (Calculated): 26.7  BMI Grade: 25 - 29.9 - overweight       Lab/Procedures/Meds    Pertinent Labs Reviewed: reviewed  BMP  Lab Results   Component Value Date     04/14/2025    K 3.8 04/14/2025     04/14/2025    CO2 18 (L) 04/14/2025    BUN 22 (H) 04/14/2025    CREATININE 0.8 04/14/2025    CALCIUM 8.9 04/14/2025    ANIONGAP 10 04/14/2025    EGFRNORACEVR >60 04/14/2025     Lab Results   Component Value Date    HGBA1C 5.2 04/12/2025     Lab Results   Component Value Date    CALCIUM 8.9 04/14/2025    PHOS 3.7 04/12/2025       Pertinent Medications Reviewed: " reviewed  Scheduled Meds:   aspirin  81 mg Oral Daily    atorvastatin  40 mg Oral QHS    dapagliflozin propanediol  10 mg Oral Daily    furosemide (LASIX) injection  80 mg Intravenous BID WM    gabapentin  100 mg Oral TID    heparin (porcine)  5,000 Units Subcutaneous Q8H    metoprolol succinate  200 mg Oral Daily    polyethylene glycol  17 g Oral Daily    sacubitriL-valsartan  2 tablet Oral BID    senna-docusate  1 tablet Oral BID     Continuous Infusions:  PRN Meds:.  Current Facility-Administered Medications:     acetaminophen, 650 mg, Oral, Q8H PRN    fluticasone propionate, 1 spray, Each Nostril, BID PRN    melatonin, 6 mg, Oral, Nightly PRN    ondansetron, 8 mg, Oral, Q8H PRN    ondansetron, 4 mg, Intravenous, Q12H PRN    sodium chloride 0.9%, 10 mL, Intravenous, PRN    Estimated/Assessed Needs    Weight Used For Calorie Calculations: 82 kg (180 lb 12.4 oz)  Energy Calorie Requirements (kcal): 25-30kcals/kg (2050-2460kcals/day)  Energy Need Method: Kcal/kg  Protein Requirements: 1.2g/kg (99g/day)  Weight Used For Protein Calculations: 82 kg (180 lb 12.4 oz)  Fluid Requirements (mL): 1500ml  Estimated Fluid Requirement Method: other (see comments) (fluid restriction)  RDA Method (mL): 25  CHO Requirement: 250      Nutrition Prescription Ordered    Current Diet Order: Heart healthy, 1500ml fluid restriction    Evaluation of Received Nutrient/Fluid Intake    % Kcal Needs: 25-75%  % Protein Needs: 25-75%  I/O: 4/14/25: -1010mls since admit  Energy Calories Required: meeting needs  Protein Required: not meeting needs  Fluid Required: meeting needs  Comments: LBM: 4/10/25  Tolerance: tolerating  % Intake of Estimated Energy Needs: Other: 25-75%  % Meal Intake: Other: %    Nutrition Risk    Level of Risk/Frequency of Follow-up: moderate (Follow up: 1-2x per week)     Monitor and Evaluation    Monitor and Evaluation: Energy intake, Protein intake, Diet order, Weight, Beliefs and attitudes, Electrolyte and renal  panel, Gastrointestinal profile, Glucose/endocrine profile, Inflammatory profile, Lipid profile     Nutrition Follow-Up    RD Follow-up?: Yes  Marta Carlton MS, RDN, LDN

## 2025-04-14 NOTE — NURSING
[facility]  Nurses Note -- 4 Eyes        Date:  04/15/2025        Skin assessed on: Q shift        [x] No Pressure Injuries Present                 []Prevention Measures Documented     [] Yes LDA Previously documented      [] Yes New Pressure Injury Discovered              [] LDA Added        Attending RN:  DIGNA Galicia     Second RN:  DIGNA Jarquin

## 2025-04-14 NOTE — PLAN OF CARE
Nutrition Plan of Care:    Recommendations     1. Continue on heart healthy diet    2. Monitor labs and weights    3. Collaboration by nutrition professional with other providers     Goals: Patient to consume >75% of EEN prior to RD follow up  Nutrition Goal Status: new  Communication of RD Recs: other (comment)     Nutrition Discharge Planning     Nutrition Discharge Planning: Therapeutic diet (comments)  Therapeutic diet (comments): Heart healthy     Assessment and Plan     Nutrition Problem  Excessive fluid intake     Related to (etiology):   Food/nutrition knowledge deficit      Signs and Symptoms (as evidenced by):   CHF     Interventions (treatment strategy):  Decreased sodium and fat modified diet   Collaboration by nutrition professional with other providers     Nutrition Diagnosis Status:   New    Marta Carlton MS, RDN, LDN

## 2025-04-14 NOTE — CONSULTS
[facility] [place] [facility]  Neurology Consult Note    Patient Name: Nasra Marks  Age: 39 y.o.  MRN: [id number]  Admission Date: 4/11/2025  Reason for consult:  Headaches    CC:  Shortness of breath    HPI:   Nasra Marks is a 39 y.o. year old female with past medical history of combined systolic and diastolic heart failure, nonischemic cardiomyopathy status post ICD placement, hypertension and chronic kidney disease presented to the ED for shortness of breath on 4/11.  History obtained from patient and chart review.    Patient presented 4 days back with shortness of breath, nausea and vomiting.  Had associated headaches with her symptoms however patient does have a diagnosis of migraines and the headaches appeared to be similar.  She presented with the above symptoms to the ER.  Diagnosed with acute on chronic CHF exacerbation and admitted for diuresis.  CT head obtained for headaches was unremarkable.  She got a headache cocktail with ketorolac, Benadryl with significant improvement in headaches.  Headaches returned last night, CT head was repeated without any acute findings, got 1 dose of Percocet with improvement in headache again.  Neurology was consulted for headache management.    Per patient, she does have history of migraines with typical headaches on the left side associated with nausea and vomiting, photophobia and phonophobia.  She does get sparkles in her vision as ocular aura.  Headache is similar except that she has some numbness in the left side of the face associated with the headache along with blurry vision thats been ongoing since Friday. No episodes of confusion or projectile vomiting.       Histories:  Allergies:  Aldactone [spironolactone], Hydralazine analogues, and Isosorbide    Current Medications:  Current Medications[1]    Medical:   Past Medical History:   Diagnosis Date    CHF (congestive heart failure)     Chronic combined systolic and diastolic congestive heart failure 5/24/2019     "Essential hypertension 2012    Hypertension     dx at 16y.o.    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 disease, with heart failure 2021    ICD (implantable cardioverter-defibrillator), single, in situ 2020    Nonischemic cardiomyopathy 2019    Pacemaker 2017        Surgeries:   Past Surgical History:   Procedure Laterality Date    CARDIAC DEFIBRILLATOR PLACEMENT  2017     SECTION      x 1    INDUCED       RIGHT HEART CATHETERIZATION Right 2022    Procedure: INSERTION, CATHETER, RIGHT HEART;  Surgeon: Timothy Prajapati Jr., MD;  Location: [facility] CATH LAB;  Service: Cardiology;  Laterality: Right;    RIGHT HEART CATHETERIZATION Right 2024    Procedure: INSERTION, CATHETER, RIGHT HEART;  Surgeon: Lior Rueda MD;  Location: [facility] CATH LAB;  Service: Cardiology;  Laterality: Right;    TUBAL LIGATION          Family:   Family History   Problem Relation Name Age of Onset    Hypertension Mother      Cancer Maternal Grandmother          breast x 2    Cancer Paternal Grandmother          breast    No Known Problems Sister      No Known Problems Brother      No Known Problems Son      No Known Problems Brother      Hypertension Other      Diabetes Other      Breast cancer Other      Cancer Paternal Aunt          breast    Cancer Paternal Uncle         Tobacco Use    Smoking status: Never    Smokeless tobacco: Never   Substance and Sexual Activity    Alcohol use: Not Currently     Comment: occasionally    Drug use: Not Currently     Types: Marijuana     Comment: in past very little marijuana    Sexual activity: Yes     Partners: Male     Birth control/protection: None         Physical Exam:     Physical Examination  /68 (BP Location: Left arm, Patient Position: Lying)   Pulse 73   Temp 98.4 °F (36.9 °C) (Oral)   Resp 18   Ht 5' 9" (1.753 m)   Wt 82 kg (180 lb 12.4 oz)   LMP 2025 (Approximate)   SpO2 " 99%   Breastfeeding No   BMI 26.70 kg/m²   Body mass index is 26.7 kg/m².    Neurological Exam  Mental Status:   Alert and oriented to name, date, location  Recent/remote memory, registration, attention span/concentration, fund of knowledge intact by history.    CN:   II, III, IV, VI: PERRL, EOMI, no nystagmus, fundus not visualized due to inadequate dilation.V:  Reduced to fine touch in the left side.VII: symmetrical facial movement, nice smile, no drooping.VIII: grossly intact to hearing XII: tongue midline    Motor:   5/5 in all 4 extremities, no drift                Reflexes:   No Clonus, down going toes b/l.    Sensation: on both UEs and LEs    Intact to all modalities tested    Coordination:    Tremor: Absent.    Gait:    Not tested      Significant Labs:   Recent Lab Results         04/14/25  0524   04/13/25  1400        Anion Gap 10         BUN 22         Calcium 8.9         Chloride 108         CO2 18         Creatinine 0.8         eGFR >60  Comment: Estimated GFR calculated using the CKD-EPI creatinine (2021) equation.         Glucose 77         Hematocrit 37.6         Hemoglobin 11.9         Extra Tube   Hold for add-ons.  Comment: Auto resulted.             Hold for add-ons.  Comment: Auto resulted.          MCH 27.5         MCHC 31.6         MCV 87         MPV 11.5         Platelet Count 289         Potassium 3.8   4.4       RBC 4.33         RDW 15.4         Sodium 136         WBC 8.93                 Significant Imaging:   Images were personally reviewed and interpreted:   CT head wo con: NAICA, remote right cerebellar infarct noted.    Assessment and Plan:   39 y.o. year old female with past medical history of combined systolic and diastolic heart failure, nonischemic cardiomyopathy status post ICD placement, hypertension and chronic kidney disease presented to the ED for shortness of breath on 4/11. Also presenting with headaches that started on Friday mostly limited to left side associated with  blurry vision, nausea and vomiting and photophobia.  She also has numbness of the left side of the face which is no only feature different from her typical migraines.  Presentation likely complex migraines given similar features.  Does not have features of thunder clap headaches, AMS to be concerned regarding other intracranial pathologies resulting headaches.    Plan:   -  migraine cocktail: valproic acid 500 mg IV over 10 minutes q.8 p.r.n., Benadryl 25 mg IV Q 8 p.r.n., Compazine 10 mg q.8 p.r.n.  - avoid NSAIDs or Norco for pain relief  - if headaches persist tomorrow, kindly obtain MRI brain with and without contrast.  Noted patient has AICD placement but however appears to be compatible as she had 2 MRIs in the system last year.  - follow up in Neurology Clinic as outpatient    Thank you for your consult. I will sign off. Please contact us if you have any additional questions.    Time spent on this encounter: 30 minutes. This includes face to face time and non-face to face time preparing to see the patient (eg, review of tests), obtaining and/or reviewing separately obtained history, documenting clinical information in the electronic or other health record, independently interpreting results and communicating results to the patient/family/caregiver, or care coordinator.         Clarita Egan MD  Neurology  [facility]         [1]   Current Facility-Administered Medications   Medication Dose Route Frequency Provider Last Rate Last Admin    acetaminophen tablet 650 mg  650 mg Oral Q8H PRN Noel Mcdowell MD   650 mg at 04/13/25 2130    aspirin EC tablet 81 mg  81 mg Oral Daily Noel Mcdowell MD   81 mg at 04/14/25 0937    atorvastatin tablet 40 mg  40 mg Oral QHS Noel Mcdowell MD   40 mg at 04/13/25 2131    dapagliflozin propanediol (Farxiga) tablet 10 mg  10 mg Oral Daily Noel Mcdowell MD   10 mg at 04/14/25 0937    fluticasone propionate 50 mcg/actuation nasal spray 50 mcg  1  spray Each Nostril BID PRN Noel Mcdowell MD        furosemide injection 80 mg  80 mg Intravenous BID WM Alex Alfaro SMiquel, NP        gabapentin capsule 100 mg  100 mg Oral TID Noel Mcdowell MD   100 mg at 04/14/25 0937    heparin (porcine) injection 5,000 Units  5,000 Units Subcutaneous Q8H Noel Mcdowell MD   5,000 Units at 04/14/25 0619    lactulose 20 gram/30 mL solution Soln 20 g  20 g Oral Once AngelinaLenardicia SMiquel, NP        melatonin tablet 6 mg  6 mg Oral Nightly PRN Noel Mcdowell MD        metoprolol succinate (TOPROL-XL) 24 hr tablet 200 mg  200 mg Oral Daily Alex Alfaro, NP   200 mg at 04/13/25 0841    ondansetron disintegrating tablet 8 mg  8 mg Oral Q8H PRN Noel Mcdowell MD        ondansetron injection 4 mg  4 mg Intravenous Q12H PRN Noel Mcdowell MD   4 mg at 04/13/25 1644    polyethylene glycol packet 17 g  17 g Oral Daily Noel Mcdowell MD   17 g at 04/14/25 0937    sacubitriL-valsartan 24-26 mg per tablet 2 tablet  2 tablet Oral BID Alex Alfaro, NP   2 tablet at 04/13/25 0840    senna-docusate 8.6-50 mg per tablet 1 tablet  1 tablet Oral BID Noel Mcdowell MD   1 tablet at 04/14/25 0937    sodium chloride 0.9% flush 10 mL  10 mL Intravenous PRN Noel Mcdowell MD

## 2025-04-14 NOTE — ASSESSMENT & PLAN NOTE
Patient's blood pressure range in the last 24 hours was: BP  Min: 107/68  Max: 114/76.The patient's inpatient anti-hypertensive regimen is listed below:  Current Antihypertensives  metoprolol succinate (TOPROL-XL) 24 hr tablet 200 mg, Daily, Oral  furosemide injection 80 mg, 2 times daily with meals, Intravenous  Entresto.    Plan  - BP is controlled, no changes needed to their regimen added parameter to BP meds   - lifestyle modification  - cardiac diet

## 2025-04-14 NOTE — NURSING
[facility]  Nurses Note -- 4 Eyes      4/14/2025       Skin assessed on: Q Shift      [x] No Pressure Injuries Present    []Prevention Measures Documented    [] Yes LDA  for Pressure Injury Previously documented     [] Yes New Pressure Injury Discovered   [] LDA for New Pressure Injury Added      Attending RN:  Milka Dunn RN     Second RN:  XIOMY Ramires

## 2025-04-14 NOTE — PROGRESS NOTES
[facility] Medicine  Progress Note    Patient Name: Nasra Marks  MRN: [id number]  Patient Class: OP- Observation   Admission Date: 4/11/2025  Length of Stay: 0 days  Attending Physician: Leatha Angulo MD  Primary Care Provider: Veronika Rainey MD        Subjective     Principal Problem:Acute on chronic combined systolic and diastolic congestive heart failure        HPI:  39-year-old  female with medical history significant for combined systolic and diastolic heart failure, nonischemic cardiomyopathy status post ICD placement, hypertension and chronic kidney disease who presented to the emergency department on account of worsening shortness a breath and fatigue of 4 days' duration, nausea and vomiting x2 days duration.  She voiced baseline dyspnea, which has worsened in the last 4 days, she can only walk about 50 ft before getting short of breath, this has been associated with profound fatigue, nausea and vomiting, 3 bouts of vomiting today, no hematemesis.  Dyspnea associated with 4 pillow orthopnea, and paroxysmal nocturnal dyspnea but no pedal swelling, although noticed abdominal swelling.  She denied any chest pain but voiced cough productive of clear sputum, no hemoptysis.  She denied fever, chills or rigors however complained of left ear fullness, left-sided headache, headache said to be dull in nature, nonradiating, 7/10 in severity, she noticed some tingling sensation in her left upper extremity prior to presentation as well, but no weakness, no visual changes noticed.  She is also constipated and has tried magnesium citrate and Dulcolax with no relief.  She denied any dysuria, no frequency, no urgency, no straining at micturition or hematuria.  She has not had any diarrhea, but has been constipated detailed above.  She voiced compliance with the medication regimen, denied eating high salt diet, although still add salt to food when cooking.  She denied cigarette  "smoking, does not drink alcohol as well.  Outpatient she follows up with the General Cardiology and advanced heart failure/ transplant cardiology.  Lab obtained in the emergency room showed urinalysis without evidence of infection, BNP elevated at 2020, serum creatinine elevated to 1.4 from baseline of 0.9  Chest x-ray showed "cardiomegaly and findings suggestive of pulmonary edema/CHF although correlation for infectious process advised".   Most recent 2D echo of 08/09/2024 showed "EF of 30-35% with grade 2 diastolic dysfunction.  Right heart catheterization of 08/27/2024, mildly elevated filling pressures on the right and left , with RA 10  PA 31/15 (20)  PCWP 15, CO 5.7 l/min  CI 2.8 l/min/m2, TD cardiac output 5.2 l/min cardiac index 2.6 l/min/m2".      Overview/Hospital Course:  Admitted for CHF. Consulted cards recs: Acute on chronic combined systolic and diastolic congestive heart failure. Diuresis and afterload reduction as tolerated. Titrate up entresto - intolerant to aldactone. Continue GDMT. Will f/u PRN. Patient c/o headache. Given prn meds. CT head: 1. No CT evidence of acute intracranial abnormality. If the patient's headaches are sufficiently clinically suspicious, or associated with signs of elevated ICP, focal neurologic deficits, nausea, or vomiting, further evaluation with MRI can be performed if there are no clinical contraindications.2. Findings in keeping with remote right cerebellar infarct, unchanged from prior exam. Patient now stays headache resolved. Patient BP was  96/65 and nurse gave meds. Ordered dose of midodrine 10 mg once. Improved /75    Interval History: patient seen and examined. NAD noted. Patient states she starting to have headache again and feels lightheaded. Consulted neuro. Continue to IV diuresis as blood pressure tolerates.     Review of Systems   Constitutional:  Positive for appetite change and fatigue. Negative for chills and fever.   HENT:  Negative for " congestion, ear discharge, ear pain and sore throat.    Eyes:  Negative for photophobia, redness and visual disturbance.   Respiratory:  Positive for cough and shortness of breath. Negative for apnea, chest tightness and wheezing.    Cardiovascular:  Negative for chest pain, palpitations and leg swelling.   Gastrointestinal:  Positive for abdominal pain, constipation, nausea and vomiting. Negative for abdominal distention, blood in stool and diarrhea.   Endocrine: Negative for cold intolerance and heat intolerance.   Genitourinary:  Negative for difficulty urinating, dysuria, flank pain, frequency and hematuria.   Musculoskeletal:  Negative for back pain, joint swelling and neck stiffness.   Skin:  Negative for color change, pallor, rash and wound.   Neurological:  Negative for dizziness, tremors, seizures, syncope, weakness, light-headedness and headaches.   Psychiatric/Behavioral:  Negative for behavioral problems, confusion, hallucinations and suicidal ideas. The patient is not hyperactive.      Objective:     Vital Signs (Most Recent):  Temp: 98.4 °F (36.9 °C) (04/14/25 1153)  Pulse: 73 (04/14/25 1153)  Resp: 18 (04/14/25 1153)  BP: 107/68 (04/14/25 1153)  SpO2: 99 % (04/14/25 1153) Vital Signs (24h Range):  Temp:  [97.7 °F (36.5 °C)-98.4 °F (36.9 °C)] 98.4 °F (36.9 °C)  Pulse:  [63-88] 73  Resp:  [18-20] 18  SpO2:  [98 %-100 %] 99 %  BP: (107-114)/(68-79) 107/68     Weight: 82 kg (180 lb 12.4 oz)  Body mass index is 26.7 kg/m².    Intake/Output Summary (Last 24 hours) at 4/14/2025 1321  Last data filed at 4/14/2025 0954  Gross per 24 hour   Intake 360 ml   Output --   Net 360 ml         Physical Exam  Vitals and nursing note reviewed.   Constitutional:       General: She is not in acute distress.     Appearance: Normal appearance. She is not ill-appearing, toxic-appearing or diaphoretic.   HENT:      Head: Normocephalic and atraumatic.      Nose: Nose normal. No congestion or rhinorrhea.      Mouth/Throat:       Mouth: Mucous membranes are moist.   Eyes:      General: No scleral icterus.     Extraocular Movements: Extraocular movements intact.      Conjunctiva/sclera: Conjunctivae normal.      Pupils: Pupils are equal, round, and reactive to light.   Cardiovascular:      Rate and Rhythm: Normal rate and regular rhythm.      Pulses: Normal pulses.      Heart sounds:      No friction rub. Gallop (S3, S1 and S2 gallop) present.   Pulmonary:      Effort: Pulmonary effort is normal. No respiratory distress.      Breath sounds: No stridor. No rhonchi or rales     Comments: Decreased breath sounds   Chest:      Chest wall: No tenderness.   Abdominal:      General: Abdomen is flat. Bowel sounds are normal. There is no distension.      Palpations: Abdomen is soft.      Tenderness: There is no abdominal tenderness. There is no right CVA tenderness, left CVA tenderness, guarding or rebound.   Musculoskeletal:         General: No swelling.      Cervical back: Normal range of motion. No rigidity.      Right lower leg: No edema.      Left lower leg: No edema.   Skin:     General: Skin is warm and dry.      Coloration: Skin is not jaundiced.      Findings: No bruising.   Neurological:      General: No focal deficit present.      Mental Status: She is alert and oriented to person, place, and time. Mental status is at baseline.      Cranial Nerves: No cranial nerve deficit.      Sensory: No sensory deficit.   Psychiatric:         Mood and Affect: Mood normal.         Behavior: Behavior normal.         Thought Content: Thought content normal.         Judgment: Judgment normal.               Significant Labs: All pertinent labs within the past 24 hours have been reviewed.    Significant Imaging: I have reviewed all pertinent imaging results/findings within the past 24 hours.      Assessment & Plan  Essential hypertension  Patient's blood pressure range in the last 24 hours was: BP  Min: 107/68  Max: 114/76.The patient's inpatient  anti-hypertensive regimen is listed below:  Current Antihypertensives  metoprolol succinate (TOPROL-XL) 24 hr tablet 200 mg, Daily, Oral  furosemide injection 80 mg, 2 times daily with meals, Intravenous  Entresto.    Plan  - BP is controlled, no changes needed to their regimen added parameter to BP meds   - lifestyle modification  - cardiac diet  Acute on chronic combined systolic and diastolic congestive heart failure  Patient has Combined Systolic and Diastolic heart failure that is Acute on chronic. On presentation their CHF was decompensated. Evidence of decompensated CHF on presentation includes: elevated JVD, crackles on lung auscultation, weight gain, orthopnea, paroxysmal nocturnal dyspnea (PND), and dyspnea on exertion (ELENA). The etiology of their decompensation is likely dietary indiscretion and increased fluid intake. Most recent BNP and echo results are listed below.  BNP 2020  Latest ECHO  Results for orders placed during the hospital encounter of 08/09/24    Echo    Interpretation Summary    Left Ventricle: The left ventricle is moderately dilated. Normal wall thickness. Moderate global hypokinesis present. There is moderately reduced systolic function with a visually estimated ejection fraction of 30 - 35%. Grade II diastolic dysfunction. Normal left ventricular filling pressure.    Right Ventricle: Normal right ventricular cavity size. Wall thickness is normal. Right ventricle wall motion  is normal. Systolic function is normal.    Left Atrium: Normal left atrial size.    Right Atrium: Normal right atrial size.    Aortic Valve: The aortic valve is a trileaflet valve. There is mild aortic valve sclerosis. There is mild annular calcification present.    Mitral Valve: There is mild bileaflet sclerosis. There is mild regurgitation.    Aorta: Aortic root is normal in size measuring 2.81 cm. Ascending aorta is normal measuring 2.96 cm.    Pulmonary Artery: The estimated pulmonary artery systolic pressure  skin is 19 mmHg.    IVC/SVC: Normal venous pressure at 3 mmHg.    Current Heart Failure Medications  metoprolol succinate (TOPROL-XL) 24 hr tablet 200 mg, Daily, Oral  dapagliflozin propanediol (Farxiga) tablet 10 mg, Daily, Oral  sacubitriL-valsartan 24-26 mg per tablet 2 tablet, 2 times daily, Oral  furosemide injection 80 mg, 2 times daily with meals, Intravenous    Plan  - Monitor strict I&Os and daily weights.    - Place on telemetry  - Low sodium diet  - Place on fluid restriction of 1.5 L.   - Cardiology has been consulted  - The patient's volume status is improving but not at their baseline as indicated by elevated JVD, crackles on lung auscultation, weight gain, orthopnea, paroxysmal nocturnal dyspnea (PND), and dyspnea on exertion (ELENA)  - continue lifestyle modification  -cards recs: Diuresis and afterload reduction as tolerated. Titrate up entresto - intolerant to aldactone. Continue GDMT. Will f/u PRN     Nonischemic cardiomyopathy  Severe. EF 15%   ICD (implantable cardioverter-defibrillator), single, in situ  Normal device function at last check     Hx of ischemic right PCA stroke  Continue statin.    Headache  Patient give toradol, benadryl, and compazine for headache  CT head: 1. No CT evidence of acute intracranial abnormality. If the patient's headaches are sufficiently clinically suspicious, or associated with signs of elevated ICP, focal neurologic deficits, nausea, or vomiting, further evaluation with MRI can be performed if there are no clinical contraindications.2. Findings in keeping with remote right cerebellar infarct, unchanged from prior exam.  Neuro recs:   -  migraine cocktail: valproic acid 500 mg IV over 10 minutes q.8 p.r.n., Benadryl 25 mg IV Q 8 p.r.n., Compazine 10 mg q.8 p.r.n.  - avoid NSAIDs or Norco for pain relief  - if headaches persist tomorrow, kindly obtain MRI brain with and without contrast.  Noted patient has AICD placement but however appears to be compatible as she had 2  MRIs in the system last year.  - follow up in Neurology Clinic as outpatient       Hypotensive episode  Patient BP was  96/65 and nurse gave meds. Ordered dose of midodrine 10 mg once. Improved /75. Patient states she still feel lightheaded.   Monitor BP.   VTE Risk Mitigation (From admission, onward)           Ordered     heparin (porcine) injection 5,000 Units  Every 8 hours         04/12/25 0321     IP VTE HIGH RISK PATIENT  Once         04/12/25 0321     Place sequential compression device  Until discontinued         04/12/25 0321                    Discharge Planning   MELA:      Code Status: Full Code   Medical Readiness for Discharge Date:   Discharge Plan A: Home                        Alex Alfaro NP  Department of Hospital Medicine   [facility]

## 2025-04-14 NOTE — ASSESSMENT & PLAN NOTE
Patient give toradol, benadryl, and compazine for headache  CT head: 1. No CT evidence of acute intracranial abnormality. If the patient's headaches are sufficiently clinically suspicious, or associated with signs of elevated ICP, focal neurologic deficits, nausea, or vomiting, further evaluation with MRI can be performed if there are no clinical contraindications.2. Findings in keeping with remote right cerebellar infarct, unchanged from prior exam.  Neuro recs:   -  migraine cocktail: valproic acid 500 mg IV over 10 minutes q.8 p.r.n., Benadryl 25 mg IV Q 8 p.r.n., Compazine 10 mg q.8 p.r.n.  - avoid NSAIDs or Norco for pain relief  - if headaches persist tomorrow, kindly obtain MRI brain with and without contrast.  Noted patient has AICD placement but however appears to be compatible as she had 2 MRIs in the system last year.  - follow up in Neurology Clinic as outpatient

## 2025-04-14 NOTE — CONSULTS
"RD consulted for, "For education on fluid and salt restriction."  Currently providing remote weekend coverage.    Pt receiving Heart Healthy diet with 1500 mL fluid restriction with % intake recorded.    Attached diet education handouts to discharge instructions.  On-site RD to follow up with in-person education as appropriate.    Please Re-Consult as needed.    Thank you.    "

## 2025-04-14 NOTE — NURSING
Report received from the off - going nurse. Pt noted with RR even and regular  with O2 at  RA. Skin warm and dry . Pt noted alert and oriented x 4. Abdomen spft and nontender . Pt moves all extremities well .  Pt requested to even walk in the hallway stating that the walk may relieve her headache.  Doctor notified of pt c/o H.A. Urinary elimination via BRP . Call light within reach and pt is instructed how to use the call light.  Bed in the lowest position with SR's elevated x 2.  Pt encouraged to call for staff before getting up OOB.  Pt request not to be on Bed alarm .  Saline lock intact with no evidence of redness or infiltration noted in the rt forearm position. Will continue to monitor.

## 2025-04-14 NOTE — ASSESSMENT & PLAN NOTE
Patient has Combined Systolic and Diastolic heart failure that is Acute on chronic. On presentation their CHF was decompensated. Evidence of decompensated CHF on presentation includes: elevated JVD, crackles on lung auscultation, weight gain, orthopnea, paroxysmal nocturnal dyspnea (PND), and dyspnea on exertion (ELENA). The etiology of their decompensation is likely dietary indiscretion and increased fluid intake. Most recent BNP and echo results are listed below.  BNP 2020  Latest ECHO  Results for orders placed during the hospital encounter of 08/09/24    Echo    Interpretation Summary    Left Ventricle: The left ventricle is moderately dilated. Normal wall thickness. Moderate global hypokinesis present. There is moderately reduced systolic function with a visually estimated ejection fraction of 30 - 35%. Grade II diastolic dysfunction. Normal left ventricular filling pressure.    Right Ventricle: Normal right ventricular cavity size. Wall thickness is normal. Right ventricle wall motion  is normal. Systolic function is normal.    Left Atrium: Normal left atrial size.    Right Atrium: Normal right atrial size.    Aortic Valve: The aortic valve is a trileaflet valve. There is mild aortic valve sclerosis. There is mild annular calcification present.    Mitral Valve: There is mild bileaflet sclerosis. There is mild regurgitation.    Aorta: Aortic root is normal in size measuring 2.81 cm. Ascending aorta is normal measuring 2.96 cm.    Pulmonary Artery: The estimated pulmonary artery systolic pressure is 19 mmHg.    IVC/SVC: Normal venous pressure at 3 mmHg.    Current Heart Failure Medications  metoprolol succinate (TOPROL-XL) 24 hr tablet 200 mg, Daily, Oral  dapagliflozin propanediol (Farxiga) tablet 10 mg, Daily, Oral  sacubitriL-valsartan 24-26 mg per tablet 2 tablet, 2 times daily, Oral  furosemide injection 80 mg, 2 times daily with meals, Intravenous    Plan  - Monitor strict I&Os and daily weights.    - Place  on telemetry  - Low sodium diet  - Place on fluid restriction of 1.5 L.   - Cardiology has been consulted  - The patient's volume status is improving but not at their baseline as indicated by elevated JVD, crackles on lung auscultation, weight gain, orthopnea, paroxysmal nocturnal dyspnea (PND), and dyspnea on exertion (ELENA)  - continue lifestyle modification  -cards recs: Diuresis and afterload reduction as tolerated. Titrate up entresto - intolerant to aldactone. Continue GDMT. Will f/u PRN

## 2025-04-14 NOTE — PROGRESS NOTES
"  [facility]) nurse navigator notified of [facility] candidate in need of education and introduction to 4-6 week program.      PT aao x 3 while lying in bed supine. Introduced self to pt as [facility] nurse navigator.     Patient given "[facility] Home Care Guide" which includes "Daily weight and symptom tracker".  Encouraged pt to review information.      Reviewed the following key points of H[facility] program with pt and family:   1.) Take your medications as directed.    2.) Weight yourself daily   3.) Follow low salt and limited fluid diet.    4.) Stop smoking and start exercising   5.) Go to your appointments and call your team.      Pt reminded to follow Symptom tracker and to call at the onset of symptoms according to tracker.     Reviewed plan for follow up once discharged to include phone calls, in person and virtual visits to assist pt optimizing their heart failure medication regimen and encouraging healthy lifestyle modifications.  Reminded pt that program will assist them over the next 4-6 weeks and then patient will be transferred to long term care provider .  Reminded pt how to contact H[facility] navigator via phone and or via [employer].     Pt instructed appointment will be printed on hospital discharge paperwork.     Pt also reminded RN will call 48-72 hours after discharge to check on them.     PT  verbalize read back of information given.  Encouraged pt and family to read over information often and contact team with any questions or concerns.    "

## 2025-04-14 NOTE — SUBJECTIVE & OBJECTIVE
Interval History: patient seen and examined. NAD noted. Patient states she starting to have headache again and feels lightheaded. Consulted neuro. Continue to IV diuresis as blood pressure tolerates.     Review of Systems   Constitutional:  Positive for appetite change and fatigue. Negative for chills and fever.   HENT:  Negative for congestion, ear discharge, ear pain and sore throat.    Eyes:  Negative for photophobia, redness and visual disturbance.   Respiratory:  Positive for cough and shortness of breath. Negative for apnea, chest tightness and wheezing.    Cardiovascular:  Negative for chest pain, palpitations and leg swelling.   Gastrointestinal:  Positive for abdominal pain, constipation, nausea and vomiting. Negative for abdominal distention, blood in stool and diarrhea.   Endocrine: Negative for cold intolerance and heat intolerance.   Genitourinary:  Negative for difficulty urinating, dysuria, flank pain, frequency and hematuria.   Musculoskeletal:  Negative for back pain, joint swelling and neck stiffness.   Skin:  Negative for color change, pallor, rash and wound.   Neurological:  Negative for dizziness, tremors, seizures, syncope, weakness, light-headedness and headaches.   Psychiatric/Behavioral:  Negative for behavioral problems, confusion, hallucinations and suicidal ideas. The patient is not hyperactive.      Objective:     Vital Signs (Most Recent):  Temp: 98.4 °F (36.9 °C) (04/14/25 1153)  Pulse: 73 (04/14/25 1153)  Resp: 18 (04/14/25 1153)  BP: 107/68 (04/14/25 1153)  SpO2: 99 % (04/14/25 1153) Vital Signs (24h Range):  Temp:  [97.7 °F (36.5 °C)-98.4 °F (36.9 °C)] 98.4 °F (36.9 °C)  Pulse:  [63-88] 73  Resp:  [18-20] 18  SpO2:  [98 %-100 %] 99 %  BP: (107-114)/(68-79) 107/68     Weight: 82 kg (180 lb 12.4 oz)  Body mass index is 26.7 kg/m².    Intake/Output Summary (Last 24 hours) at 4/14/2025 1321  Last data filed at 4/14/2025 0995  Gross per 24 hour   Intake 360 ml   Output --   Net 360 ml          Physical Exam  Vitals and nursing note reviewed.   Constitutional:       General: She is not in acute distress.     Appearance: Normal appearance. She is not ill-appearing, toxic-appearing or diaphoretic.   HENT:      Head: Normocephalic and atraumatic.      Nose: Nose normal. No congestion or rhinorrhea.      Mouth/Throat:      Mouth: Mucous membranes are moist.   Eyes:      General: No scleral icterus.     Extraocular Movements: Extraocular movements intact.      Conjunctiva/sclera: Conjunctivae normal.      Pupils: Pupils are equal, round, and reactive to light.   Cardiovascular:      Rate and Rhythm: Normal rate and regular rhythm.      Pulses: Normal pulses.      Heart sounds:      No friction rub. Gallop (S3, S1 and S2 gallop) present.   Pulmonary:      Effort: Pulmonary effort is normal. No respiratory distress.      Breath sounds: No stridor. No rhonchi or rales (bibasal).      Comments: Decreased breath sounds   Chest:      Chest wall: No tenderness.   Abdominal:      General: Abdomen is flat. Bowel sounds are normal. There is no distension.      Palpations: Abdomen is soft.      Tenderness: There is no abdominal tenderness. There is no right CVA tenderness, left CVA tenderness, guarding or rebound.   Musculoskeletal:         General: No swelling.      Cervical back: Normal range of motion. No rigidity.      Right lower leg: No edema.      Left lower leg: No edema.   Skin:     General: Skin is warm and dry.      Coloration: Skin is not jaundiced.      Findings: No bruising.   Neurological:      General: No focal deficit present.      Mental Status: She is alert and oriented to person, place, and time. Mental status is at baseline.      Cranial Nerves: No cranial nerve deficit.      Sensory: No sensory deficit.   Psychiatric:         Mood and Affect: Mood normal.         Behavior: Behavior normal.         Thought Content: Thought content normal.         Judgment: Judgment normal.                Significant Labs: All pertinent labs within the past 24 hours have been reviewed.    Significant Imaging: I have reviewed all pertinent imaging results/findings within the past 24 hours.

## 2025-04-15 LAB
ANION GAP (OHS): 11 MMOL/L (ref 8–16)
BUN SERPL-MCNC: 20 MG/DL (ref 6–20)
CALCIUM SERPL-MCNC: 8.8 MG/DL (ref 8.7–10.5)
CHLORIDE SERPL-SCNC: 105 MMOL/L (ref 95–110)
CO2 SERPL-SCNC: 21 MMOL/L (ref 23–29)
CREAT SERPL-MCNC: 0.8 MG/DL (ref 0.5–1.4)
ERYTHROCYTE [DISTWIDTH] IN BLOOD BY AUTOMATED COUNT: 15 % (ref 11.5–14.5)
GFR SERPLBLD CREATININE-BSD FMLA CKD-EPI: >60 ML/MIN/1.73/M2
GLUCOSE SERPL-MCNC: 82 MG/DL (ref 70–110)
HCT VFR BLD AUTO: 38.6 % (ref 37–48.5)
HGB BLD-MCNC: 12.4 GM/DL (ref 12–16)
MCH RBC QN AUTO: 27.5 PG (ref 27–31)
MCHC RBC AUTO-ENTMCNC: 32.1 G/DL (ref 32–36)
MCV RBC AUTO: 86 FL (ref 82–98)
PLATELET # BLD AUTO: 349 K/UL (ref 150–450)
PMV BLD AUTO: 10.9 FL (ref 9.2–12.9)
POTASSIUM SERPL-SCNC: 3.3 MMOL/L (ref 3.5–5.1)
RBC # BLD AUTO: 4.51 M/UL (ref 4–5.4)
SODIUM SERPL-SCNC: 137 MMOL/L (ref 136–145)
WBC # BLD AUTO: 8.17 K/UL (ref 3.9–12.7)

## 2025-04-15 PROCEDURE — 96367 TX/PROPH/DG ADDL SEQ IV INF: CPT

## 2025-04-15 PROCEDURE — 25000003 PHARM REV CODE 250: Performed by: STUDENT IN AN ORGANIZED HEALTH CARE EDUCATION/TRAINING PROGRAM

## 2025-04-15 PROCEDURE — 85027 COMPLETE CBC AUTOMATED: CPT | Performed by: NURSE PRACTITIONER

## 2025-04-15 PROCEDURE — 96366 THER/PROPH/DIAG IV INF ADDON: CPT

## 2025-04-15 PROCEDURE — 25000003 PHARM REV CODE 250

## 2025-04-15 PROCEDURE — 96376 TX/PRO/DX INJ SAME DRUG ADON: CPT

## 2025-04-15 PROCEDURE — 63600175 PHARM REV CODE 636 W HCPCS: Performed by: NURSE PRACTITIONER

## 2025-04-15 PROCEDURE — 63600175 PHARM REV CODE 636 W HCPCS: Performed by: STUDENT IN AN ORGANIZED HEALTH CARE EDUCATION/TRAINING PROGRAM

## 2025-04-15 PROCEDURE — G0378 HOSPITAL OBSERVATION PER HR: HCPCS

## 2025-04-15 PROCEDURE — 63600175 PHARM REV CODE 636 W HCPCS

## 2025-04-15 PROCEDURE — 80048 BASIC METABOLIC PNL TOTAL CA: CPT | Performed by: STUDENT IN AN ORGANIZED HEALTH CARE EDUCATION/TRAINING PROGRAM

## 2025-04-15 PROCEDURE — 36415 COLL VENOUS BLD VENIPUNCTURE: CPT | Performed by: STUDENT IN AN ORGANIZED HEALTH CARE EDUCATION/TRAINING PROGRAM

## 2025-04-15 PROCEDURE — 25000003 PHARM REV CODE 250: Performed by: NURSE PRACTITIONER

## 2025-04-15 PROCEDURE — 96372 THER/PROPH/DIAG INJ SC/IM: CPT | Performed by: STUDENT IN AN ORGANIZED HEALTH CARE EDUCATION/TRAINING PROGRAM

## 2025-04-15 RX ORDER — PROCHLORPERAZINE EDISYLATE 5 MG/ML
10 INJECTION INTRAMUSCULAR; INTRAVENOUS ONCE
Status: COMPLETED | OUTPATIENT
Start: 2025-04-15 | End: 2025-04-15

## 2025-04-15 RX ORDER — DIPHENHYDRAMINE HYDROCHLORIDE 50 MG/ML
25 INJECTION, SOLUTION INTRAMUSCULAR; INTRAVENOUS ONCE
Status: COMPLETED | OUTPATIENT
Start: 2025-04-15 | End: 2025-04-15

## 2025-04-15 RX ORDER — MAGNESIUM SULFATE 1 G/100ML
1 INJECTION INTRAVENOUS ONCE
Status: COMPLETED | OUTPATIENT
Start: 2025-04-15 | End: 2025-04-15

## 2025-04-15 RX ADMIN — GABAPENTIN 100 MG: 100 CAPSULE ORAL at 02:04

## 2025-04-15 RX ADMIN — MAGNESIUM SULFATE IN DEXTROSE 1 G: 10 INJECTION, SOLUTION INTRAVENOUS at 04:04

## 2025-04-15 RX ADMIN — SODIUM CHLORIDE 500 MG: 900 INJECTION INTRAVENOUS at 03:04

## 2025-04-15 RX ADMIN — ATORVASTATIN CALCIUM 40 MG: 40 TABLET, FILM COATED ORAL at 09:04

## 2025-04-15 RX ADMIN — DIPHENHYDRAMINE HYDROCHLORIDE 25 MG: 50 INJECTION INTRAMUSCULAR; INTRAVENOUS at 02:04

## 2025-04-15 RX ADMIN — FUROSEMIDE 80 MG: 10 INJECTION, SOLUTION INTRAVENOUS at 04:04

## 2025-04-15 RX ADMIN — GABAPENTIN 100 MG: 100 CAPSULE ORAL at 09:04

## 2025-04-15 RX ADMIN — SENNOSIDES AND DOCUSATE SODIUM 1 TABLET: 50; 8.6 TABLET ORAL at 09:04

## 2025-04-15 RX ADMIN — ASPIRIN 81 MG: 81 TABLET, COATED ORAL at 09:04

## 2025-04-15 RX ADMIN — HEPARIN SODIUM 5000 UNITS: 5000 INJECTION INTRAVENOUS; SUBCUTANEOUS at 02:04

## 2025-04-15 RX ADMIN — SACUBITRIL AND VALSARTAN 2 TABLET: 24; 26 TABLET, FILM COATED ORAL at 09:04

## 2025-04-15 RX ADMIN — PROCHLORPERAZINE EDISYLATE 10 MG: 5 INJECTION INTRAMUSCULAR; INTRAVENOUS at 02:04

## 2025-04-15 RX ADMIN — HEPARIN SODIUM 5000 UNITS: 5000 INJECTION INTRAVENOUS; SUBCUTANEOUS at 09:04

## 2025-04-15 RX ADMIN — POLYETHYLENE GLYCOL 3350 17 G: 17 POWDER, FOR SOLUTION ORAL at 09:04

## 2025-04-15 RX ADMIN — DAPAGLIFLOZIN 10 MG: 10 TABLET, FILM COATED ORAL at 09:04

## 2025-04-15 RX ADMIN — HEPARIN SODIUM 5000 UNITS: 5000 INJECTION INTRAVENOUS; SUBCUTANEOUS at 06:04

## 2025-04-15 NOTE — ASSESSMENT & PLAN NOTE
Patient give toradol, benadryl, and compazine for headache  CT head: 1. No CT evidence of acute intracranial abnormality. If the patient's headaches are sufficiently clinically suspicious, or associated with signs of elevated ICP, focal neurologic deficits, nausea, or vomiting, further evaluation with MRI can be performed if there are no clinical contraindications.2. Findings in keeping with remote right cerebellar infarct, unchanged from prior exam.  Neuro recs:   -  migraine cocktail: valproic acid 500 mg IV over 10 minutes q.8 p.r.n., Benadryl 25 mg IV Q 8 p.r.n., Compazine 10 mg q.8 p.r.n.  - avoid NSAIDs or Norco for pain relief  - if headaches persist tomorrow, kindly obtain MRI brain with and without contrast.  Noted patient has AICD placement but however appears to be compatible as she had 2 MRIs in the system last year.  - follow up in Neurology Clinic as outpatient    4/15: Continues to complain of headache. States cocktail improved headache slightly but headache back. Planned to order MRI however unable to do at [facility] due to patients AICD. Discussed with Neurology and recommendations to try another headache cocktail with valproic acid, the scene, and Benadryl.  Also 1 mg IV magnesium.

## 2025-04-15 NOTE — NURSING
[facility] [place]  Nurses Note -- 4 Eyes      4/14/2025       Skin assessed on: Q Shift      [x] No Pressure Injuries Present    []Prevention Measures Documented    [] Yes LDA  for Pressure Injury Previously documented     [] Yes New Pressure Injury Discovered   [] LDA for New Pressure Injury Added      Attending RN:  Natalie Pantoja RN     Second RN:  Frida Elizabeth RN

## 2025-04-15 NOTE — PLAN OF CARE
Patient is awake alert and oriented. Denies pain, HA, SOB. No complaints over night, mostly slept; eyes closed chest rising and falling. Free of falls. Continue with plan of care as ordered. No distress noted.   Problem: Adult Inpatient Plan of Care  Goal: Plan of Care Review  4/15/2025 0717 by Natalie Pantoja RN  Outcome: Progressing  4/15/2025 0651 by Natalie Pantoja, RN  Outcome: Progressing  Goal: Patient-Specific Goal (Individualized)  4/15/2025 0717 by Natalie Pantoja RN  Outcome: Progressing  4/15/2025 0651 by Natalie Pantoja, RN  Outcome: Progressing  Goal: Optimal Comfort and Wellbeing  4/15/2025 0717 by Natalie Pantoja, RN  Outcome: Progressing  4/15/2025 0651 by Natalie Pantoja, RN  Outcome: Progressing  Goal: Readiness for Transition of Care  4/15/2025 0717 by Natalie Pantoja, RN  Outcome: Progressing  4/15/2025 0651 by Natalie Pantoja RN  Outcome: Progressing     Problem: Fall Injury Risk  Goal: Absence of Fall and Fall-Related Injury  Outcome: Progressing     Problem: Pain Acute  Goal: Optimal Pain Control and Function  Outcome: Progressing

## 2025-04-15 NOTE — PROGRESS NOTES
[facility] Medicine  Progress Note    Patient Name: Nasra Marks  MRN: [id number]  Patient Class: OP- Observation   Admission Date: 4/11/2025  Length of Stay: 0 days  Attending Physician: Leatha Angulo MD  Primary Care Provider: Veronika Rainey MD        Subjective     Principal Problem:Acute on chronic combined systolic and diastolic congestive heart failure        HPI:  39-year-old  female with medical history significant for combined systolic and diastolic heart failure, nonischemic cardiomyopathy status post ICD placement, hypertension and chronic kidney disease who presented to the emergency department on account of worsening shortness a breath and fatigue of 4 days' duration, nausea and vomiting x2 days duration.  She voiced baseline dyspnea, which has worsened in the last 4 days, she can only walk about 50 ft before getting short of breath, this has been associated with profound fatigue, nausea and vomiting, 3 bouts of vomiting today, no hematemesis.  Dyspnea associated with 4 pillow orthopnea, and paroxysmal nocturnal dyspnea but no pedal swelling, although noticed abdominal swelling.  She denied any chest pain but voiced cough productive of clear sputum, no hemoptysis.  She denied fever, chills or rigors however complained of left ear fullness, left-sided headache, headache said to be dull in nature, nonradiating, 7/10 in severity, she noticed some tingling sensation in her left upper extremity prior to presentation as well, but no weakness, no visual changes noticed.  She is also constipated and has tried magnesium citrate and Dulcolax with no relief.  She denied any dysuria, no frequency, no urgency, no straining at micturition or hematuria.  She has not had any diarrhea, but has been constipated detailed above.  She voiced compliance with the medication regimen, denied eating high salt diet, although still add salt to food when cooking.  She denied cigarette  "smoking, does not drink alcohol as well.  Outpatient she follows up with the General Cardiology and advanced heart failure/ transplant cardiology.  Lab obtained in the emergency room showed urinalysis without evidence of infection, BNP elevated at 2020, serum creatinine elevated to 1.4 from baseline of 0.9  Chest x-ray showed "cardiomegaly and findings suggestive of pulmonary edema/CHF although correlation for infectious process advised".   Most recent 2D echo of 08/09/2024 showed "EF of 30-35% with grade 2 diastolic dysfunction.  Right heart catheterization of 08/27/2024, mildly elevated filling pressures on the right and left , with RA 10  PA 31/15 (20)  PCWP 15, CO 5.7 l/min  CI 2.8 l/min/m2, TD cardiac output 5.2 l/min cardiac index 2.6 l/min/m2".      Overview/Hospital Course:  Admitted for CHF. Consulted cards recs: Acute on chronic combined systolic and diastolic congestive heart failure. Diuresis and afterload reduction as tolerated. Titrate up entresto - intolerant to aldactone. Continue GDMT. Will f/u PRN. Patient c/o headache. Given prn meds. CT head: 1. No CT evidence of acute intracranial abnormality. If the patient's headaches are sufficiently clinically suspicious, or associated with signs of elevated ICP, focal neurologic deficits, nausea, or vomiting, further evaluation with MRI can be performed if there are no clinical contraindications.2. Findings in keeping with remote right cerebellar infarct, unchanged from prior exam. Patient now stays headache resolved. Patient BP was  96/65 and nurse gave meds. Ordered dose of midodrine 10 mg once. Improved /75    Interval History: Patient seen and examined. Continues to complain of headache. States cocktail improved slightly but headache back. Planned to order MRI however unable to do at [facility] due to patients AICD. Discussed with Neurology and recommendations to try another headache cocktail with valproic acid, the scene, and Benadryl.  Also 1 " mg IV magnesium. Continue with diuresis as tolerated. Likely d/c tomorrow     Review of Systems   Constitutional:  Positive for appetite change and fatigue. Negative for chills and fever.   Respiratory:  Positive for cough and shortness of breath.    Cardiovascular:  Negative for chest pain.   Gastrointestinal:  Positive for abdominal pain, constipation, nausea and vomiting.   Neurological:  Positive for headaches. Negative for tremors and weakness.     Objective:     Vital Signs (Most Recent):  Temp: 97.7 °F (36.5 °C) (04/15/25 1059)  Pulse: 85 (04/15/25 1515)  Resp: 19 (04/15/25 1059)  BP: 126/87 (04/15/25 1059)  SpO2: 100 % (04/15/25 1059) Vital Signs (24h Range):  Temp:  [97.7 °F (36.5 °C)-98.6 °F (37 °C)] 97.7 °F (36.5 °C)  Pulse:  [] 85  Resp:  [18-19] 19  SpO2:  [98 %-100 %] 100 %  BP: ()/(68-87) 126/87     Weight: 82 kg (180 lb 12.4 oz)  Body mass index is 26.7 kg/m².    Intake/Output Summary (Last 24 hours) at 4/15/2025 1553  Last data filed at 4/15/2025 1259  Gross per 24 hour   Intake 960 ml   Output 725 ml   Net 235 ml         Physical Exam  Vitals and nursing note reviewed.   Constitutional:       General: She is not in acute distress.     Appearance: Normal appearance. She is not ill-appearing, toxic-appearing or diaphoretic.   HENT:      Head: Normocephalic and atraumatic.      Nose: Nose normal. No congestion or rhinorrhea.      Mouth/Throat:      Mouth: Mucous membranes are moist.   Eyes:      General: No scleral icterus.     Extraocular Movements: Extraocular movements intact.      Conjunctiva/sclera: Conjunctivae normal.      Pupils: Pupils are equal, round, and reactive to light.   Cardiovascular:      Rate and Rhythm: Normal rate and regular rhythm.      Pulses: Normal pulses.   Pulmonary:      Effort: Pulmonary effort is normal. No respiratory distress.      Breath sounds: No stridor. No rhonchi or rales (bibasal).      Comments: Decreased breath sounds   Chest:      Chest wall: No  tenderness.   Abdominal:      General: Abdomen is flat. Bowel sounds are normal. There is no distension.      Palpations: Abdomen is soft.      Tenderness: There is no abdominal tenderness. There is no right CVA tenderness, left CVA tenderness, guarding or rebound.   Musculoskeletal:         General: No swelling.      Cervical back: Normal range of motion. No rigidity.      Right lower leg: No edema.      Left lower leg: No edema.   Skin:     General: Skin is warm and dry.      Coloration: Skin is not jaundiced.      Findings: No bruising.   Neurological:      General: No focal deficit present.      Mental Status: She is alert and oriented to person, place, and time. Mental status is at baseline.      Cranial Nerves: No cranial nerve deficit.      Sensory: No sensory deficit.   Psychiatric:         Mood and Affect: Mood normal.         Behavior: Behavior normal.         Thought Content: Thought content normal.         Judgment: Judgment normal.               Significant Labs: All pertinent labs within the past 24 hours have been reviewed.    Significant Imaging: I have reviewed all pertinent imaging results/findings within the past 24 hours.      Assessment & Plan  Essential hypertension  Patient's blood pressure range in the last 24 hours was: BP  Min: 98/68  Max: 126/87.The patient's inpatient anti-hypertensive regimen is listed below:  Current Antihypertensives  metoprolol succinate (TOPROL-XL) 24 hr tablet 200 mg, Daily, Oral  furosemide injection 80 mg, 2 times daily with meals, Intravenous  Entresto.    Plan  - BP is controlled, no changes needed to their regimen added parameter to BP meds   - lifestyle modification  - cardiac diet  Acute on chronic combined systolic and diastolic congestive heart failure  Patient has Combined Systolic and Diastolic heart failure that is Acute on chronic. On presentation their CHF was decompensated. Evidence of decompensated CHF on presentation includes: elevated JVD, crackles  on lung auscultation, weight gain, orthopnea, paroxysmal nocturnal dyspnea (PND), and dyspnea on exertion (ELENA). The etiology of their decompensation is likely dietary indiscretion and increased fluid intake. Most recent BNP and echo results are listed below.  BNP 2020  Latest ECHO  Results for orders placed during the hospital encounter of 08/09/24    Echo    Interpretation Summary    Left Ventricle: The left ventricle is moderately dilated. Normal wall thickness. Moderate global hypokinesis present. There is moderately reduced systolic function with a visually estimated ejection fraction of 30 - 35%. Grade II diastolic dysfunction. Normal left ventricular filling pressure.    Right Ventricle: Normal right ventricular cavity size. Wall thickness is normal. Right ventricle wall motion  is normal. Systolic function is normal.    Left Atrium: Normal left atrial size.    Right Atrium: Normal right atrial size.    Aortic Valve: The aortic valve is a trileaflet valve. There is mild aortic valve sclerosis. There is mild annular calcification present.    Mitral Valve: There is mild bileaflet sclerosis. There is mild regurgitation.    Aorta: Aortic root is normal in size measuring 2.81 cm. Ascending aorta is normal measuring 2.96 cm.    Pulmonary Artery: The estimated pulmonary artery systolic pressure is 19 mmHg.    IVC/SVC: Normal venous pressure at 3 mmHg.    Current Heart Failure Medications  metoprolol succinate (TOPROL-XL) 24 hr tablet 200 mg, Daily, Oral  dapagliflozin propanediol (Farxiga) tablet 10 mg, Daily, Oral  sacubitriL-valsartan 24-26 mg per tablet 2 tablet, 2 times daily, Oral  furosemide injection 80 mg, 2 times daily with meals, Intravenous    Plan  - Monitor strict I&Os and daily weights.    - Place on telemetry  - Low sodium diet  - Place on fluid restriction of 1.5 L.   - Cardiology has been consulted  - The patient's volume status is improving but not at their baseline as indicated by elevated JVD,  crackles on lung auscultation, weight gain, orthopnea, paroxysmal nocturnal dyspnea (PND), and dyspnea on exertion (ELENA)  - continue lifestyle modification  -cards recs: Diuresis and afterload reduction as tolerated. Titrate up entresto - intolerant to aldactone. Continue GDMT. Will f/u PRN   Nonischemic cardiomyopathy  Severe. EF 15%   ICD (implantable cardioverter-defibrillator), single, in situ  Normal device function at last check     Hx of ischemic right PCA stroke  Continue statin.    Headache  Patient give toradol, benadryl, and compazine for headache  CT head: 1. No CT evidence of acute intracranial abnormality. If the patient's headaches are sufficiently clinically suspicious, or associated with signs of elevated ICP, focal neurologic deficits, nausea, or vomiting, further evaluation with MRI can be performed if there are no clinical contraindications.2. Findings in keeping with remote right cerebellar infarct, unchanged from prior exam.  Neuro recs:   -  migraine cocktail: valproic acid 500 mg IV over 10 minutes q.8 p.r.n., Benadryl 25 mg IV Q 8 p.r.n., Compazine 10 mg q.8 p.r.n.  - avoid NSAIDs or Norco for pain relief  - if headaches persist tomorrow, kindly obtain MRI brain with and without contrast.  Noted patient has AICD placement but however appears to be compatible as she had 2 MRIs in the system last year.  - follow up in Neurology Clinic as outpatient    4/15: Continues to complain of headache. States cocktail improved headache slightly but headache back. Planned to order MRI however unable to do at [facility] due to patients AICD. Discussed with Neurology and recommendations to try another headache cocktail with valproic acid, the scene, and Benadryl.  Also 1 mg IV magnesium.         Hypotensive episode  Patient BP was  96/65 and nurse gave meds. Ordered dose of midodrine 10 mg once. Improved /75. Patient states she still feel lightheaded.   Monitor BP.   VTE Risk Mitigation (From  admission, onward)           Ordered     heparin (porcine) injection 5,000 Units  Every 8 hours         04/12/25 0321     IP VTE HIGH RISK PATIENT  Once         04/12/25 0321     Place sequential compression device  Until discontinued         04/12/25 0321                    Discharge Planning   MELA:      Code Status: Full Code   Medical Readiness for Discharge Date:   Discharge Plan A: Home                        Michelle Watson PA-C  Department of Hospital Medicine   [facility]

## 2025-04-15 NOTE — NURSING
[facility] [place]  Nurses Note -- 4 Eyes        Date:  04/15/2025        Skin assessed on:    Q shift        [x] No Pressure Injuries Present                 []Prevention Measures Documented     [] Yes LDA Previously documented      [] Yes New Pressure Injury Discovered              [] LDA Added        Attending RN:  DIGNA Galicia     Second RN:  Natalie RN

## 2025-04-15 NOTE — SUBJECTIVE & OBJECTIVE
Interval History: Patient seen and examined. Continues to complain of headache. States cocktail improved slightly but headache back. Planned to order MRI however unable to do at [facility] due to patients AICD. Discussed with Neurology and recommendations to try another headache cocktail with valproic acid, the scene, and Benadryl.  Also 1 mg IV magnesium. Continue with diuresis as tolerated. Likely d/c tomorrow     Review of Systems   Constitutional:  Positive for appetite change and fatigue. Negative for chills and fever.   Respiratory:  Positive for cough and shortness of breath.    Cardiovascular:  Negative for chest pain.   Gastrointestinal:  Positive for abdominal pain, constipation, nausea and vomiting.   Neurological:  Positive for headaches. Negative for tremors and weakness.     Objective:     Vital Signs (Most Recent):  Temp: 97.7 °F (36.5 °C) (04/15/25 1059)  Pulse: 85 (04/15/25 1515)  Resp: 19 (04/15/25 1059)  BP: 126/87 (04/15/25 1059)  SpO2: 100 % (04/15/25 1059) Vital Signs (24h Range):  Temp:  [97.7 °F (36.5 °C)-98.6 °F (37 °C)] 97.7 °F (36.5 °C)  Pulse:  [] 85  Resp:  [18-19] 19  SpO2:  [98 %-100 %] 100 %  BP: ()/(68-87) 126/87     Weight: 82 kg (180 lb 12.4 oz)  Body mass index is 26.7 kg/m².    Intake/Output Summary (Last 24 hours) at 4/15/2025 1553  Last data filed at 4/15/2025 1259  Gross per 24 hour   Intake 960 ml   Output 725 ml   Net 235 ml         Physical Exam  Vitals and nursing note reviewed.   Constitutional:       General: She is not in acute distress.     Appearance: Normal appearance. She is not ill-appearing, toxic-appearing or diaphoretic.   HENT:      Head: Normocephalic and atraumatic.      Nose: Nose normal. No congestion or rhinorrhea.      Mouth/Throat:      Mouth: Mucous membranes are moist.   Eyes:      General: No scleral icterus.     Extraocular Movements: Extraocular movements intact.      Conjunctiva/sclera: Conjunctivae normal.      Pupils: Pupils are  equal, round, and reactive to light.   Cardiovascular:      Rate and Rhythm: Normal rate and regular rhythm.      Pulses: Normal pulses.   Pulmonary:      Effort: Pulmonary effort is normal. No respiratory distress.      Breath sounds: No stridor. No rhonchi or rales (bibasal).      Comments: Decreased breath sounds   Chest:      Chest wall: No tenderness.   Abdominal:      General: Abdomen is flat. Bowel sounds are normal. There is no distension.      Palpations: Abdomen is soft.      Tenderness: There is no abdominal tenderness. There is no right CVA tenderness, left CVA tenderness, guarding or rebound.   Musculoskeletal:         General: No swelling.      Cervical back: Normal range of motion. No rigidity.      Right lower leg: No edema.      Left lower leg: No edema.   Skin:     General: Skin is warm and dry.      Coloration: Skin is not jaundiced.      Findings: No bruising.   Neurological:      General: No focal deficit present.      Mental Status: She is alert and oriented to person, place, and time. Mental status is at baseline.      Cranial Nerves: No cranial nerve deficit.      Sensory: No sensory deficit.   Psychiatric:         Mood and Affect: Mood normal.         Behavior: Behavior normal.         Thought Content: Thought content normal.         Judgment: Judgment normal.               Significant Labs: All pertinent labs within the past 24 hours have been reviewed.    Significant Imaging: I have reviewed all pertinent imaging results/findings within the past 24 hours.

## 2025-04-15 NOTE — ASSESSMENT & PLAN NOTE
Patient's blood pressure range in the last 24 hours was: BP  Min: 98/68  Max: 126/87.The patient's inpatient anti-hypertensive regimen is listed below:  Current Antihypertensives  metoprolol succinate (TOPROL-XL) 24 hr tablet 200 mg, Daily, Oral  furosemide injection 80 mg, 2 times daily with meals, Intravenous  Entresto.    Plan  - BP is controlled, no changes needed to their regimen added parameter to BP meds   - lifestyle modification  - cardiac diet

## 2025-04-15 NOTE — PLAN OF CARE
Problem: Fall Injury Risk  Goal: Absence of Fall and Fall-Related Injury  Outcome: Progressing  Intervention: Identify and Manage Contributors  Flowsheets (Taken 4/15/2025 1700)  Self-Care Promotion:   independence encouraged   BADL personal objects within reach  Medication Review/Management:   medications reviewed   high-risk medications identified   provider consulted  Intervention: Promote Injury-Free Environment  Flowsheets (Taken 4/15/2025 1700)  Safety Promotion/Fall Prevention:   assistive device/personal item within reach   high risk medications identified   lighting adjusted   medications reviewed   pulse ox   side rails raised x 2

## 2025-04-16 VITALS
RESPIRATION RATE: 18 BRPM | WEIGHT: 180.75 LBS | OXYGEN SATURATION: 97 % | BODY MASS INDEX: 26.77 KG/M2 | HEIGHT: 69 IN | TEMPERATURE: 99 F | HEART RATE: 97 BPM | DIASTOLIC BLOOD PRESSURE: 68 MMHG | SYSTOLIC BLOOD PRESSURE: 97 MMHG

## 2025-04-16 LAB
ANION GAP (OHS): 10 MMOL/L (ref 8–16)
BUN SERPL-MCNC: 18 MG/DL (ref 6–20)
CALCIUM SERPL-MCNC: 9.4 MG/DL (ref 8.7–10.5)
CHLORIDE SERPL-SCNC: 105 MMOL/L (ref 95–110)
CO2 SERPL-SCNC: 21 MMOL/L (ref 23–29)
CREAT SERPL-MCNC: 0.8 MG/DL (ref 0.5–1.4)
ERYTHROCYTE [DISTWIDTH] IN BLOOD BY AUTOMATED COUNT: 15.1 % (ref 11.5–14.5)
GFR SERPLBLD CREATININE-BSD FMLA CKD-EPI: >60 ML/MIN/1.73/M2
GLUCOSE SERPL-MCNC: 94 MG/DL (ref 70–110)
HCT VFR BLD AUTO: 40.7 % (ref 37–48.5)
HGB BLD-MCNC: 13.2 GM/DL (ref 12–16)
MCH RBC QN AUTO: 27.7 PG (ref 27–31)
MCHC RBC AUTO-ENTMCNC: 32.4 G/DL (ref 32–36)
MCV RBC AUTO: 85 FL (ref 82–98)
PLATELET # BLD AUTO: 352 K/UL (ref 150–450)
PMV BLD AUTO: 10.3 FL (ref 9.2–12.9)
POTASSIUM SERPL-SCNC: 3.4 MMOL/L (ref 3.5–5.1)
RBC # BLD AUTO: 4.77 M/UL (ref 4–5.4)
SODIUM SERPL-SCNC: 136 MMOL/L (ref 136–145)
WBC # BLD AUTO: 7.33 K/UL (ref 3.9–12.7)

## 2025-04-16 PROCEDURE — 25000003 PHARM REV CODE 250: Performed by: STUDENT IN AN ORGANIZED HEALTH CARE EDUCATION/TRAINING PROGRAM

## 2025-04-16 PROCEDURE — 94761 N-INVAS EAR/PLS OXIMETRY MLT: CPT

## 2025-04-16 PROCEDURE — 96372 THER/PROPH/DIAG INJ SC/IM: CPT | Performed by: STUDENT IN AN ORGANIZED HEALTH CARE EDUCATION/TRAINING PROGRAM

## 2025-04-16 PROCEDURE — 25000003 PHARM REV CODE 250

## 2025-04-16 PROCEDURE — G0378 HOSPITAL OBSERVATION PER HR: HCPCS

## 2025-04-16 PROCEDURE — 36415 COLL VENOUS BLD VENIPUNCTURE: CPT | Performed by: NURSE PRACTITIONER

## 2025-04-16 PROCEDURE — 80048 BASIC METABOLIC PNL TOTAL CA: CPT | Performed by: STUDENT IN AN ORGANIZED HEALTH CARE EDUCATION/TRAINING PROGRAM

## 2025-04-16 PROCEDURE — 85027 COMPLETE CBC AUTOMATED: CPT | Performed by: NURSE PRACTITIONER

## 2025-04-16 PROCEDURE — 63600175 PHARM REV CODE 636 W HCPCS: Performed by: STUDENT IN AN ORGANIZED HEALTH CARE EDUCATION/TRAINING PROGRAM

## 2025-04-16 RX ORDER — BISACODYL 10 MG/1
10 SUPPOSITORY RECTAL ONCE
Status: COMPLETED | OUTPATIENT
Start: 2025-04-16 | End: 2025-04-16

## 2025-04-16 RX ADMIN — BISACODYL 10 MG: 10 SUPPOSITORY RECTAL at 09:04

## 2025-04-16 RX ADMIN — ASPIRIN 81 MG: 81 TABLET, COATED ORAL at 08:04

## 2025-04-16 RX ADMIN — SENNOSIDES AND DOCUSATE SODIUM 1 TABLET: 50; 8.6 TABLET ORAL at 08:04

## 2025-04-16 RX ADMIN — POLYETHYLENE GLYCOL 3350 17 G: 17 POWDER, FOR SOLUTION ORAL at 08:04

## 2025-04-16 RX ADMIN — GABAPENTIN 100 MG: 100 CAPSULE ORAL at 08:04

## 2025-04-16 RX ADMIN — HEPARIN SODIUM 5000 UNITS: 5000 INJECTION INTRAVENOUS; SUBCUTANEOUS at 05:04

## 2025-04-16 RX ADMIN — DAPAGLIFLOZIN 10 MG: 10 TABLET, FILM COATED ORAL at 08:04

## 2025-04-16 NOTE — PLAN OF CARE
Problem: Adult Inpatient Plan of Care  Goal: Plan of Care Review  Outcome: Met  Goal: Patient-Specific Goal (Individualized)  Outcome: Met  Goal: Absence of Hospital-Acquired Illness or Injury  Outcome: Met  Goal: Optimal Comfort and Wellbeing  Outcome: Met  Goal: Readiness for Transition of Care  Outcome: Met     Problem: Fall Injury Risk  Goal: Absence of Fall and Fall-Related Injury  Outcome: Met     Problem: Pain Acute  Goal: Optimal Pain Control and Function  Outcome: Met

## 2025-04-16 NOTE — NURSING
Patient complained of constipation and has not had a BM since 4/10. Notified MD. Suppository ordered. Gave patient suppository. Patient had small BM. Noticed small blood in toilet. Notified MD. Discharge orders placed. IV removed and tele removed. Patient ready for discharge teaching. Patient AAOx4. Patient did not complain of any pain throughout shift. Patient not showing signs of distress. Bed in lowest position, wheels locked, call bell in reach, side rails up x2.

## 2025-04-16 NOTE — PLAN OF CARE
Problem: Adult Inpatient Plan of Care  Goal: Plan of Care Review  Outcome: Progressing  Goal: Patient-Specific Goal (Individualized)  Outcome: Progressing  Goal: Optimal Comfort and Wellbeing  Outcome: Progressing  Goal: Readiness for Transition of Care  Outcome: Progressing     Problem: Fall Injury Risk  Goal: Absence of Fall and Fall-Related Injury  Outcome: Progressing     Problem: Pain Acute  Goal: Optimal Pain Control and Function  Outcome: Progressing

## 2025-04-16 NOTE — DISCHARGE INSTRUCTIONS
Our goal at [facility] is to always give you outstanding care and exceptional service. You may receive a survey from [employer] by mail, text or e-mail in the next 24-48 hours asking about the care you received with us. The survey should only take 5-10 minutes to complete and is very important to us.     Your feedback provides us with a way to recognize our staff who work tirelessly to provide the best care! Also, your responses help us learn how to improve when your experience was below our aspiration of excellence. We are always looking for ways to improve your stay. We WILL use your feedback to continue making improvements to help us provide the highest quality care. We keep your personal information and feedback confidential. We appreciate your time completing this survey and can't wait to hear from you!!!    We look forward to your continued care with us! Thanks so much for choosing [facility] for your healthcare needs!

## 2025-04-16 NOTE — PROGRESS NOTES
[facility] Provider Note (Initial Clinic) Consult Note    Age: 39 y.o.  Gender: female  Ethnicity:   Number of admissions for CHF within the preceding year: 3   Type of Congestive Heart Failure: Combined     Diagnostic Labs:   EKG - 04/13/2025  CXR - 04/11/2025  ECHO - 04/12/2025  Stress test -   Stress echo -   Pharmacologic stress -   Cardiac catheterization - 08/27/2024   Cardiac MRI -     Lab Results   Component Value Date     04/16/2025     04/15/2025    K 3.4 (L) 04/16/2025    K 3.3 (L) 04/15/2025     04/16/2025     04/15/2025    CO2 21 (L) 04/16/2025    CO2 21 (L) 04/15/2025    GLU 83 02/16/2025    GLU 81 12/28/2024    BUN 18 04/16/2025    BUN 20 04/15/2025    CREATININE 0.8 04/16/2025    CREATININE 0.8 04/15/2025    CALCIUM 9.4 04/16/2025    CALCIUM 8.8 04/15/2025    PROT 7.9 02/16/2025    PROT 8.4 12/28/2024    ALBUMIN 3.7 04/12/2025    ALBUMIN 3.9 02/16/2025    BILITOT 0.7 04/12/2025    BILITOT 0.7 02/16/2025    ALKPHOS 61 04/12/2025    ALKPHOS 67 02/16/2025    AST 61 (H) 04/12/2025    AST 29 02/16/2025    ALT 83 (H) 04/12/2025    ALT 33 02/16/2025    ANIONGAP 10 04/16/2025    ANIONGAP 11 04/15/2025    ESTGFRAFRICA >60.0 02/25/2022    ESTGFRAFRICA >60.0 01/26/2022    EGFRNONAA >60.0 02/25/2022    EGFRNONAA >60.0 01/26/2022       Lab Results   Component Value Date    WBC 7.33 04/16/2025    WBC 8.17 04/15/2025    RBC 4.77 04/16/2025    RBC 4.51 04/15/2025    HGB 13.2 04/16/2025    HGB 12.4 04/15/2025    HCT 40.7 04/16/2025    HCT 38.6 04/15/2025    MCV 85 04/16/2025    MCV 86 04/15/2025    MCH 27.7 04/16/2025    MCH 27.5 04/15/2025    MCHC 32.4 04/16/2025    MCHC 32.1 04/15/2025    RDW 15.1 (H) 04/16/2025    RDW 15.0 (H) 04/15/2025     04/16/2025     04/15/2025    MPV 10.3 04/16/2025    MPV 10.9 04/15/2025    IMMGR 0.4 04/12/2025    IMMGR 0.2 02/16/2025    IGABS 0.04 04/12/2025    IGABS 0.02 02/16/2025    LYMPH 20.3 04/12/2025    LYMPH 1.82 04/12/2025    MONO  6.4 04/12/2025    MONO 0.57 04/12/2025    EOS 0.8 04/12/2025    EOS 0.07 04/12/2025    BASO 0.04 02/16/2025    BASO 0.04 12/28/2024    NRBC 0 04/12/2025    NRBC 0 02/16/2025    GRAN 6.3 02/16/2025    GRAN 73.4 (H) 02/16/2025    EOSINOPHIL 1.1 02/16/2025    EOSINOPHIL 1.8 12/28/2024    BASOPHIL 0.4 04/12/2025    BASOPHIL 0.04 04/12/2025    PLTEST Adequate 05/15/2013    ANISO Slight 09/25/2022    ANISO sl 05/17/2013       Lab Results   Component Value Date    BNP 2,875 (H) 04/12/2025    BNP 1,285 (H) 02/16/2025    MG 1.8 04/12/2025    MG 2.0 08/28/2024    PHOS 3.7 04/12/2025    PHOS 2.9 04/25/2024    NTPROBNP 143 02/27/2024    TROPONINI 0.010 02/16/2025    TROPONINI 0.009 02/16/2025    HGBA1C 5.2 04/12/2025    HGBA1C 5.3 08/27/2024    TSH 3.141 04/12/2025    TSH 3.133 08/27/2024       Lab Results   Component Value Date    FERRITIN 186 07/24/2021    CHOL 122 04/12/2025    TRIG 54 04/12/2025    HDL 36 (L) 04/12/2025    LDLCALC 78.0 08/27/2024    CHOLHDL 29.5 04/12/2025    TOTALCHOLEST 3.4 04/12/2025    NONHDLCHOL 86 04/12/2025    COLORU Yellow 04/11/2025    APPEARANCEUA Hazy (A) 02/16/2025    PHUR 7.0 02/16/2025    SPECGRAV >=1.030 (>) 04/11/2025    PROTEINUA 1+ (A) 02/16/2025    GLUCUA Negative 02/16/2025    KETONESU Negative 02/16/2025    BILIRUBINUA Negative 02/16/2025    OCCULTUA 2+ (A) 02/16/2025    NITRITE Negative 04/11/2025    LEUKOCYTESUR Negative 04/11/2025       List all implanted cardiac devices:   AICD    Current Outpatient Medications on File Prior to Visit   Medication Sig Dispense Refill    acetaminophen (TYLENOL) 500 MG tablet Take 1 tablet (500 mg total) by mouth every 6 (six) hours as needed for Pain. 30 tablet 0    albuterol (PROVENTIL/VENTOLIN HFA) 90 mcg/actuation inhaler Inhale 1-2 puffs into the lungs every 6 (six) hours as needed. Rescue 18 g 0    amLODIPine (NORVASC) 5 MG tablet Take 5 mg by mouth.      aspirin (ECOTRIN) 81 MG EC tablet Take 1 tablet (81 mg total) by mouth once daily. 30 tablet  0    atorvastatin (LIPITOR) 40 MG tablet Take 1 tablet (40 mg total) by mouth every evening. 90 tablet 3    benzocaine-menthoL 15-3.6 mg Lozg 1 lozenge by Mucous Membrane route every 2 (two) hours as needed (sore throat). 16 lozenge 0    cetirizine (ZYRTEC) 10 MG tablet Take 1 tablet (10 mg total) by mouth once daily. 30 tablet 0    cetirizine (ZYRTEC) 10 MG tablet Take 1 tablet (10 mg total) by mouth once daily. 30 tablet 0    docusate sodium (COLACE) 100 MG capsule Take 1 capsule (100 mg total) by mouth 2 (two) times daily. 20 capsule 0    eplerenone (INSPRA) 25 MG Tab Take 1 tablet (25 mg total) by mouth once daily. 90 tablet 3    FARXIGA 10 mg tablet Take 1 tablet by mouth once daily 30 tablet 6    FLUoxetine 20 MG capsule Take 1 capsule (20 mg total) by mouth once daily. 30 capsule 1    fluticasone propionate (FLONASE) 50 mcg/actuation nasal spray 1 spray (50 mcg total) by Each Nostril route 2 (two) times daily as needed for Rhinitis. 15 g 0    furosemide (LASIX) 40 MG tablet Take 1 tablet (40 mg total) by mouth once daily. 90 tablet 2    gabapentin (NEURONTIN) 300 MG capsule Take 2 capsules (600 mg total) by mouth 3 (three) times daily. 180 capsule 5    hydrALAZINE (APRESOLINE) 50 MG tablet Take 1 tablet (50 mg total) by mouth 2 (two) times a day. 180 tablet 3    isosorbide mononitrate (IMDUR) 60 MG 24 hr tablet Take 1 tablet (60 mg total) by mouth once daily. 90 tablet 3    metoprolol succinate (TOPROL-XL) 200 MG 24 hr tablet Take 1 tablet (200 mg total) by mouth once daily. 90 tablet 3    potassium chloride SA (K-DUR,KLOR-CON) 20 MEQ tablet Take 2 tablets (40 mEq total) by mouth 2 (two) times daily. 120 tablet 5    sacubitriL-valsartan (ENTRESTO)  mg per tablet Take 1 tablet by mouth 2 (two) times daily. 180 tablet 3    [DISCONTINUED] acetaminophen (TYLENOL) 500 MG tablet Take 1 tablet (500 mg total) by mouth every 6 (six) hours as needed. 13 tablet 0    [DISCONTINUED] acetaminophen (TYLENOL) 500 MG  tablet Take 1 tablet (500 mg total) by mouth every 4 (four) hours as needed for Pain or Temperature greater than (100.5 or greater). 30 tablet 0     Current Facility-Administered Medications on File Prior to Visit   Medication Dose Route Frequency Provider Last Rate Last Admin    acetaminophen tablet 650 mg  650 mg Oral Q8H PRN Noel Mcdowell MD   650 mg at 04/13/25 2130    aspirin EC tablet 81 mg  81 mg Oral Daily Noel Mcdowell MD   81 mg at 04/16/25 0811    atorvastatin tablet 40 mg  40 mg Oral QHS Noel Mcdowell MD   40 mg at 04/15/25 2126    [COMPLETED] bisacodyL suppository 10 mg  10 mg Rectal Once Michelle Watson PA-C   10 mg at 04/16/25 0936    dapagliflozin propanediol (Farxiga) tablet 10 mg  10 mg Oral Daily Noel Mcdowell MD   10 mg at 04/16/25 0811    diphenhydrAMINE injection 25 mg  25 mg Intravenous Q8H PRN Lenard Alfaroicia SMiquel, NP   25 mg at 04/15/25 1458    [COMPLETED] diphenhydrAMINE injection 25 mg  25 mg Intravenous Once Michelle Watson PA-C   25 mg at 04/15/25 1459    fluticasone propionate 50 mcg/actuation nasal spray 50 mcg  1 spray Each Nostril BID PRN Noel Mcdowell MD        furosemide injection 80 mg  80 mg Intravenous BID  AngelinaLenardicia SMiquel, NP   80 mg at 04/15/25 1619    gabapentin capsule 100 mg  100 mg Oral TID Noel Mcdowell MD   100 mg at 04/16/25 0811    heparin (porcine) injection 5,000 Units  5,000 Units Subcutaneous Q8H Noel Mcdowell MD   5,000 Units at 04/16/25 0550    [COMPLETED] magnesium sulfate in dextrose IVPB (premix) 1 g  1 g Intravenous Once Michelle Watson PA-C   Stopped at 04/15/25 1721    melatonin tablet 6 mg  6 mg Oral Nightly PRN Noel Mcdowell MD        metoprolol succinate (TOPROL-XL) 24 hr tablet 200 mg  200 mg Oral Daily Alex Alfaro, NP   200 mg at 04/13/25 0841    ondansetron disintegrating tablet 8 mg  8 mg Oral Q8H PRN Noel Mcdowell MD        ondansetron injection 4 mg  4 mg Intravenous Q12H PRN Noel Mcdowell  MD ANJELICA   4 mg at 04/14/25 1732    polyethylene glycol packet 17 g  17 g Oral Daily Noel Mcdowell MD   17 g at 04/16/25 0811    prochlorperazine injection Soln 10 mg  10 mg Intravenous Q8H PRN Lenard Alfaroicia SMiquel, NP   10 mg at 04/15/25 1458    [COMPLETED] prochlorperazine injection Soln 10 mg  10 mg Intravenous Once Michelle Watson, PA-C   10 mg at 04/15/25 1458    sacubitriL-valsartan 24-26 mg per tablet 2 tablet  2 tablet Oral BID AngelinaMeaghana S., NP   2 tablet at 04/15/25 2126    sodium chloride 0.9% flush 10 mL  10 mL Intravenous PRN Noel Mcdowell MD        valproate (DEPACON) 500 mg in 0.9% NaCl 50 mL IVPB  500 mg Intravenous Q8H PRN Alex Alfaro, NP   Stopped at 04/14/25 1900    [COMPLETED] valproate (DEPACON) 500 mg in 0.9% NaCl 50 mL IVPB  500 mg Intravenous Once Michelle Watson, PA-C   Stopped at 04/15/25 1556    [DISCONTINUED] senna-docusate 8.6-50 mg per tablet 1 tablet  1 tablet Oral BID Noel Mcdowell MD   1 tablet at 04/16/25 0811         HPI:  Patient can walk with sob at longer distances and intermittently at rest but states improvement since hospital discharge   Patient sleeps on 3 number of pillows   Patient wakes up SOB, has to get out of bed, associated cough, sputum and color-endorses cough   Palpitations -  denies   Dizzy, light-headed, pre-syncope or syncope-intermittent   Since discharge frequency of performing weights, home weight and weight change-179lbs on home scale   Other information felt pertinent to HPI: 39-year-old  female with combined systolic and diastolic heart failure, nonischemic cardiomyopathy status post ICD placement, hypertension and chronic kidney disease who presented to the emergency department on account of worsening shortness a breath and fatigue of 4 days' duration, nausea and vomiting x2 days duration.  She voiced baseline dyspnea, which has worsened in the last 4 days, she can only walk about 50 ft before getting short of  "breath, this has been associated with profound fatigue, nausea and vomiting, 3 bouts of vomiting today, no hematemesis.  Dyspnea associated with 4 pillow orthopnea, and paroxysmal nocturnal dyspnea but no pedal swelling, although noticed abdominal swelling.  She denied any chest pain but voiced cough productive of clear sputum, no hemoptysis.  She denied fever, chills or rigors however complained of left ear fullness, left-sided headache, headache said to be dull in nature, nonradiating, 7/10 in severity, she noticed some tingling sensation in her left upper extremity prior to presentation as well, but no weakness, no visual changes noticed.  She is also constipated and has tried magnesium citrate and Dulcolax with no relief.  She denied any dysuria, no frequency, no urgency, no straining at micturition or hematuria.  She has not had any diarrhea, but has been constipated detailed above.  She voiced compliance with the medication regimen, denied eating high salt diet, although still add salt to food when cooking.  She denied cigarette smoking, does not drink alcohol as well.  Outpatient she follows up with the General Cardiology and advanced heart failure/ transplant cardiology.  Lab obtained in the emergency room showed urinalysis without evidence of infection, BNP elevated at 2020, serum creatinine elevated to 1.4 from baseline of 0.9  Chest x-ray showed "cardiomegaly and findings suggestive of pulmonary edema/CHF although correlation for infectious process advised".   Most recent 2D echo of 08/09/2024 showed "EF of 30-35% with grade 2 diastolic dysfunction.  Right heart catheterization of 08/27/2024, mildly elevated filling pressures on the right and left , with RA 10  PA 31/15 (20)  PCWP 15, CO 5.7 l/min  CI 2.8 l/min/m2, TD cardiac output 5.2 l/min cardiac index 2.6 l/min/m2".        * No surgery found *       Hospital Course:   Admitted for CHF. Consulted cards recs: Acute on chronic combined systolic and " diastolic congestive heart failure. Diuresis and afterload reduction as tolerated. Titrate up entresto - intolerant to aldactone. Continue GDMT. Will f/u PRN. Patient c/o headache. Given prn meds. CT head: 1. No CT evidence of acute intracranial abnormality. If the patient's headaches are sufficiently clinically suspicious, or associated with signs of elevated ICP, focal neurologic deficits, nausea, or vomiting, further evaluation with MRI can be performed if there are no clinical contraindications.2. Findings in keeping with remote right cerebellar infarct, unchanged from prior exam. Patient now stays headache resolved. Patient BP was  96/65 and nurse gave meds. Ordered dose of midodrine 10 mg once. Improved /75  Neurology consulted for continued headache.  Patient was started on migraine cocktail:  Valproic acid 500 mg IV over 10 minutes Q 8 hours p.r.n., Benadryl 25 mg IV Q 8 p.r.n., and Compazine 10 mg Q 8 p.r.n. patient is told to avoid NSAIDs or Norco for pain relief.  Neurology recommended MRI brain if headache persists and follow up with Neurology Clinic outpatient.  MRI brain ordered because patient continued to have headaches.  However, unable to do MRI brain due to patient's AICD.  Discussed with Neurology and recommendation was MRI brain not warranted at this time.  Patient can follow up outpatient with Neurology.  Patient was given another migraine cocktail with significant improvement of her headache.  Patient states she feels fine and would like to go home. Patient medically stable and cleared for discharge with stable vital signs.  Patient acknowledged understanding of what was stated above and had no further questions.  Return precautions given.     All findings and plan were explained to the patient. All questions and concerns were answered. Patient verbalized understanding. Patient is in stable condition to d/c home and has been informed to follow up with her PCP within the next 7-10 days to  discuss her observation stay and to follow-up with Neurology.  Patient has been educated to return to the ED if She experiences any further chest pain, lightheadness, weakness, or discomfort.     Patient presents today without swelling. With complaint of low blood pressure reading at home. She endorses readings of 80/50 and 90/61 which she states has happened twice since hospital discharge and had dizziness with those episodes. She endorses that she has not been taking imdur or hydralazine since discharge but has been taking all other medications. She was instructed to continue to hold Imdur and hydralazine. She was instructed to stop amlodipine and continue other GDMT. She endorses taking lasix once daily and will take an evening dose of 40mg for abdominal distention as that is where she hold weight. She stated she felt better since hospitalization overall but had some abdominal distention today. Reviewed as needed instructions for afternoon lasix with her of an extra 40mg for weight gain of 2-3lbs in a day, increased shortness of breath, increased swelling in her legs and abdomen and instructed to take an extra dose of lasix today which she verbalized understanding. She has a follow up next week with Dr. Peguero. She states she did not take any of her medications today and was counseled on the importance of not missing any medication doses. She verbalized understanding.       PHYSICAL:   Vitals:    04/23/25 1054   BP: 120/80   Pulse: 90   Resp: 16        JVD: no   Heart rhythm: regular  Cardiac murmur: No    S3: no  S4: no  Lungs: clear  Hepatojugular reflux: no  Edema: no      4/12/25 Echo:    Left Ventricle: The left ventricle is severely dilated. There is severely reduced systolic function with a visually estimated ejection fraction of 10 -15%. Grade III diastolic dysfunction.    Right Ventricle: The right ventricle has mild enlargement. Systolic function is mildly reduced.    Left Atrium: Moderately dilated     Mitral Valve: There is mild to moderate regurgitation.    Tricuspid Valve: There is mild regurgitation.    Pulmonary Artery: The estimated pulmonary artery systolic pressure is 21 mmHg.    IVC/SVC: Normal venous pressure at 3 mmHg.     1. Congestive heart failure, unspecified HF chronicity, unspecified heart failure type      Recommend 2-3 gram sodium restriction and 1500cc- 2000cc fluid restriction.  Call clinic for increased shortness of breath, or increased swelling.  Weigh yourself every day and call the office if your weight increases by more than 3 lbs in 1 day or 5 lbs in 3 days.   Continue GDMT  Stop Amlodipine  Take lasix 40mg once daily and an extra 40mg in the afternoon for worsening SOB, swelling in legs or abdomen, or weight gain of 2-3lbs in a day  Unable to tolerate spironolactone per hospital record  NYHA class 2 in office    2. Abnormal bleeding in menstrual cycle  -     Ambulatory referral/consult to Obstetrics / Gynecology; Future; Expected date: 04/30/2025  Patient reports plans to have prior surgery with OB however no longer has a provider. Referral to OB placed    3. Nonischemic cardiomyopathy  -     Ambulatory referral/consult to Transplant, Heart; Future; Expected date: 04/30/2025  As above    4. Essential hypertension      Continue not to take imdur and hyrdralazine, stop amlodipine    5. Hypotensive episode     As above

## 2025-04-16 NOTE — NURSING
[facility], [facility]  Nurses Note -- 4 Eyes      4/15/2025       Skin assessed on: Q Shift      [x] No Pressure Injuries Present    []Prevention Measures Documented    [] Yes LDA  for Pressure Injury Previously documented     [] Yes New Pressure Injury Discovered   [] LDA for New Pressure Injury Added      Attending RN:  Natalie Pantoja RN     Second RN:  Frida Elizabeth RN

## 2025-04-16 NOTE — PLAN OF CARE
04/16/25 1056   Final Note   Assessment Type Final Discharge Note   Anticipated Discharge Disposition Home   Hospital Resources/Appts/Education Provided Appointments scheduled and added to AVS   Post-Acute Status   Post-Acute Authorization Other   Other Status No Post-Acute Service Needs     Pts nurse Ramires notified that the pt can d/c from CM standpoint

## 2025-04-16 NOTE — NURSING
[facility] [place]es [place] -- 4 Eyes      4/16/2025       Skin assessed on: Q Shift      [x] No Pressure Injuries Present    [x]Prevention Measures Documented    [] Yes LDA  for Pressure Injury Previously documented     [] Yes New Pressure Injury Discovered   [] LDA for New Pressure Injury Added      Attending RN:  Keyla Varma LPN     Second RN: DIGNA Estrada

## 2025-04-17 ENCOUNTER — PATIENT OUTREACH (OUTPATIENT)
Dept: ADMINISTRATIVE | Facility: CLINIC | Age: 40
End: 2025-04-17
Payer: MEDICAID

## 2025-04-17 NOTE — TELEPHONE ENCOUNTER
Spoke with patient scheduled at this time for hospital follow up with NP Odin , patient verbalized understanding and acceptance  of appointment date and time.

## 2025-04-17 NOTE — PROGRESS NOTES
C3 nurse attempted to contact Nasra Marks for a TCC post hospital discharge follow up call. No answer. LVM requesting a callback at [phone].    The patient does not have a scheduled HOSFU appointment. Message sent to PCP's staff to assist with HOSFU appointment scheduling.

## 2025-04-18 ENCOUNTER — HOSPITAL ENCOUNTER (EMERGENCY)
Facility: HOSPITAL | Age: 40
Discharge: HOME | End: 2025-04-18
Attending: EMERGENCY MEDICINE
Payer: MEDICAID

## 2025-04-18 VITALS
BODY MASS INDEX: 26.58 KG/M2 | DIASTOLIC BLOOD PRESSURE: 78 MMHG | OXYGEN SATURATION: 98 % | RESPIRATION RATE: 20 BRPM | HEART RATE: 71 BPM | TEMPERATURE: 97 F | WEIGHT: 180 LBS | SYSTOLIC BLOOD PRESSURE: 118 MMHG

## 2025-04-18 DIAGNOSIS — R06.02 SHORTNESS OF BREATH: ICD-10-CM

## 2025-04-18 DIAGNOSIS — R07.9 CHEST PAIN: ICD-10-CM

## 2025-04-18 DIAGNOSIS — I42.9 CONGESTIVE HEART FAILURE DUE TO CARDIOMYOPATHY: ICD-10-CM

## 2025-04-18 DIAGNOSIS — R07.9 CHEST PAIN WITH LOW RISK FOR CARDIAC ETIOLOGY: Primary | ICD-10-CM

## 2025-04-18 DIAGNOSIS — I50.9 CONGESTIVE HEART FAILURE DUE TO CARDIOMYOPATHY: ICD-10-CM

## 2025-04-18 LAB
ABSOLUTE EOSINOPHIL (OHS): 0.09 K/UL
ABSOLUTE MONOCYTE (OHS): 0.69 K/UL (ref 0.3–1)
ABSOLUTE NEUTROPHIL COUNT (OHS): 5.96 K/UL (ref 1.8–7.7)
ALBUMIN SERPL BCP-MCNC: 3.8 G/DL (ref 3.5–5.2)
ALP SERPL-CCNC: 55 UNIT/L (ref 40–150)
ALT SERPL W/O P-5'-P-CCNC: 47 UNIT/L (ref 10–44)
ANION GAP (OHS): 12 MMOL/L (ref 8–16)
AST SERPL-CCNC: 31 UNIT/L (ref 11–45)
BASOPHILS # BLD AUTO: 0.03 K/UL
BASOPHILS NFR BLD AUTO: 0.4 %
BILIRUB SERPL-MCNC: 0.3 MG/DL (ref 0.1–1)
BNP SERPL-MCNC: 841 PG/ML (ref 0–99)
BUN SERPL-MCNC: 21 MG/DL (ref 6–20)
CALCIUM SERPL-MCNC: 9 MG/DL (ref 8.7–10.5)
CHLORIDE SERPL-SCNC: 106 MMOL/L (ref 95–110)
CO2 SERPL-SCNC: 19 MMOL/L (ref 23–29)
CREAT SERPL-MCNC: 1 MG/DL (ref 0.5–1.4)
ERYTHROCYTE [DISTWIDTH] IN BLOOD BY AUTOMATED COUNT: 15.2 % (ref 11.5–14.5)
GFR SERPLBLD CREATININE-BSD FMLA CKD-EPI: >60 ML/MIN/1.73/M2
GLUCOSE SERPL-MCNC: 94 MG/DL (ref 70–110)
HCT VFR BLD AUTO: 39.6 % (ref 37–48.5)
HGB BLD-MCNC: 12.3 GM/DL (ref 12–16)
IMM GRANULOCYTES # BLD AUTO: 0.02 K/UL (ref 0–0.04)
IMM GRANULOCYTES NFR BLD AUTO: 0.2 % (ref 0–0.5)
LYMPHOCYTES # BLD AUTO: 1.25 K/UL (ref 1–4.8)
MCH RBC QN AUTO: 27.4 PG (ref 27–31)
MCHC RBC AUTO-ENTMCNC: 31.1 G/DL (ref 32–36)
MCV RBC AUTO: 88 FL (ref 82–98)
NUCLEATED RBC (/100WBC) (OHS): 0 /100 WBC
PLATELET # BLD AUTO: 351 K/UL (ref 150–450)
PMV BLD AUTO: 10.2 FL (ref 9.2–12.9)
POTASSIUM SERPL-SCNC: 4.3 MMOL/L (ref 3.5–5.1)
PROT SERPL-MCNC: 8 GM/DL (ref 6–8.4)
RBC # BLD AUTO: 4.49 M/UL (ref 4–5.4)
RELATIVE EOSINOPHIL (OHS): 1.1 %
RELATIVE LYMPHOCYTE (OHS): 15.5 % (ref 18–48)
RELATIVE MONOCYTE (OHS): 8.6 % (ref 4–15)
RELATIVE NEUTROPHIL (OHS): 74.2 % (ref 38–73)
SODIUM SERPL-SCNC: 137 MMOL/L (ref 136–145)
TROPONIN I SERPL DL<=0.01 NG/ML-MCNC: 0.01 NG/ML
WBC # BLD AUTO: 8.04 K/UL (ref 3.9–12.7)

## 2025-04-18 PROCEDURE — 93010 ELECTROCARDIOGRAM REPORT: CPT | Mod: ,,, | Performed by: INTERNAL MEDICINE

## 2025-04-18 PROCEDURE — 96375 TX/PRO/DX INJ NEW DRUG ADDON: CPT

## 2025-04-18 PROCEDURE — 82040 ASSAY OF SERUM ALBUMIN: CPT | Performed by: PHYSICIAN ASSISTANT

## 2025-04-18 PROCEDURE — 83880 ASSAY OF NATRIURETIC PEPTIDE: CPT | Performed by: PHYSICIAN ASSISTANT

## 2025-04-18 PROCEDURE — 63600175 PHARM REV CODE 636 W HCPCS: Performed by: EMERGENCY MEDICINE

## 2025-04-18 PROCEDURE — 96374 THER/PROPH/DIAG INJ IV PUSH: CPT

## 2025-04-18 PROCEDURE — 25000003 PHARM REV CODE 250: Performed by: EMERGENCY MEDICINE

## 2025-04-18 PROCEDURE — 99285 EMERGENCY DEPT VISIT HI MDM: CPT | Mod: 25

## 2025-04-18 PROCEDURE — 84484 ASSAY OF TROPONIN QUANT: CPT | Performed by: PHYSICIAN ASSISTANT

## 2025-04-18 PROCEDURE — 85025 COMPLETE CBC W/AUTO DIFF WBC: CPT | Performed by: PHYSICIAN ASSISTANT

## 2025-04-18 PROCEDURE — 93005 ELECTROCARDIOGRAM TRACING: CPT

## 2025-04-18 RX ORDER — PANTOPRAZOLE SODIUM 40 MG/10ML
80 INJECTION, POWDER, LYOPHILIZED, FOR SOLUTION INTRAVENOUS
Status: COMPLETED | OUTPATIENT
Start: 2025-04-18 | End: 2025-04-18

## 2025-04-18 RX ORDER — MORPHINE SULFATE 4 MG/ML
3 INJECTION, SOLUTION INTRAMUSCULAR; INTRAVENOUS
Refills: 0 | Status: COMPLETED | OUTPATIENT
Start: 2025-04-18 | End: 2025-04-18

## 2025-04-18 RX ORDER — ALUMINUM HYDROXIDE, MAGNESIUM HYDROXIDE, AND SIMETHICONE 1200; 120; 1200 MG/30ML; MG/30ML; MG/30ML
60 SUSPENSION ORAL
Status: COMPLETED | OUTPATIENT
Start: 2025-04-18 | End: 2025-04-18

## 2025-04-18 RX ORDER — FUROSEMIDE 10 MG/ML
80 INJECTION INTRAMUSCULAR; INTRAVENOUS
Status: COMPLETED | OUTPATIENT
Start: 2025-04-18 | End: 2025-04-18

## 2025-04-18 RX ORDER — PANTOPRAZOLE SODIUM 40 MG/1
TABLET, DELAYED RELEASE ORAL
Qty: 10 TABLET | Refills: 0 | Status: SHIPPED | OUTPATIENT
Start: 2025-04-18

## 2025-04-18 RX ADMIN — FUROSEMIDE 80 MG: 10 INJECTION, SOLUTION INTRAVENOUS at 04:04

## 2025-04-18 RX ADMIN — PANTOPRAZOLE SODIUM 80 MG: 40 INJECTION, POWDER, FOR SOLUTION INTRAVENOUS at 04:04

## 2025-04-18 RX ADMIN — ALUMINUM HYDROXIDE, MAGNESIUM HYDROXIDE, AND SIMETHICONE 60 ML: 200; 200; 20 SUSPENSION ORAL at 04:04

## 2025-04-18 RX ADMIN — MORPHINE SULFATE 3 MG: 4 INJECTION INTRAVENOUS at 04:04

## 2025-04-18 NOTE — DISCHARGE INSTRUCTIONS
Please take your Lasix 1 tablet twice a day for 3 days, then daily.  Low-sodium diet, no extra fluids.  Please avoid caffeine carbonation alcohol cigarette citrus tomato Naprosyn aspirin ibuprofen.  Pantoprazole as directed.  Return immediately if you get worse or if new problems develop.  Please follow up with the your cardiologist the cardiologist above this week.  Rest.

## 2025-04-18 NOTE — ED PROVIDER NOTES
"Encounter Date: 2025       History     Chief Complaint   Patient presents with    Chest Pain     Pt reports sudden onset constant, midsternal chest "pressure like someone is sitting on her chest", accompanied by shortness of breath x1 hour. Pt reports the pain radiates to both arms and her head. Pt reports just being discharged from the hospital 2 days ago due to CHF exacerbation but reports she was feeling fine today until the pain started.      HPI  This 39-year-old black female presents emergency room complaining of constant midsternal chest pressure with shortness of breath for about an hour prior to coming to the emergency room.  The patient has a history of congestive heart failure.  The patient has a "funny feeling" that goes to both arms into her back.  She is otherwise well without other injuries or problems.  There is no coughing.  She has no abdominal complaints.  There is no leg swelling or tenderness.  There is no history of pulmonary emboli deep venous thrombosis.  There are no neurologic deficits she has no history of head trauma fever neck pain.  Review of patient's allergies indicates:   Allergen Reactions    Aldactone [spironolactone] Swelling     Lips swelled    Hydralazine analogues Other (See Comments)     headaches    Isosorbide Other (See Comments)     headaches     Past Medical History:   Diagnosis Date    CHF (congestive heart failure)     Chronic combined systolic and diastolic congestive heart failure 2019    Essential hypertension 2012    Hypertension     dx at 16y.o.    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 disease, with heart failure 2021    ICD (implantable cardioverter-defibrillator), single, in situ 2020    Nonischemic cardiomyopathy 2019    Pacemaker 2017     Past Surgical History:   Procedure Laterality Date    CARDIAC DEFIBRILLATOR PLACEMENT  2017     SECTION      x 1    INDUCED   "     RIGHT HEART CATHETERIZATION Right 1/27/2022    Procedure: INSERTION, CATHETER, RIGHT HEART;  Surgeon: Timothy Prajapati Jr., MD;  Location: [facility] CATH LAB;  Service: Cardiology;  Laterality: Right;    RIGHT HEART CATHETERIZATION Right 8/27/2024    Procedure: INSERTION, CATHETER, RIGHT HEART;  Surgeon: Lior Rueda MD;  Location: [facility] CATH LAB;  Service: Cardiology;  Laterality: Right;    TUBAL LIGATION       Family History   Problem Relation Name Age of Onset    Hypertension Mother      Cancer Maternal Grandmother          breast x 2    Cancer Paternal Grandmother          breast    No Known Problems Sister      No Known Problems Brother      No Known Problems Son      No Known Problems Brother      Hypertension Other      Diabetes Other      Breast cancer Other      Cancer Paternal Aunt          breast    Cancer Paternal Uncle       Social History[1]  Review of Systems  The patient was questioned specifically with regard to the following.  General: Fever, chills, sweats. Neuro: Headache. Eyes: eye problems. ENT: Ear pain, sore throat. Cardiovascular: Chest pain. Respiratory: Cough, shortness of breath. Gastrointestinal: Abdominal pain, vomiting, diarrhea. Genitourinary: Painful urination.  Musculoskeletal: Arm and leg problems. Skin: Rash.  The review of systems was negative except for the following:  Headache chest pain back pain funny feeling in the arms.  Physical Exam     Initial Vitals [04/18/25 1526]   BP Pulse Resp Temp SpO2   120/84 75 18 97.4 °F (36.3 °C) 100 %      MAP       --         Physical Exam  The patient was examined specifically for the following:   General:No significant distress, Good color, Warm and dry. Head and neck:Scalp atraumatic, Neck supple. Neurological:Appropriate conversation, Gross motor deficits. Eyes:Conjugate gaze, Clear corneas. ENT: No epistaxis. Cardiac: Regular rate and rhythm, Grossly normal heart tones. Pulmonary: Wheezing, Rales. Gastrointestinal: Abdominal  tenderness, Abdominal distention. Musculoskeletal: Extremity deformity, Apparent pain with range of motion of the joints. Skin: Rash.   The findings on examination were normal except for the following:  Lungs are clear.  The heart tones are normal.  The abdomen is soft.  Extremities are nontender there is no apparent pain with range of motion any joints.  The patient has no rash.  ED Course   Procedures  Labs Reviewed   COMPREHENSIVE METABOLIC PANEL - Abnormal       Result Value    Sodium 137      Potassium 4.3      Chloride 106      CO2 19 (*)     Glucose 94      BUN 21 (*)     Creatinine 1.0      Calcium 9.0      Protein Total 8.0      Albumin 3.8      Bilirubin Total 0.3      ALP 55      AST 31      ALT 47 (*)     Anion Gap 12      eGFR >60     B-TYPE NATRIURETIC PEPTIDE - Abnormal     (*)    CBC WITH DIFFERENTIAL - Abnormal    WBC 8.04      RBC 4.49      HGB 12.3      HCT 39.6      MCV 88      MCH 27.4      MCHC 31.1 (*)     RDW 15.2 (*)     Platelet Count 351      MPV 10.2      Nucleated RBC 0      Neut % 74.2 (*)     Lymph % 15.5 (*)     Mono % 8.6      Eos % 1.1      Basophil % 0.4      Imm Grans % 0.2      Neut # 5.96      Lymph # 1.25      Mono # 0.69      Eos # 0.09      Baso # 0.03      Imm Grans # 0.02     TROPONIN I - Normal    Troponin-I 0.011     CBC W/ AUTO DIFFERENTIAL    Narrative:     The following orders were created for panel order CBC auto differential.  Procedure                               Abnormality         Status                     ---------                               -----------         ------                     CBC with Differential[[id number]]       Abnormal            Final result                 Please view results for these tests on the individual orders.   TROPONIN I   POCT URINE PREGNANCY     EKG Readings: (Independently Interpreted)   This patient is in a normal sinus rhythm with a heart rate of 75.  There are nonspecific T-wave changes.  There are no ST segment  changes.  There is no definite evidence of acute myocardial infarction or malignant arrhythmia.       Imaging Results              X-Ray Chest AP Portable (Final result)  Result time 04/18/25 17:10:41      Final result by Kasandra Erazo MD (04/18/25 17:10:41)                   Impression:      No acute cardiopulmonary process identified.      Electronically signed by: Kasandra Erazo MD  Date:    04/18/2025  Time:    17:10               Narrative:    EXAMINATION:  XR CHEST AP PORTABLE    CLINICAL HISTORY:  Chest Pain;    TECHNIQUE:  Single frontal view of the chest was performed.    COMPARISON:  04/11/2025.    FINDINGS:  Cardiac silhouette is enlarged but stable in size.  Left-sided pacer device is seen.  Lungs are symmetrically expanded.  No evidence of new focal consolidative process, pneumothorax, or significant pleural effusion.  No acute osseous abnormality identified.                                       Medications   morphine injection 3 mg (3 mg Intravenous Given 4/18/25 1650)   furosemide injection 80 mg (80 mg Intravenous Given 4/18/25 1649)   pantoprazole injection 80 mg (80 mg Intravenous Given 4/18/25 1650)   aluminum-magnesium hydroxide-simethicone 200-200-20 mg/5 mL suspension 60 mL (60 mLs Oral Given 4/18/25 1649)     Medical Decision Making  Risk  OTC drugs.  Prescription drug management.    Given the above, this patient was treated with Lasix, morphine, pantoprazole.  All of her symptoms resolved.  Her 1st troponin is negative.  Her symptoms began at 2:30 a.m. this afternoon.  The troponin was drawn at 5.  I offered her a 2nd troponin for improve diagnostic accuracy.  The patient declined.  The patient is also treated with Lasix.  She made urine.  Her shortness of breath completely resolved.  She is not tachycardic at discharge oxygen saturations remained 100%.  I carefully considered aortic dissection, myocardial infarction pneumothorax pneumonia pleural effusion and pulmonary embolus.  I feel  these are all unlikely.  The patient is asymptomatic at discharge.  I will discharge her on pantoprazole.  Her BNP was 800.  I will increase her diuretics for 3 days.  I will have her follow up with cardiology                                    Clinical Impression:  Final diagnoses:  [R07.9] Chest pain  [R07.9] Chest pain with low risk for cardiac etiology (Primary)  [R06.02] Shortness of breath  [I50.9, I42.9] Congestive heart failure due to cardiomyopathy          ED Disposition Condition    Discharge Good          ED Prescriptions       Medication Sig Dispense Start Date End Date Auth. Provider    pantoprazole (PROTONIX) 40 MG tablet Please take 1 tablet twice a day for 3 days, then 1 tablet daily 10 tablet 4/18/2025 -- Rayray Mason MD          Follow-up Information       Follow up With Specialties Details Why Contact Info    Aruna Gold MD Cardiology In 3 days  [address]  [address]  [facility] [address]  [phone]                 [1]   Social History  Tobacco Use    Smoking status: Never    Smokeless tobacco: Never   Substance Use Topics    Alcohol use: Not Currently     Comment: occasionally    Drug use: Not Currently     Types: Marijuana     Comment: in past very little marijuana        Rayray Mason MD  04/18/25 1938

## 2025-04-19 LAB
OHS QRS DURATION: 96 MS
OHS QTC CALCULATION: 486 MS

## 2025-04-19 NOTE — ED NOTES
Pt presents to the ED today for complaints of non-radiating, midsternal chest pressure that began about an hour prior to arrival. Pt reports associated symptoms of shortness of breath with onset of chest pain. Pt endorses Hx of CHF, reports compliance with all medications but states that she gets frequent hospitalizations for CHF exacerbation. Pt denies cough or influenza like illness or dizziness. Pt is alert and oriented, vss, respirations normal and unlabored, mother at beside. Pt was placed on a full cardiac monitor, bewd rails raised, call light at bedside.

## 2025-04-21 NOTE — ASSESSMENT & PLAN NOTE
Patient give toradol, benadryl, and compazine for headache  CT head: 1. No CT evidence of acute intracranial abnormality. If the patient's headaches are sufficiently clinically suspicious, or associated with signs of elevated ICP, focal neurologic deficits, nausea, or vomiting, further evaluation with MRI can be performed if there are no clinical contraindications.2. Findings in keeping with remote right cerebellar infarct, unchanged from prior exam.  Neuro recs:   -  migraine cocktail: valproic acid 500 mg IV over 10 minutes q.8 p.r.n., Benadryl 25 mg IV Q 8 p.r.n., Compazine 10 mg q.8 p.r.n.  - avoid NSAIDs or Norco for pain relief  - if headaches persist tomorrow, kindly obtain MRI brain with and without contrast.  Noted patient has AICD placement but however appears to be compatible as she had 2 MRIs in the system last year.  - follow up in Neurology Clinic as outpatient    4/15: Continues to complain of headache. States cocktail improved headache slightly but headache back. Planned to order MRI however unable to do at [facility] due to patients AICD. Discussed with Neurology and recommendations to try another headache cocktail with valproic acid, the scene, and Benadryl.  Also 1 mg IV magnesium.

## 2025-04-21 NOTE — DISCHARGE SUMMARY
[facility]  Discharge Summary      Patient Name: Nasra Marks  MRN: [id number]  ETTA: [id number]  Patient Class: OP- Observation  Admission Date: 4/11/2025  Hospital Length of Stay: 0 days  Discharge Date and Time: 4/16/2025 12:43 PM  Attending Physician: No att. providers found   Discharging Provider: Michelle Watson PA-C  Primary Care Provider: Veronika Rainey MD    Primary Care Team:  [facility]    HPI:   39-year-old  female with medical history significant for combined systolic and diastolic heart failure, nonischemic cardiomyopathy status post ICD placement, hypertension and chronic kidney disease who presented to the emergency department on account of worsening shortness a breath and fatigue of 4 days' duration, nausea and vomiting x2 days duration.  She voiced baseline dyspnea, which has worsened in the last 4 days, she can only walk about 50 ft before getting short of breath, this has been associated with profound fatigue, nausea and vomiting, 3 bouts of vomiting today, no hematemesis.  Dyspnea associated with 4 pillow orthopnea, and paroxysmal nocturnal dyspnea but no pedal swelling, although noticed abdominal swelling.  She denied any chest pain but voiced cough productive of clear sputum, no hemoptysis.  She denied fever, chills or rigors however complained of left ear fullness, left-sided headache, headache said to be dull in nature, nonradiating, 7/10 in severity, she noticed some tingling sensation in her left upper extremity prior to presentation as well, but no weakness, no visual changes noticed.  She is also constipated and has tried magnesium citrate and Dulcolax with no relief.  She denied any dysuria, no frequency, no urgency, no straining at micturition or hematuria.  She has not had any diarrhea, but has been constipated detailed above.  She voiced compliance with the medication regimen, denied eating high salt diet, although still add salt to  "food when cooking.  She denied cigarette smoking, does not drink alcohol as well.  Outpatient she follows up with the General Cardiology and advanced heart failure/ transplant cardiology.  Lab obtained in the emergency room showed urinalysis without evidence of infection, BNP elevated at 2020, serum creatinine elevated to 1.4 from baseline of 0.9  Chest x-ray showed "cardiomegaly and findings suggestive of pulmonary edema/CHF although correlation for infectious process advised".   Most recent 2D echo of 08/09/2024 showed "EF of 30-35% with grade 2 diastolic dysfunction.  Right heart catheterization of 08/27/2024, mildly elevated filling pressures on the right and left , with RA 10  PA 31/15 (20)  PCWP 15, CO 5.7 l/min  CI 2.8 l/min/m2, TD cardiac output 5.2 l/min cardiac index 2.6 l/min/m2".      * No surgery found *      Hospital Course:   Admitted for CHF. Consulted cards recs: Acute on chronic combined systolic and diastolic congestive heart failure. Diuresis and afterload reduction as tolerated. Titrate up entresto - intolerant to aldactone. Continue GDMT. Will f/u PRN. Patient c/o headache. Given prn meds. CT head: 1. No CT evidence of acute intracranial abnormality. If the patient's headaches are sufficiently clinically suspicious, or associated with signs of elevated ICP, focal neurologic deficits, nausea, or vomiting, further evaluation with MRI can be performed if there are no clinical contraindications.2. Findings in keeping with remote right cerebellar infarct, unchanged from prior exam. Patient now stays headache resolved. Patient BP was  96/65 and nurse gave meds. Ordered dose of midodrine 10 mg once. Improved /75  Neurology consulted for continued headache.  Patient was started on migraine cocktail:  Valproic acid 500 mg IV over 10 minutes Q 8 hours p.r.n., Benadryl 25 mg IV Q 8 p.r.n., and Compazine 10 mg Q 8 p.r.n. patient is told to avoid NSAIDs or Norco for pain relief.  Neurology recommended " MRI brain if headache persists and follow up with Neurology Clinic outpatient.  MRI brain ordered because patient continued to have headaches.  However, unable to do MRI brain due to patient's AICD.  Discussed with Neurology and recommendation was MRI brain not warranted at this time.  Patient can follow up outpatient with Neurology.  Patient was given another migraine cocktail with significant improvement of her headache.  Patient states she feels fine and would like to go home. Patient medically stable and cleared for discharge with stable vital signs.  Patient acknowledged understanding of what was stated above and had no further questions.  Return precautions given.    All findings and plan were explained to the patient. All questions and concerns were answered. Patient verbalized understanding. Patient is in stable condition to d/c home and has been informed to follow up with her PCP within the next 7-10 days to discuss her observation stay and to follow-up with Neurology.  Patient has been educated to return to the ED if She experiences any further chest pain, lightheadness, weakness, or discomfort.         Goals of Care Treatment Preferences:  Code Status: Full Code         Consults:   Consults (From admission, onward)          Status Ordering Provider     Inpatient Consult to Neurology Services (General Neurology)  Once        Provider:  Clarita Egan MD    Completed JOSETTE HAIRSTON     Inpatient consult to Cardiology  Once        Provider:  Shaquille Dia MD    Completed JOSETTE HAIRSTON     Inpatient consult to Social Work/Case Management  Once        Provider:  (Not yet assigned)    Completed GIO STOLL     Inpatient consult to Registered Dietitian/Nutritionist  Once        Provider:  (Not yet assigned)    Completed GIO STOLL            Assessment & Plan  Essential hypertension  Patient's blood pressure range in the last 24 hours was: No data recorded.The patient's inpatient  anti-hypertensive regimen is listed below:  Current Antihypertensives     Entresto.    Plan  - BP is controlled, no changes needed to their regimen added parameter to BP meds   - lifestyle modification  - cardiac diet  Acute on chronic combined systolic and diastolic congestive heart failure  Patient has Combined Systolic and Diastolic heart failure that is Acute on chronic. On presentation their CHF was decompensated. Evidence of decompensated CHF on presentation includes: elevated JVD, crackles on lung auscultation, weight gain, orthopnea, paroxysmal nocturnal dyspnea (PND), and dyspnea on exertion (ELENA). The etiology of their decompensation is likely dietary indiscretion and increased fluid intake. Most recent BNP and echo results are listed below.  BNP 2020  Latest ECHO  Results for orders placed during the hospital encounter of 08/09/24    Echo    Interpretation Summary    Left Ventricle: The left ventricle is moderately dilated. Normal wall thickness. Moderate global hypokinesis present. There is moderately reduced systolic function with a visually estimated ejection fraction of 30 - 35%. Grade II diastolic dysfunction. Normal left ventricular filling pressure.    Right Ventricle: Normal right ventricular cavity size. Wall thickness is normal. Right ventricle wall motion  is normal. Systolic function is normal.    Left Atrium: Normal left atrial size.    Right Atrium: Normal right atrial size.    Aortic Valve: The aortic valve is a trileaflet valve. There is mild aortic valve sclerosis. There is mild annular calcification present.    Mitral Valve: There is mild bileaflet sclerosis. There is mild regurgitation.    Aorta: Aortic root is normal in size measuring 2.81 cm. Ascending aorta is normal measuring 2.96 cm.    Pulmonary Artery: The estimated pulmonary artery systolic pressure is 19 mmHg.    IVC/SVC: Normal venous pressure at 3 mmHg.    Current Heart Failure Medications       Plan  - Monitor strict I&Os  and daily weights.    - Place on telemetry  - Low sodium diet  - Place on fluid restriction of 1.5 L.   - Cardiology has been consulted  - The patient's volume status is improving but not at their baseline as indicated by elevated JVD, crackles on lung auscultation, weight gain, orthopnea, paroxysmal nocturnal dyspnea (PND), and dyspnea on exertion (ELENA)  - continue lifestyle modification  -cards recs: Diuresis and afterload reduction as tolerated. Titrate up entresto - intolerant to aldactone. Continue GDMT. Will f/u PRN   Nonischemic cardiomyopathy  Severe. EF 15%   ICD (implantable cardioverter-defibrillator), single, in situ  Normal device function at last check     Hx of ischemic right PCA stroke  Continue statin.    Headache  Patient give toradol, benadryl, and compazine for headache  CT head: 1. No CT evidence of acute intracranial abnormality. If the patient's headaches are sufficiently clinically suspicious, or associated with signs of elevated ICP, focal neurologic deficits, nausea, or vomiting, further evaluation with MRI can be performed if there are no clinical contraindications.2. Findings in keeping with remote right cerebellar infarct, unchanged from prior exam.  Neuro recs:   -  migraine cocktail: valproic acid 500 mg IV over 10 minutes q.8 p.r.n., Benadryl 25 mg IV Q 8 p.r.n., Compazine 10 mg q.8 p.r.n.  - avoid NSAIDs or Norco for pain relief  - if headaches persist tomorrow, kindly obtain MRI brain with and without contrast.  Noted patient has AICD placement but however appears to be compatible as she had 2 MRIs in the system last year.  - follow up in Neurology Clinic as outpatient    4/15: Continues to complain of headache. States cocktail improved headache slightly but headache back. Planned to order MRI however unable to do at [facility] due to patients AICD. Discussed with Neurology and recommendations to try another headache cocktail with valproic acid, the scene, and Benadryl.  Also 1 mg  "IV magnesium.         Hypotensive episode  Patient BP was  96/65 and nurse gave meds. Ordered dose of midodrine 10 mg once. Improved /75. Patient states she still feel lightheaded.   Monitor BP.   Final Active Diagnoses:    Diagnosis Date Noted POA    PRINCIPAL PROBLEM:  Acute on chronic combined systolic and diastolic congestive heart failure [I50.43] 05/24/2019 Yes    Hypotensive episode [I95.9] 04/13/2025 Yes    Hx of ischemic right PCA stroke [Z86.73] 02/08/2023 Not Applicable    Headache [R51.9] 02/08/2023 Yes    ICD (implantable cardioverter-defibrillator), single, in situ [Z95.810] 02/17/2020 Yes    Nonischemic cardiomyopathy [I42.8] 05/24/2019 Yes     Chronic    Essential hypertension [I10] 11/19/2012 Yes     Chronic      Problems Resolved During this Admission:       Discharged Condition: stable    Disposition: Home or Self Care    Follow Up:    Patient Instructions:      Ambulatory referral/consult to Outpatient Case Management   Referral Priority: Routine Referral Type: Consultation   Referral Reason: Specialty Services Required   Number of Visits Requested: 1     Ambulatory referral/consult to Neurology   Standing Status: Future   Referral Priority: Routine Referral Type: Consultation   Referral Reason: Specialty Services Required   Requested Specialty: Neurology   Number of Visits Requested: 1     Ambulatory referral/consult to Gastroenterology   Standing Status: Future   Referral Priority: Routine Referral Type: Consultation   Referral Reason: Specialty Services Required   Requested Specialty: Gastroenterology   Number of Visits Requested: 1     Diet Cardiac     Activity as tolerated       Significant Diagnostic Studies: Labs: CMP No results for input(s): "NA", "K", "CL", "CO2", "GLU", "BUN", "CREATININE", "CALCIUM", "PROT", "ALBUMIN", "BILITOT", "ALKPHOS", "AST", "ALT", "ANIONGAP", "ESTGFRAFRICA", "EGFRNONAA" in the last 48 hours., CBC No results for input(s): "WBC", "HGB", "HCT", "PLT" in the " last 48 hours., and Troponin   Recent Labs   Lab 04/18/25  1648   TROPONINI 0.011       Pending Diagnostic Studies:       None           Medications:  Reconciled Home Medications:      Medication List        CHANGE how you take these medications      acetaminophen 500 MG tablet  Commonly known as: TYLENOL  Take 1 tablet (500 mg total) by mouth every 6 (six) hours as needed for Pain.  What changed: Another medication with the same name was removed. Continue taking this medication, and follow the directions you see here.            CONTINUE taking these medications      albuterol 90 mcg/actuation inhaler  Commonly known as: PROVENTIL/VENTOLIN HFA  Inhale 1-2 puffs into the lungs every 6 (six) hours as needed. Rescue     amLODIPine 5 MG tablet  Commonly known as: NORVASC  Take 5 mg by mouth.     aspirin 81 MG EC tablet  Commonly known as: ECOTRIN  Take 1 tablet (81 mg total) by mouth once daily.     atorvastatin 40 MG tablet  Commonly known as: LIPITOR  Take 1 tablet (40 mg total) by mouth every evening.     benzocaine-menthoL 15-3.6 mg Lozg  1 lozenge by Mucous Membrane route every 2 (two) hours as needed (sore throat).     * cetirizine 10 MG tablet  Commonly known as: ZYRTEC  Take 1 tablet (10 mg total) by mouth once daily.     * cetirizine 10 MG tablet  Commonly known as: ZYRTEC  Take 1 tablet (10 mg total) by mouth once daily.     docusate sodium 100 MG capsule  Commonly known as: COLACE  Take 1 capsule (100 mg total) by mouth 2 (two) times daily.     ENTRESTO  mg per tablet  Generic drug: sacubitriL-valsartan  Take 1 tablet by mouth 2 (two) times daily.     eplerenone 25 MG Tab  Commonly known as: INSPRA  Take 1 tablet (25 mg total) by mouth once daily.     FARXIGA 10 mg tablet  Generic drug: dapagliflozin propanediol  Take 1 tablet by mouth once daily     FLUoxetine 20 MG capsule  Take 1 capsule (20 mg total) by mouth once daily.     fluticasone propionate 50 mcg/actuation nasal spray  Commonly known as:  FLONASE  1 spray (50 mcg total) by Each Nostril route 2 (two) times daily as needed for Rhinitis.     furosemide 40 MG tablet  Commonly known as: LASIX  Take 1 tablet (40 mg total) by mouth once daily.     gabapentin 300 MG capsule  Commonly known as: NEURONTIN  Take 2 capsules (600 mg total) by mouth 3 (three) times daily.     hydrALAZINE 50 MG tablet  Commonly known as: APRESOLINE  Take 1 tablet (50 mg total) by mouth 2 (two) times a day.     isosorbide mononitrate 60 MG 24 hr tablet  Commonly known as: IMDUR  Take 1 tablet (60 mg total) by mouth once daily.     metoprolol succinate 200 MG 24 hr tablet  Commonly known as: TOPROL-XL  Take 1 tablet (200 mg total) by mouth once daily.     potassium chloride SA 20 MEQ tablet  Commonly known as: K-DUR,KLOR-CON  Take 2 tablets (40 mEq total) by mouth 2 (two) times daily.           * This list has 2 medication(s) that are the same as other medications prescribed for you. Read the directions carefully, and ask your doctor or other care provider to review them with you.                  Indwelling Lines/Drains at time of discharge:   Lines/Drains/Airways       None                   Time spent on the discharge of patient: 45 minutes         Michelle Watson PA-C  Department of Hospital Medicine  [facility]

## 2025-04-21 NOTE — ASSESSMENT & PLAN NOTE
Patient's blood pressure range in the last 24 hours was: No data recorded.The patient's inpatient anti-hypertensive regimen is listed below:  Current Antihypertensives     Entresto.    Plan  - BP is controlled, no changes needed to their regimen added parameter to BP meds   - lifestyle modification  - cardiac diet

## 2025-04-21 NOTE — ASSESSMENT & PLAN NOTE
Patient has Combined Systolic and Diastolic heart failure that is Acute on chronic. On presentation their CHF was decompensated. Evidence of decompensated CHF on presentation includes: elevated JVD, crackles on lung auscultation, weight gain, orthopnea, paroxysmal nocturnal dyspnea (PND), and dyspnea on exertion (ELENA). The etiology of their decompensation is likely dietary indiscretion and increased fluid intake. Most recent BNP and echo results are listed below.  BNP 2020  Latest ECHO  Results for orders placed during the hospital encounter of 08/09/24    Echo    Interpretation Summary    Left Ventricle: The left ventricle is moderately dilated. Normal wall thickness. Moderate global hypokinesis present. There is moderately reduced systolic function with a visually estimated ejection fraction of 30 - 35%. Grade II diastolic dysfunction. Normal left ventricular filling pressure.    Right Ventricle: Normal right ventricular cavity size. Wall thickness is normal. Right ventricle wall motion  is normal. Systolic function is normal.    Left Atrium: Normal left atrial size.    Right Atrium: Normal right atrial size.    Aortic Valve: The aortic valve is a trileaflet valve. There is mild aortic valve sclerosis. There is mild annular calcification present.    Mitral Valve: There is mild bileaflet sclerosis. There is mild regurgitation.    Aorta: Aortic root is normal in size measuring 2.81 cm. Ascending aorta is normal measuring 2.96 cm.    Pulmonary Artery: The estimated pulmonary artery systolic pressure is 19 mmHg.    IVC/SVC: Normal venous pressure at 3 mmHg.    Current Heart Failure Medications       Plan  - Monitor strict I&Os and daily weights.    - Place on telemetry  - Low sodium diet  - Place on fluid restriction of 1.5 L.   - Cardiology has been consulted  - The patient's volume status is improving but not at their baseline as indicated by elevated JVD, crackles on lung auscultation, weight gain, orthopnea,  paroxysmal nocturnal dyspnea (PND), and dyspnea on exertion (ELENA)  - continue lifestyle modification  -cards recs: Diuresis and afterload reduction as tolerated. Titrate up entresto - intolerant to aldactone. Continue GDMT. Will f/u PRN

## 2025-04-23 ENCOUNTER — LAB VISIT (OUTPATIENT)
Dept: LAB | Facility: HOSPITAL | Age: 40
End: 2025-04-23
Attending: INTERNAL MEDICINE
Payer: MEDICAID

## 2025-04-23 ENCOUNTER — OFFICE VISIT (OUTPATIENT)
Facility: CLINIC | Age: 40
End: 2025-04-23
Payer: MEDICAID

## 2025-04-23 ENCOUNTER — DOCUMENTATION ONLY (OUTPATIENT)
Facility: CLINIC | Age: 40
End: 2025-04-23

## 2025-04-23 ENCOUNTER — TELEPHONE (OUTPATIENT)
Dept: TRANSPLANT | Facility: CLINIC | Age: 40
End: 2025-04-23
Payer: MEDICAID

## 2025-04-23 VITALS
DIASTOLIC BLOOD PRESSURE: 80 MMHG | SYSTOLIC BLOOD PRESSURE: 120 MMHG | OXYGEN SATURATION: 99 % | BODY MASS INDEX: 26.92 KG/M2 | HEIGHT: 69 IN | RESPIRATION RATE: 16 BRPM | WEIGHT: 181.75 LBS | HEART RATE: 90 BPM

## 2025-04-23 DIAGNOSIS — N92.6 ABNORMAL BLEEDING IN MENSTRUAL CYCLE: ICD-10-CM

## 2025-04-23 DIAGNOSIS — I95.9 HYPOTENSIVE EPISODE: ICD-10-CM

## 2025-04-23 DIAGNOSIS — I42.8 NONISCHEMIC CARDIOMYOPATHY: Chronic | ICD-10-CM

## 2025-04-23 DIAGNOSIS — I50.9 CONGESTIVE HEART FAILURE, UNSPECIFIED HF CHRONICITY, UNSPECIFIED HEART FAILURE TYPE: Primary | ICD-10-CM

## 2025-04-23 DIAGNOSIS — I10 ESSENTIAL HYPERTENSION: Chronic | ICD-10-CM

## 2025-04-23 DIAGNOSIS — R06.02 SOB (SHORTNESS OF BREATH): ICD-10-CM

## 2025-04-23 LAB
ABSOLUTE EOSINOPHIL (OHS): 0.07 K/UL
ABSOLUTE MONOCYTE (OHS): 0.53 K/UL (ref 0.3–1)
ABSOLUTE NEUTROPHIL COUNT (OHS): 4.38 K/UL (ref 1.8–7.7)
ALBUMIN SERPL BCP-MCNC: 3.7 G/DL (ref 3.5–5.2)
ALP SERPL-CCNC: 54 UNIT/L (ref 40–150)
ALT SERPL W/O P-5'-P-CCNC: 31 UNIT/L (ref 10–44)
ANION GAP (OHS): 9 MMOL/L (ref 8–16)
AST SERPL-CCNC: 22 UNIT/L (ref 11–45)
BASOPHILS # BLD AUTO: 0.03 K/UL
BASOPHILS NFR BLD AUTO: 0.5 %
BILIRUB SERPL-MCNC: 0.4 MG/DL (ref 0.1–1)
BNP SERPL-MCNC: 1100 PG/ML (ref 0–99)
BUN SERPL-MCNC: 16 MG/DL (ref 6–20)
CALCIUM SERPL-MCNC: 8.9 MG/DL (ref 8.7–10.5)
CHLORIDE SERPL-SCNC: 109 MMOL/L (ref 95–110)
CO2 SERPL-SCNC: 21 MMOL/L (ref 23–29)
CREAT SERPL-MCNC: 0.8 MG/DL (ref 0.5–1.4)
ERYTHROCYTE [DISTWIDTH] IN BLOOD BY AUTOMATED COUNT: 15.3 % (ref 11.5–14.5)
GFR SERPLBLD CREATININE-BSD FMLA CKD-EPI: >60 ML/MIN/1.73/M2
GLUCOSE SERPL-MCNC: 85 MG/DL (ref 70–110)
HCT VFR BLD AUTO: 35.9 % (ref 37–48.5)
HGB BLD-MCNC: 11 GM/DL (ref 12–16)
IMM GRANULOCYTES # BLD AUTO: 0.03 K/UL (ref 0–0.04)
IMM GRANULOCYTES NFR BLD AUTO: 0.5 % (ref 0–0.5)
LYMPHOCYTES # BLD AUTO: 1.54 K/UL (ref 1–4.8)
MAGNESIUM SERPL-MCNC: 1.9 MG/DL (ref 1.6–2.6)
MCH RBC QN AUTO: 27 PG (ref 27–31)
MCHC RBC AUTO-ENTMCNC: 30.6 G/DL (ref 32–36)
MCV RBC AUTO: 88 FL (ref 82–98)
NUCLEATED RBC (/100WBC) (OHS): 0 /100 WBC
PHOSPHATE SERPL-MCNC: 3.7 MG/DL (ref 2.7–4.5)
PLATELET # BLD AUTO: 332 K/UL (ref 150–450)
PMV BLD AUTO: 10.2 FL (ref 9.2–12.9)
POTASSIUM SERPL-SCNC: 4 MMOL/L (ref 3.5–5.1)
PROT SERPL-MCNC: 7.7 GM/DL (ref 6–8.4)
RBC # BLD AUTO: 4.07 M/UL (ref 4–5.4)
RELATIVE EOSINOPHIL (OHS): 1.1 %
RELATIVE LYMPHOCYTE (OHS): 23.4 % (ref 18–48)
RELATIVE MONOCYTE (OHS): 8.1 % (ref 4–15)
RELATIVE NEUTROPHIL (OHS): 66.4 % (ref 38–73)
SODIUM SERPL-SCNC: 139 MMOL/L (ref 136–145)
WBC # BLD AUTO: 6.58 K/UL (ref 3.9–12.7)

## 2025-04-23 PROCEDURE — 84100 ASSAY OF PHOSPHORUS: CPT

## 2025-04-23 PROCEDURE — 36415 COLL VENOUS BLD VENIPUNCTURE: CPT

## 2025-04-23 PROCEDURE — 83880 ASSAY OF NATRIURETIC PEPTIDE: CPT

## 2025-04-23 PROCEDURE — 99999 PR PBB SHADOW E&M-EST. PATIENT-LVL V: CPT | Mod: PBBFAC,,,

## 2025-04-23 PROCEDURE — 99215 OFFICE O/P EST HI 40 MIN: CPT | Mod: PBBFAC

## 2025-04-23 PROCEDURE — 83735 ASSAY OF MAGNESIUM: CPT

## 2025-04-23 PROCEDURE — 80053 COMPREHEN METABOLIC PANEL: CPT

## 2025-04-23 PROCEDURE — 85025 COMPLETE CBC W/AUTO DIFF WBC: CPT

## 2025-04-23 NOTE — Clinical Note
Saw her today. Appears she was d/c on entresto, imdur, hydralazine,metoprolol and amlodipine. She has had a couple instances of symptomatic hypotension at home (80/50). I stopped the amlodipine. She had already stopped taking imdur and hydralazine. Advised to continue with other GDMT and bring her pressure log to clinic when she sees you next week. She had not taken any medication at clinic today. May benefit from [facility] not sure if medicaid covers

## 2025-04-23 NOTE — PROGRESS NOTES
"Heart Failure [facility]itional Care Clinic([facility]) First Week Visit     Pt presents to clinic 04/23/2025 .     Most Recent Hospital Discharge Date: 04/16/2025  Last admission Diagnosis/chief complaint:SOB edema        Visit Vitals:     Wt Readings from Last 3 Encounters:   04/23/25 82.5 kg (181 lb 12.3 oz)   04/18/25 81.6 kg (180 lb)   04/14/25 82 kg (180 lb 12.4 oz)     Temp Readings from Last 3 Encounters:   04/18/25 97.4 °F (36.3 °C) (Oral)   04/16/25 98.7 °F (37.1 °C)   02/16/25 98.6 °F (37 °C) (Oral)     BP Readings from Last 3 Encounters:   04/23/25 120/80   04/18/25 118/78   04/16/25 97/68     Pulse Readings from Last 3 Encounters:   04/23/25 90   04/18/25 71   04/16/25 97            Pt reports the following:  []  Shortness of Breath with activity  []  Shortness of Breath at rest/ sleeping on 2-3 pillows "some days"  []  Fatigue  []  Edema   [] Chest pain or tightness  [] Weight Increase since discharge  [x] None of the above    Pt reports being in the green Zone. If in yellow/red, reminded that they should be calling [facility] today or now.      Medications:     Pt reports having all medications available and understands how to take them appropriately. Reminded pt to call prior to making any changes to medications.      Education:    [x] Gave pt new  / Confirmed pt has  "Heart Failure Transitional Care Clinic Home Care Guide" .   Reviewed key points as listed below.      Recommend 2 gram sodium restriction and 1500cc fluid restriction.  Encourage physical activity with graded exercise program.  Requested patient to weigh themselves daily, and to notify us if their weight increases by more than 3 lbs in 1 day or 5 lbs in 3 days.      [x] Gave / Reviewed "Daily Weight and Symptom Tracker".  Reviewed with patient when and how to call  [facility] according to "Yellow Zone" and "Red Zone".                  [x] Pt given list of low/high sodium food list                   Watch for these Signs and Symptoms: If any of these " "occur, contact [facility] immediately:   Increase in shortness of breath with movement   Increase in swelling in your legs and ankles   Weight gain of more than 3 pounds in a night or 5 pounds in 3 days.   Difficulty breathing when you are lying down   Worsening fatigue or tiredness   Stomach bloating, a full feeling or a loss of appetite   Increased coughing--especially when you are lying down     MyChart and Care Companion:              Patient active on myChart? Yes, patient uses regularly.    Contacting our Team:              Reviewed with pt how to contact [facility] RN via phone or [facility] messaging.      HF TCC Program Plan:  Pt educated on follow-up plan while in HFT[facility] program.   [x]  PT given /reviewed upcoming appointment/check in dates. These will include weekly contact with RN or visits with providers over the next 4-6 weeks.                   [x]  Pt educated that they will transitioned to long term care provider team at week 4-6.  This team will be either Cardiology, PCP or Advanced Heart Failure depending on needs.          Pt was able to verbalize back to RN in their own words correct diet/fluid restrictions, necessity for exercise, warning signs and symptoms, when and how to contact their TCC team .       Plan: Follow up with [facility]          [x]  Pt given AVS with follow up appointment within 1 week and medication detail list for ease of use.     [x]  Explained to pt they will have a phone "check in" by RN in/on 1          Will follow up with pt at next clinic visit and RN navigator available for pt questions, issues or concerns.     Please refer to provider visit for additional details and assessment.    "

## 2025-04-24 ENCOUNTER — OFFICE VISIT (OUTPATIENT)
Dept: FAMILY MEDICINE | Facility: CLINIC | Age: 40
End: 2025-04-24
Payer: MEDICAID

## 2025-04-24 ENCOUNTER — OUTPATIENT CASE MANAGEMENT (OUTPATIENT)
Dept: ADMINISTRATIVE | Facility: OTHER | Age: 40
End: 2025-04-24
Payer: MEDICAID

## 2025-04-24 VITALS
HEIGHT: 69 IN | OXYGEN SATURATION: 98 % | WEIGHT: 185.19 LBS | BODY MASS INDEX: 27.43 KG/M2 | HEART RATE: 83 BPM | DIASTOLIC BLOOD PRESSURE: 80 MMHG | TEMPERATURE: 99 F | SYSTOLIC BLOOD PRESSURE: 110 MMHG

## 2025-04-24 DIAGNOSIS — I50.9 CONGESTIVE HEART FAILURE, UNSPECIFIED HF CHRONICITY, UNSPECIFIED HEART FAILURE TYPE: ICD-10-CM

## 2025-04-24 DIAGNOSIS — F33.2 SEVERE EPISODE OF RECURRENT MAJOR DEPRESSIVE DISORDER, WITHOUT PSYCHOTIC FEATURES: ICD-10-CM

## 2025-04-24 DIAGNOSIS — Z09 HOSPITAL DISCHARGE FOLLOW-UP: Primary | ICD-10-CM

## 2025-04-24 PROCEDURE — 99214 OFFICE O/P EST MOD 30 MIN: CPT | Mod: PBBFAC,PO

## 2025-04-24 PROCEDURE — 99999 PR PBB SHADOW E&M-EST. PATIENT-LVL IV: CPT | Mod: PBBFAC,,,

## 2025-04-24 NOTE — PROGRESS NOTES
HPI     Chief Complaint:  Chief Complaint   Patient presents with    Follow-up       Nasra Marks is a 39 y.o. female with multiple medical diagnoses as listed in the medical history and problem list that presents for   Chief Complaint   Patient presents with    Follow-up    .     Patient is not known to me with her last appointment in this department on Visit date not found.     HPI  Patient presents for hospital follow-up.  Saw cardiology yesterday. Echo scheduled 4/29.   Complains of some continued headache likely related to medication.      Hospital Course:   Admitted for CHF. Consulted cards recs: Acute on chronic combined systolic and diastolic congestive heart failure. Diuresis and afterload reduction as tolerated. Titrate up entresto - intolerant to aldactone. Continue GDMT. Will f/u PRN. Patient c/o headache. Given prn meds. CT head: 1. No CT evidence of acute intracranial abnormality. If the patient's headaches are sufficiently clinically suspicious, or associated with signs of elevated ICP, focal neurologic deficits, nausea, or vomiting, further evaluation with MRI can be performed if there are no clinical contraindications.2. Findings in keeping with remote right cerebellar infarct, unchanged from prior exam. Patient now stays headache resolved. Patient BP was  96/65 and nurse gave meds. Ordered dose of midodrine 10 mg once. Improved /75  Neurology consulted for continued headache.  Patient was started on migraine cocktail:  Valproic acid 500 mg IV over 10 minutes Q 8 hours p.r.n., Benadryl 25 mg IV Q 8 p.r.n., and Compazine 10 mg Q 8 p.r.n. patient is told to avoid NSAIDs or Norco for pain relief.  Neurology recommended MRI brain if headache persists and follow up with Neurology Clinic outpatient.  MRI brain ordered because patient continued to have headaches.  However, unable to do MRI brain due to patient's AICD.  Discussed with Neurology and recommendation was MRI brain not warranted at  this time.  Patient can follow up outpatient with Neurology.  Patient was given another migraine cocktail with significant improvement of her headache.  Patient states she feels fine and would like to go home. Patient medically stable and cleared for discharge with stable vital signs.  Patient acknowledged understanding of what was stated above and had no further questions.  Return precautions given.      Assessment & Plan     Problem List Items Addressed This Visit       Congestive heart failure  Stable.  Hospital follow up today for CHF exacerbation.  Has been stable since hospital discharge.  Seen by Cardiology yesterday in clinic, has echo scheduled soon.    Severe episode of recurrent major depressive disorder, without psychotic features  Stable mood.  Followed by Psychiatry.     Other Visit Diagnoses         Hospital discharge follow-up    -  Primary   Hospital follow up today for CHF exacerbation.  Has been stable since hospital discharge.  Seen by Cardiology yesterday in clinic, has echo scheduled soon.              --------------------------------------------      Health Maintenance:  Health Maintenance         Date Due Completion Date    Pneumococcal Vaccines (Age 0-49) (1 of 2 - PCV) Never done ---    TETANUS VACCINE 08/19/2009 8/19/1999    Cervical Cancer Screening 11/01/2022 11/1/2017    Influenza Vaccine (1) 09/01/2024 3/14/2024 (Declined)    Override on 3/14/2024: Declined    COVID-19 Vaccine (1 - 2024-25 season) Never done ---    Hemoglobin A1c (Diabetic Prevention Screening) 04/12/2028 4/12/2025    RSV Vaccine (Age 60+ and Pregnant patients) (1 - 1-dose 75+ series) 06/19/2061 ---            Discussed the importance of overdue vaccines which were offered during this encounter. Patient declined overdue vaccines at this time and Health maintenance reviewed    Follow Up:  Follow up in about 6 months (around 10/24/2025).    Exam     Review of Systems:  (as noted above)  Review of Systems    Physical  "Exam:   Physical Exam  Constitutional:       General: She is not in acute distress.     Appearance: Normal appearance. She is not ill-appearing, toxic-appearing or diaphoretic.   Cardiovascular:      Rate and Rhythm: Normal rate.   Pulmonary:      Effort: Pulmonary effort is normal.   Neurological:      Mental Status: She is lethargic.   Psychiatric:         Mood and Affect: Mood normal.       Vitals:    25 1439   BP: 110/80   BP Location: Right arm   Patient Position: Sitting   Pulse: 83   Temp: 98.9 °F (37.2 °C)   TempSrc: Oral   SpO2: 98%   Weight: 84 kg (185 lb 3 oz)   Height: 5' 9" (1.753 m)      Body mass index is 27.35 kg/m².        History     Past Medical History:  Past Medical History:   Diagnosis Date    CHF (congestive heart failure)     Chronic combined systolic and diastolic congestive heart failure 2019    Essential hypertension 2012    Hypertension     dx at 16y.o.    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 disease, with heart failure 2021    ICD (implantable cardioverter-defibrillator), single, in situ 2020    Nonischemic cardiomyopathy 2019    Pacemaker 2017       Past Surgical History:  Past Surgical History:   Procedure Laterality Date    CARDIAC DEFIBRILLATOR PLACEMENT  2017     SECTION      x 1    INDUCED       RIGHT HEART CATHETERIZATION Right 2022    Procedure: INSERTION, CATHETER, RIGHT HEART;  Surgeon: Timothy Prajapati Jr., MD;  Location: [facility] CATH LAB;  Service: Cardiology;  Laterality: Right;    RIGHT HEART CATHETERIZATION Right 2024    Procedure: INSERTION, CATHETER, RIGHT HEART;  Surgeon: Lior Rueda MD;  Location: [facility] CATH LAB;  Service: Cardiology;  Laterality: Right;    TUBAL LIGATION         Social History:  Social History[1]    Family History:  Family History   Problem Relation Name Age of Onset    Hypertension Mother      Cancer Maternal Grandmother        "   breast x 2    Cancer Paternal Grandmother          breast    No Known Problems Sister      No Known Problems Brother      No Known Problems Son      No Known Problems Brother      Hypertension Other      Diabetes Other      Breast cancer Other      Cancer Paternal Aunt          breast    Cancer Paternal Uncle         Allergies and Medications: (updated and reviewed)  Review of patient's allergies indicates:   Allergen Reactions    Aldactone [spironolactone] Swelling     Lips swelled    Hydralazine analogues Other (See Comments)     headaches    Isosorbide Other (See Comments)     headaches     Current Medications[2]    Patient Care Team:  Veronika Rainey MD as PCP - General (Family Medicine)  Carito Geiger, RN as Heart Failure Coordinator (Advanced Heart Failure & Transplant Cardiology)  Jodi Justin, RN as Heart Failure Coordinator (Advanced Heart Failure & Transplant Cardiology)         - The patient is given an After Visit Summary that lists all medications with directions, allergies, education, orders placed during this encounter and follow-up instructions.      - I have reviewed the patient's medical information including past medical, family, and social history sections including the medications and allergies.      - We discussed the patient's current medications.     This note was created by combination of typed  and MModal dictation.  Transcription errors may be present.  If there are any questions, please contact me.       Floridalma Newby NP                      [1]   Social History  Socioeconomic History    Marital status:    Occupational History     Employer: [employer]   Tobacco Use    Smoking status: Never    Smokeless tobacco: Never   Substance and Sexual Activity    Alcohol use: Not Currently     Comment: occasionally    Drug use: Not Currently     Types: Marijuana     Comment: in past very little marijuana    Sexual activity: Yes     Partners: Male     Birth  control/protection: None     Social Drivers of Health     Financial Resource Strain: Patient Declined (4/13/2025)    Overall Financial Resource Strain (CARDIA)     Difficulty of Paying Living Expenses: Patient declined   Food Insecurity: Patient Declined (4/13/2025)    Hunger Vital Sign     Worried About Running Out of Food in the Last Year: Patient declined     Ran Out of Food in the Last Year: Patient declined   Transportation Needs: No Transportation Needs (8/28/2024)    TRANSPORTATION NEEDS     Transportation : No   Physical Activity: Inactive (1/6/2025)    Exercise Vital Sign     Days of Exercise per Week: 0 days     Minutes of Exercise per Session: 10 min   Stress: Patient Declined (4/13/2025)    [employer] - Occupational Stress Questionnaire     Feeling of Stress : Patient declined   Housing Stability: Patient Declined (4/13/2025)    Housing Stability Vital Sign     Unable to Pay for Housing in the Last Year: Patient declined     Homeless in the Last Year: Patient declined   [2]   Current Outpatient Medications   Medication Sig Dispense Refill    acetaminophen (TYLENOL) 500 MG tablet Take 1 tablet (500 mg total) by mouth every 6 (six) hours as needed for Pain. 30 tablet 0    albuterol (PROVENTIL/VENTOLIN HFA) 90 mcg/actuation inhaler Inhale 1-2 puffs into the lungs every 6 (six) hours as needed. Rescue 18 g 0    benzocaine-menthoL 15-3.6 mg Lozg 1 lozenge by Mucous Membrane route every 2 (two) hours as needed (sore throat). 16 lozenge 0    cetirizine (ZYRTEC) 10 MG tablet Take 1 tablet (10 mg total) by mouth once daily. 30 tablet 0    cetirizine (ZYRTEC) 10 MG tablet Take 1 tablet (10 mg total) by mouth once daily. (Patient taking differently: Take 10 mg by mouth as needed for Allergies.) 30 tablet 0    docusate sodium (COLACE) 100 MG capsule Take 1 capsule (100 mg total) by mouth 2 (two) times daily. 20 capsule 0    eplerenone (INSPRA) 25 MG Tab Take 1 tablet (25 mg total) by  mouth once daily. 90 tablet 3    FARXIGA 10 mg tablet Take 1 tablet by mouth once daily 30 tablet 6    fluticasone propionate (FLONASE) 50 mcg/actuation nasal spray 1 spray (50 mcg total) by Each Nostril route 2 (two) times daily as needed for Rhinitis. 15 g 0    gabapentin (NEURONTIN) 300 MG capsule Take 2 capsules (600 mg total) by mouth 3 (three) times daily. 180 capsule 5    hydrALAZINE (APRESOLINE) 50 MG tablet Take 1 tablet (50 mg total) by mouth 2 (two) times a day. 180 tablet 3    isosorbide mononitrate (IMDUR) 60 MG 24 hr tablet Take 1 tablet (60 mg total) by mouth once daily. 90 tablet 3    metoprolol succinate (TOPROL-XL) 200 MG 24 hr tablet Take 1 tablet (200 mg total) by mouth once daily. 90 tablet 3    pantoprazole (PROTONIX) 40 MG tablet Please take 1 tablet twice a day for 3 days, then 1 tablet daily 10 tablet 0    potassium chloride SA (K-DUR,KLOR-CON) 20 MEQ tablet Take 2 tablets (40 mEq total) by mouth 2 (two) times daily. 120 tablet 5    sacubitriL-valsartan (ENTRESTO)  mg per tablet Take 1 tablet by mouth 2 (two) times daily. 180 tablet 3    aspirin (ECOTRIN) 81 MG EC tablet Take 1 tablet (81 mg total) by mouth once daily. 30 tablet 0    atorvastatin (LIPITOR) 40 MG tablet Take 1 tablet (40 mg total) by mouth every evening. 90 tablet 3    FLUoxetine 20 MG capsule Take 1 capsule (20 mg total) by mouth once daily. 30 capsule 1    furosemide (LASIX) 40 MG tablet Take 1 tablet (40 mg total) by mouth once daily. 90 tablet 2     No current facility-administered medications for this visit.

## 2025-04-24 NOTE — PROGRESS NOTES
Patient notified on personal, identified voicemail. Advised to return call with questions or to discuss further.   E-message sent as well.    Pt reports that she isn't able to talk at this time. Scheduled a callback.     yes

## 2025-04-25 ENCOUNTER — OUTPATIENT CASE MANAGEMENT (OUTPATIENT)
Dept: ADMINISTRATIVE | Facility: OTHER | Age: 40
End: 2025-04-25
Payer: MEDICAID

## 2025-04-25 PROBLEM — F33.2 SEVERE EPISODE OF RECURRENT MAJOR DEPRESSIVE DISORDER, WITHOUT PSYCHOTIC FEATURES: Status: ACTIVE | Noted: 2025-04-25

## 2025-04-29 ENCOUNTER — HOSPITAL ENCOUNTER (OUTPATIENT)
Dept: CARDIOLOGY | Facility: HOSPITAL | Age: 40
Discharge: HOME | End: 2025-04-29
Attending: INTERNAL MEDICINE
Payer: MEDICAID

## 2025-04-29 DIAGNOSIS — I50.42 CHRONIC COMBINED SYSTOLIC AND DIASTOLIC CONGESTIVE HEART FAILURE: ICD-10-CM

## 2025-04-29 PROCEDURE — 93282 PRGRMG EVAL IMPLANTABLE DFB: CPT | Mod: 26,,, | Performed by: INTERNAL MEDICINE

## 2025-04-29 PROCEDURE — 93282 PRGRMG EVAL IMPLANTABLE DFB: CPT

## 2025-04-30 ENCOUNTER — OUTPATIENT CASE MANAGEMENT (OUTPATIENT)
Dept: ADMINISTRATIVE | Facility: OTHER | Age: 40
End: 2025-04-30
Payer: MEDICAID

## 2025-04-30 ENCOUNTER — DOCUMENTATION ONLY (OUTPATIENT)
Facility: CLINIC | Age: 40
End: 2025-04-30
Payer: MEDICAID

## 2025-04-30 NOTE — PROGRESS NOTES
"[facility]) weekly phone follow up / triage call completed.     [facility] RN Navigator spoke with Ms. Marks    Current Patient reported weight: 181.7 lbs   Patient Goal Weight: ()179. lbs  Recent Patient reported blood pressure and heart rate: 108/70    Pt reports the following:  []  Shortness of Breath with Activity  []  Shortness of Breath at rest   []  Fatigue  [x]  Edema in abd  [] Chest pain or tightness  [] Weight Increase since discharge  [] None of the above    Pt reports using "Daily weight and symptom tracker".    Pt reports being in the yellow(Zone. If in yellow/red, reminded that they should be calling [facility] today or now.     Medications:   Medication compliance reviewed with pt.  Pt reports having medication list available and has all medications at home for use per list. Patient instructed to take 40 mg furosemide BID for 2 days/Donna MARTINEZ. Will call patient on 05/01 to assess the outcome.     Education:   Confirmed pt still has "[facility] Home Care Guide"  .     Reminded of key points as listed below.     Recommend 2 -3 gram sodium restriction and 1500 cc-2000 cc fluid restriction.  Encourage physical activity with graded exercise program.  Requested patient to weigh themselves daily, and to notify us if their weight increases by more than 3 lbs in 1 day or 5 lbs in 3 days.   Reminded to use "Daily weight and symptom tracker".  Even if pt does not have a scale, to use symptom tracker.       Watch for these Signs and Symptoms: If any of these occur, contact [facility] immediately:   Increase in shortness of breath with movement   Increase in swelling in your legs and ankles   Weight gain of more than 3 pounds in a day or 5 pounds in 3 days.   Difficulty breathing when you are lying down   Worsening fatigue or tiredness   Stomach bloating, a full feeling or a loss of appetite   Increased coughing--especially when you are lying down      Pt was able to " verbalize back to RN in their own words correct diet/fluid restrictions, necessity for exercise, warning signs and symptoms, when and how to contact their [facility] team.      Pt reminded of upcoming appointment.  PT reports they will attend.       Pt reminded of how and when to contact [facility]:  [phone](Mon-Fri, 8a-5p) & for urgent issues on the weekend to page the Heart Transplant MD on call.  Pt also encouraged utilize [facility] messaging as well.      Pt  verbalized understanding and in agreement of plan to take furosemide 40 mg BID for 2 days than resume ordered dose of 40 mg daily.       Will follow up with pt at next clinic visit and RN navigator available for pt questions, issues or concerns.

## 2025-05-02 ENCOUNTER — OFFICE VISIT (OUTPATIENT)
Dept: CARDIOLOGY | Facility: CLINIC | Age: 40
End: 2025-05-02
Payer: MEDICAID

## 2025-05-02 VITALS
HEIGHT: 69 IN | RESPIRATION RATE: 18 BRPM | WEIGHT: 182.75 LBS | HEART RATE: 90 BPM | OXYGEN SATURATION: 98 % | SYSTOLIC BLOOD PRESSURE: 114 MMHG | DIASTOLIC BLOOD PRESSURE: 74 MMHG | BODY MASS INDEX: 27.07 KG/M2

## 2025-05-02 DIAGNOSIS — I10 ESSENTIAL HYPERTENSION: ICD-10-CM

## 2025-05-02 DIAGNOSIS — Z95.810 ICD (IMPLANTABLE CARDIOVERTER-DEFIBRILLATOR), SINGLE, IN SITU: ICD-10-CM

## 2025-05-02 DIAGNOSIS — I50.42 CHRONIC COMBINED SYSTOLIC AND DIASTOLIC CONGESTIVE HEART FAILURE: ICD-10-CM

## 2025-05-02 DIAGNOSIS — I42.8 NONISCHEMIC CARDIOMYOPATHY: Primary | ICD-10-CM

## 2025-05-02 DIAGNOSIS — Z86.73 HISTORY OF CVA (CEREBROVASCULAR ACCIDENT): ICD-10-CM

## 2025-05-02 DIAGNOSIS — R53.83 FATIGUE, UNSPECIFIED TYPE: ICD-10-CM

## 2025-05-02 DIAGNOSIS — E78.5 DYSLIPIDEMIA: ICD-10-CM

## 2025-05-02 LAB
OHS CV DC REMOTE DEVICE TYPE: NORMAL
OHS CV RV PACING PERCENT: <0.1 %

## 2025-05-02 PROCEDURE — 99999 PR PBB SHADOW E&M-EST. PATIENT-LVL IV: CPT | Mod: PBBFAC,,, | Performed by: INTERNAL MEDICINE

## 2025-05-02 PROCEDURE — 99214 OFFICE O/P EST MOD 30 MIN: CPT | Mod: PBBFAC,PO | Performed by: INTERNAL MEDICINE

## 2025-05-02 RX ORDER — EPLERENONE 25 MG/1
25 TABLET ORAL DAILY
Qty: 90 TABLET | Refills: 3 | Status: SHIPPED | OUTPATIENT
Start: 2025-05-02

## 2025-05-02 RX ORDER — ATORVASTATIN CALCIUM 80 MG/1
80 TABLET, FILM COATED ORAL NIGHTLY
Qty: 90 TABLET | Refills: 3 | Status: SHIPPED | OUTPATIENT
Start: 2025-05-02 | End: 2026-05-02

## 2025-05-02 RX ORDER — DAPAGLIFLOZIN 10 MG/1
10 TABLET, FILM COATED ORAL DAILY
Qty: 90 TABLET | Refills: 3 | Status: SHIPPED | OUTPATIENT
Start: 2025-05-02

## 2025-05-02 NOTE — PROGRESS NOTES
CARDIOVASCULAR PROGRESS NOTE    REASON FOR CONSULT:   Nasra Marks is a 39 y.o. female who presents for follow up of MINDY, MDT ICD.    PCP: Redd  Gyn: Labadie, Juan  CHF: Victorina  HISTORY OF PRESENT ILLNESS:   The patient returns for follow up.  She was recently hospitalized with heart failure.  She presents today with complaints of abdominal fullness and generalized fatigue with some shortness of breath.  She denies any angina, palpitations, syncope, or defibrillator discharges.  There has been no PND, orthopnea, melena, hematuria, or claudication symptoms.  She has no lower extremity edema and appears generally euvolemic on examination.  Recent echocardiogram notes persistent severe LV systolic dysfunction.    CARDIOVASCULAR HISTORY:   NICM (cath 2017 with normal cors)  ICD (Donta 2017, MDT single chamber)    PAST MEDICAL HISTORY:     Past Medical History:   Diagnosis Date    CHF (congestive heart failure)     Chronic combined systolic and diastolic congestive heart failure 2019    Essential hypertension 2012    Hypertension     dx at 16y.o.    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 disease, with heart failure 2021    ICD (implantable cardioverter-defibrillator), single, in situ 2020    Nonischemic cardiomyopathy 2019    Pacemaker 2017       PAST SURGICAL HISTORY:     Past Surgical History:   Procedure Laterality Date    CARDIAC DEFIBRILLATOR PLACEMENT  2017     SECTION      x 1    INDUCED       RIGHT HEART CATHETERIZATION Right 2022    Procedure: INSERTION, CATHETER, RIGHT HEART;  Surgeon: Timothy Prajapati Jr., MD;  Location: [facility] CATH LAB;  Service: Cardiology;  Laterality: Right;    RIGHT HEART CATHETERIZATION Right 2024    Procedure: INSERTION, CATHETER, RIGHT HEART;  Surgeon: Lior Rueda MD;  Location: [facility] CATH LAB;  Service: Cardiology;  Laterality: Right;    TUBAL LIGATION          ALLERGIES AND MEDICATION:     Review of patient's allergies indicates:   Allergen Reactions    Aldactone [spironolactone] Swelling     Lips swelled    Hydralazine analogues Other (See Comments)     headaches    Isosorbide Other (See Comments)     headaches        Medication List            Accurate as of May 2, 2025  2:21 PM. If you have any questions, ask your nurse or doctor.                CHANGE how you take these medications      atorvastatin 80 MG tablet  Commonly known as: LIPITOR  Take 1 tablet (80 mg total) by mouth every evening.  What changed:   medication strength  how much to take  Changed by: Kasihf Peguero MD     dapagliflozin propanediol 10 mg tablet  Commonly known as: FARXIGA  Take 1 tablet (10 mg total) by mouth once daily.  What changed:   how much to take  when to take this  Changed by: Kashif Peguero MD            CONTINUE taking these medications      acetaminophen 500 MG tablet  Commonly known as: TYLENOL  Take 1 tablet (500 mg total) by mouth every 6 (six) hours as needed for Pain.     albuterol 90 mcg/actuation inhaler  Commonly known as: PROVENTIL/VENTOLIN HFA  Inhale 1-2 puffs into the lungs every 6 (six) hours as needed. Rescue     aspirin 81 MG EC tablet  Commonly known as: ECOTRIN  Take 1 tablet (81 mg total) by mouth once daily.     benzocaine-menthoL 15-3.6 mg Lozg  1 lozenge by Mucous Membrane route every 2 (two) hours as needed (sore throat).     docusate sodium 100 MG capsule  Commonly known as: COLACE  Take 1 capsule (100 mg total) by mouth 2 (two) times daily.     ENTRESTO  mg per tablet  Generic drug: sacubitriL-valsartan  Take 1 tablet by mouth 2 (two) times daily.     eplerenone 25 MG Tab  Commonly known as: INSPRA  Take 1 tablet (25 mg total) by mouth once daily.     FLUoxetine 20 MG capsule  Take 1 capsule (20 mg total) by mouth once daily.     fluticasone propionate 50 mcg/actuation nasal spray  Commonly known as: FLONASE  1 spray (50 mcg total) by Each  Nostril route 2 (two) times daily as needed for Rhinitis.     furosemide 40 MG tablet  Commonly known as: LASIX  Take 1 tablet (40 mg total) by mouth once daily.     gabapentin 300 MG capsule  Commonly known as: NEURONTIN  Take 2 capsules (600 mg total) by mouth 3 (three) times daily.     metoprolol succinate 200 MG 24 hr tablet  Commonly known as: TOPROL-XL  Take 1 tablet (200 mg total) by mouth once daily.     pantoprazole 40 MG tablet  Commonly known as: PROTONIX  Please take 1 tablet twice a day for 3 days, then 1 tablet daily     potassium chloride SA 20 MEQ tablet  Commonly known as: K-DUR,KLOR-CON  Take 2 tablets (40 mEq total) by mouth 2 (two) times daily.            STOP taking these medications      cetirizine 10 MG tablet  Commonly known as: ZYRTEC  Stopped by: Kashif Peguero MD     hydrALAZINE 50 MG tablet  Commonly known as: APRESOLINE  Stopped by: Kashif Peguero MD     isosorbide mononitrate 60 MG 24 hr tablet  Commonly known as: IMDUR  Stopped by: Kashif Peguero MD               Where to Get Your Medications        These medications were sent to [facility] [address] [place] [address] [address] [facility]  [address] [facility] [address]      Phone: [phone]   atorvastatin 80 MG tablet  dapagliflozin propanediol 10 mg tablet  eplerenone 25 MG Tab         SOCIAL HISTORY:     Social History     Socioeconomic History    Marital status:    Occupational History     Employer: [employer]   Tobacco Use    Smoking status: Never    Smokeless tobacco: Never   Substance and Sexual Activity    Alcohol use: Not Currently     Comment: occasionally    Drug use: Not Currently     Types: Marijuana     Comment: in past very little marijuana    Sexual activity: Yes     Partners: Male     Birth control/protection: None     Social Drivers of Health     Financial Resource Strain: Patient Declined (4/13/2025)    Overall Financial Resource Strain (CARDIA)     Difficulty of Paying Living Expenses: Patient  declined   Food Insecurity: Patient Declined (4/13/2025)    Hunger Vital Sign     Worried About Running Out of Food in the Last Year: Patient declined     Ran Out of Food in the Last Year: Patient declined   Transportation Needs: No Transportation Needs (8/28/2024)    TRANSPORTATION NEEDS     Transportation : No   Physical Activity: Inactive (1/6/2025)    Exercise Vital Sign     Days of Exercise per Week: 0 days     Minutes of Exercise per Session: 10 min   Stress: Patient Declined (4/13/2025)    [employer] - Occupational Stress Questionnaire     Feeling of Stress : Patient declined   Housing Stability: Patient Declined (4/13/2025)    Housing Stability Vital Sign     Unable to Pay for Housing in the Last Year: Patient declined     Homeless in the Last Year: Patient declined       FAMILY HISTORY:     Family History   Problem Relation Name Age of Onset    Hypertension Mother      Cancer Maternal Grandmother          breast x 2    Cancer Paternal Grandmother          breast    No Known Problems Sister      No Known Problems Brother      No Known Problems Son      No Known Problems Brother      Hypertension Other      Diabetes Other      Breast cancer Other      Cancer Paternal Aunt          breast    Cancer Paternal Uncle         REVIEW OF SYSTEMS:   Review of Systems   Constitutional:  Positive for malaise/fatigue. Negative for chills, diaphoresis and fever.   HENT:  Negative for nosebleeds.    Eyes:  Negative for blurred vision, double vision and photophobia.   Respiratory:  Positive for shortness of breath. Negative for hemoptysis and wheezing.    Cardiovascular:  Negative for chest pain, palpitations, orthopnea, claudication, leg swelling and PND.   Gastrointestinal:  Negative for abdominal pain, blood in stool, heartburn, melena, nausea and vomiting.   Genitourinary:  Negative for flank pain and hematuria.   Musculoskeletal:  Negative for falls, myalgias and neck pain.   Skin:   "Negative for rash.   Neurological:  Negative for dizziness, seizures, loss of consciousness, weakness and headaches.   Endo/Heme/Allergies:  Negative for polydipsia. Does not bruise/bleed easily.   Psychiatric/Behavioral:  Negative for depression and memory loss. The patient is not nervous/anxious.        PHYSICAL EXAM:     Vitals:    05/02/25 1352   BP: 114/74   Pulse: 90   Resp: 18      Body mass index is 26.99 kg/m².  Weight: 82.9 kg (182 lb 12.2 oz)   Height: 5' 9" (175.3 cm)     Physical Exam  Vitals reviewed.   Constitutional:       General: She is not in acute distress.     Appearance: Normal appearance. She is well-developed. She is not ill-appearing, toxic-appearing or diaphoretic.   HENT:      Head: Normocephalic and atraumatic.   Eyes:      General: No scleral icterus.     Extraocular Movements: Extraocular movements intact.      Conjunctiva/sclera: Conjunctivae normal.      Pupils: Pupils are equal, round, and reactive to light.   Neck:      Thyroid: No thyromegaly.      Vascular: Normal carotid pulses. No carotid bruit or JVD.      Trachea: Trachea normal. No tracheal deviation.   Cardiovascular:      Rate and Rhythm: Normal rate and regular rhythm.      Pulses:           Carotid pulses are 2+ on the right side and 2+ on the left side.     Heart sounds: S1 normal and S2 normal. No murmur heard.     No friction rub. No gallop.      Comments: L infraclavicular ICD site well healed  Pulmonary:      Effort: Pulmonary effort is normal. No respiratory distress.      Breath sounds: Normal breath sounds. No stridor. No wheezing, rhonchi or rales.   Chest:      Chest wall: No tenderness.   Abdominal:      General: There is no distension.      Palpations: Abdomen is soft.   Musculoskeletal:         General: No swelling or tenderness. Normal range of motion.      Cervical back: Normal range of motion and neck supple. No edema or rigidity.      Right lower leg: No edema.      Left lower leg: No edema.   Feet:      " Right foot:      Skin integrity: No ulcer.      Left foot:      Skin integrity: No ulcer.   Skin:     General: Skin is warm and dry.      Coloration: Skin is not jaundiced.   Neurological:      General: No focal deficit present.      Mental Status: She is alert and oriented to person, place, and time.      Cranial Nerves: No cranial nerve deficit.   Psychiatric:         Mood and Affect: Mood normal.         Speech: Speech normal.         Behavior: Behavior normal. Behavior is cooperative.         DATA:   EKG: (personally reviewed tracing(s))  1/24/25 , PVCs, NSSTTW changes    Laboratory:  CBC:  Recent Labs   Lab 04/16/25  0545 04/18/25  1648 04/23/25  1047   WBC 7.33 8.04 6.58   HGB 13.2 12.3 11.0 L   HCT 40.7 39.6 35.9 L   Platelet Count 352 351 332       CHEMISTRIES:  Recent Labs   Lab 08/28/24  1019 08/29/24  0939 04/12/25  0539 04/12/25  1557 04/16/25  0545 04/18/25  1648 04/23/25  1047   Glucose 96   < > 86   < > 94 94 85   Sodium 135 L   < > 138   < > 136 137 139   Potassium 3.4 L   < > 3.3 L   < > 3.4 L 4.3 4.0   BUN 8   < > 18   < > 18 21 H 16   Creatinine 0.7   < > 0.8   < > 0.8 1.0 0.8   Calcium 9.2   < > 8.7   < > 9.4 9.0 8.9   Magnesium   --   --  1.8  --   --   --  1.9   Magnesium 2.0  --   --   --   --   --   --     < > = values in this interval not displayed.       CARDIAC BIOMARKERS:  Recent Labs   Lab 02/16/25  1143 02/16/25  1609 04/18/25  1648   Troponin I 0.009 0.010  --    Troponin-I  --   --  0.011       COAGS:  Recent Labs   Lab 10/23/23  2218 08/27/24  1249 08/27/24  1312   INR 1.1 1.3 H 1.0       LIPIDS/LFTS:  Recent Labs   Lab 02/16/24  0946 04/25/24  1818 08/27/24  1312 12/28/24  1948 04/12/25  0539 04/18/25  1648 04/23/25  1047   Cholesterol Total  --   --   --   --  122  --   --    Cholesterol 91 L  --  129  --   --   --   --    Triglycerides 23 L  --  70  --   --   --   --    Triglyceride  --   --   --   --  54  --   --    HDL 46  --  37 L  --   --   --   --    HDL Cholesterol  --    --   --   --  36 L  --   --    LDL Cholesterol 40.4 L  --  78.0  --  75.2  --   --    Non-HDL Cholesterol 45  --  92  --   --   --   --    Non HDL Cholesterol  --   --   --   --  86  --   --    AST 19   < > 23   < > 61 H 31 22   ALT 14   < > 13   < > 83 H 47 H 31    < > = values in this interval not displayed.     Lab Results   Component Value Date    TSH 3.141 04/12/2025      (1/24/25)    Cardiovascular Testing:  Echo 4/12/25    Left Ventricle: The left ventricle is severely dilated. There is severely reduced systolic function with a visually estimated ejection fraction of 10 -15%. Grade III diastolic dysfunction.    Right Ventricle: The right ventricle has mild enlargement. Systolic function is mildly reduced.    Left Atrium: Moderately dilated    Mitral Valve: There is mild to moderate regurgitation.    Tricuspid Valve: There is mild regurgitation.    Pulmonary Artery: The estimated pulmonary artery systolic pressure is 21 mmHg.    IVC/SVC: Normal venous pressure at 3 mmHg.    RHC 8/27/24    The estimated blood loss was none.    The filling pressures on the right and left were mildly elevated.    RA 10  PA 31/15 (20)  PCWP 15    CO 5.7 l/min  CI 2.8 l/min/m2    TD cardiac output 5.2 l/min cardiac index 2.6 l/min/m2.    Condition: no inotropes or pressors.    Carotid US 1/20/23  There is 0-19% right Internal Carotid Stenosis.  There is 0-19% left Internal Carotid Stenosis.    Cath 4/18/17  B. Summary/Post-Operative Diagnosis    Normal coronary arteries.    Systolic dysfunction.    Mildly elevated left Filling Pressures.    Normal Pulmonary Hypertension.  D. Hemodynamic Results  Ejection Fraction: 20%  Global LV Function: severely depressed  PW:  (4)  PA: 24  CO_THERM: 4.4  RV: 25  RA:  (5)  LVEDP: 17   E. Angiographic Results       Patient has a right dominant coronary artery.      - Left Main Coronary Artery:             The LM is normal. There is DANNY 3 flow.     - Left Anterior Descending Artery:              The LAD is normal. There is DANNY 3 flow.     - Left Circumflex Artery:             The LCX is normal. There is DANNY 3 flow.     - Right Coronary Artery:             The RCA is normal. There is DANNY 3 flow.     - Left Renal Artery:             The ostial left renal is normal.     - Right Renal Artery:             The ostial right renal is normal.     - Common Femoral Artery:             The right CFA is normal.      ASSESSMENT:   # NICM, appears euvolemic bu conplaining of ELENA.  Following with Dr. Prajapati.  RHC 8/2024 as above with ?conversion d/o thereafter (CVA type sxs).  # MDT ICD, functioning normally  # hx NSVT, last in 8/12/21  # HTN, controlled  # HLP on atorva 40mg, LDL not at goal  # remote CVA (PICA territory on CT head 11/25/22).  Carotid US 1/20/23 neg.    PLAN:   Cont med rx  Cont ASA 81mg qd  Cont GDMT  Resume eplerenone 25mg qd (prev intol of aldact)  Check BMP/NT BNP 1 week  Inc atorva 80mg qhs  RTC 1 month (June 2025), ?CardioMEMS (if pt willing to have repeat RHC)  Follow up with Dr. Prajapati/[facility] HTS July 2025  MDT ICD check 3 months (Aug 2025)  Check lipids/LFT 6 months (Nov 2025)    The above documents medical care services that are part of ongoing care related to this patient's serious/complex condition (Code ) (NICM, HTN, HLP).      Kashif Peguero MD, FACC

## 2025-05-04 ENCOUNTER — HOSPITAL ENCOUNTER (INPATIENT)
Facility: HOSPITAL | Age: 40
LOS: 4 days | Discharge: HOME | DRG: 280 | End: 2025-05-08
Attending: EMERGENCY MEDICINE | Admitting: EMERGENCY MEDICINE
Payer: MEDICAID

## 2025-05-04 DIAGNOSIS — R11.2 NAUSEA AND VOMITING, UNSPECIFIED VOMITING TYPE: Primary | ICD-10-CM

## 2025-05-04 DIAGNOSIS — I21.4 NSTEMI (NON-ST ELEVATION MYOCARDIAL INFARCTION): ICD-10-CM

## 2025-05-04 DIAGNOSIS — I27.20 PULMONARY HYPERTENSION: ICD-10-CM

## 2025-05-04 DIAGNOSIS — R07.89 CHEST PAIN, NON-CARDIAC: ICD-10-CM

## 2025-05-04 DIAGNOSIS — I21.4 NSTEMI (NON-ST ELEVATED MYOCARDIAL INFARCTION): ICD-10-CM

## 2025-05-04 DIAGNOSIS — I50.42 CHRONIC COMBINED SYSTOLIC AND DIASTOLIC CONGESTIVE HEART FAILURE: ICD-10-CM

## 2025-05-04 DIAGNOSIS — I21.4 NON-ST ELEVATION MYOCARDIAL INFARCTION (NSTEMI): ICD-10-CM

## 2025-05-04 DIAGNOSIS — R07.9 CHEST PAIN: ICD-10-CM

## 2025-05-04 LAB
ABORH RETYPE: NORMAL
ABSOLUTE EOSINOPHIL (OHS): 0.05 K/UL
ABSOLUTE MONOCYTE (OHS): 0.62 K/UL (ref 0.3–1)
ABSOLUTE NEUTROPHIL COUNT (OHS): 7.42 K/UL (ref 1.8–7.7)
ALBUMIN SERPL BCP-MCNC: 3.8 G/DL (ref 3.5–5.2)
ALP SERPL-CCNC: 59 UNIT/L (ref 40–150)
ALT SERPL W/O P-5'-P-CCNC: 31 UNIT/L (ref 10–44)
ANION GAP (OHS): 10 MMOL/L (ref 8–16)
APTT PPP: 25.9 SECONDS (ref 21–32)
AST SERPL-CCNC: 48 UNIT/L (ref 11–45)
B-HCG UR QL: NEGATIVE
BASOPHILS # BLD AUTO: 0.04 K/UL
BASOPHILS NFR BLD AUTO: 0.4 %
BILIRUB SERPL-MCNC: 1 MG/DL (ref 0.1–1)
BNP SERPL-MCNC: 2322 PG/ML (ref 0–99)
BUN SERPL-MCNC: 9 MG/DL (ref 6–20)
CALCIUM SERPL-MCNC: 8.7 MG/DL (ref 8.7–10.5)
CHLORIDE SERPL-SCNC: 108 MMOL/L (ref 95–110)
CHOLEST SERPL-MCNC: 119 MG/DL (ref 120–199)
CHOLEST/HDLC SERPL: 3.1 {RATIO} (ref 2–5)
CO2 SERPL-SCNC: 21 MMOL/L (ref 23–29)
CREAT SERPL-MCNC: 0.9 MG/DL (ref 0.5–1.4)
CTP QC/QA: YES
ERYTHROCYTE [DISTWIDTH] IN BLOOD BY AUTOMATED COUNT: 15.4 % (ref 11.5–14.5)
GFR SERPLBLD CREATININE-BSD FMLA CKD-EPI: >60 ML/MIN/1.73/M2
GLUCOSE SERPL-MCNC: 86 MG/DL (ref 70–110)
HCT VFR BLD AUTO: 35.3 % (ref 37–48.5)
HDLC SERPL-MCNC: 38 MG/DL (ref 40–75)
HDLC SERPL: 31.9 % (ref 20–50)
HGB BLD-MCNC: 11.3 GM/DL (ref 12–16)
IMM GRANULOCYTES # BLD AUTO: 0.03 K/UL (ref 0–0.04)
IMM GRANULOCYTES NFR BLD AUTO: 0.3 % (ref 0–0.5)
INDIRECT COOMBS: NORMAL
INR PPP: 1.1 (ref 0.8–1.2)
LDLC SERPL CALC-MCNC: 74.8 MG/DL (ref 63–159)
LIPASE SERPL-CCNC: 12 U/L (ref 4–60)
LYMPHOCYTES # BLD AUTO: 1.68 K/UL (ref 1–4.8)
MCH RBC QN AUTO: 28 PG (ref 27–31)
MCHC RBC AUTO-ENTMCNC: 32 G/DL (ref 32–36)
MCV RBC AUTO: 87 FL (ref 82–98)
NONHDLC SERPL-MCNC: 81 MG/DL
NUCLEATED RBC (/100WBC) (OHS): 0 /100 WBC
PLATELET # BLD AUTO: 339 K/UL (ref 150–450)
PMV BLD AUTO: 10.9 FL (ref 9.2–12.9)
POTASSIUM SERPL-SCNC: 3.6 MMOL/L (ref 3.5–5.1)
PROT SERPL-MCNC: 7.9 GM/DL (ref 6–8.4)
PROTHROMBIN TIME: 11.8 SECONDS (ref 9–12.5)
RBC # BLD AUTO: 4.04 M/UL (ref 4–5.4)
RELATIVE EOSINOPHIL (OHS): 0.5 %
RELATIVE LYMPHOCYTE (OHS): 17.1 % (ref 18–48)
RELATIVE MONOCYTE (OHS): 6.3 % (ref 4–15)
RELATIVE NEUTROPHIL (OHS): 75.4 % (ref 38–73)
RH BLD: NORMAL
SODIUM SERPL-SCNC: 139 MMOL/L (ref 136–145)
SPECIMEN OUTDATE: NORMAL
TRIGL SERPL-MCNC: 31 MG/DL (ref 30–150)
TROPONIN I SERPL DL<=0.01 NG/ML-MCNC: 2.89 NG/ML
TROPONIN I SERPL DL<=0.01 NG/ML-MCNC: 3.02 NG/ML
TROPONIN I SERPL DL<=0.01 NG/ML-MCNC: 3.08 NG/ML
TROPONIN I SERPL DL<=0.01 NG/ML-MCNC: 4.4 NG/ML
WBC # BLD AUTO: 9.84 K/UL (ref 3.9–12.7)

## 2025-05-04 PROCEDURE — 25000242 PHARM REV CODE 250 ALT 637 W/ HCPCS

## 2025-05-04 PROCEDURE — 27000221 HC OXYGEN, UP TO 24 HOURS

## 2025-05-04 PROCEDURE — 85730 THROMBOPLASTIN TIME PARTIAL: CPT | Performed by: EMERGENCY MEDICINE

## 2025-05-04 PROCEDURE — 83036 HEMOGLOBIN GLYCOSYLATED A1C: CPT

## 2025-05-04 PROCEDURE — 12000002 HC ACUTE/MED SURGE SEMI-PRIVATE ROOM

## 2025-05-04 PROCEDURE — 80061 LIPID PANEL: CPT

## 2025-05-04 PROCEDURE — 63600175 PHARM REV CODE 636 W HCPCS: Performed by: STUDENT IN AN ORGANIZED HEALTH CARE EDUCATION/TRAINING PROGRAM

## 2025-05-04 PROCEDURE — 84484 ASSAY OF TROPONIN QUANT: CPT | Performed by: EMERGENCY MEDICINE

## 2025-05-04 PROCEDURE — 81025 URINE PREGNANCY TEST: CPT | Performed by: EMERGENCY MEDICINE

## 2025-05-04 PROCEDURE — 99285 EMERGENCY DEPT VISIT HI MDM: CPT | Mod: 25

## 2025-05-04 PROCEDURE — 93010 ELECTROCARDIOGRAM REPORT: CPT | Mod: ,,, | Performed by: INTERNAL MEDICINE

## 2025-05-04 PROCEDURE — 25000003 PHARM REV CODE 250: Performed by: EMERGENCY MEDICINE

## 2025-05-04 PROCEDURE — 80053 COMPREHEN METABOLIC PANEL: CPT | Performed by: EMERGENCY MEDICINE

## 2025-05-04 PROCEDURE — 96374 THER/PROPH/DIAG INJ IV PUSH: CPT

## 2025-05-04 PROCEDURE — 94799 UNLISTED PULMONARY SVC/PX: CPT

## 2025-05-04 PROCEDURE — 85610 PROTHROMBIN TIME: CPT | Performed by: EMERGENCY MEDICINE

## 2025-05-04 PROCEDURE — 86901 BLOOD TYPING SEROLOGIC RH(D): CPT | Performed by: HOSPITALIST

## 2025-05-04 PROCEDURE — 63600175 PHARM REV CODE 636 W HCPCS: Performed by: EMERGENCY MEDICINE

## 2025-05-04 PROCEDURE — 85025 COMPLETE CBC W/AUTO DIFF WBC: CPT | Performed by: EMERGENCY MEDICINE

## 2025-05-04 PROCEDURE — 93005 ELECTROCARDIOGRAM TRACING: CPT

## 2025-05-04 PROCEDURE — 94761 N-INVAS EAR/PLS OXIMETRY MLT: CPT

## 2025-05-04 PROCEDURE — 84484 ASSAY OF TROPONIN QUANT: CPT

## 2025-05-04 PROCEDURE — 96375 TX/PRO/DX INJ NEW DRUG ADDON: CPT

## 2025-05-04 PROCEDURE — 99900035 HC TECH TIME PER 15 MIN (STAT)

## 2025-05-04 PROCEDURE — 83690 ASSAY OF LIPASE: CPT | Performed by: EMERGENCY MEDICINE

## 2025-05-04 PROCEDURE — 25000003 PHARM REV CODE 250

## 2025-05-04 PROCEDURE — 83880 ASSAY OF NATRIURETIC PEPTIDE: CPT | Performed by: EMERGENCY MEDICINE

## 2025-05-04 PROCEDURE — 93010 ELECTROCARDIOGRAM REPORT: CPT | Mod: 76,,, | Performed by: INTERNAL MEDICINE

## 2025-05-04 PROCEDURE — 25000003 PHARM REV CODE 250: Performed by: STUDENT IN AN ORGANIZED HEALTH CARE EDUCATION/TRAINING PROGRAM

## 2025-05-04 RX ORDER — ALUMINUM HYDROXIDE, MAGNESIUM HYDROXIDE, AND SIMETHICONE 1200; 120; 1200 MG/30ML; MG/30ML; MG/30ML
30 SUSPENSION ORAL ONCE
Status: COMPLETED | OUTPATIENT
Start: 2025-05-04 | End: 2025-05-04

## 2025-05-04 RX ORDER — EPLERENONE 25 MG/1
25 TABLET ORAL DAILY
Status: DISCONTINUED | OUTPATIENT
Start: 2025-05-05 | End: 2025-05-04

## 2025-05-04 RX ORDER — ALBUTEROL SULFATE 2.5 MG/.5ML
2.5 SOLUTION RESPIRATORY (INHALATION) EVERY 6 HOURS PRN
Status: DISCONTINUED | OUTPATIENT
Start: 2025-05-04 | End: 2025-05-05

## 2025-05-04 RX ORDER — BENZONATATE 100 MG/1
100 CAPSULE ORAL 3 TIMES DAILY PRN
Status: DISCONTINUED | OUTPATIENT
Start: 2025-05-04 | End: 2025-05-08 | Stop reason: HOSPADM

## 2025-05-04 RX ORDER — LOPERAMIDE HYDROCHLORIDE 2 MG/1
4 CAPSULE ORAL ONCE AS NEEDED
Status: DISCONTINUED | OUTPATIENT
Start: 2025-05-04 | End: 2025-05-08 | Stop reason: HOSPADM

## 2025-05-04 RX ORDER — GUAIFENESIN 100 MG/5ML
200 LIQUID ORAL EVERY 4 HOURS PRN
Status: DISCONTINUED | OUTPATIENT
Start: 2025-05-04 | End: 2025-05-08 | Stop reason: HOSPADM

## 2025-05-04 RX ORDER — NITROGLYCERIN 20 MG/100ML
0-400 INJECTION INTRAVENOUS CONTINUOUS
Status: DISCONTINUED | OUTPATIENT
Start: 2025-05-04 | End: 2025-05-05

## 2025-05-04 RX ORDER — ASPIRIN 325 MG
325 TABLET ORAL
Status: COMPLETED | OUTPATIENT
Start: 2025-05-04 | End: 2025-05-04

## 2025-05-04 RX ORDER — METOPROLOL SUCCINATE 50 MG/1
200 TABLET, EXTENDED RELEASE ORAL DAILY
Status: DISCONTINUED | OUTPATIENT
Start: 2025-05-05 | End: 2025-05-04

## 2025-05-04 RX ORDER — FAMOTIDINE 10 MG/ML
20 INJECTION, SOLUTION INTRAVENOUS
Status: COMPLETED | OUTPATIENT
Start: 2025-05-04 | End: 2025-05-04

## 2025-05-04 RX ORDER — HEPARIN SODIUM,PORCINE/D5W 25000/250
0-40 INTRAVENOUS SOLUTION INTRAVENOUS CONTINUOUS
Status: DISCONTINUED | OUTPATIENT
Start: 2025-05-04 | End: 2025-05-05

## 2025-05-04 RX ORDER — FLUOXETINE 20 MG/1
20 CAPSULE ORAL DAILY
Status: DISCONTINUED | OUTPATIENT
Start: 2025-05-05 | End: 2025-05-04

## 2025-05-04 RX ORDER — FUROSEMIDE 10 MG/ML
40 INJECTION INTRAMUSCULAR; INTRAVENOUS ONCE
Status: COMPLETED | OUTPATIENT
Start: 2025-05-04 | End: 2025-05-04

## 2025-05-04 RX ORDER — NAPROXEN SODIUM 220 MG/1
81 TABLET, FILM COATED ORAL DAILY
Status: DISCONTINUED | OUTPATIENT
Start: 2025-05-05 | End: 2025-05-04

## 2025-05-04 RX ORDER — NITROGLYCERIN 0.4 MG/1
0.4 TABLET SUBLINGUAL EVERY 5 MIN PRN
Status: DISCONTINUED | OUTPATIENT
Start: 2025-05-04 | End: 2025-05-04

## 2025-05-04 RX ORDER — DOCUSATE SODIUM 100 MG/1
100 CAPSULE, LIQUID FILLED ORAL 2 TIMES DAILY
Status: DISCONTINUED | OUTPATIENT
Start: 2025-05-04 | End: 2025-05-04

## 2025-05-04 RX ORDER — METOPROLOL TARTRATE 50 MG/1
50 TABLET ORAL 2 TIMES DAILY
Status: DISCONTINUED | OUTPATIENT
Start: 2025-05-04 | End: 2025-05-05

## 2025-05-04 RX ORDER — CLOPIDOGREL BISULFATE 75 MG/1
75 TABLET ORAL DAILY
Status: DISCONTINUED | OUTPATIENT
Start: 2025-05-05 | End: 2025-05-08 | Stop reason: HOSPADM

## 2025-05-04 RX ORDER — POLYETHYLENE GLYCOL 3350 17 G/17G
17 POWDER, FOR SOLUTION ORAL ONCE AS NEEDED
Status: DISCONTINUED | OUTPATIENT
Start: 2025-05-04 | End: 2025-05-08 | Stop reason: HOSPADM

## 2025-05-04 RX ORDER — ONDANSETRON HYDROCHLORIDE 2 MG/ML
4 INJECTION, SOLUTION INTRAVENOUS EVERY 8 HOURS PRN
Status: DISCONTINUED | OUTPATIENT
Start: 2025-05-04 | End: 2025-05-08 | Stop reason: HOSPADM

## 2025-05-04 RX ORDER — NITROGLYCERIN 0.4 MG/1
0.4 TABLET SUBLINGUAL EVERY 5 MIN PRN
Status: DISCONTINUED | OUTPATIENT
Start: 2025-05-04 | End: 2025-05-08 | Stop reason: HOSPADM

## 2025-05-04 RX ORDER — HYDRALAZINE HYDROCHLORIDE 20 MG/ML
10 INJECTION INTRAMUSCULAR; INTRAVENOUS EVERY 6 HOURS PRN
Status: DISCONTINUED | OUTPATIENT
Start: 2025-05-04 | End: 2025-05-08 | Stop reason: HOSPADM

## 2025-05-04 RX ORDER — FLUTICASONE PROPIONATE 50 MCG
1 SPRAY, SUSPENSION (ML) NASAL 2 TIMES DAILY PRN
Status: DISCONTINUED | OUTPATIENT
Start: 2025-05-04 | End: 2025-05-08 | Stop reason: HOSPADM

## 2025-05-04 RX ORDER — LIDOCAINE HYDROCHLORIDE 20 MG/ML
15 SOLUTION OROPHARYNGEAL ONCE
Status: COMPLETED | OUTPATIENT
Start: 2025-05-04 | End: 2025-05-04

## 2025-05-04 RX ORDER — ACETAMINOPHEN 325 MG/1
650 TABLET ORAL ONCE AS NEEDED
Status: DISCONTINUED | OUTPATIENT
Start: 2025-05-04 | End: 2025-05-08 | Stop reason: HOSPADM

## 2025-05-04 RX ORDER — GABAPENTIN 300 MG/1
600 CAPSULE ORAL 3 TIMES DAILY
Status: DISCONTINUED | OUTPATIENT
Start: 2025-05-04 | End: 2025-05-04

## 2025-05-04 RX ORDER — TALC
6 POWDER (GRAM) TOPICAL NIGHTLY PRN
Status: DISCONTINUED | OUTPATIENT
Start: 2025-05-04 | End: 2025-05-08 | Stop reason: HOSPADM

## 2025-05-04 RX ORDER — ATORVASTATIN CALCIUM 40 MG/1
80 TABLET, FILM COATED ORAL NIGHTLY
Status: DISCONTINUED | OUTPATIENT
Start: 2025-05-04 | End: 2025-05-08 | Stop reason: HOSPADM

## 2025-05-04 RX ORDER — CLOPIDOGREL BISULFATE 300 MG/1
600 TABLET, FILM COATED ORAL ONCE
Status: COMPLETED | OUTPATIENT
Start: 2025-05-04 | End: 2025-05-04

## 2025-05-04 RX ORDER — ONDANSETRON HYDROCHLORIDE 2 MG/ML
4 INJECTION, SOLUTION INTRAVENOUS
Status: COMPLETED | OUTPATIENT
Start: 2025-05-04 | End: 2025-05-04

## 2025-05-04 RX ORDER — MAGNESIUM SULFATE HEPTAHYDRATE 40 MG/ML
2 INJECTION, SOLUTION INTRAVENOUS ONCE
Status: COMPLETED | OUTPATIENT
Start: 2025-05-05 | End: 2025-05-05

## 2025-05-04 RX ORDER — ALBUTEROL SULFATE 90 UG/1
1 INHALANT RESPIRATORY (INHALATION) EVERY 6 HOURS PRN
Status: DISCONTINUED | OUTPATIENT
Start: 2025-05-04 | End: 2025-05-04

## 2025-05-04 RX ORDER — POTASSIUM CHLORIDE 7.45 MG/ML
10 INJECTION INTRAVENOUS
Status: DISPENSED | OUTPATIENT
Start: 2025-05-04 | End: 2025-05-05

## 2025-05-04 RX ORDER — ASPIRIN 81 MG/1
81 TABLET ORAL DAILY
Status: DISCONTINUED | OUTPATIENT
Start: 2025-05-05 | End: 2025-05-08 | Stop reason: HOSPADM

## 2025-05-04 RX ORDER — MORPHINE SULFATE 4 MG/ML
4 INJECTION, SOLUTION INTRAMUSCULAR; INTRAVENOUS EVERY 6 HOURS PRN
Status: DISCONTINUED | OUTPATIENT
Start: 2025-05-04 | End: 2025-05-05

## 2025-05-04 RX ORDER — AMOXICILLIN 250 MG
1 CAPSULE ORAL DAILY PRN
Status: DISCONTINUED | OUTPATIENT
Start: 2025-05-04 | End: 2025-05-08 | Stop reason: HOSPADM

## 2025-05-04 RX ADMIN — METOPROLOL TARTRATE 50 MG: 50 TABLET, FILM COATED ORAL at 11:05

## 2025-05-04 RX ADMIN — SACUBITRIL AND VALSARTAN 2 TABLET: 24; 26 TABLET, FILM COATED ORAL at 08:05

## 2025-05-04 RX ADMIN — ONDANSETRON 4 MG: 2 INJECTION INTRAMUSCULAR; INTRAVENOUS at 10:05

## 2025-05-04 RX ADMIN — ASPIRIN 325 MG ORAL TABLET 325 MG: 325 PILL ORAL at 06:05

## 2025-05-04 RX ADMIN — HEPARIN SODIUM 12 UNITS/KG/HR: 10000 INJECTION, SOLUTION INTRAVENOUS at 07:05

## 2025-05-04 RX ADMIN — NITROGLYCERIN 0.4 MG: 0.4 TABLET, ORALLY DISINTEGRATING SUBLINGUAL at 07:05

## 2025-05-04 RX ADMIN — FAMOTIDINE 20 MG: 10 INJECTION, SOLUTION INTRAVENOUS at 05:05

## 2025-05-04 RX ADMIN — CLOPIDOGREL BISULFATE 600 MG: 300 TABLET, FILM COATED ORAL at 07:05

## 2025-05-04 RX ADMIN — MAGNESIUM SULFATE HEPTAHYDRATE 2 G: 40 INJECTION, SOLUTION INTRAVENOUS at 11:05

## 2025-05-04 RX ADMIN — ONDANSETRON 4 MG: 2 INJECTION INTRAMUSCULAR; INTRAVENOUS at 05:05

## 2025-05-04 RX ADMIN — FUROSEMIDE 40 MG: 10 INJECTION, SOLUTION INTRAVENOUS at 08:05

## 2025-05-04 RX ADMIN — LIDOCAINE HYDROCHLORIDE 15 ML: 20 SOLUTION ORAL at 04:05

## 2025-05-04 RX ADMIN — ALUMINUM HYDROXIDE, MAGNESIUM HYDROXIDE, AND SIMETHICONE 30 ML: 200; 200; 20 SUSPENSION ORAL at 04:05

## 2025-05-04 RX ADMIN — ATORVASTATIN CALCIUM 80 MG: 40 TABLET, FILM COATED ORAL at 07:05

## 2025-05-04 RX ADMIN — POTASSIUM CHLORIDE 10 MEQ: 7.46 INJECTION, SOLUTION INTRAVENOUS at 11:05

## 2025-05-04 RX ADMIN — MORPHINE SULFATE 4 MG: 4 INJECTION INTRAVENOUS at 10:05

## 2025-05-04 RX ADMIN — NITROGLYCERIN 10 MCG/MIN: 20 INJECTION INTRAVENOUS at 07:05

## 2025-05-04 NOTE — ED TRIAGE NOTES
39 y.o, F w/ a Pmhx of CHF, pacemaker, HTN, nonischemic cardiomyopathy, and CKD c/o of left anterior chest tightness that radiates to her left arm and SOB since 1530. Pt also reporting n/v/d, gas, and belching over the past week. Pt also hypertensive upon assessment. Pt AAOx4, ambulatory, pulse oximetry reading 100% on RA.

## 2025-05-04 NOTE — ED PROVIDER NOTES
"Encounter Date: 2025    SCRIBE #1 NOTE: I, Ana Claire, am scribing for, and in the presence of,  Tamara Post MD. I have scribed the following portions of the note - Other sections scribed: HPI, ROS, PE.       History     Chief Complaint   Patient presents with    Chest Pain     Vomiting since middle of the night with chest pain that start 1530 today     Nasra Marks is a 39 y.o. female, with a PMHx of HTN, CHF, and ischemic right PCA stroke, who presents to the ED with 9/10 chest pain x1 hour. Patient reports chest pain feels like a "tightness" across her chest. Reports emesis x1 day. Reports associated nausea, fatigue, hot flashes and decreased appetite x a couple of days. Denies taking medication to alleviate nausea. Reports cough with clear phlegm that she believes is due to her CHF. Denies sick contact. Reports upcoming GI appt 2025. No other exacerbating or alleviating factors. Denies fever, rhinorrhea, constipation, dysuria, trouble urinating, leg swelling or other associated symptoms.      The history is provided by the patient. No  was used.     Review of patient's allergies indicates:   Allergen Reactions    Aldactone [spironolactone] Swelling     Lips swelled    Hydralazine analogues Other (See Comments)     headaches    Isosorbide Other (See Comments)     headaches     Past Medical History:   Diagnosis Date    CHF (congestive heart failure)     Chronic combined systolic and diastolic congestive heart failure 2019    Essential hypertension 2012    Hypertension     dx at 16y.o.    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 disease, with heart failure 2021    ICD (implantable cardioverter-defibrillator), single, in situ 2020    Nonischemic cardiomyopathy 2019    Pacemaker 2017     Past Surgical History:   Procedure Laterality Date    CARDIAC DEFIBRILLATOR PLACEMENT  2017     SECTION "      x 1    INDUCED       RIGHT HEART CATHETERIZATION Right 2022    Procedure: INSERTION, CATHETER, RIGHT HEART;  Surgeon: Timothy Prajapati Jr., MD;  Location: [facility] CATH LAB;  Service: Cardiology;  Laterality: Right;    RIGHT HEART CATHETERIZATION Right 2024    Procedure: INSERTION, CATHETER, RIGHT HEART;  Surgeon: Lior Rueda MD;  Location: [facility] CATH LAB;  Service: Cardiology;  Laterality: Right;    TUBAL LIGATION       Family History   Problem Relation Name Age of Onset    Hypertension Mother      Cancer Maternal Grandmother          breast x 2    Cancer Paternal Grandmother          breast    No Known Problems Sister      No Known Problems Brother      No Known Problems Son      No Known Problems Brother      Hypertension Other      Diabetes Other      Breast cancer Other      Cancer Paternal Aunt          breast    Cancer Paternal Uncle       Social History[1]  Review of Systems   Constitutional:  Positive for appetite change and fatigue. Negative for chills and fever.        (+) heat flashes   HENT:  Negative for congestion, rhinorrhea and sore throat.    Eyes:  Negative for visual disturbance.   Respiratory:  Positive for cough (progressive). Negative for shortness of breath.    Cardiovascular:  Positive for chest pain. Negative for leg swelling.   Gastrointestinal:  Positive for nausea and vomiting. Negative for abdominal distention, abdominal pain, blood in stool, constipation and diarrhea.        (+) increased flatulence   Genitourinary:  Negative for difficulty urinating and dysuria.   Skin:  Negative for rash.   Neurological:  Negative for headaches.   Psychiatric/Behavioral:  Negative for decreased concentration.        Physical Exam     Initial Vitals [25 1613]   BP Pulse Resp Temp SpO2   (!) 153/98 96 20 98 °F (36.7 °C) 100 %      MAP       --         Physical Exam    Nursing note and vitals reviewed.  Constitutional: She appears well-developed and well-nourished.  She is not diaphoretic. No distress.   HENT:   Head: Normocephalic and atraumatic. Mouth/Throat: Oropharynx is clear and moist.   Eyes: Pupils are equal, round, and reactive to light.   Neck: Neck supple.   Cardiovascular:  Regular rhythm.   Tachycardia present.         Pulmonary/Chest: Breath sounds normal. No respiratory distress. She has no wheezes. She has no rales.   Abdominal: Abdomen is soft. Bowel sounds are normal. She exhibits no distension. There is no abdominal tenderness.   Musculoskeletal:         General: No edema.      Cervical back: Neck supple.     Neurological: She is alert. GCS score is 15. GCS eye subscore is 4. GCS verbal subscore is 5. GCS motor subscore is 6.   Skin: Skin is warm and dry.   Psychiatric: She has a normal mood and affect.         ED Course   Critical Care    Date/Time: 5/4/2025 6:48 PM    Performed by: Tamara Post MD  Authorized by: Leatha Agnulo MD  Total critical care time (exclusive of procedural time) : 0 minutes  Comments: Please put in 40 minutes of critical care due to patient having a high risk of cardiac failure.   Separate from teaching and exclusive of procedure and ekg time  Includes:  Time at bedside  Time reviewing test results  Time discussing case with staff  Time documenting the medical record  Time spent with family members  Time spent with consults  Management            Labs Reviewed   COMPREHENSIVE METABOLIC PANEL - Abnormal       Result Value    Sodium 139      Potassium 3.6      Chloride 108      CO2 21 (*)     Glucose 86      BUN 9      Creatinine 0.9      Calcium 8.7      Protein Total 7.9      Albumin 3.8      Bilirubin Total 1.0      ALP 59      AST 48 (*)     ALT 31      Anion Gap 10      eGFR >60     TROPONIN I - Abnormal    Troponin-I 2.888 (*)    B-TYPE NATRIURETIC PEPTIDE - Abnormal    BNP 2,322 (*)    CBC WITH DIFFERENTIAL - Abnormal    WBC 9.84      RBC 4.04      HGB 11.3 (*)     HCT 35.3 (*)     MCV 87      MCH 28.0      MCHC 32.0       RDW 15.4 (*)     Platelet Count 339      MPV 10.9      Nucleated RBC 0      Neut % 75.4 (*)     Lymph % 17.1 (*)     Mono % 6.3      Eos % 0.5      Basophil % 0.4      Imm Grans % 0.3      Neut # 7.42      Lymph # 1.68      Mono # 0.62      Eos # 0.05      Baso # 0.04      Imm Grans # 0.03     LIPASE - Normal    Lipase Level 12     APTT - Normal    PTT 25.9     PROTIME-INR - Normal    PT 11.8      INR 1.1     CBC W/ AUTO DIFFERENTIAL    Narrative:     The following orders were created for panel order CBC auto differential.  Procedure                               Abnormality         Status                     ---------                               -----------         ------                     CBC with Differential[[id number]]       Abnormal            Final result                 Please view results for these tests on the individual orders.   TROPONIN I   APTT   LIPID PANEL   HEMOGLOBIN A1C   TROPONIN I   TROPONIN I   POCT URINE PREGNANCY    POC Preg Test, Ur Negative       Acceptable Yes     TYPE & SCREEN        ECG Results              EKG 12-lead (Preliminary result)  Result time 05/04/25 16:52:51      Wet Read by Tamara Post MD (05/04/25 16:52:51, [facility], Emergency Medicine)    Sinus tachycardia, rate 105 beats per minute, normal FL interval,  milliseconds, no STEMI.                                  Imaging Results              X-Ray Chest AP Portable (Final result)  Result time 05/04/25 17:06:31      Final result by Kasandra Erazo MD (05/04/25 17:06:31)                   Impression:      No acute cardiopulmonary process identified.  No significant change.      Electronically signed by: Kasandra Erazo MD  Date:    05/04/2025  Time:    17:06               Narrative:    EXAMINATION:  XR CHEST AP PORTABLE    CLINICAL HISTORY:  chest pain;    TECHNIQUE:  Single frontal view of the chest was performed.    COMPARISON:  04/18/2025.    FINDINGS:  Cardiac  silhouette is stable in size.  Lungs are symmetrically expanded.  No evidence of new focal consolidative process, pneumothorax, or large pleural effusion.  No acute osseous abnormality identified.                                       Medications   heparin 25,000 units in dextrose 5% (100 units/ml) IV bolus from bag LOW INTENSITY nomogram - [facility] (has no administration in time range)   heparin 25,000 units in dextrose 5% 250 mL (100 units/mL) infusion LOW INTENSITY nomogram - [facility] (has no administration in time range)   heparin 25,000 units in dextrose 5% (100 units/ml) IV bolus from bag LOW INTENSITY nomogram - [facility] (has no administration in time range)   heparin 25,000 units in dextrose 5% (100 units/ml) IV bolus from bag LOW INTENSITY nomogram - [facility] (has no administration in time range)   nitroGLYCERIN in 5 % dextrose 50 mg/250 mL (200 mcg/mL) infusion (has no administration in time range)   acetaminophen tablet 650 mg (has no administration in time range)   nitroGLYCERIN SL tablet 0.4 mg (has no administration in time range)   clopidogreL tablet 600 mg (has no administration in time range)   clopidogreL tablet 75 mg (has no administration in time range)   hydrALAZINE injection 10 mg (has no administration in time range)   guaiFENesin 100 mg/5 ml syrup 200 mg (has no administration in time range)   benzonatate capsule 100 mg (has no administration in time range)   melatonin tablet 6 mg (has no administration in time range)   loperamide capsule 4 mg (has no administration in time range)   polyethylene glycol packet 17 g (has no administration in time range)   senna-docusate 8.6-50 mg per tablet 1 tablet (has no administration in time range)   fluticasone propionate 50 mcg/actuation nasal spray 50 mcg (has no administration in time range)   FLUoxetine capsule 20 mg (has no administration in time range)   gabapentin capsule 600 mg (has no administration in time range)   eplerenone tablet 25 mg (has no administration in  time range)   docusate sodium capsule 100 mg (has no administration in time range)   atorvastatin tablet 80 mg (has no administration in time range)   aspirin EC tablet 81 mg (has no administration in time range)   albuterol sulfate nebulizer solution 2.5 mg (has no administration in time range)   ondansetron injection 4 mg (4 mg Intravenous Given 5/4/25 1705)   famotidine (PF) injection 20 mg (20 mg Intravenous Given 5/4/25 1707)   aluminum-magnesium hydroxide-simethicone 200-200-20 mg/5 mL suspension 30 mL (30 mLs Oral Given 5/4/25 1659)     And   LIDOcaine viscous HCl 2% oral solution 15 mL (15 mLs Oral Given 5/4/25 1659)   aspirin tablet 325 mg (325 mg Oral Given 5/4/25 1822)     Medical Decision Making  39-year-old female with history of nonischemic cardiomyopathy with ICD in place, hypertension presents to the ED with chest pain, nausea, vomiting and feeling unwell.  Nausea and vomiting started yesterday, chest pain started this morning.  Patient describes a tightness across her chest.  Denies any lower extremity edema or abdominal distention, denies diarrhea, constipation or blood in stool.  Denies fever.  Reports cough which she states is chronic.  She has not taken any medications for vomiting at home.  No known sick contacts.  Also reports she feels like she has a knot when she tries to swallow something.  patient has follow-up scheduled with GI for April 7th.  On exam, patient is in no acute distress, abdomen is soft and nontender.  She does have mild tachycardia.  There is no peripheral edema or abdominal distention.  She appears euvolemic on exam. Differential includes but not limited to gastritis, GERD, gastroenteritis, pancreatitis, ACS felt less likely.  Workup initiated with labs including lipase, chest x-ray, will treat with Zofran, Pepcid and GI cocktail.    Amount and/or Complexity of Data Reviewed  Labs: ordered.     Details: BNP 2322, troponin 2.88, lipase normal.  CBC shows no leukocytosis,  "hemoglobin 11.3, hematocrit 35.3, platelet count 339.  CMP within acceptable limits.  UPT negative.  Radiology: ordered.     Details: Chest x-ray without acute finding.  ECG/medicine tests: ordered and independent interpretation performed.    Risk  OTC drugs.  Prescription drug management.  Decision regarding hospitalization.            Scribe Attestation:   Scribe #1: I performed the above scribed service and the documentation accurately describes the services I performed. I attest to the accuracy of the note.        ED Course as of 05/04/25 1848   Sun May 04, 2025   1808 Case reviewed with Dr. Gold- recommends starting heparin drip, trend troponins, and echo in the AM, start nitro drip if still having chest pain.  [LH]   1819 Patient reassessed, reviewed test results with her and her mother at bedside.  She is still having "a little" chest pain after treatment with Pepcid, Zofran and GI cocktail.  Will start nitro drip. [LH]   1824 Case reviewed with Dr. Wall for hospital admission.  [LH]      ED Course User Index  [LH] Tamara Post MD                       I, Tamara Post MD, personally performed the services described in this documentation. All medical record entries made by the scribe were at my direction and in my presence. I have reviewed the chart and agree that the record reflects my personal performance and is accurate and complete.      DISCLAIMER: This note was prepared with [employer] voice recognition transcription software. Garbled syntax, mangled pronouns, and other bizarre constructions may be attributed to that software system.     Clinical Impression:  Final diagnoses:  [R07.9] Chest pain  [R11.2] Nausea and vomiting, unspecified vomiting type (Primary)  [I21.4] NSTEMI (non-ST elevated myocardial infarction)          ED Disposition Condition    Admit                   Tamara Post MD  05/04/25 1849         [1]   Social History  Tobacco Use    Smoking status: Never    Smokeless " tobacco: Never   Substance Use Topics    Alcohol use: Not Currently     Comment: occasionally    Drug use: Not Currently     Types: Marijuana     Comment: in past very little marijuana        Tamara Post MD  05/04/25 1683

## 2025-05-04 NOTE — Clinical Note
The catheter was removed from the and was repositioned into the ostium   left main. An angiography was performed of the left coronary arteries. Multiple views were taken. The angiography was performed via power injection.

## 2025-05-05 PROBLEM — D64.9 NORMOCYTIC ANEMIA: Status: ACTIVE | Noted: 2025-05-05

## 2025-05-05 PROBLEM — R55 SYNCOPE: Status: RESOLVED | Noted: 2021-12-20 | Resolved: 2025-05-05

## 2025-05-05 PROBLEM — F44.9 CONVERSION DISORDER: Status: RESOLVED | Noted: 2024-08-28 | Resolved: 2025-05-05

## 2025-05-05 PROBLEM — I13.0 HYPERTENSIVE CARDIOVASCULAR-RENAL DISEASE, STAGE 1-4 OR UNSPECIFIED CHRONIC KIDNEY DISEASE, WITH HEART FAILURE: Status: RESOLVED | Noted: 2021-12-28 | Resolved: 2025-05-05

## 2025-05-05 PROBLEM — R79.89 TROPONIN LEVEL ELEVATED: Status: RESOLVED | Noted: 2021-07-24 | Resolved: 2025-05-05

## 2025-05-05 PROBLEM — R09.1 PLEURISY: Status: RESOLVED | Noted: 2021-03-04 | Resolved: 2025-05-05

## 2025-05-05 PROBLEM — E87.1 HYPONATREMIA: Status: RESOLVED | Noted: 2024-08-28 | Resolved: 2025-05-05

## 2025-05-05 PROBLEM — R20.0 LEG NUMBNESS: Status: RESOLVED | Noted: 2025-01-06 | Resolved: 2025-05-05

## 2025-05-05 PROBLEM — E87.6 HYPOKALEMIA: Status: RESOLVED | Noted: 2023-08-14 | Resolved: 2025-05-05

## 2025-05-05 PROBLEM — I95.9 HYPOTENSIVE EPISODE: Status: RESOLVED | Noted: 2025-04-13 | Resolved: 2025-05-05

## 2025-05-05 PROBLEM — I50.9 CONGESTIVE HEART FAILURE: Status: RESOLVED | Noted: 2021-03-04 | Resolved: 2025-05-05

## 2025-05-05 PROBLEM — R06.02 SHORTNESS OF BREATH: Status: RESOLVED | Noted: 2021-03-04 | Resolved: 2025-05-05

## 2025-05-05 PROBLEM — R29.818 ACUTE FOCAL NEUROLOGICAL DEFICIT: Status: RESOLVED | Noted: 2024-08-27 | Resolved: 2025-05-05

## 2025-05-05 PROBLEM — U07.1 PNEUMONIA DUE TO COVID-19 VIRUS: Status: RESOLVED | Noted: 2021-07-24 | Resolved: 2025-05-05

## 2025-05-05 PROBLEM — J12.82 PNEUMONIA DUE TO COVID-19 VIRUS: Status: RESOLVED | Noted: 2021-07-24 | Resolved: 2025-05-05

## 2025-05-05 PROBLEM — D50.9 IRON DEFICIENCY ANEMIA: Status: ACTIVE | Noted: 2025-05-05

## 2025-05-05 PROBLEM — R52 PAIN: Status: RESOLVED | Noted: 2024-08-28 | Resolved: 2025-05-05

## 2025-05-05 LAB
ABSOLUTE EOSINOPHIL (OHS): 0.01 K/UL
ABSOLUTE MONOCYTE (OHS): 0.62 K/UL (ref 0.3–1)
ABSOLUTE NEUTROPHIL COUNT (OHS): 9.75 K/UL (ref 1.8–7.7)
ALBUMIN SERPL BCP-MCNC: 2.9 G/DL (ref 3.5–5.2)
ALP SERPL-CCNC: 46 UNIT/L (ref 40–150)
ALT SERPL W/O P-5'-P-CCNC: 23 UNIT/L (ref 10–44)
ANION GAP (OHS): 10 MMOL/L (ref 8–16)
ANION GAP (OHS): 12 MMOL/L (ref 8–16)
ANION GAP (OHS): 6 MMOL/L (ref 8–16)
AORTIC ROOT ANNULUS: 2.9 CM
AORTIC VALVE CUSP SEPERATION: 2.02 CM
APTT PPP: 47.3 SECONDS (ref 21–32)
APTT PPP: 50.3 SECONDS (ref 21–32)
APTT PPP: 55.7 SECONDS (ref 21–32)
APTT PPP: 72.3 SECONDS (ref 21–32)
ASCENDING AORTA: 2.9 CM
AST SERPL-CCNC: 44 UNIT/L (ref 11–45)
AV INDEX (PROSTH): 0.57
AV MEAN GRADIENT: 4 MMHG
AV PEAK GRADIENT: 8 MMHG
AV VALVE AREA BY VELOCITY RATIO: 2.2 CM²
AV VALVE AREA: 2 CM²
AV VELOCITY RATIO: 0.64
BASOPHILS # BLD AUTO: 0.04 K/UL
BASOPHILS NFR BLD AUTO: 0.4 %
BILIRUB SERPL-MCNC: 0.7 MG/DL (ref 0.1–1)
BSA FOR ECHO PROCEDURE: 1.98 M2
BUN SERPL-MCNC: 10 MG/DL (ref 6–20)
BUN SERPL-MCNC: 8 MG/DL (ref 6–20)
BUN SERPL-MCNC: 9 MG/DL (ref 6–20)
CALCIUM SERPL-MCNC: 7.4 MG/DL (ref 8.7–10.5)
CALCIUM SERPL-MCNC: 7.5 MG/DL (ref 8.7–10.5)
CALCIUM SERPL-MCNC: 8.7 MG/DL (ref 8.7–10.5)
CHLORIDE SERPL-SCNC: 103 MMOL/L (ref 95–110)
CHLORIDE SERPL-SCNC: 110 MMOL/L (ref 95–110)
CHLORIDE SERPL-SCNC: 112 MMOL/L (ref 95–110)
CO2 SERPL-SCNC: 20 MMOL/L (ref 23–29)
CO2 SERPL-SCNC: 20 MMOL/L (ref 23–29)
CO2 SERPL-SCNC: 22 MMOL/L (ref 23–29)
CREAT SERPL-MCNC: 0.7 MG/DL (ref 0.5–1.4)
CREAT SERPL-MCNC: 0.8 MG/DL (ref 0.5–1.4)
CREAT SERPL-MCNC: 0.9 MG/DL (ref 0.5–1.4)
CV ECHO LV RWT: 0.29 CM
DOP CALC AO PEAK VEL: 1.4 M/S
DOP CALC AO VTI: 23.5 CM
DOP CALC LVOT AREA: 3.5 CM2
DOP CALC LVOT DIAMETER: 2.1 CM
DOP CALC LVOT PEAK VEL: 0.9 M/S
DOP CALC LVOT STROKE VOLUME: 46.7 CM3
DOP CALCLVOT PEAK VEL VTI: 13.5 CM
E WAVE DECELERATION TIME: 137 MSEC
E/A RATIO: 2.68
EAG (OHS): 111 MG/DL (ref 68–131)
ECHO LV POSTERIOR WALL: 1 CM (ref 0.6–1.1)
ERYTHROCYTE [DISTWIDTH] IN BLOOD BY AUTOMATED COUNT: 15.3 % (ref 11.5–14.5)
FERRITIN SERPL-MCNC: 17.2 NG/ML (ref 20–300)
FOLATE SERPL-MCNC: 12.7 NG/ML (ref 4–24)
FRACTIONAL SHORTENING: 10.3 % (ref 28–44)
GFR SERPLBLD CREATININE-BSD FMLA CKD-EPI: >60 ML/MIN/1.73/M2
GLUCOSE SERPL-MCNC: 115 MG/DL (ref 70–110)
GLUCOSE SERPL-MCNC: 116 MG/DL (ref 70–110)
GLUCOSE SERPL-MCNC: 95 MG/DL (ref 70–110)
HBA1C MFR BLD: 5.5 % (ref 4–5.6)
HCT VFR BLD AUTO: 28.8 % (ref 37–48.5)
HGB BLD-MCNC: 9.2 GM/DL (ref 12–16)
HOLD SPECIMEN: NORMAL
IMM GRANULOCYTES # BLD AUTO: 0.03 K/UL (ref 0–0.04)
IMM GRANULOCYTES NFR BLD AUTO: 0.3 % (ref 0–0.5)
INFLUENZA A MOLECULAR (OHS): NEGATIVE
INFLUENZA B MOLECULAR (OHS): NEGATIVE
INTERVENTRICULAR SEPTUM: 1 CM (ref 0.6–1.1)
IRON SATN MFR SERPL: 7 % (ref 20–50)
IRON SERPL-MCNC: 26 UG/DL (ref 30–160)
IVC DIAMETER: 1.67 CM
IVRT: 131 MSEC
LA MAJOR: 6.8 CM
LA MINOR: 6.4 CM
LA WIDTH: 4.9 CM
LEFT ATRIUM SIZE: 4.4 CM
LEFT ATRIUM VOLUME INDEX: 62 ML/M2
LEFT ATRIUM VOLUME: 121 CM3
LEFT INTERNAL DIMENSION IN SYSTOLE: 6.1 CM (ref 2.1–4)
LEFT VENTRICLE DIASTOLIC VOLUME INDEX: 120.41 ML/M2
LEFT VENTRICLE DIASTOLIC VOLUME: 236 ML
LEFT VENTRICLE MASS INDEX: 156.1 G/M2
LEFT VENTRICLE SYSTOLIC VOLUME INDEX: 96.4 ML/M2
LEFT VENTRICLE SYSTOLIC VOLUME: 189 ML
LEFT VENTRICULAR INTERNAL DIMENSION IN DIASTOLE: 6.8 CM (ref 3.5–6)
LEFT VENTRICULAR MASS: 306 G
LVED V (TEICH): 236.14 ML
LVES V (TEICH): 188.81 ML
LVOT MG: 1.72 MMHG
LVOT MV: 0.61 CM/S
LYMPHOCYTES # BLD AUTO: 0.78 K/UL (ref 1–4.8)
MAGNESIUM SERPL-MCNC: 2 MG/DL (ref 1.6–2.6)
MAGNESIUM SERPL-MCNC: 2.1 MG/DL (ref 1.6–2.6)
MAGNESIUM SERPL-MCNC: 2.2 MG/DL (ref 1.6–2.6)
MCH RBC QN AUTO: 27.8 PG (ref 27–31)
MCHC RBC AUTO-ENTMCNC: 31.9 G/DL (ref 32–36)
MCV RBC AUTO: 87 FL (ref 82–98)
MV PEAK A VEL: 0.38 M/S
MV PEAK E VEL: 1.02 M/S
MV STENOSIS PRESSURE HALF TIME: 39.86 MS
MV VALVE AREA P 1/2 METHOD: 5.52 CM2
NUCLEATED RBC (/100WBC) (OHS): 0 /100 WBC
OHS CV RV/LV RATIO: 0.51 CM
PHOSPHATE SERPL-MCNC: 3.5 MG/DL (ref 2.7–4.5)
PHOSPHATE SERPL-MCNC: 3.8 MG/DL (ref 2.7–4.5)
PISA TR MAX VEL: 2.9 M/S
PLATELET # BLD AUTO: 296 K/UL (ref 150–450)
PMV BLD AUTO: 10.6 FL (ref 9.2–12.9)
POTASSIUM SERPL-SCNC: 3.6 MMOL/L (ref 3.5–5.1)
POTASSIUM SERPL-SCNC: 3.6 MMOL/L (ref 3.5–5.1)
POTASSIUM SERPL-SCNC: 3.9 MMOL/L (ref 3.5–5.1)
PROT SERPL-MCNC: 6.1 GM/DL (ref 6–8.4)
PV PEAK GRADIENT: 2 MMHG
PV PEAK VELOCITY: 0.77 M/S
RA MAJOR: 5.14 CM
RA PRESSURE ESTIMATED: 3 MMHG
RA WIDTH: 4.1 CM
RBC # BLD AUTO: 3.31 M/UL (ref 4–5.4)
RELATIVE EOSINOPHIL (OHS): 0.1 %
RELATIVE LYMPHOCYTE (OHS): 6.9 % (ref 18–48)
RELATIVE MONOCYTE (OHS): 5.5 % (ref 4–15)
RELATIVE NEUTROPHIL (OHS): 86.8 % (ref 38–73)
RIGHT VENTRICLE DIASTOLIC BASEL DIMENSION: 3.5 CM
RIGHT VENTRICULAR END-DIASTOLIC DIMENSION: 3.48 CM
RV TB RVSP: 6 MMHG
SARS-COV-2 RDRP RESP QL NAA+PROBE: NEGATIVE
SINUS: 3.04 CM
SODIUM SERPL-SCNC: 137 MMOL/L (ref 136–145)
SODIUM SERPL-SCNC: 138 MMOL/L (ref 136–145)
SODIUM SERPL-SCNC: 140 MMOL/L (ref 136–145)
STJ: 2.5 CM
TIBC SERPL-MCNC: 376 UG/DL (ref 250–450)
TR MAX PG: 35 MMHG
TRANSFERRIN SERPL-MCNC: 254 MG/DL (ref 200–375)
TRICUSPID ANNULAR PLANE SYSTOLIC EXCURSION: 2.8 CM
TROPONIN I SERPL DL<=0.01 NG/ML-MCNC: 4.5 NG/ML
TV REST PULMONARY ARTERY PRESSURE: 37 MMHG
VIT B12 SERPL-MCNC: 640 PG/ML (ref 210–950)
WBC # BLD AUTO: 11.23 K/UL (ref 3.9–12.7)
Z-SCORE OF LEFT VENTRICULAR DIMENSION IN END DIASTOLE: 1.92
Z-SCORE OF LEFT VENTRICULAR DIMENSION IN END SYSTOLE: 4.51

## 2025-05-05 PROCEDURE — 25000003 PHARM REV CODE 250: Performed by: INTERNAL MEDICINE

## 2025-05-05 PROCEDURE — 93010 ELECTROCARDIOGRAM REPORT: CPT | Mod: 76,,, | Performed by: INTERNAL MEDICINE

## 2025-05-05 PROCEDURE — 87502 INFLUENZA DNA AMP PROBE: CPT | Performed by: HOSPITALIST

## 2025-05-05 PROCEDURE — 63600175 PHARM REV CODE 636 W HCPCS: Performed by: EMERGENCY MEDICINE

## 2025-05-05 PROCEDURE — 63600175 PHARM REV CODE 636 W HCPCS: Performed by: STUDENT IN AN ORGANIZED HEALTH CARE EDUCATION/TRAINING PROGRAM

## 2025-05-05 PROCEDURE — 83540 ASSAY OF IRON: CPT | Performed by: HOSPITALIST

## 2025-05-05 PROCEDURE — 82040 ASSAY OF SERUM ALBUMIN: CPT

## 2025-05-05 PROCEDURE — 82746 ASSAY OF FOLIC ACID SERUM: CPT | Performed by: HOSPITALIST

## 2025-05-05 PROCEDURE — 99152 MOD SED SAME PHYS/QHP 5/>YRS: CPT | Mod: ,,, | Performed by: INTERNAL MEDICINE

## 2025-05-05 PROCEDURE — 93005 ELECTROCARDIOGRAM TRACING: CPT

## 2025-05-05 PROCEDURE — 83735 ASSAY OF MAGNESIUM: CPT

## 2025-05-05 PROCEDURE — 84484 ASSAY OF TROPONIN QUANT: CPT

## 2025-05-05 PROCEDURE — 83735 ASSAY OF MAGNESIUM: CPT | Performed by: STUDENT IN AN ORGANIZED HEALTH CARE EDUCATION/TRAINING PROGRAM

## 2025-05-05 PROCEDURE — 25000003 PHARM REV CODE 250

## 2025-05-05 PROCEDURE — 93010 ELECTROCARDIOGRAM REPORT: CPT | Mod: ,,, | Performed by: INTERNAL MEDICINE

## 2025-05-05 PROCEDURE — B2111ZZ FLUOROSCOPY OF MULTIPLE CORONARY ARTERIES USING LOW OSMOLAR CONTRAST: ICD-10-PCS | Performed by: INTERNAL MEDICINE

## 2025-05-05 PROCEDURE — 36415 COLL VENOUS BLD VENIPUNCTURE: CPT | Performed by: INTERNAL MEDICINE

## 2025-05-05 PROCEDURE — 93458 L HRT ARTERY/VENTRICLE ANGIO: CPT | Performed by: INTERNAL MEDICINE

## 2025-05-05 PROCEDURE — 63600175 PHARM REV CODE 636 W HCPCS: Performed by: INTERNAL MEDICINE

## 2025-05-05 PROCEDURE — 4A023N7 MEASUREMENT OF CARDIAC SAMPLING AND PRESSURE, LEFT HEART, PERCUTANEOUS APPROACH: ICD-10-PCS | Performed by: INTERNAL MEDICINE

## 2025-05-05 PROCEDURE — C1887 CATHETER, GUIDING: HCPCS | Performed by: INTERNAL MEDICINE

## 2025-05-05 PROCEDURE — 25000003 PHARM REV CODE 250: Performed by: STUDENT IN AN ORGANIZED HEALTH CARE EDUCATION/TRAINING PROGRAM

## 2025-05-05 PROCEDURE — 82728 ASSAY OF FERRITIN: CPT | Performed by: HOSPITALIST

## 2025-05-05 PROCEDURE — C1769 GUIDE WIRE: HCPCS | Performed by: INTERNAL MEDICINE

## 2025-05-05 PROCEDURE — 80053 COMPREHEN METABOLIC PANEL: CPT | Performed by: STUDENT IN AN ORGANIZED HEALTH CARE EDUCATION/TRAINING PROGRAM

## 2025-05-05 PROCEDURE — 25500020 PHARM REV CODE 255: Performed by: INTERNAL MEDICINE

## 2025-05-05 PROCEDURE — 82607 VITAMIN B-12: CPT | Performed by: HOSPITALIST

## 2025-05-05 PROCEDURE — 94761 N-INVAS EAR/PLS OXIMETRY MLT: CPT

## 2025-05-05 PROCEDURE — 93458 L HRT ARTERY/VENTRICLE ANGIO: CPT | Mod: 26,,, | Performed by: INTERNAL MEDICINE

## 2025-05-05 PROCEDURE — 25000003 PHARM REV CODE 250: Performed by: HOSPITALIST

## 2025-05-05 PROCEDURE — 25000003 PHARM REV CODE 250: Performed by: SURGERY

## 2025-05-05 PROCEDURE — 85025 COMPLETE CBC W/AUTO DIFF WBC: CPT | Performed by: EMERGENCY MEDICINE

## 2025-05-05 PROCEDURE — 84100 ASSAY OF PHOSPHORUS: CPT | Performed by: INTERNAL MEDICINE

## 2025-05-05 PROCEDURE — 99291 CRITICAL CARE FIRST HOUR: CPT | Mod: 25,,, | Performed by: INTERNAL MEDICINE

## 2025-05-05 PROCEDURE — 84100 ASSAY OF PHOSPHORUS: CPT

## 2025-05-05 PROCEDURE — 20000000 HC ICU ROOM

## 2025-05-05 PROCEDURE — 83735 ASSAY OF MAGNESIUM: CPT | Performed by: INTERNAL MEDICINE

## 2025-05-05 PROCEDURE — U0002 COVID-19 LAB TEST NON-CDC: HCPCS | Performed by: HOSPITALIST

## 2025-05-05 PROCEDURE — C1894 INTRO/SHEATH, NON-LASER: HCPCS | Performed by: INTERNAL MEDICINE

## 2025-05-05 PROCEDURE — 36415 COLL VENOUS BLD VENIPUNCTURE: CPT | Performed by: HOSPITALIST

## 2025-05-05 PROCEDURE — 85347 COAGULATION TIME ACTIVATED: CPT | Performed by: INTERNAL MEDICINE

## 2025-05-05 PROCEDURE — 85730 THROMBOPLASTIN TIME PARTIAL: CPT | Performed by: HOSPITALIST

## 2025-05-05 PROCEDURE — 25500020 PHARM REV CODE 255: Performed by: HOSPITALIST

## 2025-05-05 RX ORDER — MORPHINE SULFATE 4 MG/ML
2 INJECTION, SOLUTION INTRAMUSCULAR; INTRAVENOUS EVERY 10 MIN PRN
Refills: 0 | Status: DISCONTINUED | OUTPATIENT
Start: 2025-05-05 | End: 2025-05-08 | Stop reason: HOSPADM

## 2025-05-05 RX ORDER — SIMETHICONE 80 MG
1 TABLET,CHEWABLE ORAL EVERY 6 HOURS PRN
Status: DISCONTINUED | OUTPATIENT
Start: 2025-05-05 | End: 2025-05-08 | Stop reason: HOSPADM

## 2025-05-05 RX ORDER — FENTANYL CITRATE 50 UG/ML
INJECTION, SOLUTION INTRAMUSCULAR; INTRAVENOUS
Status: DISCONTINUED | OUTPATIENT
Start: 2025-05-05 | End: 2025-05-05 | Stop reason: HOSPADM

## 2025-05-05 RX ORDER — ONDANSETRON 8 MG/1
8 TABLET, ORALLY DISINTEGRATING ORAL EVERY 8 HOURS PRN
Status: DISCONTINUED | OUTPATIENT
Start: 2025-05-05 | End: 2025-05-08 | Stop reason: HOSPADM

## 2025-05-05 RX ORDER — METOPROLOL SUCCINATE 25 MG/1
25 TABLET, EXTENDED RELEASE ORAL DAILY
Status: DISCONTINUED | OUTPATIENT
Start: 2025-05-06 | End: 2025-05-08 | Stop reason: HOSPADM

## 2025-05-05 RX ORDER — MIDAZOLAM HYDROCHLORIDE 1 MG/ML
INJECTION INTRAMUSCULAR; INTRAVENOUS
Status: DISCONTINUED | OUTPATIENT
Start: 2025-05-05 | End: 2025-05-05 | Stop reason: HOSPADM

## 2025-05-05 RX ORDER — LIDOCAINE HYDROCHLORIDE 10 MG/ML
INJECTION, SOLUTION EPIDURAL; INFILTRATION; INTRACAUDAL; PERINEURAL
Status: DISCONTINUED | OUTPATIENT
Start: 2025-05-05 | End: 2025-05-05 | Stop reason: HOSPADM

## 2025-05-05 RX ORDER — ENOXAPARIN SODIUM 100 MG/ML
40 INJECTION SUBCUTANEOUS EVERY 24 HOURS
Status: DISCONTINUED | OUTPATIENT
Start: 2025-05-05 | End: 2025-05-08

## 2025-05-05 RX ORDER — ACETAMINOPHEN 325 MG/1
650 TABLET ORAL EVERY 4 HOURS PRN
Status: DISCONTINUED | OUTPATIENT
Start: 2025-05-05 | End: 2025-05-08 | Stop reason: HOSPADM

## 2025-05-05 RX ORDER — MUPIROCIN 20 MG/G
OINTMENT TOPICAL 2 TIMES DAILY
Status: DISCONTINUED | OUTPATIENT
Start: 2025-05-05 | End: 2025-05-08 | Stop reason: HOSPADM

## 2025-05-05 RX ORDER — HYDROCODONE BITARTRATE AND ACETAMINOPHEN 5; 325 MG/1; MG/1
1 TABLET ORAL EVERY 4 HOURS PRN
Refills: 0 | Status: COMPLETED | OUTPATIENT
Start: 2025-05-05 | End: 2025-05-07

## 2025-05-05 RX ORDER — VERAPAMIL HYDROCHLORIDE 2.5 MG/ML
INJECTION INTRAVENOUS
Status: DISCONTINUED | OUTPATIENT
Start: 2025-05-05 | End: 2025-05-05 | Stop reason: HOSPADM

## 2025-05-05 RX ORDER — HEPARIN SODIUM 1000 [USP'U]/ML
INJECTION, SOLUTION INTRAVENOUS; SUBCUTANEOUS
Status: DISCONTINUED | OUTPATIENT
Start: 2025-05-05 | End: 2025-05-05 | Stop reason: HOSPADM

## 2025-05-05 RX ORDER — ATROPINE SULFATE 0.1 MG/ML
0.5 INJECTION INTRAVENOUS
Status: DISCONTINUED | OUTPATIENT
Start: 2025-05-05 | End: 2025-05-08 | Stop reason: HOSPADM

## 2025-05-05 RX ORDER — FUROSEMIDE 10 MG/ML
40 INJECTION INTRAMUSCULAR; INTRAVENOUS EVERY 12 HOURS
Status: DISCONTINUED | OUTPATIENT
Start: 2025-05-05 | End: 2025-05-07

## 2025-05-05 RX ORDER — AMLODIPINE BESYLATE 2.5 MG/1
2.5 TABLET ORAL DAILY
Status: DISCONTINUED | OUTPATIENT
Start: 2025-05-05 | End: 2025-05-07

## 2025-05-05 RX ORDER — MORPHINE SULFATE 4 MG/ML
4 INJECTION, SOLUTION INTRAMUSCULAR; INTRAVENOUS EVERY 4 HOURS PRN
Status: DISCONTINUED | OUTPATIENT
Start: 2025-05-05 | End: 2025-05-05

## 2025-05-05 RX ORDER — POTASSIUM CHLORIDE 20 MEQ/1
40 TABLET, EXTENDED RELEASE ORAL ONCE
Status: COMPLETED | OUTPATIENT
Start: 2025-05-05 | End: 2025-05-05

## 2025-05-05 RX ADMIN — FUROSEMIDE 40 MG: 10 INJECTION, SOLUTION INTRAVENOUS at 02:05

## 2025-05-05 RX ADMIN — IOHEXOL 75 ML: 350 INJECTION, SOLUTION INTRAVENOUS at 01:05

## 2025-05-05 RX ADMIN — FUROSEMIDE 40 MG: 10 INJECTION, SOLUTION INTRAVENOUS at 09:05

## 2025-05-05 RX ADMIN — MORPHINE SULFATE 2 MG: 4 INJECTION INTRAVENOUS at 06:05

## 2025-05-05 RX ADMIN — POTASSIUM CHLORIDE 40 MEQ: 1500 TABLET, EXTENDED RELEASE ORAL at 02:05

## 2025-05-05 RX ADMIN — HYDROCODONE BITARTRATE AND ACETAMINOPHEN 1 TABLET: 5; 325 TABLET ORAL at 09:05

## 2025-05-05 RX ADMIN — POTASSIUM CHLORIDE 10 MEQ: 7.46 INJECTION, SOLUTION INTRAVENOUS at 12:05

## 2025-05-05 RX ADMIN — NITROGLYCERIN 180 MCG/MIN: 20 INJECTION INTRAVENOUS at 05:05

## 2025-05-05 RX ADMIN — ATORVASTATIN CALCIUM 80 MG: 40 TABLET, FILM COATED ORAL at 09:05

## 2025-05-05 RX ADMIN — SIMETHICONE 80 MG: 80 TABLET, CHEWABLE ORAL at 09:05

## 2025-05-05 RX ADMIN — ONDANSETRON 4 MG: 2 INJECTION INTRAMUSCULAR; INTRAVENOUS at 06:05

## 2025-05-05 RX ADMIN — AMLODIPINE BESYLATE 2.5 MG: 2.5 TABLET ORAL at 03:05

## 2025-05-05 RX ADMIN — MUPIROCIN: 20 OINTMENT TOPICAL at 09:05

## 2025-05-05 RX ADMIN — ASPIRIN 81 MG: 81 TABLET, COATED ORAL at 09:05

## 2025-05-05 RX ADMIN — NITROGLYCERIN 185 MCG/MIN: 20 INJECTION INTRAVENOUS at 10:05

## 2025-05-05 RX ADMIN — ENOXAPARIN SODIUM 40 MG: 40 INJECTION SUBCUTANEOUS at 04:05

## 2025-05-05 RX ADMIN — MORPHINE SULFATE 4 MG: 4 INJECTION INTRAVENOUS at 02:05

## 2025-05-05 RX ADMIN — CLOPIDOGREL BISULFATE 75 MG: 75 TABLET, FILM COATED ORAL at 09:05

## 2025-05-05 RX ADMIN — MORPHINE SULFATE 4 MG: 4 INJECTION INTRAVENOUS at 10:05

## 2025-05-05 NOTE — H&P
[facility] Emergency [facility] Medicine  History & Physical    Patient Name: Nasra Marks  MRN: [id number]  Patient Class: IP- Inpatient  Admission Date: 5/4/2025  Attending Physician: Leatha Angulo MD   Primary Care Provider: Veronika Rainey MD         Patient information was obtained from patient, parent, and ER records.     Subjective:     Principal Problem:NSTEMI (non-ST elevated myocardial infarction)    Chief Complaint:   Chief Complaint   Patient presents with    Chest Pain     Vomiting since middle of the night with chest pain that start 1530 today        HPI: This is a 39-year-old female with a past medical history of NICM (EF: 10-15%, GIIIDD on 4/12/2025 s/p AICD), hypertension, CVA, depression, marijuana use, who presents with chest pain.    Patient presents for evaluation of chest pain that started on the day of presentation. She reports substernal chest pain/pressure with radiation to her left arm. Additional symptoms include shortness of breath, nausea, 2 episodes of emesis and diarrhea. She feels that she is volume overloaded. Patient reports compliance with her medications.     In the ED, the patient was hemodynamically stable.  Labs were remarkable for an elevated troponin (2.888), elevated BNP (2322).  EKG showed w/o STEMI.  Chest x-ray showed no acute process.  Cardiology recommended heparin/nitro infusions.  Patient was given aspirin 325 mg, Plavix 600 mg, Maalox/viscous lidocaine, Zofran 4 mg IV, and was started on heparin/nitroglycerin infusions.  She was admitted for further management.    Past Medical History:   Diagnosis Date    CHF (congestive heart failure)     Chronic combined systolic and diastolic congestive heart failure 5/24/2019    Essential hypertension 11/19/2012    Hypertension     dx at 16y.o.    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 disease, with heart failure 12/28/2021    ICD (implantable cardioverter-defibrillator),  single, in situ 2020    Nonischemic cardiomyopathy 2019    Pacemaker 2017       Past Surgical History:   Procedure Laterality Date    CARDIAC DEFIBRILLATOR PLACEMENT  2017     SECTION      x 1    INDUCED       RIGHT HEART CATHETERIZATION Right 2022    Procedure: INSERTION, CATHETER, RIGHT HEART;  Surgeon: Timothy Prajapati Jr., MD;  Location: [facility] CATH LAB;  Service: Cardiology;  Laterality: Right;    RIGHT HEART CATHETERIZATION Right 2024    Procedure: INSERTION, CATHETER, RIGHT HEART;  Surgeon: Lior Rueda MD;  Location: [facility] CATH LAB;  Service: Cardiology;  Laterality: Right;    TUBAL LIGATION         Review of patient's allergies indicates:   Allergen Reactions    Aldactone [spironolactone] Swelling     Lips swelled    Hydralazine analogues Other (See Comments)     headaches    Isosorbide Other (See Comments)     headaches       No current facility-administered medications on file prior to encounter.     Current Outpatient Medications on File Prior to Encounter   Medication Sig    acetaminophen (TYLENOL) 500 MG tablet Take 1 tablet (500 mg total) by mouth every 6 (six) hours as needed for Pain.    albuterol (PROVENTIL/VENTOLIN HFA) 90 mcg/actuation inhaler Inhale 1-2 puffs into the lungs every 6 (six) hours as needed. Rescue    aspirin (ECOTRIN) 81 MG EC tablet Take 1 tablet (81 mg total) by mouth once daily.    atorvastatin (LIPITOR) 80 MG tablet Take 1 tablet (80 mg total) by mouth every evening.    benzocaine-menthoL 15-3.6 mg Lozg 1 lozenge by Mucous Membrane route every 2 (two) hours as needed (sore throat).    dapagliflozin propanediol (FARXIGA) 10 mg tablet Take 1 tablet (10 mg total) by mouth once daily.    docusate sodium (COLACE) 100 MG capsule Take 1 capsule (100 mg total) by mouth 2 (two) times daily.    eplerenone (INSPRA) 25 MG Tab Take 1 tablet (25 mg total) by mouth once daily.    FLUoxetine 20 MG capsule Take 1 capsule (20 mg total) by  mouth once daily.    fluticasone propionate (FLONASE) 50 mcg/actuation nasal spray 1 spray (50 mcg total) by Each Nostril route 2 (two) times daily as needed for Rhinitis.    furosemide (LASIX) 40 MG tablet Take 1 tablet (40 mg total) by mouth once daily.    gabapentin (NEURONTIN) 300 MG capsule Take 2 capsules (600 mg total) by mouth 3 (three) times daily.    metoprolol succinate (TOPROL-XL) 200 MG 24 hr tablet Take 1 tablet (200 mg total) by mouth once daily.    pantoprazole (PROTONIX) 40 MG tablet Please take 1 tablet twice a day for 3 days, then 1 tablet daily    potassium chloride SA (K-DUR,KLOR-CON) 20 MEQ tablet Take 2 tablets (40 mEq total) by mouth 2 (two) times daily.    sacubitriL-valsartan (ENTRESTO)  mg per tablet Take 1 tablet by mouth 2 (two) times daily.     Family History       Problem Relation (Age of Onset)    Breast cancer Other    Cancer Maternal Grandmother, Paternal Grandmother, Paternal Aunt, Paternal Uncle    Diabetes Other    Hypertension Mother, Other    No Known Problems Sister, Brother, Son, Brother          Tobacco Use    Smoking status: Never    Smokeless tobacco: Never   Substance and Sexual Activity    Alcohol use: Not Currently     Comment: occasionally    Drug use: Not Currently     Types: Marijuana     Comment: in past very little marijuana    Sexual activity: Yes     Partners: Male     Birth control/protection: None     Review of Systems   Respiratory:  Positive for shortness of breath.    Cardiovascular:  Positive for chest pain.   Gastrointestinal:  Positive for nausea.   Genitourinary: Negative.    Musculoskeletal: Negative.    Neurological: Negative.      Objective:     Vital Signs (Most Recent):  Temp: 98 °F (36.7 °C) (05/04/25 1613)  Pulse: 107 (05/04/25 1900)  Resp: 20 (05/04/25 1613)  BP: 129/77 (05/04/25 1800)  SpO2: 95 % (05/04/25 1900) Vital Signs (24h Range):  Temp:  [98 °F (36.7 °C)] 98 °F (36.7 °C)  Pulse:  [] 107  Resp:  [20] 20  SpO2:  [95 %-100 %] 95  %  BP: (129-155)/() 129/77     Weight: 81.2 kg (179 lb)  Body mass index is 26.43 kg/m².     Physical Exam  Vitals and nursing note reviewed.   Constitutional:       General: She is not in acute distress.     Appearance: She is not ill-appearing.   HENT:      Mouth/Throat:      Mouth: Mucous membranes are moist.   Cardiovascular:      Rate and Rhythm: Normal rate.   Pulmonary:      Effort: Pulmonary effort is normal.   Abdominal:      General: Abdomen is flat.   Skin:     General: Skin is warm.   Neurological:      General: No focal deficit present.      Mental Status: She is alert.                Significant Labs: All pertinent labs within the past 24 hours have been reviewed.    Significant Imaging: I have reviewed all pertinent imaging results/findings within the past 24 hours.  Assessment/Plan:     Assessment & Plan  NSTEMI (non-ST elevated myocardial infarction)  Presents with chest pain  EKG w/ non specific ST-T changes   Recent Labs   Lab 05/04/25  1658   TROPONINI 2.888*       Continue to trend troponin   Cardiology consult   Continue aspirin, plavix, statin, nitroglycerin ggt and heparin ggt     Essential hypertension  Patient's blood pressure range in the last 24 hours was: BP  Min: 129/77  Max: 155/117.The patient's inpatient anti-hypertensive regimen is listed below:  Current Antihypertensives  nitroGLYCERIN in 5 % dextrose 50 mg/250 mL (200 mcg/mL) infusion, Continuous, Intravenous  nitroGLYCERIN SL tablet 0.4 mg, Every 5 min PRN, Sublingual  hydrALAZINE injection 10 mg, Every 6 hours PRN, Intravenous  metoprolol succinate (TOPROL-XL) 24 hr tablet 200 mg, Daily, Oral  furosemide injection 40 mg, Once, Intravenous    Plan  - BP is controlled, no changes needed to their regimen    Acute on chronic combined systolic and diastolic congestive heart failure  Likely in exacerbation   BNP  Recent Labs   Lab 05/04/25  1658   BNP 2,322*         Latest ECHO  Results for orders placed during the hospital  encounter of 04/11/25    Echo Saline Bubble? No; Ultrasound enhancing contrast? No    Interpretation Summary    Left Ventricle: The left ventricle is severely dilated. There is severely reduced systolic function with a visually estimated ejection fraction of 10 -15%. Grade III diastolic dysfunction.    Right Ventricle: The right ventricle has mild enlargement. Systolic function is mildly reduced.    Left Atrium: Moderately dilated    Mitral Valve: There is mild to moderate regurgitation.    Tricuspid Valve: There is mild regurgitation.    Pulmonary Artery: The estimated pulmonary artery systolic pressure is 21 mmHg.    IVC/SVC: Normal venous pressure at 3 mmHg.    Current Heart Failure Medications  nitroGLYCERIN in 5 % dextrose 50 mg/250 mL (200 mcg/mL) infusion, Continuous, Intravenous  hydrALAZINE injection 10 mg, Every 6 hours PRN, Intravenous  metoprolol succinate (TOPROL-XL) 24 hr tablet 200 mg, Daily, Oral  sacubitriL-valsartan 24-26 mg per tablet 2 tablet, 2 times daily, Oral  furosemide injection 40 mg, Once, Intravenous    Plan  - Monitor strict I&Os and daily weights.    - Place on telemetry  - Lasix 40 mg IV x1. Redose after cardiac evaluation/cath       ICD (implantable cardioverter-defibrillator), single, in situ  History noted. Telemetry monitoring   Hx of ischemic right PCA stroke  History noted. Continue statin     VTE Risk Mitigation (From admission, onward)           Ordered     heparin 25,000 units in dextrose 5% (100 units/ml) IV bolus from bag LOW INTENSITY nomogram - [facility]  As needed (PRN)        Question:  Heparin Infusion Adjustment (DO NOT MODIFY ANSWER)  Answer:  [email or web address]\Images\Pharmacy\HeparinInfusions\heparin LOW INTENSITY nomogram for [facility] [id number]    05/04/25 1816     heparin 25,000 units in dextrose 5% (100 units/ml) IV bolus from bag LOW INTENSITY nomogram - [facility]  As needed (PRN)        Question:  Heparin Infusion Adjustment (DO NOT MODIFY ANSWER)  Answer:   [email or web address]\Images\Pharmacy\HeparinInfusions\heparin LOW INTENSITY nomogram for [facility] [id number]    05/04/25 1816     heparin 25,000 units in dextrose 5% 250 mL (100 units/mL) infusion LOW INTENSITY nomogram - [facility]  Continuous        Question:  Begin at (units/kg/hr)  Answer:  12    05/04/25 1816     IP VTE HIGH RISK PATIENT  Once         05/04/25 1828     Place sequential compression device  Until discontinued         05/04/25 1828                                    Adi Wall MD  Department of Hospital Medicine  [facility] [place]

## 2025-05-05 NOTE — CONSULTS
[facility] - Intensive Care  Cardiology  Consult Note    Patient Name: Nasra Marks  MRN: [id number]  Admission Date: 5/4/2025  Hospital Length of Stay: 1 days  Code Status: Full Code   Attending Provider: Leatha Angulo MD   Consulting Provider: Aruna Gold MD  Primary Care Physician: Veronika Rainey MD  Principal Problem:NSTEMI (non-ST elevated myocardial infarction)    Patient information was obtained from patient and ER records.     Inpatient consult to Cardiology  Consult performed by: Aruna Gold MD  Consult ordered by: Tamara Post MD        Subjective:     Chief Complaint:  Chest pain    HPI:   This is a 39-year-old female with a past medical history of NICM (EF: 10-15%, GIIIDD on 4/12/2025 s/p AICD), hypertension, CVA, depression, marijuana use, who presents with chest pain. Patient presents for evaluation of chest pain that started on the day of presentation. She reports substernal chest pain/pressure with radiation to her left arm. Additional symptoms include shortness of breath, nausea, 2 episodes of emesis and diarrhea. She feels that she is volume overloaded. Patient reports compliance with her medications.  In the ED, the patient was hemodynamically stable.  Labs were remarkable for an elevated troponin (2.888), elevated BNP (2322).  EKG showed w/o STEMI.  Chest x-ray showed no acute process.  Cardiology recommended heparin/nitro infusions.  Patient was given aspirin 325 mg, Plavix 600 mg, Maalox/viscous lidocaine, Zofran 4 mg IV, and was started on heparin/nitroglycerin infusions.  She was admitted for further management.    Cardiology is consulted for troponin elevation.    Patient follows up with Dr. Peguero.    Ms. Marks is a 39-year-old female with past medical history of nonischemic cardiomyopathy with most recent EF around 30-35% and hypertension who was admitted to the hospital for evaluation of worsening chest pain.  Chest pain started early morning yesterday.  Chest  pain was preceded by nausea and vomiting.  She also had 1 episode of diarrhea.  Chest pain was substernal and radiating to left arm.  She came to ED for further evaluation.  EKG on arrival showed sinus tachycardia with LVH and inferolateral ST-T wave change.  Troponin were up trending.  She was started on heparin drip.  Around 11:00 p.m., she started to have multiple PVCs on telemetry.  She was started on nitro drip which eased out the chest pain.  Given chest pain and significantly elevated troponin, coronary angiography has been recommended.  Patient is NPO, procedure will be done later today.    Past Medical History:   Diagnosis Date    CHF (congestive heart failure)     Chronic combined systolic and diastolic congestive heart failure 2019    Essential hypertension 2012    Hypertension     dx at 16y.o.    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 disease, with heart failure 2021    ICD (implantable cardioverter-defibrillator), single, in situ 2020    Nonischemic cardiomyopathy 2019    Pacemaker 2017       Past Surgical History:   Procedure Laterality Date    CARDIAC DEFIBRILLATOR PLACEMENT  2017     SECTION      x 1    INDUCED       RIGHT HEART CATHETERIZATION Right 2022    Procedure: INSERTION, CATHETER, RIGHT HEART;  Surgeon: Timothy Prajapati Jr., MD;  Location: [facility] CATH LAB;  Service: Cardiology;  Laterality: Right;    RIGHT HEART CATHETERIZATION Right 2024    Procedure: INSERTION, CATHETER, RIGHT HEART;  Surgeon: Lior Rueda MD;  Location: [facility] CATH LAB;  Service: Cardiology;  Laterality: Right;    TUBAL LIGATION         Review of patient's allergies indicates:   Allergen Reactions    Aldactone [spironolactone] Swelling     Lips swelled    Hydralazine analogues Other (See Comments)     headaches    Isosorbide Other (See Comments)     headaches       No current facility-administered  medications on file prior to encounter.     Current Outpatient Medications on File Prior to Encounter   Medication Sig    acetaminophen (TYLENOL) 500 MG tablet Take 1 tablet (500 mg total) by mouth every 6 (six) hours as needed for Pain.    albuterol (PROVENTIL/VENTOLIN HFA) 90 mcg/actuation inhaler Inhale 1-2 puffs into the lungs every 6 (six) hours as needed. Rescue    aspirin (ECOTRIN) 81 MG EC tablet Take 1 tablet (81 mg total) by mouth once daily.    atorvastatin (LIPITOR) 80 MG tablet Take 1 tablet (80 mg total) by mouth every evening.    benzocaine-menthoL 15-3.6 mg Lozg 1 lozenge by Mucous Membrane route every 2 (two) hours as needed (sore throat).    dapagliflozin propanediol (FARXIGA) 10 mg tablet Take 1 tablet (10 mg total) by mouth once daily.    docusate sodium (COLACE) 100 MG capsule Take 1 capsule (100 mg total) by mouth 2 (two) times daily.    eplerenone (INSPRA) 25 MG Tab Take 1 tablet (25 mg total) by mouth once daily.    FLUoxetine 20 MG capsule Take 1 capsule (20 mg total) by mouth once daily.    fluticasone propionate (FLONASE) 50 mcg/actuation nasal spray 1 spray (50 mcg total) by Each Nostril route 2 (two) times daily as needed for Rhinitis.    furosemide (LASIX) 40 MG tablet Take 1 tablet (40 mg total) by mouth once daily.    gabapentin (NEURONTIN) 300 MG capsule Take 2 capsules (600 mg total) by mouth 3 (three) times daily.    metoprolol succinate (TOPROL-XL) 200 MG 24 hr tablet Take 1 tablet (200 mg total) by mouth once daily.    pantoprazole (PROTONIX) 40 MG tablet Please take 1 tablet twice a day for 3 days, then 1 tablet daily    potassium chloride SA (K-DUR,KLOR-CON) 20 MEQ tablet Take 2 tablets (40 mEq total) by mouth 2 (two) times daily.    sacubitriL-valsartan (ENTRESTO)  mg per tablet Take 1 tablet by mouth 2 (two) times daily.     Family History       Problem Relation (Age of Onset)    Breast cancer Other    Cancer Maternal Grandmother, Paternal Grandmother, Paternal Aunt,  Paternal Uncle    Diabetes Other    Hypertension Mother, Other    No Known Problems Sister, Brother, Son, Brother          Tobacco Use    Smoking status: Never    Smokeless tobacco: Never   Substance and Sexual Activity    Alcohol use: Not Currently     Comment: occasionally    Drug use: Not Currently     Types: Marijuana     Comment: in past very little marijuana    Sexual activity: Yes     Partners: Male     Birth control/protection: None     Review of Systems   Cardiovascular:  Positive for chest pain. Negative for claudication, dyspnea on exertion, irregular heartbeat, leg swelling, near-syncope, orthopnea, palpitations, paroxysmal nocturnal dyspnea and syncope.   Respiratory:  Negative for cough, shortness of breath, snoring and sputum production.    Gastrointestinal:  Positive for nausea and vomiting. Negative for abdominal pain, dysphagia and heartburn.   Neurological:  Negative for dizziness, headaches, loss of balance and weakness.     Objective:     Vital Signs (Most Recent):  Temp: 97.8 °F (36.6 °C) (05/05/25 0721)  Pulse: 78 (05/05/25 1030)  Resp: 20 (05/05/25 1030)  BP: 107/66 (05/05/25 1030)  SpO2: 97 % (05/05/25 1030) Vital Signs (24h Range):  Temp:  [97.8 °F (36.6 °C)-98.3 °F (36.8 °C)] 97.8 °F (36.6 °C)  Pulse:  [] 78  Resp:  [10-39] 20  SpO2:  [92 %-100 %] 97 %  BP: ()/() 107/66     Weight: 80.3 kg (177 lb)  Body mass index is 26.14 kg/m².    SpO2: 97 %         Intake/Output Summary (Last 24 hours) at 5/5/2025 1051  Last data filed at 5/5/2025 1000  Gross per 24 hour   Intake 794.67 ml   Output 1200 ml   Net -405.33 ml       Lines/Drains/Airways       Drain  Duration             Female External Urinary Catheter w/ Suction 05/04/25 2000 <1 day              Peripheral Intravenous Line  Duration                  Peripheral IV - Single Lumen 05/04/25 1644 20 G Right Antecubital <1 day         Peripheral IV - Single Lumen 05/04/25 1914 20 G Anterior;Right Upper Arm <1 day          Peripheral IV - Single Lumen 05/04/25 2021 20 G Left Antecubital <1 day                     Physical Exam  HENT:      Head: Normocephalic and atraumatic.      Mouth/Throat:      Mouth: Mucous membranes are moist.   Eyes:      Extraocular Movements: Extraocular movements intact.      Pupils: Pupils are equal, round, and reactive to light.   Cardiovascular:      Rate and Rhythm: Normal rate and regular rhythm.      Pulses: Normal pulses.      Heart sounds: Normal heart sounds.   Pulmonary:      Effort: Pulmonary effort is normal.      Breath sounds: Normal breath sounds.   Abdominal:      General: Bowel sounds are normal.      Palpations: Abdomen is soft.   Musculoskeletal:      Left lower leg: No edema.   Skin:     General: Skin is warm.   Neurological:      General: No focal deficit present.      Mental Status: She is alert.   Psychiatric:         Mood and Affect: Mood normal.         Behavior: Behavior normal.          Current Medications:   aspirin  81 mg Oral Daily    atorvastatin  80 mg Oral QHS    clopidogreL  75 mg Oral Daily    metoprolol tartrate  50 mg Oral BID    mupirocin   Nasal BID    sacubitriL-valsartan  2 tablet Oral BID      heparin (porcine) in D5W  0-40 Units/kg/hr Intravenous Continuous 8.1 mL/hr at 05/05/25 1000 10 Units/kg/hr at 05/05/25 1000    nitroGLYCERIN  0-400 mcg/min Intravenous Continuous 55.5 mL/hr at 05/05/25 1026 185 mcg/min at 05/05/25 1026       Current Facility-Administered Medications:     acetaminophen, 650 mg, Oral, Once PRN    albuterol sulfate, 2.5 mg, Nebulization, Q6H PRN    benzonatate, 100 mg, Oral, TID PRN    fluticasone propionate, 1 spray, Each Nostril, BID PRN    guaiFENesin 100 mg/5 ml, 200 mg, Oral, Q4H PRN    heparin (PORCINE), 49.3 Units/kg, Intravenous, PRN    heparin (PORCINE), 30 Units/kg, Intravenous, PRN    hydrALAZINE, 10 mg, Intravenous, Q6H PRN    loperamide, 4 mg, Oral, Once PRN    melatonin, 6 mg, Oral, Nightly PRN    morphine, 4 mg, Intravenous, Q4H  PRN    nitroGLYCERIN, 0.4 mg, Sublingual, Q5 Min PRN    ondansetron, 4 mg, Intravenous, Q8H PRN    polyethylene glycol, 17 g, Oral, Once PRN    senna-docusate, 1 tablet, Oral, Daily PRN    Laboratory (all labs reviewed):  CBC:  Recent Labs   Lab 04/16/25  0545 04/18/25  1648 04/23/25  1047 05/04/25  1658 05/05/25  0548   WBC 7.33 8.04 6.58 9.84 11.23   HGB 13.2 12.3 11.0 L 11.3 L 9.2 L   HCT 40.7 39.6 35.9 L 35.3 L 28.8 L   Platelet Count 352 351 332 339 296       CHEMISTRIES:  Recent Labs   Lab 08/28/24  1019 08/29/24  0939 04/12/25  0539 04/12/25  1557 04/18/25  1648 04/23/25  1047 05/04/25  1658 05/05/25  0003 05/05/25  0548   Glucose 96   < > 86   < > 94 85 86 95 116 H   Sodium 135 L   < > 138   < > 137 139 139 140 138   Potassium 3.4 L   < > 3.3 L   < > 4.3 4.0 3.6 3.6 3.9   BUN 8   < > 18   < > 21 H 16 9 8 9   Creatinine 0.7   < > 0.8   < > 1.0 0.8 0.9 0.7 0.8   eGFR >60.0   < > >60   < > >60 >60 >60 >60 >60   Calcium 9.2   < > 8.7   < > 9.0 8.9 8.7 7.5 L 7.4 L   Magnesium   --   --  1.8  --   --  1.9  --  2.2 2.1   Magnesium 2.0  --   --   --   --   --   --   --   --     < > = values in this interval not displayed.       CARDIAC BIOMARKERS:  Recent Labs   Lab 04/18/25  1648 05/04/25  1658 05/04/25  1919 05/04/25  2131 05/05/25  0003   Troponin-I 0.011 2.888 H 3.022 H  3.077 H 4.404 H 4.498 H       COAGS:  Recent Labs   Lab 10/23/23  2218 08/27/24  1249 08/27/24  1312 05/04/25  1819   INR 1.1 1.3 H 1.0 1.1       LIPIDS/LFTS:  Recent Labs   Lab 07/11/23  0836 01/22/24  2320 02/16/24  0946 04/25/24  1818 08/27/24  1312 12/28/24  1948 04/12/25  0539 04/18/25  1648 04/23/25  1047 05/04/25  1658 05/04/25  1919 05/05/25  0548   Cholesterol Total  --   --   --   --   --   --  122  --   --   --  119 L  --    Cholesterol 134  --  91 L  --  129  --   --   --   --   --   --   --    Triglycerides 39  --  23 L  --  70  --   --   --   --   --   --   --    Triglyceride  --   --   --   --   --   --  54  --   --   --  31  --     HDL 40  --  46  --  37 L  --   --   --   --   --   --   --    HDL Cholesterol  --   --   --   --   --   --  36 L  --   --   --  38 L  --    LDL Cholesterol 86.2  --  40.4 L  --  78.0  --  75.2  --   --   --  74.8  --    Non-HDL Cholesterol 94  --  45  --  92  --   --   --   --   --   --   --    Non HDL Cholesterol  --   --   --   --   --   --  86  --   --   --  81  --    AST 20   < > 19   < > 23   < > 61 H 31 22 48 H  --  44   ALT 15   < > 14   < > 13   < > 83 H 47 H 31 31  --  23    < > = values in this interval not displayed.       BNP:  Recent Labs   Lab 02/16/25  1143 04/12/25  0539 04/18/25  1648 04/23/25  1047 05/04/25  1658   BNP 1,285 H 2,875 H 841 H 1,100 H 2,322 H       TSH:  Recent Labs   Lab 08/27/24  1312 04/12/25  0539   TSH 3.133 3.141       Free T4:        Diagnostic Results:  ECG (personally reviewed and interpreted tracing(s)):  5/4/2025 at 4:10 p.m.  Sinus tachycardia, LVH with a STT wave change in anterolateral leads    5/4/2025  Sinus rhythm with PVCs in bigeminal pattern    Chest X-Ray (personally reviewed and interpreted image(s)):  Mild increased interstitial prominence  Echo: 8/2024    Left Ventricle: The left ventricle is moderately dilated. Normal wall thickness. Moderate global hypokinesis present. There is moderately reduced systolic function with a visually estimated ejection fraction of 30 - 35%. Grade II diastolic dysfunction. Normal left ventricular filling pressure.    Right Ventricle: Normal right ventricular cavity size. Wall thickness is normal. Right ventricle wall motion  is normal. Systolic function is normal.    Left Atrium: Normal left atrial size.    Right Atrium: Normal right atrial size.    Aortic Valve: The aortic valve is a trileaflet valve. There is mild aortic valve sclerosis. There is mild annular calcification present.    Mitral Valve: There is mild bileaflet sclerosis. There is mild regurgitation.    Aorta: Aortic root is normal in size measuring 2.81 cm.  Ascending aorta is normal measuring 2.96 cm.    Pulmonary Artery: The estimated pulmonary artery systolic pressure is 19 mmHg.    IVC/SVC: Normal venous pressure at 3 mmHg.    Stress Test: NA    Cath:  2017  Angiographically normal coronary arteries  Assessment and Plan:     * NSTEMI (non-ST elevated myocardial infarction)  Chest pain with significantly elevated troponin  Coronary angiography to be done later today by Dr. Peguero    Patient is status post aspirin and Plavix load    Continue heparin and nitro drips    Further recommendations after coronary angiography    Risks, benefits and alternatives of the catheterization procedure were discussed with the patient which include but are not limited to: bleeding, infection, death, heart attack, arrhythmia, kidney failure, stroke, need for emergency surgery, etc.  Patient understands and and agrees to proceed.  Consent was placed on the chart.     Acute on chronic combined systolic and diastolic congestive heart failure  History of nonischemic cardiomyopathy with most recent EF around 30-35%  Follows up with heart failure Service  Continue guideline directed therapy as BP allows  Switch metoprolol tartrate with metoprolol succinate 100 mg daily  Continue Entresto 24-26 b.i.d..  Up titrate if BP allows  She is already on empagliflozin and eplerenone as outpatient    Echocardiogram pending    Hx of ischemic right PCA stroke  Continue with aspirin and statin    Essential hypertension  Continue Entresto  Switch metoprolol tartrate with metoprolol succinate        VTE Risk Mitigation (From admission, onward)           Ordered     heparin 25,000 units in dextrose 5% (100 units/ml) IV bolus from bag LOW INTENSITY nomogram - [facility]  As needed (PRN)        Question:  Heparin Infusion Adjustment (DO NOT MODIFY ANSWER)  Answer:  [email or web address]\Images\Pharmacy\HeparinInfusions\heparin LOW INTENSITY nomogram for [facility] [id number]    05/04/25 1816     heparin 25,000 units in  dextrose 5% (100 units/ml) IV bolus from bag LOW INTENSITY nomogram - [facility]  As needed (PRN)        Question:  Heparin Infusion Adjustment (DO NOT MODIFY ANSWER)  Answer:  [email or web address]\Images\Pharmacy\HeparinInfusions\heparin LOW INTENSITY nomogram for [facility] [id number]    05/04/25 1816     heparin 25,000 units in dextrose 5% 250 mL (100 units/mL) infusion LOW INTENSITY nomogram - [facility]  Continuous        Question:  Begin at (units/kg/hr)  Answer:  12    05/04/25 1816     IP VTE HIGH RISK PATIENT  Once         05/04/25 1828     Place sequential compression device  Until discontinued         05/04/25 1828                    Aruna Gold MD  Cardiology   [facility] - Intensive Care

## 2025-05-05 NOTE — EICU
Virtual ICU Admission    Admit Date: 2025  LOS: 1  Code Status: Full Code   : 1985  Bed: W271/W271 A:     Diagnosis: NSTEMI (non-ST elevated myocardial infarction)    Patient  has a past medical history of CHF (congestive heart failure), Chronic combined systolic and diastolic congestive heart failure, Essential hypertension, Hypertension, 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 disease, with heart failure, ICD (implantable cardioverter-defibrillator), single, in situ, Nonischemic cardiomyopathy, and Pacemaker.    Last VS: BP (!) 102/59 (BP Location: Left forearm, Patient Position: Lying)   Pulse 76   Temp 97.8 °F (36.6 °C) (Oral)   Resp 17   Wt 80.6 kg (177 lb 11.1 oz)   LMP 2025 (Approximate)   SpO2 95%   Breastfeeding No   BMI 26.24 kg/m²       VICU Review    VICU nurse assessment :  [place] completed, LDA documentation reconciliation completed, Stress ulcer prophylaxis for ventilated patients review, and VTE prophylaxis review        Nursing orders placed : IP AALIYAH Peripheral IV Access

## 2025-05-05 NOTE — NURSING
Pt arrived to unit from ED. Rates chest pain minimal, 1-2 out of 10. States she is comfortable at this time. GCS 15. Nitro and heparin infusions remain active. Pt updated on POC, no complaints.

## 2025-05-05 NOTE — SUBJECTIVE & OBJECTIVE
Interval History: This morning she has left sided chest pain 7/10 associated with shortness of breath. She has a headache (note she is on nitro gtt at 185). She has some nausea. She feels abdominal distension.     Review of Systems   Constitutional:  Negative for chills and fever.   Respiratory:  Positive for chest tightness and shortness of breath.    Cardiovascular:  Positive for chest pain. Negative for leg swelling.   Gastrointestinal:  Positive for abdominal distention and nausea. Negative for abdominal pain, constipation, diarrhea and vomiting.   Genitourinary:  Negative for difficulty urinating.   Psychiatric/Behavioral:  Negative for confusion.      Objective:     Vital Signs (Most Recent):  Temp: 97.8 °F (36.6 °C) (05/05/25 0721)  Pulse: 76 (05/05/25 0930)  Resp: 17 (05/05/25 0930)  BP: 95/63 (05/05/25 0930)  SpO2: 96 % (05/05/25 0930) Vital Signs (24h Range):  Temp:  [97.8 °F (36.6 °C)-98.3 °F (36.8 °C)] 97.8 °F (36.6 °C)  Pulse:  [] 76  Resp:  [10-39] 17  SpO2:  [92 %-100 %] 96 %  BP: ()/() 95/63     Weight: 80.6 kg (177 lb 11.1 oz)  Body mass index is 26.24 kg/m².    Intake/Output Summary (Last 24 hours) at 5/5/2025 0954  Last data filed at 5/5/2025 0900  Gross per 24 hour   Intake 732.85 ml   Output 1200 ml   Net -467.15 ml         Physical Exam  Vitals and nursing note reviewed.   Constitutional:       Appearance: She is ill-appearing.   HENT:      Head: Normocephalic and atraumatic.      Nose: Nose normal.   Cardiovascular:      Rate and Rhythm: Normal rate and regular rhythm.      Comments: ICD in place  Pulmonary:      Effort: Pulmonary effort is normal.      Breath sounds: Normal breath sounds.      Comments: RA  Abdominal:      General: Bowel sounds are normal.      Palpations: Abdomen is soft.      Tenderness: There is no abdominal tenderness.   Musculoskeletal:      Right lower leg: No edema.      Left lower leg: No edema.   Skin:     General: Skin is warm and dry.    Neurological:      Mental Status: She is alert. Mental status is at baseline.               Significant Labs: All pertinent labs within the past 24 hours have been reviewed.    Significant Imaging: I have reviewed all pertinent imaging results/findings within the past 24 hours.

## 2025-05-05 NOTE — ED NOTES
Pt currently complaining of left breast pain 8/10. Nitroglycerin titration adjusted. 2nd bag of K+ initiated.

## 2025-05-05 NOTE — NURSING
Pt returned from cath lab. Placed back on ICU continuous monitoring. CT tech contacted regarding ordered scan, sending for pt.

## 2025-05-05 NOTE — SUBJECTIVE & OBJECTIVE
Past Medical History:   Diagnosis Date    CHF (congestive heart failure)     Chronic combined systolic and diastolic congestive heart failure 2019    Essential hypertension 2012    Hypertension     dx at 16y.o.    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 disease, with heart failure 2021    ICD (implantable cardioverter-defibrillator), single, in situ 2020    Nonischemic cardiomyopathy 2019    Pacemaker 2017       Past Surgical History:   Procedure Laterality Date    CARDIAC DEFIBRILLATOR PLACEMENT  2017     SECTION      x 1    INDUCED       RIGHT HEART CATHETERIZATION Right 2022    Procedure: INSERTION, CATHETER, RIGHT HEART;  Surgeon: Timothy Prajapati Jr., MD;  Location: [facility] CATH LAB;  Service: Cardiology;  Laterality: Right;    RIGHT HEART CATHETERIZATION Right 2024    Procedure: INSERTION, CATHETER, RIGHT HEART;  Surgeon: Lior Rueda MD;  Location: [facility] CATH LAB;  Service: Cardiology;  Laterality: Right;    TUBAL LIGATION         Review of patient's allergies indicates:   Allergen Reactions    Aldactone [spironolactone] Swelling     Lips swelled    Hydralazine analogues Other (See Comments)     headaches    Isosorbide Other (See Comments)     headaches       No current facility-administered medications on file prior to encounter.     Current Outpatient Medications on File Prior to Encounter   Medication Sig    acetaminophen (TYLENOL) 500 MG tablet Take 1 tablet (500 mg total) by mouth every 6 (six) hours as needed for Pain.    albuterol (PROVENTIL/VENTOLIN HFA) 90 mcg/actuation inhaler Inhale 1-2 puffs into the lungs every 6 (six) hours as needed. Rescue    aspirin (ECOTRIN) 81 MG EC tablet Take 1 tablet (81 mg total) by mouth once daily.    atorvastatin (LIPITOR) 80 MG tablet Take 1 tablet (80 mg total) by mouth every evening.    benzocaine-menthoL 15-3.6 mg Lozg 1 lozenge by Mucous Membrane  route every 2 (two) hours as needed (sore throat).    dapagliflozin propanediol (FARXIGA) 10 mg tablet Take 1 tablet (10 mg total) by mouth once daily.    docusate sodium (COLACE) 100 MG capsule Take 1 capsule (100 mg total) by mouth 2 (two) times daily.    eplerenone (INSPRA) 25 MG Tab Take 1 tablet (25 mg total) by mouth once daily.    FLUoxetine 20 MG capsule Take 1 capsule (20 mg total) by mouth once daily.    fluticasone propionate (FLONASE) 50 mcg/actuation nasal spray 1 spray (50 mcg total) by Each Nostril route 2 (two) times daily as needed for Rhinitis.    furosemide (LASIX) 40 MG tablet Take 1 tablet (40 mg total) by mouth once daily.    gabapentin (NEURONTIN) 300 MG capsule Take 2 capsules (600 mg total) by mouth 3 (three) times daily.    metoprolol succinate (TOPROL-XL) 200 MG 24 hr tablet Take 1 tablet (200 mg total) by mouth once daily.    pantoprazole (PROTONIX) 40 MG tablet Please take 1 tablet twice a day for 3 days, then 1 tablet daily    potassium chloride SA (K-DUR,KLOR-CON) 20 MEQ tablet Take 2 tablets (40 mEq total) by mouth 2 (two) times daily.    sacubitriL-valsartan (ENTRESTO)  mg per tablet Take 1 tablet by mouth 2 (two) times daily.     Family History       Problem Relation (Age of Onset)    Breast cancer Other    Cancer Maternal Grandmother, Paternal Grandmother, Paternal Aunt, Paternal Uncle    Diabetes Other    Hypertension Mother, Other    No Known Problems Sister, Brother, Son, Brother          Tobacco Use    Smoking status: Never    Smokeless tobacco: Never   Substance and Sexual Activity    Alcohol use: Not Currently     Comment: occasionally    Drug use: Not Currently     Types: Marijuana     Comment: in past very little marijuana    Sexual activity: Yes     Partners: Male     Birth control/protection: None     Review of Systems   Cardiovascular:  Positive for chest pain. Negative for claudication, dyspnea on exertion, irregular heartbeat, leg swelling, near-syncope,  orthopnea, palpitations, paroxysmal nocturnal dyspnea and syncope.   Respiratory:  Negative for cough, shortness of breath, snoring and sputum production.    Gastrointestinal:  Positive for nausea and vomiting. Negative for abdominal pain, dysphagia and heartburn.   Neurological:  Negative for dizziness, headaches, loss of balance and weakness.     Objective:     Vital Signs (Most Recent):  Temp: 97.8 °F (36.6 °C) (05/05/25 0721)  Pulse: 78 (05/05/25 1030)  Resp: 20 (05/05/25 1030)  BP: 107/66 (05/05/25 1030)  SpO2: 97 % (05/05/25 1030) Vital Signs (24h Range):  Temp:  [97.8 °F (36.6 °C)-98.3 °F (36.8 °C)] 97.8 °F (36.6 °C)  Pulse:  [] 78  Resp:  [10-39] 20  SpO2:  [92 %-100 %] 97 %  BP: ()/() 107/66     Weight: 80.3 kg (177 lb)  Body mass index is 26.14 kg/m².    SpO2: 97 %         Intake/Output Summary (Last 24 hours) at 5/5/2025 1051  Last data filed at 5/5/2025 1000  Gross per 24 hour   Intake 794.67 ml   Output 1200 ml   Net -405.33 ml       Lines/Drains/Airways       Drain  Duration             Female External Urinary Catheter w/ Suction 05/04/25 2000 <1 day              Peripheral Intravenous Line  Duration                  Peripheral IV - Single Lumen 05/04/25 1644 20 G Right Antecubital <1 day         Peripheral IV - Single Lumen 05/04/25 1914 20 G Anterior;Right Upper Arm <1 day         Peripheral IV - Single Lumen 05/04/25 2021 20 G Left Antecubital <1 day                     Physical Exam  HENT:      Head: Normocephalic and atraumatic.      Mouth/Throat:      Mouth: Mucous membranes are moist.   Eyes:      Extraocular Movements: Extraocular movements intact.      Pupils: Pupils are equal, round, and reactive to light.   Cardiovascular:      Rate and Rhythm: Normal rate and regular rhythm.      Pulses: Normal pulses.      Heart sounds: Normal heart sounds.   Pulmonary:      Effort: Pulmonary effort is normal.      Breath sounds: Normal breath sounds.   Abdominal:      General: Bowel  sounds are normal.      Palpations: Abdomen is soft.   Musculoskeletal:      Left lower leg: No edema.   Skin:     General: Skin is warm.   Neurological:      General: No focal deficit present.      Mental Status: She is alert.   Psychiatric:         Mood and Affect: Mood normal.         Behavior: Behavior normal.          Current Medications:   aspirin  81 mg Oral Daily    atorvastatin  80 mg Oral QHS    clopidogreL  75 mg Oral Daily    metoprolol tartrate  50 mg Oral BID    mupirocin   Nasal BID    sacubitriL-valsartan  2 tablet Oral BID      heparin (porcine) in D5W  0-40 Units/kg/hr Intravenous Continuous 8.1 mL/hr at 05/05/25 1000 10 Units/kg/hr at 05/05/25 1000    nitroGLYCERIN  0-400 mcg/min Intravenous Continuous 55.5 mL/hr at 05/05/25 1026 185 mcg/min at 05/05/25 1026       Current Facility-Administered Medications:     acetaminophen, 650 mg, Oral, Once PRN    albuterol sulfate, 2.5 mg, Nebulization, Q6H PRN    benzonatate, 100 mg, Oral, TID PRN    fluticasone propionate, 1 spray, Each Nostril, BID PRN    guaiFENesin 100 mg/5 ml, 200 mg, Oral, Q4H PRN    heparin (PORCINE), 49.3 Units/kg, Intravenous, PRN    heparin (PORCINE), 30 Units/kg, Intravenous, PRN    hydrALAZINE, 10 mg, Intravenous, Q6H PRN    loperamide, 4 mg, Oral, Once PRN    melatonin, 6 mg, Oral, Nightly PRN    morphine, 4 mg, Intravenous, Q4H PRN    nitroGLYCERIN, 0.4 mg, Sublingual, Q5 Min PRN    ondansetron, 4 mg, Intravenous, Q8H PRN    polyethylene glycol, 17 g, Oral, Once PRN    senna-docusate, 1 tablet, Oral, Daily PRN    Laboratory (all labs reviewed):  CBC:  Recent Labs   Lab 04/16/25  0545 04/18/25  1648 04/23/25  1047 05/04/25  1658 05/05/25  0548   WBC 7.33 8.04 6.58 9.84 11.23   HGB 13.2 12.3 11.0 L 11.3 L 9.2 L   HCT 40.7 39.6 35.9 L 35.3 L 28.8 L   Platelet Count 352 351 332 339 296       CHEMISTRIES:  Recent Labs   Lab 08/28/24  1019 08/29/24  0939 04/12/25  0539 04/12/25  1557 04/18/25  1648 04/23/25  1047 05/04/25  1658  05/05/25  0003 05/05/25  0548   Glucose 96   < > 86   < > 94 85 86 95 116 H   Sodium 135 L   < > 138   < > 137 139 139 140 138   Potassium 3.4 L   < > 3.3 L   < > 4.3 4.0 3.6 3.6 3.9   BUN 8   < > 18   < > 21 H 16 9 8 9   Creatinine 0.7   < > 0.8   < > 1.0 0.8 0.9 0.7 0.8   eGFR >60.0   < > >60   < > >60 >60 >60 >60 >60   Calcium 9.2   < > 8.7   < > 9.0 8.9 8.7 7.5 L 7.4 L   Magnesium   --   --  1.8  --   --  1.9  --  2.2 2.1   Magnesium 2.0  --   --   --   --   --   --   --   --     < > = values in this interval not displayed.       CARDIAC BIOMARKERS:  Recent Labs   Lab 04/18/25  1648 05/04/25  1658 05/04/25 1919 05/04/25  2131 05/05/25  0003   Troponin-I 0.011 2.888 H 3.022 H  3.077 H 4.404 H 4.498 H       COAGS:  Recent Labs   Lab 10/23/23  2218 08/27/24  1249 08/27/24  1312 05/04/25  1819   INR 1.1 1.3 H 1.0 1.1       LIPIDS/LFTS:  Recent Labs   Lab 07/11/23  0836 01/22/24  2320 02/16/24  0946 04/25/24  1818 08/27/24  1312 12/28/24  1948 04/12/25  0539 04/18/25  1648 04/23/25  1047 05/04/25 1658 05/04/25 1919 05/05/25  0548   Cholesterol Total  --   --   --   --   --   --  122  --   --   --  119 L  --    Cholesterol 134  --  91 L  --  129  --   --   --   --   --   --   --    Triglycerides 39  --  23 L  --  70  --   --   --   --   --   --   --    Triglyceride  --   --   --   --   --   --  54  --   --   --  31  --    HDL 40  --  46  --  37 L  --   --   --   --   --   --   --    HDL Cholesterol  --   --   --   --   --   --  36 L  --   --   --  38 L  --    LDL Cholesterol 86.2  --  40.4 L  --  78.0  --  75.2  --   --   --  74.8  --    Non-HDL Cholesterol 94  --  45  --  92  --   --   --   --   --   --   --    Non HDL Cholesterol  --   --   --   --   --   --  86  --   --   --  81  --    AST 20   < > 19   < > 23   < > 61 H 31 22 48 H  --  44   ALT 15   < > 14   < > 13   < > 83 H 47 H 31 31  --  23    < > = values in this interval not displayed.       BNP:  Recent Labs   Lab 02/16/25  1143 04/12/25  0539  04/18/25  1648 04/23/25  1047 05/04/25  1658   BNP 1,285 H 2,875 H 841 H 1,100 H 2,322 H       TSH:  Recent Labs   Lab 08/27/24  1312 04/12/25  0539   TSH 3.133 3.141       Free T4:        Diagnostic Results:  ECG (personally reviewed and interpreted tracing(s)):  5/4/2025 at 4:10 p.m.  Sinus tachycardia, LVH with a STT wave change in anterolateral leads    5/4/2025  Sinus rhythm with PVCs in bigeminal pattern    Chest X-Ray (personally reviewed and interpreted image(s)):  Mild increased interstitial prominence  Echo: 8/2024    Left Ventricle: The left ventricle is moderately dilated. Normal wall thickness. Moderate global hypokinesis present. There is moderately reduced systolic function with a visually estimated ejection fraction of 30 - 35%. Grade II diastolic dysfunction. Normal left ventricular filling pressure.    Right Ventricle: Normal right ventricular cavity size. Wall thickness is normal. Right ventricle wall motion  is normal. Systolic function is normal.    Left Atrium: Normal left atrial size.    Right Atrium: Normal right atrial size.    Aortic Valve: The aortic valve is a trileaflet valve. There is mild aortic valve sclerosis. There is mild annular calcification present.    Mitral Valve: There is mild bileaflet sclerosis. There is mild regurgitation.    Aorta: Aortic root is normal in size measuring 2.81 cm. Ascending aorta is normal measuring 2.96 cm.    Pulmonary Artery: The estimated pulmonary artery systolic pressure is 19 mmHg.    IVC/SVC: Normal venous pressure at 3 mmHg.    Stress Test: NA    Cath:  2017  Angiographically normal coronary arteries

## 2025-05-05 NOTE — ED NOTES
"Pt endorses left chest pain 7/10 "squeezing". Pt states it has not worsen, but has not gotten better. Pt resting in bed quietly. Pt requested crackers, but educated pt concerning NPO Diet at this time. Pt awaiting ICU admit bed and morning cardiac procedure.   "

## 2025-05-05 NOTE — PROGRESS NOTES
[facility] Medicine  Progress Note    Patient Name: Nasra Marks  MRN: [id number]  Patient Class: IP- Inpatient   Admission Date: 5/4/2025  Length of Stay: 1 days  Attending Physician: Leatha Angulo MD  Primary Care Provider: Veronika Rainey MD        Subjective     Principal Problem:NSTEMI (non-ST elevated myocardial infarction)        HPI:  This is a 39-year-old female with a past medical history of NICM (EF: 10-15%, GIIIDD on 4/12/2025 s/p AICD), hypertension, CVA, depression, marijuana use, who presents with chest pain.    Patient presents for evaluation of chest pain that started on the day of presentation. She reports substernal chest pain/pressure with radiation to her left arm. Additional symptoms include shortness of breath, nausea, 2 episodes of emesis and diarrhea. She feels that she is volume overloaded. Patient reports compliance with her medications.     In the ED, the patient was hemodynamically stable.  Labs were remarkable for an elevated troponin (2.888), elevated BNP (2322).  EKG showed w/o STEMI.  Chest x-ray showed no acute process.  Cardiology recommended heparin/nitro infusions.  Patient was given aspirin 325 mg, Plavix 600 mg, Maalox/viscous lidocaine, Zofran 4 mg IV, and was started on heparin/nitroglycerin infusions.  She was admitted for further management.    Overview/Hospital Course:  Ms Nasra Marks is a 39 y.o. F with NICM EF 10-15%, G3DD admitted with chest pain. Troponin elevated, TWI present. Started heparin gtt, asa, plavix, and nitro gtt. Admitted to ICU. Continues to have chest pain. Cardiology planning angiogram 5/5/25 (note last angiogram 2017 with no CAD).     Interval History: This morning she has left sided chest pain 7/10 associated with shortness of breath. She has a headache (note she is on nitro gtt at 185). She has some nausea. She feels abdominal distension.     Review of Systems   Constitutional:  Negative for chills and fever.    Respiratory:  Positive for chest tightness and shortness of breath.    Cardiovascular:  Positive for chest pain. Negative for leg swelling.   Gastrointestinal:  Positive for abdominal distention and nausea. Negative for abdominal pain, constipation, diarrhea and vomiting.   Genitourinary:  Negative for difficulty urinating.   Psychiatric/Behavioral:  Negative for confusion.      Objective:     Vital Signs (Most Recent):  Temp: 97.8 °F (36.6 °C) (05/05/25 0721)  Pulse: 76 (05/05/25 0930)  Resp: 17 (05/05/25 0930)  BP: 95/63 (05/05/25 0930)  SpO2: 96 % (05/05/25 0930) Vital Signs (24h Range):  Temp:  [97.8 °F (36.6 °C)-98.3 °F (36.8 °C)] 97.8 °F (36.6 °C)  Pulse:  [] 76  Resp:  [10-39] 17  SpO2:  [92 %-100 %] 96 %  BP: ()/() 95/63     Weight: 80.6 kg (177 lb 11.1 oz)  Body mass index is 26.24 kg/m².    Intake/Output Summary (Last 24 hours) at 5/5/2025 0954  Last data filed at 5/5/2025 0900  Gross per 24 hour   Intake 732.85 ml   Output 1200 ml   Net -467.15 ml         Physical Exam  Vitals and nursing note reviewed.   Constitutional:       Appearance: She is ill-appearing.   HENT:      Head: Normocephalic and atraumatic.      Nose: Nose normal.   Cardiovascular:      Rate and Rhythm: Normal rate and regular rhythm.      Comments: ICD in place  Pulmonary:      Effort: Pulmonary effort is normal.      Breath sounds: Normal breath sounds.      Comments: RA  Abdominal:      General: Bowel sounds are normal.      Palpations: Abdomen is soft.      Tenderness: There is no abdominal tenderness.   Musculoskeletal:      Right lower leg: No edema.      Left lower leg: No edema.   Skin:     General: Skin is warm and dry.   Neurological:      Mental Status: She is alert. Mental status is at baseline.               Significant Labs: All pertinent labs within the past 24 hours have been reviewed.    Significant Imaging: I have reviewed all pertinent imaging results/findings within the past 24  hours.      Assessment & Plan  NSTEMI (non-ST elevated myocardial infarction)  Presents with chest pain  EKG w/ non specific ST-T changes   Recent Labs   Lab 05/05/25  0003   TROPONINI 4.498*     Cardiology consult   Continue aspirin, plavix, statin, nitroglycerin ggt and heparin ggt   - plan [facility] today     Essential hypertension  Patient's blood pressure range in the last 24 hours was: BP  Min: 87/54  Max: 156/88.The patient's inpatient anti-hypertensive regimen is listed below:  Current Antihypertensives  nitroGLYCERIN in 5 % dextrose 50 mg/250 mL (200 mcg/mL) infusion, Continuous, Intravenous  nitroGLYCERIN SL tablet 0.4 mg, Every 5 min PRN, Sublingual  hydrALAZINE injection 10 mg, Every 6 hours PRN, Intravenous  metoprolol tartrate (LOPRESSOR) tablet 50 mg, 2 times daily, Oral    Plan  - BP is controlled, no changes needed to their regimen- hold parameters     Acute on chronic combined systolic and diastolic congestive heart failure  - admitted with chest pain, shortness of breath, abdominal distension.   - BNP higher than ever before   Recent Labs   Lab 05/04/25  1658   BNP 2,322*   Echo  4/11/25    Interpretation Summary    Left Ventricle: The left ventricle is severely dilated. There is severely reduced systolic function with a visually estimated ejection fraction of 10 -15%. Grade III diastolic dysfunction.    Right Ventricle: The right ventricle has mild enlargement. Systolic function is mildly reduced.    Left Atrium: Moderately dilated    Mitral Valve: There is mild to moderate regurgitation.    Tricuspid Valve: There is mild regurgitation.    Pulmonary Artery: The estimated pulmonary artery systolic pressure is 21 mmHg.    IVC/SVC: Normal venous pressure at 3 mmHg.    Current Heart Failure Medications  nitroGLYCERIN in 5 % dextrose 50 mg/250 mL (200 mcg/mL) infusion, Continuous, Intravenous  hydrALAZINE injection 10 mg, Every 6 hours PRN, Intravenous  sacubitriL-valsartan 24-26 mg per tablet 2 tablet, 2  times daily, Oral    Plan  - Monitor strict I&Os and daily weights.    - Place on telemetry  - hold parameters on GDMT  - given lasix in ED- likely redose after angiogram       ICD (implantable cardioverter-defibrillator), single, in situ  History noted. Telemetry monitoring   - interrogate medtronic device today   Hx of ischemic right PCA stroke  History noted. Continue statin     Normocytic anemia  Anemia is likely due to unknown. Most recent hemoglobin and hematocrit are listed below.  Recent Labs     05/04/25  1658 05/05/25  0548   HGB 11.3* 9.2*   HCT 35.3* 28.8*     Plan  - Monitor serial CBC: Daily  - Transfuse PRBC if patient becomes hemodynamically unstable, symptomatic or H/H drops below 7/21.  - Patient has not received any PRBC transfusions to date  - Patient's anemia is currently stable  - check iron panel, b12, folate   VTE Risk Mitigation (From admission, onward)           Ordered     heparin 25,000 units in dextrose 5% (100 units/ml) IV bolus from bag LOW INTENSITY nomogram - [facility]  As needed (PRN)        Question:  Heparin Infusion Adjustment (DO NOT MODIFY ANSWER)  Answer:  [email or web address]\Images\Pharmacy\HeparinInfusions\heparin LOW INTENSITY nomogram for [facility] [id number]    05/04/25 1816     heparin 25,000 units in dextrose 5% (100 units/ml) IV bolus from bag LOW INTENSITY nomogram - [facility]  As needed (PRN)        Question:  Heparin Infusion Adjustment (DO NOT MODIFY ANSWER)  Answer:  \[email or web address]\Images\Pharmacy\HeparinInfusions\heparin LOW INTENSITY nomogram for [facility] [id number]    05/04/25 1816     heparin 25,000 units in dextrose 5% 250 mL (100 units/mL) infusion LOW INTENSITY nomogram - [facility]  Continuous        Question:  Begin at (units/kg/hr)  Answer:  12    05/04/25 1816     IP VTE HIGH RISK PATIENT  Once         05/04/25 1828     Place sequential compression device  Until discontinued         05/04/25 1828                    Discharge Planning   MELA: 5/6/2025     Code Status: Full Code    Medical Readiness for Discharge Date:            2:20 PM  CTA negative for PE.     Critical care time spent on the evaluation and treatment of severe organ dysfunction, review of pertinent labs and imaging studies, discussions with consulting providers and discussions with patient/family: 30 minutes.    2:50 PM  Updated patient and family at bedside.         Leatha Angulo MD  Department of Hospital Medicine   [facility] - Intensive Care

## 2025-05-05 NOTE — ASSESSMENT & PLAN NOTE
Anemia is likely due to unknown. Most recent hemoglobin and hematocrit are listed below.  Recent Labs     05/04/25  1658 05/05/25  0548   HGB 11.3* 9.2*   HCT 35.3* 28.8*     Plan  - Monitor serial CBC: Daily  - Transfuse PRBC if patient becomes hemodynamically unstable, symptomatic or H/H drops below 7/21.  - Patient has not received any PRBC transfusions to date  - Patient's anemia is currently stable  - check iron panel, b12, folate

## 2025-05-05 NOTE — HPI
This is a 39-year-old female with a past medical history of NICM (EF: 10-15%, GIIIDD on 4/12/2025 s/p AICD), hypertension, CVA, depression, marijuana use, who presents with chest pain.    Patient presents for evaluation of chest pain that started on the day of presentation. She reports substernal chest pain/pressure with radiation to her left arm. Additional symptoms include shortness of breath, nausea, 2 episodes of emesis and diarrhea. She feels that she is volume overloaded. Patient reports compliance with her medications.     In the ED, the patient was hemodynamically stable.  Labs were remarkable for an elevated troponin (2.888), elevated BNP (2322).  EKG showed w/o STEMI.  Chest x-ray showed no acute process.  Cardiology recommended heparin/nitro infusions.  Patient was given aspirin 325 mg, Plavix 600 mg, Maalox/viscous lidocaine, Zofran 4 mg IV, and was started on heparin/nitroglycerin infusions.  She was admitted for further management.

## 2025-05-05 NOTE — ASSESSMENT & PLAN NOTE
Likely in exacerbation   BNP  Recent Labs   Lab 05/04/25  1658   BNP 2,322*         Latest ECHO  Results for orders placed during the hospital encounter of 04/11/25    Echo Saline Bubble? No; Ultrasound enhancing contrast? No    Interpretation Summary    Left Ventricle: The left ventricle is severely dilated. There is severely reduced systolic function with a visually estimated ejection fraction of 10 -15%. Grade III diastolic dysfunction.    Right Ventricle: The right ventricle has mild enlargement. Systolic function is mildly reduced.    Left Atrium: Moderately dilated    Mitral Valve: There is mild to moderate regurgitation.    Tricuspid Valve: There is mild regurgitation.    Pulmonary Artery: The estimated pulmonary artery systolic pressure is 21 mmHg.    IVC/SVC: Normal venous pressure at 3 mmHg.    Current Heart Failure Medications  nitroGLYCERIN in 5 % dextrose 50 mg/250 mL (200 mcg/mL) infusion, Continuous, Intravenous  hydrALAZINE injection 10 mg, Every 6 hours PRN, Intravenous  metoprolol succinate (TOPROL-XL) 24 hr tablet 200 mg, Daily, Oral  sacubitriL-valsartan 24-26 mg per tablet 2 tablet, 2 times daily, Oral  furosemide injection 40 mg, Once, Intravenous    Plan  - Monitor strict I&Os and daily weights.    - Place on telemetry  - Lasix 40 mg IV x1. Redose after cardiac evaluation/cath

## 2025-05-05 NOTE — ED NOTES
MD Wall at bedside. Pt endorses worsening chest pain 7/10. Pt not tolerating titration well. Nausea and vomiting upon titration.

## 2025-05-05 NOTE — ASSESSMENT & PLAN NOTE
Patient's blood pressure range in the last 24 hours was: BP  Min: 87/54  Max: 156/88.The patient's inpatient anti-hypertensive regimen is listed below:  Current Antihypertensives  nitroGLYCERIN in 5 % dextrose 50 mg/250 mL (200 mcg/mL) infusion, Continuous, Intravenous  nitroGLYCERIN SL tablet 0.4 mg, Every 5 min PRN, Sublingual  hydrALAZINE injection 10 mg, Every 6 hours PRN, Intravenous  metoprolol tartrate (LOPRESSOR) tablet 50 mg, 2 times daily, Oral    Plan  - BP is controlled, no changes needed to their regimen- hold parameters

## 2025-05-05 NOTE — ASSESSMENT & PLAN NOTE
Presents with chest pain  EKG w/ non specific ST-T changes   Recent Labs   Lab 05/05/25  0003   TROPONINI 4.498*     Cardiology consult   Continue aspirin, plavix, statin, nitroglycerin ggt and heparin ggt   - plan [facility] today

## 2025-05-05 NOTE — ED NOTES
"Upon reassessment pt report worsening chest pain "7 or 9" pain rating. "Feels like someone is pushing into my back". Pt reassured and titrated nitro appropriately.   "

## 2025-05-05 NOTE — ASSESSMENT & PLAN NOTE
Patient's blood pressure range in the last 24 hours was: BP  Min: 129/77  Max: 155/117.The patient's inpatient anti-hypertensive regimen is listed below:  Current Antihypertensives  nitroGLYCERIN in 5 % dextrose 50 mg/250 mL (200 mcg/mL) infusion, Continuous, Intravenous  nitroGLYCERIN SL tablet 0.4 mg, Every 5 min PRN, Sublingual  hydrALAZINE injection 10 mg, Every 6 hours PRN, Intravenous  metoprolol succinate (TOPROL-XL) 24 hr tablet 200 mg, Daily, Oral  furosemide injection 40 mg, Once, Intravenous    Plan  - BP is controlled, no changes needed to their regimen

## 2025-05-05 NOTE — ED NOTES
Pt c/o K+ infusion burning veins. MD Wall made aware at bedside. Discussed alternatives to pt. Pt stated she was still nauseated, but wanted to try and take oral K+. Pt educated on medication PO may not be tolerated due to N/V. Pt states will try again with K+ infustion. Per MD Wall can provide a slow drip on NaCL with K+ infusion.     Pt still remains 4/10 chest pain. Nitroglycerin increased titration

## 2025-05-05 NOTE — HPI
This is a 39-year-old female with a past medical history of NICM (EF: 10-15%, GIIIDD on 4/12/2025 s/p AICD), hypertension, CVA, depression, marijuana use, who presents with chest pain. Patient presents for evaluation of chest pain that started on the day of presentation. She reports substernal chest pain/pressure with radiation to her left arm. Additional symptoms include shortness of breath, nausea, 2 episodes of emesis and diarrhea. She feels that she is volume overloaded. Patient reports compliance with her medications.  In the ED, the patient was hemodynamically stable.  Labs were remarkable for an elevated troponin (2.888), elevated BNP (2322).  EKG showed w/o STEMI.  Chest x-ray showed no acute process.  Cardiology recommended heparin/nitro infusions.  Patient was given aspirin 325 mg, Plavix 600 mg, Maalox/viscous lidocaine, Zofran 4 mg IV, and was started on heparin/nitroglycerin infusions.  She was admitted for further management.    Cardiology is consulted for troponin elevation.    Patient follows up with Dr. Peguero.    Ms. Marks is a 39-year-old female with past medical history of nonischemic cardiomyopathy with most recent EF around 30-35% and hypertension who was admitted to the hospital for evaluation of worsening chest pain.  Chest pain started early morning yesterday.  Chest pain was preceded by nausea and vomiting.  She also had 1 episode of diarrhea.  Chest pain was substernal and radiating to left arm.  She came to ED for further evaluation.  EKG on arrival showed sinus tachycardia with LVH and inferolateral ST-T wave change.  Troponin were up trending.  She was started on heparin drip.  Around 11:00 p.m., she started to have multiple PVCs on telemetry.  She was started on nitro drip which eased out the chest pain.  Given chest pain and significantly elevated troponin, coronary angiography has been recommended.  Patient is NPO, procedure will be done later today.

## 2025-05-05 NOTE — ASSESSMENT & PLAN NOTE
Presents with chest pain  EKG w/ non specific ST-T changes   Recent Labs   Lab 05/04/25  1658   TROPONINI 2.888*       Continue to trend troponin   Cardiology consult   Continue aspirin, plavix, statin, nitroglycerin ggt and heparin ggt

## 2025-05-05 NOTE — ASSESSMENT & PLAN NOTE
- admitted with chest pain, shortness of breath, abdominal distension.   - BNP higher than ever before   Recent Labs   Lab 05/04/25  1658   BNP 2,322*   Echo  4/11/25    Interpretation Summary    Left Ventricle: The left ventricle is severely dilated. There is severely reduced systolic function with a visually estimated ejection fraction of 10 -15%. Grade III diastolic dysfunction.    Right Ventricle: The right ventricle has mild enlargement. Systolic function is mildly reduced.    Left Atrium: Moderately dilated    Mitral Valve: There is mild to moderate regurgitation.    Tricuspid Valve: There is mild regurgitation.    Pulmonary Artery: The estimated pulmonary artery systolic pressure is 21 mmHg.    IVC/SVC: Normal venous pressure at 3 mmHg.    Current Heart Failure Medications  nitroGLYCERIN in 5 % dextrose 50 mg/250 mL (200 mcg/mL) infusion, Continuous, Intravenous  hydrALAZINE injection 10 mg, Every 6 hours PRN, Intravenous  sacubitriL-valsartan 24-26 mg per tablet 2 tablet, 2 times daily, Oral    Plan  - Monitor strict I&Os and daily weights.    - Place on telemetry  - hold parameters on GDMT  - given lasix in ED- likely redose after angiogram

## 2025-05-05 NOTE — SUBJECTIVE & OBJECTIVE
Past Medical History:   Diagnosis Date    CHF (congestive heart failure)     Chronic combined systolic and diastolic congestive heart failure 2019    Essential hypertension 2012    Hypertension     dx at 16y.o.    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 disease, with heart failure 2021    ICD (implantable cardioverter-defibrillator), single, in situ 2020    Nonischemic cardiomyopathy 2019    Pacemaker 2017       Past Surgical History:   Procedure Laterality Date    CARDIAC DEFIBRILLATOR PLACEMENT  2017     SECTION      x 1    INDUCED       RIGHT HEART CATHETERIZATION Right 2022    Procedure: INSERTION, CATHETER, RIGHT HEART;  Surgeon: Timothy Prajapati Jr., MD;  Location: [facility] CATH LAB;  Service: Cardiology;  Laterality: Right;    RIGHT HEART CATHETERIZATION Right 2024    Procedure: INSERTION, CATHETER, RIGHT HEART;  Surgeon: Lior Rueda MD;  Location: [facility] CATH LAB;  Service: Cardiology;  Laterality: Right;    TUBAL LIGATION         Review of patient's allergies indicates:   Allergen Reactions    Aldactone [spironolactone] Swelling     Lips swelled    Hydralazine analogues Other (See Comments)     headaches    Isosorbide Other (See Comments)     headaches       No current facility-administered medications on file prior to encounter.     Current Outpatient Medications on File Prior to Encounter   Medication Sig    acetaminophen (TYLENOL) 500 MG tablet Take 1 tablet (500 mg total) by mouth every 6 (six) hours as needed for Pain.    albuterol (PROVENTIL/VENTOLIN HFA) 90 mcg/actuation inhaler Inhale 1-2 puffs into the lungs every 6 (six) hours as needed. Rescue    aspirin (ECOTRIN) 81 MG EC tablet Take 1 tablet (81 mg total) by mouth once daily.    atorvastatin (LIPITOR) 80 MG tablet Take 1 tablet (80 mg total) by mouth every evening.    benzocaine-menthoL 15-3.6 mg Lozg 1 lozenge by Mucous Membrane  route every 2 (two) hours as needed (sore throat).    dapagliflozin propanediol (FARXIGA) 10 mg tablet Take 1 tablet (10 mg total) by mouth once daily.    docusate sodium (COLACE) 100 MG capsule Take 1 capsule (100 mg total) by mouth 2 (two) times daily.    eplerenone (INSPRA) 25 MG Tab Take 1 tablet (25 mg total) by mouth once daily.    FLUoxetine 20 MG capsule Take 1 capsule (20 mg total) by mouth once daily.    fluticasone propionate (FLONASE) 50 mcg/actuation nasal spray 1 spray (50 mcg total) by Each Nostril route 2 (two) times daily as needed for Rhinitis.    furosemide (LASIX) 40 MG tablet Take 1 tablet (40 mg total) by mouth once daily.    gabapentin (NEURONTIN) 300 MG capsule Take 2 capsules (600 mg total) by mouth 3 (three) times daily.    metoprolol succinate (TOPROL-XL) 200 MG 24 hr tablet Take 1 tablet (200 mg total) by mouth once daily.    pantoprazole (PROTONIX) 40 MG tablet Please take 1 tablet twice a day for 3 days, then 1 tablet daily    potassium chloride SA (K-DUR,KLOR-CON) 20 MEQ tablet Take 2 tablets (40 mEq total) by mouth 2 (two) times daily.    sacubitriL-valsartan (ENTRESTO)  mg per tablet Take 1 tablet by mouth 2 (two) times daily.     Family History       Problem Relation (Age of Onset)    Breast cancer Other    Cancer Maternal Grandmother, Paternal Grandmother, Paternal Aunt, Paternal Uncle    Diabetes Other    Hypertension Mother, Other    No Known Problems Sister, Brother, Son, Brother          Tobacco Use    Smoking status: Never    Smokeless tobacco: Never   Substance and Sexual Activity    Alcohol use: Not Currently     Comment: occasionally    Drug use: Not Currently     Types: Marijuana     Comment: in past very little marijuana    Sexual activity: Yes     Partners: Male     Birth control/protection: None     Review of Systems   Respiratory:  Positive for shortness of breath.    Cardiovascular:  Positive for chest pain.   Gastrointestinal:  Positive for nausea.    Genitourinary: Negative.    Musculoskeletal: Negative.    Neurological: Negative.      Objective:     Vital Signs (Most Recent):  Temp: 98 °F (36.7 °C) (05/04/25 1613)  Pulse: 107 (05/04/25 1900)  Resp: 20 (05/04/25 1613)  BP: 129/77 (05/04/25 1800)  SpO2: 95 % (05/04/25 1900) Vital Signs (24h Range):  Temp:  [98 °F (36.7 °C)] 98 °F (36.7 °C)  Pulse:  [] 107  Resp:  [20] 20  SpO2:  [95 %-100 %] 95 %  BP: (129-155)/() 129/77     Weight: 81.2 kg (179 lb)  Body mass index is 26.43 kg/m².     Physical Exam  Vitals and nursing note reviewed.   Constitutional:       General: She is not in acute distress.     Appearance: She is not ill-appearing.   HENT:      Mouth/Throat:      Mouth: Mucous membranes are moist.   Cardiovascular:      Rate and Rhythm: Normal rate.   Pulmonary:      Effort: Pulmonary effort is normal.   Abdominal:      General: Abdomen is flat.   Skin:     General: Skin is warm.   Neurological:      General: No focal deficit present.      Mental Status: She is alert.                Significant Labs: All pertinent labs within the past 24 hours have been reviewed.    Significant Imaging: I have reviewed all pertinent imaging results/findings within the past 24 hours.

## 2025-05-05 NOTE — PLAN OF CARE
Case Management Assessment     PCP: Veronika Rainey MD   Pharmacy:   [facility]REA Wilkinson  [address] [facility]  [address]  [place] [address]  Phone: [phone] Fax: [phone]    [facility] [place] [address]  [address]   [place] [address]  Phone: [phone] Fax: [phone]       Patient Arrived From: home  Existing Help at Home: spouse and son    Barriers to Discharge: none    Discharge Plan:    A. Home with family   B.       Patient stated that she lives with her spouse and 17yo son.She is independent with ADLs and drives.             05/05/25 1337   Discharge Assessment   Assessment Type Discharge Planning Assessment   Confirmed/corrected address, phone number and insurance Yes   Confirmed Demographics Correct on Facesheet   Source of Information family   Communicated MELA with patient/caregiver Date not available/Unable to determine   People in Home spouse;child(valencia), adult   Do you expect to return to your current living situation? Yes   Do you have help at home or someone to help you manage your care at home? Yes   Who are your caregiver(s) and their phone number(s)? spouse   Prior to hospitilization cognitive status: Alert/Oriented   Current cognitive status: Alert/Oriented   Walking or Climbing Stairs Difficulty no   Dressing/Bathing Difficulty no   Home Layout Able to live on 1st floor   Equipment Currently Used at Home none   Readmission within 30 days? Yes   Patient currently being followed by outpatient case management? No   Do you currently have service(s) that help you manage your care at home? No   Do you take prescription medications? Yes   Do you have prescription coverage? Yes   Coverage Medicaid   Do you have any problems affording any of your prescribed medications? No   Is the patient taking medications as prescribed? yes   Who is going to help you get home at discharge? spouse   How do you get to doctors appointments? car, drives self   Are you on  dialysis? No   Do you take coumadin? No   Discharge Plan A Home with family;Home Health   Discharge Plan B   (tbd)   DME Needed Upon Discharge    (tbd)   Discharge Plan discussed with: Spouse/sig other   Name(s) and Number(s) Jacob Marks   Transition of Care Barriers None

## 2025-05-05 NOTE — ED NOTES
Handoff report rcvd from DIGNA Trevino. Pt care assumed. Pt currently resting in bed. Family member at bedside. Pt AAOX4, speech clear, able to move all extremities. Pt ambulates on regular basis. Upon initial assessment pt has 10/10 chest pain to midsternal area. VSS. Pt started on nitro and heparin infusion. Currently tolerating well and reports 5/10 chest pain. External catheter placed prior to lasix administration. Approximately 200ml straw colored urine at this time.

## 2025-05-05 NOTE — ASSESSMENT & PLAN NOTE
Chest pain with significantly elevated troponin  Coronary angiography to be done later today by Dr. Peguero    Patient is status post aspirin and Plavix load    Continue heparin and nitro drips    Further recommendations after coronary angiography    Risks, benefits and alternatives of the catheterization procedure were discussed with the patient which include but are not limited to: bleeding, infection, death, heart attack, arrhythmia, kidney failure, stroke, need for emergency surgery, etc.  Patient understands and and agrees to proceed.  Consent was placed on the chart.    No

## 2025-05-05 NOTE — BRIEF OP NOTE
[facility] - Cath Lab  Brief Operative Note     SUMMARY     Surgery Date: 5/5/2025     Surgeons and Role:     * Kashif Peguero MD - Primary    Assisting Surgeon: None    Pre-op Diagnosis:  NSTEMI (non-ST elevated myocardial infarction) [I21.4]    Post-op Diagnosis:  Post-Op Diagnosis Codes:     * NSTEMI (non-ST elevated myocardial infarction) [I21.4]    Procedure(s) (LRB):  ANGIOGRAM, CORONARY ARTERY (N/A)    Anesthesia: Topical    Description of the findings of the procedure: uneventful LHC/cor angio via R radial.  Normal cors, elev LVEDP (18 mmHg).  R rad TR band for hemostasis.    Findings/Key Components:  LVEDP: 18mmHg  LVEF: 15% by echo    Dominance: Right  LM: normal  LAD: normal  LCx: normal  RCA: normal    Hemostasis:  R Radial band    Impression:  CP (ongoing) with elev trop c/w NSTEMI  Normal cors  Elev LVEDP  Severe LV dysfxn (chronic) by cath  R rad TR band for hemostasis    Plan:  Cont med rx  Stop heparin/NTG gtts  Cont ASA 81mg qd  Cont Plavix 75mg qd for now (1 year rx planned (thru May 2026) assuming CTA chest neg).  Cont statin  Add IV lasix, ?CHF as cause of current presentation  Change metoprolol tartrate to succinate 25mg qd  Cont entresto  Check CTA chest r/o PE  Dispo planning pending above study and symptom control  Consider as a candidate for CardioMEMS in the future    Estimated Blood Loss: <50cc         Specimens: None

## 2025-05-05 NOTE — HOSPITAL COURSE
Ms Nasra Marks is a 39 y.o. F with NICM EF 10-15%, G3DD admitted with chest pain. Troponin elevated, TWI present. Started heparin gtt, asa, plavix, and nitro gtt. Admitted to ICU. Continues to have chest pain. Cardiology consulted. LHC 5/5 no CAD. CTA no PE but does show bibasilar infiltrates vs atelectasis. Started amlodipine 2.5mg for potential microvascular dysfunction causing pain. Started IV lasix.     Patient with new Pulmonary HTN with PASP 37 mmHg 5/5/25, which previously normal just a few months ago (8/2024) and on previous ECHOs. New, acute, suspected Group 2, IV diuresis. Bicarb starting to come up a bit, will transition to oral lasix when able. Discussed with Cardiology, given her soft pressures and severe LHF, would be best to stop amlodipine and maximize GDMT.     Does not look like we will be able to uptitrate the GDMT at this point, once she is adequetly diuresed her BP is 103/75 even prior to her BP meds this am, this is lower dose than she was on outpatient, will dc with this regimen but need to F/u HF clinic and PCP to make adjustments if needed. She states she was taking 40 mg BID and still came in volume overloaded, will increase to PO lasix 80 mg BID at home.

## 2025-05-05 NOTE — ASSESSMENT & PLAN NOTE
History of nonischemic cardiomyopathy with most recent EF around 30-35%  Follows up with heart failure Service  Continue guideline directed therapy as BP allows  Switch metoprolol tartrate with metoprolol succinate 100 mg daily  Continue Entresto 24-26 b.i.d..  Up titrate if BP allows  She is already on empagliflozin and eplerenone as outpatient    Echocardiogram pending

## 2025-05-05 NOTE — PLAN OF CARE
Problem: Acute Coronary Syndrome  Goal: Optimal Adaptation to Illness  Outcome: Progressing  Goal: Absence of Cardiac-Related Pain  Outcome: Progressing  Goal: Normalized Cardiac Rhythm  Outcome: Progressing  Goal: Effective Cardiac Pump Function  Outcome: Progressing  Goal: Adequate Tissue Perfusion  Outcome: Progressing     Problem: Cardiac Catheterization (Diagnostic/Interventional)  Goal: Absence of Bleeding  Outcome: Progressing  Goal: Absence of Contrast-Induced Injury  Outcome: Progressing  Goal: Stable Heart Rate and Rhythm  Outcome: Progressing  Goal: Absence of Embolism Signs and Symptoms  Outcome: Progressing  Goal: Anesthesia/Sedation Recovery  Outcome: Progressing  Goal: Optimal Pain Control and Function  Outcome: Progressing  Goal: Absence of Vascular Access Complication  Outcome: Progressing     Problem: Adult Inpatient Plan of Care  Goal: Plan of Care Review  Outcome: Progressing  Goal: Patient-Specific Goal (Individualized)  Outcome: Progressing  Goal: Absence of Hospital-Acquired Illness or Injury  Outcome: Progressing  Goal: Optimal Comfort and Wellbeing  Outcome: Progressing  Goal: Readiness for Transition of Care  Outcome: Progressing     Problem: Infection  Goal: Absence of Infection Signs and Symptoms  Outcome: Progressing     Problem: Skin Injury Risk Increased  Goal: Skin Health and Integrity  Outcome: Progressing     Problem: Wound  Goal: Optimal Coping  Outcome: Progressing  Goal: Optimal Functional Ability  Outcome: Progressing  Goal: Absence of Infection Signs and Symptoms  Outcome: Progressing  Goal: Improved Oral Intake  Outcome: Progressing  Goal: Optimal Pain Control and Function  Outcome: Progressing  Goal: Skin Health and Integrity  Outcome: Progressing  Goal: Optimal Wound Healing  Outcome: Progressing

## 2025-05-06 ENCOUNTER — EPISODE CHANGES (OUTPATIENT)
Facility: CLINIC | Age: 40
End: 2025-05-06

## 2025-05-06 ENCOUNTER — DOCUMENTATION ONLY (OUTPATIENT)
Facility: CLINIC | Age: 40
End: 2025-05-06
Payer: MEDICAID

## 2025-05-06 DIAGNOSIS — R06.02 SOB (SHORTNESS OF BREATH): Primary | ICD-10-CM

## 2025-05-06 LAB
ABSOLUTE EOSINOPHIL (OHS): 0.14 K/UL
ABSOLUTE MONOCYTE (OHS): 1.05 K/UL (ref 0.3–1)
ABSOLUTE NEUTROPHIL COUNT (OHS): 6.11 K/UL (ref 1.8–7.7)
ANION GAP (OHS): 9 MMOL/L (ref 8–16)
BASOPHILS # BLD AUTO: 0.03 K/UL
BASOPHILS NFR BLD AUTO: 0.3 %
BUN SERPL-MCNC: 10 MG/DL (ref 6–20)
CALCIUM SERPL-MCNC: 8.6 MG/DL (ref 8.7–10.5)
CHLORIDE SERPL-SCNC: 105 MMOL/L (ref 95–110)
CO2 SERPL-SCNC: 22 MMOL/L (ref 23–29)
CREAT SERPL-MCNC: 0.8 MG/DL (ref 0.5–1.4)
ERYTHROCYTE [DISTWIDTH] IN BLOOD BY AUTOMATED COUNT: 15.3 % (ref 11.5–14.5)
GFR SERPLBLD CREATININE-BSD FMLA CKD-EPI: >60 ML/MIN/1.73/M2
GLUCOSE SERPL-MCNC: 91 MG/DL (ref 70–110)
HCT VFR BLD AUTO: 32.6 % (ref 37–48.5)
HGB BLD-MCNC: 10.4 GM/DL (ref 12–16)
HOLD SPECIMEN: NORMAL
HOLD SPECIMEN: NORMAL
IMM GRANULOCYTES # BLD AUTO: 0.03 K/UL (ref 0–0.04)
IMM GRANULOCYTES NFR BLD AUTO: 0.3 % (ref 0–0.5)
LYMPHOCYTES # BLD AUTO: 1.76 K/UL (ref 1–4.8)
MAGNESIUM SERPL-MCNC: 2.1 MG/DL (ref 1.6–2.6)
MCH RBC QN AUTO: 27.7 PG (ref 27–31)
MCHC RBC AUTO-ENTMCNC: 31.9 G/DL (ref 32–36)
MCV RBC AUTO: 87 FL (ref 82–98)
NUCLEATED RBC (/100WBC) (OHS): 0 /100 WBC
PHOSPHATE SERPL-MCNC: 3.8 MG/DL (ref 2.7–4.5)
PLATELET # BLD AUTO: 324 K/UL (ref 150–450)
PMV BLD AUTO: 10.9 FL (ref 9.2–12.9)
POTASSIUM SERPL-SCNC: 4.1 MMOL/L (ref 3.5–5.1)
RBC # BLD AUTO: 3.76 M/UL (ref 4–5.4)
RELATIVE EOSINOPHIL (OHS): 1.5 %
RELATIVE LYMPHOCYTE (OHS): 19.3 % (ref 18–48)
RELATIVE MONOCYTE (OHS): 11.5 % (ref 4–15)
RELATIVE NEUTROPHIL (OHS): 67.1 % (ref 38–73)
SODIUM SERPL-SCNC: 136 MMOL/L (ref 136–145)
WBC # BLD AUTO: 9.12 K/UL (ref 3.9–12.7)

## 2025-05-06 PROCEDURE — 25000003 PHARM REV CODE 250: Performed by: INTERNAL MEDICINE

## 2025-05-06 PROCEDURE — 25000003 PHARM REV CODE 250: Performed by: HOSPITALIST

## 2025-05-06 PROCEDURE — 80048 BASIC METABOLIC PNL TOTAL CA: CPT | Performed by: HOSPITALIST

## 2025-05-06 PROCEDURE — 63600175 PHARM REV CODE 636 W HCPCS: Performed by: INTERNAL MEDICINE

## 2025-05-06 PROCEDURE — 83735 ASSAY OF MAGNESIUM: CPT

## 2025-05-06 PROCEDURE — 99291 CRITICAL CARE FIRST HOUR: CPT | Mod: ,,, | Performed by: INTERNAL MEDICINE

## 2025-05-06 PROCEDURE — 84100 ASSAY OF PHOSPHORUS: CPT

## 2025-05-06 PROCEDURE — 11000001 HC ACUTE MED/SURG PRIVATE ROOM

## 2025-05-06 PROCEDURE — 25000003 PHARM REV CODE 250

## 2025-05-06 PROCEDURE — 63600175 PHARM REV CODE 636 W HCPCS: Performed by: STUDENT IN AN ORGANIZED HEALTH CARE EDUCATION/TRAINING PROGRAM

## 2025-05-06 PROCEDURE — 36415 COLL VENOUS BLD VENIPUNCTURE: CPT | Performed by: HOSPITALIST

## 2025-05-06 PROCEDURE — 85025 COMPLETE CBC W/AUTO DIFF WBC: CPT | Performed by: INTERNAL MEDICINE

## 2025-05-06 RX ADMIN — MUPIROCIN: 20 OINTMENT TOPICAL at 08:05

## 2025-05-06 RX ADMIN — MUPIROCIN: 20 OINTMENT TOPICAL at 09:05

## 2025-05-06 RX ADMIN — ONDANSETRON 4 MG: 2 INJECTION INTRAMUSCULAR; INTRAVENOUS at 01:05

## 2025-05-06 RX ADMIN — ENOXAPARIN SODIUM 40 MG: 40 INJECTION SUBCUTANEOUS at 04:05

## 2025-05-06 RX ADMIN — METOPROLOL SUCCINATE 25 MG: 25 TABLET, EXTENDED RELEASE ORAL at 09:05

## 2025-05-06 RX ADMIN — HYDROCODONE BITARTRATE AND ACETAMINOPHEN 1 TABLET: 5; 325 TABLET ORAL at 05:05

## 2025-05-06 RX ADMIN — AMLODIPINE BESYLATE 2.5 MG: 2.5 TABLET ORAL at 09:05

## 2025-05-06 RX ADMIN — FUROSEMIDE 40 MG: 10 INJECTION, SOLUTION INTRAVENOUS at 09:05

## 2025-05-06 RX ADMIN — HYDROCODONE BITARTRATE AND ACETAMINOPHEN 1 TABLET: 5; 325 TABLET ORAL at 01:05

## 2025-05-06 RX ADMIN — ATORVASTATIN CALCIUM 80 MG: 40 TABLET, FILM COATED ORAL at 08:05

## 2025-05-06 RX ADMIN — CLOPIDOGREL BISULFATE 75 MG: 75 TABLET, FILM COATED ORAL at 09:05

## 2025-05-06 RX ADMIN — HYDROCODONE BITARTRATE AND ACETAMINOPHEN 1 TABLET: 5; 325 TABLET ORAL at 08:05

## 2025-05-06 RX ADMIN — POTASSIUM BICARBONATE 40 MEQ: 391 TABLET, EFFERVESCENT ORAL at 12:05

## 2025-05-06 RX ADMIN — ASPIRIN 81 MG: 81 TABLET, COATED ORAL at 09:05

## 2025-05-06 NOTE — PLAN OF CARE
Changes in medical condition or discharge plan:  Patient admitted due to NSTEMI; BP controlled and is ready for stepdown from ICU.      Does patient need new DME? no    Follow up appts needed: PCP, Cardiology, Heart Failure Clinic    Medically stable: no;  Treatment will continue at lower level of care    Estimated Discharge Date:  5/7/2025 05/06/25 0900   Discharge Reassessment   Assessment Type Discharge Planning Reassessment   Did the patient's condition or plan change since previous assessment? Yes   Discharge Plan discussed with:   (Rounding)   Discharge Plan A Home   Discharge Plan B Home with family   DME Needed Upon Discharge  none   Transition of Care Barriers Substance Abuse  (Marijuana Use per chart review)   Why the patient remains in the hospital Requires continued medical care   Post-Acute Status   Coverage Payor: MEDICA[employer] - [employer] -   Discharge Delays None known at this time

## 2025-05-06 NOTE — HOSPITAL COURSE
Patient was seen and examined earlier today.    Transferred out of ICU  No recurrence of chest pain  Hemodynamically stable

## 2025-05-06 NOTE — EICU
Virtual ICU Quality Rounds    Admit Date: 5/4/2025  Hospital Day: 2    ICU Day: 1d 2h    24H Vital Sign Range:  Temp:  [97.7 °F (36.5 °C)-98.1 °F (36.7 °C)]   Pulse:  []   Resp:  [12-35]   BP: ()/(57-86)   SpO2:  [94 %-100 %]     VICU Surveillance Screening                    LDA reconciliation : Yes

## 2025-05-06 NOTE — PROGRESS NOTES
[facility] Intensive Care  Cardiology  Progress Note    Patient Name: Nasra Marks  MRN: [id number]  Admission Date: 5/4/2025  Hospital Length of Stay: 2 days  Code Status: Full Code   Attending Physician: Everton Watson MD   Primary Care Physician: Veronika Rainey MD  Expected Discharge Date: 5/6/2025  Principal Problem:NSTEMI (non-ST elevated myocardial infarction)    Subjective:     Hospital Course:   Patient was seen and examined earlier today.  Chest pain has improved.  Underwent coronary angiography yesterday which showed  angiographically normal coronary arteries.        Review of Systems   Cardiovascular:  Negative for chest pain, claudication, dyspnea on exertion, irregular heartbeat, leg swelling, near-syncope, orthopnea, palpitations, paroxysmal nocturnal dyspnea and syncope.   Respiratory:  Positive for shortness of breath. Negative for cough, snoring and sputum production.    Gastrointestinal:  Negative for abdominal pain, dysphagia, heartburn, nausea and vomiting.   Neurological:  Negative for dizziness, headaches, loss of balance and weakness.     Objective:     Vital Signs (Most Recent):  Temp: 97.9 °F (36.6 °C) (05/06/25 1200)  Pulse: 97 (05/06/25 1500)  Resp: 20 (05/06/25 1500)  BP: 92/60 (05/06/25 1500)  SpO2: 100 % (05/06/25 1500) Vital Signs (24h Range):  Temp:  [97.7 °F (36.5 °C)-98 °F (36.7 °C)] 97.9 °F (36.6 °C)  Pulse:  [] 97  Resp:  [12-35] 20  SpO2:  [94 %-100 %] 100 %  BP: ()/(52-94) 92/60     Weight: 83.3 kg (183 lb 10.3 oz)  Body mass index is 27.12 kg/m².     SpO2: 100 %         Intake/Output Summary (Last 24 hours) at 5/6/2025 1536  Last data filed at 5/6/2025 1124  Gross per 24 hour   Intake 470 ml   Output 2950 ml   Net -2480 ml       Lines/Drains/Airways       Drain  Duration             Female External Urinary Catheter w/ Suction 05/04/25 2000 1 day              Peripheral Intravenous Line  Duration                  Peripheral IV - Single Lumen 05/04/25 1644 20 G  Right Antecubital 1 day         Peripheral IV - Single Lumen 05/04/25 1914 20 G Anterior;Right Upper Arm 1 day         Peripheral IV - Single Lumen 05/04/25 2021 20 G Left Antecubital 1 day                       Physical Exam  HENT:      Head: Normocephalic and atraumatic.      Mouth/Throat:      Mouth: Mucous membranes are moist.   Eyes:      Extraocular Movements: Extraocular movements intact.      Pupils: Pupils are equal, round, and reactive to light.   Cardiovascular:      Rate and Rhythm: Normal rate and regular rhythm.      Pulses: Normal pulses.      Heart sounds: Normal heart sounds.   Pulmonary:      Effort: Pulmonary effort is normal.      Breath sounds: Normal breath sounds.   Abdominal:      General: Bowel sounds are normal.      Palpations: Abdomen is soft.   Musculoskeletal:      Left lower leg: No edema.   Skin:     General: Skin is warm.   Neurological:      General: No focal deficit present.      Mental Status: She is alert.   Psychiatric:         Mood and Affect: Mood normal.         Behavior: Behavior normal.            Current Medications:   amLODIPine  2.5 mg Oral Daily    aspirin  81 mg Oral Daily    atorvastatin  80 mg Oral QHS    clopidogreL  75 mg Oral Daily    enoxparin  40 mg Subcutaneous Q24H (prophylaxis, 1700)    furosemide (LASIX) injection  40 mg Intravenous Q12H    metoprolol succinate  25 mg Oral Daily    mupirocin   Nasal BID    sacubitriL-valsartan  2 tablet Oral BID      sodium chloride 0.9%    Continuous PRN   500 mL at 05/05/25 1150       Current Facility-Administered Medications:     acetaminophen, 650 mg, Oral, Once PRN    acetaminophen, 650 mg, Oral, Q4H PRN    atropine, 0.5 mg, Intravenous, PRN    benzonatate, 100 mg, Oral, TID PRN    fluticasone propionate, 1 spray, Each Nostril, BID PRN    guaiFENesin 100 mg/5 ml, 200 mg, Oral, Q4H PRN    hydrALAZINE, 10 mg, Intravenous, Q6H PRN    HYDROcodone-acetaminophen, 1 tablet, Oral, Q4H PRN    loperamide, 4 mg, Oral, Once PRN     melatonin, 6 mg, Oral, Nightly PRN    morphine, 2 mg, Intravenous, Q10 Min PRN    nitroGLYCERIN, 0.4 mg, Sublingual, Q5 Min PRN    ondansetron, 8 mg, Oral, Q8H PRN    ondansetron, 4 mg, Intravenous, Q8H PRN    polyethylene glycol, 17 g, Oral, Once PRN    senna-docusate, 1 tablet, Oral, Daily PRN    simethicone, 1 tablet, Oral, Q6H PRN    sodium chloride 0.9%, 250 mL, Intravenous, PRN    sodium chloride 0.9%, , , Continuous PRN    Laboratory (all labs reviewed):  CBC:  Recent Labs   Lab 04/18/25  1648 04/23/25  1047 05/04/25  1658 05/05/25  0548 05/06/25  0333   WBC 8.04 6.58 9.84 11.23 9.12   HGB 12.3 11.0 L 11.3 L 9.2 L 10.4 L   HCT 39.6 35.9 L 35.3 L 28.8 L 32.6 L   Platelet Count 351 332 339 296 324       CHEMISTRIES:  Recent Labs   Lab 04/23/25  1047 05/04/25  1658 05/05/25  0003 05/05/25  0548 05/05/25  2225 05/06/25  0333   Glucose 85 86 95 116 H 115 H 91   Sodium 139 139 140 138 137 136   Potassium 4.0 3.6 3.6 3.9 3.6 4.1   BUN 16 9 8 9 10 10   Creatinine 0.8 0.9 0.7 0.8 0.9 0.8   eGFR >60 >60 >60 >60 >60 >60   Calcium 8.9 8.7 7.5 L 7.4 L 8.7 8.6 L   Magnesium  1.9  --  2.2 2.1 2.0 2.1       CARDIAC BIOMARKERS:  Recent Labs   Lab 04/18/25  1648 05/04/25  1658 05/04/25  1919 05/04/25  2131 05/05/25  0003   Troponin-I 0.011 2.888 H 3.022 H  3.077 H 4.404 H 4.498 H       COAGS:  Recent Labs   Lab 10/23/23  2218 08/27/24  1249 08/27/24  1312 05/04/25  1819   INR 1.1 1.3 H 1.0 1.1       LIPIDS/LFTS:  Recent Labs   Lab 07/11/23  0836 01/22/24  2320 02/16/24  0946 04/25/24  1818 08/27/24  1312 12/28/24  1948 04/12/25  0539 04/18/25  1648 04/23/25  1047 05/04/25  1658 05/04/25  1919 05/05/25  0548   Cholesterol Total  --   --   --   --   --   --  122  --   --   --  119 L  --    Cholesterol 134  --  91 L  --  129  --   --   --   --   --   --   --    Triglycerides 39  --  23 L  --  70  --   --   --   --   --   --   --    Triglyceride  --   --   --   --   --   --  54  --   --   --  31  --    HDL 40  --  46  --  37 L   --   --   --   --   --   --   --    HDL Cholesterol  --   --   --   --   --   --  36 L  --   --   --  38 L  --    LDL Cholesterol 86.2  --  40.4 L  --  78.0  --  75.2  --   --   --  74.8  --    Non-HDL Cholesterol 94  --  45  --  92  --   --   --   --   --   --   --    Non HDL Cholesterol  --   --   --   --   --   --  86  --   --   --  81  --    AST 20   < > 19   < > 23   < > 61 H 31 22 48 H  --  44   ALT 15   < > 14   < > 13   < > 83 H 47 H 31 31  --  23    < > = values in this interval not displayed.       BNP:  Recent Labs   Lab 02/16/25  1143 04/12/25  0539 04/18/25  1648 04/23/25  1047 05/04/25  1658   BNP 1,285 H 2,875 H 841 H 1,100 H 2,322 H       TSH:  Recent Labs   Lab 08/27/24  1312 04/12/25  0539   TSH 3.133 3.141       Free T4:          Assessment and Plan:       * NSTEMI (non-ST elevated myocardial infarction)  Coronary angiography revealed angiographically normal coronary arteries    Continue with aspirin 81 mg/ Plavix 75 mg daily   Continue with high-intensity statin      Acute on chronic combined systolic and diastolic congestive heart failure  History of nonischemic cardiomyopathy with most recent EF around  10-15%  Follows up with heart failure Service at [facility]  Continue guideline directed therapy as BP allows  Continue Entresto/ Toprol-XL  Up titrate as BP allows  She is already on empagliflozin and eplerenone as outpatient     Continue IV diuresis (Lasix 40 mg BID) for another 24-48 hours    Keep electrolytes repleted   Trend Cr        Hx of ischemic right PCA stroke  Continue with aspirin and statin    Essential hypertension  Stable. Continue Entresto/ Toprol-XL  Do not hold guideline directed medical therapy unless systolic blood pressure is less than 90        VTE Risk Mitigation (From admission, onward)           Ordered     enoxaparin injection 40 mg  Every 24 hours         05/05/25 1201     IP VTE HIGH RISK PATIENT  Once         05/04/25 1828     Place sequential compression device   Until discontinued         05/04/25 1828                    Aruna Gold MD  Cardiology  [facility] - Intensive Care

## 2025-05-06 NOTE — SUBJECTIVE & OBJECTIVE
Review of Systems   Cardiovascular:  Negative for chest pain, claudication, dyspnea on exertion, irregular heartbeat, leg swelling, near-syncope, orthopnea, palpitations, paroxysmal nocturnal dyspnea and syncope.   Respiratory:  Positive for shortness of breath. Negative for cough, snoring and sputum production.    Gastrointestinal:  Negative for abdominal pain, dysphagia, heartburn, nausea and vomiting.   Neurological:  Negative for dizziness, headaches, loss of balance and weakness.     Objective:     Vital Signs (Most Recent):  Temp: 97.9 °F (36.6 °C) (05/06/25 1200)  Pulse: 97 (05/06/25 1500)  Resp: 20 (05/06/25 1500)  BP: 92/60 (05/06/25 1500)  SpO2: 100 % (05/06/25 1500) Vital Signs (24h Range):  Temp:  [97.7 °F (36.5 °C)-98 °F (36.7 °C)] 97.9 °F (36.6 °C)  Pulse:  [] 97  Resp:  [12-35] 20  SpO2:  [94 %-100 %] 100 %  BP: ()/(52-94) 92/60     Weight: 83.3 kg (183 lb 10.3 oz)  Body mass index is 27.12 kg/m².     SpO2: 100 %         Intake/Output Summary (Last 24 hours) at 5/6/2025 1536  Last data filed at 5/6/2025 1124  Gross per 24 hour   Intake 470 ml   Output 2950 ml   Net -2480 ml       Lines/Drains/Airways       Drain  Duration             Female External Urinary Catheter w/ Suction 05/04/25 2000 1 day              Peripheral Intravenous Line  Duration                  Peripheral IV - Single Lumen 05/04/25 1644 20 G Right Antecubital 1 day         Peripheral IV - Single Lumen 05/04/25 1914 20 G Anterior;Right Upper Arm 1 day         Peripheral IV - Single Lumen 05/04/25 2021 20 G Left Antecubital 1 day                       Physical Exam  HENT:      Head: Normocephalic and atraumatic.      Mouth/Throat:      Mouth: Mucous membranes are moist.   Eyes:      Extraocular Movements: Extraocular movements intact.      Pupils: Pupils are equal, round, and reactive to light.   Cardiovascular:      Rate and Rhythm: Normal rate and regular rhythm.      Pulses: Normal pulses.      Heart sounds: Normal  heart sounds.   Pulmonary:      Effort: Pulmonary effort is normal.      Breath sounds: Normal breath sounds.   Abdominal:      General: Bowel sounds are normal.      Palpations: Abdomen is soft.   Musculoskeletal:      Left lower leg: No edema.   Skin:     General: Skin is warm.   Neurological:      General: No focal deficit present.      Mental Status: She is alert.   Psychiatric:         Mood and Affect: Mood normal.         Behavior: Behavior normal.            Current Medications:   amLODIPine  2.5 mg Oral Daily    aspirin  81 mg Oral Daily    atorvastatin  80 mg Oral QHS    clopidogreL  75 mg Oral Daily    enoxparin  40 mg Subcutaneous Q24H (prophylaxis, 1700)    furosemide (LASIX) injection  40 mg Intravenous Q12H    metoprolol succinate  25 mg Oral Daily    mupirocin   Nasal BID    sacubitriL-valsartan  2 tablet Oral BID      sodium chloride 0.9%    Continuous PRN   500 mL at 05/05/25 1150       Current Facility-Administered Medications:     acetaminophen, 650 mg, Oral, Once PRN    acetaminophen, 650 mg, Oral, Q4H PRN    atropine, 0.5 mg, Intravenous, PRN    benzonatate, 100 mg, Oral, TID PRN    fluticasone propionate, 1 spray, Each Nostril, BID PRN    guaiFENesin 100 mg/5 ml, 200 mg, Oral, Q4H PRN    hydrALAZINE, 10 mg, Intravenous, Q6H PRN    HYDROcodone-acetaminophen, 1 tablet, Oral, Q4H PRN    loperamide, 4 mg, Oral, Once PRN    melatonin, 6 mg, Oral, Nightly PRN    morphine, 2 mg, Intravenous, Q10 Min PRN    nitroGLYCERIN, 0.4 mg, Sublingual, Q5 Min PRN    ondansetron, 8 mg, Oral, Q8H PRN    ondansetron, 4 mg, Intravenous, Q8H PRN    polyethylene glycol, 17 g, Oral, Once PRN    senna-docusate, 1 tablet, Oral, Daily PRN    simethicone, 1 tablet, Oral, Q6H PRN    sodium chloride 0.9%, 250 mL, Intravenous, PRN    sodium chloride 0.9%, , , Continuous PRN    Laboratory (all labs reviewed):  CBC:  Recent Labs   Lab 04/18/25  1648 04/23/25  1047 05/04/25  1658 05/05/25  0548 05/06/25  0333   WBC 8.04 6.58 9.84  11.23 9.12   HGB 12.3 11.0 L 11.3 L 9.2 L 10.4 L   HCT 39.6 35.9 L 35.3 L 28.8 L 32.6 L   Platelet Count 351 332 339 296 324       CHEMISTRIES:  Recent Labs   Lab 04/23/25  1047 05/04/25 1658 05/05/25  0003 05/05/25  0548 05/05/25 2225 05/06/25  0333   Glucose 85 86 95 116 H 115 H 91   Sodium 139 139 140 138 137 136   Potassium 4.0 3.6 3.6 3.9 3.6 4.1   BUN 16 9 8 9 10 10   Creatinine 0.8 0.9 0.7 0.8 0.9 0.8   eGFR >60 >60 >60 >60 >60 >60   Calcium 8.9 8.7 7.5 L 7.4 L 8.7 8.6 L   Magnesium  1.9  --  2.2 2.1 2.0 2.1       CARDIAC BIOMARKERS:  Recent Labs   Lab 04/18/25  1648 05/04/25 1658 05/04/25 1919 05/04/25  2131 05/05/25  0003   Troponin-I 0.011 2.888 H 3.022 H  3.077 H 4.404 H 4.498 H       COAGS:  Recent Labs   Lab 10/23/23  2218 08/27/24  1249 08/27/24  1312 05/04/25  1819   INR 1.1 1.3 H 1.0 1.1       LIPIDS/LFTS:  Recent Labs   Lab 07/11/23  0836 01/22/24  2320 02/16/24  0946 04/25/24  1818 08/27/24  1312 12/28/24  1948 04/12/25  0539 04/18/25  1648 04/23/25 1047 05/04/25 1658 05/04/25 1919 05/05/25  0548   Cholesterol Total  --   --   --   --   --   --  122  --   --   --  119 L  --    Cholesterol 134  --  91 L  --  129  --   --   --   --   --   --   --    Triglycerides 39  --  23 L  --  70  --   --   --   --   --   --   --    Triglyceride  --   --   --   --   --   --  54  --   --   --  31  --    HDL 40  --  46  --  37 L  --   --   --   --   --   --   --    HDL Cholesterol  --   --   --   --   --   --  36 L  --   --   --  38 L  --    LDL Cholesterol 86.2  --  40.4 L  --  78.0  --  75.2  --   --   --  74.8  --    Non-HDL Cholesterol 94  --  45  --  92  --   --   --   --   --   --   --    Non HDL Cholesterol  --   --   --   --   --   --  86  --   --   --  81  --    AST 20   < > 19   < > 23   < > 61 H 31 22 48 H  --  44   ALT 15   < > 14   < > 13   < > 83 H 47 H 31 31  --  23    < > = values in this interval not displayed.       BNP:  Recent Labs   Lab 02/16/25  1143 04/12/25  0539 04/18/25  4403  04/23/25  1047 05/04/25  1658   BNP 1,285 H 2,875 H 841 H 1,100 H 2,322 H       TSH:  Recent Labs   Lab 08/27/24  1312 04/12/25  0539   TSH 3.133 3.141       Free T4:

## 2025-05-06 NOTE — SUBJECTIVE & OBJECTIVE
Interval History:  No acute event overnight.  Patient still complain of intermittent episodes of chest pain.  Diuresing well; stable for transfer to telemetry .    Review of Systems  Objective:     Vital Signs (Most Recent):  Temp: 97.9 °F (36.6 °C) (05/06/25 1200)  Pulse: 93 (05/06/25 1530)  Resp: 20 (05/06/25 1530)  BP: 116/69 (05/06/25 1530)  SpO2: 100 % (05/06/25 1530) Vital Signs (24h Range):  Temp:  [97.7 °F (36.5 °C)-98 °F (36.7 °C)] 97.9 °F (36.6 °C)  Pulse:  [] 93  Resp:  [12-35] 20  SpO2:  [94 %-100 %] 100 %  BP: ()/(52-94) 116/69     Weight: 83.3 kg (183 lb 10.3 oz)  Body mass index is 27.12 kg/m².    Intake/Output Summary (Last 24 hours) at 5/6/2025 1551  Last data filed at 5/6/2025 1124  Gross per 24 hour   Intake 470 ml   Output 2450 ml   Net -1980 ml         Physical Exam  Constitutional:       General: She is not in acute distress.     Appearance: She is not ill-appearing, toxic-appearing or diaphoretic.   Cardiovascular:      Rate and Rhythm: Normal rate and regular rhythm.      Pulses: Normal pulses.      Heart sounds: Normal heart sounds. No murmur heard.     No friction rub.   Pulmonary:      Effort: Pulmonary effort is normal. No respiratory distress.      Breath sounds: Normal breath sounds. No stridor.   Neurological:      General: No focal deficit present.      Mental Status: She is oriented to person, place, and time.               Significant Labs: CBC:   Recent Labs   Lab 05/04/25  1658 05/05/25  0548 05/06/25  0333   WBC 9.84 11.23 9.12   HGB 11.3* 9.2* 10.4*   HCT 35.3* 28.8* 32.6*    296 324     CMP:   Recent Labs   Lab 05/04/25  1658 05/05/25  0003 05/05/25  0548 05/05/25  2225 05/06/25  0333      < > 138 137 136   K 3.6   < > 3.9 3.6 4.1      < > 112* 103 105   CO2 21*   < > 20* 22* 22*   GLU 86   < > 116* 115* 91   BUN 9   < > 9 10 10   CREATININE 0.9   < > 0.8 0.9 0.8   CALCIUM 8.7   < > 7.4* 8.7 8.6*   PROT 7.9  --  6.1  --   --    ALBUMIN 3.8  --   2.9*  --   --    BILITOT 1.0  --  0.7  --   --    ALKPHOS 59  --  46  --   --    AST 48*  --  44  --   --    ALT 31  --  23  --   --    ANIONGAP 10   < > 6* 12 9    < > = values in this interval not displayed.       Significant Imaging:   CTA Chest Non-Coronary (PE Studies)   Final Result      No evidence of pulmonary embolus or acute aortic syndrome.      Posterior basal areas of consolidation versus infiltrate with small pleural effusions.  This could be pneumonia or aspiration.         Electronically signed by: Rogelio Alcazar MD   Date:    05/05/2025   Time:    14:16      X-Ray Chest AP Portable   Final Result      No acute cardiopulmonary process identified.  No significant change.         Electronically signed by: Kasandra Erazo MD   Date:    05/04/2025   Time:    17:06

## 2025-05-06 NOTE — NURSING
[facility] - [place], [place]  Nurses Note -- 4 Eyes      5/6/2025       Skin assessed on: Transfer      [x] No Pressure Injuries Present    [x]Prevention Measures Documented    [] Yes LDA  for Pressure Injury Previously documented     [] Yes New Pressure Injury Discovered   [] LDA for New Pressure Injury Added      Attending RN:  Keyla Varma LPN     Second RN:  DIGNA Foreman

## 2025-05-06 NOTE — ASSESSMENT & PLAN NOTE
Anemia is likely due to unknown. Most recent hemoglobin and hematocrit are listed below.  Recent Labs     05/04/25  1658 05/05/25  0548 05/06/25  0333   HGB 11.3* 9.2* 10.4*   HCT 35.3* 28.8* 32.6*     Plan  - Monitor serial CBC: Daily  - Transfuse PRBC if patient becomes hemodynamically unstable, symptomatic or H/H drops below 7/21.  - Patient has not received any PRBC transfusions to date  - Patient's anemia is currently stable  - check iron panel, b12, folate

## 2025-05-06 NOTE — NURSING
Providers Asif and Shirley notified of pt c/o abdominal and chest pain with nausea. EKG completed per PRN orders. Pt given prn meds.

## 2025-05-06 NOTE — EICU
[facility] Night Rounds Checklist  24H Vital Sign Range:  Temp:  [97.7 °F (36.5 °C)-98.3 °F (36.8 °C)]   Pulse:  []   Resp:  [10-39]   BP: ()/(50-89)   SpO2:  [92 %-100 %]     Video rounds and LDA reconciliation

## 2025-05-06 NOTE — NURSING
Received report from DIGNA Foreman in ICU. Patient arrived to floor via stretcher. Patient walked to bed. Patient AAOx4. Patient currently not complaining of any pain. Patient not showing signs of distress. Bed in lowest position, wheels locked, call bell in reach, side rails up x2.

## 2025-05-06 NOTE — PLAN OF CARE
Patient remains in the ICU. Vital signs stable on room air. PRN pain medication administered for patient's chest discomfort with relief. PRN medication administered for flatulence with full relief. Patient had an 8-beat run of V-tach on 5/4/2025 at 1950, and was experiencing frequent PVCs, this RN notified MD Xavier, labs performed, and potassium replacement administered for a potassium level of 3.6. Patient has had good urine output throughout the shift. Plan of care reviewed with the patient. Patient remains free of falls, injury, or breakdown.     Problem: Acute Coronary Syndrome  Goal: Optimal Adaptation to Illness  Outcome: Progressing  Goal: Absence of Cardiac-Related Pain  Outcome: Progressing  Goal: Normalized Cardiac Rhythm  Outcome: Progressing  Goal: Effective Cardiac Pump Function  Outcome: Progressing  Goal: Adequate Tissue Perfusion  Outcome: Progressing     Problem: Cardiac Catheterization (Diagnostic/Interventional)  Goal: Absence of Bleeding  Outcome: Progressing  Goal: Absence of Contrast-Induced Injury  Outcome: Progressing  Goal: Stable Heart Rate and Rhythm  Outcome: Progressing  Goal: Absence of Embolism Signs and Symptoms  Outcome: Progressing  Goal: Anesthesia/Sedation Recovery  Outcome: Progressing  Goal: Optimal Pain Control and Function  Outcome: Progressing  Goal: Absence of Vascular Access Complication  Outcome: Progressing     Problem: Adult Inpatient Plan of Care  Goal: Plan of Care Review  Outcome: Progressing  Goal: Patient-Specific Goal (Individualized)  Outcome: Progressing  Goal: Absence of Hospital-Acquired Illness or Injury  Outcome: Progressing  Goal: Optimal Comfort and Wellbeing  Outcome: Progressing  Goal: Readiness for Transition of Care  Outcome: Progressing     Problem: Infection  Goal: Absence of Infection Signs and Symptoms  Outcome: Progressing     Problem: Skin Injury Risk Increased  Goal: Skin Health and Integrity  Outcome: Progressing     Problem: Wound  Goal: Optimal  Coping  Outcome: Progressing  Goal: Optimal Functional Ability  Outcome: Progressing  Goal: Absence of Infection Signs and Symptoms  Outcome: Progressing  Goal: Improved Oral Intake  Outcome: Progressing  Goal: Optimal Pain Control and Function  Outcome: Progressing  Goal: Skin Health and Integrity  Outcome: Progressing  Goal: Optimal Wound Healing  Outcome: Progressing

## 2025-05-06 NOTE — PLAN OF CARE
Pt pain controlled much better today in comparison to yesterday. Pt agreeable to plan of care to be transferred to floor for additional day(s) of monitoring per MD Watson plan.     Problem: Acute Coronary Syndrome  Goal: Optimal Adaptation to Illness  Outcome: Progressing  Goal: Absence of Cardiac-Related Pain  Outcome: Progressing  Goal: Normalized Cardiac Rhythm  Outcome: Progressing  Goal: Effective Cardiac Pump Function  Outcome: Progressing  Goal: Adequate Tissue Perfusion  Outcome: Progressing     Problem: Cardiac Catheterization (Diagnostic/Interventional)  Goal: Absence of Bleeding  Outcome: Progressing  Goal: Absence of Contrast-Induced Injury  Outcome: Progressing  Goal: Stable Heart Rate and Rhythm  Outcome: Progressing  Goal: Absence of Embolism Signs and Symptoms  Outcome: Progressing  Goal: Anesthesia/Sedation Recovery  Outcome: Progressing  Goal: Optimal Pain Control and Function  Outcome: Progressing  Goal: Absence of Vascular Access Complication  Outcome: Progressing     Problem: Adult Inpatient Plan of Care  Goal: Plan of Care Review  Outcome: Progressing  Goal: Patient-Specific Goal (Individualized)  Outcome: Progressing  Goal: Absence of Hospital-Acquired Illness or Injury  Outcome: Progressing  Goal: Optimal Comfort and Wellbeing  Outcome: Progressing  Goal: Readiness for Transition of Care  Outcome: Progressing     Problem: Infection  Goal: Absence of Infection Signs and Symptoms  Outcome: Progressing     Problem: Skin Injury Risk Increased  Goal: Skin Health and Integrity  Outcome: Progressing     Problem: Wound  Goal: Optimal Coping  Outcome: Progressing  Goal: Optimal Functional Ability  Outcome: Progressing  Goal: Absence of Infection Signs and Symptoms  Outcome: Progressing  Goal: Improved Oral Intake  Outcome: Progressing  Goal: Optimal Pain Control and Function  Outcome: Progressing  Goal: Skin Health and Integrity  Outcome: Progressing  Goal: Optimal Wound Healing  Outcome:  Progressing

## 2025-05-06 NOTE — ASSESSMENT & PLAN NOTE
- admitted with chest pain, shortness of breath, abdominal distension.   - BNP higher than ever before   Recent Labs   Lab 05/04/25  1658   BNP 2,322*   Echo  4/11/25    Interpretation Summary    Left Ventricle: The left ventricle is severely dilated. There is severely reduced systolic function with a visually estimated ejection fraction of 10 -15%. Grade III diastolic dysfunction.    Right Ventricle: The right ventricle has mild enlargement. Systolic function is mildly reduced.    Left Atrium: Moderately dilated    Mitral Valve: There is mild to moderate regurgitation.    Tricuspid Valve: There is mild regurgitation.    Pulmonary Artery: The estimated pulmonary artery systolic pressure is 21 mmHg.    IVC/SVC: Normal venous pressure at 3 mmHg.    Current Heart Failure Medications  hydrALAZINE injection 10 mg, Every 6 hours PRN, Intravenous  sacubitriL-valsartan 24-26 mg per tablet 2 tablet, 2 times daily, Oral  furosemide injection 40 mg, Every 12 hours, Intravenous  metoprolol succinate (TOPROL-XL) 24 hr tablet 25 mg, Daily, Oral    Plan  - Monitor strict I&Os and daily weights.    - Place on telemetry  - hold parameters on GDMT  - given lasix in ED- likely redose after angiogram

## 2025-05-06 NOTE — CONSULTS
"RD providing remote interim coverage.   Attempted to reach out to pt via phone room x2.      RD consulted for, "NSTEMI-Cardiac Diet ."    Pt receiving Heart Healthy diet with 50% intake recorded.      Attached diet education handouts to discharge instructions.  Will attempt to follow up later in the week prior to discharge.     Please Re-Consult as needed.     Thank you.      "

## 2025-05-06 NOTE — ASSESSMENT & PLAN NOTE
Coronary angiography revealed angiographically normal coronary arteries    Continue with aspirin 81 mg/ Plavix 75 mg daily   Continue with high-intensity statin

## 2025-05-06 NOTE — EICU
eICU Physician Virtual/Remote Brief Evaluation Note      Message from bedside RN   Requesting something for gas  Chart reviewed  /73, P 103, R 26, O2 sat 99  Admitted for CHF. [facility] with normal coronaries and 15% ejection fraction  Simethicone ordered q.6h PRN      TIERRA Stewart MD  eICU Attending  [phone]    This report has been created through the use of M-Modal dictation software. Typographical and content errors may occur with this process. While efforts are made to detect and correct such errors, in some cases errors will persist. For this reason, wording in this document should be considered in the proper context and not strictly verbatim

## 2025-05-06 NOTE — ASSESSMENT & PLAN NOTE
Stable. Continue Entresto/ Toprol-XL  Do not hold guideline directed medical therapy unless systolic blood pressure is less than 90

## 2025-05-06 NOTE — PROGRESS NOTES
[facility] Medicine  Progress Note    Patient Name: Nasra Marks  MRN: [id number]  Patient Class: IP- Inpatient   Admission Date: 5/4/2025  Length of Stay: 2 days  Attending Physician: Everton Watson MD  Primary Care Provider: Veronika Rainey MD        Subjective     Principal Problem:NSTEMI (non-ST elevated myocardial infarction)        HPI:  This is a 39-year-old female with a past medical history of NICM (EF: 10-15%, GIIIDD on 4/12/2025 s/p AICD), hypertension, CVA, depression, marijuana use, who presents with chest pain.    Patient presents for evaluation of chest pain that started on the day of presentation. She reports substernal chest pain/pressure with radiation to her left arm. Additional symptoms include shortness of breath, nausea, 2 episodes of emesis and diarrhea. She feels that she is volume overloaded. Patient reports compliance with her medications.     In the ED, the patient was hemodynamically stable.  Labs were remarkable for an elevated troponin (2.888), elevated BNP (2322).  EKG showed w/o STEMI.  Chest x-ray showed no acute process.  Cardiology recommended heparin/nitro infusions.  Patient was given aspirin 325 mg, Plavix 600 mg, Maalox/viscous lidocaine, Zofran 4 mg IV, and was started on heparin/nitroglycerin infusions.  She was admitted for further management.    Overview/Hospital Course:  Ms Nasra Marks is a 39 y.o. F with NICM EF 10-15%, G3DD admitted with chest pain. Troponin elevated, TWI present. Started heparin gtt, asa, plavix, and nitro gtt. Admitted to ICU. Continues to have chest pain. Cardiology consulted. [facility] 5/5 no CAD. CTA no PE but does show bibasilar infiltrates vs atelectasis. Started amlodipine 2.5mg for potential microvascular dysfunction causing pain. Started IV lasix.     Interval History:  No acute event overnight.  Patient still complain of intermittent episodes of chest pain.  Diuresing well; stable for transfer to telemetry  .    Review of Systems  Objective:     Vital Signs (Most Recent):  Temp: 97.9 °F (36.6 °C) (05/06/25 1200)  Pulse: 93 (05/06/25 1530)  Resp: 20 (05/06/25 1530)  BP: 116/69 (05/06/25 1530)  SpO2: 100 % (05/06/25 1530) Vital Signs (24h Range):  Temp:  [97.7 °F (36.5 °C)-98 °F (36.7 °C)] 97.9 °F (36.6 °C)  Pulse:  [] 93  Resp:  [12-35] 20  SpO2:  [94 %-100 %] 100 %  BP: ()/(52-94) 116/69     Weight: 83.3 kg (183 lb 10.3 oz)  Body mass index is 27.12 kg/m².    Intake/Output Summary (Last 24 hours) at 5/6/2025 1551  Last data filed at 5/6/2025 1124  Gross per 24 hour   Intake 470 ml   Output 2450 ml   Net -1980 ml         Physical Exam  Constitutional:       General: She is not in acute distress.     Appearance: She is not ill-appearing, toxic-appearing or diaphoretic.   Cardiovascular:      Rate and Rhythm: Normal rate and regular rhythm.      Pulses: Normal pulses.      Heart sounds: Normal heart sounds. No murmur heard.     No friction rub.   Pulmonary:      Effort: Pulmonary effort is normal. No respiratory distress.      Breath sounds: Normal breath sounds. No stridor.   Neurological:      General: No focal deficit present.      Mental Status: She is oriented to person, place, and time.               Significant Labs: CBC:   Recent Labs   Lab 05/04/25 1658 05/05/25 0548 05/06/25  0333   WBC 9.84 11.23 9.12   HGB 11.3* 9.2* 10.4*   HCT 35.3* 28.8* 32.6*    296 324     CMP:   Recent Labs   Lab 05/04/25 1658 05/05/25  0003 05/05/25  0548 05/05/25  2225 05/06/25  0333      < > 138 137 136   K 3.6   < > 3.9 3.6 4.1      < > 112* 103 105   CO2 21*   < > 20* 22* 22*   GLU 86   < > 116* 115* 91   BUN 9   < > 9 10 10   CREATININE 0.9   < > 0.8 0.9 0.8   CALCIUM 8.7   < > 7.4* 8.7 8.6*   PROT 7.9  --  6.1  --   --    ALBUMIN 3.8  --  2.9*  --   --    BILITOT 1.0  --  0.7  --   --    ALKPHOS 59  --  46  --   --    AST 48*  --  44  --   --    ALT 31  --  23  --   --    ANIONGAP 10   < > 6* 12 9     < > = values in this interval not displayed.       Significant Imaging:   CTA Chest Non-Coronary (PE Studies)   Final Result      No evidence of pulmonary embolus or acute aortic syndrome.      Posterior basal areas of consolidation versus infiltrate with small pleural effusions.  This could be pneumonia or aspiration.         Electronically signed by: oRgelio Alcazar MD   Date:    05/05/2025   Time:    14:16      X-Ray Chest AP Portable   Final Result      No acute cardiopulmonary process identified.  No significant change.         Electronically signed by: Kasandra Erazo MD   Date:    05/04/2025   Time:    17:06              Assessment & Plan  NSTEMI (non-ST elevated myocardial infarction)  Presents with chest pain  EKG w/ non specific ST-T changes   Recent Labs   Lab 05/05/25  0003   TROPONINI 4.498*     Cardiac catheterization on 05/05 showed clean coronary arteries  Continue GDMT with BB, Entresto,  Continue amlodipine and DAPT for microvascular disease  Possible CardioMEMS in the future; cardiology following    Essential hypertension  Patient's blood pressure range in the last 24 hours was: BP  Min: 92/60  Max: 126/94.The patient's inpatient anti-hypertensive regimen is listed below:  Current Antihypertensives  nitroGLYCERIN SL tablet 0.4 mg, Every 5 min PRN, Sublingual  hydrALAZINE injection 10 mg, Every 6 hours PRN, Intravenous  furosemide injection 40 mg, Every 12 hours, Intravenous  metoprolol succinate (TOPROL-XL) 24 hr tablet 25 mg, Daily, Oral  amLODIPine tablet 2.5 mg, Daily, Oral    Plan  - BP is controlled, no changes needed to their regimen- hold parameters     Acute on chronic combined systolic and diastolic congestive heart failure  - admitted with chest pain, shortness of breath, abdominal distension.   - BNP higher than ever before   Recent Labs   Lab 05/04/25  1658   BNP 2,322*   Echo  4/11/25    Interpretation Summary    Left Ventricle: The left ventricle is severely dilated. There is  severely reduced systolic function with a visually estimated ejection fraction of 10 -15%. Grade III diastolic dysfunction.    Right Ventricle: The right ventricle has mild enlargement. Systolic function is mildly reduced.    Left Atrium: Moderately dilated    Mitral Valve: There is mild to moderate regurgitation.    Tricuspid Valve: There is mild regurgitation.    Pulmonary Artery: The estimated pulmonary artery systolic pressure is 21 mmHg.    IVC/SVC: Normal venous pressure at 3 mmHg.    Current Heart Failure Medications  hydrALAZINE injection 10 mg, Every 6 hours PRN, Intravenous  sacubitriL-valsartan 24-26 mg per tablet 2 tablet, 2 times daily, Oral  furosemide injection 40 mg, Every 12 hours, Intravenous  metoprolol succinate (TOPROL-XL) 24 hr tablet 25 mg, Daily, Oral    Plan  - Monitor strict I&Os and daily weights.    - Place on telemetry  - hold parameters on GDMT  - given lasix in ED- likely redose after angiogram       ICD (implantable cardioverter-defibrillator), single, in situ  History noted. Telemetry monitoring   - interrogate medtronic device today   Hx of ischemic right PCA stroke  History noted. Continue statin     Iron deficiency anemia  Anemia is likely due to unknown. Most recent hemoglobin and hematocrit are listed below.  Recent Labs     05/04/25  1658 05/05/25  0548 05/06/25  0333   HGB 11.3* 9.2* 10.4*   HCT 35.3* 28.8* 32.6*     Plan  - Monitor serial CBC: Daily  - Transfuse PRBC if patient becomes hemodynamically unstable, symptomatic or H/H drops below 7/21.  - Patient has not received any PRBC transfusions to date  - Patient's anemia is currently stable  - check iron panel, b12, folate   VTE Risk Mitigation (From admission, onward)           Ordered     enoxaparin injection 40 mg  Every 24 hours         05/05/25 1201     IP VTE HIGH RISK PATIENT  Once         05/04/25 1828     Place sequential compression device  Until discontinued         05/04/25 1828                    Discharge  Planning   MELA: 5/6/2025     Code Status: Full Code   Medical Readiness for Discharge Date:   Discharge Plan A: Home   Discharge Delays: None known at this time        Critical care time spent on the evaluation and treatment of severe organ dysfunction, review of pertinent labs and imaging studies, discussions with consulting providers and discussions with patient/family: more than 35 minutes.            Everton Watson MD  Department of Hospital Medicine   [facility] [place] [facility]

## 2025-05-06 NOTE — ASSESSMENT & PLAN NOTE
History of nonischemic cardiomyopathy with most recent EF around  10-15%  Follows up with heart failure Service at [facility]  Continue guideline directed therapy as BP allows  Continue Entresto/ Toprol-XL  Up titrate as BP allows  She is already on empagliflozin and eplerenone as outpatient     Continue IV diuresis (Lasix 40 mg BID) for another 24-48 hours    Keep electrolytes repleted   Trend Cr

## 2025-05-06 NOTE — ASSESSMENT & PLAN NOTE
Patient's blood pressure range in the last 24 hours was: BP  Min: 92/60  Max: 126/94.The patient's inpatient anti-hypertensive regimen is listed below:  Current Antihypertensives  nitroGLYCERIN SL tablet 0.4 mg, Every 5 min PRN, Sublingual  hydrALAZINE injection 10 mg, Every 6 hours PRN, Intravenous  furosemide injection 40 mg, Every 12 hours, Intravenous  metoprolol succinate (TOPROL-XL) 24 hr tablet 25 mg, Daily, Oral  amLODIPine tablet 2.5 mg, Daily, Oral    Plan  - BP is controlled, no changes needed to their regimen- hold parameters

## 2025-05-06 NOTE — NURSING
[facility]  Nurses Note -- 4 Eyes      5/5/2025       Skin assessed on: Q Shift      [x] No Pressure Injuries Present    [x]Prevention Measures Documented    [] Yes LDA  for Pressure Injury Previously documented     [] Yes New Pressure Injury Discovered   [] LDA for New Pressure Injury Added      Attending RN:  Lashay Hazel RN     Second RN:  DIGNA Foreman    '

## 2025-05-06 NOTE — ASSESSMENT & PLAN NOTE
Presents with chest pain  EKG w/ non specific ST-T changes   Recent Labs   Lab 05/05/25  0003   TROPONINI 4.498*     Cardiac catheterization on 05/05 showed clean coronary arteries  Continue GDMT with BB, Entresto,  Continue amlodipine and DAPT for microvascular disease  Possible CardioMEMS in the future; cardiology following

## 2025-05-07 ENCOUNTER — TELEPHONE (OUTPATIENT)
Dept: FAMILY MEDICINE | Facility: CLINIC | Age: 40
End: 2025-05-07
Payer: MEDICAID

## 2025-05-07 ENCOUNTER — DOCUMENTATION ONLY (OUTPATIENT)
Facility: CLINIC | Age: 40
End: 2025-05-07
Payer: MEDICAID

## 2025-05-07 PROBLEM — I27.20 PULMONARY HYPERTENSION: Status: ACTIVE | Noted: 2025-05-07

## 2025-05-07 LAB
ALLENS TEST: ABNORMAL
ANION GAP (OHS): 9 MMOL/L (ref 8–16)
BUN SERPL-MCNC: 16 MG/DL (ref 6–20)
CALCIUM SERPL-MCNC: 8.8 MG/DL (ref 8.7–10.5)
CHLORIDE SERPL-SCNC: 105 MMOL/L (ref 95–110)
CO2 SERPL-SCNC: 23 MMOL/L (ref 23–29)
CREAT SERPL-MCNC: 0.8 MG/DL (ref 0.5–1.4)
GFR SERPLBLD CREATININE-BSD FMLA CKD-EPI: >60 ML/MIN/1.73/M2
GLUCOSE SERPL-MCNC: 86 MG/DL (ref 70–110)
HOLD SPECIMEN: NORMAL
HOLD SPECIMEN: NORMAL
MAGNESIUM SERPL-MCNC: 1.9 MG/DL (ref 1.6–2.6)
OHS QRS DURATION: 90 MS
OHS QRS DURATION: 90 MS
OHS QRS DURATION: 96 MS
OHS QRS DURATION: 98 MS
OHS QTC CALCULATION: 465 MS
OHS QTC CALCULATION: 484 MS
OHS QTC CALCULATION: 494 MS
OHS QTC CALCULATION: 495 MS
PHOSPHATE SERPL-MCNC: 4.5 MG/DL (ref 2.7–4.5)
POC ACTIVATED CLOTTING TIME K: 199 SEC (ref 74–137)
POTASSIUM SERPL-SCNC: 3.4 MMOL/L (ref 3.5–5.1)
SAMPLE: ABNORMAL
SITE: ABNORMAL
SODIUM SERPL-SCNC: 137 MMOL/L (ref 136–145)

## 2025-05-07 PROCEDURE — 84100 ASSAY OF PHOSPHORUS: CPT | Performed by: INTERNAL MEDICINE

## 2025-05-07 PROCEDURE — 99233 SBSQ HOSP IP/OBS HIGH 50: CPT | Mod: ,,, | Performed by: INTERNAL MEDICINE

## 2025-05-07 PROCEDURE — 25000003 PHARM REV CODE 250: Performed by: INTERNAL MEDICINE

## 2025-05-07 PROCEDURE — 83735 ASSAY OF MAGNESIUM: CPT | Performed by: INTERNAL MEDICINE

## 2025-05-07 PROCEDURE — 36415 COLL VENOUS BLD VENIPUNCTURE: CPT | Performed by: HOSPITALIST

## 2025-05-07 PROCEDURE — 25000003 PHARM REV CODE 250: Performed by: STUDENT IN AN ORGANIZED HEALTH CARE EDUCATION/TRAINING PROGRAM

## 2025-05-07 PROCEDURE — 11000001 HC ACUTE MED/SURG PRIVATE ROOM

## 2025-05-07 PROCEDURE — 63600175 PHARM REV CODE 636 W HCPCS: Performed by: INTERNAL MEDICINE

## 2025-05-07 PROCEDURE — 36415 COLL VENOUS BLD VENIPUNCTURE: CPT | Performed by: STUDENT IN AN ORGANIZED HEALTH CARE EDUCATION/TRAINING PROGRAM

## 2025-05-07 PROCEDURE — 80048 BASIC METABOLIC PNL TOTAL CA: CPT | Performed by: HOSPITALIST

## 2025-05-07 PROCEDURE — 87070 CULTURE OTHR SPECIMN AEROBIC: CPT | Performed by: HOSPITALIST

## 2025-05-07 PROCEDURE — 63600175 PHARM REV CODE 636 W HCPCS: Performed by: STUDENT IN AN ORGANIZED HEALTH CARE EDUCATION/TRAINING PROGRAM

## 2025-05-07 PROCEDURE — 94761 N-INVAS EAR/PLS OXIMETRY MLT: CPT

## 2025-05-07 RX ORDER — FUROSEMIDE 10 MG/ML
40 INJECTION INTRAMUSCULAR; INTRAVENOUS 2 TIMES DAILY
Status: DISCONTINUED | OUTPATIENT
Start: 2025-05-07 | End: 2025-05-07

## 2025-05-07 RX ORDER — FUROSEMIDE 10 MG/ML
40 INJECTION INTRAMUSCULAR; INTRAVENOUS 2 TIMES DAILY
Status: DISCONTINUED | OUTPATIENT
Start: 2025-05-07 | End: 2025-05-08 | Stop reason: HOSPADM

## 2025-05-07 RX ORDER — FUROSEMIDE 40 MG/1
40 TABLET ORAL 2 TIMES DAILY
Status: DISCONTINUED | OUTPATIENT
Start: 2025-05-09 | End: 2025-05-08

## 2025-05-07 RX ORDER — POTASSIUM CHLORIDE 20 MEQ/1
40 TABLET, EXTENDED RELEASE ORAL ONCE
Status: COMPLETED | OUTPATIENT
Start: 2025-05-07 | End: 2025-05-07

## 2025-05-07 RX ADMIN — ATORVASTATIN CALCIUM 80 MG: 40 TABLET, FILM COATED ORAL at 08:05

## 2025-05-07 RX ADMIN — CLOPIDOGREL BISULFATE 75 MG: 75 TABLET, FILM COATED ORAL at 08:05

## 2025-05-07 RX ADMIN — ASPIRIN 81 MG: 81 TABLET, COATED ORAL at 08:05

## 2025-05-07 RX ADMIN — HYDROCODONE BITARTRATE AND ACETAMINOPHEN 1 TABLET: 5; 325 TABLET ORAL at 04:05

## 2025-05-07 RX ADMIN — MUPIROCIN: 20 OINTMENT TOPICAL at 08:05

## 2025-05-07 RX ADMIN — SENNOSIDES AND DOCUSATE SODIUM 1 TABLET: 50; 8.6 TABLET ORAL at 05:05

## 2025-05-07 RX ADMIN — METOPROLOL SUCCINATE 25 MG: 25 TABLET, EXTENDED RELEASE ORAL at 08:05

## 2025-05-07 RX ADMIN — SACUBITRIL AND VALSARTAN 2 TABLET: 24; 26 TABLET, FILM COATED ORAL at 08:05

## 2025-05-07 RX ADMIN — FUROSEMIDE 40 MG: 10 INJECTION, SOLUTION INTRAVENOUS at 05:05

## 2025-05-07 RX ADMIN — FUROSEMIDE 40 MG: 10 INJECTION, SOLUTION INTRAVENOUS at 08:05

## 2025-05-07 RX ADMIN — ENOXAPARIN SODIUM 40 MG: 40 INJECTION SUBCUTANEOUS at 05:05

## 2025-05-07 RX ADMIN — POTASSIUM CHLORIDE 40 MEQ: 1500 TABLET, EXTENDED RELEASE ORAL at 08:05

## 2025-05-07 NOTE — NURSING
Patient complained of 6/10 pain. Gave PRN hydrocodone-acetaminophen 5-325. When reassessed, patient stated pain was 2/10. Patient complained of constipation. Gave PRN senna-docusate. Patient AAOx4. Patient currently not complaining of any pain. Patient not showing signs of distress. Bed in lowest position, wheels locked, call bell in reach, side rails up x2.

## 2025-05-07 NOTE — NURSING
[facility] [place]  Nurses Note -- 4 Eyes      5/7/2025       Skin assessed on: Q Shift      [x] No Pressure Injuries Present    [x]Prevention Measures Documented    [] Yes LDA  for Pressure Injury Previously documented     [] Yes New Pressure Injury Discovered   [] LDA for New Pressure Injury Added      Attending RN:  Keyla Varma LPN     Second RN:  DIGNA Younger

## 2025-05-07 NOTE — ASSESSMENT & PLAN NOTE
History of nonischemic cardiomyopathy with most recent EF around  10-15%  Follows up with heart failure Service at [facility]  Continue guideline directed therapy as BP allows  Continue Entresto/ Toprol-XL  Up titrate as BP allows  She is already on empagliflozin and eplerenone as outpatient    Switch to p.o. Lasix 20 mg daily

## 2025-05-07 NOTE — ASSESSMENT & PLAN NOTE
Patient's blood pressure range in the last 24 hours was: BP  Min: 92/60  Max: 126/94.The patient's inpatient anti-hypertensive regimen is listed below:  Current Antihypertensives  nitroGLYCERIN SL tablet 0.4 mg, Every 5 min PRN, Sublingual  hydrALAZINE injection 10 mg, Every 6 hours PRN, Intravenous  furosemide injection 40 mg, Every 12 hours, Intravenous  metoprolol succinate (TOPROL-XL) 24 hr tablet 25 mg, Daily, Oral    Plan  - BP is controlled, no changes needed to their regimen- hold parameters

## 2025-05-07 NOTE — SUBJECTIVE & OBJECTIVE
Review of Systems   Cardiovascular:  Negative for chest pain, claudication, dyspnea on exertion, irregular heartbeat, leg swelling, near-syncope, orthopnea, palpitations, paroxysmal nocturnal dyspnea and syncope.   Respiratory:  Negative for cough, shortness of breath, snoring and sputum production.    Gastrointestinal:  Negative for abdominal pain, dysphagia, heartburn, nausea and vomiting.   Neurological:  Negative for dizziness, headaches, loss of balance and weakness.     Objective:     Vital Signs (Most Recent):  Temp: 98.3 °F (36.8 °C) (05/07/25 1131)  Pulse: 83 (05/07/25 1131)  Resp: 18 (05/07/25 1131)  BP: 117/79 (05/07/25 1131)  SpO2: 99 % (05/07/25 1131) Vital Signs (24h Range):  Temp:  [97.9 °F (36.6 °C)-98.5 °F (36.9 °C)] 98.3 °F (36.8 °C)  Pulse:  [61-97] 83  Resp:  [15-20] 18  SpO2:  [97 %-100 %] 99 %  BP: ()/(52-83) 117/79     Weight: 83.3 kg (183 lb 10.3 oz)  Body mass index is 27.12 kg/m².     SpO2: 99 %         Intake/Output Summary (Last 24 hours) at 5/7/2025 1251  Last data filed at 5/7/2025 1218  Gross per 24 hour   Intake 720 ml   Output 1825 ml   Net -1105 ml       Lines/Drains/Airways       Drain  Duration             Female External Urinary Catheter w/ Suction 05/04/25 2000 2 days              Peripheral Intravenous Line  Duration                  Peripheral IV - Single Lumen 05/04/25 1644 20 G Right Antecubital 2 days         Peripheral IV - Single Lumen 05/04/25 1914 20 G Anterior;Right Upper Arm 2 days         Peripheral IV - Single Lumen 05/04/25 2021 20 G Left Antecubital 2 days                       Physical Exam  HENT:      Head: Normocephalic and atraumatic.      Mouth/Throat:      Mouth: Mucous membranes are moist.   Eyes:      Extraocular Movements: Extraocular movements intact.      Pupils: Pupils are equal, round, and reactive to light.   Cardiovascular:      Rate and Rhythm: Normal rate and regular rhythm.      Pulses: Normal pulses.      Heart sounds: Normal heart  sounds.   Pulmonary:      Effort: Pulmonary effort is normal.      Breath sounds: Normal breath sounds.   Abdominal:      General: Bowel sounds are normal.      Palpations: Abdomen is soft.   Musculoskeletal:      Left lower leg: No edema.   Skin:     General: Skin is warm.   Neurological:      General: No focal deficit present.      Mental Status: She is alert.   Psychiatric:         Mood and Affect: Mood normal.         Behavior: Behavior normal.            Current Medications:   aspirin  81 mg Oral Daily    atorvastatin  80 mg Oral QHS    clopidogreL  75 mg Oral Daily    enoxparin  40 mg Subcutaneous Q24H (prophylaxis, 1700)    furosemide (LASIX) injection  40 mg Intravenous BID    [START ON 5/9/2025] furosemide  40 mg Oral BID    metoprolol succinate  25 mg Oral Daily    mupirocin   Nasal BID    sacubitriL-valsartan  2 tablet Oral BID         Current Facility-Administered Medications:     acetaminophen, 650 mg, Oral, Once PRN    acetaminophen, 650 mg, Oral, Q4H PRN    atropine, 0.5 mg, Intravenous, PRN    benzonatate, 100 mg, Oral, TID PRN    fluticasone propionate, 1 spray, Each Nostril, BID PRN    guaiFENesin 100 mg/5 ml, 200 mg, Oral, Q4H PRN    hydrALAZINE, 10 mg, Intravenous, Q6H PRN    HYDROcodone-acetaminophen, 1 tablet, Oral, Q4H PRN    loperamide, 4 mg, Oral, Once PRN    melatonin, 6 mg, Oral, Nightly PRN    morphine, 2 mg, Intravenous, Q10 Min PRN    nitroGLYCERIN, 0.4 mg, Sublingual, Q5 Min PRN    ondansetron, 8 mg, Oral, Q8H PRN    ondansetron, 4 mg, Intravenous, Q8H PRN    polyethylene glycol, 17 g, Oral, Once PRN    senna-docusate, 1 tablet, Oral, Daily PRN    simethicone, 1 tablet, Oral, Q6H PRN    sodium chloride 0.9%, 250 mL, Intravenous, PRN    Laboratory (all labs reviewed):  CBC:  Recent Labs   Lab 04/18/25  1648 04/23/25  1047 05/04/25  1658 05/05/25  0548 05/06/25  0333   WBC 8.04 6.58 9.84 11.23 9.12   HGB 12.3 11.0 L 11.3 L 9.2 L 10.4 L   HCT 39.6 35.9 L 35.3 L 28.8 L 32.6 L   Platelet  Count 351 332 339 296 324       CHEMISTRIES:  Recent Labs   Lab 05/05/25  0003 05/05/25  0548 05/05/25  2225 05/06/25  0333 05/07/25  0603   Glucose 95 116 H 115 H 91 86   Sodium 140 138 137 136 137   Potassium 3.6 3.9 3.6 4.1 3.4 L   BUN 8 9 10 10 16   Creatinine 0.7 0.8 0.9 0.8 0.8   eGFR >60 >60 >60 >60 >60   Calcium 7.5 L 7.4 L 8.7 8.6 L 8.8   Magnesium  2.2 2.1 2.0 2.1 1.9       CARDIAC BIOMARKERS:  Recent Labs   Lab 04/18/25  1648 05/04/25  1658 05/04/25 1919 05/04/25  2131 05/05/25  0003   Troponin-I 0.011 2.888 H 3.022 H  3.077 H 4.404 H 4.498 H       COAGS:  Recent Labs   Lab 10/23/23  2218 08/27/24  1249 08/27/24  1312 05/04/25  1819   INR 1.1 1.3 H 1.0 1.1       LIPIDS/LFTS:  Recent Labs   Lab 07/11/23  0836 01/22/24  2320 02/16/24  0946 04/25/24  1818 08/27/24  1312 12/28/24  1948 04/12/25  0539 04/18/25  1648 04/23/25  1047 05/04/25  1658 05/04/25 1919 05/05/25  0548   Cholesterol Total  --   --   --   --   --   --  122  --   --   --  119 L  --    Cholesterol 134  --  91 L  --  129  --   --   --   --   --   --   --    Triglycerides 39  --  23 L  --  70  --   --   --   --   --   --   --    Triglyceride  --   --   --   --   --   --  54  --   --   --  31  --    HDL 40  --  46  --  37 L  --   --   --   --   --   --   --    HDL Cholesterol  --   --   --   --   --   --  36 L  --   --   --  38 L  --    LDL Cholesterol 86.2  --  40.4 L  --  78.0  --  75.2  --   --   --  74.8  --    Non-HDL Cholesterol 94  --  45  --  92  --   --   --   --   --   --   --    Non HDL Cholesterol  --   --   --   --   --   --  86  --   --   --  81  --    AST 20   < > 19   < > 23   < > 61 H 31 22 48 H  --  44   ALT 15   < > 14   < > 13   < > 83 H 47 H 31 31  --  23    < > = values in this interval not displayed.       BNP:  Recent Labs   Lab 02/16/25  1143 04/12/25  0539 04/18/25  1648 04/23/25  1047 05/04/25  1658   BNP 1,285 H 2,875 H 841 H 1,100 H 2,322 H       TSH:  Recent Labs   Lab 08/27/24  1312 04/12/25  0539   TSH 3.133  3.141       Free T4:

## 2025-05-07 NOTE — RESPIRATORY THERAPY
Pt asked to cough deeply and spit into cup that was placed at bedside     pt understands and will call when sputum is produced

## 2025-05-07 NOTE — PLAN OF CARE
Pt remains free from fall/injury. Applied SCDs. Complaining of pain, given PRN pain med 1x with moderate relief. Educated pt on measuring her UO and fluid intake, verbalizes understanding. No other complaints overnight. Plan of care ongoing.  Problem: Acute Coronary Syndrome  Goal: Normalized Cardiac Rhythm  Outcome: Progressing  Intervention: Monitor and Manage Cardiac Rhythm Effect  Flowsheets (Taken 5/7/2025 1444)  VTE Prevention/Management: remove, assess skin, and reapply sequential compression device     Problem: Cardiac Catheterization (Diagnostic/Interventional)  Goal: Stable Heart Rate and Rhythm  Outcome: Progressing  Goal: Optimal Pain Control and Function  Outcome: Progressing     Problem: Adult Inpatient Plan of Care  Goal: Absence of Hospital-Acquired Illness or Injury  Outcome: Progressing  Intervention: Identify and Manage Fall Risk  Flowsheets (Taken 5/7/2025 0784)  Safety Promotion/Fall Prevention:   assistive device/personal item within reach   bed alarm set   side rails raised x 2  Goal: Optimal Comfort and Wellbeing  Outcome: Progressing

## 2025-05-07 NOTE — TELEPHONE ENCOUNTER
----- Message from Alem Doughertys sent at 5/7/2025 10:55 AM CDT -----  Regarding: Hosp Fu  Good morning,Patient is discharging and needs a hosp fu scheduled. Could you assist please.Thank you!

## 2025-05-07 NOTE — PROGRESS NOTES
[facility] Medicine  Progress Note    Patient Name: Nasra Marks  MRN: [id number]  Patient Class: IP- Inpatient   Admission Date: 5/4/2025  Length of Stay: 3 days  Attending Physician: Stewart Ann MD  Primary Care Provider: Veronika Rainey MD        Subjective     Principal Problem:NSTEMI (non-ST elevated myocardial infarction)        HPI:  This is a 39-year-old female with a past medical history of NICM (EF: 10-15%, GIIIDD on 4/12/2025 s/p AICD), hypertension, CVA, depression, marijuana use, who presents with chest pain.    Patient presents for evaluation of chest pain that started on the day of presentation. She reports substernal chest pain/pressure with radiation to her left arm. Additional symptoms include shortness of breath, nausea, 2 episodes of emesis and diarrhea. She feels that she is volume overloaded. Patient reports compliance with her medications.     In the ED, the patient was hemodynamically stable.  Labs were remarkable for an elevated troponin (2.888), elevated BNP (2322).  EKG showed w/o STEMI.  Chest x-ray showed no acute process.  Cardiology recommended heparin/nitro infusions.  Patient was given aspirin 325 mg, Plavix 600 mg, Maalox/viscous lidocaine, Zofran 4 mg IV, and was started on heparin/nitroglycerin infusions.  She was admitted for further management.    Overview/Hospital Course:  Ms Nasra Marks is a 39 y.o. F with NICM EF 10-15%, G3DD admitted with chest pain. Troponin elevated, TWI present. Started heparin gtt, asa, plavix, and nitro gtt. Admitted to ICU. Continues to have chest pain. Cardiology consulted. [facility] 5/5 no CAD. CTA no PE but does show bibasilar infiltrates vs atelectasis. Started amlodipine 2.5mg for potential microvascular dysfunction causing pain. Started IV lasix.     Patient with new Pulmonary HTN with PASP 37 mmHg 5/5/25, which previously normal just a few months ago (8/2024) and on previous ECHOs. New, acute, suspected Group 2, IV  diuresis. Bicarb starting to come up a bit, will transition to oral lasix when able. Discussed with Cardiology, given her soft pressures and severe LHF, would be best to stop amlodipine and maximize GDMT.     Interval History:  NAEON.  No new issues.   CC- SOB  All questions answered and updates on care given.       ROS:  General: Negative for fevers   Cardiac: Negative for chest pain   Pulmonary: Negative for wheezing  GI: Negative for abdominal distention      Vitals:    05/07/25 0318 05/07/25 0425 05/07/25 0720 05/07/25 0724   BP:  100/65  101/67   BP Location:  Left arm  Left arm   Patient Position:  Lying  Lying   Pulse: 71 79 94 77   Resp:  18  18   Temp:  97.9 °F (36.6 °C)  98.1 °F (36.7 °C)   TempSrc:  Oral  Oral   SpO2:  99%  99%   Weight:       Height:              Body mass index is 27.12 kg/m².      PHYSICAL EXAM:  GENERAL APPEARANCE: alert and cooperative.     HEAD: NC/AT  CARDIAC: There is no cyanosis or pallor.   LUNGS: No apparent wheezing or stridor.  ABDOMEN: Non-distended. No guarding.  MSK: No joint erythema or tenderness.   EXTREMITIES: No significant new deformity or new joint abnormality.   NEUROLOGICAL: CN II-XII grossly intact.   SKIN: No lesions or eruptions.  PSYCHIATRIC: No tangential speech. No Hyperactive features.        Recent Results (from the past 24 hours)   Magnesium    Collection Time: 05/07/25  6:03 AM   Result Value Ref Range    Magnesium  1.9 1.6 - 2.6 mg/dL   Phosphorus    Collection Time: 05/07/25  6:03 AM   Result Value Ref Range    Phosphorus Level 4.5 2.7 - 4.5 mg/dL   Basic Metabolic Panel    Collection Time: 05/07/25  6:03 AM   Result Value Ref Range    Sodium 137 136 - 145 mmol/L    Potassium 3.4 (L) 3.5 - 5.1 mmol/L    Chloride 105 95 - 110 mmol/L    CO2 23 23 - 29 mmol/L    Glucose 86 70 - 110 mg/dL    BUN 16 6 - 20 mg/dL    Creatinine 0.8 0.5 - 1.4 mg/dL    Calcium 8.8 8.7 - 10.5 mg/dL    Anion Gap 9 8 - 16 mmol/L    eGFR >60 >60 mL/min/1.73/m2   Lavender Top Hold     Collection Time: 05/07/25  6:03 AM   Result Value Ref Range    Extra Tube Hold for add-ons.    Culture, Respiratory with Gram Stain    Collection Time: 05/07/25  6:42 AM    Specimen: Sputum, Expectorated; Respiratory   Result Value Ref Range    GRAM STAIN <10 Epithelial Cells/LPF     GRAM STAIN Few WBC seen     GRAM STAIN Moderate Gram positive cocci in clusters     GRAM STAIN Few Gram Negative Rods        Microbiology Results (last 7 days)       Procedure Component Value Units Date/Time    Culture, Respiratory with Gram Stain [[id number]] Collected: 05/07/25 0642    Order Status: Completed Specimen: Respiratory from Sputum, Expectorated Updated: 05/07/25 0802     GRAM STAIN <10 Epithelial Cells/LPF      Few WBC seen      Moderate Gram positive cocci in clusters      Few Gram Negative Rods    Influenza A & B by Molecular [[id number]]  (Normal) Collected: 05/05/25 1604    Order Status: Completed Specimen: Nasal Swab Updated: 05/05/25 1638     INFLUENZA A MOLECULAR Negative     INFLUENZA B MOLECULAR  Negative             Imaging Results              X-Ray Chest AP Portable (Final result)  Result time 05/04/25 17:06:31      Final result by Kasandra Erazo MD (05/04/25 17:06:31)                   Impression:      No acute cardiopulmonary process identified.  No significant change.      Electronically signed by: Kasandra Erazo MD  Date:    05/04/2025  Time:    17:06               Narrative:    EXAMINATION:  XR CHEST AP PORTABLE    CLINICAL HISTORY:  chest pain;    TECHNIQUE:  Single frontal view of the chest was performed.    COMPARISON:  04/18/2025.    FINDINGS:  Cardiac silhouette is stable in size.  Lungs are symmetrically expanded.  No evidence of new focal consolidative process, pneumothorax, or large pleural effusion.  No acute osseous abnormality identified.                                               Assessment & Plan  NSTEMI (non-ST elevated myocardial infarction)  Presents with chest pain  EKG w/ non  specific ST-T changes   Recent Labs   Lab 05/05/25  0003   TROPONINI 4.498*     Cardiac catheterization on 05/05 showed clean coronary arteries  Continue GDMT with BB, Entresto,  Continue amlodipine and DAPT for microvascular disease  Possible CardioMEMS in the future; cardiology following    Essential hypertension  Patient's blood pressure range in the last 24 hours was: BP  Min: 92/60  Max: 126/94.The patient's inpatient anti-hypertensive regimen is listed below:  Current Antihypertensives  nitroGLYCERIN SL tablet 0.4 mg, Every 5 min PRN, Sublingual  hydrALAZINE injection 10 mg, Every 6 hours PRN, Intravenous  furosemide injection 40 mg, Every 12 hours, Intravenous  metoprolol succinate (TOPROL-XL) 24 hr tablet 25 mg, Daily, Oral    Plan  - BP is controlled, no changes needed to their regimen- hold parameters     Acute on chronic combined systolic and diastolic congestive heart failure  - admitted with chest pain, shortness of breath, abdominal distension.   - BNP higher than ever before   Recent Labs   Lab 05/04/25  1658   BNP 2,322*   Echo  4/11/25    Interpretation Summary    Left Ventricle: The left ventricle is severely dilated. There is severely reduced systolic function with a visually estimated ejection fraction of 10 -15%. Grade III diastolic dysfunction.    Right Ventricle: The right ventricle has mild enlargement. Systolic function is mildly reduced.    Left Atrium: Moderately dilated    Mitral Valve: There is mild to moderate regurgitation.    Tricuspid Valve: There is mild regurgitation.    Pulmonary Artery: The estimated pulmonary artery systolic pressure is 21 mmHg.    IVC/SVC: Normal venous pressure at 3 mmHg.    Current Heart Failure Medications  hydrALAZINE injection 10 mg, Every 6 hours PRN, Intravenous  sacubitriL-valsartan 24-26 mg per tablet 2 tablet, 2 times daily, Oral  furosemide injection 40 mg, Every 12 hours, Intravenous  metoprolol succinate (TOPROL-XL) 24 hr tablet 25 mg, Daily,  Oral    Plan  - Monitor strict I&Os and daily weights.    - Place on telemetry  - hold parameters on GDMT  - given lasix in ED- likely redose after angiogram       ICD (implantable cardioverter-defibrillator), single, in situ  History noted. Telemetry monitoring   - interrogate medtronic device today   Hx of ischemic right PCA stroke  History noted. Continue statin     Iron deficiency anemia  Anemia is likely due to unknown. Most recent hemoglobin and hematocrit are listed below.  Recent Labs     05/04/25  1658 05/05/25  0548 05/06/25  0333   HGB 11.3* 9.2* 10.4*   HCT 35.3* 28.8* 32.6*     Plan  - Monitor serial CBC: Daily  - Transfuse PRBC if patient becomes hemodynamically unstable, symptomatic or H/H drops below 7/21.  - Patient has not received any PRBC transfusions to date  - Patient's anemia is currently stable  - check iron panel, b12, folate   Pulmonary hypertension  PASP 37 mmHg 5/5/25, previously normal- acute, new   suspected Group 2  IV diuresis     VTE Risk Mitigation (From admission, onward)           Ordered     enoxaparin injection 40 mg  Every 24 hours         05/05/25 1201     IP VTE HIGH RISK PATIENT  Once         05/04/25 1828     Place sequential compression device  Until discontinued         05/04/25 1828                    Discharge Planning   MELA: 5/6/2025     Code Status: Full Code   Medical Readiness for Discharge Date:   Discharge Plan A: Home   Discharge Delays: None known at this time                    Stewart Ann MD  Department of Hospital Medicine   [facility] - Med Surg

## 2025-05-07 NOTE — PROGRESS NOTES
[facility] Surg  Cardiology  Progress Note    Patient Name: Nasra Marks  MRN: [id number]  Admission Date: 5/4/2025  Hospital Length of Stay: 3 days  Code Status: Full Code   Attending Physician: Stewart Ann MD   Primary Care Physician: Veronika Rainey MD  Expected Discharge Date: 5/6/2025  Principal Problem:NSTEMI (non-ST elevated myocardial infarction)    Subjective:     Hospital Course:   Patient was seen and examined earlier today.    Transferred out of ICU  No recurrence of chest pain  Hemodynamically stable        Review of Systems   Cardiovascular:  Negative for chest pain, claudication, dyspnea on exertion, irregular heartbeat, leg swelling, near-syncope, orthopnea, palpitations, paroxysmal nocturnal dyspnea and syncope.   Respiratory:  Negative for cough, shortness of breath, snoring and sputum production.    Gastrointestinal:  Negative for abdominal pain, dysphagia, heartburn, nausea and vomiting.   Neurological:  Negative for dizziness, headaches, loss of balance and weakness.     Objective:     Vital Signs (Most Recent):  Temp: 98.3 °F (36.8 °C) (05/07/25 1131)  Pulse: 83 (05/07/25 1131)  Resp: 18 (05/07/25 1131)  BP: 117/79 (05/07/25 1131)  SpO2: 99 % (05/07/25 1131) Vital Signs (24h Range):  Temp:  [97.9 °F (36.6 °C)-98.5 °F (36.9 °C)] 98.3 °F (36.8 °C)  Pulse:  [61-97] 83  Resp:  [15-20] 18  SpO2:  [97 %-100 %] 99 %  BP: ()/(52-83) 117/79     Weight: 83.3 kg (183 lb 10.3 oz)  Body mass index is 27.12 kg/m².     SpO2: 99 %         Intake/Output Summary (Last 24 hours) at 5/7/2025 1251  Last data filed at 5/7/2025 1218  Gross per 24 hour   Intake 720 ml   Output 1825 ml   Net -1105 ml       Lines/Drains/Airways       Drain  Duration             Female External Urinary Catheter w/ Suction 05/04/25 2000 2 days              Peripheral Intravenous Line  Duration                  Peripheral IV - Single Lumen 05/04/25 1644 20 G Right Antecubital 2 days         Peripheral IV - Single Lumen  05/04/25 1914 20 G Anterior;Right Upper Arm 2 days         Peripheral IV - Single Lumen 05/04/25 2021 20 G Left Antecubital 2 days                       Physical Exam  HENT:      Head: Normocephalic and atraumatic.      Mouth/Throat:      Mouth: Mucous membranes are moist.   Eyes:      Extraocular Movements: Extraocular movements intact.      Pupils: Pupils are equal, round, and reactive to light.   Cardiovascular:      Rate and Rhythm: Normal rate and regular rhythm.      Pulses: Normal pulses.      Heart sounds: Normal heart sounds.   Pulmonary:      Effort: Pulmonary effort is normal.      Breath sounds: Normal breath sounds.   Abdominal:      General: Bowel sounds are normal.      Palpations: Abdomen is soft.   Musculoskeletal:      Left lower leg: No edema.   Skin:     General: Skin is warm.   Neurological:      General: No focal deficit present.      Mental Status: She is alert.   Psychiatric:         Mood and Affect: Mood normal.         Behavior: Behavior normal.            Current Medications:   aspirin  81 mg Oral Daily    atorvastatin  80 mg Oral QHS    clopidogreL  75 mg Oral Daily    enoxparin  40 mg Subcutaneous Q24H (prophylaxis, 1700)    furosemide (LASIX) injection  40 mg Intravenous BID    [START ON 5/9/2025] furosemide  40 mg Oral BID    metoprolol succinate  25 mg Oral Daily    mupirocin   Nasal BID    sacubitriL-valsartan  2 tablet Oral BID         Current Facility-Administered Medications:     acetaminophen, 650 mg, Oral, Once PRN    acetaminophen, 650 mg, Oral, Q4H PRN    atropine, 0.5 mg, Intravenous, PRN    benzonatate, 100 mg, Oral, TID PRN    fluticasone propionate, 1 spray, Each Nostril, BID PRN    guaiFENesin 100 mg/5 ml, 200 mg, Oral, Q4H PRN    hydrALAZINE, 10 mg, Intravenous, Q6H PRN    HYDROcodone-acetaminophen, 1 tablet, Oral, Q4H PRN    loperamide, 4 mg, Oral, Once PRN    melatonin, 6 mg, Oral, Nightly PRN    morphine, 2 mg, Intravenous, Q10 Min PRN    nitroGLYCERIN, 0.4 mg,  Sublingual, Q5 Min PRN    ondansetron, 8 mg, Oral, Q8H PRN    ondansetron, 4 mg, Intravenous, Q8H PRN    polyethylene glycol, 17 g, Oral, Once PRN    senna-docusate, 1 tablet, Oral, Daily PRN    simethicone, 1 tablet, Oral, Q6H PRN    sodium chloride 0.9%, 250 mL, Intravenous, PRN    Laboratory (all labs reviewed):  CBC:  Recent Labs   Lab 04/18/25  1648 04/23/25  1047 05/04/25  1658 05/05/25  0548 05/06/25  0333   WBC 8.04 6.58 9.84 11.23 9.12   HGB 12.3 11.0 L 11.3 L 9.2 L 10.4 L   HCT 39.6 35.9 L 35.3 L 28.8 L 32.6 L   Platelet Count 351 332 339 296 324       CHEMISTRIES:  Recent Labs   Lab 05/05/25  0003 05/05/25  0548 05/05/25  2225 05/06/25  0333 05/07/25  0603   Glucose 95 116 H 115 H 91 86   Sodium 140 138 137 136 137   Potassium 3.6 3.9 3.6 4.1 3.4 L   BUN 8 9 10 10 16   Creatinine 0.7 0.8 0.9 0.8 0.8   eGFR >60 >60 >60 >60 >60   Calcium 7.5 L 7.4 L 8.7 8.6 L 8.8   Magnesium  2.2 2.1 2.0 2.1 1.9       CARDIAC BIOMARKERS:  Recent Labs   Lab 04/18/25  1648 05/04/25  1658 05/04/25  1919 05/04/25  2131 05/05/25  0003   Troponin-I 0.011 2.888 H 3.022 H  3.077 H 4.404 H 4.498 H       COAGS:  Recent Labs   Lab 10/23/23  2218 08/27/24  1249 08/27/24  1312 05/04/25  1819   INR 1.1 1.3 H 1.0 1.1       LIPIDS/LFTS:  Recent Labs   Lab 07/11/23  0836 01/22/24  2320 02/16/24  0946 04/25/24  1818 08/27/24  1312 12/28/24  1948 04/12/25  0539 04/18/25  1648 04/23/25  1047 05/04/25  1658 05/04/25  1919 05/05/25  0548   Cholesterol Total  --   --   --   --   --   --  122  --   --   --  119 L  --    Cholesterol 134  --  91 L  --  129  --   --   --   --   --   --   --    Triglycerides 39  --  23 L  --  70  --   --   --   --   --   --   --    Triglyceride  --   --   --   --   --   --  54  --   --   --  31  --    HDL 40  --  46  --  37 L  --   --   --   --   --   --   --    HDL Cholesterol  --   --   --   --   --   --  36 L  --   --   --  38 L  --    LDL Cholesterol 86.2  --  40.4 L  --  78.0  --  75.2  --   --   --  74.8  --     Non-HDL Cholesterol 94  --  45  --  92  --   --   --   --   --   --   --    Non HDL Cholesterol  --   --   --   --   --   --  86  --   --   --  81  --    AST 20   < > 19   < > 23   < > 61 H 31 22 48 H  --  44   ALT 15   < > 14   < > 13   < > 83 H 47 H 31 31  --  23    < > = values in this interval not displayed.       BNP:  Recent Labs   Lab 02/16/25  1143 04/12/25  0539 04/18/25  1648 04/23/25  1047 05/04/25  1658   BNP 1,285 H 2,875 H 841 H 1,100 H 2,322 H       TSH:  Recent Labs   Lab 08/27/24  1312 04/12/25  0539   TSH 3.133 3.141       Free T4:          Assessment and Plan:         * NSTEMI (non-ST elevated myocardial infarction)  Coronary angiography revealed angiographically normal coronary arteries    Continue with aspirin 81 mg/ Plavix 75 mg daily  Continue with high-intensity statin      Acute on chronic combined systolic and diastolic congestive heart failure  History of nonischemic cardiomyopathy with most recent EF around  10-15%  Follows up with heart failure Service at [facility]  Continue guideline directed therapy as BP allows  Continue Entresto/ Toprol-XL  Up titrate as BP allows  She is already on empagliflozin and eplerenone as outpatient    Switch to p.o. Lasix 40 mg daily      Hx of ischemic right PCA stroke  Continue with antiplatelet therapy and statin    ICD (implantable cardioverter-defibrillator), single, in situ  Outpatient interrogation    Essential hypertension  Stable. Continue Entresto/ Toprol-XL  Do not hold guideline directed medical therapy unless systolic blood pressure is less than 90        VTE Risk Mitigation (From admission, onward)           Ordered     enoxaparin injection 40 mg  Every 24 hours         05/05/25 1201     IP VTE HIGH RISK PATIENT  Once         05/04/25 1828     Place sequential compression device  Until discontinued         05/04/25 1828                  Cardiology will sign off.  Follow with Dr. Peguero as outpatient.    Aruna Godl,  MD  Cardiology  [facility] Surg

## 2025-05-07 NOTE — NURSING
[facility] [place]  Nurses Note -- 4 Eyes      5/6/2025       Skin assessed on: Q Shift    With gauze dressing right anterior FA  [x] No Pressure Injuries Present    [x]Prevention Measures Documented    [] Yes LDA  for Pressure Injury Previously documented     [] Yes New Pressure Injury Discovered   [] LDA for New Pressure Injury Added      Attending RN:  Aren Jackson RN     Second RN:  DIGNA Faust

## 2025-05-08 VITALS
OXYGEN SATURATION: 97 % | TEMPERATURE: 98 F | SYSTOLIC BLOOD PRESSURE: 103 MMHG | HEIGHT: 69 IN | BODY MASS INDEX: 26.02 KG/M2 | DIASTOLIC BLOOD PRESSURE: 75 MMHG | RESPIRATION RATE: 18 BRPM | WEIGHT: 175.69 LBS | HEART RATE: 85 BPM

## 2025-05-08 LAB
ANION GAP (OHS): 9 MMOL/L (ref 8–16)
BUN SERPL-MCNC: 17 MG/DL (ref 6–20)
CALCIUM SERPL-MCNC: 8.7 MG/DL (ref 8.7–10.5)
CHLORIDE SERPL-SCNC: 105 MMOL/L (ref 95–110)
CO2 SERPL-SCNC: 23 MMOL/L (ref 23–29)
CREAT SERPL-MCNC: 0.8 MG/DL (ref 0.5–1.4)
GFR SERPLBLD CREATININE-BSD FMLA CKD-EPI: >60 ML/MIN/1.73/M2
GLUCOSE SERPL-MCNC: 87 MG/DL (ref 70–110)
MAGNESIUM SERPL-MCNC: 1.8 MG/DL (ref 1.6–2.6)
PHOSPHATE SERPL-MCNC: 3.9 MG/DL (ref 2.7–4.5)
POTASSIUM SERPL-SCNC: 3.6 MMOL/L (ref 3.5–5.1)
SODIUM SERPL-SCNC: 137 MMOL/L (ref 136–145)

## 2025-05-08 PROCEDURE — 25000003 PHARM REV CODE 250: Performed by: INTERNAL MEDICINE

## 2025-05-08 PROCEDURE — 84100 ASSAY OF PHOSPHORUS: CPT | Performed by: INTERNAL MEDICINE

## 2025-05-08 PROCEDURE — 82374 ASSAY BLOOD CARBON DIOXIDE: CPT | Performed by: HOSPITALIST

## 2025-05-08 PROCEDURE — 83735 ASSAY OF MAGNESIUM: CPT | Performed by: INTERNAL MEDICINE

## 2025-05-08 PROCEDURE — 36415 COLL VENOUS BLD VENIPUNCTURE: CPT | Performed by: HOSPITALIST

## 2025-05-08 PROCEDURE — 63600175 PHARM REV CODE 636 W HCPCS: Performed by: STUDENT IN AN ORGANIZED HEALTH CARE EDUCATION/TRAINING PROGRAM

## 2025-05-08 RX ORDER — CLOPIDOGREL BISULFATE 75 MG/1
75 TABLET ORAL DAILY
Qty: 90 TABLET | Refills: 3 | Status: SHIPPED | OUTPATIENT
Start: 2025-05-09 | End: 2026-05-09

## 2025-05-08 RX ORDER — FUROSEMIDE 40 MG/1
80 TABLET ORAL 2 TIMES DAILY
Status: DISCONTINUED | OUTPATIENT
Start: 2025-05-08 | End: 2025-05-08 | Stop reason: HOSPADM

## 2025-05-08 RX ORDER — FUROSEMIDE 40 MG/1
80 TABLET ORAL 2 TIMES DAILY
Qty: 360 TABLET | Refills: 2 | Status: SHIPPED | OUTPATIENT
Start: 2025-05-08 | End: 2025-05-08

## 2025-05-08 RX ORDER — FUROSEMIDE 80 MG/1
80 TABLET ORAL 2 TIMES DAILY
Qty: 180 TABLET | Refills: 2 | Status: SHIPPED | OUTPATIENT
Start: 2025-05-08 | End: 2026-02-02

## 2025-05-08 RX ORDER — METOPROLOL SUCCINATE 25 MG/1
25 TABLET, EXTENDED RELEASE ORAL DAILY
Qty: 90 TABLET | Refills: 3 | Status: SHIPPED | OUTPATIENT
Start: 2025-05-08 | End: 2026-05-08

## 2025-05-08 RX ADMIN — CLOPIDOGREL BISULFATE 75 MG: 75 TABLET, FILM COATED ORAL at 08:05

## 2025-05-08 RX ADMIN — ASPIRIN 81 MG: 81 TABLET, COATED ORAL at 08:05

## 2025-05-08 RX ADMIN — METOPROLOL SUCCINATE 25 MG: 25 TABLET, EXTENDED RELEASE ORAL at 08:05

## 2025-05-08 NOTE — PLAN OF CARE
Case Management Final Discharge Note    Discharge Disposition: Home    New DME ordered / company name: None    Relevant SDOH / Transition of Care Barriers:  None    Person available to provide assistance at home when needed and their contact information: Jacob spouse [phone]    Scheduled followup appointment: PCP- staff will call pt. Message sent to staff. Cardiology, Heart Failure Clinic and GI scheduled.    Referrals placed: None    Transportation: Family    Patient and family educated on discharge services and updated on DC plan. Bedside RN notified, patient clear to discharge from Case Management Perspective.       05/08/25 0917   Final Note   Assessment Type Final Discharge Note   Anticipated Discharge Disposition Home   What phone number can be called within the next 1-3 days to see how you are doing after discharge? [phone]   Hospital Resources/Appts/Education Provided Appointments scheduled and added to AVS   Post-Acute Status   Coverage MEDICA[employer] - [employer]   Discharge Delays None known at this time

## 2025-05-08 NOTE — NURSING
[facility] [place]  Nurses Note -- 4 Eyes      5/8/2025       Skin assessed on: Q Shift      [x] No Pressure Injuries Present    [x]Prevention Measures Documented    [] Yes LDA  for Pressure Injury Previously documented     [] Yes New Pressure Injury Discovered   [] LDA for New Pressure Injury Added      Attending RN:  Aren Jackson RN     Second RN:  DIGNA Faust

## 2025-05-08 NOTE — DISCHARGE SUMMARY
[facility] Medicine  Discharge Summary      Patient Name: Nasra Marks  MRN: [id number]  [facility]: [id number]  Patient Class: IP- Inpatient  Admission Date: 5/4/2025  Hospital Length of Stay: 4 days  Discharge Date and Time: 05/08/2025 9:08 AM  Attending Physician: Stewart Ann MD   Discharging Provider: Stewart Ann MD  Primary Care Provider: Veronika Rainey MD    Primary Care Team: Networked reference to record PCT     HPI:   This is a 39-year-old female with a past medical history of NICM (EF: 10-15%, GIIIDD on 4/12/2025 s/p AICD), hypertension, CVA, depression, marijuana use, who presents with chest pain.    Patient presents for evaluation of chest pain that started on the day of presentation. She reports substernal chest pain/pressure with radiation to her left arm. Additional symptoms include shortness of breath, nausea, 2 episodes of emesis and diarrhea. She feels that she is volume overloaded. Patient reports compliance with her medications.     In the ED, the patient was hemodynamically stable.  Labs were remarkable for an elevated troponin (2.888), elevated BNP (2322).  EKG showed w/o STEMI.  Chest x-ray showed no acute process.  Cardiology recommended heparin/nitro infusions.  Patient was given aspirin 325 mg, Plavix 600 mg, Maalox/viscous lidocaine, Zofran 4 mg IV, and was started on heparin/nitroglycerin infusions.  She was admitted for further management.    Procedure(s) (LRB):  ANGIOGRAM, CORONARY ARTERY (N/A)  Left heart cath (Left)      Hospital Course:   Ms Nasra Marks is a 39 y.o. F with NICM EF 10-15%, G3DD admitted with chest pain. Troponin elevated, TWI present. Started heparin gtt, asa, plavix, and nitro gtt. Admitted to ICU. Continues to have chest pain. Cardiology consulted. [facility] 5/5 no CAD. CTA no PE but does show bibasilar infiltrates vs atelectasis. Started amlodipine 2.5mg for potential microvascular dysfunction causing pain. Started IV lasix.     Patient with new  Pulmonary HTN with PASP 37 mmHg 5/5/25, which previously normal just a few months ago (8/2024) and on previous ECHOs. New, acute, suspected Group 2, IV diuresis. Bicarb starting to come up a bit, will transition to oral lasix when able. Discussed with Cardiology, given her soft pressures and severe LHF, would be best to stop amlodipine and maximize GDMT.     Does not look like we will be able to uptitrate the GDMT at this point, once she is adequetly diuresed her BP is 103/75 even prior to her BP meds this am, this is lower dose than she was on outpatient, will dc with this regimen but need to F/u HF clinic and PCP to make adjustments if needed. She states she was taking 40 mg BID and still came in volume overloaded, will increase to PO lasix 80 mg BID at home.     Your BP is soft on the lower dosages here, recommend changing to lower doses for now, but would like to maximize   f/u with HF clinic, PCP and cardiology to see if able to increase again.   But once fluid is all off, your BP is lower- Will increase home lasix to 80 mg BID  Take Apirin, plavix and statin, go to Cardiologist   New onset pulmonary hypertension, suspect from HF    Thank you for trusting [facility] and me with your care.  We are honored that you entrusted us with your healthcare needs. Your satisfaction is very important to us and we hope you have been very pleased with your experience at [facility]. After your discharge you may receive a survey asking you to rate your hospital experience- Please help us by completing this survey. We hope that you have received the very best care possible during your hospitalization at [facility], as your satisfaction is our top priority.    Let me know if there is anything more I can do!!              Stewart Ann MD        Medical Director        Section Head of         Board-Certified IM Attending    ROS:  General: Negative for fevers or chills.  Cardiac: Negative for chest  pain   Pulmonary: Negative for wheezing.  GI: Negative for abdominal pain     Vitals:    05/08/25 0018 05/08/25 0444 05/08/25 0600 05/08/25 0728   BP: 111/74 103/69  103/75   BP Location:    Left arm   Patient Position: Lying Lying  Lying   Pulse: 74 72  85   Resp: 18 18  18   Temp: 98.5 °F (36.9 °C) 97.8 °F (36.6 °C)  98 °F (36.7 °C)   TempSrc: Oral Oral  Oral   SpO2: 99% 98%  97%   Weight:   79.7 kg (175 lb 11.3 oz)    Height:              Body mass index is 25.95 kg/m².      PHYSICAL EXAM:  GENERAL APPEARANCE: alert and cooperative  HEENT:     HEAD: NC/AT  CARDIAC: There is no peripheral edema, cyanosis or pallor.   LUNGS: No wheezing, work of breathing normal  ABDOMEN: Non-distended. No guarding.  PSYCHIATRIC: No tangential speech. No Hyperactive features.    Goals of Care Treatment Preferences:  Code Status: Full Code      SDOH Screening:  The patient was screened for utility difficulties, food insecurity, transport difficulties, housing insecurity, and interpersonal safety and there were no concerns identified this admission.     Consults:   Consults (From admission, onward)          Status Ordering Provider     Inpatient consult to Registered Dietitian/Nutritionist  Once        Provider:  (Not yet assigned)    Completed CHRISTINE LAZARO     Inpatient consult to Social Work/Case Management  Once        Provider:  (Not yet assigned)    Completed CHRISTINE LAZARO     Inpatient consult to Cardiology  Once        Provider:  Aruna Gold MD    Completed EMELIA MASSEY            Assessment & Plan  NSTEMI (non-ST elevated myocardial infarction)  Presents with chest pain  EKG w/ non specific ST-T changes   Recent Labs   Lab 05/05/25  0003   TROPONINI 4.498*     Cardiac catheterization on 05/05 showed clean coronary arteries  Continue GDMT with BB, Entresto,  Continue amlodipine and DAPT for microvascular disease  Possible CardioMEMS in the future; cardiology following    Essential hypertension  Patient's  blood pressure range in the last 24 hours was: BP  Min: 103/75  Max: 131/89.The patient's inpatient anti-hypertensive regimen is listed below:  Current Antihypertensives  nitroGLYCERIN SL tablet 0.4 mg, Every 5 min PRN, Sublingual  hydrALAZINE injection 10 mg, Every 6 hours PRN, Intravenous  metoprolol succinate (TOPROL-XL) 24 hr tablet 25 mg, Daily, Oral  furosemide injection 40 mg, 2 times daily, Intravenous  furosemide tablet 40 mg, 2 times daily, Oral  furosemide (LASIX) tablet, 2 times daily, Oral  metoprolol succinate (TOPROL-XL) 24 hr tablet, Daily, Oral    Plan  - BP is controlled, no changes needed to their regimen- hold parameters     Acute on chronic combined systolic and diastolic congestive heart failure  - admitted with chest pain, shortness of breath, abdominal distension.   - BNP higher than ever before   Recent Labs   Lab 05/04/25  1658   BNP 2,322*   Echo  4/11/25    Interpretation Summary    Left Ventricle: The left ventricle is severely dilated. There is severely reduced systolic function with a visually estimated ejection fraction of 10 -15%. Grade III diastolic dysfunction.    Right Ventricle: The right ventricle has mild enlargement. Systolic function is mildly reduced.    Left Atrium: Moderately dilated    Mitral Valve: There is mild to moderate regurgitation.    Tricuspid Valve: There is mild regurgitation.    Pulmonary Artery: The estimated pulmonary artery systolic pressure is 21 mmHg.    IVC/SVC: Normal venous pressure at 3 mmHg.    Current Heart Failure Medications  hydrALAZINE injection 10 mg, Every 6 hours PRN, Intravenous  sacubitriL-valsartan 24-26 mg per tablet 2 tablet, 2 times daily, Oral  metoprolol succinate (TOPROL-XL) 24 hr tablet 25 mg, Daily, Oral  furosemide injection 40 mg, 2 times daily, Intravenous  furosemide tablet 40 mg, 2 times daily, Oral  furosemide (LASIX) tablet, 2 times daily, Oral  metoprolol succinate (TOPROL-XL) 24 hr tablet, Daily, Oral  , 2 times daily,  Oral    Plan  - Monitor strict I&Os and daily weights.    - Place on telemetry  - hold parameters on GDMT  - given lasix in ED- likely redose after angiogram       ICD (implantable cardioverter-defibrillator), single, in situ  History noted. Telemetry monitoring   - interrogate medtronic device today   Hx of ischemic right PCA stroke  History noted. Continue statin     Iron deficiency anemia  Anemia is likely due to unknown. Most recent hemoglobin and hematocrit are listed below.  Recent Labs     05/06/25  0333   HGB 10.4*   HCT 32.6*     Plan  - Monitor serial CBC: Daily  - Transfuse PRBC if patient becomes hemodynamically unstable, symptomatic or H/H drops below 7/21.  - Patient has not received any PRBC transfusions to date  - Patient's anemia is currently stable  - check iron panel, b12, folate   Pulmonary hypertension  PASP 37 mmHg 5/5/25, previously normal- acute, new   suspected Group 2  IV diuresis     Final Active Diagnoses:    Diagnosis Date Noted POA    PRINCIPAL PROBLEM:  NSTEMI (non-ST elevated myocardial infarction) [I21.4] 05/04/2025 Yes    Pulmonary hypertension [I27.20] 05/07/2025 Yes    Iron deficiency anemia [D50.9] 05/05/2025 Yes    Hx of ischemic right PCA stroke [Z86.73] 02/08/2023 Not Applicable    ICD (implantable cardioverter-defibrillator), single, in situ [Z95.810] 02/17/2020 Yes    Acute on chronic combined systolic and diastolic congestive heart failure [I50.43] 05/24/2019 Yes    Essential hypertension [I10] 11/19/2012 Yes     Chronic      Problems Resolved During this Admission:       Discharged Condition: good    Disposition: home    Follow Up:    Patient Instructions:      Ambulatory referral/consult to Internal Medicine   Standing Status: Future   Referral Priority: Routine Referral Type: Consultation   Referral Reason: Specialty Services Required   Requested Specialty: Internal Medicine   Number of Visits Requested: 1     Ambulatory referral/consult to Cardiology   Standing Status:  Future   Referral Priority: Routine Referral Type: Consultation   Referral Reason: Specialty Services Required   Requested Specialty: Cardiology   Number of Visits Requested: 1     Ambulatory referral/consult to Adult Advanced Heart Failure   Standing Status: Future   Referral Priority: Routine Referral Type: Transplants   Number of Visits Requested: 1       Significant Diagnostic Studies:   Recent Results (from the past 100 hours)   EKG 12-lead    Collection Time: 05/04/25  4:10 PM   Result Value Ref Range    QRS Duration 90 ms    [facility] QTC Calculation 465 ms   POCT urine pregnancy    Collection Time: 05/04/25  4:36 PM   Result Value Ref Range    POC Preg Test, Ur Negative Negative     Acceptable Yes    Comprehensive metabolic panel    Collection Time: 05/04/25  4:58 PM   Result Value Ref Range    Sodium 139 136 - 145 mmol/L    Potassium 3.6 3.5 - 5.1 mmol/L    Chloride 108 95 - 110 mmol/L    CO2 21 (L) 23 - 29 mmol/L    Glucose 86 70 - 110 mg/dL    BUN 9 6 - 20 mg/dL    Creatinine 0.9 0.5 - 1.4 mg/dL    Calcium 8.7 8.7 - 10.5 mg/dL    Protein Total 7.9 6.0 - 8.4 gm/dL    Albumin 3.8 3.5 - 5.2 g/dL    Bilirubin Total 1.0 0.1 - 1.0 mg/dL    ALP 59 40 - 150 unit/L    AST 48 (H) 11 - 45 unit/L    ALT 31 10 - 44 unit/L    Anion Gap 10 8 - 16 mmol/L    eGFR >60 >60 mL/min/1.73/m2   Troponin I #1    Collection Time: 05/04/25  4:58 PM   Result Value Ref Range    Troponin-I 2.888 (H) <=0.026 ng/mL   B-Type natriuretic peptide (BNP)    Collection Time: 05/04/25  4:58 PM   Result Value Ref Range    BNP 2,322 (H) 0 - 99 pg/mL   Lipase    Collection Time: 05/04/25  4:58 PM   Result Value Ref Range    Lipase Level 12 4 - 60 U/L   CBC with Differential    Collection Time: 05/04/25  4:58 PM   Result Value Ref Range    WBC 9.84 3.90 - 12.70 K/uL    RBC 4.04 4.00 - 5.40 M/uL    HGB 11.3 (L) 12.0 - 16.0 gm/dL    HCT 35.3 (L) 37.0 - 48.5 %    MCV 87 82 - 98 fL    MCH 28.0 27.0 - 31.0 pg    MCHC 32.0 32.0 - 36.0 g/dL     RDW 15.4 (H) 11.5 - 14.5 %    Platelet Count 339 150 - 450 K/uL    MPV 10.9 9.2 - 12.9 fL    Nucleated RBC 0 <=0 /100 WBC    Neut % 75.4 (H) 38 - 73 %    Lymph % 17.1 (L) 18 - 48 %    Mono % 6.3 4 - 15 %    Eos % 0.5 <=8 %    Basophil % 0.4 <=1.9 %    Imm Grans % 0.3 0.0 - 0.5 %    Neut # 7.42 1.8 - 7.7 K/uL    Lymph # 1.68 1 - 4.8 K/uL    Mono # 0.62 0.3 - 1 K/uL    Eos # 0.05 <=0.5 K/uL    Baso # 0.04 <=0.2 K/uL    Imm Grans # 0.03 0.00 - 0.04 K/uL   APTT    Collection Time: 05/04/25  6:19 PM   Result Value Ref Range    PTT 25.9 21.0 - 32.0 seconds   Protime-INR    Collection Time: 05/04/25  6:19 PM   Result Value Ref Range    PT 11.8 9.0 - 12.5 seconds    INR 1.1 0.8 - 1.2   ABORH RETYPE    Collection Time: 05/04/25  7:00 PM   Result Value Ref Range    ABORH Retype O POS    Troponin I #2    Collection Time: 05/04/25  7:19 PM   Result Value Ref Range    Troponin-I 3.077 (H) <=0.026 ng/mL   Lipid panel    Collection Time: 05/04/25  7:19 PM   Result Value Ref Range    Cholesterol Total 119 (L) 120 - 199 mg/dL    Triglyceride 31 30 - 150 mg/dL    HDL Cholesterol 38 (L) 40 - 75 mg/dL    LDL Cholesterol 74.8 63.0 - 159.0 mg/dL    HDL/Cholesterol Ratio 31.9 20.0 - 50.0 %    Cholesterol/HDL Ratio 3.1 2.0 - 5.0    Non HDL Cholesterol 81 mg/dL   Hemoglobin A1c    Collection Time: 05/04/25  7:19 PM   Result Value Ref Range    Hemoglobin A1c 5.5 4.0 - 5.6 %    Estimated Average Glucose 111 68 - 131 mg/dL   Troponin I    Collection Time: 05/04/25  7:19 PM   Result Value Ref Range    Troponin-I 3.022 (H) <=0.026 ng/mL   Type & Screen    Collection Time: 05/04/25  8:17 PM   Result Value Ref Range    Specimen Outdate 05/07/2025 23:59     Group & Rh O POS     Indirect Jessica NEG    Troponin I    Collection Time: 05/04/25  9:31 PM   Result Value Ref Range    Troponin-I 4.404 (H) <=0.026 ng/mL   EKG 12-lead    Collection Time: 05/04/25 10:28 PM   Result Value Ref Range    QRS Duration 96 ms    [facility] QTC Calculation 494 ms   Troponin  I    Collection Time: 05/05/25 12:03 AM   Result Value Ref Range    Troponin-I 4.498 (H) <=0.026 ng/mL   Basic metabolic panel    Collection Time: 05/05/25 12:03 AM   Result Value Ref Range    Sodium 140 136 - 145 mmol/L    Potassium 3.6 3.5 - 5.1 mmol/L    Chloride 110 95 - 110 mmol/L    CO2 20 (L) 23 - 29 mmol/L    Glucose 95 70 - 110 mg/dL    BUN 8 6 - 20 mg/dL    Creatinine 0.7 0.5 - 1.4 mg/dL    Calcium 7.5 (L) 8.7 - 10.5 mg/dL    Anion Gap 10 8 - 16 mmol/L    eGFR >60 >60 mL/min/1.73/m2   Magnesium    Collection Time: 05/05/25 12:03 AM   Result Value Ref Range    Magnesium  2.2 1.6 - 2.6 mg/dL   APTT    Collection Time: 05/05/25  1:13 AM   Result Value Ref Range    PTT 72.3 (H) 21.0 - 32.0 seconds   EKG 12-lead    Collection Time: 05/05/25  5:43 AM   Result Value Ref Range    QRS Duration 98 ms    [facility] QTC Calculation 495 ms   Comprehensive Metabolic Panel    Collection Time: 05/05/25  5:48 AM   Result Value Ref Range    Sodium 138 136 - 145 mmol/L    Potassium 3.9 3.5 - 5.1 mmol/L    Chloride 112 (H) 95 - 110 mmol/L    CO2 20 (L) 23 - 29 mmol/L    Glucose 116 (H) 70 - 110 mg/dL    BUN 9 6 - 20 mg/dL    Creatinine 0.8 0.5 - 1.4 mg/dL    Calcium 7.4 (L) 8.7 - 10.5 mg/dL    Protein Total 6.1 6.0 - 8.4 gm/dL    Albumin 2.9 (L) 3.5 - 5.2 g/dL    Bilirubin Total 0.7 0.1 - 1.0 mg/dL    ALP 46 40 - 150 unit/L    AST 44 11 - 45 unit/L    ALT 23 10 - 44 unit/L    Anion Gap 6 (L) 8 - 16 mmol/L    eGFR >60 >60 mL/min/1.73/m2   Magnesium    Collection Time: 05/05/25  5:48 AM   Result Value Ref Range    Magnesium  2.1 1.6 - 2.6 mg/dL   Phosphorus    Collection Time: 05/05/25  5:48 AM   Result Value Ref Range    Phosphorus Level 3.5 2.7 - 4.5 mg/dL   CBC with Differential    Collection Time: 05/05/25  5:48 AM   Result Value Ref Range    WBC 11.23 3.90 - 12.70 K/uL    RBC 3.31 (L) 4.00 - 5.40 M/uL    HGB 9.2 (L) 12.0 - 16.0 gm/dL    HCT 28.8 (L) 37.0 - 48.5 %    MCV 87 82 - 98 fL    MCH 27.8 27.0 - 31.0 pg    MCHC 31.9 (L)  32.0 - 36.0 g/dL    RDW 15.3 (H) 11.5 - 14.5 %    Platelet Count 296 150 - 450 K/uL    MPV 10.6 9.2 - 12.9 fL    Nucleated RBC 0 <=0 /100 WBC    Neut % 86.8 (H) 38 - 73 %    Lymph % 6.9 (L) 18 - 48 %    Mono % 5.5 4 - 15 %    Eos % 0.1 <=8 %    Basophil % 0.4 <=1.9 %    Imm Grans % 0.3 0.0 - 0.5 %    Neut # 9.75 (H) 1.8 - 7.7 K/uL    Lymph # 0.78 (L) 1 - 4.8 K/uL    Mono # 0.62 0.3 - 1 K/uL    Eos # 0.01 <=0.5 K/uL    Baso # 0.04 <=0.2 K/uL    Imm Grans # 0.03 0.00 - 0.04 K/uL   APTT    Collection Time: 05/05/25  5:48 AM   Result Value Ref Range    PTT 55.7 (H) 21.0 - 32.0 seconds   APTT    Collection Time: 05/05/25  7:19 AM   Result Value Ref Range    PTT 50.3 (H) 21.0 - 32.0 seconds   Iron and TIBC    Collection Time: 05/05/25 10:32 AM   Result Value Ref Range    Iron Level 26 (L) 30 - 160 ug/dL    Transferrin 254 200 - 375 mg/dL    Iron Binding Capacity Total 376 250 - 450 ug/dL    Iron Saturation 7 (L) 20 - 50 %   Ferritin    Collection Time: 05/05/25 10:32 AM   Result Value Ref Range    Ferritin 17.2 (L) 20.0 - 300.0 ng/mL   Vitamin B12    Collection Time: 05/05/25 10:32 AM   Result Value Ref Range    Vitamin B12 640 210 - 950 pg/mL   Folate    Collection Time: 05/05/25 10:32 AM   Result Value Ref Range    Folate Level 12.7 4.0 - 24.0 ng/mL   APTT    Collection Time: 05/05/25 10:32 AM   Result Value Ref Range    PTT 47.3 (H) 21.0 - 32.0 seconds   Lavender Top Hold    Collection Time: 05/05/25 10:32 AM   Result Value Ref Range    Extra Tube Hold for add-ons.    Echo    Collection Time: 05/05/25 10:35 AM   Result Value Ref Range    BSA 1.98 m2    LVOT stroke volume 46.7 cm3    LVIDd 6.8 (A) 3.5 - 6.0 cm    LV Systolic Volume 189 mL    LV Systolic Volume Index 96.4 mL/m2    LVIDs 6.1 (A) 2.1 - 4.0 cm    LV Diastolic Volume 236 mL    LV Diastolic Volume Index 120.41 mL/m2    Left Ventricular End Systolic Volume by Teichholz Method 188.81 mL    Left Ventricular End Diastolic Volume by Teichholz Method 236.14 mL     IVS 1.0 0.6 - 1.1 cm    LVOT diameter 2.1 cm    LVOT area 3.5 cm2    FS 10.3 (A) 28 - 44 %    Left Ventricle Relative Wall Thickness 0.29 cm    PW 1.0 0.6 - 1.1 cm    LV mass 306.0 g    LV Mass Index 156.1 g/m2    MV Peak E Jesus 1.02 m/s    MV Peak A Jesus 0.38 m/s    TR Max Jesus 2.9 m/s    E/A ratio 2.68     IVRT 131 msec    E wave deceleration time 137 msec    LVOT peak jesus 0.9 m/s    Left Ventricular Outflow Tract Mean Velocity 0.61 cm/s    Left Ventricular Outflow Tract Mean Gradient 1.72 mmHg    RV- calloway basal diam 3.5 cm    TAPSE 2.8 cm    RV/LV Ratio 0.51 cm    LA size 4.4 cm    Left Atrium Minor Axis 6.4 cm    Left Atrium Major Axis 6.8 cm    RA Major Axis 5.14 cm    AV mean gradient 4 mmHg    AV peak gradient 8 mmHg    Ao peak jesus 1.4 m/s    Ao VTI 23.5 cm    LVOT peak VTI 13.5 cm    AV valve area 2.0 cm²    AV Velocity Ratio 0.64     AV index (prosthetic) 0.57     TYRELL by Velocity Ratio 2.2 cm²    MV stenosis pressure 1/2 time 39.86 ms    MV valve area p 1/2 method 5.52 cm2    Triscuspid Valve Regurgitation Peak Gradient 35 mmHg    PV PEAK VELOCITY 0.77 m/s    PV peak gradient 2 mmHg    Ao root annulus 2.9 cm    Sinus 3.04 cm    STJ 2.5 cm    Ascending aorta 2.9 cm    IVC diameter 1.67 cm    ZLVIDS 4.51     ZLVIDD 1.92     RVDD 3.48 cm    AORTIC VALVE CUSP SEPERATION 2.02 cm    MAVIS 62 mL/m2    LA Vol 121 cm3    LA WIDTH 4.9 cm    RA Width 4.1 cm    TV resting pulmonary artery pressure 37 mmHg    RV TB RVSP 6 mmHg    Est. RA pres 3 mmHg   ISTAT ACT-K    Collection Time: 05/05/25 11:54 AM   Result Value Ref Range    POC ACTIVATED CLOTTING TIME K 199 (H) 74 - 137 sec    Sample ARTERIAL     Site RR     Allens Test Pass    COVID-19 Rapid Screening    Collection Time: 05/05/25  3:55 PM   Result Value Ref Range    SARS COV-2 Molecular Negative Negative   Influenza A & B by Molecular    Collection Time: 05/05/25  4:04 PM    Specimen: Nasal Swab   Result Value Ref Range    INFLUENZA A MOLECULAR Negative Negative     INFLUENZA B MOLECULAR  Negative Negative   EKG 12-lead    Collection Time: 05/05/25  7:52 PM   Result Value Ref Range    QRS Duration 90 ms    [facility] QTC Calculation 484 ms   Basic metabolic panel    Collection Time: 05/05/25 10:25 PM   Result Value Ref Range    Sodium 137 136 - 145 mmol/L    Potassium 3.6 3.5 - 5.1 mmol/L    Chloride 103 95 - 110 mmol/L    CO2 22 (L) 23 - 29 mmol/L    Glucose 115 (H) 70 - 110 mg/dL    BUN 10 6 - 20 mg/dL    Creatinine 0.9 0.5 - 1.4 mg/dL    Calcium 8.7 8.7 - 10.5 mg/dL    Anion Gap 12 8 - 16 mmol/L    eGFR >60 >60 mL/min/1.73/m2   Magnesium    Collection Time: 05/05/25 10:25 PM   Result Value Ref Range    Magnesium  2.0 1.6 - 2.6 mg/dL   Phosphorus    Collection Time: 05/05/25 10:25 PM   Result Value Ref Range    Phosphorus Level 3.8 2.7 - 4.5 mg/dL   Lavender Top Hold    Collection Time: 05/05/25 10:37 PM   Result Value Ref Range    Extra Tube Hold for add-ons.    Gold Top Hold    Collection Time: 05/05/25 10:37 PM   Result Value Ref Range    Extra Tube Hold for add-ons.    Magnesium    Collection Time: 05/06/25  3:33 AM   Result Value Ref Range    Magnesium  2.1 1.6 - 2.6 mg/dL   Phosphorus    Collection Time: 05/06/25  3:33 AM   Result Value Ref Range    Phosphorus Level 3.8 2.7 - 4.5 mg/dL   Basic Metabolic Panel    Collection Time: 05/06/25  3:33 AM   Result Value Ref Range    Sodium 136 136 - 145 mmol/L    Potassium 4.1 3.5 - 5.1 mmol/L    Chloride 105 95 - 110 mmol/L    CO2 22 (L) 23 - 29 mmol/L    Glucose 91 70 - 110 mg/dL    BUN 10 6 - 20 mg/dL    Creatinine 0.8 0.5 - 1.4 mg/dL    Calcium 8.6 (L) 8.7 - 10.5 mg/dL    Anion Gap 9 8 - 16 mmol/L    eGFR >60 >60 mL/min/1.73/m2   CBC with Differential    Collection Time: 05/06/25  3:33 AM   Result Value Ref Range    WBC 9.12 3.90 - 12.70 K/uL    RBC 3.76 (L) 4.00 - 5.40 M/uL    HGB 10.4 (L) 12.0 - 16.0 gm/dL    HCT 32.6 (L) 37.0 - 48.5 %    MCV 87 82 - 98 fL    MCH 27.7 27.0 - 31.0 pg    MCHC 31.9 (L) 32.0 - 36.0 g/dL    RDW 15.3  (H) 11.5 - 14.5 %    Platelet Count 324 150 - 450 K/uL    MPV 10.9 9.2 - 12.9 fL    Nucleated RBC 0 <=0 /100 WBC    Neut % 67.1 38 - 73 %    Lymph % 19.3 18 - 48 %    Mono % 11.5 4 - 15 %    Eos % 1.5 <=8 %    Basophil % 0.3 <=1.9 %    Imm Grans % 0.3 0.0 - 0.5 %    Neut # 6.11 1.8 - 7.7 K/uL    Lymph # 1.76 1 - 4.8 K/uL    Mono # 1.05 (H) 0.3 - 1 K/uL    Eos # 0.14 <=0.5 K/uL    Baso # 0.03 <=0.2 K/uL    Imm Grans # 0.03 0.00 - 0.04 K/uL   Magnesium    Collection Time: 05/07/25  6:03 AM   Result Value Ref Range    Magnesium  1.9 1.6 - 2.6 mg/dL   Phosphorus    Collection Time: 05/07/25  6:03 AM   Result Value Ref Range    Phosphorus Level 4.5 2.7 - 4.5 mg/dL   Basic Metabolic Panel    Collection Time: 05/07/25  6:03 AM   Result Value Ref Range    Sodium 137 136 - 145 mmol/L    Potassium 3.4 (L) 3.5 - 5.1 mmol/L    Chloride 105 95 - 110 mmol/L    CO2 23 23 - 29 mmol/L    Glucose 86 70 - 110 mg/dL    BUN 16 6 - 20 mg/dL    Creatinine 0.8 0.5 - 1.4 mg/dL    Calcium 8.8 8.7 - 10.5 mg/dL    Anion Gap 9 8 - 16 mmol/L    eGFR >60 >60 mL/min/1.73/m2   Lavender Top Hold    Collection Time: 05/07/25  6:03 AM   Result Value Ref Range    Extra Tube Hold for add-ons.    Light Green Top Hold    Collection Time: 05/07/25  6:30 AM   Result Value Ref Range    Extra Tube Hold for add-ons.    Culture, Respiratory with Gram Stain    Collection Time: 05/07/25  6:42 AM    Specimen: Sputum, Expectorated; Respiratory   Result Value Ref Range    Respiratory Culture Normal respiratory jeremiah     GRAM STAIN <10 Epithelial Cells/LPF     GRAM STAIN Few WBC seen     GRAM STAIN Moderate Gram positive cocci in clusters     GRAM STAIN Few Gram Negative Rods    Magnesium    Collection Time: 05/08/25  4:07 AM   Result Value Ref Range    Magnesium  1.8 1.6 - 2.6 mg/dL   Phosphorus    Collection Time: 05/08/25  4:07 AM   Result Value Ref Range    Phosphorus Level 3.9 2.7 - 4.5 mg/dL   Basic Metabolic Panel    Collection Time: 05/08/25  4:07 AM   Result  Value Ref Range    Sodium 137 136 - 145 mmol/L    Potassium 3.6 3.5 - 5.1 mmol/L    Chloride 105 95 - 110 mmol/L    CO2 23 23 - 29 mmol/L    Glucose 87 70 - 110 mg/dL    BUN 17 6 - 20 mg/dL    Creatinine 0.8 0.5 - 1.4 mg/dL    Calcium 8.7 8.7 - 10.5 mg/dL    Anion Gap 9 8 - 16 mmol/L    eGFR >60 >60 mL/min/1.73/m2       Microbiology Results (last 7 days)       Procedure Component Value Units Date/Time    Culture, Respiratory with Gram Stain [[id number]] Collected: 05/07/25 0642    Order Status: Completed Specimen: Respiratory from Sputum, Expectorated Updated: 05/08/25 0841     Respiratory Culture Normal respiratory jeremiah     GRAM STAIN <10 Epithelial Cells/LPF      Few WBC seen      Moderate Gram positive cocci in clusters      Few Gram Negative Rods    Influenza A & B by Molecular [[id number]]  (Normal) Collected: 05/05/25 1604    Order Status: Completed Specimen: Nasal Swab Updated: 05/05/25 1638     INFLUENZA A MOLECULAR Negative     INFLUENZA B MOLECULAR  Negative            Imaging Results              X-Ray Chest AP Portable (Final result)  Result time 05/04/25 17:06:31      Final result by Kasandra Erazo MD (05/04/25 17:06:31)                   Impression:      No acute cardiopulmonary process identified.  No significant change.      Electronically signed by: Kasandra Erazo MD  Date:    05/04/2025  Time:    17:06               Narrative:    EXAMINATION:  XR CHEST AP PORTABLE    CLINICAL HISTORY:  chest pain;    TECHNIQUE:  Single frontal view of the chest was performed.    COMPARISON:  04/18/2025.    FINDINGS:  Cardiac silhouette is stable in size.  Lungs are symmetrically expanded.  No evidence of new focal consolidative process, pneumothorax, or large pleural effusion.  No acute osseous abnormality identified.                                          Pending Diagnostic Studies:       None           Medications:  Reconciled Home Medications:      Medication List        START taking these medications       clopidogreL 75 mg tablet  Commonly known as: PLAVIX  Take 1 tablet (75 mg total) by mouth once daily.  Start taking on: May 9, 2025     sacubitriL-valsartan 24-26 mg per tablet  Commonly known as: ENTRESTO  Take 2 tablets by mouth 2 (two) times daily.  Replaces: ENTRESTO  mg per tablet            CHANGE how you take these medications      furosemide 80 MG tablet  Commonly known as: LASIX  Take 1 tablet (80 mg total) by mouth 2 (two) times a day.  What changed:   medication strength  how much to take  when to take this     metoprolol succinate 25 MG 24 hr tablet  Commonly known as: TOPROL-XL  Take 1 tablet (25 mg total) by mouth once daily.  What changed:   medication strength  how much to take            CONTINUE taking these medications      acetaminophen 500 MG tablet  Commonly known as: TYLENOL  Take 1 tablet (500 mg total) by mouth every 6 (six) hours as needed for Pain.     albuterol 90 mcg/actuation inhaler  Commonly known as: PROVENTIL/VENTOLIN HFA  Inhale 1-2 puffs into the lungs every 6 (six) hours as needed. Rescue     aspirin 81 MG EC tablet  Commonly known as: ECOTRIN  Take 1 tablet (81 mg total) by mouth once daily.     atorvastatin 80 MG tablet  Commonly known as: LIPITOR  Take 1 tablet (80 mg total) by mouth every evening.     benzocaine-menthoL 15-3.6 mg Lozg  1 lozenge by Mucous Membrane route every 2 (two) hours as needed (sore throat).     dapagliflozin propanediol 10 mg tablet  Commonly known as: FARXIGA  Take 1 tablet (10 mg total) by mouth once daily.     docusate sodium 100 MG capsule  Commonly known as: COLACE  Take 1 capsule (100 mg total) by mouth 2 (two) times daily.     eplerenone 25 MG Tab  Commonly known as: INSPRA  Take 1 tablet (25 mg total) by mouth once daily.     FLUoxetine 20 MG capsule  Take 1 capsule (20 mg total) by mouth once daily.     fluticasone propionate 50 mcg/actuation nasal spray  Commonly known as: FLONASE  1 spray (50 mcg total) by Each Nostril route 2  (two) times daily as needed for Rhinitis.     gabapentin 300 MG capsule  Commonly known as: NEURONTIN  Take 2 capsules (600 mg total) by mouth 3 (three) times daily.     pantoprazole 40 MG tablet  Commonly known as: PROTONIX  Please take 1 tablet twice a day for 3 days, then 1 tablet daily     potassium chloride SA 20 MEQ tablet  Commonly known as: K-DUR,KLOR-CON  Take 2 tablets (40 mEq total) by mouth 2 (two) times daily.            STOP taking these medications      ENTRESTO  mg per tablet  Generic drug: sacubitriL-valsartan  Replaced by: sacubitriL-valsartan 24-26 mg per tablet              Indwelling Lines/Drains at time of discharge:   Lines/Drains/Airways       Drain  Duration             Female External Urinary Catheter w/ Suction 05/04/25 2000 3 days                    Time spent on the discharge of patient: 35 minutes         Stewart Ann MD  Department of Hospital Medicine  [facility]

## 2025-05-08 NOTE — ASSESSMENT & PLAN NOTE
- admitted with chest pain, shortness of breath, abdominal distension.   - BNP higher than ever before   Recent Labs   Lab 05/04/25  1658   BNP 2,322*   Echo  4/11/25    Interpretation Summary    Left Ventricle: The left ventricle is severely dilated. There is severely reduced systolic function with a visually estimated ejection fraction of 10 -15%. Grade III diastolic dysfunction.    Right Ventricle: The right ventricle has mild enlargement. Systolic function is mildly reduced.    Left Atrium: Moderately dilated    Mitral Valve: There is mild to moderate regurgitation.    Tricuspid Valve: There is mild regurgitation.    Pulmonary Artery: The estimated pulmonary artery systolic pressure is 21 mmHg.    IVC/SVC: Normal venous pressure at 3 mmHg.    Current Heart Failure Medications  hydrALAZINE injection 10 mg, Every 6 hours PRN, Intravenous  sacubitriL-valsartan 24-26 mg per tablet 2 tablet, 2 times daily, Oral  metoprolol succinate (TOPROL-XL) 24 hr tablet 25 mg, Daily, Oral  furosemide injection 40 mg, 2 times daily, Intravenous  furosemide tablet 40 mg, 2 times daily, Oral  furosemide (LASIX) tablet, 2 times daily, Oral  metoprolol succinate (TOPROL-XL) 24 hr tablet, Daily, Oral  , 2 times daily, Oral    Plan  - Monitor strict I&Os and daily weights.    - Place on telemetry  - hold parameters on GDMT  - given lasix in ED- likely redose after angiogram

## 2025-05-08 NOTE — DISCHARGE INSTRUCTIONS
Your BP is soft on the lower dosages here, recommend changing to lower doses for now, but would like to maximize   f/u with HF clinic, PCP and cardiology to see if able to increase again.   But once fluid is all off, your BP is lower- Will increase home lasix to 80 mg BID  Take Apirin, plavix and statin, go to Cardiologist   New onset pulmonary hypertension, suspect from HF    Thank you for trusting [facility] and me with your care.  We are honored that you entrusted us with your healthcare needs. Your satisfaction is very important to us and we hope you have been very pleased with your experience at [facility]. After your discharge you may receive a survey asking you to rate your hospital experience- Please help us by completing this survey. We hope that you have received the very best care possible during your hospitalization at [facility], as your satisfaction is our top priority.    Let me know if there is anything more I can do!!              Stewart Ann MD        Medical Director        Section Head of         Board-Certified IM Attending      PATIENT GENERAL DISCHARGE INFORMATION   Things that YOU are responsible for to Manage Your Care At Home:  1. Getting your prescriptions filled.  2. Taking you medications as directed. (DO NOT MISS ANY DOSES!)  3. Going to your follow-up doctor appointments.                 *This is important because it allows the doctor to monitor your progress and make changes.      If no one calls you for your appointments, please call ([phone]) and reschedule this appointment.   After discharge, if you need assistance, you can call [facility] On [facility] Nurse Care Line for 24/7 assistance at [phone]  If you are experience any signs or symptoms, Call your doctor or Call [phone] and come to your nearest Emergency Room.    You should receive a call from [facility] within 48-72 hours to help manage your care after discharge.   Please  try to make sure that you answer your phone for this important phone call.         Our goal at [facility] is to always give you outstanding care and exceptional service. You may receive a survey from [facility] by mail, text or e-mail in the next 24-48 hours asking about the care you received with us. The survey should only take 5-10 minutes to complete and is very important to us.     Your feedback provides us with a way to recognize our staff who work tirelessly to provide the best care! Also, your responses help us learn how to improve when your experience was below our aspiration of excellence. We are always looking for ways to improve your stay. We WILL use your feedback to continue making improvements to help us provide the highest quality care. We keep your personal information and feedback confidential. We appreciate your time completing this survey and can't wait to hear from you!!!    We look forward to your continued care with us! Thanks so much for choosing [facility] for your healthcare needs!

## 2025-05-08 NOTE — NURSING
[facility]  Nurses Note -- 4 Eyes      5/8/2025       Skin assessed on: Q Shift      [x] No Pressure Injuries Present    []Prevention Measures Documented    [] Yes LDA  for Pressure Injury Previously documented     [] Yes New Pressure Injury Discovered   [] LDA for New Pressure Injury Added      Attending RN:  Yessenia Baig RN     Second RN: DIGNA Younger

## 2025-05-08 NOTE — PLAN OF CARE
No other complaints overnight.   Problem: Acute Coronary Syndrome  Goal: Absence of Cardiac-Related Pain  Outcome: Progressing  Goal: Normalized Cardiac Rhythm  Outcome: Progressing  Goal: Effective Cardiac Pump Function  Outcome: Progressing  Goal: Adequate Tissue Perfusion  Outcome: Progressing     Problem: Cardiac Catheterization (Diagnostic/Interventional)  Goal: Absence of Bleeding  Outcome: Progressing  Goal: Absence of Contrast-Induced Injury  Outcome: Progressing

## 2025-05-08 NOTE — ASSESSMENT & PLAN NOTE
Patient's blood pressure range in the last 24 hours was: BP  Min: 103/75  Max: 131/89.The patient's inpatient anti-hypertensive regimen is listed below:  Current Antihypertensives  nitroGLYCERIN SL tablet 0.4 mg, Every 5 min PRN, Sublingual  hydrALAZINE injection 10 mg, Every 6 hours PRN, Intravenous  metoprolol succinate (TOPROL-XL) 24 hr tablet 25 mg, Daily, Oral  furosemide injection 40 mg, 2 times daily, Intravenous  furosemide tablet 40 mg, 2 times daily, Oral  furosemide (LASIX) tablet, 2 times daily, Oral  metoprolol succinate (TOPROL-XL) 24 hr tablet, Daily, Oral    Plan  - BP is controlled, no changes needed to their regimen- hold parameters

## 2025-05-08 NOTE — PROGRESS NOTES
Virtual Nurse:Discharge orders noted; additional clinical references attached. Patient's discharge instruction packet given by bedside RN.    Cued into patient's room.  Permission received per patient to turn camera to view patient.  Introduced as VN that will be instructing on discharge instructions. Educated patient on reason for admission; medications to hold, continue, and start, appointment to follow-up with doctor, and when to return to ED. Teach back method used. Verbalized understanding  Number given for 24/7 Nurse Line. Opportunity given for questions and questions answered.  Bedside nurse updated.        05/08/25 1058   Shift   Virtual Nurse - Patient Verbalized Approval Of Camera Use   Type of Frequent Check   Type Patient Rounds   Safety/Activity   Patient Rounds visualized patient   Activity Assistance Provided independent   Positioning   Body Position sitting up in bed

## 2025-05-08 NOTE — ASSESSMENT & PLAN NOTE
Anemia is likely due to unknown. Most recent hemoglobin and hematocrit are listed below.  Recent Labs     05/06/25  0333   HGB 10.4*   HCT 32.6*     Plan  - Monitor serial CBC: Daily  - Transfuse PRBC if patient becomes hemodynamically unstable, symptomatic or H/H drops below 7/21.  - Patient has not received any PRBC transfusions to date  - Patient's anemia is currently stable  - check iron panel, b12, folate

## 2025-05-08 NOTE — NURSING
Patient discharged per MD order.  IV removed.  Catheter tip intact.  No distress noted.  Discharge instructions explained by virtual discharge nurse.  AVS given to patient.  Patient verbalized understanding.  VSS. Afebrile.  No complaints of pain, N/V, diarrhea, or SOB.  Rx provided to home pharmacy.  Patient left with belongings to Main Entrance via wheelchair per nurse.

## 2025-05-09 ENCOUNTER — OFFICE VISIT (OUTPATIENT)
Dept: OBSTETRICS AND GYNECOLOGY | Facility: CLINIC | Age: 40
End: 2025-05-09
Payer: MEDICAID

## 2025-05-09 VITALS
BODY MASS INDEX: 26.39 KG/M2 | DIASTOLIC BLOOD PRESSURE: 70 MMHG | WEIGHT: 178.69 LBS | SYSTOLIC BLOOD PRESSURE: 104 MMHG

## 2025-05-09 DIAGNOSIS — N92.6 ABNORMAL BLEEDING IN MENSTRUAL CYCLE: ICD-10-CM

## 2025-05-09 DIAGNOSIS — Z01.419 ROUTINE GYNECOLOGICAL EXAMINATION: ICD-10-CM

## 2025-05-09 DIAGNOSIS — N92.0 MENORRHAGIA WITH REGULAR CYCLE: Primary | ICD-10-CM

## 2025-05-09 LAB
BACTERIA SPEC CULT: NORMAL
GRAM STN SPEC: NORMAL

## 2025-05-09 PROCEDURE — 99214 OFFICE O/P EST MOD 30 MIN: CPT | Mod: PBBFAC | Performed by: OBSTETRICS & GYNECOLOGY

## 2025-05-09 PROCEDURE — 88175 CYTOPATH C/V AUTO FLUID REDO: CPT | Mod: TC | Performed by: OBSTETRICS & GYNECOLOGY

## 2025-05-09 PROCEDURE — 99999 PR PBB SHADOW E&M-EST. PATIENT-LVL IV: CPT | Mod: PBBFAC,,, | Performed by: OBSTETRICS & GYNECOLOGY

## 2025-05-09 NOTE — TELEPHONE ENCOUNTER
----- Message from Sidra sent at 5/9/2025  3:50 PM CDT -----  Type:  RX Refill RequestWho Called:  PtRefill or New Rx: NewRX Name and Strength: sacubitriL-valsartan (ENTRESTO) 24-26 mg per tabletHow is the patient currently taking it? (ex. 1XDay):Is this a 30 day or 90 day RX:Preferred Pharmacy with phone number:[facility] [address] [place] [address] [facility] CenterVDLocal or Mail Order: LocalOrdering Provider: Stewart Catalanceleste the patient rather a call back or a response via [facility]? TUNJI Call Back Number:  [phone] Information:  Pt states medication was prescribed to her and she is not able to fill due to instructions on prescription was not clear.  Pt need prescription to be resent with clear instructions.  Pt would like to speak with nurse in office regarding prescription

## 2025-05-09 NOTE — PROGRESS NOTES
Subjective     Patient ID: Nasra Marks is a 39 y.o. female.    Chief Complaint:  Annual Exam      History of Present Illness  HPI  Annual Exam-Premenopausal  Patient presents for annual exam. The patient is sexually active. GYN screening history: last pap: approximate date  and was normal. The patient wears seatbelts: yes. The patient participates in regular exercise: no. Has the patient ever been transfused or tattooed?: yes. The patient reports that there is not domestic violence in her life.    Pt has known congestive heart failure which has been worsening over last 8 years.  Pt states she get extra fluid overload during time of menses.  Pt states menses can be heavy and somewhat painful.  Pt desires to discuss options.    GYN & OB History  Patient's last menstrual period was 2025 (approximate).   Date of Last Pap: 2017    OB History    Para Term  AB Living   6 2  1 4 1   SAB IAB Ectopic Multiple Live Births   2 2   2      # Outcome Date GA Lbr Tre/2nd Weight Sex Type Anes PTL Lv   6 Para 13 18w6d   M Vag-Spont EPI Y ND   5 2012 12w0d             Birth Comments: d&c   4 2011           3  06 28w0d  0.907 kg (2 lb) M CS-LTranv  Y LETICIA      Birth Comments: Severe Preeclampsia; states delivered at 6-7 mos   2 IAB            1 IAB                Review of Systems  Review of Systems   Constitutional: Negative.    Respiratory: Negative.     Cardiovascular:         Heart failure   Gastrointestinal: Negative.  Positive for reflux.   Endocrine: Negative.    Genitourinary:  Positive for menorrhagia and menstrual problem. Negative for bladder incontinence, decreased libido, dysmenorrhea, dyspareunia, dysuria, flank pain, frequency, genital sores, hematuria, hot flashes and urinary incontinence.   Musculoskeletal: Negative.    Neurological: Negative.    Hematological: Negative.    Psychiatric/Behavioral:  The patient is nervous/anxious.    Breast: negative.          Objective   Physical Exam:   Constitutional: She is oriented to person, place, and time. She appears well-developed and well-nourished. No distress.    HENT:   Head: Normocephalic and atraumatic.     Neck: No thyromegaly present.     Pulmonary/Chest: Effort normal. No respiratory distress. Right breast exhibits no inverted nipple, no mass, no nipple discharge, no skin change, no tenderness, presence, no bleeding, no swelling, no mastectomy, no augmentation and no lumpectomy. Left breast exhibits no inverted nipple, no mass, no nipple discharge, no skin change, no tenderness, presence, no bleeding, no swelling, no mastectomy, no augmentation and no lumpectomy. Breasts are symmetrical.        Abdominal: Soft. She exhibits no distension and no mass. There is no abdominal tenderness. There is no rebound and no guarding.     Genitourinary:    Urethra, bladder, vagina, uterus, right adnexa and left adnexa normal.      Pelvic exam was performed with patient in the lithotomy position.   The external female genitalia was normal.   No external genitalia lesions identified,Genitalia hair distrobution normal .     Labial bartholins normal.There is no rash, tenderness, lesion or injury on the right labia. There is no rash, tenderness, lesion or injury on the left labia. Cervix is normal. No no adexnal prolapse or no masses or organomegaly. Right adnexum displays no mass, no tenderness and no fullness. Left adnexum displays no mass, no tenderness and no fullness. Vagina exhibits no lesion. No erythema, vaginal discharge, tenderness, bleeding, rectocele, cystocele or prolapse of vaginal walls in the vagina.    No foreign body in the vagina.      No signs of injury in the vagina.   Vagina was moist.Cervix exhibits no motion tenderness, no lesion, no discharge, no friability, no tenderness and no polyp.    pap smear completedUterus consistancy normal and Uerus contour normal  Uterus is not deviated, not enlarged, not fixed, not  tender, not hosting fibroids and no uterine prolapse. Normal urethral meatus.Urethral Meatus exhibits: no urethral lesionUrethra findings: no urethral mass, no tenderness, no urethral scarring and no prolapsedBladder findings: no bladder distention and no bladder tenderness   Genitourinary Comments: Female chaperone, Adrian John,  present for entire exam                   Neurological: She is alert and oriented to person, place, and time. No cranial nerve deficit. Coordination normal.    Skin: She is not diaphoretic.    Psychiatric: She has a normal mood and affect. Her behavior is normal. Judgment and thought content normal.       Female chaperone, Adrian John,  present for entire exam       Assessment and Plan     1. Menorrhagia with regular cycle    2. Abnormal bleeding in menstrual cycle    3. Routine gynecological examination            Plan:   Pap and HR HPV collected  Discussed Mirena IUD vs endometrial ablation vs hysterectomy (with or without ovaries).  Stressed for any surgical procedure, pt would need cardiac clearance.  Pt most interested in ablation, info given.    Pelvic sono ordered; pt to contact office when surgically cleared

## 2025-05-12 ENCOUNTER — OFFICE VISIT (OUTPATIENT)
Dept: GASTROENTEROLOGY | Facility: CLINIC | Age: 40
End: 2025-05-12
Payer: MEDICAID

## 2025-05-12 VITALS
WEIGHT: 177.69 LBS | DIASTOLIC BLOOD PRESSURE: 84 MMHG | BODY MASS INDEX: 26.32 KG/M2 | HEIGHT: 69 IN | SYSTOLIC BLOOD PRESSURE: 120 MMHG | HEART RATE: 99 BPM

## 2025-05-12 DIAGNOSIS — K59.04 CHRONIC IDIOPATHIC CONSTIPATION: Primary | ICD-10-CM

## 2025-05-12 PROCEDURE — 99999 PR PBB SHADOW E&M-EST. PATIENT-LVL IV: CPT | Mod: PBBFAC,,,

## 2025-05-12 PROCEDURE — 4010F ACE/ARB THERAPY RXD/TAKEN: CPT | Mod: CPTII,,,

## 2025-05-12 PROCEDURE — 99204 OFFICE O/P NEW MOD 45 MIN: CPT | Mod: S$PBB,,,

## 2025-05-12 PROCEDURE — 3079F DIAST BP 80-89 MM HG: CPT | Mod: CPTII,,,

## 2025-05-12 PROCEDURE — 1111F DSCHRG MED/CURRENT MED MERGE: CPT | Mod: CPTII,,,

## 2025-05-12 PROCEDURE — 99214 OFFICE O/P EST MOD 30 MIN: CPT | Mod: PBBFAC

## 2025-05-12 PROCEDURE — 3008F BODY MASS INDEX DOCD: CPT | Mod: CPTII,,,

## 2025-05-12 PROCEDURE — 3044F HG A1C LEVEL LT 7.0%: CPT | Mod: CPTII,,,

## 2025-05-12 PROCEDURE — 3074F SYST BP LT 130 MM HG: CPT | Mod: CPTII,,,

## 2025-05-12 PROCEDURE — 1159F MED LIST DOCD IN RCRD: CPT | Mod: CPTII,,,

## 2025-05-12 NOTE — PROGRESS NOTES
"    [facility] Consultation Note    Reason for Consult:  The encounter diagnosis was Chronic idiopathic constipation.    PCP:   Veronika Rainey   [address] / [place] [id number]    Referring MD:  Michelle Watson Pa-c  [address] [address]    HPI:  This is a 39 y.o. female with PMHx of HTN, cardiomyopathy and CHF with ICD here for evaluation of chronic constipation that has been gradually worsening. Reports pebble-like BM every 2-3 weeks with lots of straining. Endorsed episode of blood on stool and mucus in stool a couple weeks ago. Reports feeling a possible external hemorrhoid with burning pain. She has tried OTC stool softeners, Miralax, dulcolax, mag citrate, fleets and suppositories without any relief. Also completed colonics as needed previously. States her appetite is stable. Denies nausea, vomiting, abdominal pain, dysphagia, diarrhea, or black stools. Denies tobacco and alcohol use. Denies first degree relatives with GI malignancies. Reports paternal aunt with colon cancer and paternal uncle with stomach cancer diagnosed in his 30s.     Objective Findings:    Vital Signs:  /84   Pulse 99   Ht 5' 9" (1.753 m)   Wt 80.6 kg (177 lb 11.1 oz)   LMP 04/16/2025 (Approximate)   BMI 26.24 kg/m²   Body mass index is 26.24 kg/m².    Physical Exam:  General Appearance: Well appearing in no acute distress  Abdomen: Soft, non tender, non distended in all four quadrants. No hepatosplenomegaly, ascites, or mass.    Assessment:  1. Chronic idiopathic constipation      This is a 39 y.o F with heart failure here for eval of chronic constipation.     - Flush with OTC laxatives, then start Linzess 290 mcg once daily   - Miralax PRN in addition  - Maintain water intake consistent with fluid restriction  - Advised stool or squatty potty  - Avoid sitting on the toilet >5 minutes    RTC 3 months.     Order summary:  Orders Placed This Encounter    linaCLOtide (LINZESS) 290 " mcg Cap capsule     Thank you so much for allowing me to participate in the care of Kelly Louise Allen Sarah Abukhader, PA-C [facility]

## 2025-05-12 NOTE — PATIENT INSTRUCTIONS
Constipation Tips  - For a flush: take mag citrate and dulcolax at the same time.   - Start taking Linzess once daily afterwards  - Eat more high fiber foods a take a Fiber supplement (Metamucil, Benefiber, Citrucell, etc)  - Take Miralax (as needed or 1-3x/day) as needed with the Linzess  - Drink at least 8 cups of water a day  - Get regular physical activity  - Use a stool or Squatty Potty  - Avoid sitting on the toilet >5 minutes to prevent hemorrhoids

## 2025-05-13 ENCOUNTER — RESULTS FOLLOW-UP (OUTPATIENT)
Dept: CARDIOLOGY | Facility: CLINIC | Age: 40
End: 2025-05-13

## 2025-05-13 RX ORDER — SACUBITRIL AND VALSARTAN 49; 51 MG/1; MG/1
1 TABLET, FILM COATED ORAL 2 TIMES DAILY
COMMUNITY
End: 2025-05-13 | Stop reason: SDUPTHER

## 2025-05-13 RX ORDER — SACUBITRIL AND VALSARTAN 49; 51 MG/1; MG/1
1 TABLET, FILM COATED ORAL 2 TIMES DAILY
Qty: 180 TABLET | Refills: 3 | Status: SHIPPED | OUTPATIENT
Start: 2025-05-13

## 2025-05-14 ENCOUNTER — DOCUMENTATION ONLY (OUTPATIENT)
Facility: CLINIC | Age: 40
End: 2025-05-14
Payer: MEDICAID

## 2025-05-15 ENCOUNTER — RESULTS FOLLOW-UP (OUTPATIENT)
Dept: OBSTETRICS AND GYNECOLOGY | Facility: HOSPITAL | Age: 40
End: 2025-05-15

## 2025-05-15 ENCOUNTER — OFFICE VISIT (OUTPATIENT)
Dept: FAMILY MEDICINE | Facility: CLINIC | Age: 40
End: 2025-05-15
Payer: MEDICAID

## 2025-05-15 VITALS
OXYGEN SATURATION: 99 % | DIASTOLIC BLOOD PRESSURE: 74 MMHG | WEIGHT: 178.38 LBS | HEART RATE: 61 BPM | BODY MASS INDEX: 26.42 KG/M2 | HEIGHT: 69 IN | SYSTOLIC BLOOD PRESSURE: 116 MMHG

## 2025-05-15 DIAGNOSIS — K59.09 CHRONIC CONSTIPATION: ICD-10-CM

## 2025-05-15 DIAGNOSIS — I10 ESSENTIAL HYPERTENSION: Primary | Chronic | ICD-10-CM

## 2025-05-15 DIAGNOSIS — K21.9 GASTROESOPHAGEAL REFLUX DISEASE WITHOUT ESOPHAGITIS: ICD-10-CM

## 2025-05-15 DIAGNOSIS — E66.3 OVERWEIGHT (BMI 25.0-29.9): ICD-10-CM

## 2025-05-15 DIAGNOSIS — F33.2 SEVERE EPISODE OF RECURRENT MAJOR DEPRESSIVE DISORDER, WITHOUT PSYCHOTIC FEATURES: ICD-10-CM

## 2025-05-15 DIAGNOSIS — Z95.810 ICD (IMPLANTABLE CARDIOVERTER-DEFIBRILLATOR), SINGLE, IN SITU: ICD-10-CM

## 2025-05-15 DIAGNOSIS — M54.16 LUMBAR RADICULOPATHY: ICD-10-CM

## 2025-05-15 DIAGNOSIS — N93.9 ABNORMAL UTERINE BLEEDING (AUB): ICD-10-CM

## 2025-05-15 DIAGNOSIS — I42.8 NONISCHEMIC CARDIOMYOPATHY: Chronic | ICD-10-CM

## 2025-05-15 DIAGNOSIS — Z86.73 HX OF ISCHEMIC RIGHT PCA STROKE: ICD-10-CM

## 2025-05-15 DIAGNOSIS — I50.42 CHRONIC COMBINED SYSTOLIC AND DIASTOLIC CONGESTIVE HEART FAILURE: ICD-10-CM

## 2025-05-15 PROCEDURE — 3008F BODY MASS INDEX DOCD: CPT | Mod: CPTII,,, | Performed by: FAMILY MEDICINE

## 2025-05-15 PROCEDURE — 3044F HG A1C LEVEL LT 7.0%: CPT | Mod: CPTII,,, | Performed by: FAMILY MEDICINE

## 2025-05-15 PROCEDURE — 4010F ACE/ARB THERAPY RXD/TAKEN: CPT | Mod: CPTII,,, | Performed by: FAMILY MEDICINE

## 2025-05-15 PROCEDURE — 1111F DSCHRG MED/CURRENT MED MERGE: CPT | Mod: CPTII,,, | Performed by: FAMILY MEDICINE

## 2025-05-15 PROCEDURE — 3078F DIAST BP <80 MM HG: CPT | Mod: CPTII,,, | Performed by: FAMILY MEDICINE

## 2025-05-15 PROCEDURE — G2211 COMPLEX E/M VISIT ADD ON: HCPCS | Mod: S$PBB,,, | Performed by: FAMILY MEDICINE

## 2025-05-15 PROCEDURE — 99215 OFFICE O/P EST HI 40 MIN: CPT | Mod: S$PBB,,, | Performed by: FAMILY MEDICINE

## 2025-05-15 PROCEDURE — 99214 OFFICE O/P EST MOD 30 MIN: CPT | Mod: PBBFAC,PO | Performed by: FAMILY MEDICINE

## 2025-05-15 PROCEDURE — 99999 PR PBB SHADOW E&M-EST. PATIENT-LVL IV: CPT | Mod: PBBFAC,,, | Performed by: FAMILY MEDICINE

## 2025-05-15 PROCEDURE — 3074F SYST BP LT 130 MM HG: CPT | Mod: CPTII,,, | Performed by: FAMILY MEDICINE

## 2025-05-15 PROCEDURE — 1159F MED LIST DOCD IN RCRD: CPT | Mod: CPTII,,, | Performed by: FAMILY MEDICINE

## 2025-05-15 PROCEDURE — 1160F RVW MEDS BY RX/DR IN RCRD: CPT | Mod: CPTII,,, | Performed by: FAMILY MEDICINE

## 2025-05-15 RX ORDER — PANTOPRAZOLE SODIUM 40 MG/1
40 TABLET, DELAYED RELEASE ORAL DAILY
Qty: 90 TABLET | Refills: 1 | Status: SHIPPED | OUTPATIENT
Start: 2025-05-15

## 2025-05-15 NOTE — PROGRESS NOTES
"[facility]) hospital discharge 48-72 hour phone follow up completed.     Most Recent Hospital Discharge Date: 05/08/2025  Last admission Diagnosis/chief complaint:SOB    TCC RN Navigator spoke with Ms. Marks    Current Patient reported weight: 175 lbs    Current Patient reported blood pressure and heart rate: not available     Pt reports the following:  []  Shortness of Breath with Activity  []  Shortness of Breath at rest   []  Fatigue  []  Edema   [] Chest pain or tightness  [] Weight Increase since discharge  [x] None of the above    Medications:   Discharge medication reviewed with pt.  Pt reports having medication list available and has all medications at home for use per list. Patient has  all medications prescribed at discharged.    Education:   Confirmed pt received "Home Care Guide for Heart Failure Patients" while admitted.   Reviewed key points as listed below.     Recommend 2 -3 gram sodium restriction and 1500 cc-2000 cc fluid restriction.  Encourage physical activity with graded exercise program.  Requested patient to weigh themselves daily, and to notify us if their weight increases by more than 3 lbs in 1 day or 5 lbs in 3 days.   Reminded patient to use "Daily weight and symptom tracker" to record and guide patient on when and how to call [facility]. PT may also use symptom tracker if no scale available  Pt reports being in the green(color) Zone. If in yellow/red, reminded that they should be calling [facility] today or now.     Watch for these Signs and Symptoms: If any of these occur, contact [facility] immediately:   Increase in shortness of breath with movement   Increase in swelling in your legs and ankles   Weight gain of more than 3 pounds in a day or 5 pounds in 3 days.   Difficulty breathing when you are lying down   Worsening fatigue or tiredness   Stomach bloating, a full feeling or a loss of appetite   Increased coughing--especially when you are lying down      Pt " was able to verbalize back to RN in their own words correct diet/fluid restrictions, necessity for exercise, warning signs and symptoms, when and how to contact their TCC team.      Pt educated on follow-up plan while in H[facility] program to include:   Week 1 -  F/u appt with Provider and RN (Date) 05/20/2025   Week 2-5 - In person/ Virtual/ phone call check ins    Week 5-7 - Pt will discharge from [facility] and transition to longterm care provider (Cardiology/PCP/ Advanced Heart Failure).      Patient active on myChart? Yes      Pt given the following contact information for ease of communication: [phone] (Mon-Fri, 8a-5p) & for urgent issues on the weekend to page the Heart Transplant MD on call.  Pt also encouraged utilize [facility] messaging as well.      Will follow up with pt at first clinic visit and RN navigator available for pt questions, issues or concerns.

## 2025-05-19 NOTE — PROGRESS NOTES
HF TCC Provider Note (Follow-up) Consult Note      HPI:  Patient can walk with SOB at longer distances which she states has been improving since hospital DC   Patient sleeps on 3 number of pillows   Patient wakes up SOB, has to get out of bed, associated cough, sputum and color-states resolved now, has in past   Palpitations - intermittent   Dizzy, light-headed, pre-syncope or syncope-1 episode dizziness   Since discharge frequency of performing weights, home weight and weight change-stable   Other information felt pertinent to HPI: Hospital Course:   Admitted for CHF. Consulted cards recs: Acute on chronic combined systolic and diastolic congestive heart failure. Diuresis and afterload reduction as tolerated. Titrate up entresto - intolerant to aldactone. Continue GDMT. Will f/u PRN. Patient c/o headache. Given prn meds. CT head: 1. No CT evidence of acute intracranial abnormality. If the patient's headaches are sufficiently clinically suspicious, or associated with signs of elevated ICP, focal neurologic deficits, nausea, or vomiting, further evaluation with MRI can be performed if there are no clinical contraindications.2. Findings in keeping with remote right cerebellar infarct, unchanged from prior exam. Patient now stays headache resolved. Patient BP was  96/65 and nurse gave meds. Ordered dose of midodrine 10 mg once. Improved /75  Neurology consulted for continued headache.  Patient was started on migraine cocktail:  Valproic acid 500 mg IV over 10 minutes Q 8 hours p.r.n., Benadryl 25 mg IV Q 8 p.r.n., and Compazine 10 mg Q 8 p.r.n. patient is told to avoid NSAIDs or Norco for pain relief.  Neurology recommended MRI brain if headache persists and follow up with Neurology Clinic outpatient.  MRI brain ordered because patient continued to have headaches.  However, unable to do MRI brain due to patient's AICD.  Discussed with Neurology and recommendation was MRI brain not warranted at this time.  Patient  can follow up outpatient with Neurology.  Patient was given another migraine cocktail with significant improvement of her headache.  Patient states she feels fine and would like to go home. Patient medically stable and cleared for discharge with stable vital signs.  Patient acknowledged understanding of what was stated above and had no further questions.  Return precautions given.    Patient recently readmitted as follows: This is a 39-year-old female with a past medical history of NICM (EF: 10-15%, GIIIDD on 4/12/2025 s/p AICD), hypertension, CVA, depression, marijuana use, who presents with chest pain.     Patient presents for evaluation of chest pain that started on the day of presentation. She reports substernal chest pain/pressure with radiation to her left arm. Additional symptoms include shortness of breath, nausea, 2 episodes of emesis and diarrhea. She feels that she is volume overloaded. Patient reports compliance with her medications.      In the ED, the patient was hemodynamically stable.  Labs were remarkable for an elevated troponin (2.888), elevated BNP (2322).  EKG showed w/o STEMI.  Chest x-ray showed no acute process.  Cardiology recommended heparin/nitro infusions.  Patient was given aspirin 325 mg, Plavix 600 mg, Maalox/viscous lidocaine, Zofran 4 mg IV, and was started on heparin/nitroglycerin infusions.  She was admitted for further management.     Procedure(s) (LRB):  ANGIOGRAM, CORONARY ARTERY (N/A)  Left heart cath (Left)       Hospital Course:   39 y.o. F with NICM EF 10-15%, G3DD admitted with chest pain. Troponin elevated, TWI present. Started heparin gtt, asa, plavix, and nitro gtt. Admitted to ICU. Continues to have chest pain. Cardiology consulted. [facility] 5/5 no CAD. CTA no PE but does show bibasilar infiltrates vs atelectasis. Started amlodipine 2.5mg for potential microvascular dysfunction causing pain. Started IV lasix.      Patient with new Pulmonary HTN with PASP 37 mmHg 5/5/25,  which previously normal just a few months ago (8/2024) and on previous ECHOs. New, acute, suspected Group 2, IV diuresis. Bicarb starting to come up a bit, will transition to oral lasix when able. Discussed with Cardiology, given her soft pressures and severe LHF, would be best to stop amlodipine and maximize GDMT.      Does not look like we will be able to uptitrate the GDMT at this point, once she is adequetly diuresed her BP is 103/75 even prior to her BP meds this am, this is lower dose than she was on outpatient, will dc with this regimen but need to F/u HF clinic and PCP to make adjustments if needed. She states she was taking 40 mg BID and still came in volume overloaded, will increase to PO lasix 80 mg BID at home.      Patient presents to HFT[facility] today following recent hospital admission. She states compliance with medications and diet and weight has been stable at home. Patient still has liable blood pressure. She endorses one episode of low bp Saturday of 80/56 which was accompanied by dizziness. She states all other BP readings have been around 105 systolic. She reports outside of that episode of dizziness there has not been any other dizziness or occurrence of bp readings under 100 systolic. BP stable today in clinic. She was instructed to continue to monitor her home BP and call the office for low BP readings and to return to ED for recurrent symptoms as previously admitted for. She was instructed to keep her follow up appointments with cardiology and transplant. She verbalized understanding.     PHYSICAL:   Vitals:    05/23/25 1059   BP: 110/80   Pulse: 91   Resp: 16        JVD: no   Heart rhythm: regular  Cardiac murmur: No    S3: no  S4: no  Lungs: clear  Hepatojugular reflux: no  Edema: no      1. Congestive heart failure, unspecified HF chronicity, unspecified heart failure type        Recommend 2-3 gram sodium restriction and 1500cc- 2000cc fluid restriction.  Call clinic for increased shortness of  breath, or increased swelling.  Weigh yourself every day and call the office if your weight increases by more than 3 lbs in 1 day or 5 lbs in 3 days.   Continue GDMT-avoid up titration at this time d/t labile BP  Patient did not tolerate spironolactone according to hospital record     3. Nonischemic cardiomyopathy  -     Ambulatory referral/consult to Transplant, Heart; Future; Expected date: 04/30/2025  As above     4. Essential hypertension      As above     5. Pulmonary HTN      As above

## 2025-05-19 NOTE — PROGRESS NOTES
Routine Office Visit     Patient Name: Nasra Marks    : 1985  MRN: [id number]    Subjective     History of Present Illness    CHIEF COMPLAINT:  Hospital follow-up   Non-ischemic cardiomyopathy / CHF / Hyperlipidemia / ICD - cardiology - Dr. Peguero / transplant - Dr. Prajapati   Chronic back pain - Dr. Beauchamp   Hx of CVA - neurology - Dr. Pablo   Chronic constipation -     HPI:  Patient has a complex medical history, including heart failure with an EF of 10-15%. She reports significant fatigue and shortness of breath, even with minimal exertion such as walking from the bathroom to her bed. She describes unpredictable energy levels, with some days better than others. She expresses apprehension about initiating an exercise regimen due to her heart condition, concerned about overexertion.    She has been evaluated by multiple specialists for various conditions. She is under the care of a gastroenterologist for constipation and an OB/GYN for abnormal bleeding. She has an upcoming appointment with the heart failure clinic next week as a follow-up from a recent hospital stay. She is also scheduled to see her transplant cardiologist on  but mentions needing to reschedule for an earlier appointment.    For pain management, she is evaluated by Dr. Long for knee pain and tears in her ribs and back. The current treatment plan involves therapy to avoid injections, with surgery being considered as a last resort due to her young age.    She reports a concerning incident following a right heart catheterization procedure where she was unable to walk or talk for several days afterward. Medical professionals ruled out seizures and stroke, but the exact cause remains undetermined. This event led to the discontinuation of her depression medication (fluoxetine).    She expresses frustration with her current health status, stating that her condition is not improving despite efforts to maintain a healthy  lifestyle. She reports a lack of appetite prior to her recent hospitalization, which she was told could be related to her heart failure.    She denies feeling stressed at the time of her right heart catheterization procedure.    MEDICATIONS:  Patient is on Inspira, Entresto, and Lasix for her heart condition. She is also taking Gabapentin for back pain, Farxiga, Atorvastatin, Metoprolol, Linzess for constipation, Potassium, Colace, Aspirin, and Plavix. Albuterol is used as needed. Patient discontinued fluoxetine for depression/anxiety as she felt it was not effective. Pantoprazole was prescribed for reflux, but she has not taken it due to stock issues at the pharmacy.    MEDICAL HISTORY:  Patient has a history of heart failure and a heart condition requiring an implant. She also has constipation, abnormal uterine bleeding, and a past history of depression/anxiety for which she discontinued medication.    FAMILY HISTORY:  Family history is significant for the patient's 18-year-old daughter being at risk for heart issues due to the patient's genetics.    SURGICAL HISTORY:  Patient has had a heart implant for her heart condition. She also underwent a right heart catheterization, which resulted in a complication of temporary inability to walk and talk for   days after the procedure.    TEST RESULTS:  Patient had labs done 6 months ago. Patient underwent a right heart catheterization, after which she experienced difficulty walking and talking for several days. She also had a brain EEG, which showed normal results and ruled out seizures.    SOCIAL HISTORY:  Denies alcohol use Occupation: Former  for [employer]    ROS:  General: no fever, no chills, +fatigue, no weight gain, no weight loss  Eyes: no vision changes, no redness, no discharge  ENT: no ear pain, no nasal congestion, no sore throat  Cardiovascular: no chest pain, no palpitations, no lower extremity edema  Respiratory: no cough, +shortness of  "breath, +exertional dyspnea  Gastrointestinal: no abdominal pain, no nausea, no vomiting, no diarrhea, +constipation, no blood in stool, +loss of appetite  Genitourinary: no dysuria, no hematuria, no frequency  Musculoskeletal: +joint pain, no muscle pain, +back pain, +limb pain  Skin: no rash, no lesion  Neurological: no headache, +dizziness, no numbness, no tingling, +nearno syncope  Psychiatric: no anxiety, no depression, no sleep difficulty  Female Genitourinary: +excessive vaginal bleeding           Objective     /74 (BP Location: Left arm, Patient Position: Sitting)   Pulse 61   Ht 5' 9" (1.753 m)   Wt 80.9 kg (178 lb 5.6 oz)   LMP 04/16/2025 (Approximate)   SpO2 99%   BMI 26.34 kg/m²   Physical Exam  Constitutional:       Appearance: She is well-developed.   HENT:      Head: Normocephalic and atraumatic.   Eyes:      Conjunctiva/sclera: Conjunctivae normal.      Pupils: Pupils are equal, round, and reactive to light.   Cardiovascular:      Rate and Rhythm: Normal rate and regular rhythm.      Heart sounds: Normal heart sounds. No murmur heard.     No friction rub. No gallop.   Pulmonary:      Effort: No respiratory distress.      Breath sounds: Normal breath sounds.   Abdominal:      General: Bowel sounds are normal. There is no distension.      Palpations: Abdomen is soft.      Tenderness: There is no abdominal tenderness.   Musculoskeletal:         General: Normal range of motion.      Cervical back: Normal range of motion and neck supple.   Lymphadenopathy:      Cervical: No cervical adenopathy.   Skin:     General: Skin is warm.   Neurological:      Mental Status: She is alert and oriented to person, place, and time.           Assessment     Assessment & Plan        Chronic constipation    Continue Linzess for constipation management.   Referral to gastroenterologist for specialized care has been placed.    Abnormal uterine bleeding (AUB)    Referred patient to OB/GYN for evaluation and " management of abnormal bleeding.          GENERAL FOLLOW-UP:   Assessed current medication regimen.   Addressed concerns about disability status, acknowledging limitations of current role in disability determinations.   Will consider labs at next follow-up visit.   Patient to return in 6 months.         Problem List Items Addressed This Visit          Neuro    Hx of ischemic right PCA stroke    Lumbar radiculopathy  Continue f/u with neurology    Continue care under Dr. Beauchamp for pain management, with focus on therapy before considering injections or surgical intervention.   Prescribed gabapentin for pain management.       Psychiatric    Severe episode of recurrent major depressive disorder, without psychotic features   Discontinued fluoxetine due to lack of efficacy.   Patient reports no current need for depression medication.  Patient does not want to start medication at this time  Recommend therapy          Cardiac/Vascular    Essential hypertension - Primary (Chronic)    Relevant Orders    [employer] [facility] [employer] Medicine (HDMP) Enrollment Order (Completed)    Nonischemic cardiomyopathy (Chronic)    Chronic combined systolic and diastolic congestive heart failure    ICD (implantable cardioverter-defibrillator), single, in situ   Patient is enrolled in a heart program with follow-up visit at the heart failure clinic next week.   Severe heart failure confirmed with EF of 10-15%.   Patient reports fatigue and dyspnea even with minimal exertion (e.g., moving from bathroom to bed), with unpredictable energy levels.   Continue current medication regimen including Inspra, Entresto, Lasix, and Metoprolol.   Recommend cardiac rehabilitation with cautious exercise approach - starting with short walks or stationary bike use, incorporating rest periods when experiencing symptoms.   Advised to follow Mediterranean diet with sodium restriction.   Consider obtaining home exercise equipment for safe physical activity.   Patient  mentions having a heart implant and is considering a new device.   Appointment with transplant cardiologist scheduled for July 1st.       Other Visit Diagnoses         Overweight (BMI 25.0-29.9)          Gastroesophageal reflux disease without esophagitis        Relevant Medications    pantoprazole (PROTONIX) 40 MG tablet   Continue pantoprazole for GERD management.                This note was generated with the assistance of ambient listening technology. Verbal consent was obtained by the patient and accompanying visitor(s) for the recording of patient appointment to facilitate this note. I attest to having reviewed and edited the generated note for accuracy, though some syntax or spelling errors may persist. Please contact the author of this note for any clarification.

## 2025-05-22 ENCOUNTER — TELEPHONE (OUTPATIENT)
Facility: CLINIC | Age: 40
End: 2025-05-22
Payer: MEDICAID

## 2025-05-23 ENCOUNTER — LAB VISIT (OUTPATIENT)
Dept: LAB | Facility: HOSPITAL | Age: 40
End: 2025-05-23
Payer: MEDICAID

## 2025-05-23 ENCOUNTER — OFFICE VISIT (OUTPATIENT)
Facility: CLINIC | Age: 40
End: 2025-05-23
Payer: MEDICAID

## 2025-05-23 ENCOUNTER — DOCUMENTATION ONLY (OUTPATIENT)
Facility: CLINIC | Age: 40
End: 2025-05-23

## 2025-05-23 ENCOUNTER — RESULTS FOLLOW-UP (OUTPATIENT)
Facility: CLINIC | Age: 40
End: 2025-05-23

## 2025-05-23 VITALS
RESPIRATION RATE: 16 BRPM | HEART RATE: 91 BPM | HEIGHT: 69 IN | OXYGEN SATURATION: 99 % | SYSTOLIC BLOOD PRESSURE: 110 MMHG | BODY MASS INDEX: 25.91 KG/M2 | DIASTOLIC BLOOD PRESSURE: 80 MMHG | WEIGHT: 174.94 LBS

## 2025-05-23 DIAGNOSIS — I50.20 HFREF (HEART FAILURE WITH REDUCED EJECTION FRACTION): Primary | ICD-10-CM

## 2025-05-23 DIAGNOSIS — I42.8 NONISCHEMIC CARDIOMYOPATHY: Chronic | ICD-10-CM

## 2025-05-23 DIAGNOSIS — I27.20 PULMONARY HYPERTENSION: ICD-10-CM

## 2025-05-23 DIAGNOSIS — R06.02 SOB (SHORTNESS OF BREATH): ICD-10-CM

## 2025-05-23 DIAGNOSIS — I10 ESSENTIAL HYPERTENSION: Chronic | ICD-10-CM

## 2025-05-23 LAB
ABSOLUTE EOSINOPHIL (OHS): 0.11 K/UL
ABSOLUTE MONOCYTE (OHS): 0.52 K/UL (ref 0.3–1)
ABSOLUTE NEUTROPHIL COUNT (OHS): 3.6 K/UL (ref 1.8–7.7)
ALBUMIN SERPL BCP-MCNC: 3.8 G/DL (ref 3.5–5.2)
ALP SERPL-CCNC: 61 UNIT/L (ref 40–150)
ALT SERPL W/O P-5'-P-CCNC: 16 UNIT/L (ref 10–44)
ANION GAP (OHS): 10 MMOL/L (ref 8–16)
AST SERPL-CCNC: 18 UNIT/L (ref 11–45)
BASOPHILS # BLD AUTO: 0.04 K/UL
BASOPHILS NFR BLD AUTO: 0.7 %
BILIRUB SERPL-MCNC: 0.3 MG/DL (ref 0.1–1)
BNP SERPL-MCNC: 1865 PG/ML (ref 0–99)
BUN SERPL-MCNC: 13 MG/DL (ref 6–20)
CALCIUM SERPL-MCNC: 9.3 MG/DL (ref 8.7–10.5)
CHLORIDE SERPL-SCNC: 106 MMOL/L (ref 95–110)
CO2 SERPL-SCNC: 26 MMOL/L (ref 23–29)
CREAT SERPL-MCNC: 0.9 MG/DL (ref 0.5–1.4)
ERYTHROCYTE [DISTWIDTH] IN BLOOD BY AUTOMATED COUNT: 15 % (ref 11.5–14.5)
GFR SERPLBLD CREATININE-BSD FMLA CKD-EPI: >60 ML/MIN/1.73/M2
GLUCOSE SERPL-MCNC: 88 MG/DL (ref 70–110)
HCT VFR BLD AUTO: 36.7 % (ref 37–48.5)
HGB BLD-MCNC: 11.4 GM/DL (ref 12–16)
IMM GRANULOCYTES # BLD AUTO: 0.01 K/UL (ref 0–0.04)
IMM GRANULOCYTES NFR BLD AUTO: 0.2 % (ref 0–0.5)
LYMPHOCYTES # BLD AUTO: 1.68 K/UL (ref 1–4.8)
MAGNESIUM SERPL-MCNC: 1.8 MG/DL (ref 1.6–2.6)
MCH RBC QN AUTO: 27 PG (ref 27–31)
MCHC RBC AUTO-ENTMCNC: 31.1 G/DL (ref 32–36)
MCV RBC AUTO: 87 FL (ref 82–98)
NUCLEATED RBC (/100WBC) (OHS): 0 /100 WBC
PHOSPHATE SERPL-MCNC: 3.8 MG/DL (ref 2.7–4.5)
PLATELET # BLD AUTO: 336 K/UL (ref 150–450)
PMV BLD AUTO: 9.9 FL (ref 9.2–12.9)
POTASSIUM SERPL-SCNC: 3.7 MMOL/L (ref 3.5–5.1)
PROT SERPL-MCNC: 8.2 GM/DL (ref 6–8.4)
RBC # BLD AUTO: 4.22 M/UL (ref 4–5.4)
RELATIVE EOSINOPHIL (OHS): 1.8 %
RELATIVE LYMPHOCYTE (OHS): 28.2 % (ref 18–48)
RELATIVE MONOCYTE (OHS): 8.7 % (ref 4–15)
RELATIVE NEUTROPHIL (OHS): 60.4 % (ref 38–73)
SODIUM SERPL-SCNC: 142 MMOL/L (ref 136–145)
WBC # BLD AUTO: 5.96 K/UL (ref 3.9–12.7)

## 2025-05-23 PROCEDURE — 80053 COMPREHEN METABOLIC PANEL: CPT

## 2025-05-23 PROCEDURE — 99999 PR PBB SHADOW E&M-EST. PATIENT-LVL V: CPT | Mod: PBBFAC,,,

## 2025-05-23 PROCEDURE — 83880 ASSAY OF NATRIURETIC PEPTIDE: CPT

## 2025-05-23 PROCEDURE — 85025 COMPLETE CBC W/AUTO DIFF WBC: CPT

## 2025-05-23 PROCEDURE — 84100 ASSAY OF PHOSPHORUS: CPT

## 2025-05-23 PROCEDURE — 83735 ASSAY OF MAGNESIUM: CPT

## 2025-05-23 PROCEDURE — 36415 COLL VENOUS BLD VENIPUNCTURE: CPT

## 2025-05-23 PROCEDURE — 99215 OFFICE O/P EST HI 40 MIN: CPT | Mod: PBBFAC

## 2025-05-23 NOTE — PATIENT INSTRUCTIONS
Activity and Diet restrictions:   Recommend 2-3 gram sodium restriction and 1500cc- 2000cc fluid restriction.  Call clinic for increased shortness of breath, or increased swelling.  Weigh yourself every day and call the office if your weight increases by more than 3 lbs in 1 day or 5 lbs in 3 days.     Follow with transplant and general cardiology

## 2025-05-23 NOTE — PROGRESS NOTES
"Heart Failure Transitional Care Clinic(H[facility]) First Week Visit     Pt presents to clinic 05/23/2025 .     Most Recent Hospital Discharge Date: 05/08/2025  Last admission Diagnosis/chief complaint:SOB        Visit Vitals:     Wt Readings from Last 3 Encounters:   05/23/25 79.3 kg (174 lb 15 oz)   05/15/25 80.9 kg (178 lb 5.6 oz)   05/12/25 80.6 kg (177 lb 11.1 oz)     Temp Readings from Last 3 Encounters:   05/08/25 98 °F (36.7 °C) (Oral)   04/24/25 98.9 °F (37.2 °C) (Oral)   04/18/25 97.4 °F (36.3 °C) (Oral)     BP Readings from Last 3 Encounters:   05/23/25 110/80   05/15/25 116/74   05/12/25 120/84     Pulse Readings from Last 3 Encounters:   05/23/25 91   05/15/25 61   05/12/25 99            Pt reports the following:  []  Shortness of Breath with activity  []  Shortness of Breath at rest/ sleeping on 2-3 pillows "some days"  []  Fatigue  []  Edema   [] Chest pain or tightness  [] Weight Increase since discharge  [x] None of the above    Pt reports being in the green Zone. If in yellow/red, reminded that they [facility] or now.      Medications:     Pt reports having all medications available and understands how to take them appropriately. Reminded pt to call prior to making any changes to medications.      Education:    [x] Gave pt new  / Confirmed pt has  "Heart Failure Transitional Care Clinic Home Care Guide" .   Reviewed key points as listed below.      Recommend 2 gram sodium restriction and 1500cc fluid restriction.  Encourage physical activity with graded exercise program.  Requested patient to weigh themselves daily, and to notify us if their weight increases by more than 3 lbs in 1 day or 5 lbs in 3 days.      [x] Gave / Reviewed "Daily Weight and Symptom Tracker".  Reviewed with patient when and how to call  [facility] according to "Yellow Zone" and "Red Zone".                  [x] Pt given list of low/high sodium foods and Your Heart-Healthy Nutrition booklet.                   Watch for " "these Signs and Symptoms: If any of these occur, contact HFT[facility] immediately:   Increase in shortness of breath with movement   Increase in swelling in your legs and ankles   Weight gain of more than 3 pounds in a night or 5 pounds in 3 days.   Difficulty breathing when you are lying down   Worsening fatigue or tiredness   Stomach bloating, a full feeling or a loss of appetite   Increased coughing--especially when you are lying down     MyChart and Care Companion:              Patient active on myChart? Yes, patient uses regularly.    Contacting our Team:              Reviewed with pt how to contact [facility] RN via phone or [employer] messaging.      HF TCC Program Plan:  Pt educated on follow-up plan while in HFTCC program.   [x]  PT given /reviewed upcoming appointment/check in dates. These will include weekly contact with RN or visits with providers over the next 4-6 weeks.                   [x]  Pt educated that they will transitioned to long term care provider team at week 4-6.  This team will be either Cardiology, PCP or Advanced Heart Failure depending on needs.          Pt was able to verbalize back to RN in their own words correct diet/fluid restrictions, necessity for exercise, warning signs and symptoms, when and how to contact their TCC team .       Plan: weekly phone checks           [x]  Pt given AVS with follow up appointment within 1 week and medication detail list for ease of use.     [x]  Explained to pt they will have a phone "check in" by RN in/on           Will follow up with pt at next clinic visit and RN navigator available for pt questions, issues or concerns.     Please refer to provider visit for additional details and assessment.    "

## 2025-05-30 ENCOUNTER — DOCUMENTATION ONLY (OUTPATIENT)
Facility: CLINIC | Age: 40
End: 2025-05-30
Payer: MEDICAID

## 2025-05-30 NOTE — PROGRESS NOTES
"Heart Failure Transitional Care Clinic(HFT[facility]) weekly phone follow up / triage call completed.     TCC RN Navigator spoke with Ms. Hammonds    Current Patient reported weight: 178 lbs   Patient Goal Weight: ()  Recent Patient reported blood pressure and heart rate: not taken    Pt reports the following:  []  Shortness of Breath with Activity  []  Shortness of Breath at rest   []  Fatigue  []  Edema   [] Chest pain or tightness  [] Weight Increase since discharge  [x] None of the above    Pt reports using "Daily weight and symptom tracker".    Pt reports being in the green( Zone. If in yellow/red, reminded that they should be calling H[facility] today or now. Patient concern her weight is up but her weight has been consistent with visits and weekly weights. Patient advise to seek medical attention if she experience CP SOB.    Medications:   Medication compliance reviewed with pt.  Pt reports having medication list available and has all medications at home for use per list.     Education:   Confirmed pt still has "Heart Failure Transitional Care Clinic Home Care Guide"  .     Reminded of key points as listed below.     Recommend 2 -3 gram sodium restriction and 1500 cc-2000 cc fluid restriction.  Encourage physical activity with graded exercise program.  Requested patient to weigh themselves daily, and to notify us if their weight increases by more than 3 lbs in 1 day or 5 lbs in 3 days.   Reminded to use "Daily weight and symptom tracker".  Even if pt does not have a scale, to use symptom tracker.       Watch for these Signs and Symptoms: If any of these occur, contact H[facility] immediately:   Increase in shortness of breath with movement   Increase in swelling in your legs and ankles   Weight gain of more than 3 pounds in a day or 5 pounds in 3 days.   Difficulty breathing when you are lying down   Worsening fatigue or tiredness   Stomach bloating, a full feeling or a loss of appetite   Increased coughing--especially when you " are lying down      Pt was able to verbalize back to RN in their own words correct diet/fluid restrictions, necessity for exercise, warning signs and symptoms, when and how to contact their HFTCC team.      Pt reminded of upcoming appointment.  PT reports they will attend.       Pt reminded of how and when to contact [facility]:  [phone](Mon-Fri, 8a-5p) & for urgent issues on the weekend to page the Heart Transplant MD on call.  Pt also encouraged utilize [facility] messaging as well.      Pt verbalized understanding and in agreement of plan.       Will follow up with pt at next clinic visit and RN navigator available for pt questions, issues or concerns.

## 2025-06-01 ENCOUNTER — HOSPITAL ENCOUNTER (OUTPATIENT)
Dept: CARDIOLOGY | Facility: HOSPITAL | Age: 40
Discharge: HOME | End: 2025-06-01
Attending: INTERNAL MEDICINE
Payer: MEDICAID

## 2025-06-01 ENCOUNTER — CLINICAL SUPPORT (OUTPATIENT)
Dept: CARDIOLOGY | Facility: HOSPITAL | Age: 40
End: 2025-06-01
Payer: MEDICAID

## 2025-06-01 DIAGNOSIS — I42.9 CARDIOMYOPATHY, UNSPECIFIED: ICD-10-CM

## 2025-06-01 DIAGNOSIS — Z95.810 PRESENCE OF AUTOMATIC (IMPLANTABLE) CARDIAC DEFIBRILLATOR: ICD-10-CM

## 2025-06-01 PROCEDURE — 93295 DEV INTERROG REMOTE 1/2/MLT: CPT | Mod: ,,, | Performed by: INTERNAL MEDICINE

## 2025-06-01 PROCEDURE — 93296 REM INTERROG EVL PM/IDS: CPT | Performed by: INTERNAL MEDICINE

## 2025-06-06 ENCOUNTER — DOCUMENTATION ONLY (OUTPATIENT)
Facility: CLINIC | Age: 40
End: 2025-06-06
Payer: MEDICAID

## 2025-06-13 ENCOUNTER — DOCUMENTATION ONLY (OUTPATIENT)
Facility: CLINIC | Age: 40
End: 2025-06-13
Payer: MEDICAID

## 2025-06-13 NOTE — PROGRESS NOTES
"Heart Failure Transitional Care Clinic    Attempted to call pt to complete 1 week "check in" call. Unable to reach pt at listed phone numbers.  Was able to leave message on voicemail encouraging pt to return call with HFT[facility] phone number..     Will continue to try to reach patient.      "

## 2025-06-20 LAB
OHS CV AF BURDEN PERCENT: < 1
OHS CV DC REMOTE DEVICE TYPE: NORMAL
OHS CV ICD SHOCK: NO
OHS CV RV PACING PERCENT: 0 %

## 2025-06-24 ENCOUNTER — DOCUMENTATION ONLY (OUTPATIENT)
Facility: CLINIC | Age: 40
End: 2025-06-24
Payer: MEDICAID

## 2025-06-24 NOTE — PROGRESS NOTES
"[facility]) DISCHARGE VISIT - PHONE     Called and spoke to     Most Recent Hospital Discharge Date: 05/06/2025  Last admission Diagnosis/chief complaint:CHF    Pt discharge completed by phone related to catarina's perfsarahynce      Pt reports the following:  []  Shortness of Breath with activity  []  Shortness of Breath at rest   []  Fatigue  []  Edema   [] Chest pain or tightness  [] Weight Increase since discharge  [x] None of the above    Medications:   Medication reconciliation completed today per RN.  Pt reports having all medications available and understands how to take them appropriately. Reminded pt to call prior to making any changes to medications. Patient's has     Education:   [x] Confirmed pt still has  "[facility] Home Care Guide" .   Reviewed key points as listed below.     Recommend 2 gram sodium restriction and 1500cc fluid restriction.  Encourage physical activity with graded exercise program.  Requested patient to weigh themselves daily, and to notify us if their weight increases by more than 3 lbs in 1 day or 5 lbs in 3 days.     [x] Reviewed completed "Daily Weight and Symptom Tracker".  Reviewed with patient when and how to call HFT[facility] according to "Yellow Zone" and "Red Zone".       Watch for these Signs and Symptoms: If any of these occur, contact HFT[facility] immediately:   Increase in shortness of breath with movement   Increase in swelling in your legs and ankles   Weight gain of more than 3 pounds in a night or 5 pounds in 3 days.   Difficulty breathing when you are lying down   Worsening fatigue or tiredness   Stomach bloating, a full feeling or a loss of appetite   Increased coughing--especially when you are lying down    MyChart and Care Companion:   Patient active on myChart? Yes, patient uses regularly.    HF TCC Program Plan:  Pt has successfully completed HFTCC program.  Pt care to be transferred to Dr. Peguero for long " term care.     Pt educated on how to call their offices and how to call [facility] On call in the event of an after hour issue.    PT reminded to continue to follow recommendations made during the HFT[facility] program to include monitoring daily weights, taking medications according to list, following up to appointments per provider recommendations, stop smoking/ start exercising and following a heart friendly low salt, low fluid diet.      Pt was able to verbalize back to RN in their own words correct diet/fluid restrictions, necessity for exercise, warning signs and symptoms, when and how to contact their  Long term care team .      Plan: Long term follow up with Dr. Peguero        [x]  Discussed upcoming appointments and/or plan for follow-up care with his/her PCP/Cardiology     Electronic hand off completed : To Dr. Peguero  by routing epic note per   Donna Conde, [facility]  Cardiology       Please refer to provider note for additional details and assessment.

## 2025-06-25 ENCOUNTER — OFFICE VISIT (OUTPATIENT)
Dept: CARDIOLOGY | Facility: CLINIC | Age: 40
End: 2025-06-25
Payer: MEDICAID

## 2025-06-25 VITALS
OXYGEN SATURATION: 96 % | HEIGHT: 69 IN | BODY MASS INDEX: 25.63 KG/M2 | WEIGHT: 173.06 LBS | DIASTOLIC BLOOD PRESSURE: 88 MMHG | HEART RATE: 51 BPM | SYSTOLIC BLOOD PRESSURE: 122 MMHG | RESPIRATION RATE: 18 BRPM

## 2025-06-25 DIAGNOSIS — I10 ESSENTIAL HYPERTENSION: ICD-10-CM

## 2025-06-25 DIAGNOSIS — I50.42 CHRONIC COMBINED SYSTOLIC AND DIASTOLIC CONGESTIVE HEART FAILURE: Primary | ICD-10-CM

## 2025-06-25 DIAGNOSIS — I21.4 NSTEMI (NON-ST ELEVATED MYOCARDIAL INFARCTION): ICD-10-CM

## 2025-06-25 DIAGNOSIS — Z95.810 ICD (IMPLANTABLE CARDIOVERTER-DEFIBRILLATOR), SINGLE, IN SITU: ICD-10-CM

## 2025-06-25 DIAGNOSIS — E78.5 DYSLIPIDEMIA: ICD-10-CM

## 2025-06-25 DIAGNOSIS — I27.20 PULMONARY HYPERTENSION: ICD-10-CM

## 2025-06-25 DIAGNOSIS — I42.8 NONISCHEMIC CARDIOMYOPATHY: ICD-10-CM

## 2025-06-25 DIAGNOSIS — Z86.73 HISTORY OF CVA (CEREBROVASCULAR ACCIDENT): ICD-10-CM

## 2025-06-25 PROCEDURE — 99214 OFFICE O/P EST MOD 30 MIN: CPT | Mod: PBBFAC,PO | Performed by: INTERNAL MEDICINE

## 2025-06-25 PROCEDURE — 3079F DIAST BP 80-89 MM HG: CPT | Mod: CPTII,,, | Performed by: INTERNAL MEDICINE

## 2025-06-25 PROCEDURE — 3008F BODY MASS INDEX DOCD: CPT | Mod: CPTII,,, | Performed by: INTERNAL MEDICINE

## 2025-06-25 PROCEDURE — 1160F RVW MEDS BY RX/DR IN RCRD: CPT | Mod: CPTII,,, | Performed by: INTERNAL MEDICINE

## 2025-06-25 PROCEDURE — 3074F SYST BP LT 130 MM HG: CPT | Mod: CPTII,,, | Performed by: INTERNAL MEDICINE

## 2025-06-25 PROCEDURE — 3044F HG A1C LEVEL LT 7.0%: CPT | Mod: CPTII,,, | Performed by: INTERNAL MEDICINE

## 2025-06-25 PROCEDURE — 1159F MED LIST DOCD IN RCRD: CPT | Mod: CPTII,,, | Performed by: INTERNAL MEDICINE

## 2025-06-25 PROCEDURE — 4010F ACE/ARB THERAPY RXD/TAKEN: CPT | Mod: CPTII,,, | Performed by: INTERNAL MEDICINE

## 2025-06-25 PROCEDURE — G2211 COMPLEX E/M VISIT ADD ON: HCPCS | Mod: ,,, | Performed by: INTERNAL MEDICINE

## 2025-06-25 PROCEDURE — 99999 PR PBB SHADOW E&M-EST. PATIENT-LVL IV: CPT | Mod: PBBFAC,,, | Performed by: INTERNAL MEDICINE

## 2025-06-25 PROCEDURE — 99214 OFFICE O/P EST MOD 30 MIN: CPT | Mod: S$PBB,,, | Performed by: INTERNAL MEDICINE

## 2025-06-25 RX ORDER — DAPAGLIFLOZIN 10 MG/1
10 TABLET, FILM COATED ORAL DAILY
Qty: 90 TABLET | Refills: 3 | Status: SHIPPED | OUTPATIENT
Start: 2025-06-25

## 2025-07-01 ENCOUNTER — OFFICE VISIT (OUTPATIENT)
Dept: TRANSPLANT | Facility: CLINIC | Age: 40
End: 2025-07-01
Attending: INTERNAL MEDICINE
Payer: MEDICAID

## 2025-07-01 ENCOUNTER — LAB VISIT (OUTPATIENT)
Dept: LAB | Facility: HOSPITAL | Age: 40
End: 2025-07-01
Attending: INTERNAL MEDICINE
Payer: MEDICAID

## 2025-07-01 VITALS
HEIGHT: 69 IN | HEART RATE: 97 BPM | DIASTOLIC BLOOD PRESSURE: 69 MMHG | BODY MASS INDEX: 26.06 KG/M2 | SYSTOLIC BLOOD PRESSURE: 100 MMHG | WEIGHT: 175.94 LBS

## 2025-07-01 DIAGNOSIS — I10 ESSENTIAL HYPERTENSION: Chronic | ICD-10-CM

## 2025-07-01 DIAGNOSIS — I42.8 NONISCHEMIC CARDIOMYOPATHY: Chronic | ICD-10-CM

## 2025-07-01 DIAGNOSIS — I50.42 CHRONIC COMBINED SYSTOLIC AND DIASTOLIC CONGESTIVE HEART FAILURE: Primary | ICD-10-CM

## 2025-07-01 DIAGNOSIS — I50.9 CONGESTIVE HEART FAILURE, UNSPECIFIED HF CHRONICITY, UNSPECIFIED HEART FAILURE TYPE: ICD-10-CM

## 2025-07-01 DIAGNOSIS — I49.3 PVC (PREMATURE VENTRICULAR CONTRACTION): ICD-10-CM

## 2025-07-01 DIAGNOSIS — E61.1 IRON DEFICIENCY: ICD-10-CM

## 2025-07-01 DIAGNOSIS — Z95.810 ICD (IMPLANTABLE CARDIOVERTER-DEFIBRILLATOR), SINGLE, IN SITU: ICD-10-CM

## 2025-07-01 LAB
ALBUMIN SERPL BCP-MCNC: 3.9 G/DL (ref 3.5–5.2)
ALP SERPL-CCNC: 69 UNIT/L (ref 40–150)
ALT SERPL W/O P-5'-P-CCNC: 12 UNIT/L (ref 10–44)
ANION GAP (OHS): 6 MMOL/L (ref 8–16)
AST SERPL-CCNC: 16 UNIT/L (ref 11–45)
BILIRUB SERPL-MCNC: 0.9 MG/DL (ref 0.1–1)
BNP SERPL-MCNC: 828 PG/ML (ref 0–99)
BUN SERPL-MCNC: 13 MG/DL (ref 6–20)
CALCIUM SERPL-MCNC: 9 MG/DL (ref 8.7–10.5)
CHLORIDE SERPL-SCNC: 107 MMOL/L (ref 95–110)
CO2 SERPL-SCNC: 25 MMOL/L (ref 23–29)
CREAT SERPL-MCNC: 0.7 MG/DL (ref 0.5–1.4)
GFR SERPLBLD CREATININE-BSD FMLA CKD-EPI: >60 ML/MIN/1.73/M2
GLUCOSE SERPL-MCNC: 61 MG/DL (ref 70–110)
MAGNESIUM SERPL-MCNC: 1.6 MG/DL (ref 1.6–2.6)
POTASSIUM SERPL-SCNC: 3.6 MMOL/L (ref 3.5–5.1)
PROT SERPL-MCNC: 7.5 GM/DL (ref 6–8.4)
SODIUM SERPL-SCNC: 138 MMOL/L (ref 136–145)

## 2025-07-01 PROCEDURE — 36415 COLL VENOUS BLD VENIPUNCTURE: CPT

## 2025-07-01 PROCEDURE — 99214 OFFICE O/P EST MOD 30 MIN: CPT | Mod: PBBFAC | Performed by: INTERNAL MEDICINE

## 2025-07-01 PROCEDURE — 83880 ASSAY OF NATRIURETIC PEPTIDE: CPT | Mod: 59

## 2025-07-01 PROCEDURE — 3044F HG A1C LEVEL LT 7.0%: CPT | Mod: CPTII,,, | Performed by: INTERNAL MEDICINE

## 2025-07-01 PROCEDURE — 80053 COMPREHEN METABOLIC PANEL: CPT

## 2025-07-01 PROCEDURE — 3008F BODY MASS INDEX DOCD: CPT | Mod: CPTII,,, | Performed by: INTERNAL MEDICINE

## 2025-07-01 PROCEDURE — 99215 OFFICE O/P EST HI 40 MIN: CPT | Mod: S$PBB,,, | Performed by: INTERNAL MEDICINE

## 2025-07-01 PROCEDURE — 1159F MED LIST DOCD IN RCRD: CPT | Mod: CPTII,,, | Performed by: INTERNAL MEDICINE

## 2025-07-01 PROCEDURE — 83880 ASSAY OF NATRIURETIC PEPTIDE: CPT

## 2025-07-01 PROCEDURE — 4010F ACE/ARB THERAPY RXD/TAKEN: CPT | Mod: CPTII,,, | Performed by: INTERNAL MEDICINE

## 2025-07-01 PROCEDURE — 3074F SYST BP LT 130 MM HG: CPT | Mod: CPTII,,, | Performed by: INTERNAL MEDICINE

## 2025-07-01 PROCEDURE — 3078F DIAST BP <80 MM HG: CPT | Mod: CPTII,,, | Performed by: INTERNAL MEDICINE

## 2025-07-01 PROCEDURE — G2211 COMPLEX E/M VISIT ADD ON: HCPCS | Mod: ,,, | Performed by: INTERNAL MEDICINE

## 2025-07-01 PROCEDURE — 99999 PR PBB SHADOW E&M-EST. PATIENT-LVL IV: CPT | Mod: PBBFAC,,, | Performed by: INTERNAL MEDICINE

## 2025-07-01 PROCEDURE — 83735 ASSAY OF MAGNESIUM: CPT

## 2025-07-01 PROCEDURE — 1160F RVW MEDS BY RX/DR IN RCRD: CPT | Mod: CPTII,,, | Performed by: INTERNAL MEDICINE

## 2025-07-01 RX ORDER — POTASSIUM CHLORIDE 20 MEQ/1
40 TABLET, EXTENDED RELEASE ORAL 2 TIMES DAILY
Qty: 120 TABLET | Refills: 11 | Status: SHIPPED | OUTPATIENT
Start: 2025-07-01

## 2025-07-01 NOTE — LETTER
July 1, 2025        Kashif Peguero  [address] [facility] [address]  [address]  [place] [address]  Phone: [phone]  Fax: [phone]             [id number]  [address]  [place] [address]  Phone: [phone]   Patient: Nasra Marks   MR Number: [id number]   YOB: 1985   Date of Visit: 7/1/2025       Dear Dr. Kashif Peguero    Thank you for referring Nasra Marks to me for evaluation. Attached you will find relevant portions of my assessment and plan of care.    If you have questions, please do not hesitate to call me. I look forward to following Nasra Marks along with you.    Sincerely,    Timothy Prajapati Jr, MD    Enclosure    If you would like to receive this communication electronically, please contact [email or web address] or [phone] to request [employer] access.    The [employer] Link is a tool which provides read-only access to select patient information with whom you have a relationship. Its easy to use and provides real time access to review your patients record including encounter summaries, notes, results, and demographic information.    If you feel you have received this communication in error or would no longer like to receive these types of communications, please e-mail [email or web address]

## 2025-07-01 NOTE — Clinical Note
Set up 48 hr holter, RHC, CPX, and see me (if all testing done first (a few days before RHC) I can then review all after the RHC with her  I entered the lab orders can be done day of the RHC She also needs IV iron

## 2025-07-01 NOTE — PROGRESS NOTES
Subjective:     Patient ID:  Nasra Marks is a 40 y.o. female who presents for follow-up of Congestive Heart Failure    HPI:   40 yo BF diagnosis CHF due to non-ischemic cardiomyopathy in 2017 with angiogram at that time without evidence of coronary artery disease.  She developed hypertension at the age of 16 but did not take her medications regularly.   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 She was referred by Dr. Kashif Peguero.      22: Stopped Carvedilol and started Metoprolol succinate 100mg daily for fatigue     At visit 22  she had made good progress with much improvement in functional capacity and shortness of breath but still with fatigue.  At that visit plan was:  To see if I can help fatigue change the beta blocker metoprolol to bisoprolol 10 mg once a day    Reduce fluid intake to 1.5 to 2 quarts a day    Start taking half a tablet of hydralazine (50 mg tab) and half a tablet of isosorbide dinitrate (20 mg tab) both are taken 3 times a day and in 2 weeks change to full tablet of each    Call in 6-8 weeks as we can increase this medication combination further if needed for symptoms    At visit 2023 I learned that she had gone to ED last week with atypical CP and negative evaluation.  No CAD at cath .  She sees Dr. Peguero.  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  Had HA, nausea when went to ED.  Dr. Peguero ordering tests.    At visit 23  I added amlodipine 5 mg daily  For blood pressure and subsequent potassium for hypokalemia.    At visit 2024  with biggest complaint being profound fatigue.  She had an echocardiogram today which I read as an ejection fraction of 20-25%.  I also reviewed the echocardiogram from 2024 and describe that ejection fraction as 20-25% as well.  Therefore I read the ejection fraction lower than that reported on both of these  studies.    At visit 1/2/25  She still reports profound fatigue.  Stable ELENA.  Post RHC complained of atypical symptoms of neuro event and subsequent evaluation negative for stroke.  She was asked to start prozac but elected to not take this treatment.  She had no recurrent symptoms.  She denies symptoms of depression.    She returns 7/1/25 for f/u visit. I learned that she was admitted with ADHF twice since her last visit here and that HF meds were reduced she says this was done for asymptomatic hypotension which she reports was below 115 mm Hg systolic.  Metoprolol succinate dose was reduced from 200 mg to 25 mg daily and her Entresto dose was reduced from  mg twice daily to 24-26 mg twice daily in the hospital though Dr. Peguero did increase this back up to 49-51 mg twice daily last week.  She is tolerating this dose fine.    She reports her biggest complaint is profound fatigue.  She lost her disability but is unable to work because of this fatigue.  She sleeps on 3-4 pillows at night for breathing and has to get up in a chair to catch her breath frequently during the night.  She has dyspnea on exertion moving around her home.  I noted frequent PVCs on some of her EKGs (bigeminy on some) but she reports she has not experienced any ICD shocks.  The ICD followed by Dr. Peguero,    Review of Systems   Constitutional: Positive for malaise/fatigue and weight loss.   Cardiovascular:  Positive for dyspnea on exertion, orthopnea and paroxysmal nocturnal dyspnea. Negative for chest pain, irregular heartbeat, leg swelling, near-syncope, palpitations and syncope.   Hematologic/Lymphatic: Does not bruise/bleed easily.   Neurological:  Positive for weakness.     Objective:   Physical Exam  Constitutional:       General: She is not in acute distress.     Appearance: She is well-developed. She is not ill-appearing, toxic-appearing or diaphoretic.      Comments: /69 (BP Location: Left arm, Patient Position: Sitting)  "  Pulse 97   Ht 5' 9" (1.753 m)   Wt 79.8 kg (175 lb 14.8 oz)   BMI 25.98 kg/m²   Last 5 visits weight 193, 190, 183, 184 and 178 lb  Friendly black female in no acute distress       HENT:      Head: Normocephalic and atraumatic.   Eyes:      General: No scleral icterus.        Right eye: No discharge.         Left eye: No discharge.      Conjunctiva/sclera: Conjunctivae normal.   Neck:      Thyroid: No thyromegaly.      Vascular: No JVD.      Trachea: No tracheal deviation.   Cardiovascular:      Rate and Rhythm: Normal rate and regular rhythm.      Heart sounds: S1 normal and S2 normal. No murmur heard.     No gallop. No S4 sounds.   Pulmonary:      Effort: Pulmonary effort is normal.      Breath sounds: Normal breath sounds.   Abdominal:      General: Bowel sounds are normal. There is no distension.      Palpations: Abdomen is soft. There is no hepatomegaly (10 cm) or mass.      Tenderness: There is no abdominal tenderness. There is no guarding or rebound.   Musculoskeletal:         General: No swelling or tenderness.      Right lower leg: No edema.      Left lower leg: No edema.   Skin:     General: Skin is warm and dry.   Neurological:      General: No focal deficit present.      Mental Status: She is alert and oriented to person, place, and time. Mental status is at baseline.   Psychiatric:         Mood and Affect: Mood normal.         Behavior: Behavior normal.         Thought Content: Thought content normal.         Judgment: Judgment normal.          Labs reviewed as follows were obtain with KATARINA Monique at [facility]:  Component Ref Range & Units 5 d ago  (12/28/24) 4 mo ago  (8/27/24) 4 mo ago  (8/9/24) 7 mo ago  (5/24/24) 8 mo ago  (4/25/24) 11 mo ago  (1/22/24) 1 yr ago  (8/14/23)   BNP 0 - 99 pg/mL 449 High  357 High   High   High   High  CM 88 CM 90 CM     Lab Results   Component Value Date     (H) 07/01/2025     07/01/2025     02/16/2025    K 3.6 07/01/2025    K " 3.6 02/16/2025    MG 1.6 07/01/2025     07/01/2025     (H) 02/16/2025    CO2 25 07/01/2025    CO2 18 (L) 02/16/2025    PHOS 3.8 05/23/2025    PHOS 2.9 04/25/2024    BUN 13 07/01/2025    CREATININE 0.7 07/01/2025    GLU 61 (L) 07/01/2025    GLU 83 02/16/2025    HGBA1C 5.5 05/04/2025    HGBA1C 5.3 08/27/2024    AST 16 07/01/2025    AST 29 02/16/2025    ALT 12 07/01/2025    ALT 33 02/16/2025    ALBUMIN 3.9 07/01/2025    ALBUMIN 3.9 02/16/2025    PROT 7.5 07/01/2025    PROT 7.9 02/16/2025    BILITOT 0.9 07/01/2025    BILITOT 0.7 02/16/2025    WBC 5.96 05/23/2025    HGB 11.4 (L) 05/23/2025    HGB 11.7 (L) 02/16/2025    HCT 36.7 (L) 05/23/2025    HCT 36.7 (L) 02/16/2025     05/23/2025     02/16/2025    INR 1.1 05/04/2025    INR 1.0 08/27/2024    INR 1.3 (H) 08/27/2024     07/24/2021    TSH 3.141 04/12/2025    TSH 3.133 08/27/2024    CHOL 119 (L) 05/04/2025    CHOL 129 08/27/2024    HDL 38 (L) 05/04/2025    LDLCALC 74.8 05/04/2025    TRIG 31 05/04/2025    TRIG 70 08/27/2024     Lab Results   Component Value Date    IRON 26 (L) 05/05/2025    TIBC 376 05/05/2025    FERRITIN 17.2 (L) 05/05/2025    FERRITIN 186 07/24/2021     Component  Ref Range & Units (hover) 1 mo ago  (5/5/25) 1 mo ago  (5/4/25) 1 mo ago  (5/4/25) 1 mo ago  (5/4/25) 1 mo ago  (5/4/25) 2 mo ago  (4/18/25) 4 mo ago  (2/16/25)   Troponin-I 4.498 High  4.404 High  CM 3.022 High  CM 3.077 High  CM 2.888 High  CM 0.011 CM 0.010 R,     5/5/2025 Cardiac cath  LVEDP: 18mmHg  LVEF: 15% by echo     Dominance: Right  LM: normal  LAD: normal  LCx: normal  RCA: normal    5/5/2025 ECHO    Left Ventricle: The left ventricle is severely dilated. Normal wall thickness. There is severe eccentric hypertrophy. Severe global hypokinesis present. There is severely reduced systolic function with a visually estimated ejection fraction of 10 -15%. Grade III diastolic dysfunction.    Right Ventricle: The right ventricle is normal in size. Systolic  function is normal.    Left Atrium: Severely dilated    Right Atrium: Right atrium is dilated.    Mitral Valve: There is moderate regurgitation.    Tricuspid Valve: There is moderate regurgitation.    Pulmonary Artery: The estimated pulmonary artery systolic pressure is 37 mmHg.    IVC/SVC: Normal venous pressure at 3 mmHg.     May 2025 EKG's reviewed demonstrate unifocal PVC's some in bigeminy pattern    12/28/2024 CXR portable no acute change    8/27/2024 RHC    RA 10  PA 31/15 (20)  PCWP 15    CO 5.7 l/min  CI 2.8 l/min/m2    TD cardiac output 5.2 l/min cardiac index 2.6 l/min/m2.    Condition: no inotropes or pressors.    8/21/2024 CPX  Resting spirometry reveals an FVC = 2.69L which is 69% of predicted, an FEV1 of 2.12L, which is 65% of predicted and an FEV1/FVC ratio of 79%. The MVV = 85 L/min, which is 74% of predicted.     The respiratory exchange ratio (RER) was 1.18, suggesting an adequate effort.     The breathing reserve is calculated at -3%, which is reduced. This may be due to a falsely reduced MVV. Oxygen saturation with exercise remained normal.     The peak VO2 was 19.8 ml/kg/min which is 56% of predicted equating to a functional capacity of 5.66 METS indicating moderate to severe functional impairment.     The anaerobic threshold (AT), which occurred at a heart rate of 118bpm, was 15.1 ml/kg/min, which is 43% of the predicted VO2 and is borderline reduced.     The peak VO2 Lean was 27.99 ml/kg of lean body weight/min indicating a good prognosis in heart failure.     The VE/VCO2 [place] was 36.2. The Resting PetCO2 was 27.0.       8/9/2024 ECHO-- I reviewed these images directly at today's visit estimate ejection fraction 20 25%    Left Ventricle: The left ventricle is moderately dilated. Normal wall thickness. Moderate global hypokinesis present. There is moderately reduced systolic function with a visually estimated ejection fraction of 30 - 35%. Grade II diastolic dysfunction. Normal left  ventricular filling pressure.    Right Ventricle: Normal right ventricular cavity size. Wall thickness is normal. Right ventricle wall motion  is normal. Systolic function is normal.    Left Atrium: Normal left atrial size.    Right Atrium: Normal right atrial size.    Aortic Valve: The aortic valve is a trileaflet valve. There is mild aortic valve sclerosis. There is mild annular calcification present.    Mitral Valve: There is mild bileaflet sclerosis. There is mild regurgitation.    Aorta: Aortic root is normal in size measuring 2.81 cm. Ascending aorta is normal measuring 2.96 cm.    Pulmonary Artery: The estimated pulmonary artery systolic pressure is 19 mmHg.    IVC/SVC: Normal venous pressure at 3 mmHg.    May 2024 EKG I could not get the images to pull up but did read the report EKG  did not demonstrate any conduction disturbance      2/27/2024 ECHO-- I reviewed these images directly at today's visit and estimate ejection fraction 20-25%    Left Ventricle: The left ventricle is moderately dilated. Normal wall thickness. Moderate global hypokinesis present. There is moderately reduced systolic function with a visually estimated ejection fraction of 30 - 35%. Grade I diastolic dysfunction.    Right Ventricle: Normal right ventricular cavity size. Systolic function is normal.    Pulmonary Artery: The estimated pulmonary artery systolic pressure is 16 mmHg.    1/17/2023 CXR read as mild cardiomegaly and possible mild perihilar interstitial edema.  I reviewed images and lungs are clear; also BNP only 75 down from 600's in March 2022 1/31/2022 CPX  Metabolic Findings Resting spirometry reveals an FVC = 2.86L which is 70.1% of predicted, an FEV1 of 2.30L, which is 68.27% of predicted and an FEV1/FVC ratio of 80.42%. The MVV = 92 L/min, which is 78.32% of predicted.     The respiratory exchange ratio (RER) was 1.32, suggesting an excellent effort.     The breathing reserve is calculated at 5.54%, which is  reduced. Oxygen saturation with exercise remained normal.     The peak VO2 was 20.5 ml/kg/min which is 58.24% of predicted equating to a functional capacity of 5.86 METS indicating moderate functional impairment.     The anaerobic threshold (AT), which occurred at a heart rate of 109bpm, was 14.1 ml/kg/min, which is 40.06% of the predicted VO2 and is borderline reduced.     The peak VO2 Lean was 26.25 ml/kg of lean body weight/min indicating a good prognosis in heart failure.     The VE/VCO2 [place] was 30.0. The Resting PetCO2 was 29.0.     Moderate functional impairment associated with a reduced breathing reserve, normal oxygen stauration, an excellent effort, and a borderline reduced AT. These findings are indicative of functional impairment secondary to circulatory insufficiency, ventilatory impairment.     12/28/2021 6 minute walk test 459.4 m without desaturation    10/06/2021 echocardiogram  The left ventricle is severely enlarged with eccentric hypertrophy and severely decreased systolic function.  The estimated ejection fraction is 15%.  Grade III left ventricular diastolic dysfunction.  Normal right ventricular size with normal right ventricular systolic function.  Moderate left atrial enlargement.  Mild mitral regurgitation.  Normal central venous pressure (3 mmHg).  The estimated PA systolic pressure is 15 mmHg.      12/31/2021 EKG normal sinus rhythm nonspecific ST-T abnormality    Cath 4/18/17:  Angiographic Results       Patient has a right dominant coronary artery.      - Left Main Coronary Artery:             The LM is normal. There is DANNY 3 flow.     - Left Anterior Descending Artery:             The LAD is normal. There is DANNY 3 flow.     - Left Circumflex Artery:             The LCX is normal. There is DANNY 3 flow.     - Right Coronary Artery:             The RCA is normal. There is DANNY 3 flow.     - Left Renal Artery:             The ostial left renal is normal.     - Right Renal Artery:              The ostial right renal is normal.     - Common Femoral Artery:             The right CFA is normal.    Assessment:     1. Chronic combined systolic and diastolic congestive heart failure    2. Iron deficiency    3. PVC (premature ventricular contraction)    4. Nonischemic cardiomyopathy    5. Essential hypertension    6. ICD (implantable cardioverter-defibrillator), single, in situ      Plan:    She has required 2 hospitalizations this year for decompensated heart failure which is a high-risk state.  She has experienced progressive symptoms.  Her last CPX had a high-risk feature of elevated VEVCO2 slope. She has not been able to tolerate hydralazine and isosorbide due to severe headaches.      I am going to obtain 48 hour Holter to assess the frequency of her PVCs for if high enough ( greater than 10-20% burden ) this might account for worsening ejection fraction and symptoms.     I am going to repeat her CPX and RHC for risk stratification     She is iron deficient so request IV iron replacement therapy ferric carboxymaltose 750 mg IV x 1 dose Indications:congestive heart failure with iron deficiency     We will meet back to review all     In my opinion she is disabled from her congestive heart failure NYHA class 3-4 symptoms and should reapply for disability at this time

## 2025-07-03 DIAGNOSIS — E61.1 IRON DEFICIENCY: Primary | ICD-10-CM

## 2025-07-03 LAB — NT-PROBNP SERPL IA-MCNC: 896 PG/ML

## 2025-07-08 DIAGNOSIS — D50.9 IRON DEFICIENCY ANEMIA, UNSPECIFIED IRON DEFICIENCY ANEMIA TYPE: Primary | ICD-10-CM

## 2025-07-23 ENCOUNTER — CLINICAL SUPPORT (OUTPATIENT)
Dept: CARDIOLOGY | Facility: HOSPITAL | Age: 40
End: 2025-07-23
Attending: INTERNAL MEDICINE
Payer: MEDICAID

## 2025-07-23 DIAGNOSIS — I50.42 CHRONIC COMBINED SYSTOLIC AND DIASTOLIC CONGESTIVE HEART FAILURE: ICD-10-CM

## 2025-07-23 DIAGNOSIS — I49.3 PVC (PREMATURE VENTRICULAR CONTRACTION): ICD-10-CM

## 2025-07-23 PROCEDURE — 93225 XTRNL ECG REC<48 HRS REC: CPT

## 2025-07-25 ENCOUNTER — LAB VISIT (OUTPATIENT)
Dept: LAB | Facility: HOSPITAL | Age: 40
End: 2025-07-25
Payer: MEDICAID

## 2025-07-25 ENCOUNTER — PATIENT OUTREACH (OUTPATIENT)
Dept: ADMINISTRATIVE | Facility: HOSPITAL | Age: 40
End: 2025-07-25
Payer: MEDICAID

## 2025-07-25 DIAGNOSIS — D50.9 IRON DEFICIENCY ANEMIA, UNSPECIFIED IRON DEFICIENCY ANEMIA TYPE: ICD-10-CM

## 2025-07-25 LAB
ABSOLUTE EOSINOPHIL (OHS): 0.14 K/UL
ABSOLUTE MONOCYTE (OHS): 0.56 K/UL (ref 0.3–1)
ABSOLUTE NEUTROPHIL COUNT (OHS): 5.48 K/UL (ref 1.8–7.7)
ALBUMIN SERPL BCP-MCNC: 3.9 G/DL (ref 3.5–5.2)
ALP SERPL-CCNC: 55 UNIT/L (ref 40–150)
ALT SERPL W/O P-5'-P-CCNC: 9 UNIT/L (ref 10–44)
ANION GAP (OHS): 6 MMOL/L (ref 8–16)
AST SERPL-CCNC: 21 UNIT/L (ref 11–45)
BASOPHILS # BLD AUTO: 0.05 K/UL
BASOPHILS NFR BLD AUTO: 0.6 %
BILIRUB SERPL-MCNC: 0.4 MG/DL (ref 0.1–1)
BUN SERPL-MCNC: 12 MG/DL (ref 6–20)
CALCIUM SERPL-MCNC: 9 MG/DL (ref 8.7–10.5)
CHLORIDE SERPL-SCNC: 110 MMOL/L (ref 95–110)
CO2 SERPL-SCNC: 25 MMOL/L (ref 23–29)
CREAT SERPL-MCNC: 0.9 MG/DL (ref 0.5–1.4)
ERYTHROCYTE [DISTWIDTH] IN BLOOD BY AUTOMATED COUNT: 15.2 % (ref 11.5–14.5)
FERRITIN SERPL-MCNC: 14 NG/ML (ref 20–300)
GFR SERPLBLD CREATININE-BSD FMLA CKD-EPI: >60 ML/MIN/1.73/M2
GLUCOSE SERPL-MCNC: 89 MG/DL (ref 70–110)
HCT VFR BLD AUTO: 35.2 % (ref 37–48.5)
HGB BLD-MCNC: 10.9 GM/DL (ref 12–16)
IMM GRANULOCYTES # BLD AUTO: 0.03 K/UL (ref 0–0.04)
IMM GRANULOCYTES NFR BLD AUTO: 0.4 % (ref 0–0.5)
IRON SATN MFR SERPL: 6 % (ref 20–50)
IRON SERPL-MCNC: 22 UG/DL (ref 30–160)
LYMPHOCYTES # BLD AUTO: 1.48 K/UL (ref 1–4.8)
MCH RBC QN AUTO: 27.9 PG (ref 27–31)
MCHC RBC AUTO-ENTMCNC: 31 G/DL (ref 32–36)
MCV RBC AUTO: 90 FL (ref 82–98)
NUCLEATED RBC (/100WBC) (OHS): 0 /100 WBC
PLATELET # BLD AUTO: 343 K/UL (ref 150–450)
PMV BLD AUTO: 11 FL (ref 9.2–12.9)
POTASSIUM SERPL-SCNC: 4.3 MMOL/L (ref 3.5–5.1)
PROT SERPL-MCNC: 7.5 GM/DL (ref 6–8.4)
RBC # BLD AUTO: 3.9 M/UL (ref 4–5.4)
RELATIVE EOSINOPHIL (OHS): 1.8 %
RELATIVE LYMPHOCYTE (OHS): 19.1 % (ref 18–48)
RELATIVE MONOCYTE (OHS): 7.2 % (ref 4–15)
RELATIVE NEUTROPHIL (OHS): 70.9 % (ref 38–73)
SODIUM SERPL-SCNC: 141 MMOL/L (ref 136–145)
TIBC SERPL-MCNC: 374 UG/DL (ref 250–450)
TRANSFERRIN SERPL-MCNC: 253 MG/DL (ref 200–375)
WBC # BLD AUTO: 7.74 K/UL (ref 3.9–12.7)

## 2025-07-25 PROCEDURE — 82947 ASSAY GLUCOSE BLOOD QUANT: CPT

## 2025-07-25 PROCEDURE — 83921 ORGANIC ACID SINGLE QUANT: CPT

## 2025-07-25 PROCEDURE — 36415 COLL VENOUS BLD VENIPUNCTURE: CPT | Mod: PO

## 2025-07-25 PROCEDURE — 83540 ASSAY OF IRON: CPT

## 2025-07-25 PROCEDURE — 84238 ASSAY NONENDOCRINE RECEPTOR: CPT

## 2025-07-25 PROCEDURE — 85025 COMPLETE CBC W/AUTO DIFF WBC: CPT

## 2025-07-25 PROCEDURE — 82728 ASSAY OF FERRITIN: CPT

## 2025-07-26 LAB — STFR SERPL-MCNC: 2.9 MG/L (ref 1.8–4.6)

## 2025-07-28 ENCOUNTER — OFFICE VISIT (OUTPATIENT)
Dept: HEMATOLOGY/ONCOLOGY | Facility: CLINIC | Age: 40
End: 2025-07-28
Payer: MEDICAID

## 2025-07-28 VITALS
SYSTOLIC BLOOD PRESSURE: 119 MMHG | HEIGHT: 69 IN | BODY MASS INDEX: 26.25 KG/M2 | HEART RATE: 88 BPM | WEIGHT: 177.25 LBS | OXYGEN SATURATION: 98 % | DIASTOLIC BLOOD PRESSURE: 83 MMHG

## 2025-07-28 DIAGNOSIS — Z95.810 ICD (IMPLANTABLE CARDIOVERTER-DEFIBRILLATOR), SINGLE, IN SITU: ICD-10-CM

## 2025-07-28 DIAGNOSIS — D50.9 IRON DEFICIENCY ANEMIA, UNSPECIFIED IRON DEFICIENCY ANEMIA TYPE: Primary | ICD-10-CM

## 2025-07-28 DIAGNOSIS — I21.4 NSTEMI (NON-ST ELEVATED MYOCARDIAL INFARCTION): ICD-10-CM

## 2025-07-28 DIAGNOSIS — Z76.89 ENCOUNTER TO ESTABLISH CARE: ICD-10-CM

## 2025-07-28 DIAGNOSIS — K59.09 CHRONIC CONSTIPATION: ICD-10-CM

## 2025-07-28 PROCEDURE — 99213 OFFICE O/P EST LOW 20 MIN: CPT | Mod: PBBFAC | Performed by: STUDENT IN AN ORGANIZED HEALTH CARE EDUCATION/TRAINING PROGRAM

## 2025-07-28 PROCEDURE — 4010F ACE/ARB THERAPY RXD/TAKEN: CPT | Mod: CPTII,,, | Performed by: STUDENT IN AN ORGANIZED HEALTH CARE EDUCATION/TRAINING PROGRAM

## 2025-07-28 PROCEDURE — 3044F HG A1C LEVEL LT 7.0%: CPT | Mod: CPTII,,, | Performed by: STUDENT IN AN ORGANIZED HEALTH CARE EDUCATION/TRAINING PROGRAM

## 2025-07-28 PROCEDURE — 3074F SYST BP LT 130 MM HG: CPT | Mod: CPTII,,, | Performed by: STUDENT IN AN ORGANIZED HEALTH CARE EDUCATION/TRAINING PROGRAM

## 2025-07-28 PROCEDURE — 1159F MED LIST DOCD IN RCRD: CPT | Mod: CPTII,,, | Performed by: STUDENT IN AN ORGANIZED HEALTH CARE EDUCATION/TRAINING PROGRAM

## 2025-07-28 PROCEDURE — 99205 OFFICE O/P NEW HI 60 MIN: CPT | Mod: S$PBB,,, | Performed by: STUDENT IN AN ORGANIZED HEALTH CARE EDUCATION/TRAINING PROGRAM

## 2025-07-28 PROCEDURE — 99999 PR PBB SHADOW E&M-EST. PATIENT-LVL III: CPT | Mod: PBBFAC,,, | Performed by: STUDENT IN AN ORGANIZED HEALTH CARE EDUCATION/TRAINING PROGRAM

## 2025-07-28 PROCEDURE — 3079F DIAST BP 80-89 MM HG: CPT | Mod: CPTII,,, | Performed by: STUDENT IN AN ORGANIZED HEALTH CARE EDUCATION/TRAINING PROGRAM

## 2025-07-28 PROCEDURE — 3008F BODY MASS INDEX DOCD: CPT | Mod: CPTII,,, | Performed by: STUDENT IN AN ORGANIZED HEALTH CARE EDUCATION/TRAINING PROGRAM

## 2025-07-28 RX ORDER — HEPARIN 100 UNIT/ML
500 SYRINGE INTRAVENOUS
OUTPATIENT
Start: 2025-08-06

## 2025-07-28 RX ORDER — SODIUM CHLORIDE 0.9 % (FLUSH) 0.9 %
10 SYRINGE (ML) INJECTION
OUTPATIENT
Start: 2025-08-06

## 2025-07-28 RX ORDER — EPINEPHRINE 0.3 MG/.3ML
0.3 INJECTION SUBCUTANEOUS ONCE AS NEEDED
OUTPATIENT
Start: 2025-08-06

## 2025-07-28 NOTE — PROGRESS NOTES
Hematology- Oncology Clinic Note :     7/28/2025    Chief Complaint   Patient presents with    Anemia    Establish Care         HPI  Pt is a 40 y.o. female who  has a past medical history of CHF (congestive heart failure), Chronic combined systolic and diastolic congestive heart failure (05/24/2019), Essential hypertension (11/19/2012), Hypertension, 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 disease, with heart failure (12/28/2021), ICD (implantable cardioverter-defibrillator), single, in situ (02/17/2020), Nonischemic cardiomyopathy (05/24/2019), Pacemaker (12/18/2017), and Pulmonary hypertension (05/07/2025). Who presents today for follow up of anemia.  Anemia appears to have iron def component and pt agreeable to IV iron after side effects discussed.  Would like to avoid additional PO iron for now due to constipation.  Pt agreeable to plan and all questions answered to her satisfaction.          Active Problem List with Overview Notes    Diagnosis Date Noted    Pulmonary hypertension 05/07/2025     PASP 37 mmHg 5/5/25, previously normal- suspected Group 2      Iron deficiency anemia 05/05/2025    NSTEMI (non-ST elevated myocardial infarction) 05/04/2025    Severe episode of recurrent major depressive disorder, without psychotic features 04/25/2025    Degeneration of intervertebral disc of lumbar region with discogenic back pain and lower extremity pain 01/07/2025    Lumbar radiculopathy 01/07/2025    Lumbar radiculopathy, chronic 01/06/2025    Hx of ischemic right PCA stroke 02/08/2023    Headache 02/08/2023    Cerebral infarction due to unspecified occlusion or stenosis of right posterior cerebral artery 02/08/2023    History of COVID-19 10/28/2021    ICD (implantable cardioverter-defibrillator), single, in situ 02/17/2020    Chronic combined systolic and diastolic congestive heart failure 05/24/2019    Nonischemic cardiomyopathy 05/24/2019    Sciatica of left side  2019    Essential hypertension 2012       Patient Active Problem List    Diagnosis Date Noted    Pulmonary hypertension 2025    Iron deficiency anemia 2025    NSTEMI (non-ST elevated myocardial infarction) 2025    Severe episode of recurrent major depressive disorder, without psychotic features 2025    Degeneration of intervertebral disc of lumbar region with discogenic back pain and lower extremity pain 2025    Lumbar radiculopathy 2025    Lumbar radiculopathy, chronic 2025    Hx of ischemic right PCA stroke 2023    Headache 2023    Cerebral infarction due to unspecified occlusion or stenosis of right posterior cerebral artery 2023    History of COVID-19 10/28/2021    ICD (implantable cardioverter-defibrillator), single, in situ 2020    Chronic combined systolic and diastolic congestive heart failure 2019    Nonischemic cardiomyopathy 2019    Sciatica of left side 2019    Essential hypertension 2012     Past Medical History:   Diagnosis Date    CHF (congestive heart failure)     Chronic combined systolic and diastolic congestive heart failure 2019    Essential hypertension 2012    Hypertension     dx at 16y.o.    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 disease, with heart failure 2021    ICD (implantable cardioverter-defibrillator), single, in situ 2020    Nonischemic cardiomyopathy 2019    Pacemaker 2017    Pulmonary hypertension 2025    PASP 37 mmHg 25, previously normal- suspected Group 2        Past Surgical History:   Procedure Laterality Date    CARDIAC DEFIBRILLATOR PLACEMENT  2017     SECTION      x 1    CORONARY ANGIOGRAPHY N/A 2025    Procedure: ANGIOGRAM, CORONARY ARTERY;  Surgeon: Kashif Peguero MD;  Location: [facility] CATH LAB;  Service: Cardiology;  Laterality: N/A;  Right radial. Noon time -  stick time    INDUCED       LEFT HEART CATHETERIZATION Left 2025    Procedure: Left heart cath;  Surgeon: Kashif Peguero MD;  Location: [facility] CATH LAB;  Service: Cardiology;  Laterality: Left;    RIGHT HEART CATHETERIZATION Right 2022    Procedure: INSERTION, CATHETER, RIGHT HEART;  Surgeon: Timothy Prajapati Jr., MD;  Location: [facility] CATH LAB;  Service: Cardiology;  Laterality: Right;    RIGHT HEART CATHETERIZATION Right 2024    Procedure: INSERTION, CATHETER, RIGHT HEART;  Surgeon: Lior Rueda MD;  Location: [facility] CATH LAB;  Service: Cardiology;  Laterality: Right;    TUBAL LIGATION        Prescriptions Prior to Admission[1]  Review of patient's allergies indicates:   Allergen Reactions    Aldactone [spironolactone] Swelling     Lips swelled    Hydralazine analogues Other (See Comments)     headaches    Isosorbide Other (See Comments)     headaches      Social History     Tobacco Use    Smoking status: Never    Smokeless tobacco: Never   Substance Use Topics    Alcohol use: Not Currently     Comment: occasionally      Family History   Problem Relation Name Age of Onset    Hypertension Mother      Cancer Maternal Grandmother          breast x 2    Cancer Paternal Grandmother          breast    No Known Problems Sister      No Known Problems Brother      No Known Problems Son      No Known Problems Brother      Hypertension Other      Diabetes Other      Breast cancer Other      Cancer Paternal Aunt          breast    Cancer Paternal Uncle          Review of Systems :  Review of Systems   Constitutional:  Positive for malaise/fatigue. Negative for fever.   HENT:  Negative for congestion, hearing loss and nosebleeds.    Eyes:  Negative for blurred vision and discharge.   Respiratory:  Negative for cough and shortness of breath.    Cardiovascular:  Negative for chest pain and leg swelling.   Gastrointestinal:  Positive for constipation. Negative for abdominal pain, blood in  stool, heartburn, melena, nausea and vomiting.   Genitourinary:  Negative for dysuria and hematuria.   Musculoskeletal:  Negative for joint pain and myalgias.   Skin:  Negative for itching and rash.   Neurological:  Negative for dizziness and focal weakness.   Endo/Heme/Allergies:  Does not bruise/bleed easily.   Psychiatric/Behavioral:  Negative for depression. The patient is not nervous/anxious.        Physical Exam :  Wt Readings from Last 3 Encounters:   07/28/25 80.4 kg (177 lb 4 oz)   07/01/25 79.8 kg (175 lb 14.8 oz)   06/25/25 78.5 kg (173 lb 1 oz)     Temp Readings from Last 3 Encounters:   05/08/25 98 °F (36.7 °C) (Oral)   04/24/25 98.9 °F (37.2 °C) (Oral)   04/18/25 97.4 °F (36.3 °C) (Oral)     BP Readings from Last 3 Encounters:   07/28/25 119/83   07/01/25 100/69   06/25/25 122/88     Pulse Readings from Last 3 Encounters:   07/28/25 88   07/01/25 97   06/25/25 (!) 51     Body mass index is 26.18 kg/m².    Physical Exam  Vitals reviewed.   Constitutional:       Appearance: Normal appearance.   HENT:      Head: Normocephalic and atraumatic.      Nose: Nose normal.      Mouth/Throat:      Mouth: Mucous membranes are moist.   Eyes:      Pupils: Pupils are equal, round, and reactive to light.   Cardiovascular:      Rate and Rhythm: Normal rate and regular rhythm.   Pulmonary:      Effort: Pulmonary effort is normal.   Abdominal:      General: Abdomen is flat.   Musculoskeletal:         General: Normal range of motion.      Cervical back: Normal range of motion and neck supple.   Skin:     General: Skin is dry.   Neurological:      Mental Status: She is alert. Mental status is at baseline.   Psychiatric:         Mood and Affect: Mood normal.         Behavior: Behavior normal.           Pertinent Diagnostic studies:    No results found for this or any previous visit (from the past 24 hours).    Assessment/Plan :     Anemia  -Appears to have chronic disease and iron def components  -Iron def of 800mg  calculated  -Set up for 2 doses of venofer prior to next visit  -RTC in 2 months for MD virtual visit with Dr. Connors and labs day prior      Chronic combined systolic and diastolic congestive heart failure   -Hx of ICD (implantable cardioverter-defibrillator)  -Per cardiology      Chronic constipation  -Follows with GI      Time spent on case: 60 minutes    Summary of orders placed this encounter:  Orders Placed This Encounter   Procedures    CBC Auto Differential    Ferritin    Iron and TIBC       Future Appointments   Date Time Provider Department Center   8/6/2025 10:20 AM Kashif Peguero MD [facility] CARDIO [facility]   8/12/2025  2:00 PM Rhona Sen PA-C [facility] GASTRO [facility]   8/13/2025 10:00 AM LAB, APPOINTMENT [place] [facility] LAB [facility]   8/13/2025 10:30 AM CPX [facility] ECHOSTR [facility]   8/13/2025 11:45 AM 3PREP, [facility] SSCU [facility]   11/25/2025  2:00 PM Veronika Rainey MD [facility] Jaja Basurto MD   Hematology/oncology, [facility]           [1] (Not in a hospital admission)

## 2025-07-28 NOTE — Clinical Note
-Set up for 2 doses of venofer prior to next visit -RTC in 2 months for MD virtual visit with Dr. Connors and labs day prior CBC, iron, ferritin

## 2025-07-29 LAB — W METHYLMALONIC ACID: 0.24 UMOL/L

## 2025-08-02 ENCOUNTER — HOSPITAL ENCOUNTER (OUTPATIENT)
Facility: HOSPITAL | Age: 40
Discharge: HOME | End: 2025-08-03
Attending: EMERGENCY MEDICINE | Admitting: EMERGENCY MEDICINE
Payer: MEDICAID

## 2025-08-02 DIAGNOSIS — R07.9 CHEST PAIN: ICD-10-CM

## 2025-08-02 DIAGNOSIS — R05.9 COUGH, UNSPECIFIED TYPE: ICD-10-CM

## 2025-08-02 DIAGNOSIS — R06.02 SHORTNESS OF BREATH: ICD-10-CM

## 2025-08-02 DIAGNOSIS — M54.16 LUMBAR RADICULOPATHY: ICD-10-CM

## 2025-08-02 DIAGNOSIS — M54.16 LUMBAR RADICULOPATHY, CHRONIC: ICD-10-CM

## 2025-08-02 DIAGNOSIS — R07.89 CHEST TIGHTNESS: Primary | ICD-10-CM

## 2025-08-02 DIAGNOSIS — I50.42 CHRONIC COMBINED SYSTOLIC AND DIASTOLIC CONGESTIVE HEART FAILURE: ICD-10-CM

## 2025-08-02 DIAGNOSIS — I50.9 CHF (CONGESTIVE HEART FAILURE): ICD-10-CM

## 2025-08-02 DIAGNOSIS — M51.362 DEGENERATION OF INTERVERTEBRAL DISC OF LUMBAR REGION WITH DISCOGENIC BACK PAIN AND LOWER EXTREMITY PAIN: ICD-10-CM

## 2025-08-02 DIAGNOSIS — I50.9 CHF EXACERBATION: ICD-10-CM

## 2025-08-02 PROBLEM — J90 PLEURAL EFFUSION, LEFT: Status: ACTIVE | Noted: 2025-08-02

## 2025-08-02 LAB
ABSOLUTE EOSINOPHIL (OHS): 0.08 K/UL
ABSOLUTE MONOCYTE (OHS): 0.46 K/UL (ref 0.3–1)
ABSOLUTE NEUTROPHIL COUNT (OHS): 5.05 K/UL (ref 1.8–7.7)
ALBUMIN SERPL BCP-MCNC: 3.7 G/DL (ref 3.5–5.2)
ALP SERPL-CCNC: 61 UNIT/L (ref 40–150)
ALT SERPL W/O P-5'-P-CCNC: 57 UNIT/L (ref 10–44)
ANION GAP (OHS): 8 MMOL/L (ref 8–16)
AST SERPL-CCNC: 49 UNIT/L (ref 11–45)
B-HCG UR QL: NEGATIVE
BACTERIA #/AREA URNS AUTO: ABNORMAL /HPF
BASOPHILS # BLD AUTO: 0.03 K/UL
BASOPHILS NFR BLD AUTO: 0.4 %
BILIRUB SERPL-MCNC: 0.7 MG/DL (ref 0.1–1)
BILIRUB UR QL STRIP.AUTO: NEGATIVE
BUN SERPL-MCNC: 14 MG/DL (ref 6–20)
CALCIUM SERPL-MCNC: 8.3 MG/DL (ref 8.7–10.5)
CHLORIDE SERPL-SCNC: 111 MMOL/L (ref 95–110)
CLARITY UR: ABNORMAL
CO2 SERPL-SCNC: 20 MMOL/L (ref 23–29)
COLOR UR AUTO: YELLOW
CREAT SERPL-MCNC: 1.2 MG/DL (ref 0.5–1.4)
CTP QC/QA: YES
ERYTHROCYTE [DISTWIDTH] IN BLOOD BY AUTOMATED COUNT: 15 % (ref 11.5–14.5)
GFR SERPLBLD CREATININE-BSD FMLA CKD-EPI: 59 ML/MIN/1.73/M2
GLUCOSE SERPL-MCNC: 94 MG/DL (ref 70–110)
GLUCOSE UR QL STRIP: NEGATIVE
GRAN CASTS UR QL COMP ASSIST: 24 /LPF (ref ?–0)
HCT VFR BLD AUTO: 33.3 % (ref 37–48.5)
HGB BLD-MCNC: 10.4 GM/DL (ref 12–16)
HGB UR QL STRIP: ABNORMAL
HYALINE CASTS UR QL AUTO: 23 /LPF (ref 0–1)
IMM GRANULOCYTES # BLD AUTO: 0.02 K/UL (ref 0–0.04)
IMM GRANULOCYTES NFR BLD AUTO: 0.3 % (ref 0–0.5)
KETONES UR QL STRIP: NEGATIVE
LEUKOCYTE ESTERASE UR QL STRIP: NEGATIVE
LIPASE SERPL-CCNC: 10 U/L (ref 4–60)
LYMPHOCYTES # BLD AUTO: 1.35 K/UL (ref 1–4.8)
MAGNESIUM SERPL-MCNC: 1.8 MG/DL (ref 1.6–2.6)
MCH RBC QN AUTO: 28 PG (ref 27–31)
MCHC RBC AUTO-ENTMCNC: 31.2 G/DL (ref 32–36)
MCV RBC AUTO: 90 FL (ref 82–98)
MICROSCOPIC COMMENT: ABNORMAL
NITRITE UR QL STRIP: NEGATIVE
NT-PROBNP SERPL-MCNC: 4091 PG/ML
NUCLEATED RBC (/100WBC) (OHS): 0 /100 WBC
PH UR STRIP: 6 [PH]
PHOSPHATE SERPL-MCNC: 3 MG/DL (ref 2.7–4.5)
PLATELET # BLD AUTO: 288 K/UL (ref 150–450)
PMV BLD AUTO: 10.9 FL (ref 9.2–12.9)
POC MOLECULAR INFLUENZA A AGN: NEGATIVE
POC MOLECULAR INFLUENZA B AGN: NEGATIVE
POTASSIUM SERPL-SCNC: 3.7 MMOL/L (ref 3.5–5.1)
PROT SERPL-MCNC: 7.1 GM/DL (ref 6–8.4)
PROT UR QL STRIP: ABNORMAL
RBC # BLD AUTO: 3.71 M/UL (ref 4–5.4)
RBC #/AREA URNS AUTO: 7 /HPF (ref 0–4)
RELATIVE EOSINOPHIL (OHS): 1.1 %
RELATIVE LYMPHOCYTE (OHS): 19.3 % (ref 18–48)
RELATIVE MONOCYTE (OHS): 6.6 % (ref 4–15)
RELATIVE NEUTROPHIL (OHS): 72.3 % (ref 38–73)
SARS-COV-2 RDRP RESP QL NAA+PROBE: NEGATIVE
SODIUM SERPL-SCNC: 139 MMOL/L (ref 136–145)
SP GR UR STRIP: 1.02
SQUAMOUS #/AREA URNS AUTO: 5 /HPF
TROPONIN I SERPL HS-MCNC: 6 NG/L
TROPONIN I SERPL HS-MCNC: 7 NG/L
UROBILINOGEN UR STRIP-ACNC: ABNORMAL EU/DL
WBC # BLD AUTO: 6.99 K/UL (ref 3.9–12.7)
WBC #/AREA URNS AUTO: 3 /HPF (ref 0–5)

## 2025-08-02 PROCEDURE — 25000003 PHARM REV CODE 250

## 2025-08-02 PROCEDURE — 82435 ASSAY OF BLOOD CHLORIDE: CPT | Performed by: EMERGENCY MEDICINE

## 2025-08-02 PROCEDURE — 25000003 PHARM REV CODE 250: Performed by: EMERGENCY MEDICINE

## 2025-08-02 PROCEDURE — 93010 ELECTROCARDIOGRAM REPORT: CPT | Mod: ,,, | Performed by: INTERNAL MEDICINE

## 2025-08-02 PROCEDURE — 81025 URINE PREGNANCY TEST: CPT | Performed by: EMERGENCY MEDICINE

## 2025-08-02 PROCEDURE — 96374 THER/PROPH/DIAG INJ IV PUSH: CPT

## 2025-08-02 PROCEDURE — 83880 ASSAY OF NATRIURETIC PEPTIDE: CPT | Performed by: EMERGENCY MEDICINE

## 2025-08-02 PROCEDURE — 93005 ELECTROCARDIOGRAM TRACING: CPT

## 2025-08-02 PROCEDURE — 87502 INFLUENZA DNA AMP PROBE: CPT

## 2025-08-02 PROCEDURE — 85025 COMPLETE CBC W/AUTO DIFF WBC: CPT | Performed by: EMERGENCY MEDICINE

## 2025-08-02 PROCEDURE — 84484 ASSAY OF TROPONIN QUANT: CPT | Performed by: EMERGENCY MEDICINE

## 2025-08-02 PROCEDURE — 36415 COLL VENOUS BLD VENIPUNCTURE: CPT

## 2025-08-02 PROCEDURE — G0378 HOSPITAL OBSERVATION PER HR: HCPCS

## 2025-08-02 PROCEDURE — 81003 URINALYSIS AUTO W/O SCOPE: CPT | Performed by: EMERGENCY MEDICINE

## 2025-08-02 PROCEDURE — 87635 SARS-COV-2 COVID-19 AMP PRB: CPT | Performed by: EMERGENCY MEDICINE

## 2025-08-02 PROCEDURE — 99285 EMERGENCY DEPT VISIT HI MDM: CPT | Mod: 25

## 2025-08-02 PROCEDURE — 84100 ASSAY OF PHOSPHORUS: CPT

## 2025-08-02 PROCEDURE — 83735 ASSAY OF MAGNESIUM: CPT

## 2025-08-02 PROCEDURE — 84484 ASSAY OF TROPONIN QUANT: CPT | Mod: 91

## 2025-08-02 PROCEDURE — 83690 ASSAY OF LIPASE: CPT | Performed by: EMERGENCY MEDICINE

## 2025-08-02 PROCEDURE — 63600175 PHARM REV CODE 636 W HCPCS: Performed by: EMERGENCY MEDICINE

## 2025-08-02 PROCEDURE — 63600175 PHARM REV CODE 636 W HCPCS: Performed by: INTERNAL MEDICINE

## 2025-08-02 PROCEDURE — 96376 TX/PRO/DX INJ SAME DRUG ADON: CPT

## 2025-08-02 PROCEDURE — 96375 TX/PRO/DX INJ NEW DRUG ADDON: CPT

## 2025-08-02 RX ORDER — TALC
6 POWDER (GRAM) TOPICAL NIGHTLY PRN
Status: DISCONTINUED | OUTPATIENT
Start: 2025-08-02 | End: 2025-08-03 | Stop reason: HOSPADM

## 2025-08-02 RX ORDER — IBUPROFEN 200 MG
16 TABLET ORAL
Status: DISCONTINUED | OUTPATIENT
Start: 2025-08-02 | End: 2025-08-03 | Stop reason: HOSPADM

## 2025-08-02 RX ORDER — ONDANSETRON HYDROCHLORIDE 2 MG/ML
4 INJECTION, SOLUTION INTRAVENOUS EVERY 12 HOURS PRN
Status: DISCONTINUED | OUTPATIENT
Start: 2025-08-02 | End: 2025-08-03 | Stop reason: HOSPADM

## 2025-08-02 RX ORDER — PROCHLORPERAZINE EDISYLATE 5 MG/ML
5 INJECTION INTRAMUSCULAR; INTRAVENOUS EVERY 6 HOURS PRN
Status: DISCONTINUED | OUTPATIENT
Start: 2025-08-02 | End: 2025-08-03 | Stop reason: HOSPADM

## 2025-08-02 RX ORDER — IBUPROFEN 200 MG
24 TABLET ORAL
Status: DISCONTINUED | OUTPATIENT
Start: 2025-08-02 | End: 2025-08-03 | Stop reason: HOSPADM

## 2025-08-02 RX ORDER — SODIUM CHLORIDE 0.9 % (FLUSH) 0.9 %
10 SYRINGE (ML) INJECTION
Status: DISCONTINUED | OUTPATIENT
Start: 2025-08-02 | End: 2025-08-03 | Stop reason: HOSPADM

## 2025-08-02 RX ORDER — FUROSEMIDE 10 MG/ML
60 INJECTION INTRAMUSCULAR; INTRAVENOUS EVERY 12 HOURS
Status: DISCONTINUED | OUTPATIENT
Start: 2025-08-03 | End: 2025-08-02

## 2025-08-02 RX ORDER — FUROSEMIDE 10 MG/ML
60 INJECTION INTRAMUSCULAR; INTRAVENOUS EVERY 12 HOURS
Status: DISCONTINUED | OUTPATIENT
Start: 2025-08-02 | End: 2025-08-02

## 2025-08-02 RX ORDER — NALOXONE HCL 0.4 MG/ML
0.02 VIAL (ML) INJECTION
Status: DISCONTINUED | OUTPATIENT
Start: 2025-08-02 | End: 2025-08-03 | Stop reason: HOSPADM

## 2025-08-02 RX ORDER — POLYETHYLENE GLYCOL 3350 17 G/17G
17 POWDER, FOR SOLUTION ORAL DAILY
Status: DISCONTINUED | OUTPATIENT
Start: 2025-08-02 | End: 2025-08-03 | Stop reason: HOSPADM

## 2025-08-02 RX ORDER — GLUCAGON 1 MG
1 KIT INJECTION
Status: DISCONTINUED | OUTPATIENT
Start: 2025-08-02 | End: 2025-08-03 | Stop reason: HOSPADM

## 2025-08-02 RX ORDER — PROCHLORPERAZINE EDISYLATE 5 MG/ML
10 INJECTION INTRAMUSCULAR; INTRAVENOUS
Status: COMPLETED | OUTPATIENT
Start: 2025-08-02 | End: 2025-08-02

## 2025-08-02 RX ORDER — DIPHENHYDRAMINE HYDROCHLORIDE 50 MG/ML
25 INJECTION, SOLUTION INTRAMUSCULAR; INTRAVENOUS
Status: COMPLETED | OUTPATIENT
Start: 2025-08-02 | End: 2025-08-02

## 2025-08-02 RX ORDER — CLOPIDOGREL BISULFATE 75 MG/1
75 TABLET ORAL DAILY
Status: DISCONTINUED | OUTPATIENT
Start: 2025-08-03 | End: 2025-08-03 | Stop reason: HOSPADM

## 2025-08-02 RX ORDER — POTASSIUM CHLORIDE 20 MEQ/1
40 TABLET, EXTENDED RELEASE ORAL 2 TIMES DAILY
Status: DISCONTINUED | OUTPATIENT
Start: 2025-08-02 | End: 2025-08-03 | Stop reason: HOSPADM

## 2025-08-02 RX ORDER — FUROSEMIDE 10 MG/ML
60 INJECTION INTRAMUSCULAR; INTRAVENOUS ONCE
Status: COMPLETED | OUTPATIENT
Start: 2025-08-02 | End: 2025-08-02

## 2025-08-02 RX ORDER — FUROSEMIDE 10 MG/ML
60 INJECTION INTRAMUSCULAR; INTRAVENOUS EVERY 12 HOURS
Status: DISCONTINUED | OUTPATIENT
Start: 2025-08-03 | End: 2025-08-03 | Stop reason: HOSPADM

## 2025-08-02 RX ORDER — DAPAGLIFLOZIN 10 MG/1
10 TABLET, FILM COATED ORAL DAILY
Status: DISCONTINUED | OUTPATIENT
Start: 2025-08-03 | End: 2025-08-03 | Stop reason: HOSPADM

## 2025-08-02 RX ORDER — ATORVASTATIN CALCIUM 40 MG/1
80 TABLET, FILM COATED ORAL NIGHTLY
Status: DISCONTINUED | OUTPATIENT
Start: 2025-08-02 | End: 2025-08-03 | Stop reason: HOSPADM

## 2025-08-02 RX ORDER — FUROSEMIDE 10 MG/ML
40 INJECTION INTRAMUSCULAR; INTRAVENOUS
Status: COMPLETED | OUTPATIENT
Start: 2025-08-02 | End: 2025-08-02

## 2025-08-02 RX ORDER — METOPROLOL SUCCINATE 25 MG/1
25 TABLET, EXTENDED RELEASE ORAL DAILY
Status: DISCONTINUED | OUTPATIENT
Start: 2025-08-03 | End: 2025-08-03 | Stop reason: HOSPADM

## 2025-08-02 RX ORDER — ACETAMINOPHEN 325 MG/1
650 TABLET ORAL EVERY 8 HOURS PRN
Status: DISCONTINUED | OUTPATIENT
Start: 2025-08-02 | End: 2025-08-03 | Stop reason: HOSPADM

## 2025-08-02 RX ORDER — BENZONATATE 100 MG/1
100 CAPSULE ORAL
Status: COMPLETED | OUTPATIENT
Start: 2025-08-02 | End: 2025-08-02

## 2025-08-02 RX ORDER — ENOXAPARIN SODIUM 100 MG/ML
40 INJECTION SUBCUTANEOUS EVERY 24 HOURS
Status: DISCONTINUED | OUTPATIENT
Start: 2025-08-03 | End: 2025-08-03 | Stop reason: HOSPADM

## 2025-08-02 RX ORDER — GUAIFENESIN 100 MG/5ML
200 LIQUID ORAL EVERY 6 HOURS PRN
Status: DISCONTINUED | OUTPATIENT
Start: 2025-08-02 | End: 2025-08-03 | Stop reason: HOSPADM

## 2025-08-02 RX ORDER — ENOXAPARIN SODIUM 100 MG/ML
40 INJECTION SUBCUTANEOUS EVERY 12 HOURS
Status: DISCONTINUED | OUTPATIENT
Start: 2025-08-02 | End: 2025-08-02

## 2025-08-02 RX ADMIN — BENZONATATE 100 MG: 100 CAPSULE ORAL at 08:08

## 2025-08-02 RX ADMIN — DIPHENHYDRAMINE HYDROCHLORIDE 25 MG: 50 INJECTION INTRAMUSCULAR; INTRAVENOUS at 08:08

## 2025-08-02 RX ADMIN — FUROSEMIDE 40 MG: 10 INJECTION, SOLUTION INTRAVENOUS at 09:08

## 2025-08-02 RX ADMIN — SACUBITRIL AND VALSARTAN 2 TABLET: 24; 26 TABLET, FILM COATED ORAL at 09:08

## 2025-08-02 RX ADMIN — FUROSEMIDE 60 MG: 10 INJECTION, SOLUTION INTRAVENOUS at 06:08

## 2025-08-02 RX ADMIN — POTASSIUM CHLORIDE 40 MEQ: 1500 TABLET, EXTENDED RELEASE ORAL at 09:08

## 2025-08-02 RX ADMIN — ATORVASTATIN CALCIUM 80 MG: 40 TABLET, FILM COATED ORAL at 09:08

## 2025-08-02 RX ADMIN — PROCHLORPERAZINE EDISYLATE 10 MG: 5 INJECTION INTRAMUSCULAR; INTRAVENOUS at 08:08

## 2025-08-02 NOTE — ASSESSMENT & PLAN NOTE
Patient has Combined Systolic and Diastolic heart failure that is Acute on chronic. On presentation their CHF was decompensated. Evidence of decompensated CHF on presentation includes: edema and shortness of breath.  Most recent BNP and echo results are listed below.  Recent Labs     08/02/25  0835   BNP 4,091*     Latest ECHO  Results for orders placed during the hospital encounter of 05/04/25    Echo    Interpretation Summary    Left Ventricle: The left ventricle is severely dilated. Normal wall thickness. There is severe eccentric hypertrophy. Severe global hypokinesis present. There is severely reduced systolic function with a visually estimated ejection fraction of 10 -15%. Grade III diastolic dysfunction.    Right Ventricle: The right ventricle is normal in size. Systolic function is normal.    Left Atrium: Severely dilated    Right Atrium: Right atrium is dilated.    Mitral Valve: There is moderate regurgitation.    Tricuspid Valve: There is moderate regurgitation.    Pulmonary Artery: The estimated pulmonary artery systolic pressure is 37 mmHg.    IVC/SVC: Normal venous pressure at 3 mmHg.    Current Heart Failure Medications  furosemide injection 60 mg, Every 12 hours, Intravenous  dapagliflozin propanediol (Farxiga) tablet 10 mg, Daily, Oral  metoprolol succinate (TOPROL-XL) 24 hr tablet 25 mg, Daily, Oral  sacubitriL-valsartan 24-26 mg per tablet 2 tablet, 2 times daily, Oral    Plan  - Monitor strict I&Os and daily weights.    - Place on telemetry  - Low sodium diet  - Place on fluid restriction of 1.5 L.   - Cardiology has not been consulted  - The patient's volume status is stable but not at their baseline as indicated by edema, dyspnea on exertion (ELENA), and shortness of breath  - Continue GDMT   - Continue diuresis with Lasix

## 2025-08-02 NOTE — ASSESSMENT & PLAN NOTE
Patient's blood pressure range in the last 24 hours was: BP  Min: 101/73  Max: 116/74.The patient's inpatient anti-hypertensive regimen is listed below:  Current Antihypertensives  furosemide injection 60 mg, Every 12 hours, Intravenous  metoprolol succinate (TOPROL-XL) 24 hr tablet 25 mg, Daily, Oral    Plan  - BP is controlled, no changes needed to their regimen

## 2025-08-02 NOTE — H&P
"  [facility]  [facility] & Physical    Patient Name: Nasra Marks  MRN: [id number]  Patient Class: OP- Observation  Admission Date: 8/2/2025  Attending Physician: Tasia العلي MD   Primary Care Provider: Veronika Rainey MD         Patient information was obtained from patient, past medical records, and ER records.     Subjective:     Principal Problem:Acute on chronic combined systolic and diastolic congestive heart failure    Chief Complaint:   Chief Complaint   Patient presents with    Chest Pain     Pt to ER with reports of chest pain with cough x few days. PT reports hx of CHF. Denies increased swelling, N/V.         HPI: Nasra Marks is a 40 y.o. female with a pmh nonischemic cardiomyopathy EF 10-15% and AICD in place, hypertension, stroke iron-deficiency anemia presents to the hospital with complaints of chest pain with the associated cough for the past several days.    Patient complains of nausea, weakness, fatigue for the past 2-3 days. Patient described the pain as "tightness" and states, "it feels like my heart is just trying". Patient is also complaining of nausea, SOB, cough with clear sputum, fatigue, and weakness for the past 2-3 days. Patient attempted to treat her symptoms with Tylenol with no relief. Patient states she has decreased appetite and decreased po intake, noting she feels she has loss weight, but her weight remains stable. Patient denies any other alleviating or exacerbating factors. Patient denies headache, blurry vision, abdominal pain, hematemesis, hematochezia, hematuria, or any other associated symptoms.    In the ED:  Patient afebrile without leukocytosis, hemodynamically stable with a SpO2 94%, H and H 10.4/33.3, chloride 111, GFR 59, calcium 8.3, AST 49, ALT 57, elevated BNP 4091, COVID influenza negative, UA with 2+ blood and moderate bacteria, urine pregnancy test negative, chest x-ray shows cardiomegaly and pulmonary venous " congestion.  Patient given benzonatate 100 mg, IV diphenhydramine 25 mg, IV Lasix 40 mg, and IV Compazine 10 mg in the ED. Case discussed with ED provider and patient placed under observation for further medical management.      Past Medical History:   Diagnosis Date    CHF (congestive heart failure)     Chronic combined systolic and diastolic congestive heart failure 2019    Essential hypertension 2012    Hypertension     dx at 16y.o.    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 disease, with heart failure 2021    ICD (implantable cardioverter-defibrillator), single, in situ 2020    Nonischemic cardiomyopathy 2019    Pacemaker 2017    Pulmonary hypertension 2025    PASP 37 mmHg 25, previously normal- suspected Group 2         Past Surgical History:   Procedure Laterality Date    CARDIAC DEFIBRILLATOR PLACEMENT  2017     SECTION      x 1    CORONARY ANGIOGRAPHY N/A 2025    Procedure: ANGIOGRAM, CORONARY ARTERY;  Surgeon: Kashif Peguero MD;  Location: [facility] CATH LAB;  Service: Cardiology;  Laterality: N/A;  Right radial. Noon time - stick time    INDUCED       LEFT HEART CATHETERIZATION Left 2025    Procedure: Left heart cath;  Surgeon: Kashif Peguero MD;  Location: [facility] CATH LAB;  Service: Cardiology;  Laterality: Left;    RIGHT HEART CATHETERIZATION Right 2022    Procedure: INSERTION, CATHETER, RIGHT HEART;  Surgeon: Timothy Prajapati Jr., MD;  Location: [facility] CATH LAB;  Service: Cardiology;  Laterality: Right;    RIGHT HEART CATHETERIZATION Right 2024    Procedure: INSERTION, CATHETER, RIGHT HEART;  Surgeon: Lior Rueda MD;  Location: [facility] CATH LAB;  Service: Cardiology;  Laterality: Right;    TUBAL LIGATION         Review of patient's allergies indicates:   Allergen Reactions    Aldactone [spironolactone] Swelling     Lips swelled    Hydralazine analogues Other (See  Comments)     headaches    Isosorbide Other (See Comments)     headaches       No current facility-administered medications on file prior to encounter.     Current Outpatient Medications on File Prior to Encounter   Medication Sig    acetaminophen (TYLENOL) 500 MG tablet Take 1 tablet (500 mg total) by mouth every 6 (six) hours as needed for Pain.    atorvastatin (LIPITOR) 80 MG tablet Take 1 tablet (80 mg total) by mouth every evening.    clopidogreL (PLAVIX) 75 mg tablet Take 1 tablet (75 mg total) by mouth once daily.    dapagliflozin propanediol (FARXIGA) 10 mg tablet Take 1 tablet (10 mg total) by mouth once daily.    furosemide (LASIX) 80 MG tablet Take 1 tablet (80 mg total) by mouth 2 (two) times a day.    linaCLOtide (LINZESS) 290 mcg Cap capsule Take 1 capsule (290 mcg total) by mouth before breakfast.    metoprolol succinate (TOPROL-XL) 25 MG 24 hr tablet Take 1 tablet (25 mg total) by mouth once daily.    pantoprazole (PROTONIX) 40 MG tablet Take 1 tablet (40 mg total) by mouth once daily. Please take 1 tablet twice a day for 3 days, then 1 tablet daily    potassium chloride SA (K-DUR,KLOR-CON) 20 MEQ tablet Take 2 tablets (40 mEq total) by mouth 2 (two) times daily.    sacubitriL-valsartan (ENTRESTO) 49-51 mg per tablet Take 1 tablet by mouth 2 (two) times daily.    albuterol (PROVENTIL/VENTOLIN HFA) 90 mcg/actuation inhaler Inhale 1-2 puffs into the lungs every 6 (six) hours as needed. Rescue (Patient not taking: Reported on 8/2/2025)    aspirin (ECOTRIN) 81 MG EC tablet Take 1 tablet (81 mg total) by mouth once daily. (Patient not taking: Reported on 8/2/2025)    benzocaine-menthoL 15-3.6 mg Lozg 1 lozenge by Mucous Membrane route every 2 (two) hours as needed (sore throat). (Patient not taking: Reported on 8/2/2025)    docusate sodium (COLACE) 100 MG capsule Take 1 capsule (100 mg total) by mouth 2 (two) times daily. (Patient not taking: Reported on 8/2/2025)    eplerenone (INSPRA) 25 MG Tab Take 1  tablet (25 mg total) by mouth once daily. (Patient not taking: Reported on 8/2/2025)    FLUoxetine 20 MG capsule Take 1 capsule (20 mg total) by mouth once daily. (Patient not taking: Reported on 8/2/2025)    fluticasone propionate (FLONASE) 50 mcg/actuation nasal spray 1 spray (50 mcg total) by Each Nostril route 2 (two) times daily as needed for Rhinitis. (Patient not taking: Reported on 8/2/2025)    gabapentin (NEURONTIN) 300 MG capsule Take 2 capsules (600 mg total) by mouth 3 (three) times daily. (Patient not taking: Reported on 8/2/2025)     Family History       Problem Relation (Age of Onset)    Breast cancer Other    Cancer Maternal Grandmother, Paternal Grandmother, Paternal Aunt, Paternal Uncle    Diabetes Other    Hypertension Mother, Other    No Known Problems Sister, Brother, Son, Brother          Tobacco Use    Smoking status: Never    Smokeless tobacco: Never   Substance and Sexual Activity    Alcohol use: Not Currently     Comment: occasionally    Drug use: Not Currently     Types: Marijuana     Comment: in past very little marijuana    Sexual activity: Yes     Partners: Male     Birth control/protection: None     Review of Systems   Constitutional: Negative.    HENT: Negative.     Respiratory:  Positive for cough and shortness of breath.    Cardiovascular:  Positive for chest pain.   Gastrointestinal:  Positive for nausea.   Genitourinary: Negative.    Musculoskeletal: Negative.    Skin: Negative.    Neurological:  Positive for weakness and headaches.   Psychiatric/Behavioral: Negative.       Objective:     Vital Signs (Most Recent):  Temp: 98 °F (36.7 °C) (08/02/25 0757)  Pulse: 90 (08/02/25 1500)  Resp: 18 (08/02/25 0757)  BP: 116/74 (08/02/25 1500)  SpO2: 98 % (08/02/25 1500) Vital Signs (24h Range):  Temp:  [98 °F (36.7 °C)] 98 °F (36.7 °C)  Pulse:  [81-97] 90  Resp:  [18] 18  SpO2:  [94 %-100 %] 98 %  BP: (101-116)/(67-78) 116/74     Weight: 81.2 kg (179 lb)  Body mass index is 26.43 kg/m².      Physical Exam  Constitutional:       General: She is not in acute distress.     Appearance: Normal appearance. She is not ill-appearing or diaphoretic.   HENT:      Head: Normocephalic and atraumatic.      Mouth/Throat:      Mouth: Mucous membranes are moist.   Eyes:      Extraocular Movements: Extraocular movements intact.   Cardiovascular:      Rate and Rhythm: Normal rate and regular rhythm.      Pulses: Normal pulses.   Pulmonary:      Effort: Pulmonary effort is normal.      Comments: Speaking in full sentences on RA  Decrease breath sound in left lower quadrant  Abdominal:      General: Abdomen is flat.      Palpations: Abdomen is soft.   Musculoskeletal:      Right lower leg: No edema.      Left lower leg: No edema.   Skin:     General: Skin is warm.   Neurological:      General: No focal deficit present.      Mental Status: She is alert and oriented to person, place, and time. Mental status is at baseline.   Psychiatric:         Mood and Affect: Mood normal.         Behavior: Behavior normal.         Thought Content: Thought content normal.                Significant Labs: All pertinent labs within the past 24 hours have been reviewed.    Significant Imaging: I have reviewed all pertinent imaging results/findings within the past 24 hours.  Imaging Results              X-Ray Chest AP Portable (Final result)  Result time 08/02/25 09:43:29      Final result by Huber Walker Jr., MD (08/02/25 09:43:29)                   Impression:      Cardiomegaly and pulmonary venous congestion without emilie edema      Electronically signed by: Huber Storm Jr  Date:    08/02/2025  Time:    09:43               Narrative:    EXAMINATION:  XR CHEST AP PORTABLE    CLINICAL HISTORY:  CHF;    TECHNIQUE:  Single frontal view of the chest was performed.    COMPARISON:  05/05/2025    FINDINGS:  Cardiomediastinal silhouetteremains enlarged.  Left-sided defibrillator redemonstrated.    There is some pulmonary vascular  congestion present but no emilie edema.    Small left pleural effusion and basilar consolidation difficult to exclude with certainty based on this single frontal view.                                      Assessment/Plan:     Assessment & Plan  Acute on chronic combined systolic and diastolic congestive heart failure  Patient has Combined Systolic and Diastolic heart failure that is Acute on chronic. On presentation their CHF was decompensated. Evidence of decompensated CHF on presentation includes: edema and shortness of breath.  Most recent BNP and echo results are listed below.  Recent Labs     08/02/25  0835   BNP 4,091*     Latest ECHO  Results for orders placed during the hospital encounter of 05/04/25    Echo    Interpretation Summary    Left Ventricle: The left ventricle is severely dilated. Normal wall thickness. There is severe eccentric hypertrophy. Severe global hypokinesis present. There is severely reduced systolic function with a visually estimated ejection fraction of 10 -15%. Grade III diastolic dysfunction.    Right Ventricle: The right ventricle is normal in size. Systolic function is normal.    Left Atrium: Severely dilated    Right Atrium: Right atrium is dilated.    Mitral Valve: There is moderate regurgitation.    Tricuspid Valve: There is moderate regurgitation.    Pulmonary Artery: The estimated pulmonary artery systolic pressure is 37 mmHg.    IVC/SVC: Normal venous pressure at 3 mmHg.    Current Heart Failure Medications  furosemide injection 60 mg, Every 12 hours, Intravenous  dapagliflozin propanediol (Farxiga) tablet 10 mg, Daily, Oral  metoprolol succinate (TOPROL-XL) 24 hr tablet 25 mg, Daily, Oral  sacubitriL-valsartan 24-26 mg per tablet 2 tablet, 2 times daily, Oral    Plan  - Monitor strict I&Os and daily weights.    - Place on telemetry  - Low sodium diet  - Place on fluid restriction of 1.5 L.   - Cardiology has not been consulted  - The patient's volume status is stable but not  at their baseline as indicated by edema, dyspnea on exertion (ELENA), and shortness of breath  - Continue GDMT   - Continue diuresis with Lasix   Essential hypertension  Patient's blood pressure range in the last 24 hours was: BP  Min: 101/73  Max: 116/74.The patient's inpatient anti-hypertensive regimen is listed below:  Current Antihypertensives  furosemide injection 60 mg, Every 12 hours, Intravenous  metoprolol succinate (TOPROL-XL) 24 hr tablet 25 mg, Daily, Oral    Plan  - BP is controlled, no changes needed to their regimen  Nonischemic cardiomyopathy  Severe EF 10-15%   Repeat Echo ordered   AICD in place   ICD (implantable cardioverter-defibrillator), single, in situ  History noted  Continue to monitor on telemetry   Hx of ischemic right PCA stroke  History noted, continue statin   Pulmonary hypertension  Previous Echo on 5/5/25 with PASP 37 mm Hg  Continue IV diuresis   Pleural effusion, left  Patient found to have small pleural effusion on imaging. I have personally reviewed and interpreted the following imaging: Xray. A thoracentesis was deferred. Most likely etiology includes Congestive Heart Failure. Management to include Diuresis    VTE Risk Mitigation (From admission, onward)           Ordered     enoxaparin injection 40 mg  Every 24 hours         08/02/25 1550     IP VTE HIGH RISK PATIENT  Once         08/02/25 1023     Place sequential compression device  Until discontinued         08/02/25 1023                          On 08/02/2025, patient should be placed in hospital observation services under my care in collaboration with Tasia العلي MD.           Michelle Watson PA-C  Department of Hospital Medicine  [facility]

## 2025-08-02 NOTE — ED PROVIDER NOTES
"Encounter Date: 8/2/2025    SCRIBE #1 NOTE: I, Floridalma Moore, am scribing for, and in the presence of,  Tamara Post MD. I have scribed the following portions of the note - Other sections scribed: HPI, ROS, PE.       History     Chief Complaint   Patient presents with    Chest Pain     Pt to ER with reports of chest pain with cough x few days. PT reports hx of CHF. Denies increased swelling, N/V.      T10 y.o. female with a PMHx of NICCM with HFrEF and AICD in place, HTN, pulmonary hypertension, and PSHx of left and right heart catheterization, who presents to the ED with chest pain for the past 2-3 days. Patient described the pain as "tightness" and states, "it feels like my heart is just trying". Patient is also complaining of nausea, SOB, cough with clear sputum, fatigue, and weakness for the past 2-3 days. Patient denies emesis, but reports gagging. Patient attempted to treat her symptoms with Tylenol with no relief. Patient states she has decreased appetite and decreased po intake, noting she feels she has loss weight, but her weight remains stable. Patient reports concern she may have accumulated excess fluid which may explain why her weight hasn't changed. Patient reports a intermittent headache since yesterday, that comes on with the chest pain. No other exacerbating or alleviating factors. Patient denies any other associated symptoms.      The history is provided by the patient. No  was used.     Review of patient's allergies indicates:   Allergen Reactions    Aldactone [spironolactone] Swelling     Lips swelled    Hydralazine analogues Other (See Comments)     headaches    Isosorbide Other (See Comments)     headaches     Past Medical History:   Diagnosis Date    CHF (congestive heart failure)     Chronic combined systolic and diastolic congestive heart failure 05/24/2019    Essential hypertension 11/19/2012    Hypertension     dx at 16y.o.    Hypertensive cardiovascular-renal " disease, stage 1-4 or unspecified chronic kidney disease, with heart failure 2021    ICD (implantable cardioverter-defibrillator), single, in situ 2020    Nonischemic cardiomyopathy 2019    Pacemaker 2017    Pulmonary hypertension 2025    PASP 37 mmHg 25, previously normal- suspected Group 2       Past Surgical History:   Procedure Laterality Date    CARDIAC DEFIBRILLATOR PLACEMENT  2017     SECTION      x 1    CORONARY ANGIOGRAPHY N/A 2025    Procedure: ANGIOGRAM, CORONARY ARTERY;  Surgeon: Kashif Peguero MD;  Location: [facility] CATH LAB;  Service: Cardiology;  Laterality: N/A;  Right radial. Noon time - stick time    INDUCED       LEFT HEART CATHETERIZATION Left 2025    Procedure: Left heart cath;  Surgeon: Kashif Peguero MD;  Location: [facility] CATH LAB;  Service: Cardiology;  Laterality: Left;    RIGHT HEART CATHETERIZATION Right 2022    Procedure: INSERTION, CATHETER, RIGHT HEART;  Surgeon: Timothy Prajapati Jr., MD;  Location: [facility] CATH LAB;  Service: Cardiology;  Laterality: Right;    RIGHT HEART CATHETERIZATION Right 2024    Procedure: INSERTION, CATHETER, RIGHT HEART;  Surgeon: Lior Rueda MD;  Location: [facility] CATH LAB;  Service: Cardiology;  Laterality: Right;    TUBAL LIGATION       Family History   Problem Relation Name Age of Onset    Hypertension Mother      Cancer Maternal Grandmother          breast x 2    Cancer Paternal Grandmother          breast    No Known Problems Sister      No Known Problems Brother      No Known Problems Son      No Known Problems Brother      Hypertension Other      Diabetes Other      Breast cancer Other      Cancer Paternal Aunt          breast    Cancer Paternal Uncle       Social History[1]  Review of Systems   Constitutional:  Positive for appetite change (decreased) and fatigue. Negative for chills and fever.   HENT:  Negative for congestion and sore throat.    Eyes:  Negative for  visual disturbance.   Respiratory:  Positive for cough (with clear sputum) and shortness of breath.    Cardiovascular:  Positive for chest pain.   Gastrointestinal:  Positive for nausea. Negative for abdominal pain and vomiting.   Genitourinary:  Negative for dysuria.   Skin:  Negative for rash.   Neurological:  Positive for weakness and headaches.   Psychiatric/Behavioral:  Negative for agitation.        Physical Exam     Initial Vitals [08/02/25 0757]   BP Pulse Resp Temp SpO2   103/69 82 18 98 °F (36.7 °C) 100 %      MAP       --         Physical Exam    Nursing note and vitals reviewed.  Constitutional: She appears well-developed and well-nourished. She is not diaphoretic. She has a sickly appearance. No distress.   HENT: Mouth/Throat: Oropharynx is clear and moist.   Eyes: Pupils are equal, round, and reactive to light.   Neck: Neck supple.   Cardiovascular:  Normal rate, regular rhythm and normal heart sounds.     Exam reveals no friction rub.       No murmur heard.  Pulmonary/Chest: She has rales (bibasilar).   Abdominal: Abdomen is soft. There is no abdominal tenderness.   Musculoskeletal:         General: No edema.      Cervical back: Neck supple.     Neurological: She is alert and oriented to person, place, and time.   Skin: Skin is warm and dry.   Psychiatric: She has a normal mood and affect.         ED Course   Procedures  Labs Reviewed   COMPREHENSIVE METABOLIC PANEL - Abnormal       Result Value    Sodium 139      Potassium 3.7      Chloride 111 (*)     CO2 20 (*)     Glucose 94      BUN 14      Creatinine 1.2      Calcium 8.3 (*)     Protein Total 7.1      Albumin 3.7      Bilirubin Total 0.7      ALP 61      AST 49 (*)     ALT 57 (*)     Anion Gap 8      eGFR 59 (*)    NT-PRO NATRIURETIC PEPTIDE - Abnormal    NT-proBNP 4,091 (*)     Narrative:     NOTE:  Access complete set of age - and/or gender-specific reference intervals for this test in the [facility] Collection Manual.   CBC WITH  DIFFERENTIAL - Abnormal    WBC 6.99      RBC 3.71 (*)     HGB 10.4 (*)     HCT 33.3 (*)     MCV 90      MCH 28.0      MCHC 31.2 (*)     RDW 15.0 (*)     Platelet Count 288      MPV 10.9      Nucleated RBC 0      Neut % 72.3      Lymph % 19.3      Mono % 6.6      Eos % 1.1      Basophil % 0.4      Imm Grans % 0.3      Neut # 5.05      Lymph # 1.35      Mono # 0.46      Eos # 0.08      Baso # 0.03      Imm Grans # 0.02     URINALYSIS, REFLEX TO URINE CULTURE - Abnormal    Color, UA Yellow      Appearance, UA Hazy (*)     pH, UA 6.0      Spec Grav UA 1.025      Protein, UA 1+ (*)     Glucose, UA Negative      Ketones, UA Negative      Bilirubin, UA Negative      Blood, UA 2+ (*)     Nitrites, UA Negative      Urobilinogen, UA 2.0-3.0 (*)     Leukocyte Esterase, UA Negative     URINALYSIS MICROSCOPIC - Abnormal    RBC, UA 7 (*)     WBC, UA 3      Bacteria, UA Moderate (*)     Squamous Epithelial Cells, UA 5      Hyaline Casts, UA 23 (*)     Granular Casts 24 (*)     Microscopic Comment       TROPONIN I HIGH SENSITIVITY - Normal    Troponin High Sensitive 7     LIPASE - Normal    Lipase Level 10     MAGNESIUM - Normal    Magnesium  1.8     PHOSPHORUS - Normal    Phosphorus Level 3.0     TROPONIN I HIGH SENSITIVITY - Normal    Troponin High Sensitive 6     CBC W/ AUTO DIFFERENTIAL    Narrative:     The following orders were created for panel order CBC auto differential.  Procedure                               Abnormality         Status                     ---------                               -----------         ------                     CBC with Differential[[id number]]       Abnormal            Final result                 Please view results for these tests on the individual orders.   GREY TOP URINE HOLD   POCT URINE PREGNANCY    POC Preg Test, Ur Negative       Acceptable Yes     SARS-COV-2 RDRP GENE    POC Rapid COVID Negative       Acceptable Yes     POCT INFLUENZA A/B MOLECULAR     POC Molecular Influenza A Ag Negative      POC Molecular Influenza B Ag Negative       Acceptable Yes          ECG Results              EKG 12-lead (Preliminary result)  Result time 08/02/25 08:29:04      Wet Read by Tamara Post MD (08/02/25 08:29:04, [facility])    Normal sinus rhythm, rate 72 beats per minute, normal TN interval,  milliseconds, no STEMI.  T-wave inversions in V5 and V6.                                  Imaging Results              X-Ray Chest AP Portable (Final result)  Result time 08/02/25 09:43:29      Final result by Huber Walker Jr., MD (08/02/25 09:43:29)                   Impression:      Cardiomegaly and pulmonary venous congestion without emilie edema      Electronically signed by: Huber Storm Jr  Date:    08/02/2025  Time:    09:43               Narrative:    EXAMINATION:  XR CHEST AP PORTABLE    CLINICAL HISTORY:  CHF;    TECHNIQUE:  Single frontal view of the chest was performed.    COMPARISON:  05/05/2025    FINDINGS:  Cardiomediastinal silhouetteremains enlarged.  Left-sided defibrillator redemonstrated.    There is some pulmonary vascular congestion present but no emilie edema.    Small left pleural effusion and basilar consolidation difficult to exclude with certainty based on this single frontal view.                                       Medications   sodium chloride 0.9% flush 10 mL (has no administration in time range)   enoxaparin injection 40 mg (has no administration in time range)   furosemide injection 60 mg (has no administration in time range)   ondansetron injection 4 mg (has no administration in time range)   prochlorperazine injection Soln 5 mg (has no administration in time range)   polyethylene glycol packet 17 g (has no administration in time range)   acetaminophen tablet 650 mg (has no administration in time range)   melatonin tablet 6 mg (has no administration in time range)   naloxone  0.4 mg/mL injection 0.02 mg (has no administration in time range)   glucose chewable tablet 16 g (has no administration in time range)   glucose chewable tablet 24 g (has no administration in time range)   dextrose 50% injection 12.5 g (has no administration in time range)   dextrose 50% injection 25 g (has no administration in time range)   glucagon (human recombinant) injection 1 mg (has no administration in time range)   prochlorperazine injection Soln 10 mg (10 mg Intravenous Given 8/2/25 0834)   diphenhydrAMINE injection 25 mg (25 mg Intravenous Given 8/2/25 0834)   benzonatate capsule 100 mg (100 mg Oral Given 8/2/25 0834)   furosemide injection 40 mg (40 mg Intravenous Given 8/2/25 0929)     Medical Decision Making  41 yo F with NICCM with HFrEF and AICD in place, HTN Presents to the ED with 2-3 day history of cough productive of clear sputum, chest tightness, shortness of breath, nausea, headache.  Patient reports attempting Tylenol at home for symptoms without improvement.  She has not felt her AICD discharge.  Although she reports a stable weight, she reports decreased p.o. intake and feels that her weight has been stable due to fluid gain, current weight 179 lb.  On exam, patient with bibasilar rales.  No pitting edema.  Heart with regular rate and rhythm.  Appears fatigued.  Differential includes not limited to viral URI, pneumonia, CHF exacerbation, bronchitis, do not suspect subarachnoid hemorrhage, meningitis, dissection, PE.  Workup initiated with labs, chest x-ray, will treat with Compazine,   Benadryl, Tessalon Perle.    Amount and/or Complexity of Data Reviewed  External Data Reviewed: notes.     Details: On chart review, patient is followed by Dr. Peguero, Dr. Quintero, and recently saw Dr. Hayes (although he recommended staying with current CHF team at [facility] unless she decides to transfer care to [facility]). Echo 5/2025 showed severely dilated LA, RA dilation, moderate mitral and tricuspid  regurgitation, EF 10-15%. CTA chest 5/2025 negative for PE. Cardiac cath 5/2025 without CAD. Plan for [facility] later this month for risk stratification as she has had 2 admissions for CHF this year.     Labs: ordered.     Details: COVID and flu negative.  UPT negative.  Lipase normal.  Pro BNP 4091.  CMP with mild elevations of ALT and AST.  Urinalysis negative for  nitrites or leukocytes.  HS troponin negative.  CBC no leukocytosis.  Mild anemia.  Does not meet criteria for transfusion.  Platelet count 288.  Radiology: ordered.     Details: Chest x-ray shows  Cardiomegaly, pulmonary vascular congestion, possible small left pleural effusion.  ECG/medicine tests: ordered and independent interpretation performed.    Risk  Prescription drug management.            Scribe Attestation:   Scribe #1: I performed the above scribed service and the documentation accurately describes the services I performed. I attest to the accuracy of the note.        ED Course as of 08/02/25 1311   Sat Aug 02, 2025   0959 Patient updated regarding test results.  Will plan for observation for continued diuresis.  Reports her headache and nausea have improved after receiving medications in the ED. Case reviewed with KATARINA Helm for placement in observation.  [LH]      ED Course User Index  [LH] Tamara Post MD                               I, Tamara Post MD, personally performed the services described in this documentation. All medical record entries made by the scribe were at my direction and in my presence. I have reviewed the chart and agree that the record reflects my personal performance and is accurate and complete.      DISCLAIMER: This note was prepared with iHigh voice recognition transcription software. Garbled syntax, mangled pronouns, and other bizarre constructions may be attributed to that software system.    Clinical Impression:  Final diagnoses:  [R06.02] Shortness of breath  [R07.89] Chest tightness  (Primary)  [R05.9] Cough, unspecified type  [I50.9] CHF exacerbation          ED Disposition Condition    Observation                       [1]   Social History  Tobacco Use    Smoking status: Never    Smokeless tobacco: Never   Substance Use Topics    Alcohol use: Not Currently     Comment: occasionally    Drug use: Not Currently     Types: Marijuana     Comment: in past very little marijuana        Tamara Post MD  08/02/25 5680

## 2025-08-02 NOTE — HPI
"Nasra Marks is a 40 y.o. female with a pmh nonischemic cardiomyopathy EF 10-15% and AICD in place, hypertension, stroke iron-deficiency anemia presents to the hospital with complaints of chest pain with the associated cough for the past several days.    Patient complains of nausea, weakness, fatigue for the past 2-3 days. Patient described the pain as "tightness" and states, "it feels like my heart is just trying". Patient is also complaining of nausea, SOB, cough with clear sputum, fatigue, and weakness for the past 2-3 days. Patient attempted to treat her symptoms with Tylenol with no relief. Patient states she has decreased appetite and decreased po intake, noting she feels she has loss weight, but her weight remains stable. Patient denies any other alleviating or exacerbating factors. Patient denies headache, blurry vision, abdominal pain, hematemesis, hematochezia, hematuria, or any other associated symptoms.    In the ED:  Patient afebrile without leukocytosis, hemodynamically stable with a SpO2 94%, H and H 10.4/33.3, chloride 111, GFR 59, calcium 8.3, AST 49, ALT 57, elevated BNP 4091, COVID influenza negative, UA with 2+ blood and moderate bacteria, urine pregnancy test negative, chest x-ray shows cardiomegaly and pulmonary venous congestion.  Patient given benzonatate 100 mg, IV diphenhydramine 25 mg, IV Lasix 40 mg, and IV Compazine 10 mg in the ED. Case discussed with ED provider and patient placed under observation for further medical management.    "

## 2025-08-02 NOTE — SUBJECTIVE & OBJECTIVE
Past Medical History:   Diagnosis Date    CHF (congestive heart failure)     Chronic combined systolic and diastolic congestive heart failure 2019    Essential hypertension 2012    Hypertension     dx at 16y.o.    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 disease, with heart failure 2021    ICD (implantable cardioverter-defibrillator), single, in situ 2020    Nonischemic cardiomyopathy 2019    Pacemaker 2017    Pulmonary hypertension 2025    PASP 37 mmHg 25, previously normal- suspected Group 2         Past Surgical History:   Procedure Laterality Date    CARDIAC DEFIBRILLATOR PLACEMENT  2017     SECTION      x 1    CORONARY ANGIOGRAPHY N/A 2025    Procedure: ANGIOGRAM, CORONARY ARTERY;  Surgeon: Kashif Peguero MD;  Location: [facility] CATH LAB;  Service: Cardiology;  Laterality: N/A;  Right radial. Noon time - stick time    INDUCED       LEFT HEART CATHETERIZATION Left 2025    Procedure: Left heart cath;  Surgeon: Kashif Peguero MD;  Location: [facility] CATH LAB;  Service: Cardiology;  Laterality: Left;    RIGHT HEART CATHETERIZATION Right 2022    Procedure: INSERTION, CATHETER, RIGHT HEART;  Surgeon: Timothy Prajapati Jr., MD;  Location: [facility] CATH LAB;  Service: Cardiology;  Laterality: Right;    RIGHT HEART CATHETERIZATION Right 2024    Procedure: INSERTION, CATHETER, RIGHT HEART;  Surgeon: Lior Rueda MD;  Location: [facility] CATH LAB;  Service: Cardiology;  Laterality: Right;    TUBAL LIGATION         Review of patient's allergies indicates:   Allergen Reactions    Aldactone [spironolactone] Swelling     Lips swelled    Hydralazine analogues Other (See Comments)     headaches    Isosorbide Other (See Comments)     headaches       No current facility-administered medications on file prior to encounter.     Current Outpatient Medications on File Prior to Encounter   Medication Sig     acetaminophen (TYLENOL) 500 MG tablet Take 1 tablet (500 mg total) by mouth every 6 (six) hours as needed for Pain.    atorvastatin (LIPITOR) 80 MG tablet Take 1 tablet (80 mg total) by mouth every evening.    clopidogreL (PLAVIX) 75 mg tablet Take 1 tablet (75 mg total) by mouth once daily.    dapagliflozin propanediol (FARXIGA) 10 mg tablet Take 1 tablet (10 mg total) by mouth once daily.    furosemide (LASIX) 80 MG tablet Take 1 tablet (80 mg total) by mouth 2 (two) times a day.    linaCLOtide (LINZESS) 290 mcg Cap capsule Take 1 capsule (290 mcg total) by mouth before breakfast.    metoprolol succinate (TOPROL-XL) 25 MG 24 hr tablet Take 1 tablet (25 mg total) by mouth once daily.    pantoprazole (PROTONIX) 40 MG tablet Take 1 tablet (40 mg total) by mouth once daily. Please take 1 tablet twice a day for 3 days, then 1 tablet daily    potassium chloride SA (K-DUR,KLOR-CON) 20 MEQ tablet Take 2 tablets (40 mEq total) by mouth 2 (two) times daily.    sacubitriL-valsartan (ENTRESTO) 49-51 mg per tablet Take 1 tablet by mouth 2 (two) times daily.    albuterol (PROVENTIL/VENTOLIN HFA) 90 mcg/actuation inhaler Inhale 1-2 puffs into the lungs every 6 (six) hours as needed. Rescue (Patient not taking: Reported on 8/2/2025)    aspirin (ECOTRIN) 81 MG EC tablet Take 1 tablet (81 mg total) by mouth once daily. (Patient not taking: Reported on 8/2/2025)    benzocaine-menthoL 15-3.6 mg Lozg 1 lozenge by Mucous Membrane route every 2 (two) hours as needed (sore throat). (Patient not taking: Reported on 8/2/2025)    docusate sodium (COLACE) 100 MG capsule Take 1 capsule (100 mg total) by mouth 2 (two) times daily. (Patient not taking: Reported on 8/2/2025)    eplerenone (INSPRA) 25 MG Tab Take 1 tablet (25 mg total) by mouth once daily. (Patient not taking: Reported on 8/2/2025)    FLUoxetine 20 MG capsule Take 1 capsule (20 mg total) by mouth once daily. (Patient not taking: Reported on 8/2/2025)    fluticasone propionate  (FLONASE) 50 mcg/actuation nasal spray 1 spray (50 mcg total) by Each Nostril route 2 (two) times daily as needed for Rhinitis. (Patient not taking: Reported on 8/2/2025)    gabapentin (NEURONTIN) 300 MG capsule Take 2 capsules (600 mg total) by mouth 3 (three) times daily. (Patient not taking: Reported on 8/2/2025)     Family History       Problem Relation (Age of Onset)    Breast cancer Other    Cancer Maternal Grandmother, Paternal Grandmother, Paternal Aunt, Paternal Uncle    Diabetes Other    Hypertension Mother, Other    No Known Problems Sister, Brother, Son, Brother          Tobacco Use    Smoking status: Never    Smokeless tobacco: Never   Substance and Sexual Activity    Alcohol use: Not Currently     Comment: occasionally    Drug use: Not Currently     Types: Marijuana     Comment: in past very little marijuana    Sexual activity: Yes     Partners: Male     Birth control/protection: None     Review of Systems   Constitutional: Negative.    HENT: Negative.     Respiratory:  Positive for cough and shortness of breath.    Cardiovascular:  Positive for chest pain.   Gastrointestinal:  Positive for nausea.   Genitourinary: Negative.    Musculoskeletal: Negative.    Skin: Negative.    Neurological:  Positive for weakness and headaches.   Psychiatric/Behavioral: Negative.       Objective:     Vital Signs (Most Recent):  Temp: 98 °F (36.7 °C) (08/02/25 0757)  Pulse: 90 (08/02/25 1500)  Resp: 18 (08/02/25 0757)  BP: 116/74 (08/02/25 1500)  SpO2: 98 % (08/02/25 1500) Vital Signs (24h Range):  Temp:  [98 °F (36.7 °C)] 98 °F (36.7 °C)  Pulse:  [81-97] 90  Resp:  [18] 18  SpO2:  [94 %-100 %] 98 %  BP: (101-116)/(67-78) 116/74     Weight: 81.2 kg (179 lb)  Body mass index is 26.43 kg/m².     Physical Exam  Constitutional:       General: She is not in acute distress.     Appearance: Normal appearance. She is not ill-appearing or diaphoretic.   HENT:      Head: Normocephalic and atraumatic.      Mouth/Throat:      Mouth:  Mucous membranes are moist.   Eyes:      Extraocular Movements: Extraocular movements intact.   Cardiovascular:      Rate and Rhythm: Normal rate and regular rhythm.      Pulses: Normal pulses.   Pulmonary:      Effort: Pulmonary effort is normal.      Comments: Speaking in full sentences on RA  Decrease breath sound in left lower quadrant  Abdominal:      General: Abdomen is flat.      Palpations: Abdomen is soft.   Musculoskeletal:      Right lower leg: No edema.      Left lower leg: No edema.   Skin:     General: Skin is warm.   Neurological:      General: No focal deficit present.      Mental Status: She is alert and oriented to person, place, and time. Mental status is at baseline.   Psychiatric:         Mood and Affect: Mood normal.         Behavior: Behavior normal.         Thought Content: Thought content normal.                Significant Labs: All pertinent labs within the past 24 hours have been reviewed.    Significant Imaging: I have reviewed all pertinent imaging results/findings within the past 24 hours.  Imaging Results              X-Ray Chest AP Portable (Final result)  Result time 08/02/25 09:43:29      Final result by Huber Walker Jr., MD (08/02/25 09:43:29)                   Impression:      Cardiomegaly and pulmonary venous congestion without emilie edema      Electronically signed by: Huber Storm Jr  Date:    08/02/2025  Time:    09:43               Narrative:    EXAMINATION:  XR CHEST AP PORTABLE    CLINICAL HISTORY:  CHF;    TECHNIQUE:  Single frontal view of the chest was performed.    COMPARISON:  05/05/2025    FINDINGS:  Cardiomediastinal silhouetteremains enlarged.  Left-sided defibrillator redemonstrated.    There is some pulmonary vascular congestion present but no emilie edema.    Small left pleural effusion and basilar consolidation difficult to exclude with certainty based on this single frontal view.

## 2025-08-03 VITALS
DIASTOLIC BLOOD PRESSURE: 83 MMHG | BODY MASS INDEX: 26.48 KG/M2 | HEART RATE: 91 BPM | HEIGHT: 69 IN | WEIGHT: 178.81 LBS | SYSTOLIC BLOOD PRESSURE: 121 MMHG | RESPIRATION RATE: 20 BRPM | TEMPERATURE: 98 F | OXYGEN SATURATION: 98 %

## 2025-08-03 LAB
ANION GAP (OHS): 9 MMOL/L (ref 8–16)
BUN SERPL-MCNC: 15 MG/DL (ref 6–20)
CALCIUM SERPL-MCNC: 8.9 MG/DL (ref 8.7–10.5)
CHLORIDE SERPL-SCNC: 108 MMOL/L (ref 95–110)
CO2 SERPL-SCNC: 23 MMOL/L (ref 23–29)
CREAT SERPL-MCNC: 0.9 MG/DL (ref 0.5–1.4)
GFR SERPLBLD CREATININE-BSD FMLA CKD-EPI: >60 ML/MIN/1.73/M2
GLUCOSE SERPL-MCNC: 81 MG/DL (ref 70–110)
HOLD SPECIMEN: NORMAL
POTASSIUM SERPL-SCNC: 3.5 MMOL/L (ref 3.5–5.1)
SODIUM SERPL-SCNC: 140 MMOL/L (ref 136–145)

## 2025-08-03 PROCEDURE — 96376 TX/PRO/DX INJ SAME DRUG ADON: CPT

## 2025-08-03 PROCEDURE — 36415 COLL VENOUS BLD VENIPUNCTURE: CPT

## 2025-08-03 PROCEDURE — 63600175 PHARM REV CODE 636 W HCPCS: Performed by: INTERNAL MEDICINE

## 2025-08-03 PROCEDURE — 80048 BASIC METABOLIC PNL TOTAL CA: CPT

## 2025-08-03 PROCEDURE — G0378 HOSPITAL OBSERVATION PER HR: HCPCS

## 2025-08-03 PROCEDURE — 25000003 PHARM REV CODE 250

## 2025-08-03 RX ADMIN — SACUBITRIL AND VALSARTAN 2 TABLET: 24; 26 TABLET, FILM COATED ORAL at 09:08

## 2025-08-03 RX ADMIN — DAPAGLIFLOZIN 10 MG: 10 TABLET, FILM COATED ORAL at 09:08

## 2025-08-03 RX ADMIN — POTASSIUM CHLORIDE 40 MEQ: 1500 TABLET, EXTENDED RELEASE ORAL at 09:08

## 2025-08-03 RX ADMIN — CLOPIDOGREL 75 MG: 75 TABLET ORAL at 09:08

## 2025-08-03 RX ADMIN — FUROSEMIDE 60 MG: 10 INJECTION, SOLUTION INTRAVENOUS at 03:08

## 2025-08-03 NOTE — CONSULTS
Pt admitted with chest pain, cough, nausea, and shortness of breath. Consult received for fluid and salt restricted diet. RD working remotely for weekend coverage. Handouts on fluid and salt restricted diet attached to d/c paperwork. RD to follow up with education.

## 2025-08-03 NOTE — NURSING
Pt discharged to home.  Pt is Aox4 and states verbal understanding of all discharge instructions.  IV access x1 removed.  Cardiac monitor removed.  Pt left with all personal belongings. Pt safe.

## 2025-08-03 NOTE — DISCHARGE SUMMARY
"[facility]  [facility] Medicine  Discharge Summary      Patient Name: Nasra Marks  MRN: [id number]  ETTA: [id number]  Patient Class: OP- Observation  Admission Date: 8/2/2025  Hospital Length of Stay: 0 days  Discharge Date and Time: 08/03/2025 12:24 PM  Attending Physician: Tasia العلي MD   Discharging Provider: Michelle Watson PA-C  Primary Care Provider: Veronika Rainey MD    Primary Care Team: Networked reference to record PCT     HPI:   Nasra Marks is a 40 y.o. female with a pmh nonischemic cardiomyopathy EF 10-15% and AICD in place, hypertension, stroke iron-deficiency anemia presents to the hospital with complaints of chest pain with the associated cough for the past several days.    Patient complains of nausea, weakness, fatigue for the past 2-3 days. Patient described the pain as "tightness" and states, "it feels like my heart is just trying". Patient is also complaining of nausea, SOB, cough with clear sputum, fatigue, and weakness for the past 2-3 days. Patient attempted to treat her symptoms with Tylenol with no relief. Patient states she has decreased appetite and decreased po intake, noting she feels she has loss weight, but her weight remains stable. Patient denies any other alleviating or exacerbating factors. Patient denies headache, blurry vision, abdominal pain, hematemesis, hematochezia, hematuria, or any other associated symptoms.    In the ED:  Patient afebrile without leukocytosis, hemodynamically stable with a SpO2 94%, H and H 10.4/33.3, chloride 111, GFR 59, calcium 8.3, AST 49, ALT 57, elevated BNP 4091, COVID influenza negative, UA with 2+ blood and moderate bacteria, urine pregnancy test negative, chest x-ray shows cardiomegaly and pulmonary venous congestion.  Patient given benzonatate 100 mg, IV diphenhydramine 25 mg, IV Lasix 40 mg, and IV Compazine 10 mg in the ED. Case discussed with ED provider and patient placed under observation for further medical " management.      * No surgery found *      Hospital Course:   Nasra Marks is a 40 y.o. female admitted for acute on chronic CHF exacerbation. In the ED:  Patient afebrile without leukocytosis, hemodynamically stable with a SpO2 94%, H and H 10.4/33.3, chloride 111, GFR 59, calcium 8.3, AST 49, ALT 57, elevated BNP 4091, COVID influenza negative, UA with 2+ blood and moderate bacteria, urine pregnancy test negative, chest x-ray shows cardiomegaly and pulmonary venous congestion. Patient given benzonatate 100 mg, IV diphenhydramine 25 mg, IV Lasix 40 mg, and IV Compazine 10 mg in the ED.   Patient was treated with IV Lasix and diuresed. Patient's output during her 1st day was net -2200. Patients sob and chest pain improved. Lung sounds improved. Patient states she feels better and would like to go home. Echocardiogram was ordered but canceled due to patient getting one just a few months ago. Patient's O2 sat was 99% and patient not requiring oxygen. Patient is HDS and medically cleared for discharge. Patient is to f/u with PCP and her primary cardiologist and heart failure clinic. Patient acknowledged understanding and had no further questions. Return precautions given.     All findings and plan were explained to the patient. All questions and concerns were answered. Patient verbalized understanding. Patient is in stable condition to d/c home and has been informed to follow up with her PCP within the next 7-10 days to discuss her observation stay and to follow-up with Cardiology.  Patient has been educated to return to the ED if She experiences any further chest pain, lightheadness, weakness, or discomfort.           Goals of Care Treatment Preferences:  Code Status: Full Code         Consults:   Consults (From admission, onward)          Status Ordering Provider     Inpatient consult to Social Work/Case Management  Once        Provider:  (Not yet assigned)    Acknowledged TIERRA DEWITT     Inpatient consult  to Registered Dietitian/Nutritionist  Once        Provider:  (Not yet assigned)    Completed TIERRA DEWITT            Assessment & Plan  Acute on chronic combined systolic and diastolic congestive heart failure  Patient has Combined Systolic and Diastolic heart failure that is Acute on chronic. On presentation their CHF was decompensated. Evidence of decompensated CHF on presentation includes: edema and shortness of breath.  Most recent BNP and echo results are listed below.  Recent Labs     08/02/25  0835   BNP 4,091*     Latest ECHO  Results for orders placed during the hospital encounter of 05/04/25    Echo    Interpretation Summary    Left Ventricle: The left ventricle is severely dilated. Normal wall thickness. There is severe eccentric hypertrophy. Severe global hypokinesis present. There is severely reduced systolic function with a visually estimated ejection fraction of 10 -15%. Grade III diastolic dysfunction.    Right Ventricle: The right ventricle is normal in size. Systolic function is normal.    Left Atrium: Severely dilated    Right Atrium: Right atrium is dilated.    Mitral Valve: There is moderate regurgitation.    Tricuspid Valve: There is moderate regurgitation.    Pulmonary Artery: The estimated pulmonary artery systolic pressure is 37 mmHg.    IVC/SVC: Normal venous pressure at 3 mmHg.    Current Heart Failure Medications  dapagliflozin propanediol (Farxiga) tablet 10 mg, Daily, Oral  metoprolol succinate (TOPROL-XL) 24 hr tablet 25 mg, Daily, Oral  sacubitriL-valsartan 24-26 mg per tablet 2 tablet, 2 times daily, Oral  furosemide injection 60 mg, Every 12 hours, Intravenous    Plan  - Monitor strict I&Os and daily weights.    - Place on telemetry  - Low sodium diet  - Place on fluid restriction of 1.5 L.   - Cardiology has not been consulted  - The patient's volume status is stable but not at their baseline as indicated by edema, dyspnea on exertion (ELENA), and shortness of breath  - Continue  GDMT   - Continue diuresis with Lasix   Essential hypertension  Patient's blood pressure range in the last 24 hours was: BP  Min: 99/63  Max: 134/86.The patient's inpatient anti-hypertensive regimen is listed below:  Current Antihypertensives  metoprolol succinate (TOPROL-XL) 24 hr tablet 25 mg, Daily, Oral  furosemide injection 60 mg, Every 12 hours, Intravenous    Plan  - BP is controlled, no changes needed to their regimen  Nonischemic cardiomyopathy  Severe EF 10-15%   Repeat Echo ordered   AICD in place   ICD (implantable cardioverter-defibrillator), single, in situ  History noted  Continue to monitor on telemetry   Hx of ischemic right PCA stroke  History noted, continue statin   Pulmonary hypertension  Previous Echo on 5/5/25 with PASP 37 mm Hg  Continue IV diuresis   Pleural effusion, left  Patient found to have small pleural effusion on imaging. I have personally reviewed and interpreted the following imaging: Xray. A thoracentesis was deferred. Most likely etiology includes Congestive Heart Failure. Management to include Diuresis    Final Active Diagnoses:    Diagnosis Date Noted POA    PRINCIPAL PROBLEM:  Acute on chronic combined systolic and diastolic congestive heart failure [I50.43] 05/24/2019 Yes    Pleural effusion, left [J90] 08/02/2025 Yes    Pulmonary hypertension [I27.20] 05/07/2025 Yes    Hx of ischemic right PCA stroke [Z86.73] 02/08/2023 Not Applicable    ICD (implantable cardioverter-defibrillator), single, in situ [Z95.810] 02/17/2020 Yes    Nonischemic cardiomyopathy [I42.8] 05/24/2019 Yes     Chronic    Essential hypertension [I10] 11/19/2012 Yes     Chronic      Problems Resolved During this Admission:       Discharged Condition: stable    Disposition: Home or Self Care    Follow Up:    Patient Instructions:      Diet Cardiac   Order Comments: Low Na, 1.5L fluid restriction cardiac diet     Notify your health care provider if you experience any of the following:  difficulty breathing or  "increased cough     Activity as tolerated       Significant Diagnostic Studies: Labs: CMP   Recent Labs   Lab 08/02/25  0835 08/03/25  0407    140   K 3.7 3.5   * 108   CO2 20* 23   GLU 94 81   BUN 14 15   CREATININE 1.2 0.9   CALCIUM 8.3* 8.9   PROT 7.1  --    ALBUMIN 3.7  --    BILITOT 0.7  --    ALKPHOS 61  --    AST 49*  --    ALT 57*  --    ANIONGAP 8 9   , CBC   Recent Labs   Lab 08/02/25  0835   WBC 6.99   HGB 10.4*   HCT 33.3*      , and Troponin No results for input(s): "TROPONINI" in the last 168 hours.    Pending Diagnostic Studies:       None           Medications:  Reconciled Home Medications:      Medication List        CONTINUE taking these medications      acetaminophen 500 MG tablet  Commonly known as: TYLENOL  Take 1 tablet (500 mg total) by mouth every 6 (six) hours as needed for Pain.     atorvastatin 80 MG tablet  Commonly known as: LIPITOR  Take 1 tablet (80 mg total) by mouth every evening.     clopidogreL 75 mg tablet  Commonly known as: PLAVIX  Take 1 tablet (75 mg total) by mouth once daily.     dapagliflozin propanediol 10 mg tablet  Commonly known as: FARXIGA  Take 1 tablet (10 mg total) by mouth once daily.     ENTRESTO 49-51 mg per tablet  Generic drug: sacubitriL-valsartan  Take 1 tablet by mouth 2 (two) times daily.     furosemide 80 MG tablet  Commonly known as: LASIX  Take 1 tablet (80 mg total) by mouth 2 (two) times a day.     linaCLOtide 290 mcg Cap capsule  Commonly known as: LINZESS  Take 1 capsule (290 mcg total) by mouth before breakfast.     metoprolol succinate 25 MG 24 hr tablet  Commonly known as: TOPROL-XL  Take 1 tablet (25 mg total) by mouth once daily.     pantoprazole 40 MG tablet  Commonly known as: PROTONIX  Take 1 tablet (40 mg total) by mouth once daily. Please take 1 tablet twice a day for 3 days, then 1 tablet daily     potassium chloride SA 20 MEQ tablet  Commonly known as: K-DUR,KLOR-CON  Take 2 tablets (40 mEq total) by mouth 2 (two) times " daily.            STOP taking these medications      albuterol 90 mcg/actuation inhaler  Commonly known as: PROVENTIL/VENTOLIN HFA     aspirin 81 MG EC tablet  Commonly known as: ECOTRIN     benzocaine-menthoL 15-3.6 mg Lozg     docusate sodium 100 MG capsule  Commonly known as: COLACE     eplerenone 25 MG Tab  Commonly known as: INSPRA     FLUoxetine 20 MG capsule     fluticasone propionate 50 mcg/actuation nasal spray  Commonly known as: FLONASE     gabapentin 300 MG capsule  Commonly known as: NEURONTIN              Indwelling Lines/Drains at time of discharge:   Lines/Drains/Airways       Drain  Duration             Female External Urinary Catheter w/ Suction 08/02/25 1901 <1 day                        Time spent on the discharge of patient: 45 minutes         Michelle Watson PA-C  Department of Hospital Medicine  [facility] [place] [facility]   You can access the FollowMyHealth Patient Portal offered by [facility] by registering at the following website: [email or web address]. By joining [employer]’s FollowMyHealth portal, you will also be able to view your health information using other applications (apps) compatible with our system.

## 2025-08-03 NOTE — PLAN OF CARE
Problem: Adult Inpatient Plan of Care  Goal: Patient-Specific Goal (Individualized)  Outcome: Progressing  Goal: Absence of Hospital-Acquired Illness or Injury  Outcome: Progressing  Goal: Optimal Comfort and Wellbeing  Outcome: Progressing  Goal: Readiness for Transition of Care  Outcome: Progressing     Problem: Gas Exchange Impaired  Goal: Optimal Gas Exchange  Outcome: Progressing     Problem: Chest Pain  Goal: Resolution of Chest Pain Symptoms  Outcome: Progressing

## 2025-08-03 NOTE — ASSESSMENT & PLAN NOTE
Patient has Combined Systolic and Diastolic heart failure that is Acute on chronic. On presentation their CHF was decompensated. Evidence of decompensated CHF on presentation includes: edema and shortness of breath.  Most recent BNP and echo results are listed below.  Recent Labs     08/02/25  0835   BNP 4,091*     Latest ECHO  Results for orders placed during the hospital encounter of 05/04/25    Echo    Interpretation Summary    Left Ventricle: The left ventricle is severely dilated. Normal wall thickness. There is severe eccentric hypertrophy. Severe global hypokinesis present. There is severely reduced systolic function with a visually estimated ejection fraction of 10 -15%. Grade III diastolic dysfunction.    Right Ventricle: The right ventricle is normal in size. Systolic function is normal.    Left Atrium: Severely dilated    Right Atrium: Right atrium is dilated.    Mitral Valve: There is moderate regurgitation.    Tricuspid Valve: There is moderate regurgitation.    Pulmonary Artery: The estimated pulmonary artery systolic pressure is 37 mmHg.    IVC/SVC: Normal venous pressure at 3 mmHg.    Current Heart Failure Medications  dapagliflozin propanediol (Farxiga) tablet 10 mg, Daily, Oral  metoprolol succinate (TOPROL-XL) 24 hr tablet 25 mg, Daily, Oral  sacubitriL-valsartan 24-26 mg per tablet 2 tablet, 2 times daily, Oral  furosemide injection 60 mg, Every 12 hours, Intravenous    Plan  - Monitor strict I&Os and daily weights.    - Place on telemetry  - Low sodium diet  - Place on fluid restriction of 1.5 L.   - Cardiology has not been consulted  - The patient's volume status is stable but not at their baseline as indicated by edema, dyspnea on exertion (ELENA), and shortness of breath  - Continue GDMT   - Continue diuresis with Lasix

## 2025-08-03 NOTE — HOSPITAL COURSE
Nasra Marks is a 40 y.o. female admitted for acute on chronic CHF exacerbation. In the ED:  Patient afebrile without leukocytosis, hemodynamically stable with a SpO2 94%, H and H 10.4/33.3, chloride 111, GFR 59, calcium 8.3, AST 49, ALT 57, elevated BNP 4091, COVID influenza negative, UA with 2+ blood and moderate bacteria, urine pregnancy test negative, chest x-ray shows cardiomegaly and pulmonary venous congestion. Patient given benzonatate 100 mg, IV diphenhydramine 25 mg, IV Lasix 40 mg, and IV Compazine 10 mg in the ED.   Patient was treated with IV Lasix and diuresed. Patient's output during her 1st day was net -2200. Patients sob and chest pain improved. Lung sounds improved. Patient states she feels better and would like to go home. Echocardiogram was ordered but canceled due to patient getting one just a few months ago. Patient's O2 sat was 99% and patient not requiring oxygen. Patient is HDS and medically cleared for discharge. Patient is to f/u with PCP and her primary cardiologist and heart failure clinic. Patient acknowledged understanding and had no further questions. Return precautions given.     All findings and plan were explained to the patient. All questions and concerns were answered. Patient verbalized understanding. Patient is in stable condition to d/c home and has been informed to follow up with her PCP within the next 7-10 days to discuss her observation stay and to follow-up with Cardiology.  Patient has been educated to return to the ED if She experiences any further chest pain, lightheadness, weakness, or discomfort.

## 2025-08-03 NOTE — ASSESSMENT & PLAN NOTE
Patient's blood pressure range in the last 24 hours was: BP  Min: 99/63  Max: 134/86.The patient's inpatient anti-hypertensive regimen is listed below:  Current Antihypertensives  metoprolol succinate (TOPROL-XL) 24 hr tablet 25 mg, Daily, Oral  furosemide injection 60 mg, Every 12 hours, Intravenous    Plan  - BP is controlled, no changes needed to their regimen

## 2025-08-04 LAB
OHS QRS DURATION: 96 MS
OHS QTC CALCULATION: 455 MS

## 2025-08-05 ENCOUNTER — PATIENT OUTREACH (OUTPATIENT)
Dept: ADMINISTRATIVE | Facility: CLINIC | Age: 40
End: 2025-08-05
Payer: MEDICAID

## 2025-08-06 ENCOUNTER — OFFICE VISIT (OUTPATIENT)
Dept: CARDIOLOGY | Facility: CLINIC | Age: 40
End: 2025-08-06
Payer: MEDICAID

## 2025-08-06 VITALS
SYSTOLIC BLOOD PRESSURE: 90 MMHG | HEART RATE: 85 BPM | OXYGEN SATURATION: 99 % | DIASTOLIC BLOOD PRESSURE: 60 MMHG | RESPIRATION RATE: 15 BRPM | HEIGHT: 69 IN | BODY MASS INDEX: 25.44 KG/M2 | WEIGHT: 171.75 LBS

## 2025-08-06 DIAGNOSIS — I42.8 NONISCHEMIC CARDIOMYOPATHY: ICD-10-CM

## 2025-08-06 DIAGNOSIS — I50.42 CHRONIC COMBINED SYSTOLIC AND DIASTOLIC CONGESTIVE HEART FAILURE: Primary | ICD-10-CM

## 2025-08-06 DIAGNOSIS — E78.5 DYSLIPIDEMIA: ICD-10-CM

## 2025-08-06 DIAGNOSIS — Z86.73 HISTORY OF CVA (CEREBROVASCULAR ACCIDENT): ICD-10-CM

## 2025-08-06 DIAGNOSIS — I10 ESSENTIAL HYPERTENSION: ICD-10-CM

## 2025-08-06 DIAGNOSIS — Z95.810 PRESENCE OF AUTOMATIC (IMPLANTABLE) CARDIAC DEFIBRILLATOR: ICD-10-CM

## 2025-08-06 DIAGNOSIS — Z95.810 ICD (IMPLANTABLE CARDIOVERTER-DEFIBRILLATOR), SINGLE, IN SITU: ICD-10-CM

## 2025-08-06 PROCEDURE — 99214 OFFICE O/P EST MOD 30 MIN: CPT | Mod: PBBFAC | Performed by: INTERNAL MEDICINE

## 2025-08-06 PROCEDURE — 4010F ACE/ARB THERAPY RXD/TAKEN: CPT | Mod: CPTII,,, | Performed by: INTERNAL MEDICINE

## 2025-08-06 PROCEDURE — 1159F MED LIST DOCD IN RCRD: CPT | Mod: CPTII,,, | Performed by: INTERNAL MEDICINE

## 2025-08-06 PROCEDURE — G2211 COMPLEX E/M VISIT ADD ON: HCPCS | Mod: ,,, | Performed by: INTERNAL MEDICINE

## 2025-08-06 PROCEDURE — 3074F SYST BP LT 130 MM HG: CPT | Mod: CPTII,,, | Performed by: INTERNAL MEDICINE

## 2025-08-06 PROCEDURE — 99215 OFFICE O/P EST HI 40 MIN: CPT | Mod: S$PBB,,, | Performed by: INTERNAL MEDICINE

## 2025-08-06 PROCEDURE — 3044F HG A1C LEVEL LT 7.0%: CPT | Mod: CPTII,,, | Performed by: INTERNAL MEDICINE

## 2025-08-06 PROCEDURE — 3008F BODY MASS INDEX DOCD: CPT | Mod: CPTII,,, | Performed by: INTERNAL MEDICINE

## 2025-08-06 PROCEDURE — 3078F DIAST BP <80 MM HG: CPT | Mod: CPTII,,, | Performed by: INTERNAL MEDICINE

## 2025-08-06 PROCEDURE — 99999 PR PBB SHADOW E&M-EST. PATIENT-LVL IV: CPT | Mod: PBBFAC,,, | Performed by: INTERNAL MEDICINE

## 2025-08-06 PROCEDURE — 1160F RVW MEDS BY RX/DR IN RCRD: CPT | Mod: CPTII,,, | Performed by: INTERNAL MEDICINE

## 2025-08-06 NOTE — PROGRESS NOTES
CARDIOVASCULAR PROGRESS NOTE    REASON FOR CONSULT:   Nasra Marks is a 40 y.o. female who presents for follow up of MINDY, MDT ICD.    PCP: Redd  Gyn: Labadie, Juan  CHF: [place]irth  HISTORY OF PRESENT ILLNESS:   The patient returns for follow up.  She has been seen by the heart transplant Service and has plans to undergo repeat right heart catheterization next week.  She apparently is no longer taking the eplerenone or aspirin, but continues on her goal-directed medical therapy as otherwise outlined below.  She also continues to take Plavix daily.  She denies any angina.  She does describe dyspnea on exertion of a class 2-3 nature.  Her main complaint is fatigue which is preventing her from work.  It appears to Dr. Morrell with plans to have her on disability but would like to get the catheterization data done 1st.  She otherwise denies any palpitations, syncope, or defibrillator discharges.  She has not appear to be grossly volume overloaded on examination.  The patient is somewhat frustrated by her situation, and I explained to her that she is at the limits of my capabilities and that is why have her seeing the transplant Service.    CARDIOVASCULAR HISTORY:   NICM (cath 4/2017 with normal cors)  ICD (Donta 12/2017, MDT single chamber)    PAST MEDICAL HISTORY:     Past Medical History:   Diagnosis Date    CHF (congestive heart failure)     Chronic combined systolic and diastolic congestive heart failure 05/24/2019    Essential hypertension 11/19/2012    Hypertension     dx at 16y.o.    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 disease, with heart failure 12/28/2021    ICD (implantable cardioverter-defibrillator), single, in situ 02/17/2020    Nonischemic cardiomyopathy 05/24/2019    Pacemaker 12/18/2017    Pulmonary hypertension 05/07/2025    PASP 37 mmHg 5/5/25, previously normal- suspected Group 2         PAST SURGICAL HISTORY:     Past Surgical History:   Procedure  Laterality Date    CARDIAC DEFIBRILLATOR PLACEMENT  2017     SECTION      x 1    CORONARY ANGIOGRAPHY N/A 2025    Procedure: ANGIOGRAM, CORONARY ARTERY;  Surgeon: Kashif Peguero MD;  Location: [facility] CATH LAB;  Service: Cardiology;  Laterality: N/A;  Right radial. Noon time - stick time    INDUCED       LEFT HEART CATHETERIZATION Left 2025    Procedure: Left heart cath;  Surgeon: Kashif Peguero MD;  Location: [facility] CATH LAB;  Service: Cardiology;  Laterality: Left;    RIGHT HEART CATHETERIZATION Right 2022    Procedure: INSERTION, CATHETER, RIGHT HEART;  Surgeon: Timothy Prajapati Jr., MD;  Location: [facility] CATH LAB;  Service: Cardiology;  Laterality: Right;    RIGHT HEART CATHETERIZATION Right 2024    Procedure: INSERTION, CATHETER, RIGHT HEART;  Surgeon: Lior Rueda MD;  Location: [facility] CATH LAB;  Service: Cardiology;  Laterality: Right;    TUBAL LIGATION         ALLERGIES AND MEDICATION:     Review of patient's allergies indicates:   Allergen Reactions    Aldactone [spironolactone] Swelling     Lips swelled    Hydralazine analogues Other (See Comments)     headaches    Isosorbide Other (See Comments)     headaches        Medication List            Accurate as of 2025 11:04 AM. If you have any questions, ask your nurse or doctor.                CONTINUE taking these medications      acetaminophen 500 MG tablet  Commonly known as: TYLENOL  Take 1 tablet (500 mg total) by mouth every 6 (six) hours as needed for Pain.     atorvastatin 80 MG tablet  Commonly known as: LIPITOR  Take 1 tablet (80 mg total) by mouth every evening.     clopidogreL 75 mg tablet  Commonly known as: PLAVIX  Take 1 tablet (75 mg total) by mouth once daily.     dapagliflozin propanediol 10 mg tablet  Commonly known as: FARXIGA  Take 1 tablet (10 mg total) by mouth once daily.     ENTRESTO 49-51 mg per tablet  Generic drug: sacubitriL-valsartan  Take 1 tablet by mouth 2 (two)  times daily.     furosemide 80 MG tablet  Commonly known as: LASIX  Take 1 tablet (80 mg total) by mouth 2 (two) times a day.     linaCLOtide 290 mcg Cap capsule  Commonly known as: LINZESS  Take 1 capsule (290 mcg total) by mouth before breakfast.     metoprolol succinate 25 MG 24 hr tablet  Commonly known as: TOPROL-XL  Take 1 tablet (25 mg total) by mouth once daily.     pantoprazole 40 MG tablet  Commonly known as: PROTONIX  Take 1 tablet (40 mg total) by mouth once daily. Please take 1 tablet twice a day for 3 days, then 1 tablet daily     potassium chloride SA 20 MEQ tablet  Commonly known as: K-DUR,KLOR-CON  Take 2 tablets (40 mEq total) by mouth 2 (two) times daily.              SOCIAL HISTORY:     Social History     Socioeconomic History    Marital status:    Occupational History     Employer: [employer]   Tobacco Use    Smoking status: Never    Smokeless tobacco: Never   Substance and Sexual Activity    Alcohol use: Not Currently     Comment: occasionally    Drug use: Not Currently     Types: Marijuana     Comment: in past very little marijuana    Sexual activity: Yes     Partners: Male     Birth control/protection: None     Social Drivers of Health     Financial Resource Strain: Low Risk  (5/5/2025)    Overall Financial Resource Strain (CARDIA)     Difficulty of Paying Living Expenses: Not hard at all   Food Insecurity: No Food Insecurity (5/5/2025)    Hunger Vital Sign     Worried About Running Out of Food in the Last Year: Never true     Ran Out of Food in the Last Year: Never true   Transportation Needs: No Transportation Needs (5/5/2025)    PRAPARE - Transportation     Lack of Transportation (Medical): No     Lack of Transportation (Non-Medical): No   Physical Activity: Inactive (1/6/2025)    Exercise Vital Sign     Days of Exercise per Week: 0 days     Minutes of Exercise per Session: 10 min   Stress: No Stress Concern Present (5/5/2025)    [employer] -  "Occupational Stress Questionnaire     Feeling of Stress : Only a little   Housing Stability: Low Risk  (5/5/2025)    Housing Stability Vital Sign     Unable to Pay for Housing in the Last Year: No     Homeless in the Last Year: No       FAMILY HISTORY:     Family History   Problem Relation Name Age of Onset    Hypertension Mother      Cancer Maternal Grandmother          breast x 2    Cancer Paternal Grandmother          breast    No Known Problems Sister      No Known Problems Brother      No Known Problems Son      No Known Problems Brother      Hypertension Other      Diabetes Other      Breast cancer Other      Cancer Paternal Aunt          breast    Cancer Paternal Uncle         REVIEW OF SYSTEMS:   Review of Systems   Constitutional:  Positive for malaise/fatigue. Negative for chills, diaphoresis and fever.   HENT:  Negative for nosebleeds.    Eyes:  Negative for blurred vision, double vision and photophobia.   Respiratory:  Positive for shortness of breath. Negative for hemoptysis and wheezing.    Cardiovascular:  Positive for orthopnea. Negative for chest pain, palpitations, claudication, leg swelling and PND.   Gastrointestinal:  Negative for abdominal pain, blood in stool, heartburn, melena, nausea and vomiting.   Genitourinary:  Negative for flank pain and hematuria.   Musculoskeletal:  Negative for falls, myalgias and neck pain.   Skin:  Negative for rash.   Neurological:  Negative for dizziness, seizures, loss of consciousness, weakness and headaches.   Endo/Heme/Allergies:  Negative for polydipsia. Does not bruise/bleed easily.   Psychiatric/Behavioral:  Negative for depression and memory loss. The patient is not nervous/anxious.        PHYSICAL EXAM:     Vitals:    08/06/25 1032   BP: 90/60   Pulse: 85   Resp: 15      Body mass index is 25.36 kg/m².  Weight: 77.9 kg (171 lb 11.8 oz)   Height: 5' 9" (175.3 cm)     Physical Exam  Vitals reviewed.   Constitutional:       General: She is not in acute " distress.     Appearance: Normal appearance. She is well-developed. She is not ill-appearing, toxic-appearing or diaphoretic.   HENT:      Head: Normocephalic and atraumatic.   Eyes:      General: No scleral icterus.     Extraocular Movements: Extraocular movements intact.      Conjunctiva/sclera: Conjunctivae normal.      Pupils: Pupils are equal, round, and reactive to light.   Neck:      Thyroid: No thyromegaly.      Vascular: Normal carotid pulses. No carotid bruit or JVD.      Trachea: Trachea normal. No tracheal deviation.   Cardiovascular:      Rate and Rhythm: Normal rate and regular rhythm.      Pulses:           Carotid pulses are 2+ on the right side and 2+ on the left side.     Heart sounds: S1 normal and S2 normal. No murmur heard.     No friction rub. No gallop.      Comments: L infraclavicular ICD site well healed  Pulmonary:      Effort: Pulmonary effort is normal. No respiratory distress.      Breath sounds: Normal breath sounds. No stridor. No wheezing, rhonchi or rales.   Chest:      Chest wall: No tenderness.   Abdominal:      General: There is no distension.      Palpations: Abdomen is soft.   Musculoskeletal:         General: No swelling or tenderness. Normal range of motion.      Cervical back: Normal range of motion and neck supple. No edema or rigidity.      Right lower leg: No edema.      Left lower leg: No edema.   Feet:      Right foot:      Skin integrity: No ulcer.      Left foot:      Skin integrity: No ulcer.   Skin:     General: Skin is warm and dry.      Coloration: Skin is not jaundiced.   Neurological:      General: No focal deficit present.      Mental Status: She is alert and oriented to person, place, and time.      Cranial Nerves: No cranial nerve deficit.   Psychiatric:         Mood and Affect: Mood normal.         Speech: Speech normal.         Behavior: Behavior normal. Behavior is cooperative.         DATA:   EKG: (personally reviewed tracing(s))  1/24/25 , PVCs,  NSSTTW changes    Laboratory:  CBC:  Recent Labs   Lab 05/23/25  1052 07/25/25  1054 08/02/25  0835   WBC 5.96 7.74 6.99   HGB 11.4 L 10.9 L 10.4 L   HCT 36.7 L 35.2 L 33.3 L   Platelet Count 336 343 288       CHEMISTRIES:  Recent Labs   Lab 05/23/25  1052 07/01/25  1136 07/25/25  1054 08/02/25  0835 08/02/25  1105 08/03/25  0407   Glucose 88 61 L 89 94  --  81   Sodium 142 138 141 139  --  140   Potassium 3.7 3.6 4.3 3.7  --  3.5   BUN 13 13 12 14  --  15   Creatinine 0.9 0.7 0.9 1.2  --  0.9   Calcium 9.3 9.0 9.0 8.3 L  --  8.9   Magnesium  1.8 1.6  --   --  1.8  --        CARDIAC BIOMARKERS:  Recent Labs   Lab 05/04/25  1919 05/04/25  2131 05/05/25  0003   Troponin-I 3.022 H  3.077 H 4.404 H 4.498 H       COAGS:  Recent Labs   Lab 08/27/24  1249 08/27/24  1312 05/04/25  1819   INR 1.3 H 1.0 1.1       LIPIDS/LFTS:  Recent Labs   Lab 08/27/24  1312 12/28/24  1948 04/12/25  0539 04/18/25  1648 05/04/25  1919 05/05/25  0548 07/01/25  1136 07/25/25  1054 08/02/25  0835   Cholesterol Total  --   --  122  --  119 L  --   --   --   --    Cholesterol 129  --   --   --   --   --   --   --   --    Triglycerides 70  --   --   --   --   --   --   --   --    Triglyceride  --   --  54  --  31  --   --   --   --    HDL 37 L  --   --   --   --   --   --   --   --    HDL Cholesterol  --   --  36 L  --  38 L  --   --   --   --    LDL Cholesterol 78.0  --  75.2  --  74.8  --   --   --   --    Non-HDL Cholesterol 92  --   --   --   --   --   --   --   --    Non HDL Cholesterol  --   --  86  --  81  --   --   --   --    AST 23   < > 61 H   < >  --    < > 16 21 49 H   ALT 13   < > 83 H   < >  --    < > 12 9 L 57 H    < > = values in this interval not displayed.     Lab Results   Component Value Date    TSH 3.141 04/12/2025      (1/24/25)    Cardiovascular Testing:  Cath 5/5/25  LVEDP: 18mmHg  LVEF: 15% by echo  Dominance: Right  LM: normal  LAD: normal  LCx: normal  RCA: normal  Hemostasis:  R Radial band  Impression:  CP  (ongoing) with elev trop c/w NSTEMI  Normal cors  Elev LVEDP  Severe LV dysfxn (chronic) by cath  R rad TR band for hemostasis  Plan:  Cont med rx  Stop heparin/NTG gtts  Cont ASA 81mg qd  Cont Plavix 75mg qd for now (1 year rx planned (thru May 2026) assuming CTA chest neg).  Cont statin  Add IV lasix, ?CHF as cause of current presentation  Change metoprolol tartrate to succinate 25mg qd  Cont entresto  Check CTA chest r/o PE  Dispo planning pending above study and symptom control  Consider as a candidate for CardioMEMS in the future    Echo 5/5/25    Left Ventricle: The left ventricle is severely dilated. Normal wall thickness. There is severe eccentric hypertrophy. Severe global hypokinesis present. There is severely reduced systolic function with a visually estimated ejection fraction of 10 -15%. Grade III diastolic dysfunction.    Right Ventricle: The right ventricle is normal in size. Systolic function is normal.    Left Atrium: Severely dilated    Right Atrium: Right atrium is dilated.    Mitral Valve: There is moderate regurgitation.    Tricuspid Valve: There is moderate regurgitation.    Pulmonary Artery: The estimated pulmonary artery systolic pressure is 37 mmHg.    IVC/SVC: Normal venous pressure at 3 mmHg.    RHC 8/27/24    The estimated blood loss was none.    The filling pressures on the right and left were mildly elevated.    RA 10  PA 31/15 (20)  PCWP 15    CO 5.7 l/min  CI 2.8 l/min/m2    TD cardiac output 5.2 l/min cardiac index 2.6 l/min/m2.    Condition: no inotropes or pressors.    Carotid US 1/20/23  There is 0-19% right Internal Carotid Stenosis.  There is 0-19% left Internal Carotid Stenosis.      ASSESSMENT:   # NICM, appears euvolemic but conplaining of ortha 4lb weight gain.  Following with Dr. Prajapati.  RHC 8/2024 as above with ?conversion d/o thereafter (CVA type sxs).  C 5/2025 with normal cors and elev filling pressures.  Appears euvolemic but still with generalized fatigue and  ELENA.  # MDT ICD, functioning normally, continue remote monitoring  # hx NSVT  # HTN, controlled  # HLP on atorva 80mg  # remote CVA (PICA territory on CT head 11/25/22).  Carotid US 1/20/23 neg.    PLAN:   Cont med rx  Cont Plavix 75mg qd  Cont GDMT, ?resume eplerenone at direction of [facility]  Follow up with Dr. Prajapati/[facility].  Pt has RHC scheduled on 8/13/25.  ?CardioMEMS at that time (message sent to Moe Prajapati and Kumar).  RTC 3 months with lipids/LFT (Nov 2025)    Complex OV, discussed possible CardioMEMS implant.    The above documents medical care services that are part of ongoing care related to this patient's serious/complex condition (Code ) (NICM, HTN, HLP).      Kashif Peguero MD, FACC    Addendum 8/8/25  Timothy Prajapati Jr., MD Castine, Michael R., MD; Isaías Heath MD  Cc: Lisseth Boyce, RN; Kyle Mosqueda MD  Caller: Unspecified (2 days ago, 11:29 AM)  Kashif, absolutely an option but it is a separate procedure from the RHC.  I will complete her risk stratification for advanced options first but include this in the discussion when I meet with her to review everything.      Will cc Dr. Mosqueda and [facility] team so that they are aware of this possibility.    Quan          Previous Messages       ----- Message -----  From: Kashif Peguero MD  Sent: 8/6/2025  11:30 AM CDT  To: Timothy Prajapati Jr., MD; Isaías Heath, *  Subject: ?CardioMEMS                                      Any thoughts about CardioMEMS placement when you do the RHC later this month?    MRC

## 2025-08-11 ENCOUNTER — INFUSION (OUTPATIENT)
Dept: INFUSION THERAPY | Facility: HOSPITAL | Age: 40
End: 2025-08-11
Payer: MEDICAID

## 2025-08-11 ENCOUNTER — HOSPITAL ENCOUNTER (EMERGENCY)
Facility: HOSPITAL | Age: 40
Discharge: HOME | End: 2025-08-11
Attending: EMERGENCY MEDICINE
Payer: MEDICAID

## 2025-08-11 ENCOUNTER — NURSE TRIAGE (OUTPATIENT)
Dept: ADMINISTRATIVE | Facility: CLINIC | Age: 40
End: 2025-08-11
Payer: MEDICAID

## 2025-08-11 VITALS
SYSTOLIC BLOOD PRESSURE: 86 MMHG | OXYGEN SATURATION: 100 % | RESPIRATION RATE: 16 BRPM | TEMPERATURE: 98 F | DIASTOLIC BLOOD PRESSURE: 53 MMHG | HEART RATE: 60 BPM

## 2025-08-11 VITALS
RESPIRATION RATE: 18 BRPM | OXYGEN SATURATION: 97 % | HEART RATE: 80 BPM | DIASTOLIC BLOOD PRESSURE: 65 MMHG | HEIGHT: 69 IN | TEMPERATURE: 99 F | BODY MASS INDEX: 25.33 KG/M2 | WEIGHT: 171 LBS | SYSTOLIC BLOOD PRESSURE: 102 MMHG

## 2025-08-11 DIAGNOSIS — R07.9 CHEST PAIN: ICD-10-CM

## 2025-08-11 DIAGNOSIS — D50.0 IRON DEFICIENCY ANEMIA DUE TO CHRONIC BLOOD LOSS: Primary | ICD-10-CM

## 2025-08-11 LAB
ABSOLUTE EOSINOPHIL (OHS): 0.09 K/UL
ABSOLUTE MONOCYTE (OHS): 0.51 K/UL (ref 0.3–1)
ABSOLUTE NEUTROPHIL COUNT (OHS): 9.54 K/UL (ref 1.8–7.7)
ALBUMIN SERPL BCP-MCNC: 4 G/DL (ref 3.5–5.2)
ALP SERPL-CCNC: 63 UNIT/L (ref 40–150)
ALT SERPL W/O P-5'-P-CCNC: 15 UNIT/L (ref 10–44)
ANION GAP (OHS): 11 MMOL/L (ref 8–16)
AST SERPL-CCNC: 35 UNIT/L (ref 11–45)
B-HCG UR QL: NEGATIVE
BACTERIA #/AREA URNS AUTO: NORMAL /HPF
BASOPHILS # BLD AUTO: 0.04 K/UL
BASOPHILS NFR BLD AUTO: 0.4 %
BILIRUB SERPL-MCNC: 0.7 MG/DL (ref 0.1–1)
BILIRUB UR QL STRIP.AUTO: NEGATIVE
BUN SERPL-MCNC: 12 MG/DL (ref 6–20)
CALCIUM SERPL-MCNC: 9.7 MG/DL (ref 8.7–10.5)
CHLORIDE SERPL-SCNC: 106 MMOL/L (ref 95–110)
CLARITY UR: CLEAR
CO2 SERPL-SCNC: 23 MMOL/L (ref 23–29)
COLOR UR AUTO: YELLOW
CREAT SERPL-MCNC: 1 MG/DL (ref 0.5–1.4)
CTP QC/QA: YES
ERYTHROCYTE [DISTWIDTH] IN BLOOD BY AUTOMATED COUNT: 14.9 % (ref 11.5–14.5)
GFR SERPLBLD CREATININE-BSD FMLA CKD-EPI: >60 ML/MIN/1.73/M2
GLUCOSE SERPL-MCNC: 96 MG/DL (ref 70–110)
GLUCOSE UR QL STRIP: ABNORMAL
HCT VFR BLD AUTO: 37.6 % (ref 37–48.5)
HGB BLD-MCNC: 11.9 GM/DL (ref 12–16)
HGB UR QL STRIP: ABNORMAL
IMM GRANULOCYTES # BLD AUTO: 0.03 K/UL (ref 0–0.04)
IMM GRANULOCYTES NFR BLD AUTO: 0.3 % (ref 0–0.5)
KETONES UR QL STRIP: NEGATIVE
LEUKOCYTE ESTERASE UR QL STRIP: NEGATIVE
LYMPHOCYTES # BLD AUTO: 0.67 K/UL (ref 1–4.8)
MCH RBC QN AUTO: 28.5 PG (ref 27–31)
MCHC RBC AUTO-ENTMCNC: 31.6 G/DL (ref 32–36)
MCV RBC AUTO: 90 FL (ref 82–98)
MICROSCOPIC COMMENT: NORMAL
NITRITE UR QL STRIP: NEGATIVE
NT-PROBNP SERPL-MCNC: 863 PG/ML
NUCLEATED RBC (/100WBC) (OHS): 0 /100 WBC
PH UR STRIP: 5 [PH]
PLATELET # BLD AUTO: 329 K/UL (ref 150–450)
PMV BLD AUTO: 10.6 FL (ref 9.2–12.9)
POTASSIUM SERPL-SCNC: 4.3 MMOL/L (ref 3.5–5.1)
PROT SERPL-MCNC: 7.8 GM/DL (ref 6–8.4)
PROT UR QL STRIP: NEGATIVE
RBC # BLD AUTO: 4.17 M/UL (ref 4–5.4)
RBC #/AREA URNS AUTO: 3 /HPF (ref 0–4)
RELATIVE EOSINOPHIL (OHS): 0.8 %
RELATIVE LYMPHOCYTE (OHS): 6.2 % (ref 18–48)
RELATIVE MONOCYTE (OHS): 4.7 % (ref 4–15)
RELATIVE NEUTROPHIL (OHS): 87.6 % (ref 38–73)
SODIUM SERPL-SCNC: 140 MMOL/L (ref 136–145)
SP GR UR STRIP: 1.03
SQUAMOUS #/AREA URNS AUTO: 2 /HPF
TROPONIN I SERPL HS-MCNC: 40 NG/L
TROPONIN I SERPL HS-MCNC: 49 NG/L
UROBILINOGEN UR STRIP-ACNC: NEGATIVE EU/DL
WBC # BLD AUTO: 10.88 K/UL (ref 3.9–12.7)
YEAST UR QL AUTO: NORMAL /HPF

## 2025-08-11 PROCEDURE — 83880 ASSAY OF NATRIURETIC PEPTIDE: CPT | Performed by: PHYSICIAN ASSISTANT

## 2025-08-11 PROCEDURE — 93010 ELECTROCARDIOGRAM REPORT: CPT | Mod: ,,, | Performed by: INTERNAL MEDICINE

## 2025-08-11 PROCEDURE — 93005 ELECTROCARDIOGRAM TRACING: CPT

## 2025-08-11 PROCEDURE — 96365 THER/PROPH/DIAG IV INF INIT: CPT

## 2025-08-11 PROCEDURE — 99285 EMERGENCY DEPT VISIT HI MDM: CPT | Mod: 25

## 2025-08-11 PROCEDURE — 84484 ASSAY OF TROPONIN QUANT: CPT | Performed by: PHYSICIAN ASSISTANT

## 2025-08-11 PROCEDURE — 81003 URINALYSIS AUTO W/O SCOPE: CPT | Performed by: PHYSICIAN ASSISTANT

## 2025-08-11 PROCEDURE — 25000003 PHARM REV CODE 250: Performed by: STUDENT IN AN ORGANIZED HEALTH CARE EDUCATION/TRAINING PROGRAM

## 2025-08-11 PROCEDURE — 85025 COMPLETE CBC W/AUTO DIFF WBC: CPT | Performed by: PHYSICIAN ASSISTANT

## 2025-08-11 PROCEDURE — 82435 ASSAY OF BLOOD CHLORIDE: CPT | Performed by: PHYSICIAN ASSISTANT

## 2025-08-11 PROCEDURE — 81025 URINE PREGNANCY TEST: CPT | Performed by: PHYSICIAN ASSISTANT

## 2025-08-11 PROCEDURE — 63600175 PHARM REV CODE 636 W HCPCS: Performed by: STUDENT IN AN ORGANIZED HEALTH CARE EDUCATION/TRAINING PROGRAM

## 2025-08-11 PROCEDURE — 96366 THER/PROPH/DIAG IV INF ADDON: CPT

## 2025-08-11 RX ORDER — HEPARIN 100 UNIT/ML
500 SYRINGE INTRAVENOUS
OUTPATIENT
Start: 2025-08-18

## 2025-08-11 RX ORDER — SODIUM CHLORIDE 0.9 % (FLUSH) 0.9 %
10 SYRINGE (ML) INJECTION
OUTPATIENT
Start: 2025-08-18

## 2025-08-11 RX ORDER — EPINEPHRINE 0.3 MG/.3ML
0.3 INJECTION SUBCUTANEOUS ONCE AS NEEDED
OUTPATIENT
Start: 2025-08-18

## 2025-08-11 RX ADMIN — IRON SUCROSE 400 MG: 20 INJECTION, SOLUTION INTRAVENOUS at 08:08

## 2025-08-12 ENCOUNTER — TELEPHONE (OUTPATIENT)
Dept: TRANSPLANT | Facility: CLINIC | Age: 40
End: 2025-08-12
Payer: MEDICAID

## 2025-08-12 ENCOUNTER — OFFICE VISIT (OUTPATIENT)
Dept: GASTROENTEROLOGY | Facility: CLINIC | Age: 40
End: 2025-08-12
Payer: MEDICAID

## 2025-08-12 VITALS
DIASTOLIC BLOOD PRESSURE: 60 MMHG | WEIGHT: 175.69 LBS | HEART RATE: 76 BPM | BODY MASS INDEX: 26.02 KG/M2 | SYSTOLIC BLOOD PRESSURE: 92 MMHG | HEIGHT: 69 IN

## 2025-08-12 DIAGNOSIS — K59.04 CHRONIC IDIOPATHIC CONSTIPATION: Primary | ICD-10-CM

## 2025-08-12 LAB
HOLD SPECIMEN: NORMAL
OHS QRS DURATION: 94 MS
OHS QTC CALCULATION: 472 MS

## 2025-08-12 PROCEDURE — 99213 OFFICE O/P EST LOW 20 MIN: CPT | Mod: PBBFAC

## 2025-08-12 PROCEDURE — 3078F DIAST BP <80 MM HG: CPT | Mod: CPTII,,,

## 2025-08-12 PROCEDURE — 4010F ACE/ARB THERAPY RXD/TAKEN: CPT | Mod: CPTII,,,

## 2025-08-12 PROCEDURE — 99999 PR PBB SHADOW E&M-EST. PATIENT-LVL III: CPT | Mod: PBBFAC,,,

## 2025-08-12 PROCEDURE — 3074F SYST BP LT 130 MM HG: CPT | Mod: CPTII,,,

## 2025-08-12 PROCEDURE — 3044F HG A1C LEVEL LT 7.0%: CPT | Mod: CPTII,,,

## 2025-08-12 PROCEDURE — 3008F BODY MASS INDEX DOCD: CPT | Mod: CPTII,,,

## 2025-08-12 PROCEDURE — 99214 OFFICE O/P EST MOD 30 MIN: CPT | Mod: S$PBB,,,

## 2025-08-12 PROCEDURE — 1159F MED LIST DOCD IN RCRD: CPT | Mod: CPTII,,,

## 2025-08-13 ENCOUNTER — HOSPITAL ENCOUNTER (OUTPATIENT)
Dept: CARDIOLOGY | Facility: HOSPITAL | Age: 40
Discharge: HOME | End: 2025-08-13
Attending: INTERNAL MEDICINE | Admitting: INTERNAL MEDICINE
Payer: MEDICAID

## 2025-08-13 ENCOUNTER — DOCUMENTATION ONLY (OUTPATIENT)
Dept: ELECTROPHYSIOLOGY | Facility: CLINIC | Age: 40
End: 2025-08-13
Payer: MEDICAID

## 2025-08-13 ENCOUNTER — HOSPITAL ENCOUNTER (OUTPATIENT)
Facility: HOSPITAL | Age: 40
Discharge: HOME | End: 2025-08-13
Attending: INTERNAL MEDICINE | Admitting: INTERNAL MEDICINE
Payer: MEDICAID

## 2025-08-13 ENCOUNTER — DOCUMENTATION ONLY (OUTPATIENT)
Dept: CARDIOLOGY | Facility: HOSPITAL | Age: 40
End: 2025-08-13
Payer: MEDICAID

## 2025-08-13 VITALS
HEART RATE: 64 BPM | OXYGEN SATURATION: 100 % | RESPIRATION RATE: 18 BRPM | TEMPERATURE: 97 F | HEIGHT: 69 IN | DIASTOLIC BLOOD PRESSURE: 81 MMHG | BODY MASS INDEX: 25.62 KG/M2 | WEIGHT: 173 LBS | SYSTOLIC BLOOD PRESSURE: 136 MMHG

## 2025-08-13 VITALS
DIASTOLIC BLOOD PRESSURE: 66 MMHG | BODY MASS INDEX: 25.76 KG/M2 | HEART RATE: 73 BPM | WEIGHT: 173.94 LBS | HEIGHT: 69 IN | SYSTOLIC BLOOD PRESSURE: 88 MMHG

## 2025-08-13 DIAGNOSIS — I50.42 CHRONIC COMBINED SYSTOLIC AND DIASTOLIC CONGESTIVE HEART FAILURE: ICD-10-CM

## 2025-08-13 DIAGNOSIS — I50.9 CONGESTIVE HEART FAILURE, UNSPECIFIED HF CHRONICITY, UNSPECIFIED HEART FAILURE TYPE: ICD-10-CM

## 2025-08-13 PROBLEM — F80.81 STUTTERING: Status: ACTIVE | Noted: 2025-08-13

## 2025-08-13 LAB
B-HCG UR QL: NEGATIVE
CTP QC/QA: YES
CV STRESS BASE HR: 73 BPM
DIASTOLIC BLOOD PRESSURE: 66 MMHG
OHS CV CPX 1 MINUTE RECOVERY HEART RATE: 96 BPM
OHS CV CPX 85 PERCENT MAX PREDICTED HEART RATE MALE: 154
OHS CV CPX ANAEROBIC THRESHOLD DIASTOLIC BLOOD PRESSURE: 85 MMHG
OHS CV CPX ANAEROBIC THRESHOLD HEART RATE: 103
OHS CV CPX ANAEROBIC THRESHOLD RATE PRESSURE PRODUCT: NORMAL
OHS CV CPX ANAEROBIC THRESHOLD SYSTOLIC BLOOD PRESSURE: 139
OHS CV CPX DATA GRADE - AT: 2.7
OHS CV CPX DATA GRADE - PEAK: 6
OHS CV CPX DATA O2 SAT - PEAK: 83
OHS CV CPX DATA O2 SAT - REST: 93
OHS CV CPX DATA SPEED - AT: 2.3
OHS CV CPX DATA SPEED - PEAK: 3.9
OHS CV CPX DATA TIME - AT: 4.38
OHS CV CPX DATA TIME - PEAK: 9.25
OHS CV CPX DATA VE/VCO2 - AT: 38
OHS CV CPX DATA VE/VCO2 - PEAK: 41
OHS CV CPX DATA VE/VO2 - AT: 29
OHS CV CPX DATA VE/VO2 - PEAK: 35
OHS CV CPX DATA VO2 - AT: 10.9
OHS CV CPX DATA VO2 - PEAK: 13.1
OHS CV CPX DATA VO2 - REST: 3.7
OHS CV CPX FEV1/FVC: 0.84
OHS CV CPX FORCED EXPIRATORY VOLUME: 2.09
OHS CV CPX FORCED VITAL CAPACITY (FVC): 2.5
OHS CV CPX HIGHEST VO: 35.2
OHS CV CPX MAX PREDICTED HEART RATE: 181
OHS CV CPX MAXIMAL VOLUNTARY VENTILATION (MVV) PREDICTED: 83.6
OHS CV CPX MAXIMAL VOLUNTARY VENTILATION (MVV): 46
OHS CV CPX MAXIUMUM EXERCISE VENTILATION (VE MAX): 39.6
OHS CV CPX PATIENT AGE: 39
OHS CV CPX PATIENT HEIGHT IN: 69
OHS CV CPX PATIENT HEIGHT IN: 69
OHS CV CPX PATIENT IS FEMALE AGE 11-19: 0
OHS CV CPX PATIENT IS FEMALE AGE GREATER THAN 19: 1
OHS CV CPX PATIENT IS FEMALE AGE LESS THAN 11: 0
OHS CV CPX PATIENT IS FEMALE: 1
OHS CV CPX PATIENT IS MALE AGE 11-25: 0
OHS CV CPX PATIENT IS MALE AGE GREATER THAN 25: 0
OHS CV CPX PATIENT IS MALE AGE LESS THAN 11: 0
OHS CV CPX PATIENT IS MALE GREATER THAN 18: 0
OHS CV CPX PATIENT IS MALE LESS THAN OR EQUAL TO 18: 0
OHS CV CPX PATIENT IS MALE: 0
OHS CV CPX PATIENT WEIGHT RETURNED IN OZ: 2783.09
OHS CV CPX PEAK DIASTOLIC BLOOD PRESSURE: 57 MMHG
OHS CV CPX PEAK HEAR RATE: 122 BPM
OHS CV CPX PEAK RATE PRESSURE PRODUCT: NORMAL
OHS CV CPX PEAK SYSTOLIC BLOOD PRESSURE: 148 MMHG
OHS CV CPX PERCENT BODY FAT: 21.7
OHS CV CPX PERCENT MAX PREDICTED HEART RATE ACHIEVED: 71
OHS CV CPX PREDICTED VO2: 35.2 ML/KG/MIN
OHS CV CPX RATE PRESSURE PRODUCT PRESENTING: 6424
OHS CV CPX REST PET CO2: 25
OHS CV CPX VE/VCO2 SLOPE: 36.1
POCT GLUCOSE: 80 MG/DL (ref 70–110)
STRESS ECHO POST EXERCISE DUR MIN: 9 MINUTES
STRESS ECHO POST EXERCISE DUR SEC: 15 SECONDS
SYSTOLIC BLOOD PRESSURE: 88 MMHG

## 2025-08-13 PROCEDURE — 94621 CARDIOPULM EXERCISE TESTING: CPT

## 2025-08-13 PROCEDURE — 93451 RIGHT HEART CATH: CPT | Mod: 26,,, | Performed by: INTERNAL MEDICINE

## 2025-08-13 PROCEDURE — 63600175 PHARM REV CODE 636 W HCPCS: Performed by: INTERNAL MEDICINE

## 2025-08-13 PROCEDURE — 93451 RIGHT HEART CATH: CPT | Performed by: INTERNAL MEDICINE

## 2025-08-13 PROCEDURE — C1894 INTRO/SHEATH, NON-LASER: HCPCS | Performed by: INTERNAL MEDICINE

## 2025-08-13 PROCEDURE — C1751 CATH, INF, PER/CENT/MIDLINE: HCPCS | Performed by: INTERNAL MEDICINE

## 2025-08-13 PROCEDURE — 94621 CARDIOPULM EXERCISE TESTING: CPT | Mod: 26,,, | Performed by: INTERNAL MEDICINE

## 2025-08-13 PROCEDURE — 81025 URINE PREGNANCY TEST: CPT | Performed by: INTERNAL MEDICINE

## 2025-08-13 RX ORDER — ONDANSETRON 4 MG/1
4 TABLET, FILM COATED ORAL ONCE
Status: DISCONTINUED | OUTPATIENT
Start: 2025-08-13 | End: 2025-08-13 | Stop reason: HOSPADM

## 2025-08-13 RX ORDER — LIDOCAINE HYDROCHLORIDE 20 MG/ML
INJECTION, SOLUTION EPIDURAL; INFILTRATION; INTRACAUDAL; PERINEURAL
Status: DISCONTINUED | OUTPATIENT
Start: 2025-08-13 | End: 2025-08-13 | Stop reason: HOSPADM

## 2025-08-18 ENCOUNTER — INFUSION (OUTPATIENT)
Dept: INFUSION THERAPY | Facility: HOSPITAL | Age: 40
End: 2025-08-18
Payer: MEDICAID

## 2025-08-18 VITALS
TEMPERATURE: 98 F | RESPIRATION RATE: 16 BRPM | HEART RATE: 52 BPM | DIASTOLIC BLOOD PRESSURE: 53 MMHG | OXYGEN SATURATION: 100 % | SYSTOLIC BLOOD PRESSURE: 83 MMHG

## 2025-08-18 DIAGNOSIS — D50.0 IRON DEFICIENCY ANEMIA DUE TO CHRONIC BLOOD LOSS: Primary | ICD-10-CM

## 2025-08-18 PROCEDURE — 63600175 PHARM REV CODE 636 W HCPCS: Performed by: STUDENT IN AN ORGANIZED HEALTH CARE EDUCATION/TRAINING PROGRAM

## 2025-08-18 PROCEDURE — 96365 THER/PROPH/DIAG IV INF INIT: CPT

## 2025-08-18 PROCEDURE — 96366 THER/PROPH/DIAG IV INF ADDON: CPT

## 2025-08-18 PROCEDURE — 25000003 PHARM REV CODE 250: Performed by: STUDENT IN AN ORGANIZED HEALTH CARE EDUCATION/TRAINING PROGRAM

## 2025-08-18 RX ORDER — SODIUM CHLORIDE 0.9 % (FLUSH) 0.9 %
10 SYRINGE (ML) INJECTION
OUTPATIENT
Start: 2025-08-18

## 2025-08-18 RX ORDER — EPINEPHRINE 0.3 MG/.3ML
0.3 INJECTION SUBCUTANEOUS ONCE AS NEEDED
OUTPATIENT
Start: 2025-08-18 | End: 2037-01-13

## 2025-08-18 RX ORDER — HEPARIN 100 UNIT/ML
500 SYRINGE INTRAVENOUS
OUTPATIENT
Start: 2025-08-18

## 2025-08-18 RX ORDER — EPINEPHRINE 0.3 MG/.3ML
0.3 INJECTION SUBCUTANEOUS ONCE AS NEEDED
Status: DISCONTINUED | OUTPATIENT
Start: 2025-08-18 | End: 2025-08-18 | Stop reason: HOSPADM

## 2025-08-18 RX ADMIN — IRON SUCROSE 400 MG: 20 INJECTION, SOLUTION INTRAVENOUS at 08:08

## 2025-08-19 ENCOUNTER — OFFICE VISIT (OUTPATIENT)
Dept: TRANSPLANT | Facility: CLINIC | Age: 40
End: 2025-08-19
Attending: INTERNAL MEDICINE
Payer: MEDICAID

## 2025-08-19 ENCOUNTER — TELEPHONE (OUTPATIENT)
Dept: TRANSPLANT | Facility: CLINIC | Age: 40
End: 2025-08-19
Payer: MEDICAID

## 2025-08-19 ENCOUNTER — EDUCATION (OUTPATIENT)
Dept: TRANSPLANT | Facility: CLINIC | Age: 40
End: 2025-08-19
Payer: MEDICAID

## 2025-08-19 ENCOUNTER — PATIENT MESSAGE (OUTPATIENT)
Dept: TRANSPLANT | Facility: CLINIC | Age: 40
End: 2025-08-19

## 2025-08-19 VITALS
DIASTOLIC BLOOD PRESSURE: 54 MMHG | HEIGHT: 69 IN | WEIGHT: 176.56 LBS | SYSTOLIC BLOOD PRESSURE: 91 MMHG | OXYGEN SATURATION: 100 % | BODY MASS INDEX: 26.15 KG/M2 | HEART RATE: 55 BPM

## 2025-08-19 DIAGNOSIS — I50.42 CHRONIC COMBINED SYSTOLIC AND DIASTOLIC CONGESTIVE HEART FAILURE: Primary | ICD-10-CM

## 2025-08-19 DIAGNOSIS — I49.3 FREQUENT PVCS: ICD-10-CM

## 2025-08-19 DIAGNOSIS — Z95.810 ICD (IMPLANTABLE CARDIOVERTER-DEFIBRILLATOR), SINGLE, IN SITU: ICD-10-CM

## 2025-08-19 DIAGNOSIS — E61.1 IRON DEFICIENCY: ICD-10-CM

## 2025-08-19 DIAGNOSIS — I42.8 NONISCHEMIC CARDIOMYOPATHY: Chronic | ICD-10-CM

## 2025-08-19 PROCEDURE — 99214 OFFICE O/P EST MOD 30 MIN: CPT | Mod: PBBFAC | Performed by: INTERNAL MEDICINE

## 2025-08-19 PROCEDURE — 3074F SYST BP LT 130 MM HG: CPT | Mod: CPTII,,, | Performed by: INTERNAL MEDICINE

## 2025-08-19 PROCEDURE — 4010F ACE/ARB THERAPY RXD/TAKEN: CPT | Mod: CPTII,,, | Performed by: INTERNAL MEDICINE

## 2025-08-19 PROCEDURE — 3008F BODY MASS INDEX DOCD: CPT | Mod: CPTII,,, | Performed by: INTERNAL MEDICINE

## 2025-08-19 PROCEDURE — 1160F RVW MEDS BY RX/DR IN RCRD: CPT | Mod: CPTII,,, | Performed by: INTERNAL MEDICINE

## 2025-08-19 PROCEDURE — 99999 PR PBB SHADOW E&M-EST. PATIENT-LVL IV: CPT | Mod: PBBFAC,,, | Performed by: INTERNAL MEDICINE

## 2025-08-19 PROCEDURE — 3078F DIAST BP <80 MM HG: CPT | Mod: CPTII,,, | Performed by: INTERNAL MEDICINE

## 2025-08-19 PROCEDURE — 3044F HG A1C LEVEL LT 7.0%: CPT | Mod: CPTII,,, | Performed by: INTERNAL MEDICINE

## 2025-08-19 PROCEDURE — 99215 OFFICE O/P EST HI 40 MIN: CPT | Mod: S$PBB,,, | Performed by: INTERNAL MEDICINE

## 2025-08-19 PROCEDURE — 1159F MED LIST DOCD IN RCRD: CPT | Mod: CPTII,,, | Performed by: INTERNAL MEDICINE

## 2025-08-29 ENCOUNTER — TELEPHONE (OUTPATIENT)
Dept: TRANSPLANT | Facility: CLINIC | Age: 40
End: 2025-08-29
Payer: MEDICAID

## 2025-08-29 DIAGNOSIS — Q28.2 ARTERIOVENOUS MALFORMATION OF CEREBRAL VESSELS: Primary | ICD-10-CM

## 2025-08-29 DIAGNOSIS — I42.8 NONISCHEMIC CARDIOMYOPATHY: ICD-10-CM

## 2025-08-29 DIAGNOSIS — R79.9 ABNORMAL BLOOD CHEMISTRY: ICD-10-CM

## 2025-08-29 DIAGNOSIS — Z76.82 ORGAN TRANSPLANT CANDIDATE: ICD-10-CM

## 2025-09-01 ENCOUNTER — HOSPITAL ENCOUNTER (EMERGENCY)
Facility: HOSPITAL | Age: 40
Discharge: HOME | End: 2025-09-01
Attending: EMERGENCY MEDICINE
Payer: MEDICAID

## 2025-09-01 VITALS
HEART RATE: 55 BPM | RESPIRATION RATE: 22 BRPM | DIASTOLIC BLOOD PRESSURE: 69 MMHG | SYSTOLIC BLOOD PRESSURE: 120 MMHG | BODY MASS INDEX: 25.99 KG/M2 | WEIGHT: 176 LBS | TEMPERATURE: 98 F | OXYGEN SATURATION: 100 %

## 2025-09-01 DIAGNOSIS — R07.89 CHEST TIGHTNESS: ICD-10-CM

## 2025-09-01 DIAGNOSIS — R07.9 CHEST PAIN: Primary | ICD-10-CM

## 2025-09-01 DIAGNOSIS — R06.02 SHORTNESS OF BREATH: ICD-10-CM

## 2025-09-01 DIAGNOSIS — I50.42 CHRONIC COMBINED SYSTOLIC AND DIASTOLIC CONGESTIVE HEART FAILURE: ICD-10-CM

## 2025-09-01 LAB
ABSOLUTE EOSINOPHIL (OHS): 0.08 K/UL
ABSOLUTE MONOCYTE (OHS): 0.79 K/UL (ref 0.3–1)
ABSOLUTE NEUTROPHIL COUNT (OHS): 5.5 K/UL (ref 1.8–7.7)
ALBUMIN SERPL BCP-MCNC: 4.1 G/DL (ref 3.5–5.2)
ALP SERPL-CCNC: 57 UNIT/L (ref 40–150)
ALT SERPL W/O P-5'-P-CCNC: 26 UNIT/L (ref 10–44)
ANION GAP (OHS): 10 MMOL/L (ref 8–16)
AST SERPL-CCNC: 24 UNIT/L (ref 11–45)
B-HCG UR QL: NEGATIVE
BASOPHILS # BLD AUTO: 0.04 K/UL
BASOPHILS NFR BLD AUTO: 0.5 %
BILIRUB SERPL-MCNC: 0.5 MG/DL (ref 0.1–1)
BUN SERPL-MCNC: 18 MG/DL (ref 6–20)
CALCIUM SERPL-MCNC: 9.2 MG/DL (ref 8.7–10.5)
CHLORIDE SERPL-SCNC: 104 MMOL/L (ref 95–110)
CO2 SERPL-SCNC: 26 MMOL/L (ref 23–29)
CREAT SERPL-MCNC: 0.9 MG/DL (ref 0.5–1.4)
CTP QC/QA: YES
ERYTHROCYTE [DISTWIDTH] IN BLOOD BY AUTOMATED COUNT: 14.9 % (ref 11.5–14.5)
GFR SERPLBLD CREATININE-BSD FMLA CKD-EPI: >60 ML/MIN/1.73/M2
GLUCOSE SERPL-MCNC: 82 MG/DL (ref 70–110)
HCT VFR BLD AUTO: 39.4 % (ref 37–48.5)
HGB BLD-MCNC: 12.4 GM/DL (ref 12–16)
IMM GRANULOCYTES # BLD AUTO: 0.02 K/UL (ref 0–0.04)
IMM GRANULOCYTES NFR BLD AUTO: 0.2 % (ref 0–0.5)
INR PPP: 1 (ref 0.8–1.2)
LYMPHOCYTES # BLD AUTO: 1.69 K/UL (ref 1–4.8)
MAGNESIUM SERPL-MCNC: 2.2 MG/DL (ref 1.6–2.6)
MCH RBC QN AUTO: 28.8 PG (ref 27–31)
MCHC RBC AUTO-ENTMCNC: 31.5 G/DL (ref 32–36)
MCV RBC AUTO: 91 FL (ref 82–98)
NT-PROBNP SERPL-MCNC: 807 PG/ML
NUCLEATED RBC (/100WBC) (OHS): 0 /100 WBC
PLATELET # BLD AUTO: 309 K/UL (ref 150–450)
PMV BLD AUTO: 10.5 FL (ref 9.2–12.9)
POTASSIUM SERPL-SCNC: 4.1 MMOL/L (ref 3.5–5.1)
PROT SERPL-MCNC: 7.7 GM/DL (ref 6–8.4)
PROTHROMBIN TIME: 11.4 SECONDS (ref 9–12.5)
RBC # BLD AUTO: 4.31 M/UL (ref 4–5.4)
RELATIVE EOSINOPHIL (OHS): 1 %
RELATIVE LYMPHOCYTE (OHS): 20.8 % (ref 18–48)
RELATIVE MONOCYTE (OHS): 9.7 % (ref 4–15)
RELATIVE NEUTROPHIL (OHS): 67.8 % (ref 38–73)
SODIUM SERPL-SCNC: 140 MMOL/L (ref 136–145)
TROPONIN I SERPL HS-MCNC: 4 NG/L
WBC # BLD AUTO: 8.12 K/UL (ref 3.9–12.7)

## 2025-09-01 PROCEDURE — 81025 URINE PREGNANCY TEST: CPT | Mod: NTX | Performed by: EMERGENCY MEDICINE

## 2025-09-01 PROCEDURE — 93005 ELECTROCARDIOGRAM TRACING: CPT | Mod: NTX

## 2025-09-01 PROCEDURE — 83880 ASSAY OF NATRIURETIC PEPTIDE: CPT | Mod: NTX | Performed by: EMERGENCY MEDICINE

## 2025-09-01 PROCEDURE — 93010 ELECTROCARDIOGRAM REPORT: CPT | Mod: NTX,,, | Performed by: INTERNAL MEDICINE

## 2025-09-01 PROCEDURE — 85025 COMPLETE CBC W/AUTO DIFF WBC: CPT | Mod: NTX | Performed by: EMERGENCY MEDICINE

## 2025-09-01 PROCEDURE — 99285 EMERGENCY DEPT VISIT HI MDM: CPT | Mod: 25,NTX

## 2025-09-01 PROCEDURE — 85610 PROTHROMBIN TIME: CPT | Mod: NTX | Performed by: EMERGENCY MEDICINE

## 2025-09-01 PROCEDURE — 84484 ASSAY OF TROPONIN QUANT: CPT | Mod: NTX | Performed by: EMERGENCY MEDICINE

## 2025-09-01 PROCEDURE — 80053 COMPREHEN METABOLIC PANEL: CPT | Mod: NTX | Performed by: EMERGENCY MEDICINE

## 2025-09-01 PROCEDURE — 83735 ASSAY OF MAGNESIUM: CPT | Mod: NTX | Performed by: EMERGENCY MEDICINE

## 2025-09-02 ENCOUNTER — TELEPHONE (OUTPATIENT)
Dept: CARDIOLOGY | Facility: HOSPITAL | Age: 40
End: 2025-09-02
Payer: MEDICAID

## 2025-09-03 LAB
OHS QRS DURATION: 100 MS
OHS QTC CALCULATION: 424 MS

## 2025-09-05 ENCOUNTER — OFFICE VISIT (OUTPATIENT)
Dept: CARDIOLOGY | Facility: CLINIC | Age: 40
End: 2025-09-05
Payer: MEDICAID

## 2025-09-05 VITALS
RESPIRATION RATE: 18 BRPM | HEIGHT: 69 IN | BODY MASS INDEX: 25.89 KG/M2 | WEIGHT: 174.81 LBS | DIASTOLIC BLOOD PRESSURE: 68 MMHG | HEART RATE: 60 BPM | SYSTOLIC BLOOD PRESSURE: 122 MMHG

## 2025-09-05 DIAGNOSIS — I42.8 NONISCHEMIC CARDIOMYOPATHY: ICD-10-CM

## 2025-09-05 DIAGNOSIS — I50.42 CHRONIC COMBINED SYSTOLIC AND DIASTOLIC CONGESTIVE HEART FAILURE: Primary | ICD-10-CM

## 2025-09-05 DIAGNOSIS — E78.5 DYSLIPIDEMIA: ICD-10-CM

## 2025-09-05 DIAGNOSIS — I49.3 PVC (PREMATURE VENTRICULAR CONTRACTION): ICD-10-CM

## 2025-09-05 DIAGNOSIS — I10 ESSENTIAL HYPERTENSION: ICD-10-CM

## 2025-09-05 DIAGNOSIS — I48.0 PAROXYSMAL ATRIAL FIBRILLATION: Primary | ICD-10-CM

## 2025-09-05 DIAGNOSIS — Z95.810 ICD (IMPLANTABLE CARDIOVERTER-DEFIBRILLATOR), SINGLE, IN SITU: ICD-10-CM

## 2025-09-05 PROCEDURE — 99999 PR PBB SHADOW E&M-EST. PATIENT-LVL IV: CPT | Mod: PBBFAC,TXP,, | Performed by: INTERNAL MEDICINE

## 2025-09-05 PROCEDURE — 99214 OFFICE O/P EST MOD 30 MIN: CPT | Mod: PBBFAC,PO,TXP | Performed by: INTERNAL MEDICINE

## 2025-09-05 RX ORDER — EPLERENONE 25 MG/1
25 TABLET ORAL DAILY
Qty: 90 TABLET | Refills: 3 | Status: SHIPPED | OUTPATIENT
Start: 2025-09-05 | End: 2026-09-05

## 2025-09-05 RX ORDER — ATORVASTATIN CALCIUM 80 MG/1
40 TABLET, FILM COATED ORAL NIGHTLY
Start: 2025-09-05 | End: 2025-09-05

## 2025-09-05 RX ORDER — ATORVASTATIN CALCIUM 40 MG/1
40 TABLET, FILM COATED ORAL NIGHTLY
Start: 2025-09-05

## 2025-09-05 RX ORDER — EPLERENONE 25 MG/1
25 TABLET ORAL DAILY
Status: DISCONTINUED | OUTPATIENT
Start: 2025-09-05 | End: 2025-09-05

## (undated) DEVICE — OMNIPAQUE CONTRAST 350MG/100ML

## (undated) DEVICE — PAD RADI FEMORAL

## (undated) DEVICE — TRANSDUCER ADULT DISP

## (undated) DEVICE — CATH SWAN GANZ STND 7FR

## (undated) DEVICE — Device

## (undated) DEVICE — DRESSING TRANS 4X4 TEGADERM

## (undated) DEVICE — KIT MICROINTRODUCE MINI 5X10CM

## (undated) DEVICE — ANGIOTOUCH KIT

## (undated) DEVICE — WIRE GUIDE SAFE-T-J .035 260CM

## (undated) DEVICE — SHEATH INTRODUCER 7FR 11CM

## (undated) DEVICE — PAD DEFIB CADENCE ADULT R2

## (undated) DEVICE — KIT MANIFOLD LOW PRESS TUBING

## (undated) DEVICE — KIT PROBE COVER WITH GEL

## (undated) DEVICE — SEE MEDLINE ITEM 156894

## (undated) DEVICE — BAND TR COMP DEVICE REG 24CM

## (undated) DEVICE — SET CO-SET CLOSED INJ

## (undated) DEVICE — COVER PROBE US 5.5X58L NON LTX

## (undated) DEVICE — LINE INJECTION CLEARACIL 25.4

## (undated) DEVICE — TRAY CATH LAB OMC

## (undated) DEVICE — KIT SYR REUSABLE

## (undated) DEVICE — CATH JACKY RADIAL 3.5 110CM

## (undated) DEVICE — PACK CATH LAB

## (undated) DEVICE — KIT GLIDESHEATH SLEND 6FR 10CM